# Patient Record
Sex: MALE | Race: WHITE | NOT HISPANIC OR LATINO | Employment: FULL TIME | URBAN - METROPOLITAN AREA
[De-identification: names, ages, dates, MRNs, and addresses within clinical notes are randomized per-mention and may not be internally consistent; named-entity substitution may affect disease eponyms.]

---

## 2017-08-03 ENCOUNTER — APPOINTMENT (OUTPATIENT)
Dept: LAB | Facility: MEDICAL CENTER | Age: 67
End: 2017-08-03

## 2017-08-03 ENCOUNTER — TRANSCRIBE ORDERS (OUTPATIENT)
Dept: URGENT CARE | Facility: MEDICAL CENTER | Age: 67
End: 2017-08-03

## 2017-08-03 ENCOUNTER — APPOINTMENT (OUTPATIENT)
Dept: URGENT CARE | Facility: MEDICAL CENTER | Age: 67
End: 2017-08-03

## 2017-08-03 DIAGNOSIS — Z02.1 PHYSICAL EXAM, PRE-EMPLOYMENT: ICD-10-CM

## 2017-08-03 DIAGNOSIS — Z02.1 PHYSICAL EXAM, PRE-EMPLOYMENT: Primary | ICD-10-CM

## 2017-08-03 LAB — RUBV IGG SERPL IA-ACNC: 56.5 IU/ML

## 2017-08-03 PROCEDURE — 86480 TB TEST CELL IMMUN MEASURE: CPT

## 2017-08-03 PROCEDURE — 86762 RUBELLA ANTIBODY: CPT

## 2017-08-03 PROCEDURE — 86735 MUMPS ANTIBODY: CPT

## 2017-08-03 PROCEDURE — 86787 VARICELLA-ZOSTER ANTIBODY: CPT

## 2017-08-03 PROCEDURE — 36415 COLL VENOUS BLD VENIPUNCTURE: CPT

## 2017-08-03 PROCEDURE — 86765 RUBEOLA ANTIBODY: CPT

## 2017-08-06 LAB
ANNOTATION COMMENT IMP: NORMAL
GAMMA INTERFERON BACKGROUND BLD IA-ACNC: 0.03 IU/ML
M TB IFN-G BLD-IMP: NEGATIVE
M TB IFN-G CD4+ BCKGRND COR BLD-ACNC: 0 IU/ML
M TB IFN-G CD4+ T-CELLS BLD-ACNC: 0.03 IU/ML
MITOGEN IGNF BLD-ACNC: 5.43 IU/ML
QUANTIFERON-TB GOLD IN TUBE: NORMAL
SERVICE CMNT-IMP: NORMAL

## 2017-08-08 LAB
MEV IGG SER QL: NORMAL
VZV IGG SER IA-ACNC: NORMAL

## 2017-08-10 LAB — MUV IGG SER QL: NORMAL

## 2020-12-29 ENCOUNTER — IMMUNIZATIONS (OUTPATIENT)
Dept: FAMILY MEDICINE CLINIC | Facility: HOSPITAL | Age: 70
End: 2020-12-29

## 2020-12-29 DIAGNOSIS — Z23 ENCOUNTER FOR IMMUNIZATION: ICD-10-CM

## 2020-12-29 PROCEDURE — 0011A SARS-COV-2 / COVID-19 MRNA VACCINE (MODERNA) 100 MCG: CPT

## 2020-12-29 PROCEDURE — 91301 SARS-COV-2 / COVID-19 MRNA VACCINE (MODERNA) 100 MCG: CPT

## 2021-01-06 DIAGNOSIS — Z11.59 SPECIAL SCREENING EXAMINATION FOR UNSPECIFIED VIRAL DISEASE: ICD-10-CM

## 2021-01-06 DIAGNOSIS — Z11.59 SPECIAL SCREENING EXAMINATION FOR UNSPECIFIED VIRAL DISEASE: Primary | ICD-10-CM

## 2021-01-06 LAB — SARS-COV-2 RNA RESP QL NAA+PROBE: POSITIVE

## 2021-01-06 PROCEDURE — 87635 SARS-COV-2 COVID-19 AMP PRB: CPT

## 2021-01-07 ENCOUNTER — TELEMEDICINE (OUTPATIENT)
Dept: FAMILY MEDICINE CLINIC | Facility: CLINIC | Age: 71
End: 2021-01-07
Payer: COMMERCIAL

## 2021-01-07 ENCOUNTER — HOSPITAL ENCOUNTER (OUTPATIENT)
Dept: INFUSION CENTER | Facility: HOSPITAL | Age: 71
Discharge: HOME | End: 2021-01-07
Payer: COMMERCIAL

## 2021-01-07 VITALS
OXYGEN SATURATION: 97 % | SYSTOLIC BLOOD PRESSURE: 113 MMHG | DIASTOLIC BLOOD PRESSURE: 56 MMHG | RESPIRATION RATE: 16 BRPM | TEMPERATURE: 96.5 F | HEART RATE: 55 BPM

## 2021-01-07 DIAGNOSIS — I10 ESSENTIAL HYPERTENSION: ICD-10-CM

## 2021-01-07 DIAGNOSIS — U07.1 COVID-19: Primary | ICD-10-CM

## 2021-01-07 PROCEDURE — 99214 OFFICE O/P EST MOD 30 MIN: CPT | Performed by: FAMILY MEDICINE

## 2021-01-07 PROCEDURE — M0239 BAMLANIVIMAB-XXXX INFUSION: HCPCS | Performed by: FAMILY MEDICINE

## 2021-01-07 RX ORDER — ACETAMINOPHEN 325 MG/1
650 TABLET ORAL ONCE AS NEEDED
Status: CANCELLED | OUTPATIENT
Start: 2021-01-07

## 2021-01-07 RX ORDER — SODIUM CHLORIDE 9 MG/ML
20 INJECTION, SOLUTION INTRAVENOUS ONCE
Status: CANCELLED | OUTPATIENT
Start: 2021-01-07

## 2021-01-07 RX ORDER — ALBUTEROL SULFATE 90 UG/1
3 AEROSOL, METERED RESPIRATORY (INHALATION) ONCE AS NEEDED
Status: DISCONTINUED | OUTPATIENT
Start: 2021-01-07 | End: 2021-01-10 | Stop reason: HOSPADM

## 2021-01-07 RX ORDER — ALBUTEROL SULFATE 90 UG/1
3 AEROSOL, METERED RESPIRATORY (INHALATION) ONCE AS NEEDED
Status: CANCELLED | OUTPATIENT
Start: 2021-01-07

## 2021-01-07 RX ORDER — ACETAMINOPHEN 325 MG/1
650 TABLET ORAL ONCE AS NEEDED
Status: DISCONTINUED | OUTPATIENT
Start: 2021-01-07 | End: 2021-01-10 | Stop reason: HOSPADM

## 2021-01-07 RX ORDER — SODIUM CHLORIDE 9 MG/ML
20 INJECTION, SOLUTION INTRAVENOUS ONCE
Status: COMPLETED | OUTPATIENT
Start: 2021-01-07 | End: 2021-01-07

## 2021-01-07 RX ORDER — IRBESARTAN 150 MG/1
150 TABLET ORAL
COMMUNITY
End: 2021-11-22

## 2021-01-07 RX ADMIN — SODIUM CHLORIDE 700 MG: 9 INJECTION, SOLUTION INTRAVENOUS at 13:10

## 2021-01-07 RX ADMIN — SODIUM CHLORIDE 20 ML/HR: 0.9 INJECTION, SOLUTION INTRAVENOUS at 12:44

## 2021-01-07 NOTE — PROGRESS NOTES
COVID-19 Virtual Visit     Assessment/Plan:    Problem List Items Addressed This Visit        Cardiovascular and Mediastinum    Hypertension     He notes his blood pressure has been typically well controlled  He did not have any vitals done today  Other    COVID-19 - Primary     The patient will be set up for monoclonal antibody infusion with Bamlanivimab as of today  Disposition:     I recommended self-quarantine for 10 days and to watch for symptoms until 14 days after exposure  If patient were to develop symptoms, they should self isolate and call our office for further guidance  Discussed COVID-19 antibody testing with the patient  If you choose to have a COVID-19 antibody test, you should understand some important limitations of this test   Antibody tests detect the body's immune response to infection rather than detecting the virus itself  It can take weeks for antibodies to show up in the blood  For this reason, antibody tests are not used to detect or manage infection  They may be better for tracking infections in the community  Current antibody tests have not undergone the usual strict FDA approval process  The FDA decided to make it easier to have more tests available  The result is that these new tests may not be reliable  Since COVID-19 antibody testing is so new, we do not know how accurate it is  You can have a positive test and have not actually been infected  You also can have a negative test even though you might have been infected  There are other coronaviruses that are known to cause the common cold  Many people may have antibodies to these other viruses that could make the COVID-19 antibody test positive  More importantly, even if you test positive, this does not necessarily mean you are immune to the virus  Even so, your [facility] provider understands that you may still want a COVID-19 antibody test and can order this test for you    It is important that you review the results with your provider and decide together how to use them  Patient is a candidate for Bamlanivimab  They were counseled in regards to risks, benefits, and side effects of this infusion  Possible side effects of bamlanivimab are: Allergic reactions   Allergic reactions can happen during and after infusion with bamlanivimab which include:    Fever, chills, nausea, headache, shortness of breath, low blood pressure, wheezing, swelling of your lips, face, or throat, rash including hives, itching, muscle aches, and dizziness  The side effects of getting any medicine by vein may include brief pain, bleeding, bruising of the skin, soreness, swelling, and possible infection at the infusion site  These are not all the possible side effects of bamlanivimab  Not a lot of people have been given bamlanivimab  Serious and unexpected side effects may happen  Heike Keita is still being studied so it is possible that all of the risks are not known at this time  Please note that this drug was approved under the Emergency Use Authorization of the FDA and has not gone through the full, formal FDA approval process    It is possible that bamlanivimab could interfere with your body's own ability to fight off a future infection of SARS-CoV-2  Similarly, bamlanivimab may reduce your bodys immune response to a vaccine for SARS-CoV-2  Specific studies have not been conducted to address these possible risks  Currently there is no data or safety or efficacy of COVID-19 vaccination in persons who received monoclonal antibodies as part of COVID-19 treatment  Treatment should be deferred for at least 90 days to avoid interference of the treatment with vaccine-induced immune responses (this is based on estimated half-life of therapies and evidence suggesting reinfection is uncommon within 90 days of initial infection)      The patient consents to proceed with bamlanivimab infusion  *[email or web address]  [email or web address]/[email or web address]-[email or web address]-factsheet-hcp  pdf    I have spent 15 minutes directly with the patient  Greater than 50% of this time was spent in counseling/coordination of care regarding: diagnostic results, prognosis, risks and benefits of treatment options and impressions  The patient is an excellent candidate for monoclonal antibody infusion  He agrees to contact me immediately with any worsening symptoms  We will do a follow-up visit tomorrow  Encounter provider Earl Lopez MD    Provider located at [address] [place] [address] [id number] [phone]    Recent Visits  No visits were found meeting these conditions  Showing recent visits within past 7 days and meeting all other requirements     Today's Visits  Date Type Provider Dept   01/07/21 Telemedicine Earl Lopez MD [facility]   Showing today's visits and meeting all other requirements     Future Appointments  No visits were found meeting these conditions  Showing future appointments within next 150 days and meeting all other requirements      This virtual check-in was done via [employer] and patient was informed that this is not a secure, HIPAA-compliant platform  He agrees to proceed  Patient agrees to participate in a virtual check in via telephone or video visit instead of presenting to the office to address urgent/immediate medical needs  Patient is aware this is a billable service  After connecting through [facility], the patient was identified by name and date of birth  Rosario Tyler was informed that this was a telemedicine visit and that the exam was being conducted confidentially over secure lines  My office door was closed  No one else was in the room  Rosario Tyler acknowledged consent and understanding of privacy and security of the telemedicine visit   I informed the patient that I have reviewed his record in Epic and presented the opportunity for him to ask any questions regarding the visit today  The patient agreed to participate  Subjective:   Jorge Alberto Cooper is a 79 y o  male who is concerned about COVID-19  Patient's symptoms include fatigue, anosmia, cough, chest tightness (with deep breathing), abdominal pain, diarrhea and myalgias  Patient denies fever, chills, malaise, congestion, rhinorrhea, sore throat, loss of taste, shortness of breath, nausea, vomiting and headaches  Date of symptom onset: 1/6/2020    Exposure:   Contact with a person who is under investigation (PUI) for or who is positive for COVID-19 within the last 14 days?: Yes    Hospitalized recently for fever and/or lower respiratory symptoms?: No      Currently a healthcare worker that is involved in direct patient care?: No      Works in a special setting where the risk of COVID-19 transmission may be high? (this may include long-term care, correctional and retirement facilities; homeless shelters; assisted-living facilities and group homes ): No      Resident in a special setting where the risk of COVID-19 transmission may be high? 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  He does wear proper PPE  He is not aware of any specific high risk exposure but presented with symptoms within the past 1-2 days  His symptoms include cough, mild chest tightness with deep breathing as well as diarrhea and abdominal pain  He has history of hypertension which he notes has been pretty well controlled  He is treated for depressive illness which is stable with Lexapro      Lab Results   Component Value Date    SARSCOV2 Positive (A) 01/06/2021     Past Medical History:   Diagnosis Date    Disease of thyroid gland     GERD (gastroesophageal reflux disease)     Hypertension     Tinnitus      Past Surgical History:   Procedure Laterality Date    ADENOIDECTOMY      COLONOSCOPY N/A 7/16/2016    Procedure: COLONOSCOPY;  Surgeon: Curtis Noble MD;  [facility];  Service:     HERNIA REPAIR      TONSILLECTOMY      UVULECTOMY       Current Outpatient Medications   Medication Sig Dispense Refill    aspirin 81 MG tablet Take 81 mg by mouth daily   escitalopram (LEXAPRO) 10 mg tablet       levothyroxine 125 mcg tablet Take 125 mcg by mouth daily   tamsulosin (FLOMAX) 0 4 mg Take 0 4 mg by mouth 2 (two) times a day Indications: Enlarged Prostate   valsartan (DIOVAN) 160 mg tablet Take 160 mg by mouth daily  No current facility-administered medications for this visit  No Known Allergies    Review of Systems   Constitutional: Positive for fatigue  Negative for chills and fever  HENT: Negative for congestion, rhinorrhea and sore throat  Respiratory: Positive for cough and chest tightness (with deep breathing)  Negative for shortness of breath  Cardiovascular: Negative for chest pain, palpitations and leg swelling  Gastrointestinal: Positive for abdominal pain and diarrhea  Negative for nausea and vomiting  Musculoskeletal: Positive for myalgias  Neurological: Negative for headaches  Objective: There were no vitals filed for this visit  Physical Exam  VIRTUAL VISIT DISCLAIMER    Christianne Reynoso acknowledges that he has consented to an online visit or consultation  He understands that the online visit is based solely on information provided by him, and that, in the absence of a face-to-face physical evaluation by the physician, the diagnosis he receives is both limited and provisional in terms of accuracy and completeness  This is not intended to replace a full medical face-to-face evaluation by the physician  Christianne Reynoso understands and accepts these terms

## 2021-01-07 NOTE — ASSESSMENT & PLAN NOTE
He notes his blood pressure has been typically well controlled  He did not have any vitals done today

## 2021-01-07 NOTE — PROGRESS NOTES
Observed 1 hour post infusion, no adverse effects noted  Patient left ambulatory in stable condition

## 2021-01-08 ENCOUNTER — TELEMEDICINE (OUTPATIENT)
Dept: FAMILY MEDICINE CLINIC | Facility: CLINIC | Age: 71
End: 2021-01-08
Payer: COMMERCIAL

## 2021-01-08 VITALS — OXYGEN SATURATION: 97 %

## 2021-01-08 DIAGNOSIS — U07.1 COVID-19: Primary | ICD-10-CM

## 2021-01-08 PROCEDURE — 99213 OFFICE O/P EST LOW 20 MIN: CPT | Performed by: FAMILY MEDICINE

## 2021-01-08 NOTE — LETTER
January 8, 2021     Patient: Jared Aguirre   YOB: 1950   Date of Visit: 1/8/2021       To Whom it May Concern:    Jared Aguirre is under my professional care  He was seen virtually in my office on 1/8/2021  He may return to work on 1/17/21  If you have any questions or concerns, please don't hesitate to call           Sincerely,          Alecia Radford MD        CC: No Recipients

## 2021-01-08 NOTE — PROGRESS NOTES
COVID-19 Virtual Visit     Assessment/Plan:    Problem List Items Addressed This Visit        Other    COVID-19 - Primary     The patient underwent successful monoclonal antibody treatment and is feeling better  He looks quite well on virtual exam today  He feels all of his symptoms have improved  He will contact me with any worsening symptoms  I did recommend follow-up with either myself or his primary care provider for his chronic health issues  Disposition:     I have spent 15 minutes directly with the patient  Greater than 50% of this time was spent in counseling/coordination of care regarding: prognosis and impressions  The patient underwent Bamlanivimab treatment yesterday  Encounter provider Rush Posadas MD    Provider located at [address] [place] [address] [address] [place] [phone]    Recent Visits  Date Type Provider Dept   01/07/21 Telemedicine Rush Posadas MD [facility]   Showing recent visits within past 7 days and meeting all other requirements     Today's Visits  Date Type Provider Dept   01/08/21 Telemedicine Rush Posadas MD [facility]   Showing today's visits and meeting all other requirements     Future Appointments  No visits were found meeting these conditions  Showing future appointments within next 150 days and meeting all other requirements      This virtual check-in was done via [employer] and patient was informed that this is not a secure, HIPAA-compliant platform  He agrees to proceed  Patient agrees to participate in a virtual check in via telephone or video visit instead of presenting to the office to address urgent/immediate medical needs  Patient is aware this is a billable service  After connecting through [facility], the patient was identified by name and date of birth   Alex Ngoc was informed that this was a telemedicine visit and that the exam was being conducted confidentially over secure lines  Jennifer Gusman acknowledged consent and understanding of privacy and security of the telemedicine visit  I informed the patient that I have reviewed his record in Epic and presented the opportunity for him to ask any questions regarding the visit today  The patient agreed to participate  Subjective:   Jennifer Gusman is a 79 y o  male who has been screened for COVID-19  Symptom change since last report: improving  Patient's symptoms include cough, myalgias (Improved) and headache  Patient denies fever, chills, congestion, rhinorrhea, sore throat, anosmia, loss of taste, shortness of breath, chest tightness, abdominal pain, nausea, vomiting and diarrhea  Enrico Vega has been staying home and has isolated themselves in his home  He is taking care to not share personal items and is cleaning all surfaces that are touched often, like counters, tabletops, and doorknobs using household cleaning sprays or wipes  He is wearing a mask when he leaves his room  Date of symptom onset: 1/6/2020  Date of positive COVID-19 PCR: 1/6/2021    Monoclonal Antibody Follow-up Symptom Questionnaire  I feel overall: much better  My breathing is: much better  My fever is: same  My fatigue is: much better    The patient underwent monoclonal antibody treatment yesterday  He is feeling well  He feels he is much improved  He had no issues with the vaccine  He denies any fever or chills  He is having a mild cough but denies shortness of breath  Myalgias have improved significantly  Fatigue also has improved      Lab Results   Component Value Date    SARSCOV2 Positive (A) 01/06/2021     Past Medical History:   Diagnosis Date    Disease of thyroid gland     GERD (gastroesophageal reflux disease)     Hypertension     Tinnitus      Past Surgical History:   Procedure Laterality Date    ADENOIDECTOMY      COLONOSCOPY N/A 7/16/2016    Procedure: COLONOSCOPY;  Surgeon: Nereida Cogan, MD;  [facility];  Service:     HERNIA REPAIR      TONSILLECTOMY      UVULECTOMY       Current Outpatient Medications   Medication Sig Dispense Refill    aspirin 81 MG tablet Take 81 mg by mouth daily   escitalopram (LEXAPRO) 10 mg tablet       irbesartan (AVAPRO) 150 mg tablet Take 150 mg by mouth daily at bedtime      levothyroxine 125 mcg tablet Take 125 mcg by mouth daily   tamsulosin (FLOMAX) 0 4 mg Take 0 4 mg by mouth 2 (two) times a day Indications: Enlarged Prostate   valsartan (DIOVAN) 160 mg tablet Take 160 mg by mouth daily  No current facility-administered medications for this visit  Facility-Administered Medications Ordered in Other Visits   Medication Dose Route Frequency Provider Last Rate Last Admin    acetaminophen (TYLENOL) tablet 650 mg  650 mg Oral Once PRN Diane Whalen MD        albuterol (PROVENTIL HFA,VENTOLIN HFA) inhaler 3 puff  3 puff Inhalation Once PRN Diane Whalen MD         No Known Allergies    Review of Systems   Constitutional: Negative for chills and fever  HENT: Negative for congestion, rhinorrhea and sore throat  Respiratory: Positive for cough  Negative for chest tightness and shortness of breath  Gastrointestinal: Negative for abdominal pain, diarrhea, nausea and vomiting  Musculoskeletal: Positive for myalgias (Improved)  Neurological: Positive for headaches  Objective:    Vitals:    01/08/21 1459   SpO2: 97%       Physical Exam  VIRTUAL VISIT DISCLAIMER    Alex Grossman acknowledges that he has consented to an online visit or consultation  He understands that the online visit is based solely on information provided by him, and that, in the absence of a face-to-face physical evaluation by the physician, the diagnosis he receives is both limited and provisional in terms of accuracy and completeness  This is not intended to replace a full medical face-to-face evaluation by the physician  Doreen Griffith understands and accepts these terms

## 2021-01-12 DIAGNOSIS — N40.0 BENIGN PROSTATIC HYPERPLASIA, UNSPECIFIED WHETHER LOWER URINARY TRACT SYMPTOMS PRESENT: ICD-10-CM

## 2021-01-12 DIAGNOSIS — E03.9 HYPOTHYROIDISM, UNSPECIFIED TYPE: Primary | ICD-10-CM

## 2021-01-12 DIAGNOSIS — E55.9 VITAMIN D DEFICIENCY: ICD-10-CM

## 2021-01-12 DIAGNOSIS — Z13.220 SCREENING FOR HYPERLIPIDEMIA: ICD-10-CM

## 2021-01-12 DIAGNOSIS — I10 ESSENTIAL HYPERTENSION: ICD-10-CM

## 2021-01-12 DIAGNOSIS — Z12.11 SCREENING FOR COLON CANCER: ICD-10-CM

## 2021-03-08 ENCOUNTER — LAB (OUTPATIENT)
Dept: LAB | Age: 71
End: 2021-03-08
Payer: COMMERCIAL

## 2021-03-08 ENCOUNTER — OFFICE VISIT (OUTPATIENT)
Dept: INTERNAL MEDICINE CLINIC | Facility: CLINIC | Age: 71
End: 2021-03-08
Payer: COMMERCIAL

## 2021-03-08 VITALS
HEIGHT: 69 IN | WEIGHT: 207.8 LBS | BODY MASS INDEX: 30.78 KG/M2 | SYSTOLIC BLOOD PRESSURE: 122 MMHG | OXYGEN SATURATION: 96 % | HEART RATE: 75 BPM | DIASTOLIC BLOOD PRESSURE: 64 MMHG

## 2021-03-08 DIAGNOSIS — Z82.49 FAMILY HISTORY OF EARLY CAD: ICD-10-CM

## 2021-03-08 DIAGNOSIS — E03.9 HYPOTHYROIDISM, UNSPECIFIED TYPE: Primary | ICD-10-CM

## 2021-03-08 DIAGNOSIS — Z13.6 ENCOUNTER FOR SCREENING FOR STENOSIS OF CAROTID ARTERY: ICD-10-CM

## 2021-03-08 DIAGNOSIS — Z13.220 SCREENING FOR HYPERLIPIDEMIA: Primary | ICD-10-CM

## 2021-03-08 DIAGNOSIS — N40.0 BENIGN PROSTATIC HYPERPLASIA, UNSPECIFIED WHETHER LOWER URINARY TRACT SYMPTOMS PRESENT: ICD-10-CM

## 2021-03-08 DIAGNOSIS — I10 ESSENTIAL HYPERTENSION: ICD-10-CM

## 2021-03-08 PROCEDURE — 84154 ASSAY OF PSA FREE: CPT | Performed by: INTERNAL MEDICINE

## 2021-03-08 PROCEDURE — 1101F PT FALLS ASSESS-DOCD LE1/YR: CPT | Performed by: INTERNAL MEDICINE

## 2021-03-08 PROCEDURE — 3074F SYST BP LT 130 MM HG: CPT | Performed by: INTERNAL MEDICINE

## 2021-03-08 PROCEDURE — 36415 COLL VENOUS BLD VENIPUNCTURE: CPT | Performed by: INTERNAL MEDICINE

## 2021-03-08 PROCEDURE — 82306 VITAMIN D 25 HYDROXY: CPT | Performed by: INTERNAL MEDICINE

## 2021-03-08 PROCEDURE — 85025 COMPLETE CBC W/AUTO DIFF WBC: CPT | Performed by: INTERNAL MEDICINE

## 2021-03-08 PROCEDURE — 3008F BODY MASS INDEX DOCD: CPT | Performed by: INTERNAL MEDICINE

## 2021-03-08 PROCEDURE — 84443 ASSAY THYROID STIM HORMONE: CPT | Performed by: INTERNAL MEDICINE

## 2021-03-08 PROCEDURE — 80061 LIPID PANEL: CPT

## 2021-03-08 PROCEDURE — 80053 COMPREHEN METABOLIC PANEL: CPT | Performed by: INTERNAL MEDICINE

## 2021-03-08 PROCEDURE — 3288F FALL RISK ASSESSMENT DOCD: CPT | Performed by: INTERNAL MEDICINE

## 2021-03-08 PROCEDURE — 93000 ELECTROCARDIOGRAM COMPLETE: CPT | Performed by: INTERNAL MEDICINE

## 2021-03-08 PROCEDURE — 99203 OFFICE O/P NEW LOW 30 MIN: CPT | Performed by: INTERNAL MEDICINE

## 2021-03-08 PROCEDURE — 3078F DIAST BP <80 MM HG: CPT | Performed by: INTERNAL MEDICINE

## 2021-03-08 PROCEDURE — 3725F SCREEN DEPRESSION PERFORMED: CPT | Performed by: INTERNAL MEDICINE

## 2021-03-08 PROCEDURE — 84153 ASSAY OF PSA TOTAL: CPT | Performed by: INTERNAL MEDICINE

## 2021-03-08 PROCEDURE — 83036 HEMOGLOBIN GLYCOSYLATED A1C: CPT | Performed by: INTERNAL MEDICINE

## 2021-03-08 PROCEDURE — 1160F RVW MEDS BY RX/DR IN RCRD: CPT | Performed by: INTERNAL MEDICINE

## 2021-03-08 RX ORDER — METHOCARBAMOL 750 MG/1
1500 TABLET, FILM COATED ORAL 2 TIMES DAILY
COMMUNITY
Start: 2020-12-01 | End: 2021-11-22

## 2021-03-08 RX ORDER — FINASTERIDE 5 MG/1
5 TABLET, FILM COATED ORAL DAILY
Qty: 90 TABLET | Refills: 1 | Status: SHIPPED | OUTPATIENT
Start: 2021-03-08 | End: 2021-11-22

## 2021-03-08 NOTE — PROGRESS NOTES
Assessment/Plan:    #HTN  -BP stable  -continue irbesartan    #Hypothyroid  -history autoimmune thyroiditis requiring radioactive iodine  -now on 125mcg levothyroxine daily    #BPH  -urinating 3-4 times nightly  -will add on proscar to flomax    #Ventral Hernia  -noted over abdomen and underwent repair  -denies pain  -also has inguinal hernia repairs b/l    #HLD  -awaiting lipids  -EKG NSR rate 70  -stress test and carotid US pending  -uses elliptical 45 minutes daily    #Depression  -stable on lexapro    #COVID  -diagnosed in January and underwent antibody infusion with resolution    #Health Maintenance  -routine labs and followup 6 months  -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  -covid vaccine scheduled  -colonoscopy due 2026    BMI Counseling: Body mass index is 30 69 kg/m²  Discussed the patient's BMI with him  The BMI is above normal  Nutrition recommendations include reducing portion sizes, reducing fast food intake, moderation in carbohydrate intake and reducing intake of saturated fat and trans fat  Exercise recommendations include vigorous aerobic physical activity for 75 minutes/week and exercising 3-5 times per week  No problem-specific Assessment & Plan notes found for this encounter  Diagnoses and all orders for this visit:    Hypothyroidism, unspecified type    Essential hypertension  -     VAS carotid complete study; Future    Benign prostatic hyperplasia, unspecified whether lower urinary tract symptoms present  -     finasteride (PROSCAR) 5 mg tablet; Take 1 tablet (5 mg total) by mouth daily    Encounter for screening for stenosis of carotid artery  -     VAS carotid complete study; Future    Family history of early CAD  -     POCT ECG  -     Stress test only, exercise; Future    Other orders  -     methocarbamol (ROBAXIN) 750 mg tablet;  Take 1,500 mg by mouth 2 (two) times a day  -     Cancel: PNEUMOCOCCAL POLYSACCHARIDE VACCINE 23-VALENT =>1YO SQ IM            Current Outpatient Medications:   aspirin 81 MG tablet, Take 81 mg by mouth daily  , Disp: , Rfl:     escitalopram (LEXAPRO) 10 mg tablet, , Disp: , Rfl:     finasteride (PROSCAR) 5 mg tablet, Take 1 tablet (5 mg total) by mouth daily, Disp: 90 tablet, Rfl: 1    irbesartan (AVAPRO) 150 mg tablet, Take 150 mg by mouth daily at bedtime, Disp: , Rfl:     levothyroxine 125 mcg tablet, Take 125 mcg by mouth daily  , Disp: , Rfl:     methocarbamol (ROBAXIN) 750 mg tablet, Take 1,500 mg by mouth 2 (two) times a day, Disp: , Rfl:     tamsulosin (FLOMAX) 0 4 mg, Take 0 4 mg by mouth 2 (two) times a day Indications: Enlarged Prostate , Disp: , Rfl:     Subjective:      Patient ID: Sandie Lawson is a 79 y o  male  HPI    Patient presents for a new patient visit  Reports that he had COVID back in January and received antibiotic infusion  He currently is asymptomatic  He states that he received the 1st dose of the COVID vaccine prior to this  He is due for the 2nd vaccine after April  Patient has hypothyroidism high  He had autoimmune thyroiditis and was given radioactive iodine which ablated his thyroid  He is currently taking 125 mcg levothyroxine daily without any issues  We are waiting results of his TS H  Patient has BPH and is currently taking Flomax 0 4 mg twice a day  He states that he is urinating approximately 4 times a night and as result we will add on Proscar for more relief  He has hypertension and remains on 896 mg orbit certain  States that his blood pressure stable  He  Is also taking Lexapro and currently doing well  Patient does report a family history of coronary artery disease in his father    We will obtain EKG and obtain an ultrasound of his carotid as well as exercise stress test     The following portions of the patient's history were reviewed and updated as appropriate: allergies, current medications, past family history, past medical history, past social history, past surgical history and problem list     Review of Systems   Constitutional: Negative for activity change, appetite change, fatigue and fever  HENT: Negative for congestion, ear pain, hearing loss, sore throat and tinnitus  Eyes: Negative for photophobia, pain and visual disturbance  Respiratory: Negative for cough, shortness of breath and wheezing  Cardiovascular: Negative for chest pain, palpitations and leg swelling  Gastrointestinal: Negative for abdominal distention, abdominal pain, constipation, diarrhea, nausea and vomiting  Genitourinary: Positive for frequency  Negative for difficulty urinating, hematuria and urgency  Nocturia 3-4 times   Musculoskeletal: Negative for arthralgias, back pain, gait problem, joint swelling, myalgias, neck pain and neck stiffness  Skin: Negative for color change, pallor, rash and wound  Neurological: Negative for dizziness, tremors, weakness, numbness and headaches  Hematological: Does not bruise/bleed easily  Objective:      /64   Pulse 75   Ht 5' 9" (1 753 m)   Wt 94 3 kg (207 lb 12 8 oz)   SpO2 96%   BMI 30 69 kg/m²          Physical Exam  Vitals signs reviewed  Constitutional:       Appearance: Normal appearance  He is well-developed  HENT:      Head: Normocephalic and atraumatic  Right Ear: Tympanic membrane, ear canal and external ear normal  There is no impacted cerumen  Left Ear: Tympanic membrane, ear canal and external ear normal  There is no impacted cerumen  Eyes:      Conjunctiva/sclera: Conjunctivae normal       Pupils: Pupils are equal, round, and reactive to light  Neck:      Musculoskeletal: Normal range of motion and neck supple  Thyroid: No thyromegaly  Vascular: No JVD  Cardiovascular:      Rate and Rhythm: Normal rate and regular rhythm  Pulses: Normal pulses  Heart sounds: Normal heart sounds  No murmur  No gallop  Pulmonary:      Effort: Pulmonary effort is normal  No respiratory distress  Breath sounds: Normal breath sounds  No stridor  No wheezing, rhonchi or rales  Abdominal:      General: Bowel sounds are normal  There is no distension  Palpations: Abdomen is soft  There is no mass  Tenderness: There is no abdominal tenderness  There is no right CVA tenderness, left CVA tenderness, guarding or rebound  Musculoskeletal: Normal range of motion  General: Tenderness (shoulders and medial left ankle) present  No deformity  Right lower leg: No edema  Left lower leg: No edema  Lymphadenopathy:      Cervical: No cervical adenopathy  Skin:     General: Skin is warm and dry  Findings: No erythema or rash  Neurological:      Mental Status: He is alert and oriented to person, place, and time  Deep Tendon Reflexes: Reflexes are normal and symmetric  This note was completed in part utilizing [employer] direct voice recognition software  Grammatical errors, random word insertion, spelling mistakes, and incomplete sentences may be an occasional consequence of the system secondary to software limitations, ambient noise and hardware issues  At the time of dictation, efforts were made to edit, clarify and /or correct errors  Please read the chart carefully and recognize, using context, where substitutions have occurred  If you have any questions or concerns about the context, text or information contained within the body of this dictation, please contact myself, the provider, for further clarification

## 2021-03-09 LAB
25(OH)D3 SERPL-MCNC: 36.1 NG/ML (ref 30–100)
ALBUMIN SERPL BCP-MCNC: 4.5 G/DL (ref 3.5–5)
ALP SERPL-CCNC: 81 U/L (ref 46–116)
ALT SERPL W P-5'-P-CCNC: 25 U/L (ref 12–78)
ANION GAP SERPL CALCULATED.3IONS-SCNC: 5 MMOL/L (ref 4–13)
AST SERPL W P-5'-P-CCNC: 18 U/L (ref 5–45)
BASOPHILS # BLD AUTO: 0.05 THOUSANDS/ΜL (ref 0–0.1)
BASOPHILS NFR BLD AUTO: 1 % (ref 0–1)
BILIRUB SERPL-MCNC: 1.05 MG/DL (ref 0.2–1)
BUN SERPL-MCNC: 21 MG/DL (ref 5–25)
CALCIUM SERPL-MCNC: 9.1 MG/DL (ref 8.3–10.1)
CHLORIDE SERPL-SCNC: 107 MMOL/L (ref 100–108)
CHOLEST SERPL-MCNC: 255 MG/DL (ref 50–200)
CO2 SERPL-SCNC: 27 MMOL/L (ref 21–32)
CREAT SERPL-MCNC: 1 MG/DL (ref 0.6–1.3)
EOSINOPHIL # BLD AUTO: 0.34 THOUSAND/ΜL (ref 0–0.61)
EOSINOPHIL NFR BLD AUTO: 5 % (ref 0–6)
ERYTHROCYTE [DISTWIDTH] IN BLOOD BY AUTOMATED COUNT: 14.2 % (ref 11.6–15.1)
EST. AVERAGE GLUCOSE BLD GHB EST-MCNC: 103 MG/DL
GFR SERPL CREATININE-BSD FRML MDRD: 76 ML/MIN/1.73SQ M
GLUCOSE P FAST SERPL-MCNC: 91 MG/DL (ref 65–99)
HBA1C MFR BLD: 5.2 %
HCT VFR BLD AUTO: 41.5 % (ref 36.5–49.3)
HDLC SERPL-MCNC: 45 MG/DL
HGB BLD-MCNC: 13.6 G/DL (ref 12–17)
IMM GRANULOCYTES # BLD AUTO: 0.03 THOUSAND/UL (ref 0–0.2)
IMM GRANULOCYTES NFR BLD AUTO: 0 % (ref 0–2)
LDLC SERPL CALC-MCNC: 184 MG/DL (ref 0–100)
LYMPHOCYTES # BLD AUTO: 1.71 THOUSANDS/ΜL (ref 0.6–4.47)
LYMPHOCYTES NFR BLD AUTO: 24 % (ref 14–44)
MCH RBC QN AUTO: 31.3 PG (ref 26.8–34.3)
MCHC RBC AUTO-ENTMCNC: 32.8 G/DL (ref 31.4–37.4)
MCV RBC AUTO: 96 FL (ref 82–98)
MONOCYTES # BLD AUTO: 0.55 THOUSAND/ΜL (ref 0.17–1.22)
MONOCYTES NFR BLD AUTO: 8 % (ref 4–12)
NEUTROPHILS # BLD AUTO: 4.53 THOUSANDS/ΜL (ref 1.85–7.62)
NEUTS SEG NFR BLD AUTO: 62 % (ref 43–75)
NRBC BLD AUTO-RTO: 0 /100 WBCS
PLATELET # BLD AUTO: 247 THOUSANDS/UL (ref 149–390)
PMV BLD AUTO: 10.3 FL (ref 8.9–12.7)
POTASSIUM SERPL-SCNC: 4.2 MMOL/L (ref 3.5–5.3)
PROT SERPL-MCNC: 7.4 G/DL (ref 6.4–8.2)
RBC # BLD AUTO: 4.34 MILLION/UL (ref 3.88–5.62)
SODIUM SERPL-SCNC: 139 MMOL/L (ref 136–145)
TRIGL SERPL-MCNC: 130 MG/DL
TSH SERPL DL<=0.05 MIU/L-ACNC: 1.49 UIU/ML (ref 0.36–3.74)
WBC # BLD AUTO: 7.21 THOUSAND/UL (ref 4.31–10.16)

## 2021-03-10 LAB
PSA FREE MFR SERPL: 17.2 %
PSA FREE SERPL-MCNC: 1.24 NG/ML
PSA SERPL-MCNC: 7.2 NG/ML (ref 0–4)

## 2021-03-11 ENCOUNTER — TELEPHONE (OUTPATIENT)
Dept: INTERNAL MEDICINE CLINIC | Facility: CLINIC | Age: 71
End: 2021-03-11

## 2021-03-11 DIAGNOSIS — R97.20 ELEVATED PSA: ICD-10-CM

## 2021-03-11 DIAGNOSIS — E03.9 HYPOTHYROIDISM, UNSPECIFIED TYPE: ICD-10-CM

## 2021-03-11 DIAGNOSIS — I10 ESSENTIAL HYPERTENSION: ICD-10-CM

## 2021-03-11 DIAGNOSIS — E78.00 PURE HYPERCHOLESTEROLEMIA: ICD-10-CM

## 2021-03-11 DIAGNOSIS — N40.0 BENIGN PROSTATIC HYPERPLASIA, UNSPECIFIED WHETHER LOWER URINARY TRACT SYMPTOMS PRESENT: Primary | ICD-10-CM

## 2021-03-11 RX ORDER — ROSUVASTATIN CALCIUM 20 MG/1
20 TABLET, COATED ORAL DAILY
COMMUNITY
End: 2022-02-09

## 2021-03-12 ENCOUNTER — IMMUNIZATIONS (OUTPATIENT)
Dept: FAMILY MEDICINE CLINIC | Facility: HOSPITAL | Age: 71
End: 2021-03-12

## 2021-03-12 DIAGNOSIS — Z23 ENCOUNTER FOR IMMUNIZATION: Primary | ICD-10-CM

## 2021-03-12 PROCEDURE — 91301 SARS-COV-2 / COVID-19 MRNA VACCINE (MODERNA) 100 MCG: CPT

## 2021-03-12 PROCEDURE — 0012A SARS-COV-2 / COVID-19 MRNA VACCINE (MODERNA) 100 MCG: CPT

## 2021-05-04 ENCOUNTER — TELEPHONE (OUTPATIENT)
Dept: UROLOGY | Facility: AMBULATORY SURGERY CENTER | Age: 71
End: 2021-05-04

## 2021-05-04 NOTE — TELEPHONE ENCOUNTER
Available appointments open with [facility] tomorrow  Called patient and left message seeing if he wanted to move appointment up to tomorrow as spots are available as of now   Patient to call back

## 2021-05-26 ENCOUNTER — TELEPHONE (OUTPATIENT)
Dept: UROLOGY | Facility: AMBULATORY SURGERY CENTER | Age: 71
End: 2021-05-26

## 2021-05-26 ENCOUNTER — CONSULT (OUTPATIENT)
Dept: UROLOGY | Facility: AMBULATORY SURGERY CENTER | Age: 71
End: 2021-05-26
Payer: COMMERCIAL

## 2021-05-26 VITALS
HEART RATE: 66 BPM | HEIGHT: 69 IN | SYSTOLIC BLOOD PRESSURE: 130 MMHG | DIASTOLIC BLOOD PRESSURE: 76 MMHG | WEIGHT: 210 LBS | BODY MASS INDEX: 31.1 KG/M2

## 2021-05-26 DIAGNOSIS — R97.20 ELEVATED PSA: Primary | ICD-10-CM

## 2021-05-26 PROCEDURE — 1160F RVW MEDS BY RX/DR IN RCRD: CPT | Performed by: UROLOGY

## 2021-05-26 PROCEDURE — 99244 OFF/OP CNSLTJ NEW/EST MOD 40: CPT | Performed by: UROLOGY

## 2021-05-26 PROCEDURE — 1036F TOBACCO NON-USER: CPT | Performed by: UROLOGY

## 2021-05-26 NOTE — PROGRESS NOTES
5/26/2021    [facility]  1950  [id number]        Assessment    Elevated PSA , BPH      Discussion   I recommend repeating a PSA  Assuming that the PSA remains elevated I would proceed with transrectal ultrasound-guided biopsy of the prostate  Risk and benefits of the biopsy discussed and reviewed and the patient is amenable with this plan  He will return 1 hour prior to his biopsy for intramuscular ceftriaxone  I will continue with the tamsulosin and hold on the finasteride until his prostate biopsy is completed  History of Present Illness  79 y o  male with a history of   An elevated PSA of 7 2 from March 2021  % free PSA is 17 2  He states that his brother had prostate cancer and unfortunately passed from other comorbidities  He states his brother was diagnosed approximately 79years of age  He states that he has significant nocturia and a known history of BPH  He is currently taking tamsulosin  He was prescribed finasteride but states he is holding the finasteride until his office evaluation today  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 2   How often have you had to urinate again less than two hours after you finished urinating? 5   How often have you found you stopped and started again several times when you urinate? 3   How often have you found it difficult to postpone urination? 5   How often have you had a weak urinary stream?  3   How often have you had to push or strain to begin urination? 2   How many times did you most typically get up to urinate from the time you went to bed at night until the time you got up in the morning?   4   Quality of Life: If you were to spend the rest of your life with your urinary condition just the way it is now, how would you feel about that?  4   AUA SYMPTOM SCORE  24          Review of Systems  Review of Systems   Constitutional: Negative  HENT: Negative  Eyes: Negative  Respiratory: Negative  Cardiovascular: Negative  Gastrointestinal: Negative  Endocrine: Negative  Genitourinary:        Per HPI   Musculoskeletal: Negative  Skin: Negative  Allergic/Immunologic: Negative  Neurological: Negative  Hematological: Negative  Psychiatric/Behavioral: Negative  All other systems reviewed and are negative  Past Medical History  Past Medical History:   Diagnosis Date    Disease of thyroid gland     GERD (gastroesophageal reflux disease)     Hypertension     Tinnitus        Past Social History  Past Surgical History:   Procedure Laterality Date    ADENOIDECTOMY      COLONOSCOPY N/A 7/16/2016    Procedure: COLONOSCOPY;  Surgeon: Caprice Pedro MD;  [facility];  Service:     HERNIA REPAIR      TONSILLECTOMY      UVULECTOMY         Past Family History  History reviewed  No pertinent family history      Past Social history  Social History     Socioeconomic History    Marital status: /Civil Union     Spouse name: Not on file    Number of children: Not on file    Years of education: Not on file    Highest education level: Not on file   Occupational History    Not on file   Social Needs    Financial resource strain: Not on file    Food insecurity     Worry: Not on file     Inability: Not on file   [employer] needs     Medical: Not on file     Non-medical: Not on file   Tobacco Use    Smoking status: Never Smoker    Smokeless tobacco: Never Used   Substance and Sexual Activity    Alcohol use: Yes     Comment: Rarely    Drug use: Never    Sexual activity: Not on file   Lifestyle    Physical activity     Days per week: Not on file     Minutes per session: Not on file    Stress: Not on file   Relationships    Social connections     Talks on phone: Not on file     Gets together: Not on file     Attends Denominational service: Not on file     Active member of club or organization: Not on file     Attends meetings of clubs or organizations: Not on file     Relationship status: Not on file    Intimate partner violence     Fear of current or ex partner: Not on file     Emotionally abused: Not on file     Physically abused: Not on file     Forced sexual activity: Not on file   Other Topics Concern    Not on file   Social History Narrative    Not on file       Current Medications  Current Outpatient Medications   Medication Sig Dispense Refill    aspirin 81 MG tablet Take 81 mg by mouth daily   escitalopram (LEXAPRO) 10 mg tablet       irbesartan (AVAPRO) 150 mg tablet Take 150 mg by mouth daily at bedtime      levothyroxine 125 mcg tablet Take 125 mcg by mouth daily   methocarbamol (ROBAXIN) 750 mg tablet Take 1,500 mg by mouth 2 (two) times a day      rosuvastatin (CRESTOR) 20 MG tablet Take 20 mg by mouth daily      tamsulosin (FLOMAX) 0 4 mg Take 0 4 mg by mouth 2 (two) times a day Indications: Enlarged Prostate   finasteride (PROSCAR) 5 mg tablet Take 1 tablet (5 mg total) by mouth daily (Patient not taking: Reported on 5/26/2021) 90 tablet 1     No current facility-administered medications for this visit  Allergies  No Known Allergies    Past Medical History, Social History, Family History, medications and allergies were reviewed  Vitals  Vitals:    05/26/21 1056   BP: 130/76   BP Location: Left arm   Patient Position: Sitting   Cuff Size: Adult   Pulse: 66   Weight: 95 3 kg (210 lb)   Height: 5' 9" (1 753 m)       Physical Exam  Physical Exam   on examination he is in no acute distress    Gait normal   Affect normal      Results  Lab Results   Component Value Date    PSA 7 2 (H) 03/08/2021     Lab Results   Component Value Date    CALCIUM 9 1 03/08/2021    K 4 2 03/08/2021    CO2 27 03/08/2021     03/08/2021    BUN 21 03/08/2021    CREATININE 1 00 03/08/2021     Lab Results   Component Value Date    WBC 7 21 03/08/2021    HGB 13 6 03/08/2021    HCT 41 5 03/08/2021    MCV 96 03/08/2021     03/08/2021         Office Urine Dip  No results found for this or any previous visit (from the past 1 hour(s))  ]      Total visit time was 40 minutes of which over 50% was spent on counseling

## 2021-05-26 NOTE — TELEPHONE ENCOUNTER
Patient seen by Dr Shakira Yung today  He needs TRUX BX ASAP/June with repeat PSA as per Dr   Please assist in scheduling  Thank you!

## 2021-05-27 NOTE — TELEPHONE ENCOUNTER
Patient tentatively scheduled for TRUS Biopsy with Dr Jonathan Thomas in [place] on 6/08/2021 - antibiotic injection at 2 pm and biopsy at 3 pm  Please call Patient to inform and confirm appointment  Please also ensure Patient is aware/has prep  Thank you

## 2021-05-28 NOTE — TELEPHONE ENCOUNTER
Advised pt that if we were cancel that appointment, we would not have anything available until Sept and that we would have to call him back to reschedule  Pt stated he would try to accommodate this appointment  I requested a call back next Tuesday if he cannot make this appointment so we can use the spot

## 2021-05-28 NOTE — TELEPHONE ENCOUNTER
Patient called stating he is not able to make the appointment 06/08 for biopsy  He stated he has available 06/09, 06/16,06/22 and 06/25       Patient can be reached at [phone] (H)

## 2021-06-08 ENCOUNTER — PROCEDURE VISIT (OUTPATIENT)
Dept: UROLOGY | Facility: AMBULATORY SURGERY CENTER | Age: 71
End: 2021-06-08
Payer: COMMERCIAL

## 2021-06-08 VITALS
HEART RATE: 67 BPM | HEIGHT: 69 IN | BODY MASS INDEX: 30.81 KG/M2 | SYSTOLIC BLOOD PRESSURE: 126 MMHG | DIASTOLIC BLOOD PRESSURE: 76 MMHG | WEIGHT: 208 LBS

## 2021-06-08 DIAGNOSIS — R97.20 ELEVATED PSA: Primary | ICD-10-CM

## 2021-06-08 PROCEDURE — 88342 IMHCHEM/IMCYTCHM 1ST ANTB: CPT | Performed by: PATHOLOGY

## 2021-06-08 PROCEDURE — G0416 PROSTATE BIOPSY, ANY MTHD: HCPCS | Performed by: PATHOLOGY

## 2021-06-08 PROCEDURE — 88344 IMHCHEM/IMCYTCHM EA MLT ANTB: CPT | Performed by: PATHOLOGY

## 2021-06-08 PROCEDURE — 76942 ECHO GUIDE FOR BIOPSY: CPT | Performed by: UROLOGY

## 2021-06-08 PROCEDURE — 3008F BODY MASS INDEX DOCD: CPT | Performed by: UROLOGY

## 2021-06-08 PROCEDURE — 96372 THER/PROPH/DIAG INJ SC/IM: CPT

## 2021-06-08 PROCEDURE — 55700 PR BIOPSY OF PROSTATE,NEEDLE/PUNCH: CPT | Performed by: UROLOGY

## 2021-06-08 RX ORDER — CEFTRIAXONE 1 G/1
1000 INJECTION, POWDER, FOR SOLUTION INTRAMUSCULAR; INTRAVENOUS EVERY 24 HOURS
Status: DISCONTINUED | OUTPATIENT
Start: 2021-06-08 | End: 2022-02-04 | Stop reason: ALTCHOICE

## 2021-06-08 RX ADMIN — CEFTRIAXONE 1000 MG: 1 INJECTION, POWDER, FOR SOLUTION INTRAMUSCULAR; INTRAVENOUS at 14:16

## 2021-06-08 NOTE — PROGRESS NOTES
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  We discussed performing a prostate biopsy  I have recommended repeating a  PSA, however, this was not performed prior to today's office visit  Prostate Biopsy note: The patient returns to the office today to undergo a transrectal ultrasound-guided biopsy of the prostate secondary to an elevated PSA  Risk and benefits of the procedure were discussed  Informed consent was obtained  The patient's prebiopsy preparation was deemed to be adequate  Antibiotics had been taken as prescribed  If appropriate blood thinners had been placed on hold  The patient completed an enema as prescribed  The patient was placed in the lateral decubitus position  Digital rectal examination was performed revealing a 80+ gram prostate  Viscous lidocaine jelly was instilled into the rectum  Transrectal ultrasonography was then performed  The prostate measured 152 grams  5 cc of 2% lidocaine were then injected bilaterally between the junction of the base of the prostate and the seminal vesicles  Ultrasound guidance was utilized to place the needle into proper position for the administration of the local anesthetic  A standard 12 core biopsy was then performed  Three cores were taken from each peripheral zone and 3 cores from each central zone bilaterally  Overall the patient tolerated the procedure and there were no complications  My overall impression status post transrectal ultrasound and biopsy the prostate secondary to an elevated PSA  I have recommended rest and completing antibiotics  Possible postbiopsy sequelae were discussed including hematuria and hematospermia  I've asked that he return in the next one to 2 weeks to review the results of the biopsy

## 2021-06-11 NOTE — TELEPHONE ENCOUNTER
Spoke to Patient and scheduled for 6/16/2021 at 9 am with Dr Scott Coleman in [place] due to Patient's work schedule  Patient repeats back appointment information and address  Knows to call office with questions or concerns in the interim

## 2021-06-14 ENCOUNTER — TELEPHONE (OUTPATIENT)
Dept: UROLOGY | Facility: AMBULATORY SURGERY CENTER | Age: 71
End: 2021-06-14

## 2021-06-14 DIAGNOSIS — N40.1 BENIGN PROSTATIC HYPERPLASIA WITH LOWER URINARY TRACT SYMPTOMS, SYMPTOM DETAILS UNSPECIFIED: Primary | ICD-10-CM

## 2021-06-14 RX ORDER — FINASTERIDE 5 MG/1
5 TABLET, FILM COATED ORAL DAILY
Qty: 90 TABLET | Refills: 3 | Status: SHIPPED | OUTPATIENT
Start: 2021-06-14 | End: 2022-02-11

## 2021-06-14 NOTE — TELEPHONE ENCOUNTER
Connected with patient via telephone to inform him that his prostate biopsy is negative  He had previously reviewed the results on MyChart  His prostate measured 152 g and we discussed that his BPH is likely driving his PSA  Based on his lower urinary tract symptoms and size of his prostate I recommend starting finasteride 5 mg daily  He is amenable with this plan  Follow-up will be in 6 months with a PSA and his office visit later this week can be canceled

## 2021-07-12 NOTE — TELEPHONE ENCOUNTER
LM for pt TENTATIVELY scheduled pt with Dr Jonathan Thomas in [facility] on 12/9/21 at [address] [place] [address], waiting for pt to cb to confirm

## 2021-07-12 NOTE — TELEPHONE ENCOUNTER
Please call Patient and offer appointment for follow up on 12/9 with Dr Kassandra Chaidez in [facility] with PSA prior to visit  Thank you

## 2021-07-16 NOTE — TELEPHONE ENCOUNTER
Left message for patient making him aware that I would be mailing him appointment card and script for PSA to be done one week prior to this appointment  Instructed him to call us if this appointment doesn't work for him

## 2021-08-11 ENCOUNTER — APPOINTMENT (OUTPATIENT)
Dept: LAB | Facility: HOSPITAL | Age: 71
End: 2021-08-11
Payer: COMMERCIAL

## 2021-08-11 ENCOUNTER — OFFICE VISIT (OUTPATIENT)
Dept: RHEUMATOLOGY | Facility: CLINIC | Age: 71
End: 2021-08-11
Payer: COMMERCIAL

## 2021-08-11 VITALS
HEIGHT: 69 IN | WEIGHT: 206.2 LBS | SYSTOLIC BLOOD PRESSURE: 110 MMHG | HEART RATE: 75 BPM | DIASTOLIC BLOOD PRESSURE: 65 MMHG | BODY MASS INDEX: 30.54 KG/M2

## 2021-08-11 DIAGNOSIS — M19.011 BILATERAL SHOULDER REGION ARTHRITIS: Primary | ICD-10-CM

## 2021-08-11 DIAGNOSIS — I10 ESSENTIAL HYPERTENSION: ICD-10-CM

## 2021-08-11 DIAGNOSIS — E03.9 HYPOTHYROIDISM, UNSPECIFIED TYPE: ICD-10-CM

## 2021-08-11 DIAGNOSIS — E78.00 PURE HYPERCHOLESTEROLEMIA: ICD-10-CM

## 2021-08-11 DIAGNOSIS — M62.81 MUSCLE WEAKNESS: ICD-10-CM

## 2021-08-11 DIAGNOSIS — N40.0 BENIGN PROSTATIC HYPERPLASIA, UNSPECIFIED WHETHER LOWER URINARY TRACT SYMPTOMS PRESENT: ICD-10-CM

## 2021-08-11 DIAGNOSIS — M19.90 INFLAMMATORY ARTHRITIS: ICD-10-CM

## 2021-08-11 DIAGNOSIS — M19.90 INFLAMMATORY ARTHRITIS: Primary | ICD-10-CM

## 2021-08-11 DIAGNOSIS — M19.012 BILATERAL SHOULDER REGION ARTHRITIS: Primary | ICD-10-CM

## 2021-08-11 DIAGNOSIS — R97.20 ELEVATED PSA: ICD-10-CM

## 2021-08-11 LAB
ALBUMIN SERPL BCP-MCNC: 4.1 G/DL (ref 3.5–5)
ALP SERPL-CCNC: 70 U/L (ref 46–116)
ALT SERPL W P-5'-P-CCNC: 25 U/L (ref 12–78)
ANION GAP SERPL CALCULATED.3IONS-SCNC: 5 MMOL/L (ref 4–13)
AST SERPL W P-5'-P-CCNC: 17 U/L (ref 5–45)
BASOPHILS # BLD AUTO: 0.05 THOUSANDS/ΜL (ref 0–0.1)
BASOPHILS NFR BLD AUTO: 1 % (ref 0–1)
BILIRUB SERPL-MCNC: 0.86 MG/DL (ref 0.2–1)
BUN SERPL-MCNC: 19 MG/DL (ref 5–25)
CALCIUM SERPL-MCNC: 8.7 MG/DL (ref 8.3–10.1)
CHLORIDE SERPL-SCNC: 109 MMOL/L (ref 100–108)
CHOLEST SERPL-MCNC: 148 MG/DL (ref 50–200)
CK SERPL-CCNC: 78 U/L (ref 39–308)
CO2 SERPL-SCNC: 25 MMOL/L (ref 21–32)
CREAT SERPL-MCNC: 1 MG/DL (ref 0.6–1.3)
CRP SERPL QL: <3 MG/L
EOSINOPHIL # BLD AUTO: 0.31 THOUSAND/ΜL (ref 0–0.61)
EOSINOPHIL NFR BLD AUTO: 4 % (ref 0–6)
ERYTHROCYTE [DISTWIDTH] IN BLOOD BY AUTOMATED COUNT: 13.2 % (ref 11.6–15.1)
ERYTHROCYTE [SEDIMENTATION RATE] IN BLOOD: 10 MM/HOUR (ref 0–19)
GFR SERPL CREATININE-BSD FRML MDRD: 75 ML/MIN/1.73SQ M
GLUCOSE SERPL-MCNC: 91 MG/DL (ref 65–140)
HCT VFR BLD AUTO: 40.5 % (ref 36.5–49.3)
HDLC SERPL-MCNC: 52 MG/DL
HGB BLD-MCNC: 13.3 G/DL (ref 12–17)
IMM GRANULOCYTES # BLD AUTO: 0.03 THOUSAND/UL (ref 0–0.2)
IMM GRANULOCYTES NFR BLD AUTO: 0 % (ref 0–2)
LDH SERPL-CCNC: 178 U/L (ref 81–234)
LDLC SERPL CALC-MCNC: 72 MG/DL (ref 0–100)
LDLC SERPL DIRECT ASSAY-MCNC: 78 MG/DL (ref 0–100)
LYMPHOCYTES # BLD AUTO: 1.54 THOUSANDS/ΜL (ref 0.6–4.47)
LYMPHOCYTES NFR BLD AUTO: 22 % (ref 14–44)
MCH RBC QN AUTO: 31.7 PG (ref 26.8–34.3)
MCHC RBC AUTO-ENTMCNC: 32.8 G/DL (ref 31.4–37.4)
MCV RBC AUTO: 97 FL (ref 82–98)
MONOCYTES # BLD AUTO: 0.62 THOUSAND/ΜL (ref 0.17–1.22)
MONOCYTES NFR BLD AUTO: 9 % (ref 4–12)
NEUTROPHILS # BLD AUTO: 4.47 THOUSANDS/ΜL (ref 1.85–7.62)
NEUTS SEG NFR BLD AUTO: 64 % (ref 43–75)
NONHDLC SERPL-MCNC: 96 MG/DL
NRBC BLD AUTO-RTO: 0 /100 WBCS
PLATELET # BLD AUTO: 266 THOUSANDS/UL (ref 149–390)
PMV BLD AUTO: 9.9 FL (ref 8.9–12.7)
POTASSIUM SERPL-SCNC: 4.8 MMOL/L (ref 3.5–5.3)
PROT SERPL-MCNC: 7 G/DL (ref 6.4–8.2)
RBC # BLD AUTO: 4.19 MILLION/UL (ref 3.88–5.62)
SODIUM SERPL-SCNC: 139 MMOL/L (ref 136–145)
TRIGL SERPL-MCNC: 121 MG/DL
TSH SERPL DL<=0.05 MIU/L-ACNC: 1 UIU/ML (ref 0.36–3.74)
WBC # BLD AUTO: 7.02 THOUSAND/UL (ref 4.31–10.16)

## 2021-08-11 PROCEDURE — 99204 OFFICE O/P NEW MOD 45 MIN: CPT | Performed by: INTERNAL MEDICINE

## 2021-08-11 PROCEDURE — 85652 RBC SED RATE AUTOMATED: CPT

## 2021-08-11 PROCEDURE — 83615 LACTATE (LD) (LDH) ENZYME: CPT

## 2021-08-11 PROCEDURE — 1036F TOBACCO NON-USER: CPT | Performed by: INTERNAL MEDICINE

## 2021-08-11 PROCEDURE — 1160F RVW MEDS BY RX/DR IN RCRD: CPT | Performed by: INTERNAL MEDICINE

## 2021-08-11 PROCEDURE — 83721 ASSAY OF BLOOD LIPOPROTEIN: CPT

## 2021-08-11 PROCEDURE — 86140 C-REACTIVE PROTEIN: CPT

## 2021-08-11 PROCEDURE — 3008F BODY MASS INDEX DOCD: CPT | Performed by: INTERNAL MEDICINE

## 2021-08-11 PROCEDURE — 85025 COMPLETE CBC W/AUTO DIFF WBC: CPT

## 2021-08-11 PROCEDURE — 80053 COMPREHEN METABOLIC PANEL: CPT

## 2021-08-11 PROCEDURE — 80061 LIPID PANEL: CPT

## 2021-08-11 PROCEDURE — 82085 ASSAY OF ALDOLASE: CPT

## 2021-08-11 PROCEDURE — 82550 ASSAY OF CK (CPK): CPT

## 2021-08-11 PROCEDURE — 84443 ASSAY THYROID STIM HORMONE: CPT

## 2021-08-11 PROCEDURE — 36415 COLL VENOUS BLD VENIPUNCTURE: CPT

## 2021-08-11 RX ORDER — METHYLPREDNISOLONE 8 MG/1
TABLET ORAL
Qty: 33 TABLET | Refills: 2 | Status: SHIPPED | OUTPATIENT
Start: 2021-08-11 | End: 2021-08-31

## 2021-08-11 NOTE — PATIENT INSTRUCTIONS
Please obtain blood work prior to your next visit in 3 months  You may use over-the-counter topical Voltaren gel 4 times daily as needed on affected painful areas  You may use steroids as needed for painful flares  Please start taking over-the-counter coenzyme Q 200 mg daily  Please continue following a healthy lifestyle and dietary modifications  Please follow-up with your primary care doctor routinely  Should you have any questions or concerns please call the office or schedule an appointment as needed

## 2021-08-12 LAB — ALDOLASE SERPL-CCNC: 3.4 U/L (ref 3.3–10.3)

## 2021-08-16 ENCOUNTER — TELEPHONE (OUTPATIENT)
Dept: INTERNAL MEDICINE CLINIC | Facility: CLINIC | Age: 71
End: 2021-08-16

## 2021-08-16 NOTE — TELEPHONE ENCOUNTER
----- Message from Darby Nuno DO sent at 8/13/2021  2:32 PM EDT -----  Please let patient know his thyroid was normal along with his cholesterol  He was scheduled to come in September but it was cancelled, please have him come in again to check up on him

## 2021-11-16 ENCOUNTER — TELEPHONE (OUTPATIENT)
Dept: INTERNAL MEDICINE CLINIC | Facility: CLINIC | Age: 71
End: 2021-11-16

## 2021-11-22 ENCOUNTER — OFFICE VISIT (OUTPATIENT)
Dept: INTERNAL MEDICINE CLINIC | Facility: CLINIC | Age: 71
End: 2021-11-22
Payer: COMMERCIAL

## 2021-11-22 VITALS
WEIGHT: 206 LBS | OXYGEN SATURATION: 97 % | RESPIRATION RATE: 16 BRPM | HEART RATE: 82 BPM | BODY MASS INDEX: 30.51 KG/M2 | DIASTOLIC BLOOD PRESSURE: 78 MMHG | SYSTOLIC BLOOD PRESSURE: 126 MMHG | HEIGHT: 69 IN

## 2021-11-22 DIAGNOSIS — I10 ESSENTIAL HYPERTENSION: ICD-10-CM

## 2021-11-22 DIAGNOSIS — E03.9 HYPOTHYROIDISM, UNSPECIFIED TYPE: ICD-10-CM

## 2021-11-22 DIAGNOSIS — K43.9 VENTRAL HERNIA WITHOUT OBSTRUCTION OR GANGRENE: Primary | ICD-10-CM

## 2021-11-22 DIAGNOSIS — I65.23 CAROTID STENOSIS, ASYMPTOMATIC, BILATERAL: ICD-10-CM

## 2021-11-22 DIAGNOSIS — E78.00 PURE HYPERCHOLESTEROLEMIA: ICD-10-CM

## 2021-11-22 DIAGNOSIS — R97.20 ELEVATED PSA: ICD-10-CM

## 2021-11-22 PROCEDURE — 99397 PER PM REEVAL EST PAT 65+ YR: CPT | Performed by: INTERNAL MEDICINE

## 2021-11-22 PROCEDURE — 3008F BODY MASS INDEX DOCD: CPT | Performed by: INTERNAL MEDICINE

## 2021-11-22 PROCEDURE — 93000 ELECTROCARDIOGRAM COMPLETE: CPT | Performed by: INTERNAL MEDICINE

## 2021-11-22 RX ORDER — HYDROCODONE BITARTRATE AND ACETAMINOPHEN 5; 325 MG/1; MG/1
1 TABLET ORAL EVERY 4 HOURS PRN
COMMUNITY
Start: 2021-10-04 | End: 2021-11-22

## 2021-11-22 RX ORDER — AZITHROMYCIN 250 MG/1
TABLET, FILM COATED ORAL
COMMUNITY
Start: 2021-11-20 | End: 2022-02-04 | Stop reason: ALTCHOICE

## 2021-11-22 RX ORDER — IRBESARTAN 300 MG/1
150 TABLET ORAL EVERY MORNING
COMMUNITY
Start: 2021-10-24 | End: 2022-03-06 | Stop reason: HOSPADM

## 2021-11-22 RX ORDER — AMOXICILLIN 875 MG/1
TABLET, COATED ORAL
COMMUNITY
Start: 2021-09-29 | End: 2021-11-22

## 2021-11-22 RX ORDER — CHLORHEXIDINE GLUCONATE 0.12 MG/ML
RINSE ORAL
COMMUNITY
Start: 2021-10-18 | End: 2022-02-11

## 2021-11-22 RX ORDER — AMOXICILLIN AND CLAVULANATE POTASSIUM 875; 125 MG/1; MG/1
TABLET, FILM COATED ORAL
COMMUNITY
Start: 2021-11-02 | End: 2021-11-22

## 2021-12-01 ENCOUNTER — CONSULT (OUTPATIENT)
Dept: SURGERY | Facility: CLINIC | Age: 71
End: 2021-12-01
Payer: COMMERCIAL

## 2021-12-01 VITALS — HEIGHT: 69 IN | WEIGHT: 190 LBS | BODY MASS INDEX: 28.14 KG/M2

## 2021-12-01 DIAGNOSIS — M62.08 RECTUS DIASTASIS: ICD-10-CM

## 2021-12-01 PROCEDURE — 99242 OFF/OP CONSLTJ NEW/EST SF 20: CPT | Performed by: SURGERY

## 2021-12-01 PROCEDURE — 3008F BODY MASS INDEX DOCD: CPT | Performed by: SURGERY

## 2021-12-15 ENCOUNTER — IMMUNIZATIONS (OUTPATIENT)
Dept: FAMILY MEDICINE CLINIC | Facility: HOSPITAL | Age: 71
End: 2021-12-15

## 2021-12-15 DIAGNOSIS — Z23 ENCOUNTER FOR IMMUNIZATION: Primary | ICD-10-CM

## 2021-12-15 PROCEDURE — 0064A COVID-19 MODERNA VACC 0.25 ML BOOSTER: CPT

## 2021-12-15 PROCEDURE — 91306 COVID-19 MODERNA VACC 0.25 ML BOOSTER: CPT

## 2022-01-01 DIAGNOSIS — C16.9 GASTRIC ADENOCARCINOMA (HCC): Primary | ICD-10-CM

## 2022-01-01 DIAGNOSIS — I10 PRIMARY HYPERTENSION: Primary | ICD-10-CM

## 2022-01-01 DIAGNOSIS — E03.9 HYPOTHYROIDISM, UNSPECIFIED TYPE: Primary | ICD-10-CM

## 2022-01-01 DIAGNOSIS — E87.6 HYPOPOTASSEMIA: ICD-10-CM

## 2022-01-01 DIAGNOSIS — E87.6 HYPOKALEMIA: ICD-10-CM

## 2022-01-01 DIAGNOSIS — E83.42 HYPOMAGNESEMIA: Primary | ICD-10-CM

## 2022-01-01 DIAGNOSIS — C16.9 MALIGNANT NEOPLASM OF STOMACH, UNSPECIFIED LOCATION (HCC): Primary | ICD-10-CM

## 2022-01-01 DIAGNOSIS — R30.0 DYSURIA: Primary | ICD-10-CM

## 2022-01-01 LAB
BACTERIA UR QL AUTO: ABNORMAL /HPF
BILIRUB UR QL STRIP: NEGATIVE
CLARITY UR: CLEAR
COLOR UR: YELLOW
CREAT UR-MCNC: 60.5 MG/DL
GLUCOSE UR STRIP-MCNC: NEGATIVE MG/DL
HGB UR QL STRIP.AUTO: NEGATIVE
KETONES UR STRIP-MCNC: NEGATIVE MG/DL
LEUKOCYTE ESTERASE UR QL STRIP: ABNORMAL
NITRITE UR QL STRIP: NEGATIVE
NON-SQ EPI CELLS URNS QL MICRO: ABNORMAL /HPF
PH UR STRIP.AUTO: 7 [PH]
POTASSIUM UR-SCNC: 49 MMOL/L
PROT UR STRIP-MCNC: ABNORMAL MG/DL
RBC #/AREA URNS AUTO: ABNORMAL /HPF
SCAN RESULT: NORMAL
SODIUM 24H UR-SCNC: 44 MOL/L
SP GR UR STRIP.AUTO: 1.01 (ref 1–1.03)
UROBILINOGEN UR QL STRIP.AUTO: 0.2 E.U./DL
WBC #/AREA URNS AUTO: ABNORMAL /HPF

## 2022-01-01 PROCEDURE — 84133 ASSAY OF URINE POTASSIUM: CPT

## 2022-01-01 PROCEDURE — 83735 ASSAY OF MAGNESIUM: CPT

## 2022-01-01 PROCEDURE — 82570 ASSAY OF URINE CREATININE: CPT

## 2022-01-01 PROCEDURE — 84300 ASSAY OF URINE SODIUM: CPT

## 2022-01-01 PROCEDURE — 81001 URINALYSIS AUTO W/SCOPE: CPT

## 2022-01-01 RX ORDER — POTASSIUM CHLORIDE 750 MG/1
10 TABLET, EXTENDED RELEASE ORAL 3 TIMES DAILY
Qty: 90 TABLET | Refills: 5 | Status: SHIPPED | OUTPATIENT
Start: 2022-01-01 | End: 2022-01-01 | Stop reason: SDUPTHER

## 2022-01-01 RX ORDER — LEVOTHYROXINE SODIUM 0.15 MG/1
150 TABLET ORAL
Qty: 90 TABLET | Refills: 3 | Status: SHIPPED | OUTPATIENT
Start: 2022-01-01 | End: 2023-01-01

## 2022-01-18 ENCOUNTER — OFFICE VISIT (OUTPATIENT)
Dept: INTERNAL MEDICINE CLINIC | Facility: CLINIC | Age: 72
End: 2022-01-18
Payer: COMMERCIAL

## 2022-01-18 VITALS
HEIGHT: 69 IN | RESPIRATION RATE: 16 BRPM | DIASTOLIC BLOOD PRESSURE: 68 MMHG | BODY MASS INDEX: 27.4 KG/M2 | TEMPERATURE: 98 F | SYSTOLIC BLOOD PRESSURE: 120 MMHG | WEIGHT: 185 LBS | HEART RATE: 70 BPM | OXYGEN SATURATION: 99 %

## 2022-01-18 DIAGNOSIS — K80.50 BILIARY COLIC: Primary | ICD-10-CM

## 2022-01-18 PROCEDURE — 3008F BODY MASS INDEX DOCD: CPT | Performed by: INTERNAL MEDICINE

## 2022-01-18 PROCEDURE — 1036F TOBACCO NON-USER: CPT | Performed by: INTERNAL MEDICINE

## 2022-01-18 PROCEDURE — 1160F RVW MEDS BY RX/DR IN RCRD: CPT | Performed by: INTERNAL MEDICINE

## 2022-01-18 PROCEDURE — 99214 OFFICE O/P EST MOD 30 MIN: CPT | Performed by: INTERNAL MEDICINE

## 2022-01-18 NOTE — PROGRESS NOTES
Assessment/Plan:    #Biliary Colic  -present 2 weeks with RUQ pain  -lasted 6 hours  -had vomiting and nausea  -triggered by fatty foods  -denies fever, diarrhea  -now with slight tenderness in RUQ  -will obtain RUQ US, hepatic function panel, lipase,   -refer to surgery    #Dental Graft  -has left upper mandible bone graft with plans for implants  -developed draining maxillary sinus tract infection and treated with multiple rounds of clindamycin and augmentin and now resolved    Addendum 2/4/22 patient complete EGD with hiatal hernia and esophageal mass  Will await bx results    No problem-specific Assessment & Plan notes found for this encounter  Diagnoses and all orders for this visit:    Biliary colic  -     Basic metabolic panel  -     Lipase  -     Hepatic function panel  -     US right upper quadrant; Future  -     Ambulatory Referral to General Surgery; Future            Current Outpatient Medications:     aspirin 81 MG tablet, Take 81 mg by mouth daily  , Disp: , Rfl:     azithromycin (ZITHROMAX) 250 mg tablet, take 2 tablets by mouth TODAY then take 1 tablet DAILY FOR 4 DAYS, Disp: , Rfl:     chlorhexidine (PERIDEX) 0 12 % solution, FILL SYRINGE WITH 10CC AND IRRIGATE UPPER LEFT AREA IN MOUTH, Disp: , Rfl:     co-enzyme Q-10 30 mg, Take 90 mg by mouth 2 (two) times a day, Disp: , Rfl:     escitalopram (LEXAPRO) 10 mg tablet, , Disp: , Rfl:     finasteride (PROSCAR) 5 mg tablet, Take 1 tablet (5 mg total) by mouth daily, Disp: 90 tablet, Rfl: 3    irbesartan (AVAPRO) 300 mg tablet, , Disp: , Rfl:     levothyroxine 125 mcg tablet, Take 125 mcg by mouth daily  , Disp: , Rfl:     rosuvastatin (CRESTOR) 20 MG tablet, Take 20 mg by mouth daily, Disp: , Rfl:     tamsulosin (FLOMAX) 0 4 mg, Take 0 4 mg by mouth 2 (two) times a day Indications: Enlarged Prostate , Disp: , Rfl:     Current Facility-Administered Medications:     cefTRIAXone (ROCEPHIN) injection 1,000 mg, 1,000 mg, Intramuscular, Q24H, Roverto Barros MD, 1,000 mg at 06/08/21 1416    Subjective:      Patient ID: Liss Breaux is a 70 y o  male  HPI      Patient presents for an acute visit  Reports that 3 months ago he had a bone graft surgery for preparation for implants and his left upper gum  States that he then developed left maxillary sinusitis and saw ENT and was treated with antibiotics and steroids  He then developed a sinus tract which was draining pus  He then started taking Augmentin as well as clindamycin and another course of Augmentin following that  States that last week and the week before that he started to have a decreased appetite as well as biliary colic  He had right upper quadrant pain that lasted for about 6 hours  He has lost approximately 20 lb  He has had a right upper quadrant ultrasound in the past with a history of stones  He denies any jaundice or fever  We will check the right upper quadrant ultrasound at this time as well as hepatic function panel and lipase  Patient reports that his pain does get worse whenever he eats fatty foods  We will also give him a referral to see surgery  If his pain worsens he is aware that he needs    The following portions of the patient's history were reviewed and updated as appropriate: allergies, current medications, past family history, past medical history, past social history, past surgical history and problem list     Review of Systems   Constitutional: Negative for activity change, appetite change, fatigue and fever  HENT: Negative for congestion and sore throat  Respiratory: Negative for cough, shortness of breath and wheezing  Cardiovascular: Negative for chest pain  Gastrointestinal: Positive for abdominal pain (right upper side)  Negative for constipation, diarrhea, nausea and vomiting  Musculoskeletal: Negative for arthralgias, back pain, neck pain and neck stiffness  Neurological: Negative for dizziness and headaches  Objective:      /68   Pulse 70   Temp 98 °F (36 7 °C)   Resp 16   Ht 5' 9" (1 753 m)   Wt 83 9 kg (185 lb)   SpO2 99%   BMI 27 32 kg/m²          Physical Exam  Vitals reviewed  Constitutional:       Appearance: He is well-developed  HENT:      Head: Normocephalic and atraumatic  Right Ear: External ear normal       Left Ear: External ear normal       Nose: Nose normal    Eyes:      Conjunctiva/sclera: Conjunctivae normal       Pupils: Pupils are equal, round, and reactive to light  Neck:      Thyroid: No thyromegaly  Vascular: No JVD  Cardiovascular:      Rate and Rhythm: Normal rate and regular rhythm  Pulses: Normal pulses  Heart sounds: Normal heart sounds  No murmur heard  Pulmonary:      Effort: Pulmonary effort is normal  No respiratory distress  Breath sounds: Normal breath sounds  No stridor  No wheezing, rhonchi or rales  Abdominal:      General: Abdomen is flat  Bowel sounds are normal  There is no distension  Palpations: Abdomen is soft  There is no mass  Tenderness: There is abdominal tenderness (RUQ)  There is no right CVA tenderness, left CVA tenderness, guarding or rebound  Comments: diasthesis recti   Musculoskeletal:         General: No tenderness or deformity  Normal range of motion  Cervical back: Normal range of motion and neck supple  Right lower leg: No edema  Left lower leg: No edema  Lymphadenopathy:      Cervical: No cervical adenopathy  Skin:     General: Skin is warm and dry  Findings: No erythema or rash  Neurological:      Mental Status: He is alert and oriented to person, place, and time  Deep Tendon Reflexes: Reflexes are normal and symmetric  This note was completed in part utilizing [employer] direct voice recognition software     Grammatical errors, random word insertion, spelling mistakes, and incomplete sentences may be an occasional consequence of the system secondary to software limitations, ambient noise and hardware issues  At the time of dictation, efforts were made to edit, clarify and /or correct errors  Please read the chart carefully and recognize, using context, where substitutions have occurred  If you have any questions or concerns about the context, text or information contained within the body of this dictation, please contact myself, the provider, for further clarification

## 2022-01-19 ENCOUNTER — APPOINTMENT (OUTPATIENT)
Dept: LAB | Age: 72
End: 2022-01-19
Payer: COMMERCIAL

## 2022-01-19 ENCOUNTER — HOSPITAL ENCOUNTER (OUTPATIENT)
Dept: RADIOLOGY | Age: 72
Discharge: HOME | End: 2022-01-19
Payer: COMMERCIAL

## 2022-01-19 DIAGNOSIS — K80.50 BILIARY COLIC: ICD-10-CM

## 2022-01-19 LAB
ALBUMIN SERPL BCP-MCNC: 3.6 G/DL (ref 3.5–5)
ALP SERPL-CCNC: 128 U/L (ref 46–116)
ALT SERPL W P-5'-P-CCNC: 25 U/L (ref 12–78)
ANION GAP SERPL CALCULATED.3IONS-SCNC: 6 MMOL/L (ref 4–13)
AST SERPL W P-5'-P-CCNC: 23 U/L (ref 5–45)
BILIRUB DIRECT SERPL-MCNC: 0.19 MG/DL (ref 0–0.2)
BILIRUB SERPL-MCNC: 1.38 MG/DL (ref 0.2–1)
BUN SERPL-MCNC: 17 MG/DL (ref 5–25)
CALCIUM SERPL-MCNC: 9.3 MG/DL (ref 8.3–10.1)
CHLORIDE SERPL-SCNC: 106 MMOL/L (ref 100–108)
CO2 SERPL-SCNC: 25 MMOL/L (ref 21–32)
CREAT SERPL-MCNC: 0.84 MG/DL (ref 0.6–1.3)
GFR SERPL CREATININE-BSD FRML MDRD: 88 ML/MIN/1.73SQ M
GLUCOSE P FAST SERPL-MCNC: 91 MG/DL (ref 65–99)
LIPASE SERPL-CCNC: 64 U/L (ref 73–393)
POTASSIUM SERPL-SCNC: 3.9 MMOL/L (ref 3.5–5.3)
PROT SERPL-MCNC: 7.4 G/DL (ref 6.4–8.2)
SODIUM SERPL-SCNC: 137 MMOL/L (ref 136–145)

## 2022-01-19 PROCEDURE — 80076 HEPATIC FUNCTION PANEL: CPT | Performed by: INTERNAL MEDICINE

## 2022-01-19 PROCEDURE — 80048 BASIC METABOLIC PNL TOTAL CA: CPT | Performed by: INTERNAL MEDICINE

## 2022-01-19 PROCEDURE — 76705 ECHO EXAM OF ABDOMEN: CPT

## 2022-01-19 PROCEDURE — 83690 ASSAY OF LIPASE: CPT | Performed by: INTERNAL MEDICINE

## 2022-01-19 PROCEDURE — 36415 COLL VENOUS BLD VENIPUNCTURE: CPT | Performed by: INTERNAL MEDICINE

## 2022-01-20 ENCOUNTER — TELEPHONE (OUTPATIENT)
Dept: INTERNAL MEDICINE CLINIC | Facility: CLINIC | Age: 72
End: 2022-01-20

## 2022-01-20 NOTE — TELEPHONE ENCOUNTER
----- Message from Sherry Farfan DO sent at 1/19/2022 10:39 AM EST -----  Patient completed US revealing right sided 8mm renal stones as well  Please have him see urology Dr Sarkis Wolfe for an evaluation since this could also be causing his right upper quadrant pain

## 2022-01-24 ENCOUNTER — ANESTHESIA EVENT (EMERGENCY)
Dept: PERIOP | Facility: HOSPITAL | Age: 72
End: 2022-01-24
Payer: COMMERCIAL

## 2022-01-24 ENCOUNTER — APPOINTMENT (EMERGENCY)
Dept: ULTRASOUND IMAGING | Facility: HOSPITAL | Age: 72
End: 2022-01-24
Payer: COMMERCIAL

## 2022-01-24 ENCOUNTER — ANESTHESIA (EMERGENCY)
Dept: PERIOP | Facility: HOSPITAL | Age: 72
End: 2022-01-24
Payer: COMMERCIAL

## 2022-01-24 ENCOUNTER — HOSPITAL ENCOUNTER (EMERGENCY)
Facility: HOSPITAL | Age: 72
Discharge: HOME | End: 2022-01-24
Attending: EMERGENCY MEDICINE | Admitting: EMERGENCY MEDICINE
Payer: COMMERCIAL

## 2022-01-24 ENCOUNTER — APPOINTMENT (EMERGENCY)
Dept: RADIOLOGY | Facility: HOSPITAL | Age: 72
End: 2022-01-24
Payer: COMMERCIAL

## 2022-01-24 VITALS
BODY MASS INDEX: 24.56 KG/M2 | WEIGHT: 165.79 LBS | DIASTOLIC BLOOD PRESSURE: 60 MMHG | SYSTOLIC BLOOD PRESSURE: 136 MMHG | TEMPERATURE: 97.9 F | RESPIRATION RATE: 20 BRPM | HEIGHT: 69 IN | HEART RATE: 72 BPM | OXYGEN SATURATION: 97 %

## 2022-01-24 DIAGNOSIS — K80.00 ACUTE CALCULOUS CHOLECYSTITIS: Primary | ICD-10-CM

## 2022-01-24 DIAGNOSIS — K80.50 BILIARY COLIC: ICD-10-CM

## 2022-01-24 LAB
ALBUMIN SERPL BCP-MCNC: 3.7 G/DL (ref 3.5–5)
ALP SERPL-CCNC: 143 U/L (ref 46–116)
ALT SERPL W P-5'-P-CCNC: 26 U/L (ref 12–78)
ANION GAP SERPL CALCULATED.3IONS-SCNC: 10 MMOL/L (ref 4–13)
AST SERPL W P-5'-P-CCNC: 29 U/L (ref 5–45)
BASOPHILS # BLD AUTO: 0.02 THOUSANDS/ΜL (ref 0–0.1)
BASOPHILS NFR BLD AUTO: 0 % (ref 0–1)
BILIRUB DIRECT SERPL-MCNC: 0.19 MG/DL (ref 0–0.2)
BILIRUB SERPL-MCNC: 0.83 MG/DL (ref 0.2–1)
BUN SERPL-MCNC: 15 MG/DL (ref 5–25)
CALCIUM SERPL-MCNC: 9.5 MG/DL (ref 8.3–10.1)
CHLORIDE SERPL-SCNC: 102 MMOL/L (ref 100–108)
CO2 SERPL-SCNC: 26 MMOL/L (ref 21–32)
CREAT SERPL-MCNC: 1.22 MG/DL (ref 0.6–1.3)
EOSINOPHIL # BLD AUTO: 0.14 THOUSAND/ΜL (ref 0–0.61)
EOSINOPHIL NFR BLD AUTO: 2 % (ref 0–6)
ERYTHROCYTE [DISTWIDTH] IN BLOOD BY AUTOMATED COUNT: 14.4 % (ref 11.6–15.1)
GFR SERPL CREATININE-BSD FRML MDRD: 59 ML/MIN/1.73SQ M
GLUCOSE SERPL-MCNC: 104 MG/DL (ref 65–140)
HCT VFR BLD AUTO: 37.3 % (ref 36.5–49.3)
HGB BLD-MCNC: 12.4 G/DL (ref 12–17)
IMM GRANULOCYTES # BLD AUTO: 0.02 THOUSAND/UL (ref 0–0.2)
IMM GRANULOCYTES NFR BLD AUTO: 0 % (ref 0–2)
LYMPHOCYTES # BLD AUTO: 1.13 THOUSANDS/ΜL (ref 0.6–4.47)
LYMPHOCYTES NFR BLD AUTO: 15 % (ref 14–44)
MCH RBC QN AUTO: 30 PG (ref 26.8–34.3)
MCHC RBC AUTO-ENTMCNC: 33.2 G/DL (ref 31.4–37.4)
MCV RBC AUTO: 90 FL (ref 82–98)
MONOCYTES # BLD AUTO: 0.61 THOUSAND/ΜL (ref 0.17–1.22)
MONOCYTES NFR BLD AUTO: 8 % (ref 4–12)
NEUTROPHILS # BLD AUTO: 5.55 THOUSANDS/ΜL (ref 1.85–7.62)
NEUTS SEG NFR BLD AUTO: 75 % (ref 43–75)
NRBC BLD AUTO-RTO: 0 /100 WBCS
PLATELET # BLD AUTO: 374 THOUSANDS/UL (ref 149–390)
PMV BLD AUTO: 9.8 FL (ref 8.9–12.7)
POTASSIUM SERPL-SCNC: 4.2 MMOL/L (ref 3.5–5.3)
PROT SERPL-MCNC: 7.6 G/DL (ref 6.4–8.2)
RBC # BLD AUTO: 4.14 MILLION/UL (ref 3.88–5.62)
SODIUM SERPL-SCNC: 138 MMOL/L (ref 136–145)
WBC # BLD AUTO: 7.47 THOUSAND/UL (ref 4.31–10.16)

## 2022-01-24 PROCEDURE — 74300 X-RAY BILE DUCTS/PANCREAS: CPT | Performed by: SURGERY

## 2022-01-24 PROCEDURE — 99285 EMERGENCY DEPT VISIT HI MDM: CPT | Performed by: EMERGENCY MEDICINE

## 2022-01-24 PROCEDURE — 36415 COLL VENOUS BLD VENIPUNCTURE: CPT | Performed by: EMERGENCY MEDICINE

## 2022-01-24 PROCEDURE — 76705 ECHO EXAM OF ABDOMEN: CPT

## 2022-01-24 PROCEDURE — 99285 EMERGENCY DEPT VISIT HI MDM: CPT

## 2022-01-24 PROCEDURE — 47563 LAPARO CHOLECYSTECTOMY/GRAPH: CPT | Performed by: SURGERY

## 2022-01-24 PROCEDURE — 93005 ELECTROCARDIOGRAM TRACING: CPT

## 2022-01-24 PROCEDURE — 85025 COMPLETE CBC W/AUTO DIFF WBC: CPT | Performed by: EMERGENCY MEDICINE

## 2022-01-24 PROCEDURE — 88304 TISSUE EXAM BY PATHOLOGIST: CPT | Performed by: PATHOLOGY

## 2022-01-24 PROCEDURE — 80048 BASIC METABOLIC PNL TOTAL CA: CPT | Performed by: EMERGENCY MEDICINE

## 2022-01-24 PROCEDURE — 99202 OFFICE O/P NEW SF 15 MIN: CPT | Performed by: SURGERY

## 2022-01-24 PROCEDURE — 80076 HEPATIC FUNCTION PANEL: CPT | Performed by: EMERGENCY MEDICINE

## 2022-01-24 RX ORDER — ONDANSETRON 2 MG/ML
INJECTION INTRAMUSCULAR; INTRAVENOUS AS NEEDED
Status: DISCONTINUED | OUTPATIENT
Start: 2022-01-24 | End: 2022-01-24

## 2022-01-24 RX ORDER — PROPOFOL 10 MG/ML
INJECTION, EMULSION INTRAVENOUS AS NEEDED
Status: DISCONTINUED | OUTPATIENT
Start: 2022-01-24 | End: 2022-01-24

## 2022-01-24 RX ORDER — NEOSTIGMINE METHYLSULFATE 1 MG/ML
INJECTION INTRAVENOUS AS NEEDED
Status: DISCONTINUED | OUTPATIENT
Start: 2022-01-24 | End: 2022-01-24

## 2022-01-24 RX ORDER — ONDANSETRON 2 MG/ML
4 INJECTION INTRAMUSCULAR; INTRAVENOUS ONCE AS NEEDED
Status: DISCONTINUED | OUTPATIENT
Start: 2022-01-24 | End: 2022-01-24 | Stop reason: HOSPADM

## 2022-01-24 RX ORDER — METOCLOPRAMIDE HYDROCHLORIDE 5 MG/ML
5 INJECTION INTRAMUSCULAR; INTRAVENOUS ONCE AS NEEDED
Status: DISCONTINUED | OUTPATIENT
Start: 2022-01-24 | End: 2022-01-24 | Stop reason: HOSPADM

## 2022-01-24 RX ORDER — SODIUM CHLORIDE, SODIUM LACTATE, POTASSIUM CHLORIDE, CALCIUM CHLORIDE 600; 310; 30; 20 MG/100ML; MG/100ML; MG/100ML; MG/100ML
INJECTION, SOLUTION INTRAVENOUS CONTINUOUS PRN
Status: DISCONTINUED | OUTPATIENT
Start: 2022-01-24 | End: 2022-01-24

## 2022-01-24 RX ORDER — HYDROMORPHONE HCL/PF 1 MG/ML
0.5 SYRINGE (ML) INJECTION
Status: DISCONTINUED | OUTPATIENT
Start: 2022-01-24 | End: 2022-01-24 | Stop reason: HOSPADM

## 2022-01-24 RX ORDER — SODIUM CHLORIDE, SODIUM LACTATE, POTASSIUM CHLORIDE, CALCIUM CHLORIDE 600; 310; 30; 20 MG/100ML; MG/100ML; MG/100ML; MG/100ML
125 INJECTION, SOLUTION INTRAVENOUS CONTINUOUS
Status: DISCONTINUED | OUTPATIENT
Start: 2022-01-24 | End: 2022-01-24 | Stop reason: HOSPADM

## 2022-01-24 RX ORDER — KETOROLAC TROMETHAMINE 30 MG/ML
INJECTION, SOLUTION INTRAMUSCULAR; INTRAVENOUS AS NEEDED
Status: DISCONTINUED | OUTPATIENT
Start: 2022-01-24 | End: 2022-01-24

## 2022-01-24 RX ORDER — NOREPINEPHRINE BITARTRATE 1 MG/ML
INJECTION, SOLUTION INTRAVENOUS AS NEEDED
Status: DISCONTINUED | OUTPATIENT
Start: 2022-01-24 | End: 2022-01-24

## 2022-01-24 RX ORDER — FENTANYL CITRATE 50 UG/ML
INJECTION, SOLUTION INTRAMUSCULAR; INTRAVENOUS AS NEEDED
Status: DISCONTINUED | OUTPATIENT
Start: 2022-01-24 | End: 2022-01-24

## 2022-01-24 RX ORDER — MAGNESIUM HYDROXIDE 1200 MG/15ML
LIQUID ORAL AS NEEDED
Status: DISCONTINUED | OUTPATIENT
Start: 2022-01-24 | End: 2022-01-24 | Stop reason: HOSPADM

## 2022-01-24 RX ORDER — GLYCOPYRROLATE 0.2 MG/ML
INJECTION INTRAMUSCULAR; INTRAVENOUS AS NEEDED
Status: DISCONTINUED | OUTPATIENT
Start: 2022-01-24 | End: 2022-01-24

## 2022-01-24 RX ORDER — CEFAZOLIN SODIUM 2 G/50ML
2000 SOLUTION INTRAVENOUS ONCE
Status: COMPLETED | OUTPATIENT
Start: 2022-01-24 | End: 2022-01-24

## 2022-01-24 RX ORDER — FENTANYL CITRATE/PF 50 MCG/ML
50 SYRINGE (ML) INJECTION
Status: DISCONTINUED | OUTPATIENT
Start: 2022-01-24 | End: 2022-01-24 | Stop reason: HOSPADM

## 2022-01-24 RX ORDER — BUPIVACAINE HYDROCHLORIDE AND EPINEPHRINE 2.5; 5 MG/ML; UG/ML
INJECTION, SOLUTION EPIDURAL; INFILTRATION; INTRACAUDAL; PERINEURAL AS NEEDED
Status: DISCONTINUED | OUTPATIENT
Start: 2022-01-24 | End: 2022-01-24 | Stop reason: HOSPADM

## 2022-01-24 RX ORDER — ROCURONIUM BROMIDE 10 MG/ML
INJECTION, SOLUTION INTRAVENOUS AS NEEDED
Status: DISCONTINUED | OUTPATIENT
Start: 2022-01-24 | End: 2022-01-24

## 2022-01-24 RX ORDER — ONDANSETRON 2 MG/ML
4 INJECTION INTRAMUSCULAR; INTRAVENOUS EVERY 4 HOURS PRN
Status: DISCONTINUED | OUTPATIENT
Start: 2022-01-24 | End: 2022-01-24 | Stop reason: HOSPADM

## 2022-01-24 RX ORDER — MORPHINE SULFATE 10 MG/ML
4 INJECTION, SOLUTION INTRAMUSCULAR; INTRAVENOUS
Status: DISCONTINUED | OUTPATIENT
Start: 2022-01-24 | End: 2022-01-24 | Stop reason: HOSPADM

## 2022-01-24 RX ORDER — SUCCINYLCHOLINE/SOD CL,ISO/PF 100 MG/5ML
SYRINGE (ML) INTRAVENOUS AS NEEDED
Status: DISCONTINUED | OUTPATIENT
Start: 2022-01-24 | End: 2022-01-24

## 2022-01-24 RX ORDER — LIDOCAINE HYDROCHLORIDE 10 MG/ML
INJECTION, SOLUTION EPIDURAL; INFILTRATION; INTRACAUDAL; PERINEURAL AS NEEDED
Status: DISCONTINUED | OUTPATIENT
Start: 2022-01-24 | End: 2022-01-24

## 2022-01-24 RX ORDER — DEXAMETHASONE SODIUM PHOSPHATE 10 MG/ML
INJECTION, SOLUTION INTRAMUSCULAR; INTRAVENOUS AS NEEDED
Status: DISCONTINUED | OUTPATIENT
Start: 2022-01-24 | End: 2022-01-24

## 2022-01-24 RX ADMIN — FENTANYL CITRATE 50 MCG: 50 INJECTION, SOLUTION INTRAMUSCULAR; INTRAVENOUS at 17:56

## 2022-01-24 RX ADMIN — KETOROLAC TROMETHAMINE 30 MG: 30 INJECTION, SOLUTION INTRAMUSCULAR at 18:40

## 2022-01-24 RX ADMIN — FENTANYL CITRATE 50 MCG: 50 INJECTION INTRAMUSCULAR; INTRAVENOUS at 19:21

## 2022-01-24 RX ADMIN — NEOSTIGMINE METHYLSULFATE 3 MG: 1 INJECTION INTRAVENOUS at 18:46

## 2022-01-24 RX ADMIN — DEXAMETHASONE SODIUM PHOSPHATE 8 MG: 10 INJECTION, SOLUTION INTRAMUSCULAR; INTRAVENOUS at 18:09

## 2022-01-24 RX ADMIN — CEFAZOLIN SODIUM 2000 MG: 2 SOLUTION INTRAVENOUS at 17:52

## 2022-01-24 RX ADMIN — NOREPINEPHRINE BITARTRATE 8 MCG: 1 INJECTION INTRAVENOUS at 18:15

## 2022-01-24 RX ADMIN — HYDROMORPHONE HYDROCHLORIDE 0.5 MG: 1 INJECTION, SOLUTION INTRAMUSCULAR; INTRAVENOUS; SUBCUTANEOUS at 19:40

## 2022-01-24 RX ADMIN — FENTANYL CITRATE 50 MCG: 50 INJECTION, SOLUTION INTRAMUSCULAR; INTRAVENOUS at 18:19

## 2022-01-24 RX ADMIN — NOREPINEPHRINE BITARTRATE 8 MCG: 1 INJECTION INTRAVENOUS at 18:33

## 2022-01-24 RX ADMIN — HYDROMORPHONE HYDROCHLORIDE 0.5 MG: 1 INJECTION, SOLUTION INTRAMUSCULAR; INTRAVENOUS; SUBCUTANEOUS at 19:30

## 2022-01-24 RX ADMIN — ROCURONIUM BROMIDE 30 MG: 10 SOLUTION INTRAVENOUS at 18:03

## 2022-01-24 RX ADMIN — ONDANSETRON 4 MG: 2 INJECTION INTRAMUSCULAR; INTRAVENOUS at 18:09

## 2022-01-24 RX ADMIN — ROCURONIUM BROMIDE 10 MG: 10 SOLUTION INTRAVENOUS at 18:34

## 2022-01-24 RX ADMIN — NOREPINEPHRINE BITARTRATE 4 MCG: 1 INJECTION INTRAVENOUS at 18:09

## 2022-01-24 RX ADMIN — Medication 100 MG: at 17:56

## 2022-01-24 RX ADMIN — LIDOCAINE HYDROCHLORIDE 50 MG: 10 INJECTION, SOLUTION EPIDURAL; INFILTRATION; INTRACAUDAL at 17:56

## 2022-01-24 RX ADMIN — GLYCOPYRROLATE 0.4 MG: 0.2 INJECTION, SOLUTION INTRAMUSCULAR; INTRAVENOUS at 18:46

## 2022-01-24 RX ADMIN — FENTANYL CITRATE 50 MCG: 50 INJECTION INTRAMUSCULAR; INTRAVENOUS at 19:26

## 2022-01-24 RX ADMIN — PROPOFOL 180 MG: 10 INJECTION, EMULSION INTRAVENOUS at 17:56

## 2022-01-24 RX ADMIN — SODIUM CHLORIDE, SODIUM LACTATE, POTASSIUM CHLORIDE, AND CALCIUM CHLORIDE: .6; .31; .03; .02 INJECTION, SOLUTION INTRAVENOUS at 17:42

## 2022-01-24 RX ADMIN — NOREPINEPHRINE BITARTRATE 4 MCG: 1 INJECTION INTRAVENOUS at 18:27

## 2022-01-24 NOTE — ED PROCEDURE NOTE
PROCEDURE  ECG 12 Lead Documentation Only    Date/Time: 1/24/2022 4:01 PM  Performed by: Aviva Shabazz MD  Authorized by: Aviva Shabazz MD     Indications / Diagnosis:  Ruq pain   ECG reviewed by me, the ED Provider: yes    Patient location:  ED and bedside  Previous ECG:     Previous ECG:  Compared to current    Comparison ECG info:  7/15/16- apcs adn vpc have resolved-  more diffuse  t wave flattening      Similarity:  Changes noted    Comparison to cardiac monitor: Yes    Interpretation:     Interpretation: non-specific    Rate:     ECG rate:  74    ECG rate assessment: normal    Rhythm:     Rhythm: sinus rhythm    Ectopy:     Ectopy: none    QRS:     QRS axis:  Normal    QRS intervals:  Normal  Conduction:     Conduction: normal    ST segments:     ST segments:  Normal  T waves:     T waves: flattening      Flattening:  V1, I, aVL, III and V2  Other findings:     Other findings: U wave    Comments:      No ecg signs of ischemia/ injury / r heart strain / manoj/pericarditis     - low voltage          Aviva Shabazz MD  01/24/22 3899

## 2022-01-24 NOTE — ANESTHESIA PREPROCEDURE EVALUATION
Procedure:  CHOLECYSTECTOMY LAPAROSCOPIC W/ INTRAOP CHOLANGIOGRAM (N/A Abdomen)    Relevant Problems   CARDIO   (+) Hypertension      ENDO   (+) Hypothyroid      /RENAL   (+) BPH (benign prostatic hyperplasia)      MUSCULOSKELETAL   (+) Inflammatory arthritis   (+) Rectus diastasis      NEURO/PSYCH   (+) Depression      Patient endorses nausea and vomiting in the past 24 hours  Physical Exam    Airway    Mallampati score: II  TM Distance: >3 FB  Neck ROM: full     Dental   Comment: Nothing loose or removable in patient's mouth ,     Cardiovascular      Pulmonary      Other Findings        Anesthesia Plan  ASA Score- 2     Anesthesia Type- general with ASA Monitors  Additional Monitors:   Airway Plan: ETT  Plan Factors-Exercise tolerance (METS): >4 METS  Chart reviewed  Existing labs reviewed  Patient summary reviewed  Patient is not a current smoker  Obstructive sleep apnea risk education given perioperatively  Induction- intravenous and rapid sequence induction  Postoperative Plan- Plan for postoperative opioid use  Planned trial extubation    Informed Consent- Anesthetic plan and risks discussed with patient  I personally reviewed this patient with the CRNA  Discussed and agreed on the Anesthesia Plan with the JUANJOSE Ponce

## 2022-01-24 NOTE — H&P
History and Physical -General Surgical Care   Minda López 70 y o  male MRN: [id number]  Unit/Bed#: ED 19 Encounter: [id number]       Principal Problem:  Calculous cholecystitis    HPI: Minda López is a 70y o  year old male who presents with biliary colic symptoms intermittently over the last 2 months  However these have been progressing  Tends to feel well in the morning  However by the evening and just before he goes to sleep experiences a lot of right costal margin pain with radiation into the right scapula  He describes bloating and nausea  Denies any vomiting  It is affecting when he eats  Does state a 30 lb weight loss in the last 2 months  No bowel issues  Last colonoscopy was 2016  Recent ultrasound as well as 1 repeated today revealed multiple gallstones with top normal thickness  Did have a history of a total bilirubin of 1 38  Is normal at 0 8 today but CBD was noted to be 9 mm  Review of Systems    Historical Information   Past Medical History:   Diagnosis Date    Disease of thyroid gland     GERD (gastroesophageal reflux disease)     Hypertension     Tinnitus      Past Surgical History:   Procedure Laterality Date    ADENOIDECTOMY      COLONOSCOPY N/A 7/16/2016    Procedure: COLONOSCOPY;  Surgeon: Viviana Regalado MD;  [facility];  Service:     HERNIA REPAIR      TONSILLECTOMY      UVULECTOMY       Social History   Social History     Substance and Sexual Activity   Alcohol Use Yes    Comment: Rarely     Social History     Substance and Sexual Activity   Drug Use Never     Social History     Tobacco Use   Smoking Status Never Smoker   Smokeless Tobacco Never Used     History reviewed  No pertinent family history      Meds/Allergies     (Not in a hospital admission)    Current Facility-Administered Medications   Medication Dose Route Frequency    cefTRIAXone (ROCEPHIN) injection 1,000 mg  1,000 mg Intramuscular Q24H       No Known Allergies        Blood pressure 105/61, pulse 70, temperature (!) 97 4 °F (36 3 °C), temperature source Oral, resp  rate 20, height 5' 9" (1 753 m), weight 75 2 kg (165 lb 12 6 oz), SpO2 95 %  No intake or output data in the 24 hours ending 01/24/22 1632    PHYSICAL EXAM  General appearance: alert and oriented, in no acute distress  Lungs: clear to auscultation bilaterally  Heart: regular rate and rhythm, S1, S2 normal, no murmur, click, rub or gallop  Abdomen:  Flat soft  Mild right upper quadrant discomfort to palpation    No rebound or guarding  Rectal: deferred  Skin: Skin color, texture, turgor normal  No rashes or lesions    Lab Results:   Admission on 01/24/2022   Component Date Value    WBC 01/24/2022 7 47     RBC 01/24/2022 4 14     Hemoglobin 01/24/2022 12 4     Hematocrit 01/24/2022 37 3     MCV 01/24/2022 90     MCH 01/24/2022 30 0     MCHC 01/24/2022 33 2     RDW 01/24/2022 14 4     MPV 01/24/2022 9 8     Platelets 23/92/4460 374     nRBC 01/24/2022 0     Neutrophils Relative 01/24/2022 75     Immat GRANS % 01/24/2022 0     Lymphocytes Relative 01/24/2022 15     Monocytes Relative 01/24/2022 8     Eosinophils Relative 01/24/2022 2     Basophils Relative 01/24/2022 0     Neutrophils Absolute 01/24/2022 5 55     Immature Grans Absolute 01/24/2022 0 02     Lymphocytes Absolute 01/24/2022 1 13     Monocytes Absolute 01/24/2022 0 61     Eosinophils Absolute 01/24/2022 0 14     Basophils Absolute 01/24/2022 0 02     Sodium 01/24/2022 138     Potassium 01/24/2022 4 2     Chloride 01/24/2022 102     CO2 01/24/2022 26     ANION GAP 01/24/2022 10     BUN 01/24/2022 15     Creatinine 01/24/2022 1 22     Glucose 01/24/2022 104     Calcium 01/24/2022 9 5     eGFR 01/24/2022 59     Total Bilirubin 01/24/2022 0 83     Bilirubin, Direct 01/24/2022 0 19     Alkaline Phosphatase 01/24/2022 143*    AST 01/24/2022 29     ALT 01/24/2022 26     Total Protein 01/24/2022 7 6     Albumin 01/24/2022 3 7      Imaging Studies: I have personally reviewed pertinent films in PACS    ASSESSMENT:  Calculous cholecystitis  Possibly dilated common duct    PLAN:  Risks and benefits for laparoscopic cholecystectomy with cholangiogram were discussed with him including potential for bile duct injury or open surgery and agrees to proceed  Counseling / Coordination of Care  Total time spent today  20 minutes  Greater than 50% of total time was spent with the patient and / or family counseling and / or coordination of care

## 2022-01-24 NOTE — ANESTHESIA POSTPROCEDURE EVALUATION
Post-Op Assessment Note    CV Status:  Stable  Pain Score: 0    Pain management: adequate     Mental Status:  Sleepy   Hydration Status:  Euvolemic   PONV Controlled:  Controlled   Airway Patency:  Patent      Post Op Vitals Reviewed: Yes      Staff: CRNA   Comments: vss sv nonobstructed uneventful        No complications documented      BP   136/62   Temp 97 0   Pulse 66   Resp 24   SpO2 99

## 2022-01-24 NOTE — ED NOTES
Pt aware of OR status   Pt undressed in room and belongings bagged      Elly Choudhury RN  01/24/22 6501

## 2022-01-25 LAB
ATRIAL RATE: 88 BPM
P AXIS: 50 DEGREES
PR INTERVAL: 146 MS
QRS AXIS: -2 DEGREES
QRSD INTERVAL: 86 MS
QT INTERVAL: 364 MS
QTC INTERVAL: 404 MS
T WAVE AXIS: 20 DEGREES
VENTRICULAR RATE: 74 BPM

## 2022-01-25 PROCEDURE — 93010 ELECTROCARDIOGRAM REPORT: CPT | Performed by: INTERNAL MEDICINE

## 2022-01-27 NOTE — ED PROVIDER NOTES
History  Chief Complaint   Patient presents with    Abdominal Pain     Pt reports worsening RLQ pain over the last few months, pain more constant, radiates to R flank/shoulder intermittently +NV Denies diarrhea or fevers Hx gallstones      70 yr  Male with  approx 3 m onths of progressive ruq pain worse after meals with radiating to r shoulder blade-  And lying down with weight loss- had recx labs ruq u/SUBJECTIVE: to see local surgeon for consult-- pain worsened so pt caedm to er - no cp/sob- no gu comps - no gerd/ progressice dysphagia/ melena- pt nto eating do to symptoms       History provided by:  Patient   used: No    Abdominal Pain  Pain location:  RUQ  Pain quality: sharp    Pain radiates to:  R shoulder  Associated symptoms: no chills, no constipation, no diarrhea, no fatigue, no fever, no nausea and no vomiting        Prior to Admission Medications   Prescriptions Last Dose Informant Patient Reported? Taking?   aspirin 81 MG tablet  Self Yes No   Sig: Take 81 mg by mouth daily  azithromycin (ZITHROMAX) 250 mg tablet   Yes No   Sig: take 2 tablets by mouth TODAY then take 1 tablet DAILY FOR 4 DAYS   chlorhexidine (PERIDEX) 0 12 % solution   Yes No   Sig: FILL SYRINGE WITH 10CC AND IRRIGATE UPPER LEFT AREA IN MOUTH   co-enzyme Q-10 30 mg   Yes No   Sig: Take 90 mg by mouth 2 (two) times a day   escitalopram (LEXAPRO) 10 mg tablet  Self Yes No   finasteride (PROSCAR) 5 mg tablet   No No   Sig: Take 1 tablet (5 mg total) by mouth daily   irbesartan (AVAPRO) 300 mg tablet   Yes No   levothyroxine 125 mcg tablet  Self Yes No   Sig: Take 125 mcg by mouth daily     oxyCODONE (Roxicodone) 5 immediate release tablet   No No   Sig: Take 1 tablet (5 mg total) by mouth every 6 (six) hours as needed for severe pain Max Daily Amount: 20 mg   rosuvastatin (CRESTOR) 20 MG tablet  Self Yes No   Sig: Take 20 mg by mouth daily   tamsulosin (FLOMAX) 0 4 mg  Self Yes No   Sig: Take 0 4 mg by mouth 2 (two) times a day Indications: Enlarged Prostate  Facility-Administered Medications Last Administration Doses Remaining   cefTRIAXone (ROCEPHIN) injection 1,000 mg 6/8/2021  2:16 PM           Past Medical History:   Diagnosis Date    Disease of thyroid gland     GERD (gastroesophageal reflux disease)     Hypertension     Tinnitus        Past Surgical History:   Procedure Laterality Date    ADENOIDECTOMY      CHOLECYSTECTOMY LAPAROSCOPIC N/A 1/24/2022    Procedure: CHOLECYSTECTOMY LAPAROSCOPIC W/ INTRAOP CHOLANGIOGRAM;  Surgeon: Andry Lee DO;  [facility];  Service: General    COLONOSCOPY N/A 7/16/2016    Procedure: COLONOSCOPY;  Surgeon: Sharifa Terrell MD;  [facility];  Service:    [facility]      UVULECTOMY         History reviewed  No pertinent family history  I have reviewed and agree with the history as documented  E-Cigarette/Vaping    E-Cigarette Use Never User      E-Cigarette/Vaping Substances     Social History     Tobacco Use    Smoking status: Never Smoker    Smokeless tobacco: Never Used   Vaping Use    Vaping Use: Never used   Substance Use Topics    Alcohol use: Yes     Comment: Rarely    Drug use: Never       Review of Systems   Constitutional: Positive for appetite change and unexpected weight change  Negative for activity change, chills, diaphoresis, fatigue and fever  HENT: Negative  Eyes: Negative  Respiratory: Negative  Cardiovascular: Negative  Gastrointestinal: Positive for abdominal pain  Negative for abdominal distention, anal bleeding, blood in stool, constipation, diarrhea, nausea, rectal pain and vomiting  Endocrine: Negative  Genitourinary: Negative  Musculoskeletal: Negative  Skin: Negative  Allergic/Immunologic: Negative  Neurological: Negative  Hematological: Negative  Psychiatric/Behavioral: Negative  Physical Exam  Physical Exam  Vitals and nursing note reviewed  Constitutional:       General: He is not in acute distress  Appearance: He is well-developed  He is not ill-appearing, toxic-appearing or diaphoretic  Comments: avss- pulse ox 97 % on ra- interpretation is normal- no intervention -    HENT:      Head: Normocephalic and atraumatic  Mouth/Throat:      Mouth: Mucous membranes are moist    Eyes:      General: No scleral icterus  Extraocular Movements: Extraocular movements intact  Pupils: Pupils are equal, round, and reactive to light  Comments: Mm pink   Cardiovascular:      Rate and Rhythm: Normal rate and regular rhythm  Heart sounds: Normal heart sounds  No murmur heard  No friction rub  No gallop  Comments: Equal  bialateral radial/dp pulses- no ble edema/calf tenderness/asym/ erythema  Pulmonary:      Effort: Pulmonary effort is normal  No respiratory distress  Breath sounds: Normal breath sounds  No stridor  No wheezing, rhonchi or rales  Chest:      Chest wall: No tenderness  Abdominal:      General: Bowel sounds are normal  There is distension  There are no signs of injury  Palpations: Abdomen is soft  There is no shifting dullness, fluid wave, hepatomegaly, splenomegaly, mass or pulsatile mass  Tenderness: There is abdominal tenderness in the right upper quadrant  There is no right CVA tenderness, left CVA tenderness, guarding or rebound  Negative signs include Horne's sign, Rovsing's sign, McBurney's sign, psoas sign and obturator sign  Hernia: No hernia is present  There is no hernia in the umbilical area, ventral area, left inguinal area, right femoral area, left femoral area or right inguinal area  Comments: Moderate ruq tenderness- no peritoneal signs- no cva tenderness- no ascites- no pulsatile abd mass/bruit/ tendenress- rest of abd is soft    Skin:     General: Skin is warm  Capillary Refill: Capillary refill takes less than 2 seconds        Coloration: Skin is not cyanotic, jaundiced, mottled or pale  Findings: No erythema or rash  Neurological:      General: No focal deficit present  Mental Status: He is alert and oriented to person, place, and time  Cranial Nerves: No cranial nerve deficit  Motor: No weakness  Comments: normal non focal neur o exam    Psychiatric:         Mood and Affect: Mood normal  Mood is not anxious or depressed           Behavior: Behavior normal          Vital Signs  ED Triage Vitals [01/24/22 1204]   Temperature Pulse Respirations Blood Pressure SpO2   (!) 97 4 °F (36 3 °C) 92 20 108/56 94 %      Temp Source Heart Rate Source Patient Position - Orthostatic VS BP Location FiO2 (%)   Oral Monitor -- -- --      Pain Score       2           Vitals:    01/24/22 1954 01/24/22 2015 01/24/22 2030 01/24/22 2045   BP: 132/62 140/66 140/66 136/60   Pulse: 84 69 74 72         Visual Acuity      ED Medications  Medications   ceFAZolin (ANCEF) IVPB (premix in dextrose) 2,000 mg 50 mL (2,000 mg Intravenous Given 1/24/22 1752)       Diagnostic Studies  Results Reviewed     Procedure Component Value Units Date/Time    Basic metabolic panel [[id number]] Collected: 01/24/22 1252    Lab Status: Final result Specimen: Blood from Arm, Left Updated: 01/24/22 1424     Sodium 138 mmol/L      Potassium 4 2 mmol/L      Chloride 102 mmol/L      CO2 26 mmol/L      ANION GAP 10 mmol/L      BUN 15 mg/dL      Creatinine 1 22 mg/dL      Glucose 104 mg/dL      Calcium 9 5 mg/dL      eGFR 59 ml/min/1 73sq m     Narrative:      [place] guidelines for Chronic Kidney Disease (CKD):     Stage 1 with normal or high GFR (GFR > 90 mL/min/1 73 square meters)    Stage 2 Mild CKD (GFR = 60-89 mL/min/1 73 square meters)    Stage 3A Moderate CKD (GFR = 45-59 mL/min/1 73 square meters)    Stage 3B Moderate CKD (GFR = 30-44 mL/min/1 73 square meters)    Stage 4 Severe CKD (GFR = 15-29 mL/min/1 73 square meters)    Stage 5 End Stage CKD (GFR <15 mL/min/1 73 square meters)  Note: GFR calculation is accurate only with a steady state creatinine    Hepatic function panel [[id number]]  (Abnormal) Collected: 01/24/22 1252    Lab Status: Final result Specimen: Blood from Arm, Left Updated: 01/24/22 1424     Total Bilirubin 0 83 mg/dL      Bilirubin, Direct 0 19 mg/dL      Alkaline Phosphatase 143 U/L      AST 29 U/L      ALT 26 U/L      Total Protein 7 6 g/dL      Albumin 3 7 g/dL     CBC and differential [[id number]] Collected: 01/24/22 1252    Lab Status: Final result Specimen: Blood from Arm, Left Updated: 01/24/22 1317     WBC 7 47 Thousand/uL      RBC 4 14 Million/uL      Hemoglobin 12 4 g/dL      Hematocrit 37 3 %      MCV 90 fL      MCH 30 0 pg      MCHC 33 2 g/dL      RDW 14 4 %      MPV 9 8 fL      Platelets 777 Thousands/uL      nRBC 0 /100 WBCs      Neutrophils Relative 75 %      Immat GRANS % 0 %      Lymphocytes Relative 15 %      Monocytes Relative 8 %      Eosinophils Relative 2 %      Basophils Relative 0 %      Neutrophils Absolute 5 55 Thousands/µL      Immature Grans Absolute 0 02 Thousand/uL      Lymphocytes Absolute 1 13 Thousands/µL      Monocytes Absolute 0 61 Thousand/µL      Eosinophils Absolute 0 14 Thousand/µL      Basophils Absolute 0 02 Thousands/µL                  US right upper quadrant   Final Result by Justin Ortiz MD (01/24 1424)      Cholelithiasis with top normal gallbladder wall caliber, but no additional features of acute cholecystitis  Mildly dilated common bile duct measuring 9 mm, not significantly changed since 1/19/2022  No choledocholithiasis seen           Workstation performed: [id number]         FL cholangiogram transhepatic    (Results Pending)              Procedures  Procedures         ED Course  ED Course as of 01/27/22 [place] Jan 24, 2022   1343 - er md note- pt offered pain medication refuses at thsis time    80 Er md note-  dr Karen allred texted = will bhe down to se pt- pt aware of this MDM    Disposition  Final diagnoses:   Biliary colic     Time reflects when diagnosis was documented in both MDM as applicable and the Disposition within this note     Time User Action Codes Description Comment    1/24/2022  4:30 PM Kulwant Diaz Add [P16 39] Biliary colic     7/50/3187  1:25 PM RaoningRoland Add [K80 00] Acute calculous cholecystitis     1/24/2022  6:39 PM Alex Zhang Modify [K80 00] Acute calculous cholecystitis       ED Disposition     ED Disposition Condition Date/Time Comment    Send to OR  Mon Jan 24, 2022  4:30 PM       Follow-up Information     Follow up With Specialties Details Why Contact Info    Paulina Beaulieu DO General Surgery Schedule an appointment as soon as possible for a visit in 2 week(s)  [address] [place] [address] 3  [phone]            Discharge Medication List as of 1/24/2022  7:10 PM      CONTINUE these medications which have NOT CHANGED    Details   aspirin 81 MG tablet Take 81 mg by mouth daily  , Historical Med      azithromycin (ZITHROMAX) 250 mg tablet take 2 tablets by mouth TODAY then take 1 tablet DAILY FOR 4 DAYS, Historical Med      chlorhexidine (PERIDEX) 0 12 % solution FILL SYRINGE WITH 10CC AND IRRIGATE UPPER LEFT AREA IN MOUTH, Historical Med      co-enzyme Q-10 30 mg Take 90 mg by mouth 2 (two) times a day, Starting Mon 10/4/2021, Historical Med      escitalopram (LEXAPRO) 10 mg tablet Starting Tue 2/4/2020, Historical Med      finasteride (PROSCAR) 5 mg tablet Take 1 tablet (5 mg total) by mouth daily, Starting Mon 6/14/2021, Normal      irbesartan (AVAPRO) 300 mg tablet Starting Sun 10/24/2021, Historical Med      levothyroxine 125 mcg tablet Take 125 mcg by mouth daily  , Historical Med      oxyCODONE (Roxicodone) 5 immediate release tablet Take 1 tablet (5 mg total) by mouth every 6 (six) hours as needed for severe pain Max Daily Amount: 20 mg, Starting Wed 1/19/2022, Normal rosuvastatin (CRESTOR) 20 MG tablet Take 20 mg by mouth daily, Historical Med      tamsulosin (FLOMAX) 0 4 mg Take 0 4 mg by mouth 2 (two) times a day Indications: Enlarged Prostate , Historical Med             No discharge procedures on file      PDMP Review       Value Time User    PDMP Reviewed  Yes 1/19/2022 10:37 AM Warner Lantigua DO          ED Provider  Electronically Signed by           Erica Quick MD  01/27/22 3096

## 2022-02-02 ENCOUNTER — TELEPHONE (OUTPATIENT)
Dept: GASTROENTEROLOGY | Facility: AMBULARY SURGERY CENTER | Age: 72
End: 2022-02-02

## 2022-02-02 ENCOUNTER — TELEPHONE (OUTPATIENT)
Dept: GASTROENTEROLOGY | Facility: CLINIC | Age: 72
End: 2022-02-02

## 2022-02-02 ENCOUNTER — PREP FOR PROCEDURE (OUTPATIENT)
Dept: GASTROENTEROLOGY | Facility: CLINIC | Age: 72
End: 2022-02-02

## 2022-02-02 DIAGNOSIS — R63.4 WEIGHT LOSS, UNINTENTIONAL: Primary | ICD-10-CM

## 2022-02-02 DIAGNOSIS — R10.13 EPIGASTRIC PAIN: ICD-10-CM

## 2022-02-02 NOTE — TELEPHONE ENCOUNTER
Patients GI provider:  Dr Alyssa Hanna    Number to return call: (  [phone]    Reason for call: Dr Gurjit Lou  calling to schedule an EGD with Dr Alyssa Hanna   He states he spoke with him on Monday and he wanted to  perform EGD as soon as possible---please assist    Scheduled procedure/appointment date if applicable: N/A

## 2022-02-02 NOTE — TELEPHONE ENCOUNTER
[employer] denied authorization for EGD as not being medically necessary  Please call for a peer to peer at [phone]  Reference# [id number]  Patients ID: [id number]    Procedure scheduled for 2/4  Thank you!

## 2022-02-03 ENCOUNTER — TELEPHONE (OUTPATIENT)
Dept: GASTROENTEROLOGY | Facility: AMBULARY SURGERY CENTER | Age: 72
End: 2022-02-03

## 2022-02-04 ENCOUNTER — HOSPITAL ENCOUNTER (OUTPATIENT)
Dept: CT IMAGING | Facility: HOSPITAL | Age: 72
Discharge: HOME | End: 2022-02-04
Attending: INTERNAL MEDICINE
Payer: COMMERCIAL

## 2022-02-04 ENCOUNTER — TELEPHONE (OUTPATIENT)
Dept: INTERNAL MEDICINE CLINIC | Facility: CLINIC | Age: 72
End: 2022-02-04

## 2022-02-04 ENCOUNTER — HOSPITAL ENCOUNTER (OUTPATIENT)
Dept: GASTROENTEROLOGY | Facility: AMBULARY SURGERY CENTER | Age: 72
Setting detail: OUTPATIENT SURGERY
Discharge: HOME | End: 2022-02-04
Attending: INTERNAL MEDICINE
Payer: COMMERCIAL

## 2022-02-04 ENCOUNTER — ANESTHESIA EVENT (OUTPATIENT)
Dept: GASTROENTEROLOGY | Facility: AMBULARY SURGERY CENTER | Age: 72
End: 2022-02-04

## 2022-02-04 ENCOUNTER — ANESTHESIA (OUTPATIENT)
Dept: GASTROENTEROLOGY | Facility: AMBULARY SURGERY CENTER | Age: 72
End: 2022-02-04

## 2022-02-04 VITALS
WEIGHT: 171 LBS | TEMPERATURE: 97.2 F | DIASTOLIC BLOOD PRESSURE: 74 MMHG | HEIGHT: 69 IN | RESPIRATION RATE: 18 BRPM | HEART RATE: 79 BPM | BODY MASS INDEX: 25.33 KG/M2 | SYSTOLIC BLOOD PRESSURE: 103 MMHG | OXYGEN SATURATION: 98 %

## 2022-02-04 DIAGNOSIS — K31.89 GASTRIC MASS: Primary | ICD-10-CM

## 2022-02-04 DIAGNOSIS — K31.89 GASTRIC MASS: ICD-10-CM

## 2022-02-04 DIAGNOSIS — R10.13 EPIGASTRIC PAIN: ICD-10-CM

## 2022-02-04 DIAGNOSIS — N20.0 NEPHROLITHIASIS: ICD-10-CM

## 2022-02-04 DIAGNOSIS — R63.4 WEIGHT LOSS, UNINTENTIONAL: ICD-10-CM

## 2022-02-04 DIAGNOSIS — K22.89 ESOPHAGEAL MASS: Primary | ICD-10-CM

## 2022-02-04 PROCEDURE — 88305 TISSUE EXAM BY PATHOLOGIST: CPT | Performed by: PATHOLOGY

## 2022-02-04 PROCEDURE — G1004 CDSM NDSC: HCPCS

## 2022-02-04 PROCEDURE — 74177 CT ABD & PELVIS W/CONTRAST: CPT

## 2022-02-04 PROCEDURE — 43239 EGD BIOPSY SINGLE/MULTIPLE: CPT | Performed by: INTERNAL MEDICINE

## 2022-02-04 PROCEDURE — 88342 IMHCHEM/IMCYTCHM 1ST ANTB: CPT | Performed by: PATHOLOGY

## 2022-02-04 PROCEDURE — 88360 TUMOR IMMUNOHISTOCHEM/MANUAL: CPT | Performed by: PATHOLOGY

## 2022-02-04 PROCEDURE — 88313 SPECIAL STAINS GROUP 2: CPT | Performed by: PATHOLOGY

## 2022-02-04 PROCEDURE — 88341 IMHCHEM/IMCYTCHM EA ADD ANTB: CPT | Performed by: PATHOLOGY

## 2022-02-04 PROCEDURE — 71260 CT THORAX DX C+: CPT

## 2022-02-04 RX ORDER — PROPOFOL 10 MG/ML
INJECTION, EMULSION INTRAVENOUS AS NEEDED
Status: DISCONTINUED | OUTPATIENT
Start: 2022-02-04 | End: 2022-02-04

## 2022-02-04 RX ORDER — SIMETHICONE 20 MG/.3ML
EMULSION ORAL CODE/TRAUMA/SEDATION MEDICATION
Status: COMPLETED | OUTPATIENT
Start: 2022-02-04 | End: 2022-02-04

## 2022-02-04 RX ORDER — PANTOPRAZOLE SODIUM 40 MG/1
40 TABLET, DELAYED RELEASE ORAL DAILY
Qty: 30 TABLET | Refills: 11 | Status: SHIPPED | OUTPATIENT
Start: 2022-02-04 | End: 2022-02-15 | Stop reason: SDUPTHER

## 2022-02-04 RX ORDER — SODIUM CHLORIDE, SODIUM LACTATE, POTASSIUM CHLORIDE, CALCIUM CHLORIDE 600; 310; 30; 20 MG/100ML; MG/100ML; MG/100ML; MG/100ML
INJECTION, SOLUTION INTRAVENOUS CONTINUOUS PRN
Status: DISCONTINUED | OUTPATIENT
Start: 2022-02-04 | End: 2022-02-04

## 2022-02-04 RX ORDER — OXYCODONE HYDROCHLORIDE 5 MG/1
5 TABLET ORAL EVERY 6 HOURS PRN
Qty: 30 TABLET | Refills: 0 | Status: SHIPPED | OUTPATIENT
Start: 2022-02-04 | End: 2022-02-08 | Stop reason: SDUPTHER

## 2022-02-04 RX ORDER — ONDANSETRON 2 MG/ML
4 INJECTION INTRAMUSCULAR; INTRAVENOUS EVERY 6 HOURS PRN
Status: DISCONTINUED | OUTPATIENT
Start: 2022-02-04 | End: 2022-02-08 | Stop reason: HOSPADM

## 2022-02-04 RX ADMIN — PROPOFOL 30 MG: 10 INJECTION, EMULSION INTRAVENOUS at 09:38

## 2022-02-04 RX ADMIN — PROPOFOL 30 MG: 10 INJECTION, EMULSION INTRAVENOUS at 09:35

## 2022-02-04 RX ADMIN — PROPOFOL 30 MG: 10 INJECTION, EMULSION INTRAVENOUS at 09:40

## 2022-02-04 RX ADMIN — IOHEXOL 100 ML: 350 INJECTION, SOLUTION INTRAVENOUS at 14:14

## 2022-02-04 RX ADMIN — ONDANSETRON 4 MG: 2 INJECTION INTRAMUSCULAR; INTRAVENOUS at 10:42

## 2022-02-04 RX ADMIN — PROPOFOL 30 MG: 10 INJECTION, EMULSION INTRAVENOUS at 09:34

## 2022-02-04 RX ADMIN — Medication 40 MG: at 09:37

## 2022-02-04 RX ADMIN — PROPOFOL 100 MG: 10 INJECTION, EMULSION INTRAVENOUS at 09:30

## 2022-02-04 RX ADMIN — SODIUM CHLORIDE, SODIUM LACTATE, POTASSIUM CHLORIDE, AND CALCIUM CHLORIDE: .6; .31; .03; .02 INJECTION, SOLUTION INTRAVENOUS at 09:19

## 2022-02-04 RX ADMIN — PROPOFOL 30 MG: 10 INJECTION, EMULSION INTRAVENOUS at 09:32

## 2022-02-04 RX ADMIN — PROPOFOL 30 MG: 10 INJECTION, EMULSION INTRAVENOUS at 09:36

## 2022-02-04 NOTE — TELEPHONE ENCOUNTER
I spoke with the patient and informed him that we or oncology would complete the FMLA forms for him and to not worry about it for now

## 2022-02-04 NOTE — TELEPHONE ENCOUNTER
PT CALLED, SAID THAT HE'D LIKE TO DISCUSS HIS CT RESULTS WITH YOU   SAID THAT HE DID SPEAK WITH DR Rosendo Bello ABOUT THE RESULTS BUT WANTS TO DISCUSS THEM FURTHER WITH YOU    HAS QUESTIONS ABOUT WORK, IF HE'LL BE ABLE TO WORK, OR IF YOU'LL HAVE TO FILL OUT FMLA FORMS FOR HIM

## 2022-02-04 NOTE — PROGRESS NOTES
Patient noted with esophageal mass  He will follow-up with Surgical Oncology and Oncology  EGD was completed with biopsies pending

## 2022-02-04 NOTE — ANESTHESIA PREPROCEDURE EVALUATION
Procedure:  EGD    Relevant Problems   CARDIO   (+) Hypertension      ENDO   (+) Hypothyroid      /RENAL   (+) BPH (benign prostatic hyperplasia)      MUSCULOSKELETAL   (+) Inflammatory arthritis   (+) Rectus diastasis      NEURO/PSYCH   (+) Depression      Physical Exam    Airway    Mallampati score: II  TM Distance: >3 FB  Neck ROM: full     Dental       Cardiovascular      Pulmonary      Other Findings        Anesthesia Plan  ASA Score- 2     Anesthesia Type- IV sedation with anesthesia with ASA Monitors  Additional Monitors:   Airway Plan:           Plan Factors-Exercise tolerance (METS): >4 METS  Chart reviewed  Existing labs reviewed  Patient summary reviewed  Patient is not a current smoker  Induction- intravenous  Postoperative Plan-     Informed Consent- Anesthetic plan and risks discussed with patient  I personally reviewed this patient with the CRNA  Discussed and agreed on the Anesthesia Plan with the CRNA  Ilir Reynolds

## 2022-02-04 NOTE — H&P
History and Physical - [facility]  Sidra Jeong 70 y o  male MRN: [id number]        HPI:  77-year-old male history of GERD, hypertension recent cholecystectomy reports having discomfort and weight loss  Regular bowel movements  He had colonoscopy in 2016  Historical Information   Past Medical History:   Diagnosis Date    Disease of thyroid gland     GERD (gastroesophageal reflux disease)     History of GI diverticular bleed     Hypertension     Tinnitus      Past Surgical History:   Procedure Laterality Date    ADENOIDECTOMY      CHOLECYSTECTOMY LAPAROSCOPIC N/A 1/24/2022    Procedure: CHOLECYSTECTOMY LAPAROSCOPIC W/ INTRAOP CHOLANGIOGRAM;  Surgeon: Roverto Portillo DO;  [facility];  Service: General    COLONOSCOPY N/A 7/16/2016    Procedure: COLONOSCOPY;  Surgeon: Dillan Donato MD;  [facility];  Service:     HERNIA REPAIR      PROSTATE BIOPSY      TONSILLECTOMY      UVULECTOMY       Social History   Social History     Substance and Sexual Activity   Alcohol Use Yes    Comment: Rarely     Social History     Substance and Sexual Activity   Drug Use Never     Social History     Tobacco Use   Smoking Status Never Smoker   Smokeless Tobacco Never Used     History reviewed  No pertinent family history  Meds/Allergies     (Not in a hospital admission)      No Known Allergies    Objective     Blood pressure 123/72, pulse 76, temperature (!) 97 4 °F (36 3 °C), temperature source Temporal, resp  rate 18, height 5' 9" (1 753 m), weight 77 6 kg (171 lb), SpO2 98 %      PHYSICAL EXAM:    Gen: NAD  CV: S1 & S2 normal, RRR  CHEST: Clear to auscultate  ABD: soft, NT/ND, good bowel sounds  EXT: no edema    ASSESSMENT:     Weight loss    PLAN:    EGD

## 2022-02-04 NOTE — ANESTHESIA POSTPROCEDURE EVALUATION
Post-Op Assessment Note    CV Status:  Stable  Pain Score: 0    Pain management: adequate     Mental Status:  Alert and awake   Hydration Status:  Euvolemic   PONV Controlled:  Controlled   Airway Patency:  Patent      Post Op Vitals Reviewed: Yes      Staff: Anesthesiologist, CRNA         No complications documented      BP   105/57   Temp     Pulse  68   Resp   18   SpO2   98

## 2022-02-07 ENCOUNTER — DOCUMENTATION (OUTPATIENT)
Dept: HEMATOLOGY ONCOLOGY | Facility: CLINIC | Age: 72
End: 2022-02-07

## 2022-02-07 ENCOUNTER — PATIENT OUTREACH (OUTPATIENT)
Dept: HEMATOLOGY ONCOLOGY | Facility: CLINIC | Age: 72
End: 2022-02-07

## 2022-02-07 ENCOUNTER — TELEPHONE (OUTPATIENT)
Dept: HEMATOLOGY ONCOLOGY | Facility: CLINIC | Age: 72
End: 2022-02-07

## 2022-02-07 DIAGNOSIS — C15.9 MALIGNANT NEOPLASM OF ESOPHAGUS, UNSPECIFIED LOCATION (HCC): Primary | ICD-10-CM

## 2022-02-07 PROBLEM — K31.89 GASTRIC MASS: Status: ACTIVE | Noted: 2022-02-07

## 2022-02-07 NOTE — TELEPHONE ENCOUNTER
Soft Intake Form   Patient Details   Aarti Avalos     1950     [id number]     Reason For Appointment   Who is Calling? Patient    If not patient, Name? Who is the Referring Doctor? Dr Silas Cordon   What is the diagnosis? Malignant neoplasm of esophagus, unspecified location  ( Preliminary diagnosis)   Has this diagnosis been confirmed by a biopsy/surgery? If yes, what is the date it was done? YES   Biopsy done at [place] [address]? If not, where was it done? YES   Was imaging done, and was it done at [facility]? If not, where was it done? yes   Have you been seen by another Oncologist?  If so, who and where (name of facility, city and state) no   For 2nd Opinions Only: Are you currently undergoing treatment, or are you scheduled to start treatment? If yes, name of facility, city and state N/a    For "History Of" only: Have you completed treatment? N/a    Have you had Genetic Testing done in the past?  If so, advise to bring test results to their visit No    Record Gathering Information   Did you advise to have records faxed to [phone]? N/a    Did you advise to have disks sent to the proper address with imaging? ("History of" Patients)  5 years of imaging for breast patients-Mammos, US etc N/a    Scheduling Information   What is the best call back number?    (If the [facility] is calling, please use their number) [phone]   Miscellaneous Information

## 2022-02-07 NOTE — TELEPHONE ENCOUNTER
Intake received  Insurance education outreach to follow    02/17/22  Pt has an active bc plan that runs on a contract year  Effective 04/01/20  Plan has in & out of network benefits  Deduct $500 met   Out of pocket $2950 met $1135 09    02/24/22   Called pt to go over his benefits got his voicemail left a message for pt to call me back    03/30/22  Per RTE pt has met the $2950 out of pocket   Since the deduct & out of pocket are met all services should be covered 100%  Insurance education outreach not needed at this time

## 2022-02-07 NOTE — PROGRESS NOTES
Ordered PET per Dr Attila Spain scheduled for [email or web address] called Lavon Haq with appointment information and reviewed all other appointments with him    He thanked me for the call and verbalized understanding

## 2022-02-07 NOTE — PROGRESS NOTES
Patients appointments have been rescheduled as follows:  Surg [email or web address]  PET 2/9@11:30  Med [email or web address]    Patient aware and agreeable  I also placed a referral for Palliative Care and spoke with the office regarding the need for a sooner rather than later appointment with Dr Angelique Saldana no longer sees patients in [facility] only

## 2022-02-07 NOTE — PROGRESS NOTES
Phone outreach to pt, introduced myself  Called to provide appt details for the pt  Pt is scheduled w/ Dr Yamile Sanford on 2/8/22 @ 21  AM and scheduled w/ Dr Karlos Johnson on 2/16/22 @ 940 AM  Both appts will be at the [address] [place] [address]  He was agreeable to the appt details  I told him to save my phone number and to reach out to me if he needed anything or had any questions, he understood and was agreeable  PET scan to be scheduled by Madelene Paget to complete workup, pending bx results

## 2022-02-08 ENCOUNTER — LAB (OUTPATIENT)
Dept: LAB | Facility: CLINIC | Age: 72
End: 2022-02-08
Payer: COMMERCIAL

## 2022-02-08 ENCOUNTER — CONSULT (OUTPATIENT)
Dept: SURGICAL ONCOLOGY | Facility: CLINIC | Age: 72
End: 2022-02-08
Payer: COMMERCIAL

## 2022-02-08 ENCOUNTER — APPOINTMENT (OUTPATIENT)
Dept: LAB | Facility: CLINIC | Age: 72
End: 2022-02-08
Payer: COMMERCIAL

## 2022-02-08 VITALS
DIASTOLIC BLOOD PRESSURE: 80 MMHG | TEMPERATURE: 97.4 F | SYSTOLIC BLOOD PRESSURE: 120 MMHG | OXYGEN SATURATION: 100 % | BODY MASS INDEX: 25.7 KG/M2 | HEIGHT: 69 IN | HEART RATE: 79 BPM | RESPIRATION RATE: 17 BRPM | WEIGHT: 173.5 LBS

## 2022-02-08 DIAGNOSIS — N20.0 NEPHROLITHIASIS: ICD-10-CM

## 2022-02-08 DIAGNOSIS — K22.89 ESOPHAGEAL MASS: ICD-10-CM

## 2022-02-08 DIAGNOSIS — C16.9 GASTRIC ADENOCARCINOMA (HCC): ICD-10-CM

## 2022-02-08 DIAGNOSIS — K31.89 GASTRIC MASS: Primary | ICD-10-CM

## 2022-02-08 DIAGNOSIS — K31.89 GASTRIC MASS: ICD-10-CM

## 2022-02-08 LAB
ALBUMIN SERPL BCP-MCNC: 3.8 G/DL (ref 3.5–5)
ALP SERPL-CCNC: 138 U/L (ref 46–116)
ALT SERPL W P-5'-P-CCNC: 22 U/L (ref 12–78)
ANION GAP SERPL CALCULATED.3IONS-SCNC: 9 MMOL/L (ref 4–13)
AST SERPL W P-5'-P-CCNC: 25 U/L (ref 5–45)
ATRIAL RATE: 73 BPM
BASOPHILS # BLD AUTO: 0.05 THOUSANDS/ΜL (ref 0–0.1)
BASOPHILS NFR BLD AUTO: 1 % (ref 0–1)
BILIRUB SERPL-MCNC: 0.94 MG/DL (ref 0.2–1)
BUN SERPL-MCNC: 17 MG/DL (ref 5–25)
CALCIUM SERPL-MCNC: 9.3 MG/DL (ref 8.3–10.1)
CHLORIDE SERPL-SCNC: 100 MMOL/L (ref 100–108)
CO2 SERPL-SCNC: 27 MMOL/L (ref 21–32)
CREAT SERPL-MCNC: 0.98 MG/DL (ref 0.6–1.3)
EOSINOPHIL # BLD AUTO: 0.19 THOUSAND/ΜL (ref 0–0.61)
EOSINOPHIL NFR BLD AUTO: 2 % (ref 0–6)
ERYTHROCYTE [DISTWIDTH] IN BLOOD BY AUTOMATED COUNT: 14.9 % (ref 11.6–15.1)
GFR SERPL CREATININE-BSD FRML MDRD: 77 ML/MIN/1.73SQ M
GLUCOSE SERPL-MCNC: 99 MG/DL (ref 65–140)
HCT VFR BLD AUTO: 39.5 % (ref 36.5–49.3)
HGB BLD-MCNC: 12.8 G/DL (ref 12–17)
IMM GRANULOCYTES # BLD AUTO: 0.05 THOUSAND/UL (ref 0–0.2)
IMM GRANULOCYTES NFR BLD AUTO: 1 % (ref 0–2)
LYMPHOCYTES # BLD AUTO: 1.2 THOUSANDS/ΜL (ref 0.6–4.47)
LYMPHOCYTES NFR BLD AUTO: 11 % (ref 14–44)
MCH RBC QN AUTO: 30.2 PG (ref 26.8–34.3)
MCHC RBC AUTO-ENTMCNC: 32.4 G/DL (ref 31.4–37.4)
MCV RBC AUTO: 93 FL (ref 82–98)
MONOCYTES # BLD AUTO: 0.74 THOUSAND/ΜL (ref 0.17–1.22)
MONOCYTES NFR BLD AUTO: 7 % (ref 4–12)
NEUTROPHILS # BLD AUTO: 8.34 THOUSANDS/ΜL (ref 1.85–7.62)
NEUTS SEG NFR BLD AUTO: 78 % (ref 43–75)
NRBC BLD AUTO-RTO: 0 /100 WBCS
P AXIS: 15 DEGREES
PLATELET # BLD AUTO: 382 THOUSANDS/UL (ref 149–390)
PMV BLD AUTO: 9.4 FL (ref 8.9–12.7)
POTASSIUM SERPL-SCNC: 4.3 MMOL/L (ref 3.5–5.3)
PR INTERVAL: 148 MS
PROT SERPL-MCNC: 7.5 G/DL (ref 6.4–8.2)
QRS AXIS: 11 DEGREES
QRSD INTERVAL: 84 MS
QT INTERVAL: 416 MS
QTC INTERVAL: 458 MS
RBC # BLD AUTO: 4.24 MILLION/UL (ref 3.88–5.62)
SODIUM SERPL-SCNC: 136 MMOL/L (ref 136–145)
T WAVE AXIS: 39 DEGREES
VENTRICULAR RATE: 73 BPM
WBC # BLD AUTO: 10.57 THOUSAND/UL (ref 4.31–10.16)

## 2022-02-08 PROCEDURE — 85025 COMPLETE CBC W/AUTO DIFF WBC: CPT

## 2022-02-08 PROCEDURE — 80053 COMPREHEN METABOLIC PANEL: CPT

## 2022-02-08 PROCEDURE — 93005 ELECTROCARDIOGRAM TRACING: CPT

## 2022-02-08 PROCEDURE — 36415 COLL VENOUS BLD VENIPUNCTURE: CPT

## 2022-02-08 PROCEDURE — 99245 OFF/OP CONSLTJ NEW/EST HI 55: CPT | Performed by: SURGERY

## 2022-02-08 PROCEDURE — 93010 ELECTROCARDIOGRAM REPORT: CPT | Performed by: INTERNAL MEDICINE

## 2022-02-08 RX ORDER — OXYCODONE HYDROCHLORIDE 5 MG/1
5 TABLET ORAL EVERY 6 HOURS PRN
Qty: 30 TABLET | Refills: 0 | Status: SHIPPED | OUTPATIENT
Start: 2022-02-08 | End: 2022-02-15 | Stop reason: SDUPTHER

## 2022-02-08 NOTE — PROGRESS NOTES
Surgical Oncology Consult       [address] [facility]  [address]  [facility] [address]    Servando Page  1950  [id number]  [address] [facility]  [address] [address]    Diagnoses and all orders for this visit:    Gastric mass  -     Comprehensive metabolic panel; Future  -     CBC and differential; Future  -     EKG 12 lead; Future    Esophageal mass  -     Ambulatory Referral to Surgical Oncology    Gastric adenocarcinoma [facility])  -     Case request operating room: INSERTION VENOUS PORT (PORT-A-CATH); Standing  -     Case request operating room: INSERTION VENOUS PORT (PORT-A-CATH)    Nephrolithiasis  -     oxyCODONE (Roxicodone) 5 immediate release tablet; Take 1 tablet (5 mg total) by mouth every 6 (six) hours as needed for severe pain Max Daily Amount: 20 mg        Chief Complaint   Patient presents with    Consult       No follow-ups on file  Oncology History    No history exists  History of Present Illness:  80-year-old male who recently started to have some right upper quadrant and epigastric discomfort  He ultimately underwent laparoscopic cholecystectomy with no significant improvement  He lost approximately 33 lb in the last month  He then saw Gastroenterology  EGD from February 4, 2022 revealed a 2 cm hiatal hernia with a friable mass at the GE junction/cardia of involving more than half of the circumference  This extended into the cardia fundus for 5 cm  Pathology revealed adenocarcinoma with a small amount squamous differentiation  CT from February 4, 2022 revealed a mass at the GE junction was suspicious for malignancy  There was significant sinan enlargement in the mediastinum, retroperitoneum and omentum  There were also suspicious subcentimeter pulmonary nodules  I personally reviewed the films    He comes in now to discuss further therapy  His appetite is poor  He has lost approximately 33 lb  No dysphagia to solids or liquids  Review of Systems  Complete ROS Surg Onc:   Constitutional: The patient has a recent weight loss  Eyes: No complaints of visual problems, no scleral icterus  ENT: no complaints of ear pain, no hoarseness, no difficulty swallowing,  no tinnitus and no new masses in head, oral cavity, or neck  Cardiovascular: No complaints of chest pain, no palpitations, no ankle edema  Respiratory: No complaints of shortness of breath, no cough  Gastrointestinal: No complaints of jaundice, no bloody stools, no pale stools  Genitourinary: No complaints of dysuria, no hematuria, no nocturia, no frequent urination, no urethral discharge  Musculoskeletal: No complaints of weakness, paralysis, joint stiffness or arthralgias  Integumentary: No complaints of rash, no new lesions  Neurological: No complaints of convulsions, no seizures, no dizziness  Hematologic/Lymphatic: No complaints of easy bruising  Endocrine:  No hot or cold intolerance  No polydipsia, polyphagia, or polyuria  Allergy/immunology:  No environmental allergies  No food allergies  Not immunocompromised  Skin:  No pallor or rash  No wound            Patient Active Problem List   Diagnosis    Hypertension    Hypothyroid    Depression    BPH (benign prostatic hyperplasia)    COVID-19    Inflammatory arthritis    Rectus diastasis    Acute calculous cholecystitis    Gastric mass    Gastric adenocarcinoma ([address] )     Past Medical History:   Diagnosis Date    Disease of thyroid gland     GERD (gastroesophageal reflux disease)     History of GI diverticular bleed     Hypertension     Tinnitus      Past Surgical History:   Procedure Laterality Date    ADENOIDECTOMY      CHOLECYSTECTOMY LAPAROSCOPIC N/A 1/24/2022    Procedure: CHOLECYSTECTOMY LAPAROSCOPIC W/ INTRAOP CHOLANGIOGRAM;  Surgeon: Sylvie Marshall DO;  [facility]; Service: General    COLONOSCOPY N/A 7/16/2016    Procedure: COLONOSCOPY;  Surgeon: Robert Mccray MD;  [facility];  Service:    [address] BIOPSY      TONSILLECTOMY      UVULECTOMY       History reviewed  No pertinent family history  Social History     Socioeconomic History    Marital status: /Civil Union     Spouse name: Not on file    Number of children: Not on file    Years of education: Not on file    Highest education level: Not on file   Occupational History    Not on file   Tobacco Use    Smoking status: Never Smoker    Smokeless tobacco: Never Used   Vaping Use    Vaping Use: Never used   Substance and Sexual Activity    Alcohol use: Yes     Comment: Rarely    Drug use: Never    Sexual activity: Not on file   Other Topics Concern    Not on file   Social History Narrative    Not on file     Social Determinants of Health     Financial Resource Strain: Not on file   Food Insecurity: Not on file   Transportation Needs: Not on file   Physical Activity: Not on file   Stress: Not on file   Social Connections: Not on file   Intimate Partner Violence: Not on file   Housing Stability: Not on file       Current Outpatient Medications:     irbesartan (AVAPRO) 300 mg tablet, , Disp: , Rfl:     levothyroxine 125 mcg tablet, Take 125 mcg by mouth daily  , Disp: , Rfl:     oxyCODONE (Roxicodone) 5 immediate release tablet, Take 1 tablet (5 mg total) by mouth every 6 (six) hours as needed for severe pain Max Daily Amount: 20 mg, Disp: 30 tablet, Rfl: 0    pantoprazole (PROTONIX) 40 mg tablet, Take 1 tablet (40 mg total) by mouth daily, Disp: 30 tablet, Rfl: 11    tamsulosin (FLOMAX) 0 4 mg, Take 0 4 mg by mouth 2 (two) times a day Indications: Enlarged Prostate , Disp: , Rfl:     chlorhexidine (PERIDEX) 0 12 % solution, FILL SYRINGE WITH 10CC AND IRRIGATE UPPER LEFT AREA IN MOUTH, Disp: , Rfl:     escitalopram (LEXAPRO) 10 mg tablet, , Disp: , Rfl:     finasteride (PROSCAR) 5 mg tablet, Take 1 tablet (5 mg total) by mouth daily, Disp: 90 tablet, Rfl: 3    rosuvastatin (CRESTOR) 20 MG tablet, Take 20 mg by mouth daily, Disp: , Rfl:   No current facility-administered medications for this visit  No Known Allergies  Vitals:    02/08/22 1032   BP: 120/80   Pulse: 79   Resp: 17   Temp: (!) 97 4 °F (36 3 °C)   SpO2: 100%       Physical Exam   Constitutional: General appearance: The Patient is well-developed and well-nourished who appears the stated age in no acute distress  Patient is pleasant and talkative  HEENT:  Normocephalic  Sclerae are anicteric  Mucous membranes are moist  Neck is supple without adenopathy  No JVD  Chest: The lungs are clear to auscultation  Cardiac: Heart is regular rate  Abdomen: Abdomen is soft, non-tender, non-distended and without masses  Extremities: There is no clubbing or cyanosis  There is no edema  Symmetric  Neuro: Grossly nonfocal  Gait is normal      Lymphatic: No evidence of cervical adenopathy bilaterally  No evidence of axillary adenopathy bilaterally  No evidence of inguinal adenopathy bilaterally  Skin: Warm, anicteric  Psych:  Patient is pleasant and talkative  Breasts:      Pathology:  Final Diagnosis   A  Stomach, body, biopsy:  - Gastric oxyntic mucosa with no significant pathologic alteration   - Negative for intestinal metaplasia, dysplasia or carcinoma    - No Helicobacter pylori is identified on H&E stained slides      B,Gastric mass, fundus, biopsy  - Adenocarcinoma with small portion of squamous component, arising in high grade dysplasia in a background of mixed squamous and cardiac mucosa       Comment: The majority of the tumor is an adenocarcinoma with a minor component of squamous differentiation which is confirmed by p40, 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        Immunohistochemistry (IHC) testing for Mismatch Repair (MMR) proteins and HER2 have been requested on block B1, and the results will be issued in an addendum      Intradepartmental consultation is in agreement  Dr Mara Gallardo is notified of the diagnosis in [facility] via [address] on 2/8/2022  at 10:30 am        Electronically signed by Cristina Domingo MD on 2/8/2022 at 10:31 AM         Labs:      Imaging  EGD    Result Date: 2/4/2022  Narrative: [facility] [address] [place] [address] [phone] DATE OF SERVICE: 2/04/22 PHYSICIAN(S): Mara Gallardo MD Proceduralist INDICATION: Epigastric pain, Weight loss, unintentional POST-OP DIAGNOSIS: See the impression below  PREPROCEDURE: Informed consent was obtained for the procedure, including sedation  Risks of perforation, hemorrhage, adverse drug reaction and aspiration were discussed  The patient was placed in the left lateral decubitus position  Patient was explained about the risks and benefits of the procedure  Risks including but not limited to bleeding, infection, and perforation were explained in detail  Also explained about less than 100% sensitivity with the exam and other alternatives  DETAILS OF PROCEDURE: Patient was taken to the procedure room where a time out was performed to confirm correct patient and correct procedure  The patient underwent monitored anesthesia care, which was administered by an anesthesia professional  The patient's blood pressure, heart rate, level of consciousness, respirations and oxygen were monitored throughout the procedure  The scope was advanced to the second part of the duodenum  Retroflexion was performed in the fundus  The patient experienced no blood loss  The procedure was not difficult  The patient tolerated the procedure well  There were no apparent complications  ANESTHESIA INFORMATION: ASA: II Anesthesia Type: Anesthesia type not filed in the log   MEDICATIONS: simethicone (MYLICON) oral suspension 40 mg (Totals for administrations occurring from 0927 to 0946 on 02/04/22) FINDINGS: Two cm hiatal hernia was seen with friable mass involving more than half the circumference and extending linearly down into the cardia and fundus for about 5 cm status post multiple biopsies Stomach-mild erythema in the antrum  Biopsies done to check for H pylori  Duodenum-patchy erythema in the bulb  SPECIMENS: ID Type Source Tests Collected by Time Destination 1 : gastric body bx r/o h  pylori Tissue Stomach TISSUE EXAM Colleen Ambrocio MD 2/4/2022  9:43 AM  2 : gastric mass/hiatal hernia/fundus bx Tissue Stomach TISSUE EXAM Colleen Ambrocio MD 2/4/2022  9:44 AM      Impression: Two cm hiatal hernia was seen with friable mass involving more than half the circumference and extending linearly down into the cardia and fundus for about 5 cm status post multiple biopsies Stomach-mild erythema in the antrum  Biopsies done to check for H pylori  Duodenum-patchy erythema in the bulb  RECOMMENDATION: Await pathology results Check CT scan of the chest, abdomen and pelvis   Colleen Ambrocio MD     CT chest abdomen pelvis w contrast    Addendum Date: 2/4/2022 Addendum:   There is a voice recognition software related error in the addendum to this report  A phrase which reads " The patient underwent LEFT UPPER CHOLECYSTECTOMY on January 24, 2022    " should read, " The patient underwent LAPAROSCOPIC CHOLECYSTECTOMY on January 24, 2022 "  Addendum Date: 2/4/2022 Addendum:   Please note the following important additional information  The patient underwent left upper cholecystectomy on January 24, 2022 which explains the presence of a few residual bubbles of pneumoperitoneum in the epigastric region on the current examination  Therefore, there are no findings to suggest perforated viscus  REVISED IMPRESSION: Mass at the gastroesophageal junction which is highly suspicious for malignancy   Areas of sinan enlargement that are highly suspicious for sinan metastatic disease in mediastinum, retroperitoneum, and omentum as described above  Subcentimeter pulmonary nodules which are highly suspicious for pulmonary parenchymal metastatic disease  Small bubbles of intraperitoneal gas in the epigastric region secondary to recent laparoscopic cholecystectomy  I discussed this addendum with Giovanni Colon via HIPAA compliant secure electronic messaging on 2/4/2022 at 3:25 PM      Result Date: 2/4/2022  Narrative: CT CHEST, ABDOMEN AND PELVIS WITH IV CONTRAST INDICATION:   K31 89: Other diseases of stomach and duodenum  COMPARISON:  Report of upper endoscopy performed earlier the same day  TECHNIQUE: CT examination of the chest, abdomen and pelvis was performed  Axial, sagittal, and coronal 2D reformatted images were created from the source data and submitted for interpretation  Radiation dose length product (DLP) for this visit:  721 mGy-cm   This examination, like all CT scans performed in the [facility], was performed utilizing techniques to minimize radiation dose exposure, including the use of iterative reconstruction and automated exposure control  IV Contrast:  100 mL of iohexol (OMNIPAQUE) Enteric Contrast: Enteric contrast was administered  FINDINGS: CHEST LUNGS:  There are numerous scattered noncalcified pulmonary nodules of varying sizes between 2 and 9 mm  Representative nodules measured on series 4 are as follows: Lateral left upper lobe, 0 3 x 0 2 cm, image 29  Medial right upper lobe, 0 5 x 0 3 cm, image 48  Superior segment right lower lobe, 0 8 x 0 6 cm, image 50  Posterior left lower lobe, 0 9 x 0 7 cm, image 79  No tracheal or endobronchial lesion  No consolidative or groundglass airspace opacity to suggest acute infection  PLEURA:  Unremarkable  HEART/GREAT VESSELS: Extensive coronary artery calcification  Mild calcification of aortic valve  No pericardial effusion  No thoracic aortic aneurysm   MEDIASTINUM AND SHERWIN:  Circumferential masslike thickening of the gastroesophageal junction corresponding to the findings suspicious for tumor in that location on recent upper endoscopy  Mild air and fluid-filled distention of the midesophagus  Enlarged aortopulmonary window lymph nodes measure 0 9 x 1 4 cm and 1 4 x 1 1 cm on image 19 of series 2  A right posterior mediastinal mass to the right of the aorta at the T11-T12 level measures 2 5 x 1 4 cm on image 49 of series 2 and appears to represent a centrally necrotic metastatic node  CHEST WALL AND LOWER NECK:   Unremarkable  ABDOMEN LIVER/BILIARY TREE:  Unremarkable  GALLBLADDER:  No calcified gallstones  No pericholecystic inflammatory change  SPLEEN:  Unremarkable  PANCREAS:  Unremarkable  ADRENAL GLANDS:  Nonspecific thickening of bilateral adrenal glands  No definite adrenal mass, though evaluation of the adrenals is limited by multiple surrounding probably metastatic nodules  KIDNEYS/URETERS:  Multiple nonobstructing intrarenal calculi on the order of 2 to 5 mm  Small parapelvic renal cysts  No solid renal mass  STOMACH AND BOWEL:  Mass at the gastroesophageal junction best seen on image 46 of series 2 suspicious for malignancy  Extensive descending and sigmoid colon diverticulosis without evidence for acute diverticulitis  No bowel obstruction  APPENDIX:  No findings to suggest appendicitis  ABDOMINOPELVIC CAVITY:  Upper retroperitoneal lymphadenopathy is noted with many of the nodes demonstrating indistinct margins and therefore difficult to measure  Conglomerate sinan mass in the aortocaval region measures 3 8 x 1 9 cm on image 64 series 2  A representative left para-aortic node measures 2 6 x 1 3 cm on image 59 of series 2  A representative mass in the gastrohepatic ligament measures 2 1 x 1 7  There are omental nodules that are highly suspicious for metastasis in the anterior right upper quadrant with a representative lesion measuring 1 1 x 1 0 cm on image 60 of series 2   There are multiple tiny bubbles of free intraperitoneal gas in the epigastric region beneath the hemidiaphragm in the approximate midline best seen on images 40 through 45 of series 2 most consistent with microperforation  No other findings of pneumoperitoneum and no abdominal or pelvic abscess is noted  There is trace free pelvic fluid but otherwise no ascites  VESSELS:  Unremarkable for patient's age  PELVIS REPRODUCTIVE ORGANS:  Marked prostatomegaly with impression of prostate into the urinary bladder base URINARY BLADDER:  Flattening of the bladder due to impression a markedly enlarged prostate  Urinary bladder is otherwise unremarkable  ABDOMINAL WALL/INGUINAL REGIONS:  Unremarkable  OSSEOUS STRUCTURES:  No acute fracture or destructive osseous lesion  Advanced bilateral glenohumeral degenerative changes  Spinal degenerative changes including grade 1 anterolisthesis of L4 relative to L5  Impression: Tiny bubbles of gas free within the upper anterior epigastric region of the peritoneum suggesting microperforation of an abdominal viscus, probably due to infiltrating mass at the gastroesophageal junction  None of the enteric contrast administered for this examination leaked into the peritoneum  No abdominal or pelvic abscess is noted  Mass at the gastroesophageal junction which is highly suspicious for malignancy  Areas of sinan enlargement that are highly suspicious for sinan metastatic disease in mediastinum, retroperitoneum, and omentum as described above  Subcentimeter pulmonary nodules which are highly suspicious for pulmonary parenchymal metastatic disease  I personally discussed this study with Olivia Simon on 2/4/2022 at 2:55 PM  Workstation performed: Kavya Fox right upper quadrant    Result Date: 1/24/2022  Narrative: RIGHT UPPER QUADRANT ULTRASOUND INDICATION:     ruq pain- hx of biliary colic  COMPARISON:  Right upper quadrant ultrasound 1/19/2022   TECHNIQUE:   Real-time ultrasound of the right upper quadrant was performed with a curvilinear transducer with both volumetric sweeps and still imaging techniques  FINDINGS: PANCREAS:  Portions of the pancreas are obscured by bowel gas  Visualized portions of the pancreas are unremarkable  AORTA AND IVC:  Obscured by bowel gas  LIVER: Size:  Within normal range  The liver measures 15 8 cm in the midclavicular line  Contour:  Surface contour is smooth  Parenchyma:  Echogenicity and echotexture are within normal limits  No evidence of suspicious mass  Limited imaging of the main portal vein shows it to be patent and hepatopetal   BILIARY: The gallbladder is normal in caliber  Top normal wall caliber  No pericholecystic fluid  Shadowing gallstone(s) identified  No sonographic Horne's sign  No intrahepatic biliary dilatation  CBD measures 9 mm, similar to prior  No choledocholithiasis  KIDNEY: Right kidney measures 11 0 x 5 9 x 7 9 cm  Nonobstructing calculi measuring up to 8 mm  There is a 1 cm lower pole cyst  ASCITES:   None  Impression: Cholelithiasis with top normal gallbladder wall caliber, but no additional features of acute cholecystitis  Mildly dilated common bile duct measuring 9 mm, not significantly changed since 1/19/2022  No choledocholithiasis seen  Workstation performed: [id number]     US right upper quadrant    Result Date: 1/19/2022  Narrative: RIGHT UPPER QUADRANT ULTRASOUND INDICATION:     K80 50: Calculus of bile duct without cholangitis or cholecystitis without obstruction  COMPARISON:  None TECHNIQUE:   Real-time ultrasound of the right upper quadrant was performed with a curvilinear transducer with both volumetric sweeps and still imaging techniques  FINDINGS: PANCREAS:  Portions of the pancreas are obscured by bowel gas  Visualized portions of the pancreas are unremarkable  AORTA AND IVC:  Visualized portions are normal for patient age  LIVER: Size:  Within normal range  The liver measures 13 6 cm in the midclavicular line  Contour:  Surface contour is smooth  Parenchyma: There is moderate diffuse increased echogenicity with smooth echotexture and acoustic beam attenuation  Most consistent with moderate hepatic steatosis  No evidence of suspicious mass  Limited imaging of the main portal vein shows it to be patent and hepatopetal   BILIARY: The gallbladder is normal in caliber  No wall thickening or pericholecystic fluid  Shadowing gallstone(s) identified  No sonographic Horne's sign  No intrahepatic biliary dilatation  CBD measures 8 mm  No choledocholithiasis  KIDNEY: Right kidney measures 10 0 cm  No hydronephrosis  Few nonobstructing intrarenal calculi measuring up to 8 mm  1 0 x 0 9 x 1 4 cm simple cyst in the lower pole  ASCITES:   None  Impression: Cholelithiasis  No sonographic evidence for acute cholecystitis  Hepatic steatosis  Right-sided nephrolithiasis  No hydronephrosis  Workstation performed: [id number]     I reviewed the above laboratory and imaging data  Discussion/Summary:  70year old male with what appears to be metastatic esophageal cancer  In there appeared to be omental and retroperitoneal lymph nodes as well as pulmonary nodules  These are certainly suspicious for metastasis  We will await the results of the PET-CT tomorrow  We discussed that based on these findings and his lack of dysphagia, he should not require radiation  I would recommend that he undergo chemotherapy  I have discussed this with his medical oncologist, Dr Ana Gunn  He will need a port for chemotherapy  The risks were explained including bleeding, infection, need for further surgery, wound complications, port malfunction, DVT, and pneumothorax  Informed consent was obtained  We will schedule this at our earliest mutual convenience  Since he is taking oxycodone, I recommended that he go on a bowel regimen  He has already set up to see palliative care    We discussed in general that surgery would usually not be offered in this situation, but pending his response to chemotherapy, surgery could be considered in certain limited situations  I will see him at the time of port placement  He and his family are agreeable to this  All their questions were answered

## 2022-02-08 NOTE — H&P (VIEW-ONLY)
Surgical Oncology Consult       [address]  [facility]  [address] [facility] [id number]    Lexis Villatoro  1950  [phone]  [address]  [facility]  [address] [facility] [address] [facility] [address]    Diagnoses and all orders for this visit:    Gastric mass  -     Comprehensive metabolic panel; Future  -     CBC and differential; Future  -     EKG 12 lead; Future    Esophageal mass  -     Ambulatory Referral to Surgical Oncology    Gastric adenocarcinoma [facility])  -     Case request operating room: INSERTION VENOUS PORT (PORT-A-CATH); Standing  -     Case request operating room: INSERTION VENOUS PORT (PORT-A-CATH)    Nephrolithiasis  -     oxyCODONE (Roxicodone) 5 immediate release tablet; Take 1 tablet (5 mg total) by mouth every 6 (six) hours as needed for severe pain Max Daily Amount: 20 mg        Chief Complaint   Patient presents with    Consult       No follow-ups on file  Oncology History    No history exists  History of Present Illness:  79-year-old male who recently started to have some right upper quadrant and epigastric discomfort  He ultimately underwent laparoscopic cholecystectomy with no significant improvement  He lost approximately 33 lb in the last month  He then saw Gastroenterology  EGD from February 4, 2022 revealed a 2 cm hiatal hernia with a friable mass at the GE junction/cardia of involving more than half of the circumference  This extended into the cardia fundus for 5 cm  Pathology revealed adenocarcinoma with a small amount squamous differentiation  CT from February 4, 2022 revealed a mass at the GE junction was suspicious for malignancy  There was significant sinan enlargement in the mediastinum, retroperitoneum and omentum  There were also suspicious subcentimeter pulmonary nodules  I personally reviewed the films    He comes in now to discuss further therapy  His appetite is poor  He has lost approximately 33 lb  No dysphagia to solids or liquids  Review of Systems  Complete ROS Surg Onc:   Constitutional: The patient has a recent weight loss  Eyes: No complaints of visual problems, no scleral icterus  ENT: no complaints of ear pain, no hoarseness, no difficulty swallowing,  no tinnitus and no new masses in head, oral cavity, or neck  Cardiovascular: No complaints of chest pain, no palpitations, no ankle edema  Respiratory: No complaints of shortness of breath, no cough  Gastrointestinal: No complaints of jaundice, no bloody stools, no pale stools  Genitourinary: No complaints of dysuria, no hematuria, no nocturia, no frequent urination, no urethral discharge  Musculoskeletal: No complaints of weakness, paralysis, joint stiffness or arthralgias  Integumentary: No complaints of rash, no new lesions  Neurological: No complaints of convulsions, no seizures, no dizziness  Hematologic/Lymphatic: No complaints of easy bruising  Endocrine:  No hot or cold intolerance  No polydipsia, polyphagia, or polyuria  Allergy/immunology:  No environmental allergies  No food allergies  Not immunocompromised  Skin:  No pallor or rash  No wound            Patient Active Problem List   Diagnosis    Hypertension    Hypothyroid    Depression    BPH (benign prostatic hyperplasia)    COVID-19    Inflammatory arthritis    Rectus diastasis    Acute calculous cholecystitis    Gastric mass    Gastric adenocarcinoma ([address] )     Past Medical History:   Diagnosis Date    Disease of thyroid gland     GERD (gastroesophageal reflux disease)     History of GI diverticular bleed     Hypertension     Tinnitus      Past Surgical History:   Procedure Laterality Date    ADENOIDECTOMY      CHOLECYSTECTOMY LAPAROSCOPIC N/A 1/24/2022    Procedure: CHOLECYSTECTOMY LAPAROSCOPIC W/ INTRAOP CHOLANGIOGRAM;  Surgeon: Jannette Gomez DO;  [facility]; Service: General    COLONOSCOPY N/A 7/16/2016    Procedure: COLONOSCOPY;  Surgeon: Cherelle Morel MD;  [facility];  Service:    [address] BIOPSY      TONSILLECTOMY      UVULECTOMY       History reviewed  No pertinent family history  Social History     Socioeconomic History    Marital status: /Civil Union     Spouse name: Not on file    Number of children: Not on file    Years of education: Not on file    Highest education level: Not on file   Occupational History    Not on file   Tobacco Use    Smoking status: Never Smoker    Smokeless tobacco: Never Used   Vaping Use    Vaping Use: Never used   Substance and Sexual Activity    Alcohol use: Yes     Comment: Rarely    Drug use: Never    Sexual activity: Not on file   Other Topics Concern    Not on file   Social History Narrative    Not on file     Social Determinants of Health     Financial Resource Strain: Not on file   Food Insecurity: Not on file   Transportation Needs: Not on file   Physical Activity: Not on file   Stress: Not on file   Social Connections: Not on file   Intimate Partner Violence: Not on file   Housing Stability: Not on file       Current Outpatient Medications:     irbesartan (AVAPRO) 300 mg tablet, , Disp: , Rfl:     levothyroxine 125 mcg tablet, Take 125 mcg by mouth daily  , Disp: , Rfl:     oxyCODONE (Roxicodone) 5 immediate release tablet, Take 1 tablet (5 mg total) by mouth every 6 (six) hours as needed for severe pain Max Daily Amount: 20 mg, Disp: 30 tablet, Rfl: 0    pantoprazole (PROTONIX) 40 mg tablet, Take 1 tablet (40 mg total) by mouth daily, Disp: 30 tablet, Rfl: 11    tamsulosin (FLOMAX) 0 4 mg, Take 0 4 mg by mouth 2 (two) times a day Indications: Enlarged Prostate , Disp: , Rfl:     chlorhexidine (PERIDEX) 0 12 % solution, FILL SYRINGE WITH 10CC AND IRRIGATE UPPER LEFT AREA IN MOUTH, Disp: , Rfl:     escitalopram (LEXAPRO) 10 mg tablet, , Disp: , Rfl:     finasteride (PROSCAR) 5 mg tablet, Take 1 tablet (5 mg total) by mouth daily, Disp: 90 tablet, Rfl: 3    rosuvastatin (CRESTOR) 20 MG tablet, Take 20 mg by mouth daily, Disp: , Rfl:   No current facility-administered medications for this visit  No Known Allergies  Vitals:    02/08/22 1032   BP: 120/80   Pulse: 79   Resp: 17   Temp: (!) 97 4 °F (36 3 °C)   SpO2: 100%       Physical Exam   Constitutional: General appearance: The Patient is well-developed and well-nourished who appears the stated age in no acute distress  Patient is pleasant and talkative  HEENT:  Normocephalic  Sclerae are anicteric  Mucous membranes are moist  Neck is supple without adenopathy  No JVD  Chest: The lungs are clear to auscultation  Cardiac: Heart is regular rate  Abdomen: Abdomen is soft, non-tender, non-distended and without masses  Extremities: There is no clubbing or cyanosis  There is no edema  Symmetric  Neuro: Grossly nonfocal  Gait is normal      Lymphatic: No evidence of cervical adenopathy bilaterally  No evidence of axillary adenopathy bilaterally  No evidence of inguinal adenopathy bilaterally  Skin: Warm, anicteric  Psych:  Patient is pleasant and talkative  Breasts:      Pathology:  Final Diagnosis   A  Stomach, body, biopsy:  - Gastric oxyntic mucosa with no significant pathologic alteration   - Negative for intestinal metaplasia, dysplasia or carcinoma    - No Helicobacter pylori is identified on H&E stained slides      B,Gastric mass, fundus, biopsy  - Adenocarcinoma with small portion of squamous component, arising in high grade dysplasia in a background of mixed squamous and cardiac mucosa       Comment: The majority of the tumor is an adenocarcinoma with a minor component of squamous differentiation which is confirmed by p40, 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        Immunohistochemistry (IHC) testing for Mismatch Repair (MMR) proteins and HER2 have been requested on block B1, and the results will be issued in an addendum      Intradepartmental consultation is in agreement  Dr Justa Cuenca is notified of the diagnosis in [facility] via [address] on 2/8/2022  at 10:30 am        Electronically signed by Kaia Odonnell MD on 2/8/2022 at 10:31 AM         Labs:      Imaging  EGD    Result Date: 2/4/2022  Narrative: Ruth Pozo [address] [place] [address] Road [address] [place] [id number] DATE OF SERVICE: 2/04/22 PHYSICIAN(S): Justa Cuenca MD Proceduralist INDICATION: Epigastric pain, Weight loss, unintentional POST-OP DIAGNOSIS: See the impression below  PREPROCEDURE: Informed consent was obtained for the procedure, including sedation  Risks of perforation, hemorrhage, adverse drug reaction and aspiration were discussed  The patient was placed in the left lateral decubitus position  Patient was explained about the risks and benefits of the procedure  Risks including but not limited to bleeding, infection, and perforation were explained in detail  Also explained about less than 100% sensitivity with the exam and other alternatives  DETAILS OF PROCEDURE: Patient was taken to the procedure room where a time out was performed to confirm correct patient and correct procedure  The patient underwent monitored anesthesia care, which was administered by an anesthesia professional  The patient's blood pressure, heart rate, level of consciousness, respirations and oxygen were monitored throughout the procedure  The scope was advanced to the second part of the duodenum  Retroflexion was performed in the fundus  The patient experienced no blood loss  The procedure was not difficult  The patient tolerated the procedure well  There were no apparent complications  ANESTHESIA INFORMATION: ASA: II Anesthesia Type: Anesthesia type not filed in the log   MEDICATIONS: simethicone (MYLICON) oral suspension 40 mg (Totals for administrations occurring from 0927 to 0946 on 02/04/22) FINDINGS: Two cm hiatal hernia was seen with friable mass involving more than half the circumference and extending linearly down into the cardia and fundus for about 5 cm status post multiple biopsies Stomach-mild erythema in the antrum  Biopsies done to check for H pylori  Duodenum-patchy erythema in the bulb  SPECIMENS: ID Type Source Tests Collected by Time Destination 1 : gastric body bx r/o h  pylori Tissue Stomach TISSUE EXAM Jing Maldonado MD 2/4/2022  9:43 AM  2 : gastric mass/hiatal hernia/fundus bx Tissue Stomach TISSUE EXAM Jing Maldonado MD 2/4/2022  9:44 AM      Impression: Two cm hiatal hernia was seen with friable mass involving more than half the circumference and extending linearly down into the cardia and fundus for about 5 cm status post multiple biopsies Stomach-mild erythema in the antrum  Biopsies done to check for H pylori  Duodenum-patchy erythema in the bulb  RECOMMENDATION: Await pathology results Check CT scan of the chest, abdomen and pelvis   Jing Maldonado MD     CT chest abdomen pelvis w contrast    Addendum Date: 2/4/2022 Addendum:   There is a voice recognition software related error in the addendum to this report  A phrase which reads " The patient underwent LEFT UPPER CHOLECYSTECTOMY on January 24, 2022    " should read, " The patient underwent LAPAROSCOPIC CHOLECYSTECTOMY on January 24, 2022 "  Addendum Date: 2/4/2022 Addendum:   Please note the following important additional information  The patient underwent left upper cholecystectomy on January 24, 2022 which explains the presence of a few residual bubbles of pneumoperitoneum in the epigastric region on the current examination  Therefore, there are no findings to suggest perforated viscus  REVISED IMPRESSION: Mass at the gastroesophageal junction which is highly suspicious for malignancy   Areas of sinan enlargement that are highly suspicious for sinan metastatic disease in mediastinum, retroperitoneum, and omentum as described above  Subcentimeter pulmonary nodules which are highly suspicious for pulmonary parenchymal metastatic disease  Small bubbles of intraperitoneal gas in the epigastric region secondary to recent laparoscopic cholecystectomy  I discussed this addendum with Tianna Anderson via HIPAA compliant secure electronic messaging on 2/4/2022 at 3:25 PM      Result Date: 2/4/2022  Narrative: CT CHEST, ABDOMEN AND PELVIS WITH IV CONTRAST INDICATION:   K31 89: Other diseases of stomach and duodenum  COMPARISON:  Report of upper endoscopy performed earlier the same day  TECHNIQUE: CT examination of the chest, abdomen and pelvis was performed  Axial, sagittal, and coronal 2D reformatted images were created from the source data and submitted for interpretation  Radiation dose length product (DLP) for this visit:  721 mGy-cm   This examination, like all CT scans performed in the [facility], was performed utilizing techniques to minimize radiation dose exposure, including the use of iterative reconstruction and automated exposure control  IV Contrast:  100 mL of iohexol (OMNIPAQUE) Enteric Contrast: Enteric contrast was administered  FINDINGS: CHEST LUNGS:  There are numerous scattered noncalcified pulmonary nodules of varying sizes between 2 and 9 mm  Representative nodules measured on series 4 are as follows: Lateral left upper lobe, 0 3 x 0 2 cm, image 29  Medial right upper lobe, 0 5 x 0 3 cm, image 48  Superior segment right lower lobe, 0 8 x 0 6 cm, image 50  Posterior left lower lobe, 0 9 x 0 7 cm, image 79  No tracheal or endobronchial lesion  No consolidative or groundglass airspace opacity to suggest acute infection  PLEURA:  Unremarkable  HEART/GREAT VESSELS: Extensive coronary artery calcification  Mild calcification of aortic valve  No pericardial effusion  No thoracic aortic aneurysm   MEDIASTINUM AND SHERWIN:  Circumferential masslike thickening of the gastroesophageal junction corresponding to the findings suspicious for tumor in that location on recent upper endoscopy  Mild air and fluid-filled distention of the midesophagus  Enlarged aortopulmonary window lymph nodes measure 0 9 x 1 4 cm and 1 4 x 1 1 cm on image 19 of series 2  A right posterior mediastinal mass to the right of the aorta at the T11-T12 level measures 2 5 x 1 4 cm on image 49 of series 2 and appears to represent a centrally necrotic metastatic node  CHEST WALL AND LOWER NECK:   Unremarkable  ABDOMEN LIVER/BILIARY TREE:  Unremarkable  GALLBLADDER:  No calcified gallstones  No pericholecystic inflammatory change  SPLEEN:  Unremarkable  PANCREAS:  Unremarkable  ADRENAL GLANDS:  Nonspecific thickening of bilateral adrenal glands  No definite adrenal mass, though evaluation of the adrenals is limited by multiple surrounding probably metastatic nodules  KIDNEYS/URETERS:  Multiple nonobstructing intrarenal calculi on the order of 2 to 5 mm  Small parapelvic renal cysts  No solid renal mass  STOMACH AND BOWEL:  Mass at the gastroesophageal junction best seen on image 46 of series 2 suspicious for malignancy  Extensive descending and sigmoid colon diverticulosis without evidence for acute diverticulitis  No bowel obstruction  APPENDIX:  No findings to suggest appendicitis  ABDOMINOPELVIC CAVITY:  Upper retroperitoneal lymphadenopathy is noted with many of the nodes demonstrating indistinct margins and therefore difficult to measure  Conglomerate sinan mass in the aortocaval region measures 3 8 x 1 9 cm on image 64 series 2  A representative left para-aortic node measures 2 6 x 1 3 cm on image 59 of series 2  A representative mass in the gastrohepatic ligament measures 2 1 x 1 7  There are omental nodules that are highly suspicious for metastasis in the anterior right upper quadrant with a representative lesion measuring 1 1 x 1 0 cm on image 60 of series 2   There are multiple tiny bubbles of free intraperitoneal gas in the epigastric region beneath the hemidiaphragm in the approximate midline best seen on images 40 through 45 of series 2 most consistent with microperforation  No other findings of pneumoperitoneum and no abdominal or pelvic abscess is noted  There is trace free pelvic fluid but otherwise no ascites  VESSELS:  Unremarkable for patient's age  PELVIS REPRODUCTIVE ORGANS:  Marked prostatomegaly with impression of prostate into the urinary bladder base URINARY BLADDER:  Flattening of the bladder due to impression a markedly enlarged prostate  Urinary bladder is otherwise unremarkable  ABDOMINAL WALL/INGUINAL REGIONS:  Unremarkable  OSSEOUS STRUCTURES:  No acute fracture or destructive osseous lesion  Advanced bilateral glenohumeral degenerative changes  Spinal degenerative changes including grade 1 anterolisthesis of L4 relative to L5  Impression: Tiny bubbles of gas free within the upper anterior epigastric region of the peritoneum suggesting microperforation of an abdominal viscus, probably due to infiltrating mass at the gastroesophageal junction  None of the enteric contrast administered for this examination leaked into the peritoneum  No abdominal or pelvic abscess is noted  Mass at the gastroesophageal junction which is highly suspicious for malignancy  Areas of sinan enlargement that are highly suspicious for sinan metastatic disease in mediastinum, retroperitoneum, and omentum as described above  Subcentimeter pulmonary nodules which are highly suspicious for pulmonary parenchymal metastatic disease  I personally discussed this study with Gustavo Tariq on 2/4/2022 at 2:55 PM  Workstation performed: Meli Capone right upper quadrant    Result Date: 1/24/2022  Narrative: RIGHT UPPER QUADRANT ULTRASOUND INDICATION:     ruq pain- hx of biliary colic  COMPARISON:  Right upper quadrant ultrasound 1/19/2022   TECHNIQUE:   Real-time ultrasound of the right upper quadrant was performed with a curvilinear transducer with both volumetric sweeps and still imaging techniques  FINDINGS: PANCREAS:  Portions of the pancreas are obscured by bowel gas  Visualized portions of the pancreas are unremarkable  AORTA AND IVC:  Obscured by bowel gas  LIVER: Size:  Within normal range  The liver measures 15 8 cm in the midclavicular line  Contour:  Surface contour is smooth  Parenchyma:  Echogenicity and echotexture are within normal limits  No evidence of suspicious mass  Limited imaging of the main portal vein shows it to be patent and hepatopetal   BILIARY: The gallbladder is normal in caliber  Top normal wall caliber  No pericholecystic fluid  Shadowing gallstone(s) identified  No sonographic Horne's sign  No intrahepatic biliary dilatation  CBD measures 9 mm, similar to prior  No choledocholithiasis  KIDNEY: Right kidney measures 11 0 x 5 9 x 7 9 cm  Nonobstructing calculi measuring up to 8 mm  There is a 1 cm lower pole cyst  ASCITES:   None  Impression: Cholelithiasis with top normal gallbladder wall caliber, but no additional features of acute cholecystitis  Mildly dilated common bile duct measuring 9 mm, not significantly changed since 1/19/2022  No choledocholithiasis seen  Workstation performed: [id number]     US right upper quadrant    Result Date: 1/19/2022  Narrative: RIGHT UPPER QUADRANT ULTRASOUND INDICATION:     K80 50: Calculus of bile duct without cholangitis or cholecystitis without obstruction  COMPARISON:  None TECHNIQUE:   Real-time ultrasound of the right upper quadrant was performed with a curvilinear transducer with both volumetric sweeps and still imaging techniques  FINDINGS: PANCREAS:  Portions of the pancreas are obscured by bowel gas  Visualized portions of the pancreas are unremarkable  AORTA AND IVC:  Visualized portions are normal for patient age  LIVER: Size:  Within normal range  The liver measures 13 6 cm in the midclavicular line  Contour:  Surface contour is smooth  Parenchyma: There is moderate diffuse increased echogenicity with smooth echotexture and acoustic beam attenuation  Most consistent with moderate hepatic steatosis  No evidence of suspicious mass  Limited imaging of the main portal vein shows it to be patent and hepatopetal   BILIARY: The gallbladder is normal in caliber  No wall thickening or pericholecystic fluid  Shadowing gallstone(s) identified  No sonographic Horne's sign  No intrahepatic biliary dilatation  CBD measures 8 mm  No choledocholithiasis  KIDNEY: Right kidney measures 10 0 cm  No hydronephrosis  Few nonobstructing intrarenal calculi measuring up to 8 mm  1 0 x 0 9 x 1 4 cm simple cyst in the lower pole  ASCITES:   None  Impression: Cholelithiasis  No sonographic evidence for acute cholecystitis  Hepatic steatosis  Right-sided nephrolithiasis  No hydronephrosis  Workstation performed: [id number]     I reviewed the above laboratory and imaging data  Discussion/Summary:  70year old male with what appears to be metastatic esophageal cancer  In there appeared to be omental and retroperitoneal lymph nodes as well as pulmonary nodules  These are certainly suspicious for metastasis  We will await the results of the PET-CT tomorrow  We discussed that based on these findings and his lack of dysphagia, he should not require radiation  I would recommend that he undergo chemotherapy  I have discussed this with his medical oncologist, Dr Davidson Pock  He will need a port for chemotherapy  The risks were explained including bleeding, infection, need for further surgery, wound complications, port malfunction, DVT, and pneumothorax  Informed consent was obtained  We will schedule this at our earliest mutual convenience  Since he is taking oxycodone, I recommended that he go on a bowel regimen  He has already set up to see palliative care    We discussed in general that surgery would usually not be offered in this situation, but pending his response to chemotherapy, surgery could be considered in certain limited situations  I will see him at the time of port placement  He and his family are agreeable to this  All their questions were answered

## 2022-02-08 NOTE — LETTER
February 8, 2022     Egegikkira Gautam, DO  [address]    Patient: Ilan Meraz   YOB: 1950   Date of Visit: 2/8/2022       Dear Dr Brandy Cardenas:    Thank you for referring Maeve Melgoza to me for evaluation  Below are my notes for this consultation  If you have questions, please do not hesitate to call me  I look forward to following your patient along with you  Sincerely,        Yovani Whatley MD        CC: THOMAS Dawson MD Charon Serge, MD Lane Goad, MD  2/8/2022 11:06 AM  Sign when Signing Visit               Surgical Oncology Consult       [address] [address]    Ilan Meraz  1950  [id number]  [address] [place] [address],[address]  [facility] [place]  [address] [facility] [address]    Diagnoses and all orders for this visit:    Gastric mass  -     Comprehensive metabolic panel; Future  -     CBC and differential; Future  -     EKG 12 lead; Future    Esophageal mass  -     Ambulatory Referral to Surgical Oncology    Gastric adenocarcinoma [facility])  -     Case request operating room: INSERTION VENOUS PORT (PORT-A-CATH); Standing  -     Case request operating room: INSERTION VENOUS PORT (PORT-A-CATH)    Nephrolithiasis  -     oxyCODONE (Roxicodone) 5 immediate release tablet; Take 1 tablet (5 mg total) by mouth every 6 (six) hours as needed for severe pain Max Daily Amount: 20 mg        Chief Complaint   Patient presents with    Consult       No follow-ups on file  Oncology History    No history exists  History of Present Illness:  70-year-old male who recently started to have some right upper quadrant and epigastric discomfort  He ultimately underwent laparoscopic cholecystectomy with no significant improvement  He lost approximately 33 lb in the last month  He then saw Gastroenterology  EGD from February 4, 2022 revealed a 2 cm hiatal hernia with a friable mass at the GE junction/cardia of involving more than half of the circumference  This extended into the cardia fundus for 5 cm  Pathology revealed adenocarcinoma with a small amount squamous differentiation  CT from February 4, 2022 revealed a mass at the GE junction was suspicious for malignancy  There was significant sinan enlargement in the mediastinum, retroperitoneum and omentum  There were also suspicious subcentimeter pulmonary nodules  I personally reviewed the films  He comes in now to discuss further therapy  His appetite is poor  He has lost approximately 33 lb  No dysphagia to solids or liquids  Review of Systems  Complete ROS Surg Onc:   Constitutional: The patient has a recent weight loss  Eyes: No complaints of visual problems, no scleral icterus  ENT: no complaints of ear pain, no hoarseness, no difficulty swallowing,  no tinnitus and no new masses in head, oral cavity, or neck  Cardiovascular: No complaints of chest pain, no palpitations, no ankle edema  Respiratory: No complaints of shortness of breath, no cough  Gastrointestinal: No complaints of jaundice, no bloody stools, no pale stools  Genitourinary: No complaints of dysuria, no hematuria, no nocturia, no frequent urination, no urethral discharge  Musculoskeletal: No complaints of weakness, paralysis, joint stiffness or arthralgias  Integumentary: No complaints of rash, no new lesions  Neurological: No complaints of convulsions, no seizures, no dizziness  Hematologic/Lymphatic: No complaints of easy bruising  Endocrine:  No hot or cold intolerance  No polydipsia, polyphagia, or polyuria  Allergy/immunology:  No environmental allergies  No food allergies  Not immunocompromised  Skin:  No pallor or rash  No wound            Patient Active Problem List   Diagnosis    Hypertension    Hypothyroid    Depression    BPH (benign prostatic hyperplasia)    COVID-19    Inflammatory arthritis    Rectus diastasis    Acute calculous cholecystitis    Gastric mass    Gastric adenocarcinoma (HCC)     Past Medical History:   Diagnosis Date    Disease of thyroid gland     GERD (gastroesophageal reflux disease)     History of GI diverticular bleed     Hypertension     Tinnitus      Past Surgical History:   Procedure Laterality Date    ADENOIDECTOMY      CHOLECYSTECTOMY LAPAROSCOPIC N/A 1/24/2022    Procedure: CHOLECYSTECTOMY LAPAROSCOPIC W/ INTRAOP CHOLANGIOGRAM;  Surgeon: Sabiha Guillermo DO;  [facility];  Service: General    COLONOSCOPY N/A 7/16/2016    Procedure: COLONOSCOPY;  Surgeon: Evette Izquierdo MD;  [facility];  Service:     HERNIA REPAIR      PROSTATE BIOPSY      TONSILLECTOMY      UVULECTOMY       History reviewed  No pertinent family history  Social History     Socioeconomic History    Marital status: /Civil Union     Spouse name: Not on file    Number of children: Not on file    Years of education: Not on file    Highest education level: Not on file   Occupational History    Not on file   Tobacco Use    Smoking status: Never Smoker    Smokeless tobacco: Never Used   Vaping Use    Vaping Use: Never used   Substance and Sexual Activity    Alcohol use: Yes     Comment: Rarely    Drug use: Never    Sexual activity: Not on file   Other Topics Concern    Not on file   Social History Narrative    Not on file     Social Determinants of Health     Financial Resource Strain: Not on file   Food Insecurity: Not on file   Transportation Needs: Not on file   Physical Activity: Not on file   Stress: Not on file   Social Connections: Not on file   Intimate Partner Violence: Not on file   Housing Stability: Not on file       Current Outpatient Medications:     irbesartan (AVAPRO) 300 mg tablet, , Disp: , Rfl:     levothyroxine 125 mcg tablet, Take 125 mcg by mouth daily  , Disp: , Rfl:     oxyCODONE (Roxicodone) 5 immediate release tablet, Take 1 tablet (5 mg total) by mouth every 6 (six) hours as needed for severe pain Max Daily Amount: 20 mg, Disp: 30 tablet, Rfl: 0    pantoprazole (PROTONIX) 40 mg tablet, Take 1 tablet (40 mg total) by mouth daily, Disp: 30 tablet, Rfl: 11    tamsulosin (FLOMAX) 0 4 mg, Take 0 4 mg by mouth 2 (two) times a day Indications: Enlarged Prostate , Disp: , Rfl:     chlorhexidine (PERIDEX) 0 12 % solution, FILL SYRINGE WITH 10CC AND IRRIGATE UPPER LEFT AREA IN MOUTH, Disp: , Rfl:     escitalopram (LEXAPRO) 10 mg tablet, , Disp: , Rfl:     finasteride (PROSCAR) 5 mg tablet, Take 1 tablet (5 mg total) by mouth daily, Disp: 90 tablet, Rfl: 3    rosuvastatin (CRESTOR) 20 MG tablet, Take 20 mg by mouth daily, Disp: , Rfl:   No current facility-administered medications for this visit  No Known Allergies  Vitals:    02/08/22 1032   BP: 120/80   Pulse: 79   Resp: 17   Temp: (!) 97 4 °F (36 3 °C)   SpO2: 100%       Physical Exam   Constitutional: General appearance: The Patient is well-developed and well-nourished who appears the stated age in no acute distress  Patient is pleasant and talkative  HEENT:  Normocephalic  Sclerae are anicteric  Mucous membranes are moist  Neck is supple without adenopathy  No JVD  Chest: The lungs are clear to auscultation  Cardiac: Heart is regular rate  Abdomen: Abdomen is soft, non-tender, non-distended and without masses  Extremities: There is no clubbing or cyanosis  There is no edema  Symmetric  Neuro: Grossly nonfocal  Gait is normal      Lymphatic: No evidence of cervical adenopathy bilaterally  No evidence of axillary adenopathy bilaterally  No evidence of inguinal adenopathy bilaterally  Skin: Warm, anicteric  Psych:  Patient is pleasant and talkative  Breasts:      Pathology:  Final Diagnosis   A    Stomach, body, biopsy:  - Gastric oxyntic mucosa with no significant pathologic alteration   - Negative for intestinal metaplasia, dysplasia or carcinoma  - No Helicobacter pylori is identified on H&E stained slides      B,Gastric mass, fundus, biopsy  - Adenocarcinoma with small portion of squamous component, arising in high grade dysplasia in a background of mixed squamous and cardiac mucosa       Comment: The majority of the tumor is an adenocarcinoma with a minor component of squamous differentiation which is confirmed by p40, 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        Immunohistochemistry (IHC) testing for Mismatch Repair (MMR) proteins and HER2 have been requested on block B1, and the results will be issued in an addendum      Intradepartmental consultation is in agreement  Dr Nils Yung is notified of the diagnosis in [facility] via [address] on 2/8/2022  at 10:30 am        Electronically signed by Meghan Nolasco MD on 2/8/2022 at 10:31 AM         Labs:      Imaging  EGD    Result Date: 2/4/2022  Narrative: Ruth Pozo [address] [place] [address] [phone] DATE OF SERVICE: 2/04/22 PHYSICIAN(S): Nils Yung MD Proceduralist INDICATION: Epigastric pain, Weight loss, unintentional POST-OP DIAGNOSIS: See the impression below  PREPROCEDURE: Informed consent was obtained for the procedure, including sedation  Risks of perforation, hemorrhage, adverse drug reaction and aspiration were discussed  The patient was placed in the left lateral decubitus position  Patient was explained about the risks and benefits of the procedure  Risks including but not limited to bleeding, infection, and perforation were explained in detail  Also explained about less than 100% sensitivity with the exam and other alternatives  DETAILS OF PROCEDURE: Patient was taken to the procedure room where a time out was performed to confirm correct patient and correct procedure   The patient underwent monitored anesthesia care, which was administered by an anesthesia professional  The patient's blood pressure, heart rate, level of consciousness, respirations and oxygen were monitored throughout the procedure  The scope was advanced to the second part of the duodenum  Retroflexion was performed in the fundus  The patient experienced no blood loss  The procedure was not difficult  The patient tolerated the procedure well  There were no apparent complications  ANESTHESIA INFORMATION: ASA: II Anesthesia Type: Anesthesia type not filed in the log  MEDICATIONS: simethicone (MYLICON) oral suspension 40 mg (Totals for administrations occurring from 0927 to 0946 on 02/04/22) FINDINGS: Two cm hiatal hernia was seen with friable mass involving more than half the circumference and extending linearly down into the cardia and fundus for about 5 cm status post multiple biopsies Stomach-mild erythema in the antrum  Biopsies done to check for H pylori  Duodenum-patchy erythema in the bulb  SPECIMENS: ID Type Source Tests Collected by Time Destination 1 : gastric body bx r/o h  pylori Tissue Stomach TISSUE EXAM Nils Yung MD 2/4/2022  9:43 AM  2 : gastric mass/hiatal hernia/fundus bx Tissue Stomach TISSUE EXAM Nils Yung MD 2/4/2022  9:44 AM      Impression: Two cm hiatal hernia was seen with friable mass involving more than half the circumference and extending linearly down into the cardia and fundus for about 5 cm status post multiple biopsies Stomach-mild erythema in the antrum  Biopsies done to check for H pylori  Duodenum-patchy erythema in the bulb  RECOMMENDATION: Await pathology results Check CT scan of the chest, abdomen and pelvis   Nils Yung MD     CT chest abdomen pelvis w contrast    Addendum Date: 2/4/2022 Addendum:   There is a voice recognition software related error in the addendum to this report  A phrase which reads " The patient underwent LEFT UPPER CHOLECYSTECTOMY on January 24, 2022    " should read, " The patient underwent LAPAROSCOPIC CHOLECYSTECTOMY on January 24, 2022 "  Addendum Date: 2/4/2022 Addendum:   Please note the following important additional information  The patient underwent left upper cholecystectomy on January 24, 2022 which explains the presence of a few residual bubbles of pneumoperitoneum in the epigastric region on the current examination  Therefore, there are no findings to suggest perforated viscus  REVISED IMPRESSION: Mass at the gastroesophageal junction which is highly suspicious for malignancy  Areas of sinan enlargement that are highly suspicious for sinan metastatic disease in mediastinum, retroperitoneum, and omentum as described above  Subcentimeter pulmonary nodules which are highly suspicious for pulmonary parenchymal metastatic disease  Small bubbles of intraperitoneal gas in the epigastric region secondary to recent laparoscopic cholecystectomy  I discussed this addendum with Mario Vazquezing via HIPAA compliant secure electronic messaging on 2/4/2022 at 3:25 PM      Result Date: 2/4/2022  Narrative: CT CHEST, ABDOMEN AND PELVIS WITH IV CONTRAST INDICATION:   K31 89: Other diseases of stomach and duodenum  COMPARISON:  Report of upper endoscopy performed earlier the same day  TECHNIQUE: CT examination of the chest, abdomen and pelvis was performed  Axial, sagittal, and coronal 2D reformatted images were created from the source data and submitted for interpretation  Radiation dose length product (DLP) for this visit:  721 mGy-cm   This examination, like all CT scans performed in the [facility], was performed utilizing techniques to minimize radiation dose exposure, including the use of iterative reconstruction and automated exposure control  IV Contrast:  100 mL of iohexol (OMNIPAQUE) Enteric Contrast: Enteric contrast was administered  FINDINGS: CHEST LUNGS:  There are numerous scattered noncalcified pulmonary nodules of varying sizes between 2 and 9 mm    Representative nodules measured on series 4 are as follows: Lateral left upper lobe, 0 3 x 0 2 cm, image 29  Medial right upper lobe, 0 5 x 0 3 cm, image 48  Superior segment right lower lobe, 0 8 x 0 6 cm, image 50  Posterior left lower lobe, 0 9 x 0 7 cm, image 79  No tracheal or endobronchial lesion  No consolidative or groundglass airspace opacity to suggest acute infection  PLEURA:  Unremarkable  HEART/GREAT VESSELS: Extensive coronary artery calcification  Mild calcification of aortic valve  No pericardial effusion  No thoracic aortic aneurysm  MEDIASTINUM AND SHERWIN:  Circumferential masslike thickening of the gastroesophageal junction corresponding to the findings suspicious for tumor in that location on recent upper endoscopy  Mild air and fluid-filled distention of the midesophagus  Enlarged aortopulmonary window lymph nodes measure 0 9 x 1 4 cm and 1 4 x 1 1 cm on image 19 of series 2  A right posterior mediastinal mass to the right of the aorta at the T11-T12 level measures 2 5 x 1 4 cm on image 49 of series 2 and appears to represent a centrally necrotic metastatic node  CHEST WALL AND LOWER NECK:   Unremarkable  ABDOMEN LIVER/BILIARY TREE:  Unremarkable  GALLBLADDER:  No calcified gallstones  No pericholecystic inflammatory change  SPLEEN:  Unremarkable  PANCREAS:  Unremarkable  ADRENAL GLANDS:  Nonspecific thickening of bilateral adrenal glands  No definite adrenal mass, though evaluation of the adrenals is limited by multiple surrounding probably metastatic nodules  KIDNEYS/URETERS:  Multiple nonobstructing intrarenal calculi on the order of 2 to 5 mm  Small parapelvic renal cysts  No solid renal mass  STOMACH AND BOWEL:  Mass at the gastroesophageal junction best seen on image 46 of series 2 suspicious for malignancy  Extensive descending and sigmoid colon diverticulosis without evidence for acute diverticulitis  No bowel obstruction  APPENDIX:  No findings to suggest appendicitis   ABDOMINOPELVIC CAVITY:  Upper retroperitoneal lymphadenopathy is noted with many of the nodes demonstrating indistinct margins and therefore difficult to measure  Conglomerate sinan mass in the aortocaval region measures 3 8 x 1 9 cm on image 64 series 2  A representative left para-aortic node measures 2 6 x 1 3 cm on image 59 of series 2  A representative mass in the gastrohepatic ligament measures 2 1 x 1 7  There are omental nodules that are highly suspicious for metastasis in the anterior right upper quadrant with a representative lesion measuring 1 1 x 1 0 cm on image 60 of series 2  There are multiple tiny bubbles of free intraperitoneal gas in the epigastric region beneath the hemidiaphragm in the approximate midline best seen on images 40 through 45 of series 2 most consistent with microperforation  No other findings of pneumoperitoneum and no abdominal or pelvic abscess is noted  There is trace free pelvic fluid but otherwise no ascites  VESSELS:  Unremarkable for patient's age  PELVIS REPRODUCTIVE ORGANS:  Marked prostatomegaly with impression of prostate into the urinary bladder base URINARY BLADDER:  Flattening of the bladder due to impression a markedly enlarged prostate  Urinary bladder is otherwise unremarkable  ABDOMINAL WALL/INGUINAL REGIONS:  Unremarkable  OSSEOUS STRUCTURES:  No acute fracture or destructive osseous lesion  Advanced bilateral glenohumeral degenerative changes  Spinal degenerative changes including grade 1 anterolisthesis of L4 relative to L5  Impression: Tiny bubbles of gas free within the upper anterior epigastric region of the peritoneum suggesting microperforation of an abdominal viscus, probably due to infiltrating mass at the gastroesophageal junction  None of the enteric contrast administered for this examination leaked into the peritoneum  No abdominal or pelvic abscess is noted  Mass at the gastroesophageal junction which is highly suspicious for malignancy   Areas of sinan enlargement that are highly suspicious for sinan metastatic disease in mediastinum, retroperitoneum, and omentum as described above  Subcentimeter pulmonary nodules which are highly suspicious for pulmonary parenchymal metastatic disease  I personally discussed this study with Yemi Hamilton on 2/4/2022 at 2:55 PM  Workstation performed: Francisco Edwards right upper quadrant    Result Date: 1/24/2022  Narrative: RIGHT UPPER QUADRANT ULTRASOUND INDICATION:     ruq pain- hx of biliary colic  COMPARISON:  Right upper quadrant ultrasound 1/19/2022  TECHNIQUE:   Real-time ultrasound of the right upper quadrant was performed with a curvilinear transducer with both volumetric sweeps and still imaging techniques  FINDINGS: PANCREAS:  Portions of the pancreas are obscured by bowel gas  Visualized portions of the pancreas are unremarkable  AORTA AND IVC:  Obscured by bowel gas  LIVER: Size:  Within normal range  The liver measures 15 8 cm in the midclavicular line  Contour:  Surface contour is smooth  Parenchyma:  Echogenicity and echotexture are within normal limits  No evidence of suspicious mass  Limited imaging of the main portal vein shows it to be patent and hepatopetal   BILIARY: The gallbladder is normal in caliber  Top normal wall caliber  No pericholecystic fluid  Shadowing gallstone(s) identified  No sonographic Horne's sign  No intrahepatic biliary dilatation  CBD measures 9 mm, similar to prior  No choledocholithiasis  KIDNEY: Right kidney measures 11 0 x 5 9 x 7 9 cm  Nonobstructing calculi measuring up to 8 mm  There is a 1 cm lower pole cyst  ASCITES:   None  Impression: Cholelithiasis with top normal gallbladder wall caliber, but no additional features of acute cholecystitis  Mildly dilated common bile duct measuring 9 mm, not significantly changed since 1/19/2022  No choledocholithiasis seen   Workstation performed: [id number]      right upper quadrant    Result Date: 1/19/2022  Narrative: RIGHT UPPER QUADRANT ULTRASOUND INDICATION:     K80 50: Calculus of bile duct without cholangitis or cholecystitis without obstruction  COMPARISON:  None TECHNIQUE:   Real-time ultrasound of the right upper quadrant was performed with a curvilinear transducer with both volumetric sweeps and still imaging techniques  FINDINGS: PANCREAS:  Portions of the pancreas are obscured by bowel gas  Visualized portions of the pancreas are unremarkable  AORTA AND IVC:  Visualized portions are normal for patient age  LIVER: Size:  Within normal range  The liver measures 13 6 cm in the midclavicular line  Contour:  Surface contour is smooth  Parenchyma: There is moderate diffuse increased echogenicity with smooth echotexture and acoustic beam attenuation  Most consistent with moderate hepatic steatosis  No evidence of suspicious mass  Limited imaging of the main portal vein shows it to be patent and hepatopetal   BILIARY: The gallbladder is normal in caliber  No wall thickening or pericholecystic fluid  Shadowing gallstone(s) identified  No sonographic Horne's sign  No intrahepatic biliary dilatation  CBD measures 8 mm  No choledocholithiasis  KIDNEY: Right kidney measures 10 0 cm  No hydronephrosis  Few nonobstructing intrarenal calculi measuring up to 8 mm  1 0 x 0 9 x 1 4 cm simple cyst in the lower pole  ASCITES:   None  Impression: Cholelithiasis  No sonographic evidence for acute cholecystitis  Hepatic steatosis  Right-sided nephrolithiasis  No hydronephrosis  Workstation performed: [id number]     I reviewed the above laboratory and imaging data  Discussion/Summary:  70year old male with what appears to be metastatic esophageal cancer  In there appeared to be omental and retroperitoneal lymph nodes as well as pulmonary nodules  These are certainly suspicious for metastasis  We will await the results of the PET-CT tomorrow  We discussed that based on these findings and his lack of dysphagia, he should not require radiation    I would recommend that he undergo chemotherapy  I have discussed this with his medical oncologist, Dr Dirk Urbina  He will need a port for chemotherapy  The risks were explained including bleeding, infection, need for further surgery, wound complications, port malfunction, DVT, and pneumothorax  Informed consent was obtained  We will schedule this at our earliest mutual convenience  Since he is taking oxycodone, I recommended that he go on a bowel regimen  He has already set up to see palliative care  We discussed in general that surgery would usually not be offered in this situation, but pending his response to chemotherapy, surgery could be considered in certain limited situations  I will see him at the time of port placement  He and his family are agreeable to this  All their questions were answered

## 2022-02-09 ENCOUNTER — HOSPITAL ENCOUNTER (OUTPATIENT)
Dept: RADIOLOGY | Age: 72
Discharge: HOME | End: 2022-02-09
Payer: COMMERCIAL

## 2022-02-09 ENCOUNTER — TELEPHONE (OUTPATIENT)
Dept: SURGICAL ONCOLOGY | Facility: CLINIC | Age: 72
End: 2022-02-09

## 2022-02-09 DIAGNOSIS — C15.9 MALIGNANT NEOPLASM OF ESOPHAGUS, UNSPECIFIED LOCATION (HCC): ICD-10-CM

## 2022-02-09 LAB — GLUCOSE SERPL-MCNC: 96 MG/DL (ref 65–140)

## 2022-02-09 PROCEDURE — 78815 PET IMAGE W/CT SKULL-THIGH: CPT

## 2022-02-09 PROCEDURE — A9552 F18 FDG: HCPCS

## 2022-02-09 PROCEDURE — 82948 REAGENT STRIP/BLOOD GLUCOSE: CPT

## 2022-02-09 PROCEDURE — G1004 CDSM NDSC: HCPCS

## 2022-02-09 NOTE — TELEPHONE ENCOUNTER
Discussed PET-CT findings with patient  Plan on follow-up with Medical Oncology and port placement  All his questions were answered

## 2022-02-09 NOTE — PRE-PROCEDURE INSTRUCTIONS
Pre-Surgery Instructions:   Medication Instructions    irbesartan (AVAPRO) 300 mg tablet Instructed patient per Anesthesia Guidelines   levothyroxine 125 mcg tablet Instructed patient per Anesthesia Guidelines   oxyCODONE (Roxicodone) 5 immediate release tablet Instructed patient per Anesthesia Guidelines   pantoprazole (PROTONIX) 40 mg tablet Instructed patient per Anesthesia Guidelines   tamsulosin (FLOMAX) 0 4 mg Instructed patient per Anesthesia Guidelines  Pre op and bathing instructions reviewed  Pt has hibiclens  Pt  Verbalized understanding of current visitor restrictions  Pt  Verbalized an understanding of all instructions reviewed and offers no concerns at this time  Instructed to avoid all ASA/NSAIDs and OTC Vit/Supp from now until after surgery per anesthesia guidelines   Tylenol ok prn  Instructed to take per normal schedule except DOS  DOS instructed to take Levothyroxine,Protonix,Flomax with a few sips of H2O and Oxycodone PRN

## 2022-02-10 ENCOUNTER — ANESTHESIA EVENT (OUTPATIENT)
Dept: PERIOP | Facility: HOSPITAL | Age: 72
End: 2022-02-10
Payer: COMMERCIAL

## 2022-02-10 ENCOUNTER — TELEPHONE (OUTPATIENT)
Dept: HEMATOLOGY ONCOLOGY | Facility: CLINIC | Age: 72
End: 2022-02-10

## 2022-02-10 ENCOUNTER — DOCUMENTATION (OUTPATIENT)
Dept: HEMATOLOGY ONCOLOGY | Facility: CLINIC | Age: 72
End: 2022-02-10

## 2022-02-10 ENCOUNTER — TELEPHONE (OUTPATIENT)
Dept: HEMATOLOGY ONCOLOGY | Facility: HOSPITAL | Age: 72
End: 2022-02-10

## 2022-02-10 DIAGNOSIS — C16.9 GASTRIC ADENOCARCINOMA (HCC): Primary | ICD-10-CM

## 2022-02-10 NOTE — PROGRESS NOTES
Scheduled a consult visit for Autumn Delacruz with Dr [email or web address] ok'd by Gene Madrigal  Spoke with Braydon Michael patients daughter and reviewed the appointment and location  She thanked me for the appointment and for all that has been done for her dad  We also reviewed the port placement appointment for same day    Braydon Michael verbalized understanding

## 2022-02-10 NOTE — TELEPHONE ENCOUNTER
Called patient and spoke to patient  Let patient know we have to reschedule his appt today due to provider not being in  Let patient know someone will be calling him today to reschedule his appt  Patient confirmed this

## 2022-02-10 NOTE — TELEPHONE ENCOUNTER
02/10/22    Spoke with pt informing we have to R/S his appt d/t Dr Alyssa Sanchez will not be in  Pt would like to be seen within 1 wk and I'm not sure if Dr Alyssa Sanchez will be back in next week  Confirmed with Sheryl Bates and pt, I will R/S him to see Dr Elio Burleson on 2/17 9:40AM     Pt is happy to the date and time

## 2022-02-10 NOTE — PROGRESS NOTES
Junito Farfan called to inquire about speaking with an Oncology social worker to assist with completing and filing paper work for social security disability  I gave him [address] name and assured him I would put in a referral for her  I also reached out to [facility] via Teams to inform her of same    Junito Farfan thanked me for the information and was appreciative of my assistance

## 2022-02-11 ENCOUNTER — DOCUMENTATION (OUTPATIENT)
Dept: HEMATOLOGY ONCOLOGY | Facility: CLINIC | Age: 72
End: 2022-02-11

## 2022-02-11 ENCOUNTER — PATIENT OUTREACH (OUTPATIENT)
Dept: CASE MANAGEMENT | Facility: HOSPITAL | Age: 72
End: 2022-02-11

## 2022-02-11 ENCOUNTER — APPOINTMENT (OUTPATIENT)
Dept: RADIOLOGY | Facility: HOSPITAL | Age: 72
End: 2022-02-11
Payer: COMMERCIAL

## 2022-02-11 ENCOUNTER — CONSULT (OUTPATIENT)
Dept: HEMATOLOGY ONCOLOGY | Facility: CLINIC | Age: 72
End: 2022-02-11
Payer: COMMERCIAL

## 2022-02-11 ENCOUNTER — HOSPITAL ENCOUNTER (OUTPATIENT)
Facility: HOSPITAL | Age: 72
Setting detail: OUTPATIENT SURGERY
Discharge: HOME | End: 2022-02-11
Attending: SURGERY | Admitting: SURGERY
Payer: COMMERCIAL

## 2022-02-11 ENCOUNTER — ANESTHESIA (OUTPATIENT)
Dept: PERIOP | Facility: HOSPITAL | Age: 72
End: 2022-02-11
Payer: COMMERCIAL

## 2022-02-11 VITALS
TEMPERATURE: 97.4 F | SYSTOLIC BLOOD PRESSURE: 113 MMHG | RESPIRATION RATE: 20 BRPM | DIASTOLIC BLOOD PRESSURE: 60 MMHG | HEART RATE: 84 BPM | WEIGHT: 173 LBS | HEIGHT: 69 IN | OXYGEN SATURATION: 98 % | BODY MASS INDEX: 25.62 KG/M2

## 2022-02-11 VITALS
BODY MASS INDEX: 25.62 KG/M2 | DIASTOLIC BLOOD PRESSURE: 78 MMHG | SYSTOLIC BLOOD PRESSURE: 106 MMHG | HEART RATE: 90 BPM | HEIGHT: 69 IN | OXYGEN SATURATION: 98 % | WEIGHT: 173 LBS | RESPIRATION RATE: 16 BRPM

## 2022-02-11 DIAGNOSIS — Z95.828 PORT-A-CATH IN PLACE: ICD-10-CM

## 2022-02-11 DIAGNOSIS — C16.0 MALIGNANT NEOPLASM OF CARDIA OF STOMACH (HCC): ICD-10-CM

## 2022-02-11 DIAGNOSIS — C16.9 GASTRIC ADENOCARCINOMA (HCC): Primary | ICD-10-CM

## 2022-02-11 DIAGNOSIS — K22.89 ESOPHAGEAL MASS: ICD-10-CM

## 2022-02-11 PROBLEM — Z45.2 ENCOUNTER FOR CENTRAL LINE CARE: Status: ACTIVE | Noted: 2022-02-11

## 2022-02-11 PROCEDURE — 36571 INSERT PICVAD CATH: CPT | Performed by: PHYSICIAN ASSISTANT

## 2022-02-11 PROCEDURE — 3008F BODY MASS INDEX DOCD: CPT | Performed by: FAMILY MEDICINE

## 2022-02-11 PROCEDURE — 77001 FLUOROGUIDE FOR VEIN DEVICE: CPT | Performed by: SURGERY

## 2022-02-11 PROCEDURE — 99245 OFF/OP CONSLTJ NEW/EST HI 55: CPT | Performed by: INTERNAL MEDICINE

## 2022-02-11 PROCEDURE — 36571 INSERT PICVAD CATH: CPT | Performed by: SURGERY

## 2022-02-11 PROCEDURE — 77001 FLUOROGUIDE FOR VEIN DEVICE: CPT

## 2022-02-11 PROCEDURE — C1788 PORT, INDWELLING, IMP: HCPCS | Performed by: SURGERY

## 2022-02-11 DEVICE — PORT HIGH PROFILE 8FR KIT PRO-FUSE: Type: IMPLANTABLE DEVICE | Status: FUNCTIONAL

## 2022-02-11 RX ORDER — MIRTAZAPINE 30 MG/1
30 TABLET, FILM COATED ORAL
Qty: 90 TABLET | Refills: 0 | Status: SHIPPED | OUTPATIENT
Start: 2022-02-11 | End: 2022-02-15 | Stop reason: SDUPTHER

## 2022-02-11 RX ORDER — FENTANYL CITRATE 50 UG/ML
INJECTION, SOLUTION INTRAMUSCULAR; INTRAVENOUS AS NEEDED
Status: DISCONTINUED | OUTPATIENT
Start: 2022-02-11 | End: 2022-02-11

## 2022-02-11 RX ORDER — FENTANYL CITRATE/PF 50 MCG/ML
25 SYRINGE (ML) INJECTION
Status: DISCONTINUED | OUTPATIENT
Start: 2022-02-11 | End: 2022-02-11 | Stop reason: HOSPADM

## 2022-02-11 RX ORDER — DEXAMETHASONE SODIUM PHOSPHATE 10 MG/ML
INJECTION, SOLUTION INTRAMUSCULAR; INTRAVENOUS AS NEEDED
Status: DISCONTINUED | OUTPATIENT
Start: 2022-02-11 | End: 2022-02-11

## 2022-02-11 RX ORDER — SODIUM CHLORIDE 9 MG/ML
20 INJECTION, SOLUTION INTRAVENOUS ONCE AS NEEDED
Status: CANCELLED | OUTPATIENT
Start: 2022-02-21

## 2022-02-11 RX ORDER — SODIUM CHLORIDE, SODIUM LACTATE, POTASSIUM CHLORIDE, CALCIUM CHLORIDE 600; 310; 30; 20 MG/100ML; MG/100ML; MG/100ML; MG/100ML
125 INJECTION, SOLUTION INTRAVENOUS CONTINUOUS
Status: DISCONTINUED | OUTPATIENT
Start: 2022-02-11 | End: 2022-02-11 | Stop reason: HOSPADM

## 2022-02-11 RX ORDER — LIDOCAINE HYDROCHLORIDE 10 MG/ML
0.5 INJECTION, SOLUTION EPIDURAL; INFILTRATION; INTRACAUDAL; PERINEURAL ONCE AS NEEDED
Status: DISCONTINUED | OUTPATIENT
Start: 2022-02-11 | End: 2022-02-11 | Stop reason: HOSPADM

## 2022-02-11 RX ORDER — MORPHINE SULFATE 10 MG/ML
2 INJECTION, SOLUTION INTRAMUSCULAR; INTRAVENOUS
Status: DISCONTINUED | OUTPATIENT
Start: 2022-02-11 | End: 2022-02-11 | Stop reason: HOSPADM

## 2022-02-11 RX ORDER — ONDANSETRON 2 MG/ML
4 INJECTION INTRAMUSCULAR; INTRAVENOUS EVERY 4 HOURS PRN
Status: DISCONTINUED | OUTPATIENT
Start: 2022-02-11 | End: 2022-02-11 | Stop reason: HOSPADM

## 2022-02-11 RX ORDER — DEXTROSE MONOHYDRATE 50 MG/ML
20 INJECTION, SOLUTION INTRAVENOUS ONCE
Status: CANCELLED | OUTPATIENT
Start: 2022-03-07

## 2022-02-11 RX ORDER — LIDOCAINE AND PRILOCAINE 25; 25 MG/G; MG/G
CREAM TOPICAL
Qty: 30 G | Refills: 1 | Status: SHIPPED | OUTPATIENT
Start: 2022-02-11 | End: 2022-04-29 | Stop reason: HOSPADM

## 2022-02-11 RX ORDER — PROPOFOL 10 MG/ML
INJECTION, EMULSION INTRAVENOUS AS NEEDED
Status: DISCONTINUED | OUTPATIENT
Start: 2022-02-11 | End: 2022-02-11

## 2022-02-11 RX ORDER — ONDANSETRON 2 MG/ML
INJECTION INTRAMUSCULAR; INTRAVENOUS AS NEEDED
Status: DISCONTINUED | OUTPATIENT
Start: 2022-02-11 | End: 2022-02-11

## 2022-02-11 RX ORDER — BUPIVACAINE HYDROCHLORIDE 2.5 MG/ML
INJECTION, SOLUTION EPIDURAL; INFILTRATION; INTRACAUDAL AS NEEDED
Status: DISCONTINUED | OUTPATIENT
Start: 2022-02-11 | End: 2022-02-11 | Stop reason: HOSPADM

## 2022-02-11 RX ORDER — ONDANSETRON 4 MG/1
4 TABLET, ORALLY DISINTEGRATING ORAL EVERY 6 HOURS PRN
Qty: 20 TABLET | Refills: 0 | Status: ON HOLD | OUTPATIENT
Start: 2022-02-11 | End: 2022-03-06 | Stop reason: SDUPTHER

## 2022-02-11 RX ORDER — SODIUM CHLORIDE, SODIUM LACTATE, POTASSIUM CHLORIDE, CALCIUM CHLORIDE 600; 310; 30; 20 MG/100ML; MG/100ML; MG/100ML; MG/100ML
INJECTION, SOLUTION INTRAVENOUS CONTINUOUS PRN
Status: DISCONTINUED | OUTPATIENT
Start: 2022-02-11 | End: 2022-02-11

## 2022-02-11 RX ORDER — OXYCODONE HYDROCHLORIDE 5 MG/1
5 TABLET ORAL EVERY 4 HOURS PRN
Status: DISCONTINUED | OUTPATIENT
Start: 2022-02-11 | End: 2022-02-11 | Stop reason: HOSPADM

## 2022-02-11 RX ORDER — CEFAZOLIN SODIUM 1 G/50ML
1000 SOLUTION INTRAVENOUS ONCE
Status: COMPLETED | OUTPATIENT
Start: 2022-02-11 | End: 2022-02-11

## 2022-02-11 RX ORDER — DEXTROSE MONOHYDRATE 50 MG/ML
20 INJECTION, SOLUTION INTRAVENOUS ONCE
Status: CANCELLED | OUTPATIENT
Start: 2022-02-21

## 2022-02-11 RX ORDER — MIDAZOLAM HYDROCHLORIDE 2 MG/2ML
INJECTION, SOLUTION INTRAMUSCULAR; INTRAVENOUS AS NEEDED
Status: DISCONTINUED | OUTPATIENT
Start: 2022-02-11 | End: 2022-02-11

## 2022-02-11 RX ORDER — SODIUM CHLORIDE 9 MG/ML
20 INJECTION, SOLUTION INTRAVENOUS ONCE AS NEEDED
Status: CANCELLED | OUTPATIENT
Start: 2022-03-07

## 2022-02-11 RX ORDER — LIDOCAINE HYDROCHLORIDE 10 MG/ML
INJECTION, SOLUTION EPIDURAL; INFILTRATION; INTRACAUDAL; PERINEURAL AS NEEDED
Status: DISCONTINUED | OUTPATIENT
Start: 2022-02-11 | End: 2022-02-11

## 2022-02-11 RX ORDER — EPHEDRINE SULFATE 50 MG/ML
INJECTION INTRAVENOUS AS NEEDED
Status: DISCONTINUED | OUTPATIENT
Start: 2022-02-11 | End: 2022-02-11

## 2022-02-11 RX ORDER — ACETAMINOPHEN 325 MG/1
650 TABLET ORAL EVERY 4 HOURS PRN
Status: DISCONTINUED | OUTPATIENT
Start: 2022-02-11 | End: 2022-02-11 | Stop reason: HOSPADM

## 2022-02-11 RX ADMIN — DEXAMETHASONE SODIUM PHOSPHATE 10 MG: 10 INJECTION, SOLUTION INTRAMUSCULAR; INTRAVENOUS at 10:54

## 2022-02-11 RX ADMIN — FENTANYL CITRATE 25 MCG: 50 INJECTION, SOLUTION INTRAMUSCULAR; INTRAVENOUS at 10:55

## 2022-02-11 RX ADMIN — EPHEDRINE SULFATE 15 MG: 50 INJECTION, SOLUTION INTRAVENOUS at 11:07

## 2022-02-11 RX ADMIN — CEFAZOLIN SODIUM 1000 MG: 1 SOLUTION INTRAVENOUS at 10:46

## 2022-02-11 RX ADMIN — LIDOCAINE HYDROCHLORIDE 50 MG: 10 INJECTION, SOLUTION EPIDURAL; INFILTRATION; INTRACAUDAL at 10:48

## 2022-02-11 RX ADMIN — PHENYLEPHRINE HYDROCHLORIDE 60 MCG/MIN: 10 INJECTION INTRAVENOUS at 11:00

## 2022-02-11 RX ADMIN — PROPOFOL 150 MG: 10 INJECTION, EMULSION INTRAVENOUS at 10:49

## 2022-02-11 RX ADMIN — ONDANSETRON 4 MG: 2 INJECTION INTRAMUSCULAR; INTRAVENOUS at 10:54

## 2022-02-11 RX ADMIN — SODIUM CHLORIDE, SODIUM LACTATE, POTASSIUM CHLORIDE, AND CALCIUM CHLORIDE: .6; .31; .03; .02 INJECTION, SOLUTION INTRAVENOUS at 10:30

## 2022-02-11 RX ADMIN — EPHEDRINE SULFATE 20 MG: 50 INJECTION, SOLUTION INTRAVENOUS at 11:01

## 2022-02-11 RX ADMIN — PROPOFOL 50 MG: 10 INJECTION, EMULSION INTRAVENOUS at 10:50

## 2022-02-11 RX ADMIN — MIDAZOLAM HYDROCHLORIDE 1 MG: 1 INJECTION, SOLUTION INTRAMUSCULAR; INTRAVENOUS at 10:44

## 2022-02-11 NOTE — ANESTHESIA PREPROCEDURE EVALUATION
Procedure:  INSERTION VENOUS PORT (PORT-A-CATH) (N/A Chest)    Relevant Problems   ANESTHESIA (within normal limits)      CARDIO   (+) Hypertension      ENDO   (+) Hypothyroid      GI/HEPATIC   (+) Gastric adenocarcinoma (HCC)      /RENAL   (+) BPH (benign prostatic hyperplasia)      MUSCULOSKELETAL   (+) Inflammatory arthritis   (+) Rectus diastasis      NEURO/PSYCH   (+) Depression      PULMONARY (within normal limits)      Other   (+) Gastric mass        Physical Exam    Airway    Mallampati score: II  TM Distance: >3 FB  Neck ROM: full     Dental       Cardiovascular      Pulmonary      Other Findings        Anesthesia Plan  ASA Score- 3     Anesthesia Type- general with ASA Monitors  Additional Monitors:   Airway Plan: LMA  Plan Factors-Exercise tolerance (METS): >4 METS  Chart reviewed  EKG reviewed  Existing labs reviewed  Patient summary reviewed  Patient is not a current smoker  Induction-     Postoperative Plan-     Informed Consent- Anesthetic plan and risks discussed with patient  I personally reviewed this patient with the CRNA  Discussed and agreed on the Anesthesia Plan with the CRNA  Shaun Colin

## 2022-02-11 NOTE — DISCHARGE INSTRUCTIONS
POST-OPERATIVE WOUND CARE INSTRUCTIONS    Your wound is closed with:   Sterile strips-white pieces of tape that hold your incision together       Wound care: You may remove your dressing after 24 hours   You may shower using soap and water to clean your wound  Gently pat it dry  You may redress your wound for comfort as needed  Activity:   Did not perform any heavy lifting or strenuous physical activity for 7 days  Your activity restrictions will be reevaluated at your postoperative visit  Medications: You may resume all your preoperative medications and diet  Pain medication as directed on the prescription given in the office  Other:   May use ice on the wound for 24-48 hours as needed for comfort  Call the office at [phone] if you have any of the followin  Redness, swelling, heat, unusual drainage or heavy bleeding from the wound     2  Fever greater than 101°F    3  Pain not relieved by the prescribed pain medication

## 2022-02-11 NOTE — OP NOTE
PERATIVE REPORT  PATIENT NAME: Servando Esparza    :  1950  MRN: [id number]  Pt Location: AN OR ROOM 03    SURGERY DATE: 2022    Surgeon(s) and Role:     Shay Harris MD - Primary     * Kecia Bass PA-C - Assisting    Preop Diagnosis:  Gastric adenocarcinoma ([facility] [address] ) [C16 9]    Post-Op Diagnosis Codes:     * Gastric adenocarcinoma ([address] ) [C16 9]    Procedure(s) (LRB):  INSERTION VENOUS PORT (PORT-A-CATH) (N/A)    Specimen(s):  * No specimens in log *    Estimated Blood Loss:   Minimal    Drains:  * No LDAs found *    Anesthesia Type:   General    Operative Indications:  Gastric adenocarcinoma ([facility] [address] ) [C129]  43-year-old male who needs a port for chemotherapy  Risks and benefits were explained  Informed consent was obtained  Patient brought to the operating room  Operative Findings:  Catheter tip at the cavoatrial junction  No ectopy  Complications:   None    Procedure and Technique:  After identifying the patient, general anesthesia was induced  Patient was prepped and draped in the usual sterile fashion  A time-out was performed  An incision was made in the left deltopectoral groove  This was taken down to the cephalic vein  The cephalic vein was encircled and tied off distally with 2-0 silk suture  A 2-0 silk was placed proximally around the vein to prevent any backbleeding  The catheter was introduced through the vein  Fluoroscopy showed this curling up into the neck  The catheter was pulled back  A guidewire was placed through the catheter and confirmed to be in the superior vena cava using fluoroscopy  The guidewire was removed  A subcutaneous port pocket was created using sharp dissection  Excess fat was removed using sharp dissection  There was excellent hemostasis  Three 2-0 Prolene sutures were placed in the port pocket and attached to the port  Using fluoroscopy, the catheter was manipulated into the cavoatrial junction    Catheter was cut and attached to the port and the port was secured using the previously placed Prolene sutures  The port withdrew blood easily and flushed very easily  The proximal tie was placed around the catheter and vein to prevent any backbleeding  Fluoroscopy confirmed the catheter tip to be at the cavoatrial junction  There was no ectopy  Wound was now copiously irrigated there was excellent hemostasis  The incision was approximated with interrupted 3-0 Vicryl suture subcutaneously and a running 4 Monocryl suture in a subcuticular fashion  Steri-Strips were applied  Port was once again accessed and withdrew blood easily and flushed very easily and was ultimately flushed with the final Hep-Lock solution  Sterile dressings were applied  The patient was extubated  Patient tolerated the procedure well and was taken to the recovery room in stable condition  I was present and participated in all aspects of this procedure  A chest x-ray was pending at the time of this dictation         I was present for the entire procedure    Patient Disposition:  PACU       SIGNATURE: Yovani Whatley MD  DATE: February 11, 2022  TIME: 11:33 AM

## 2022-02-11 NOTE — PROGRESS NOTES
Received telephone call from Derek Mar, patient's daughter, regarding the upcoming infusion appointment for 2/15/2022  Derek Mar indicated Nelson Khan is scheduled for a virtual consult with Nate Yost to investigate whether there are any available clinical trials he would be eligible for  If he starts treatment he becomes ineligible  He currently has a 3PM appointment with them on 2/17  Derek Mar will reach out to [facility] and ask for an earlier AM time and then I will move his treatment appropriately  She did not want to wait until the following week to start treatment

## 2022-02-11 NOTE — INTERVAL H&P NOTE
H&P reviewed  After examining the patient I find no changes in the patients condition since the H&P had been written      Vitals:    02/11/22 1021   BP: 101/57   Pulse: 74   Resp: 18   Temp: (!) 96 9 °F (36 1 °C)   SpO2: 96%

## 2022-02-11 NOTE — PROGRESS NOTES
Teaching completed with pt, wife and daughter    [address]     Task Priority Due Responsible Completed Completed By Step Outcome    Discussed role of Hopeline    2/11/2022 Paula Gar RN          Orders placed for pre-treatment labs    2/11/2022 Paula Gar RN          Perform IV assessment, review PORT procedure    2/11/2022 Paula Gar RN          Review schedule / calendar    2/11/2022 Paula Gar RN          Reviewed common side-effects    2/11/2022 Paula Gar RN          Reviewed if transportation assistance is needed    2/11/2022 Paula Gar RN          Reviewed medical oncology nurse contact information    2/11/2022 Paula Gar RN          Reviewed regimen specific education sheets    2/11/2022 Paula Gar RN          Reviewed use of supportive medication    2/11/2022 Paula aGr RN          Reviewed what to expect the first day of treatment    2/11/2022 Paula Gar RN          Reviewed when blood work needs to be completed (initial and subsequent draws)    2/11/2022 Paula Gar RN          Reviewed when to call (temp greater than 100 4, uncontrolled vomiting, diarrhea, etc )    2/11/2022 Paula Gar RN          Scripts for support medication sent to pharmacy    2/11/2022 Paula Gar RN

## 2022-02-11 NOTE — ANESTHESIA POSTPROCEDURE EVALUATION
Post-Op Assessment Note    CV Status:  Stable  Pain Score: 0    Pain management: adequate     Mental Status:  Alert and awake   Hydration Status:  Euvolemic   PONV Controlled:  Controlled   Airway Patency:  Patent      Post Op Vitals Reviewed: Yes      Staff: CRNA         No complications documented      BP   100/53   Temp     Pulse  81   Resp   15   SpO2 98

## 2022-02-11 NOTE — PROGRESS NOTES
Received a telephone call from Isabela Vasquez daughter, regarding the need to speak with the  Salomon Sharma  I gave Chirag Font contact information and also had Radha Tovar yesterday  Given the fact the need if for assistance in completing paperwork/filing for social security disability they would like to speak with Sonia sooner rather than later

## 2022-02-11 NOTE — PROGRESS NOTES
LSW received referral via referral process from [facility]  Requesting assistance with SSD forms  There is a DT on file where pt self scored 2/10 and noted concerns with sadness, worry , loss of interest in normal activities and physical stressors  LSW noted pt is scheduled for surgery today, LSW will attempt contact with pt in a few days

## 2022-02-11 NOTE — PROGRESS NOTES
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    History of Presenting Illness:   77-year-old  male with benign prostatic hypertrophy, hypertension, degenerative arthritis, hypothyroidism, GERD had been complaining of epigastric pain with on a 30 lb weight loss over the past 2 months, loss of appetite, he had cholecystectomy without improvement of symptoms in January 2022 subsequently EGD on 02/02/2022 showed 2 cm hiatal hernia with friable mass involving more than half of the circumference with extension down into the cardia and the fundus for about 5 cm mild erythema in the antrum    Biopsy of the gastric fundus mass showed adenocarcinoma with small portion squamous component arising in high-grade dysplasia in a background mixed squamous and cardiac mucosa the majority of the tumor is adenocarcinoma with minor component of squamous differentiation  Mismatch repair protein is intact    HER2 is 0 by IHC    CT scan the chest abdomen pelvis showed infiltrating mass at the gastroesophageal junction sinan enlargement suspicious for metastatic disease in the mediastinum, retroperitoneum, omentum, subcentimeter bilateral pulmonary nodules    PET scan on 02/09/2022 showed FDG uptake in the gastroesophageal junction and proximal stomach, sinan disease in the chest, abdomen, periaortic, small bilateral pulmonary nodules, uptake in the right upper quadrant omental nodules, uptake along the lateral service of the right lobe of the liver for possible peritoneal disease along the liver surface  Hemoglobin 12 8, MCV 93, WBC 10 5, platelets [id number], normal CMP    The patient is here with his wife and his daughter Mikal Lakhani    He does not smoke he drinks alcohol occasionally, he had cold symptoms denies any headache blurred vision diplopia dysphagia odynophagia abdominal distension dysuria hematuria melena hematochezia skin rash subjective lymphadenopathy  Review of Systems - As stated in the HPI otherwise the fourteen point review of systems was negative  Past Medical History:   Diagnosis Date    Cancer [facility])     gastric mass    COVID 01/2021    Disease of thyroid gland     GERD (gastroesophageal reflux disease)     History of GI diverticular bleed     Hyperlipidemia     Hypertension     Tinnitus        Social History     Socioeconomic History    Marital status: /Civil Union     Spouse name: None    Number of children: None    Years of education: None    Highest education level: None   Occupational History    None   Tobacco Use    Smoking status: Never Smoker    Smokeless tobacco: Never Used   Vaping Use    Vaping Use: Never used   Substance and Sexual Activity    Alcohol use: Yes     Comment: Rarely    Drug use: Never    Sexual activity: None   Other Topics Concern    None   Social History Narrative    None     Social Determinants of Health     Financial Resource Strain: Not on file   Food Insecurity: Not on file   Transportation Needs: Not on file   Physical Activity: Not on file   Stress: Not on file   Social Connections: Not on file   Intimate Partner Violence: Not on file   Housing Stability: Not on file       History reviewed  No pertinent family history      No Known Allergies      Current Outpatient Medications:     irbesartan (AVAPRO) 300 mg tablet, Take 150 mg by mouth every morning  , Disp: , Rfl:     levothyroxine 125 mcg tablet, Take 125 mcg by mouth every morning  , Disp: , Rfl:     oxyCODONE (Roxicodone) 5 immediate release tablet, Take 1 tablet (5 mg total) by mouth every 6 (six) hours as needed for severe pain Max Daily Amount: 20 mg, Disp: 30 tablet, Rfl: 0    pantoprazole (PROTONIX) 40 mg tablet, Take 1 tablet (40 mg total) by mouth daily (Patient taking differently: Take 40 mg by mouth every morning  ), Disp: 30 tablet, Rfl: 11    tamsulosin (FLOMAX) 0 4 mg, Take 0 4 mg by mouth 2 (two) times a day Indications: Enlarged Prostate , Disp: , Rfl:     mirtazapine (REMERON) 30 mg tablet, Take 1 tablet (30 mg total) by mouth daily at bedtime, Disp: 90 tablet, Rfl: 0    ondansetron (Zofran ODT) 4 mg disintegrating tablet, Take 1 tablet (4 mg total) by mouth every 6 (six) hours as needed for nausea or vomiting, Disp: 20 tablet, Rfl: 0      /78 (BP Location: Left arm, Cuff Size: Standard)   Pulse 90   Resp 16   Ht 5' 9" (1 753 m)   Wt 78 5 kg (173 lb)   SpO2 98%   BMI 25 55 kg/m²       General Appearance:    Alert, oriented        Eyes:    PERRL   Ears:    Normal external ear canals, both ears   Nose:   Nares normal, septum midline   Throat:   Mucosa moist  Pharynx without injection  Neck:   Supple       Lungs:     Clear to auscultation bilaterally   Chest Wall:    No tenderness or deformity    Heart:    Regular rate and rhythm       Abdomen:     Soft, non-tender, bowel sounds +, no organomegaly           Extremities:   Extremities no cyanosis or edema       Skin:   no rash or icterus  Lymph nodes:   Cervical, supraclavicular, and axillary nodes normal   Neurologic:   CNII-XII intact, normal strength, sensation and reflexes     Throughout               Recent Results (from the past 48 hour(s))   Fingerstick Glucose (POCT)    Collection Time: 02/09/22 11:45 AM   Result Value Ref Range    POC Glucose 96 65 - 140 mg/dl         EGD    Result Date: 2/4/2022  Narrative: Ruth Pozo y [address] [phone] DATE OF SERVICE: 2/04/22 PHYSICIAN(S): Devyn Vazquez MD Proceduralist INDICATION: Epigastric pain, Weight loss, unintentional POST-OP DIAGNOSIS: See the impression below  PREPROCEDURE: Informed consent was obtained for the procedure, including sedation  Risks of perforation, hemorrhage, adverse drug reaction and aspiration were discussed   The patient was placed in the left lateral decubitus position  Patient was explained about the risks and benefits of the procedure  Risks including but not limited to bleeding, infection, and perforation were explained in detail  Also explained about less than 100% sensitivity with the exam and other alternatives  DETAILS OF PROCEDURE: Patient was taken to the procedure room where a time out was performed to confirm correct patient and correct procedure  The patient underwent monitored anesthesia care, which was administered by an anesthesia professional  The patient's blood pressure, heart rate, level of consciousness, respirations and oxygen were monitored throughout the procedure  The scope was advanced to the second part of the duodenum  Retroflexion was performed in the fundus  The patient experienced no blood loss  The procedure was not difficult  The patient tolerated the procedure well  There were no apparent complications  ANESTHESIA INFORMATION: ASA: II Anesthesia Type: Anesthesia type not filed in the log  MEDICATIONS: simethicone (MYLICON) oral suspension 40 mg (Totals for administrations occurring from 0927 to 0946 on 02/04/22) FINDINGS: Two cm hiatal hernia was seen with friable mass involving more than half the circumference and extending linearly down into the cardia and fundus for about 5 cm status post multiple biopsies Stomach-mild erythema in the antrum  Biopsies done to check for H pylori  Duodenum-patchy erythema in the bulb   SPECIMENS: ID Type Source Tests Collected by Time Destination 1 : gastric body bx r/o h  pylori Tissue Stomach TISSUE EXAM Lashawn Abdalla MD 2/4/2022  9:43 AM  2 : gastric mass/hiatal hernia/fundus bx Tissue Stomach TISSUE EXAM Lashawn Abdalla MD 2/4/2022  9:44 AM      Impression: Two cm hiatal hernia was seen with friable mass involving more than half the circumference and extending linearly down into the cardia and fundus for about 5 cm status post multiple biopsies Stomach-mild erythema in the antrum  Biopsies done to check for H pylori  Duodenum-patchy erythema in the bulb  RECOMMENDATION: Await pathology results Check CT scan of the chest, abdomen and pelvis   Jessica Ryder MD     CT chest abdomen pelvis w contrast    Addendum Date: 2/4/2022 Addendum:   There is a voice recognition software related error in the addendum to this report  A phrase which reads " The patient underwent LEFT UPPER CHOLECYSTECTOMY on January 24, 2022    " should read, " The patient underwent LAPAROSCOPIC CHOLECYSTECTOMY on January 24, 2022 "  Addendum Date: 2/4/2022 Addendum:   Please note the following important additional information  The patient underwent left upper cholecystectomy on January 24, 2022 which explains the presence of a few residual bubbles of pneumoperitoneum in the epigastric region on the current examination  Therefore, there are no findings to suggest perforated viscus  REVISED IMPRESSION: Mass at the gastroesophageal junction which is highly suspicious for malignancy  Areas of sinan enlargement that are highly suspicious for sinan metastatic disease in mediastinum, retroperitoneum, and omentum as described above  Subcentimeter pulmonary nodules which are highly suspicious for pulmonary parenchymal metastatic disease  Small bubbles of intraperitoneal gas in the epigastric region secondary to recent laparoscopic cholecystectomy  I discussed this addendum with Mario Cabrera via HIPAA compliant secure electronic messaging on 2/4/2022 at 3:25 PM      Result Date: 2/4/2022  Narrative: CT CHEST, ABDOMEN AND PELVIS WITH IV CONTRAST INDICATION:   K31 89: Other diseases of stomach and duodenum  COMPARISON:  Report of upper endoscopy performed earlier the same day  TECHNIQUE: CT examination of the chest, abdomen and pelvis was performed  Axial, sagittal, and coronal 2D reformatted images were created from the source data and submitted for interpretation   Radiation dose length product (DLP) for this visit: 721 mGy-cm   This examination, like all CT scans performed in the [facility], was performed utilizing techniques to minimize radiation dose exposure, including the use of iterative reconstruction and automated exposure control  IV Contrast:  100 mL of iohexol (OMNIPAQUE) Enteric Contrast: Enteric contrast was administered  FINDINGS: CHEST LUNGS:  There are numerous scattered noncalcified pulmonary nodules of varying sizes between 2 and 9 mm  Representative nodules measured on series 4 are as follows: Lateral left upper lobe, 0 3 x 0 2 cm, image 29  Medial right upper lobe, 0 5 x 0 3 cm, image 48  Superior segment right lower lobe, 0 8 x 0 6 cm, image 50  Posterior left lower lobe, 0 9 x 0 7 cm, image 79  No tracheal or endobronchial lesion  No consolidative or groundglass airspace opacity to suggest acute infection  PLEURA:  Unremarkable  HEART/GREAT VESSELS: Extensive coronary artery calcification  Mild calcification of aortic valve  No pericardial effusion  No thoracic aortic aneurysm  MEDIASTINUM AND SHERWIN:  Circumferential masslike thickening of the gastroesophageal junction corresponding to the findings suspicious for tumor in that location on recent upper endoscopy  Mild air and fluid-filled distention of the midesophagus  Enlarged aortopulmonary window lymph nodes measure 0 9 x 1 4 cm and 1 4 x 1 1 cm on image 19 of series 2  A right posterior mediastinal mass to the right of the aorta at the T11-T12 level measures 2 5 x 1 4 cm on image 49 of series 2 and appears to represent a centrally necrotic metastatic node  CHEST WALL AND LOWER NECK:   Unremarkable  ABDOMEN LIVER/BILIARY TREE:  Unremarkable  GALLBLADDER:  No calcified gallstones  No pericholecystic inflammatory change  SPLEEN:  Unremarkable  PANCREAS:  Unremarkable  ADRENAL GLANDS:  Nonspecific thickening of bilateral adrenal glands    No definite adrenal mass, though evaluation of the adrenals is limited by multiple surrounding probably metastatic nodules  KIDNEYS/URETERS:  Multiple nonobstructing intrarenal calculi on the order of 2 to 5 mm  Small parapelvic renal cysts  No solid renal mass  STOMACH AND BOWEL:  Mass at the gastroesophageal junction best seen on image 46 of series 2 suspicious for malignancy  Extensive descending and sigmoid colon diverticulosis without evidence for acute diverticulitis  No bowel obstruction  APPENDIX:  No findings to suggest appendicitis  ABDOMINOPELVIC CAVITY:  Upper retroperitoneal lymphadenopathy is noted with many of the nodes demonstrating indistinct margins and therefore difficult to measure  Conglomerate sinan mass in the aortocaval region measures 3 8 x 1 9 cm on image 64 series 2  A representative left para-aortic node measures 2 6 x 1 3 cm on image 59 of series 2  A representative mass in the gastrohepatic ligament measures 2 1 x 1 7  There are omental nodules that are highly suspicious for metastasis in the anterior right upper quadrant with a representative lesion measuring 1 1 x 1 0 cm on image 60 of series 2  There are multiple tiny bubbles of free intraperitoneal gas in the epigastric region beneath the hemidiaphragm in the approximate midline best seen on images 40 through 45 of series 2 most consistent with microperforation  No other findings of pneumoperitoneum and no abdominal or pelvic abscess is noted  There is trace free pelvic fluid but otherwise no ascites  VESSELS:  Unremarkable for patient's age  PELVIS REPRODUCTIVE ORGANS:  Marked prostatomegaly with impression of prostate into the urinary bladder base URINARY BLADDER:  Flattening of the bladder due to impression a markedly enlarged prostate  Urinary bladder is otherwise unremarkable  ABDOMINAL WALL/INGUINAL REGIONS:  Unremarkable  OSSEOUS STRUCTURES:  No acute fracture or destructive osseous lesion  Advanced bilateral glenohumeral degenerative changes    Spinal degenerative changes including grade 1 anterolisthesis of L4 relative to L5  Impression: Tiny bubbles of gas free within the upper anterior epigastric region of the peritoneum suggesting microperforation of an abdominal viscus, probably due to infiltrating mass at the gastroesophageal junction  None of the enteric contrast administered for this examination leaked into the peritoneum  No abdominal or pelvic abscess is noted  Mass at the gastroesophageal junction which is highly suspicious for malignancy  Areas of sinan enlargement that are highly suspicious for sinan metastatic disease in mediastinum, retroperitoneum, and omentum as described above  Subcentimeter pulmonary nodules which are highly suspicious for pulmonary parenchymal metastatic disease  I personally discussed this study with Nisha Henson on 2/4/2022 at 2:55 PM  Workstation performed: [id number]     NM PET CT skull base to mid thigh    Result Date: 2/9/2022  Narrative: PET/CT SCAN INDICATION: Staging of esophageal cancer  C15 9: Malignant neoplasm of esophagus, unspecified MODIFIER: PI COMPARISON: CT chest abdomen and pelvis 2/4/2022 CELL TYPE:  Favor adenosquamous carcinoma gastric mass biopsy 2/4/2022 TECHNIQUE:   8 1 mCi F-18-FDG administered IV  Multiplanar attenuation corrected and non attenuation corrected PET images were acquired 60 minutes post injection  Contiguous, low dose, axial CT sections were obtained from the skull base through the femurs   Intravenous contrast material was not utilized  This examination, like all CT scans performed in the [facility], was performed utilizing techniques to minimize radiation dose exposure, including the use of iterative reconstruction and automated exposure control  Fasting serum glucose: 96 mg/dl FINDINGS: VISUALIZED BRAIN:   No acute abnormalities are seen   HEAD/NECK:   Patchy radiotracer uptake at the left ethmoid air cells and left maxillary sinus where there is partial opacification of CT likely inflammatory  There is more focal FDG uptake in the left ethmoid air cells anteriorly extending to the nasal cavity, SUV maximum of 5 6 with slightly increased density on CT, underlying obstructive polyp or lesion is not excluded  Left frontal sinus is also opacified but without significant FDG uptake  No FDG avid lymph nodes  CT images: No additional significant findings  CHEST:   Multiple small subcentimeter pulmonary nodules bilaterally with variable FDG uptake  A left lower lobe nodule posteriorly demonstrates a SUV max of 2 6  This measures up to 7 mm in size image 106 series 3  FDG activity may be underestimated in the smaller nodules due to their small size  Multiple FDG avid lymph nodes in the mediastinum and bilateral perihilar regions  Left para-aortic lymph node demonstrates a SUV max 7 1  This measures 1 5 x 1 3 cm image 81 series 3  Subcarinal lymph node demonstrates SUV max of 4 6  This measures 1 1 cm short axis image 94 series 3  Left perihilar focus demonstrate SUV max of 5 5  Right anterior hilar focus demonstrates a SUV maximum of 2 8  CT images: Extensive coronary artery calcifications  ABDOMEN:   Lobular FDG uptake at the GE junction, SUV max of 9 2  Associated soft tissue fullness on CT  The segmental FDG uptake measures approximately 3 3 cm in length  There does appear to be a separate focus of FDG uptake at the gastric fundus, SUV max of 6 2  No obvious findings on the limited CT  By PET images this is on the order of 2 cm in size  Multiple FDG avid lymph nodes in the gastrohepatic region, right retrocrural region, portacaval region and retroperitoneum  Right retrocrural lymph node demonstrates a SUV maximum of 4 5  This measures 2 4 x 1 9 cm image 123 series 3  Gastrohepatic node demonstrates a SUV maximum of 4 3  Lymph node here measures 1 5 x 1 4 cm image 128 series 3  Aortocaval lymph node demonstrates SUV max of 4 7  This measures 2 0 x 1 6 cm image 148 series 3   Left para-aortic lymph node demonstrates a SUV max of 5 3  This measures 2 2 x 1 2 cm image 142 series 3  Variable FDG uptake in the omental nodules of the right upper quadrant suspicious for metastasis  A nodule here demonstrates SUV max of 2 1  This measures 1 2 cm in size image 147 series 3  Somewhat curvilinear FDG uptake along the lateral surface of the right lobe of the liver, SUV maximum of 3 7  See image 121 series 1200 on the PET CT fusion images  Peritoneal disease along the liver surface is not excluded  CT images: Status post cholecystectomy  Scattered intrarenal calculi bilaterally measuring up to 5 mm at the left mid pole  Colonic diverticulosis  PELVIS: No FDG avid soft tissue lesions are seen  Physiologic right distal ureteral activity  Mild patchy radiotracer uptake superior to the left inguinal region may be related to prior surgery  Somewhat focal radiotracer uptake at the posterior central prostate likely related to urinary retention  CT images: Marked prostatomegaly  OSSEOUS STRUCTURES: No FDG avid lesions are seen  Patchy radiotracer uptake at the bilateral shoulders corresponding to degenerative changes on CT  CT images: Grade 1 anterolisthesis of L4 on L5  Impression: 1  FDG uptake at the gastroesophageal junction and proximal stomach compatible with known malignancy  2   Extensive FDG avid sinan disease in the chest and abdomen suspicious for metastasis  3   Variable FDG uptake in small pulmonary nodules suspicious for metastasis  4   Variable FDG uptake in right upper quadrant omental nodules suspicious for metastasis  5   Somewhat curvilinear FDG uptake along the lateral surface of the right lobe of the liver  Peritoneal disease along the liver surface is not excluded  6   Patchy radiotracer uptake at the left ethmoid air cells and left maxillary sinus where there is partial opacification of CT likely inflammatory    However, there is more focal FDG uptake in the left ethmoid air cells anteriorly extending to the nasal cavity, for which an underlying obstructive polyp or lesion is not excluded  Correlate with ENT evaluation  The study was marked in EPIC for significant notification  Workstation performed: [id number]     US right upper quadrant    Result Date: 1/24/2022  Narrative: RIGHT UPPER QUADRANT ULTRASOUND INDICATION:     ruq pain- hx of biliary colic  COMPARISON:  Right upper quadrant ultrasound 1/19/2022  TECHNIQUE:   Real-time ultrasound of the right upper quadrant was performed with a curvilinear transducer with both volumetric sweeps and still imaging techniques  FINDINGS: PANCREAS:  Portions of the pancreas are obscured by bowel gas  Visualized portions of the pancreas are unremarkable  AORTA AND IVC:  Obscured by bowel gas  LIVER: Size:  Within normal range  The liver measures 15 8 cm in the midclavicular line  Contour:  Surface contour is smooth  Parenchyma:  Echogenicity and echotexture are within normal limits  No evidence of suspicious mass  Limited imaging of the main portal vein shows it to be patent and hepatopetal   BILIARY: The gallbladder is normal in caliber  Top normal wall caliber  No pericholecystic fluid  Shadowing gallstone(s) identified  No sonographic Horne's sign  No intrahepatic biliary dilatation  CBD measures 9 mm, similar to prior  No choledocholithiasis  KIDNEY: Right kidney measures 11 0 x 5 9 x 7 9 cm  Nonobstructing calculi measuring up to 8 mm  There is a 1 cm lower pole cyst  ASCITES:   None  Impression: Cholelithiasis with top normal gallbladder wall caliber, but no additional features of acute cholecystitis  Mildly dilated common bile duct measuring 9 mm, not significantly changed since 1/19/2022  No choledocholithiasis seen   Workstation performed: [id number]     US right upper quadrant    Result Date: 1/19/2022  Narrative: RIGHT UPPER QUADRANT ULTRASOUND INDICATION:     K80 50: Calculus of bile duct without cholangitis or cholecystitis without obstruction  COMPARISON:  None TECHNIQUE:   Real-time ultrasound of the right upper quadrant was performed with a curvilinear transducer with both volumetric sweeps and still imaging techniques  FINDINGS: PANCREAS:  Portions of the pancreas are obscured by bowel gas  Visualized portions of the pancreas are unremarkable  AORTA AND IVC:  Visualized portions are normal for patient age  LIVER: Size:  Within normal range  The liver measures 13 6 cm in the midclavicular line  Contour:  Surface contour is smooth  Parenchyma: There is moderate diffuse increased echogenicity with smooth echotexture and acoustic beam attenuation  Most consistent with moderate hepatic steatosis  No evidence of suspicious mass  Limited imaging of the main portal vein shows it to be patent and hepatopetal   BILIARY: The gallbladder is normal in caliber  No wall thickening or pericholecystic fluid  Shadowing gallstone(s) identified  No sonographic Horne's sign  No intrahepatic biliary dilatation  CBD measures 8 mm  No choledocholithiasis  KIDNEY: Right kidney measures 10 0 cm  No hydronephrosis  Few nonobstructing intrarenal calculi measuring up to 8 mm  1 0 x 0 9 x 1 4 cm simple cyst in the lower pole  ASCITES:   None  Impression: Cholelithiasis  No sonographic evidence for acute cholecystitis  Hepatic steatosis  Right-sided nephrolithiasis  No hydronephrosis   Workstation performed: [id number]     ECOG :0      Assessment and plan:   59-year-old  male with history of benign prostatic hypertrophy, hypertension, hypothyroidism, GERD was diagnosed with adenocarcinoma of the GE junction with extension into the fundus of the stomach when he presented with weight loss, epigastric pain    Biopsy confirmed adenocarcinoma, IHC is 0 for HER2 staining, MMR is intact    PET scan showed multiple sinan metastases in the mediastinum, omental, periaortic, and possible involvement of the service of the right hepatic lobe    1  Will send for molecular tests at [facility]  2  [facility]  3   NCCN guidelines suggest 5 FU based chemotherapy + nivolumab     We talked about FOLFIRI versus FOLFOX    I believes initiating the patient on modified FOLFOX 6+ nivolumab 240 mg flat dose every 2 weeks is good idea    Side effects such as cold hypersensitivity, neuropathy, nausea, vomiting, diarrhea, mucositis, elevated liver enzymes, I anaphylactic reaction, allergic reaction, pneumonitis, hepatitis, nephritis, colitis etc  Told he agree to proceed, he signed the consent

## 2022-02-11 NOTE — PROGRESS NOTES
Spoke with daughter Harjit Victor and plan is now to start chemo 2/21  This will allow pt to be evaluated at [facility] for clinical trial options next week  [facility] notified  [employer]et message sent to Palliative Care to see if appt can be rescheduled, as this conflicts with chemo

## 2022-02-14 ENCOUNTER — HOSPITAL ENCOUNTER (OUTPATIENT)
Dept: INFUSION CENTER | Facility: CLINIC | Age: 72
End: 2022-02-14

## 2022-02-14 ENCOUNTER — PATIENT OUTREACH (OUTPATIENT)
Dept: CASE MANAGEMENT | Facility: HOSPITAL | Age: 72
End: 2022-02-14

## 2022-02-14 ENCOUNTER — DOCUMENTATION (OUTPATIENT)
Dept: HEMATOLOGY ONCOLOGY | Facility: CLINIC | Age: 72
End: 2022-02-14

## 2022-02-14 NOTE — PROGRESS NOTES
LSW received referral to call pt to assess needs and to offer support  Pt stated he only wanted assistance with SSD and how it would work while he is on his employers [employer] [facility] [employer] [facility]  LSW will request assistance with information and will advise pt of the outcome  LSW has been advised to have pt call his LTD and follow their recommendations  Pt requested LSW contact his daughter with information  LSW has been advised to request pt call his LTD carrier to find out the steps to take for disability  LSW contacted Derek Mar and advised her of this information  LSW advised Mary and also provided contact information and encouraged contact as needed

## 2022-02-14 NOTE — PROGRESS NOTES
Radha Reynolds daughter, called to inquire about the Palliative Care consult appointment  Per Princess Verdugo is a little concerned about getting chemo and is leaning more towards a Clinical trial if available  His 1st treatment is currently scheduled for 2/21 which is the same day of his Palliative Care appointment  She wondered is there was anything available this week  I explained the appointment given was the first available so the likelihood is slim  I did view the schedule and there was nothing available with Dr Yaritza Shabazz until 2/24

## 2022-02-15 ENCOUNTER — HOSPITAL ENCOUNTER (OUTPATIENT)
Dept: INFUSION CENTER | Facility: HOSPITAL | Age: 72
Discharge: HOME | End: 2022-02-15

## 2022-02-15 ENCOUNTER — CONSULT (OUTPATIENT)
Dept: PALLIATIVE MEDICINE | Facility: CLINIC | Age: 72
End: 2022-02-15
Payer: COMMERCIAL

## 2022-02-15 VITALS
OXYGEN SATURATION: 96 % | SYSTOLIC BLOOD PRESSURE: 110 MMHG | TEMPERATURE: 97.9 F | HEART RATE: 75 BPM | BODY MASS INDEX: 25.07 KG/M2 | DIASTOLIC BLOOD PRESSURE: 60 MMHG | WEIGHT: 169.75 LBS

## 2022-02-15 DIAGNOSIS — G89.3 CANCER-RELATED PAIN: Primary | ICD-10-CM

## 2022-02-15 DIAGNOSIS — C16.9 GASTRIC ADENOCARCINOMA (HCC): ICD-10-CM

## 2022-02-15 DIAGNOSIS — Z51.5 PALLIATIVE CARE PATIENT: ICD-10-CM

## 2022-02-15 DIAGNOSIS — C15.9 MALIGNANT NEOPLASM OF ESOPHAGUS, UNSPECIFIED LOCATION (HCC): ICD-10-CM

## 2022-02-15 DIAGNOSIS — R10.13 EPIGASTRIC PAIN: ICD-10-CM

## 2022-02-15 DIAGNOSIS — N20.0 NEPHROLITHIASIS: ICD-10-CM

## 2022-02-15 PROCEDURE — 1036F TOBACCO NON-USER: CPT | Performed by: FAMILY MEDICINE

## 2022-02-15 PROCEDURE — 99204 OFFICE O/P NEW MOD 45 MIN: CPT | Performed by: FAMILY MEDICINE

## 2022-02-15 RX ORDER — DRONABINOL 5 MG/1
5 CAPSULE ORAL
Qty: 60 CAPSULE | Refills: 0 | Status: SHIPPED | OUTPATIENT
Start: 2022-02-15 | End: 2022-03-06 | Stop reason: HOSPADM

## 2022-02-15 RX ORDER — MIRTAZAPINE 30 MG/1
45 TABLET, FILM COATED ORAL
Qty: 90 TABLET | Refills: 0
Start: 2022-02-15 | End: 2022-03-06 | Stop reason: HOSPADM

## 2022-02-15 RX ORDER — PREDNISONE 1 MG/1
5 TABLET ORAL 2 TIMES DAILY PRN
Qty: 30 TABLET | Refills: 0 | Status: SHIPPED | OUTPATIENT
Start: 2022-02-15 | End: 2022-04-29 | Stop reason: HOSPADM

## 2022-02-15 RX ORDER — PANTOPRAZOLE SODIUM 40 MG/1
40 TABLET, DELAYED RELEASE ORAL 2 TIMES DAILY
Qty: 60 TABLET | Refills: 0 | Status: SHIPPED | OUTPATIENT
Start: 2022-02-15 | End: 2022-04-09 | Stop reason: SDUPTHER

## 2022-02-15 RX ORDER — OXYCODONE HYDROCHLORIDE 10 MG/1
10 TABLET ORAL EVERY 4 HOURS PRN
Qty: 180 TABLET | Refills: 0 | Status: SHIPPED | OUTPATIENT
Start: 2022-02-15 | End: 2022-03-06 | Stop reason: HOSPADM

## 2022-02-15 NOTE — PROGRESS NOTES
Outpatient Consultation - Palliative and Supportive Care   Elías Vivas 70 y o  male [id number]    Assessment & Plan  Problem List Items Addressed This Visit     Gastric adenocarcinoma (HCC)    Relevant Medications    dronabinol (MARINOL) 5 MG capsule    mirtazapine (REMERON) 30 mg tablet    bisacodyl (DULCOLAX) 5 mg EC tablet    pantoprazole (PROTONIX) 40 mg tablet    predniSONE 5 mg tablet      Other Visit Diagnoses     Cancer-related pain    -  Primary    Relevant Medications    dronabinol (MARINOL) 5 MG capsule    bisacodyl (DULCOLAX) 5 mg EC tablet    predniSONE 5 mg tablet    Malignant neoplasm of esophagus, unspecified location (HCC)        Relevant Medications    pantoprazole (PROTONIX) 40 mg tablet    Epigastric pain        Relevant Medications    pantoprazole (PROTONIX) 40 mg tablet    Nephrolithiasis        Relevant Medications    oxyCODONE (ROXICODONE) 10 MG TABS    Palliative care patient        Relevant Medications    dronabinol (MARINOL) 5 MG capsule    bisacodyl (DULCOLAX) 5 mg EC tablet    predniSONE 5 mg tablet        - Counseling on health screening and disease prevention, COVID-19 specific (CPT V65 49)    Medications adjusted this encounter:  Requested Prescriptions     Signed Prescriptions Disp Refills    dronabinol (MARINOL) 5 MG capsule 60 capsule 0     Sig: Take 1 capsule (5 mg total) by mouth 2 (two) times a day before meals Breakfast and lunch to stimulate appetite   mirtazapine (REMERON) 30 mg tablet 90 tablet 0     Sig: Take 1 5 tablets (45 mg total) by mouth daily at bedtime If no improvement in somnolence at high dose after one week, reduce to 1 tab at bedtime for one week, then stop      bisacodyl (DULCOLAX) 5 mg EC tablet 30 tablet 0     Sig: Take 1 tablet (5 mg total) by mouth daily as needed for constipation Related to pain medicines    pantoprazole (PROTONIX) 40 mg tablet 60 tablet 0     Sig: Take 1 tablet (40 mg total) by mouth 2 (two) times a day Before breakfast and before bed to prevent acid accumulation   oxyCODONE (ROXICODONE) 10 MG TABS 180 tablet 0     Sig: Take 1 tablet (10 mg total) by mouth every 4 (four) hours as needed (cancer pain) Max Daily Amount: 60 mg    predniSONE 5 mg tablet 30 tablet 0     Sig: Take 1 tablet (5 mg total) by mouth 2 (two) times a day as needed (low energy)     No orders of the defined types were placed in this encounter  Medications Discontinued During This Encounter   Medication Reason    pantoprazole (PROTONIX) 40 mg tablet Reorder    oxyCODONE (Roxicodone) 5 immediate release tablet Reorder    mirtazapine (REMERON) 30 mg tablet Reorder   - Trial of multiple meds to attempt to stimulate appetite    PPS: 50      Lexis Villatoro was seen today for symptoms and planning cares related to above illnesses  Above orders were sent electronically, or provided in hardcopy in clinic  I have reviewed the patient's controlled substance dispensing history in the Prescription Drug Monitoring Program in compliance with the [facility] regulations before prescribing any controlled substances  We appreciate the referral, and wish for him to continue to follow with us  If there are questions or concerns, please contact us through our clinic/answering service 24 hours a day, seven days a week  Joel Duffy MD  [facility] Palliative [facility] Supportive [facility]  [phone]        Visit Information    Accompanied By: Family member    Source of History: Self, Family member    History Limitations: None    Contacts: wife Parviz Cespedes - [phone]    History of Present Illness      Lexis Villatoro is a 70 y o  male who presents as a referral from Dr Isaias Chun of Surg/Onc for primary palliative diagnosis of gastric adenoca  Consultation is requested for: symptom management, emotional support in the setting of serious illness  Former physician, recently diagnosed with cancer of gastric cardia  Constant gnawing pain in epgastric/umbilical area  Not often radiating to back  Worsening with oral intake  Noted improvement with protonix  At length today reviewed his symptoms and medications for both gastroprotection and aggressive symptom management  Pt is hopeful to be considered for [facility] [place] [facility]  Pertinent Palliative Care Domains    Physical Symptoms:ambulates with difficulty    Psychological Symptoms:mild anxiety, good insight    Social Aspects: support system relies heavily on daughter; wife is overwhelmed    Spiritual Aspects:     Historical Data  Past Medical History:   Diagnosis Date    Cancer ([address] )     gastric mass    COVID 01/2021    Disease of thyroid gland     GERD (gastroesophageal reflux disease)     History of GI diverticular bleed     Hyperlipidemia     Hypertension     Tinnitus      Past Surgical History:   Procedure Laterality Date    ADENOIDECTOMY      CHOLECYSTECTOMY LAPAROSCOPIC N/A 1/24/2022    Procedure: CHOLECYSTECTOMY LAPAROSCOPIC W/ INTRAOP CHOLANGIOGRAM;  Surgeon: Ruslan Farr DO;  [facility];  Service: General    COLONOSCOPY N/A 7/16/2016    Procedure: COLONOSCOPY;  Surgeon: Renetta Townsend MD;  [facility];  Service:     FL GUIDED CENTRAL VENOUS ACCESS DEVICE INSERTION  2/11/2022    HERNIA REPAIR      PROSTATE BIOPSY      TONSILLECTOMY      TUNNELED VENOUS PORT PLACEMENT N/A 2/11/2022    Procedure: INSERTION VENOUS PORT (PORT-A-CATH);   Surgeon: Sueellen Habermann, MD;  [facility];  Service: Surgical Oncology    UVULECTOMY       Social History     Socioeconomic History    Marital status: /Civil Union     Spouse name: Not on file    Number of children: Not on file    Years of education: Not on file    Highest education level: Not on file   Occupational History    Not on file   Tobacco Use    Smoking status: Never Smoker    Smokeless tobacco: Never Used   Vaping Use    Vaping Use: Never used   Substance and Sexual Activity    Alcohol use: Yes     Comment: Rarely    Drug use: Never    Sexual activity: Not on file   Other Topics Concern    Not on file   Social History Narrative    Not on file     Social Determinants of Health     Financial Resource Strain: Not on file   Food Insecurity: Not on file   Transportation Needs: Not on file   Physical Activity: Not on file   Stress: Not on file   Social Connections: Not on file   Intimate Partner Violence: Not on file   Housing Stability: Not on file     No family history on file  No Known Allergies  Current Outpatient Medications   Medication Sig Dispense Refill    bisacodyl (DULCOLAX) 5 mg EC tablet Take 1 tablet (5 mg total) by mouth daily as needed for constipation Related to pain medicines 30 tablet 0    dronabinol (MARINOL) 5 MG capsule Take 1 capsule (5 mg total) by mouth 2 (two) times a day before meals Breakfast and lunch to stimulate appetite  60 capsule 0    irbesartan (AVAPRO) 300 mg tablet Take 150 mg by mouth every morning        levothyroxine 125 mcg tablet Take 125 mcg by mouth every morning        lidocaine-prilocaine (EMLA) cream Apply to port site 30 minutes prior to port accessing and cover with a dressing 30 g 1    mirtazapine (REMERON) 30 mg tablet Take 1 5 tablets (45 mg total) by mouth daily at bedtime If no improvement in somnolence at high dose after one week, reduce to 1 tab at bedtime for one week, then stop  90 tablet 0    ondansetron (Zofran ODT) 4 mg disintegrating tablet Take 1 tablet (4 mg total) by mouth every 6 (six) hours as needed for nausea or vomiting 20 tablet 0    oxyCODONE (ROXICODONE) 10 MG TABS Take 1 tablet (10 mg total) by mouth every 4 (four) hours as needed (cancer pain) Max Daily Amount: 60 mg 180 tablet 0    pantoprazole (PROTONIX) 40 mg tablet Take 1 tablet (40 mg total) by mouth 2 (two) times a day Before breakfast and before bed to prevent acid accumulation   60 tablet 0    predniSONE 5 mg tablet Take 1 tablet (5 mg total) by mouth 2 (two) times a day as needed (low energy) 30 tablet 0    tamsulosin (FLOMAX) 0 4 mg Take 0 4 mg by mouth 2 (two) times a day Indications: Enlarged Prostate  No current facility-administered medications for this visit  Review of Systems   Constitutional: Positive for decreased appetite, malaise/fatigue and weight loss  Negative for weight gain  HENT: Negative for hoarse voice, nosebleeds and sore throat  Eyes: Negative for vision loss in left eye and vision loss in right eye  Cardiovascular: Negative for chest pain and dyspnea on exertion  Respiratory: Negative for cough and shortness of breath  Endocrine: Negative for polydipsia, polyphagia and polyuria  Skin: Negative for flushing and itching  Musculoskeletal: Negative for falls  Gastrointestinal: Positive for bloating, abdominal pain, diarrhea, nausea and vomiting  Negative for anorexia, dysphagia and jaundice  Genitourinary: Negative for frequency  Neurological: Negative for dizziness  Psychiatric/Behavioral: Negative for depression and memory loss  The patient is not nervous/anxious  Vital Signs    /60 (BP Location: Left arm, Cuff Size: Standard)   Pulse 75   Temp 97 9 °F (36 6 °C) (Temporal)   Wt 77 kg (169 lb 12 1 oz)   SpO2 96%   BMI 25 07 kg/m²     Physical Exam and Objective Data  Physical Exam  Constitutional:       General: He is not in acute distress  Appearance: He is ill-appearing  He is not toxic-appearing or diaphoretic  Comments: Frail   HENT:      Head: Normocephalic and atraumatic  Right Ear: External ear normal       Left Ear: External ear normal    Eyes:      General:         Right eye: No discharge  Left eye: No discharge  Conjunctiva/sclera: Conjunctivae normal       Pupils: Pupils are equal, round, and reactive to light  Neck:      Trachea: No tracheal deviation  Cardiovascular:      Rate and Rhythm: Normal rate and regular rhythm  Pulmonary:      Effort: Pulmonary effort is normal  No respiratory distress  Breath sounds: No stridor  Abdominal:      General: There is no distension  Palpations: Abdomen is soft  Comments: scaphoid   Skin:     General: Skin is warm and dry  Coloration: Skin is pale  Findings: No erythema or rash  Neurological:      General: No focal deficit present  Mental Status: He is alert and oriented to person, place, and time  Mental status is at baseline  Cranial Nerves: No cranial nerve deficit  Psychiatric:         Mood and Affect: Mood normal          Behavior: Behavior normal          Thought Content: Thought content normal          Judgment: Judgment normal            Radiology and Laboratory:  I personally reviewed and interpreted the following results: none new; reviewed historical data    45+ minutes was spent face to face with Francois Rhodes and his family with greater than 50% of the time spent in counseling or coordination of care including discussions of etiology of diagnosis, pathogenesis of diagnosis, risks and benefits of treatment, instructions for disease self management, risk factors and risk reduction of disease and patient and family counseling/involvement in care  Additional time was also spent in discussing coronavirus vaccine indications, availability, and logistical challenges  All of the patient's questions were answered during this discussion

## 2022-02-17 ENCOUNTER — TELEPHONE (OUTPATIENT)
Dept: INFUSION CENTER | Facility: CLINIC | Age: 72
End: 2022-02-17

## 2022-02-17 NOTE — TELEPHONE ENCOUNTER
Received a VM for patients daughter stating she needs to cancel appointments lab appointment for today  I called daughter Shadi Mejia back and told her that the labs would be for Chemo he is starting at [facility] on Monday  Shadi Mejia said her father is getting a second option today and they are not sure he wants to start chemo  Shadi Mejia stated if he decides he would like to start chemo on Monday he will go to an outpatient lab on Saturday  I told Shadi Mejia to please let [facility] know about his decision on chemo treatment for Monday  Shadi Meija in agreement with this

## 2022-02-18 ENCOUNTER — TELEPHONE (OUTPATIENT)
Dept: HEMATOLOGY ONCOLOGY | Facility: CLINIC | Age: 72
End: 2022-02-18

## 2022-02-18 ENCOUNTER — APPOINTMENT (OUTPATIENT)
Dept: LAB | Age: 72
End: 2022-02-18
Payer: COMMERCIAL

## 2022-02-18 DIAGNOSIS — C16.9 GASTRIC ADENOCARCINOMA (HCC): ICD-10-CM

## 2022-02-18 LAB
ALBUMIN SERPL BCP-MCNC: 3.6 G/DL (ref 3.5–5)
ALP SERPL-CCNC: 139 U/L (ref 46–116)
ALT SERPL W P-5'-P-CCNC: 22 U/L (ref 12–78)
ANION GAP SERPL CALCULATED.3IONS-SCNC: 6 MMOL/L (ref 4–13)
AST SERPL W P-5'-P-CCNC: 19 U/L (ref 5–45)
BASOPHILS # BLD AUTO: 0.03 THOUSANDS/ΜL (ref 0–0.1)
BASOPHILS NFR BLD AUTO: 0 % (ref 0–1)
BILIRUB SERPL-MCNC: 0.86 MG/DL (ref 0.2–1)
BUN SERPL-MCNC: 25 MG/DL (ref 5–25)
CALCIUM SERPL-MCNC: 9.4 MG/DL (ref 8.3–10.1)
CHLORIDE SERPL-SCNC: 102 MMOL/L (ref 100–108)
CO2 SERPL-SCNC: 28 MMOL/L (ref 21–32)
CREAT SERPL-MCNC: 0.86 MG/DL (ref 0.6–1.3)
EOSINOPHIL # BLD AUTO: 0.32 THOUSAND/ΜL (ref 0–0.61)
EOSINOPHIL NFR BLD AUTO: 4 % (ref 0–6)
ERYTHROCYTE [DISTWIDTH] IN BLOOD BY AUTOMATED COUNT: 15.1 % (ref 11.6–15.1)
GFR SERPL CREATININE-BSD FRML MDRD: 87 ML/MIN/1.73SQ M
GLUCOSE P FAST SERPL-MCNC: 97 MG/DL (ref 65–99)
HCT VFR BLD AUTO: 36.8 % (ref 36.5–49.3)
HGB BLD-MCNC: 11.9 G/DL (ref 12–17)
IMM GRANULOCYTES # BLD AUTO: 0.02 THOUSAND/UL (ref 0–0.2)
IMM GRANULOCYTES NFR BLD AUTO: 0 % (ref 0–2)
LYMPHOCYTES # BLD AUTO: 1.46 THOUSANDS/ΜL (ref 0.6–4.47)
LYMPHOCYTES NFR BLD AUTO: 19 % (ref 14–44)
MCH RBC QN AUTO: 29.9 PG (ref 26.8–34.3)
MCHC RBC AUTO-ENTMCNC: 32.3 G/DL (ref 31.4–37.4)
MCV RBC AUTO: 93 FL (ref 82–98)
MONOCYTES # BLD AUTO: 0.78 THOUSAND/ΜL (ref 0.17–1.22)
MONOCYTES NFR BLD AUTO: 10 % (ref 4–12)
NEUTROPHILS # BLD AUTO: 5.17 THOUSANDS/ΜL (ref 1.85–7.62)
NEUTS SEG NFR BLD AUTO: 67 % (ref 43–75)
NRBC BLD AUTO-RTO: 0 /100 WBCS
PLATELET # BLD AUTO: 309 THOUSANDS/UL (ref 149–390)
PMV BLD AUTO: 9.2 FL (ref 8.9–12.7)
POTASSIUM SERPL-SCNC: 4.1 MMOL/L (ref 3.5–5.3)
PROT SERPL-MCNC: 7.2 G/DL (ref 6.4–8.2)
RBC # BLD AUTO: 3.98 MILLION/UL (ref 3.88–5.62)
SODIUM SERPL-SCNC: 136 MMOL/L (ref 136–145)
WBC # BLD AUTO: 7.78 THOUSAND/UL (ref 4.31–10.16)

## 2022-02-18 PROCEDURE — 85025 COMPLETE CBC W/AUTO DIFF WBC: CPT

## 2022-02-18 PROCEDURE — 80053 COMPREHEN METABOLIC PANEL: CPT

## 2022-02-18 PROCEDURE — 36415 COLL VENOUS BLD VENIPUNCTURE: CPT

## 2022-02-18 NOTE — TELEPHONE ENCOUNTER
Suzi Soni is calling in requesting to speak to Dr Madden regarding patient, I have reached out to his MA [facility] who informed me to transfer call over to [address] at [phone]   Call transferred successfully

## 2022-02-18 NOTE — TELEPHONE ENCOUNTER
Per Dr Lazaro Spears, second opinion agrees with intended treatment plan of Folfox + Nivolumab to be started on 2/21/22  Dr Lazaro Spears would also like Guardant 360 test to be obtained prior to treatment  Call placed to daughter Jean-Paul Otoole, reviewed the above plan  She stated her father will come to [facility] office today prior to 4:30 and  the testing kit  He will take the kit to [facility] infusion on Monday, they will draw the labs at that time  [facility] was called, I spoke with Joyce RN  I notified her that [address] labs need to drawn on Monday 2/21/22 prior to starting his chemotherapy  She verbalized understanding

## 2022-02-21 ENCOUNTER — HOSPITAL ENCOUNTER (OUTPATIENT)
Dept: INFUSION CENTER | Facility: HOSPITAL | Age: 72
Discharge: HOME | End: 2022-02-21
Payer: COMMERCIAL

## 2022-02-21 ENCOUNTER — APPOINTMENT (OUTPATIENT)
Dept: LAB | Facility: HOSPITAL | Age: 72
End: 2022-02-21
Payer: COMMERCIAL

## 2022-02-21 VITALS
SYSTOLIC BLOOD PRESSURE: 135 MMHG | BODY MASS INDEX: 26.23 KG/M2 | HEART RATE: 85 BPM | DIASTOLIC BLOOD PRESSURE: 74 MMHG | WEIGHT: 167.11 LBS | TEMPERATURE: 98.3 F | OXYGEN SATURATION: 94 % | HEIGHT: 67 IN | RESPIRATION RATE: 18 BRPM

## 2022-02-21 DIAGNOSIS — C16.9 GASTRIC ADENOCARCINOMA (HCC): Primary | ICD-10-CM

## 2022-02-21 DIAGNOSIS — C16.0 MALIGNANT NEOPLASM OF CARDIA OF STOMACH (HCC): ICD-10-CM

## 2022-02-21 PROCEDURE — 96417 CHEMO IV INFUS EACH ADDL SEQ: CPT

## 2022-02-21 PROCEDURE — 96367 TX/PROPH/DG ADDL SEQ IV INF: CPT

## 2022-02-21 PROCEDURE — G0498 CHEMO EXTEND IV INFUS W/PUMP: HCPCS

## 2022-02-21 PROCEDURE — 96415 CHEMO IV INFUSION ADDL HR: CPT

## 2022-02-21 PROCEDURE — 96413 CHEMO IV INFUSION 1 HR: CPT

## 2022-02-21 PROCEDURE — 36415 COLL VENOUS BLD VENIPUNCTURE: CPT

## 2022-02-21 RX ORDER — DEXTROSE MONOHYDRATE 50 MG/ML
20 INJECTION, SOLUTION INTRAVENOUS ONCE
Status: COMPLETED | OUTPATIENT
Start: 2022-02-21 | End: 2022-02-21

## 2022-02-21 RX ORDER — SODIUM CHLORIDE 9 MG/ML
20 INJECTION, SOLUTION INTRAVENOUS ONCE AS NEEDED
Status: DISCONTINUED | OUTPATIENT
Start: 2022-02-21 | End: 2022-02-24 | Stop reason: HOSPADM

## 2022-02-21 RX ADMIN — SODIUM CHLORIDE 20 ML/HR: 0.9 INJECTION, SOLUTION INTRAVENOUS at 09:20

## 2022-02-21 RX ADMIN — OXALIPLATIN 164.9 MG: 5 INJECTION, SOLUTION INTRAVENOUS at 11:12

## 2022-02-21 RX ADMIN — DEXAMETHASONE SODIUM PHOSPHATE: 10 INJECTION, SOLUTION INTRAMUSCULAR; INTRAVENOUS at 09:21

## 2022-02-21 RX ADMIN — DEXTROSE 20 ML/HR: 50 INJECTION, SOLUTION INTRAVENOUS at 11:07

## 2022-02-21 RX ADMIN — SODIUM CHLORIDE 240 MG: 9 INJECTION, SOLUTION INTRAVENOUS at 10:06

## 2022-02-22 ENCOUNTER — TELEPHONE (OUTPATIENT)
Dept: NUTRITION | Facility: CLINIC | Age: 72
End: 2022-02-22

## 2022-02-22 NOTE — TELEPHONE ENCOUNTER
Received notification by Infusion RN, Nomi Desouza, RN, on 2/21/22 that pt has triggered for oncology nutrition care (reason for referral: MST: 5)  Jeanine Suero and introduced self and explained the reason for today's call  Spoke with Hayes Yung today who reports he got 80% of his appetite back today (day after 1st chemo infusion), prior to this he says he couldn't eat anything d/t reflux, poor taste and smell, and sharp pain when eating too much  He reports that he had been losing ~0 5# per day and lost 45# total     Discussed oncology nutrition services available (options for in-person and phone consultation) and the benefits of meeting for a consultation  Initial RD consultation set up for 3/1 at 1:30pm       E-mailed to pt ([email or web address]') my contact info  Provided this RDs contact information asking that Hayes Yung reach out prn  All questions/concerns addressed at this time

## 2022-02-22 NOTE — TELEPHONE ENCOUNTER
----- Message from Jazzy Arias RN sent at 2/21/2022 11:54 AM EST -----  Regarding: Referral  MST Score = 5

## 2022-02-23 ENCOUNTER — HOSPITAL ENCOUNTER (OUTPATIENT)
Dept: INFUSION CENTER | Facility: HOSPITAL | Age: 72
Discharge: HOME | End: 2022-02-23

## 2022-02-23 ENCOUNTER — TELEPHONE (OUTPATIENT)
Dept: HEMATOLOGY ONCOLOGY | Facility: HOSPITAL | Age: 72
End: 2022-02-23

## 2022-02-23 ENCOUNTER — TELEPHONE (OUTPATIENT)
Dept: INFUSION CENTER | Facility: CLINIC | Age: 72
End: 2022-02-23

## 2022-02-23 VITALS
RESPIRATION RATE: 18 BRPM | SYSTOLIC BLOOD PRESSURE: 134 MMHG | OXYGEN SATURATION: 97 % | TEMPERATURE: 98.7 F | DIASTOLIC BLOOD PRESSURE: 77 MMHG | HEART RATE: 81 BPM

## 2022-02-23 DIAGNOSIS — C16.9 GASTRIC ADENOCARCINOMA (HCC): Primary | ICD-10-CM

## 2022-02-23 NOTE — PROGRESS NOTES
Dr Danitza King spoke with wife - and as of now pt is ok for d/c - if he gets worse p's t wife knows to go to ER

## 2022-02-23 NOTE — TELEPHONE ENCOUNTER
02/23/22    Pt will be transferred to see Dr Chuck Pallas before his 3/21 treatment  LMOM to pt's Daughter Regan Ordonez informing I will schedule pt to see Dr Chuck Pallas on 3/17 11:20AM     [employer] number provided

## 2022-02-23 NOTE — PROGRESS NOTES
Spoke with wife - she had concerns that "  wasn;t acting right" - almost fell getting out the car - and when getting into the car tried to step on the car seat  Also didn't recognize his DIL - and has had periods of being angry and nasty    His has not had much of an appetite she has been encouraging fluids - reported to [employer] - she will speak with wife

## 2022-02-23 NOTE — TELEPHONE ENCOUNTER
I reached out to patient left  letting patient know that now 3/21 his appointment time had to get moved from 8:30 to 8:00   I let a return number and stated if this is an issue to give us a call back

## 2022-02-24 ENCOUNTER — TELEPHONE (OUTPATIENT)
Dept: INTERNAL MEDICINE CLINIC | Facility: CLINIC | Age: 72
End: 2022-02-24

## 2022-02-24 NOTE — TELEPHONE ENCOUNTER
----- Message from Jessie Branham DO sent at 2/23/2022  2:09 PM EST -----  Regarding: RE: [employer] plac card  Please place the placard form on my desk and I will sign it for him  ----- Message -----  From: Carlos Gorman  Sent: 2/23/2022  10:25 AM EST  To: Jessie Branham DO  Subject: Handi-Cap plac card                              Pt is requesting Handi Cap sticker he says the reason is for stomach cancer  Please advise if applicable     Phone-  [phone]

## 2022-02-25 ENCOUNTER — TELEPHONE (OUTPATIENT)
Dept: PALLIATIVE MEDICINE | Facility: CLINIC | Age: 72
End: 2022-02-25

## 2022-02-25 DIAGNOSIS — F98.8 ATTENTION DEFICIT DISORDER (ADD) WITHOUT HYPERACTIVITY: Primary | ICD-10-CM

## 2022-02-25 DIAGNOSIS — R64 MALIGNANT CACHEXIA (HCC): ICD-10-CM

## 2022-02-25 RX ORDER — METHYLPHENIDATE HYDROCHLORIDE 5 MG/1
5 TABLET ORAL 2 TIMES DAILY PRN
Qty: 30 TABLET | Refills: 0 | Status: SHIPPED | OUTPATIENT
Start: 2022-02-25 | End: 2022-03-16 | Stop reason: ALTCHOICE

## 2022-02-25 NOTE — TELEPHONE ENCOUNTER
Daughter Anjali Jaimes called  Her dad has been experiencing pain across his entire  abdominal area  He has been taking his Oxycodone 10 mg every four hours and it has his pain at a "5"   He describes it as stabbing pain  She also states that the days her dad does not have treatment or plans to go out he sleeps most of the day  Shes not sure if its results of the new medication or depression related   She also states the Marinol

## 2022-02-25 NOTE — TELEPHONE ENCOUNTER
2/25/2022 12:52 PM -  Pt still not eating  Pt still having significant pain  Sleeping the large portion of every day; will often not leave the home if he doesn't have treatments or appointments  Mirtazapine not helpful to improve appetite, and as noted he is sleeping the bulk of the day  Dronabinol does not seem helpful at all  He does very well a day after a treatment, then is terrible for three days, then begins to recover to some semblance of baseline  Reviewed with Liza Alvarenga that -- given his Pl-based drugs and immunotherapy -- our hands are tied re: steroids, and with his sleepiness, we should stop mirtazapine and dronabinol  Pain control may include up to 2tabs oxyIR at this moment      Will call out methylphenidate for energy boost

## 2022-02-26 LAB
Lab: NORMAL
Lab: NORMAL
MISCELLANEOUS LAB TEST RESULT: NORMAL
STONE ANALYSIS-IMP: NORMAL

## 2022-02-28 ENCOUNTER — HOSPITAL ENCOUNTER (INPATIENT)
Facility: HOSPITAL | Age: 72
LOS: 6 days | Discharge: HOME HEALTH | DRG: 375 | End: 2022-03-06
Attending: EMERGENCY MEDICINE
Payer: COMMERCIAL

## 2022-02-28 ENCOUNTER — TELEPHONE (OUTPATIENT)
Dept: NUTRITION | Facility: CLINIC | Age: 72
End: 2022-02-28

## 2022-02-28 DIAGNOSIS — C16.9 STOMACH CANCER (HCC): Primary | ICD-10-CM

## 2022-02-28 DIAGNOSIS — E44.0 MODERATE PROTEIN-CALORIE MALNUTRITION (HCC): ICD-10-CM

## 2022-02-28 DIAGNOSIS — E86.0 DEHYDRATION: ICD-10-CM

## 2022-02-28 DIAGNOSIS — G89.3 CANCER RELATED PAIN: ICD-10-CM

## 2022-02-28 DIAGNOSIS — C16.0 MALIGNANT NEOPLASM OF CARDIA OF STOMACH (HCC): ICD-10-CM

## 2022-02-28 DIAGNOSIS — C16.9 GASTRIC ADENOCARCINOMA (HCC): ICD-10-CM

## 2022-02-28 DIAGNOSIS — R63.8 DECREASED ORAL INTAKE: ICD-10-CM

## 2022-02-28 DIAGNOSIS — R52 INTRACTABLE PAIN: ICD-10-CM

## 2022-02-28 DIAGNOSIS — R62.7 FTT (FAILURE TO THRIVE) IN ADULT: ICD-10-CM

## 2022-02-28 DIAGNOSIS — Z51.5 PALLIATIVE CARE PATIENT: ICD-10-CM

## 2022-02-28 LAB
ALBUMIN SERPL BCP-MCNC: 3.3 G/DL (ref 3.5–5)
ALP SERPL-CCNC: 171 U/L (ref 46–116)
ALT SERPL W P-5'-P-CCNC: 27 U/L (ref 12–78)
ANION GAP SERPL CALCULATED.3IONS-SCNC: 13 MMOL/L (ref 4–13)
APTT PPP: 28 SECONDS (ref 23–37)
AST SERPL W P-5'-P-CCNC: 27 U/L (ref 5–45)
ATRIAL RATE: 70 BPM
BASOPHILS # BLD AUTO: 0.04 THOUSANDS/ΜL (ref 0–0.1)
BASOPHILS NFR BLD AUTO: 1 % (ref 0–1)
BILIRUB SERPL-MCNC: 0.9 MG/DL (ref 0.2–1)
BUN SERPL-MCNC: 19 MG/DL (ref 5–25)
CALCIUM ALBUM COR SERPL-MCNC: 9.6 MG/DL (ref 8.3–10.1)
CALCIUM SERPL-MCNC: 9 MG/DL (ref 8.3–10.1)
CHLORIDE SERPL-SCNC: 99 MMOL/L (ref 100–108)
CO2 SERPL-SCNC: 25 MMOL/L (ref 21–32)
CREAT SERPL-MCNC: 1.08 MG/DL (ref 0.6–1.3)
EOSINOPHIL # BLD AUTO: 0.13 THOUSAND/ΜL (ref 0–0.61)
EOSINOPHIL NFR BLD AUTO: 2 % (ref 0–6)
ERYTHROCYTE [DISTWIDTH] IN BLOOD BY AUTOMATED COUNT: 14.5 % (ref 11.6–15.1)
GFR SERPL CREATININE-BSD FRML MDRD: 68 ML/MIN/1.73SQ M
GLUCOSE SERPL-MCNC: 114 MG/DL (ref 65–140)
HCT VFR BLD AUTO: 35.5 % (ref 36.5–49.3)
HGB BLD-MCNC: 12.5 G/DL (ref 12–17)
IMM GRANULOCYTES # BLD AUTO: 0.06 THOUSAND/UL (ref 0–0.2)
IMM GRANULOCYTES NFR BLD AUTO: 1 % (ref 0–2)
INR PPP: 1.09 (ref 0.84–1.19)
LACTATE SERPL-SCNC: 1.9 MMOL/L (ref 0.5–2)
LIPASE SERPL-CCNC: 74 U/L (ref 73–393)
LYMPHOCYTES # BLD AUTO: 0.79 THOUSANDS/ΜL (ref 0.6–4.47)
LYMPHOCYTES NFR BLD AUTO: 12 % (ref 14–44)
MCH RBC QN AUTO: 30.9 PG (ref 26.8–34.3)
MCHC RBC AUTO-ENTMCNC: 35.2 G/DL (ref 31.4–37.4)
MCV RBC AUTO: 88 FL (ref 82–98)
MONOCYTES # BLD AUTO: 0.69 THOUSAND/ΜL (ref 0.17–1.22)
MONOCYTES NFR BLD AUTO: 11 % (ref 4–12)
NEUTROPHILS # BLD AUTO: 4.73 THOUSANDS/ΜL (ref 1.85–7.62)
NEUTS SEG NFR BLD AUTO: 73 % (ref 43–75)
NRBC BLD AUTO-RTO: 0 /100 WBCS
P AXIS: 8 DEGREES
PLATELET # BLD AUTO: 325 THOUSANDS/UL (ref 149–390)
PMV BLD AUTO: 9.3 FL (ref 8.9–12.7)
POTASSIUM SERPL-SCNC: 3.6 MMOL/L (ref 3.5–5.3)
PR INTERVAL: 140 MS
PROT SERPL-MCNC: 7.1 G/DL (ref 6.4–8.2)
PROTHROMBIN TIME: 14.1 SECONDS (ref 11.6–14.5)
QRS AXIS: 0 DEGREES
QRSD INTERVAL: 78 MS
QT INTERVAL: 408 MS
QTC INTERVAL: 437 MS
RBC # BLD AUTO: 4.05 MILLION/UL (ref 3.88–5.62)
SODIUM SERPL-SCNC: 137 MMOL/L (ref 136–145)
T WAVE AXIS: 37 DEGREES
TSH SERPL DL<=0.05 MIU/L-ACNC: 7.5 UIU/ML (ref 0.36–3.74)
VENTRICULAR RATE: 69 BPM
WBC # BLD AUTO: 6.44 THOUSAND/UL (ref 4.31–10.16)

## 2022-02-28 PROCEDURE — 99285 EMERGENCY DEPT VISIT HI MDM: CPT | Performed by: PHYSICIAN ASSISTANT

## 2022-02-28 PROCEDURE — 80053 COMPREHEN METABOLIC PANEL: CPT | Performed by: PHYSICIAN ASSISTANT

## 2022-02-28 PROCEDURE — 85025 COMPLETE CBC W/AUTO DIFF WBC: CPT | Performed by: PHYSICIAN ASSISTANT

## 2022-02-28 PROCEDURE — 96375 TX/PRO/DX INJ NEW DRUG ADDON: CPT

## 2022-02-28 PROCEDURE — 85730 THROMBOPLASTIN TIME PARTIAL: CPT | Performed by: PHYSICIAN ASSISTANT

## 2022-02-28 PROCEDURE — 84439 ASSAY OF FREE THYROXINE: CPT | Performed by: INTERNAL MEDICINE

## 2022-02-28 PROCEDURE — 85610 PROTHROMBIN TIME: CPT | Performed by: PHYSICIAN ASSISTANT

## 2022-02-28 PROCEDURE — 83690 ASSAY OF LIPASE: CPT | Performed by: PHYSICIAN ASSISTANT

## 2022-02-28 PROCEDURE — 84443 ASSAY THYROID STIM HORMONE: CPT | Performed by: PHYSICIAN ASSISTANT

## 2022-02-28 PROCEDURE — 99285 EMERGENCY DEPT VISIT HI MDM: CPT

## 2022-02-28 PROCEDURE — 36415 COLL VENOUS BLD VENIPUNCTURE: CPT | Performed by: PHYSICIAN ASSISTANT

## 2022-02-28 PROCEDURE — 83605 ASSAY OF LACTIC ACID: CPT | Performed by: PHYSICIAN ASSISTANT

## 2022-02-28 PROCEDURE — 93010 ELECTROCARDIOGRAM REPORT: CPT | Performed by: INTERNAL MEDICINE

## 2022-02-28 PROCEDURE — 96361 HYDRATE IV INFUSION ADD-ON: CPT

## 2022-02-28 PROCEDURE — 96374 THER/PROPH/DIAG INJ IV PUSH: CPT

## 2022-02-28 PROCEDURE — 93005 ELECTROCARDIOGRAM TRACING: CPT

## 2022-02-28 PROCEDURE — 99223 1ST HOSP IP/OBS HIGH 75: CPT | Performed by: INTERNAL MEDICINE

## 2022-02-28 RX ORDER — ONDANSETRON 2 MG/ML
4 INJECTION INTRAMUSCULAR; INTRAVENOUS ONCE
Status: COMPLETED | OUTPATIENT
Start: 2022-02-28 | End: 2022-02-28

## 2022-02-28 RX ORDER — OXYCODONE HCL 10 MG/1
10 TABLET, FILM COATED, EXTENDED RELEASE ORAL EVERY 12 HOURS SCHEDULED
Status: DISCONTINUED | OUTPATIENT
Start: 2022-02-28 | End: 2022-03-01

## 2022-02-28 RX ORDER — OXYCODONE HYDROCHLORIDE 10 MG/1
10 TABLET ORAL EVERY 4 HOURS PRN
Status: DISCONTINUED | OUTPATIENT
Start: 2022-02-28 | End: 2022-03-01

## 2022-02-28 RX ORDER — HYDROMORPHONE HCL/PF 1 MG/ML
0.5 SYRINGE (ML) INJECTION EVERY 4 HOURS PRN
Status: DISCONTINUED | OUTPATIENT
Start: 2022-02-28 | End: 2022-03-01

## 2022-02-28 RX ORDER — ONDANSETRON 2 MG/ML
4 INJECTION INTRAMUSCULAR; INTRAVENOUS EVERY 6 HOURS PRN
Status: DISCONTINUED | OUTPATIENT
Start: 2022-02-28 | End: 2022-02-28

## 2022-02-28 RX ORDER — MORPHINE SULFATE 10 MG/ML
6 INJECTION, SOLUTION INTRAMUSCULAR; INTRAVENOUS ONCE
Status: COMPLETED | OUTPATIENT
Start: 2022-02-28 | End: 2022-02-28

## 2022-02-28 RX ORDER — POLYETHYLENE GLYCOL 3350 17 G/17G
17 POWDER, FOR SOLUTION ORAL DAILY
Status: DISCONTINUED | OUTPATIENT
Start: 2022-03-01 | End: 2022-03-06 | Stop reason: HOSPADM

## 2022-02-28 RX ORDER — SODIUM CHLORIDE 9 MG/ML
125 INJECTION, SOLUTION INTRAVENOUS CONTINUOUS
Status: DISCONTINUED | OUTPATIENT
Start: 2022-02-28 | End: 2022-03-01

## 2022-02-28 RX ORDER — OXYCODONE HCL 10 MG/1
10 TABLET, FILM COATED, EXTENDED RELEASE ORAL EVERY 12 HOURS SCHEDULED
Status: DISCONTINUED | OUTPATIENT
Start: 2022-02-28 | End: 2022-02-28

## 2022-02-28 RX ORDER — ONDANSETRON 2 MG/ML
4 INJECTION INTRAMUSCULAR; INTRAVENOUS EVERY 6 HOURS PRN
Status: DISCONTINUED | OUTPATIENT
Start: 2022-02-28 | End: 2022-03-06 | Stop reason: HOSPADM

## 2022-02-28 RX ORDER — SODIUM CHLORIDE, SODIUM GLUCONATE, SODIUM ACETATE, POTASSIUM CHLORIDE, MAGNESIUM CHLORIDE, SODIUM PHOSPHATE, DIBASIC, AND POTASSIUM PHOSPHATE .53; .5; .37; .037; .03; .012; .00082 G/100ML; G/100ML; G/100ML; G/100ML; G/100ML; G/100ML; G/100ML
100 INJECTION, SOLUTION INTRAVENOUS CONTINUOUS
Status: DISCONTINUED | OUTPATIENT
Start: 2022-02-28 | End: 2022-03-01

## 2022-02-28 RX ORDER — OXYCODONE HYDROCHLORIDE 5 MG/1
5 TABLET ORAL EVERY 4 HOURS PRN
Status: DISCONTINUED | OUTPATIENT
Start: 2022-02-28 | End: 2022-03-01

## 2022-02-28 RX ORDER — DOCUSATE SODIUM 100 MG/1
100 CAPSULE, LIQUID FILLED ORAL 2 TIMES DAILY
Status: DISCONTINUED | OUTPATIENT
Start: 2022-02-28 | End: 2022-03-06 | Stop reason: HOSPADM

## 2022-02-28 RX ADMIN — SODIUM CHLORIDE 1000 ML: 0.9 INJECTION, SOLUTION INTRAVENOUS at 13:37

## 2022-02-28 RX ADMIN — DOCUSATE SODIUM 100 MG: 100 CAPSULE ORAL at 16:51

## 2022-02-28 RX ADMIN — HYDROMORPHONE HYDROCHLORIDE 0.5 MG: 1 INJECTION, SOLUTION INTRAMUSCULAR; INTRAVENOUS; SUBCUTANEOUS at 21:13

## 2022-02-28 RX ADMIN — SODIUM CHLORIDE, SODIUM GLUCONATE, SODIUM ACETATE, POTASSIUM CHLORIDE, MAGNESIUM CHLORIDE, SODIUM PHOSPHATE, DIBASIC, AND POTASSIUM PHOSPHATE 100 ML/HR: .53; .5; .37; .037; .03; .012; .00082 INJECTION, SOLUTION INTRAVENOUS at 19:16

## 2022-02-28 RX ADMIN — ONDANSETRON 4 MG: 2 INJECTION INTRAMUSCULAR; INTRAVENOUS at 13:35

## 2022-02-28 RX ADMIN — ONDANSETRON 4 MG: 2 INJECTION INTRAMUSCULAR; INTRAVENOUS at 18:42

## 2022-02-28 RX ADMIN — MORPHINE SULFATE 6 MG: 10 INJECTION INTRAVENOUS at 13:37

## 2022-02-28 RX ADMIN — OXYCODONE HYDROCHLORIDE 10 MG: 10 TABLET ORAL at 16:51

## 2022-02-28 RX ADMIN — OXYCODONE HYDROCHLORIDE 10 MG: 10 TABLET, FILM COATED, EXTENDED RELEASE ORAL at 18:42

## 2022-02-28 NOTE — H&P
[facility]  [facility]- Danise Phoenix 1950, 70 y o  male MRN: [id number]  Unit/Bed#: ED 14 Encounter: [id number]  Primary Care Provider: Annamae Lanes, DO   Date and time admitted to hospital: 2/28/2022 12:12 PM    * FTT (failure to thrive) in adult  Assessment & Plan  Significantly decreased oral intake, poor appetite and unintentional 50 lb Weight loss in last 1 month in the setting of recently diagnosed stomach cancer  Discussed with alternative means of feeding with the patient  He is agreeable at peg tube if possible  Will consult GI for evaluation for PEG tube placement  For now will continue IV hydration, full liquid diet  Can advance as tolerated  Cancer related pain  Assessment & Plan  Patient complaining of pain in his chest and upper abdomen  Constant  Not relieved with Percocet that he has been prescribed by palliative care  Was asked to increase the dose of Percocet but patient has been sleeping most of the day so could not take extra Percocet  Patient awake alert on my evaluation  Asking for long-acting OxyContin  So for now will start the patient on OxyContin 10 mg b i d   In addition can get oxycodone p r n  Every 4 hours for pain relief  Will consult palliative care for input since patient known to this service  Stomach cancer [facility])  Assessment & Plan  Recently diagnosed  Patient follows with Dr Sandie Bullock  Had one seesion of chemo        VTE Prophylaxis: Enoxaparin (Lovenox)  / sequential compression device   Code Status: DNR DNI  POLST: POLST form is not discussed and not completed at this time  Discussion with family: daughter at bedside    Anticipated Length of Stay:  Patient will be admitted on an Inpatient basis with an anticipated length of stay of  > 2 midnights  Justification for Hospital Stay: above    Total Time for Visit, including Counseling / Coordination of Care: 45 minutes    Greater than 50% of this total time spent on direct patient counseling and coordination of care  Chief Complaint:   Abdominal/chest pain, decreased oral intake, extreme fatigue, unintentional weight loss x 1 month    History of Present Illness:    Jose Cassidy is a 70 y o  male who presents with Abdominal/chest pain, decreased oral intake, extreme fatigue, unintentional weight loss x 1 month  Patient was recently diagnosed with stomach cancer beginning of this month  Had follows round of chemo  But patient is has been more and more fatigued, very poor appetite, decreased oral intake, sleeping most of the day, unintentional weight loss of approximately 50 lb in last 1 month  Has been following with Oncology, palliative Care  Has constant pain in his abdomen and chest that gets worse intermittently and shoots down his lower abdomen  He also has nausea and had an episode of coffee-ground emesis last week  Review of Systems:    Review of Systems   Constitutional: Positive for appetite change and fatigue  Negative for chills and fever  HENT: Negative for congestion and sore throat  Respiratory: Negative for cough and shortness of breath  Cardiovascular: Negative for chest pain, palpitations and leg swelling  Gastrointestinal: Positive for abdominal pain, nausea and vomiting  Negative for abdominal distention  Genitourinary: Negative for difficulty urinating, dysuria, flank pain, frequency and urgency  Musculoskeletal: Negative for arthralgias and myalgias  Skin: Negative for color change and rash  Neurological: Negative for dizziness and light-headedness  Psychiatric/Behavioral: Negative for agitation, behavioral problems and confusion         Past Medical and Surgical History:     Past Medical History:   Diagnosis Date    Cancer [facility])     gastric mass    COVID 01/2021    Disease of thyroid gland     GERD (gastroesophageal reflux disease)     History of GI diverticular bleed     Hyperlipidemia     Hypertension     Tinnitus        Past Surgical History:   Procedure Laterality Date    ADENOIDECTOMY      CHOLECYSTECTOMY LAPAROSCOPIC N/A 1/24/2022    Procedure: CHOLECYSTECTOMY LAPAROSCOPIC W/ INTRAOP CHOLANGIOGRAM;  Surgeon: Jennyfer Edwards DO;  [facility];  Service: General    COLONOSCOPY N/A 7/16/2016    Procedure: COLONOSCOPY;  Surgeon: Viviana Regalado MD;  [facility];  Service:     FL CHOLANGIOGRAM TRANSHEPATIC  1/24/2022    FL GUIDED CENTRAL VENOUS ACCESS DEVICE INSERTION  2/11/2022    HERNIA REPAIR      PROSTATE BIOPSY      TONSILLECTOMY      TUNNELED VENOUS PORT PLACEMENT N/A 2/11/2022    Procedure: INSERTION VENOUS PORT (PORT-A-CATH); Surgeon: Jennifer Velasquez MD;  [facility];  Service: Surgical Oncology    UVULECTOMY         Meds/Allergies:    Prior to Admission medications    Medication Sig Start Date End Date Taking? Authorizing Provider   bisacodyl (DULCOLAX) 5 mg EC tablet Take 1 tablet (5 mg total) by mouth daily as needed for constipation Related to pain medicines 2/15/22   Cricket Randle MD   dronabinol (MARINOL) 5 MG capsule Take 1 capsule (5 mg total) by mouth 2 (two) times a day before meals Breakfast and lunch to stimulate appetite   2/15/22   Cricket Randle MD   irbesartan (AVAPRO) 300 mg tablet Take 150 mg by mouth every morning   10/24/21   Historical Provider, MD   levothyroxine 125 mcg tablet Take 125 mcg by mouth every morning      Historical Provider, MD   lidocaine-prilocaine (EMLA) cream Apply to port site 30 minutes prior to port accessing and cover with a dressing 2/11/22   Balbir MD Alexa   methylphenidate (RITALIN) 5 mg tablet Take 1 tablet (5 mg total) by mouth 2 (two) times a day as needed (low energy) May use after breakfast, after lunch Max Daily Amount: 10 mg 2/25/22   Cricket Randle MD   mirtazapine (REMERON) 30 mg tablet Take 1 5 tablets (45 mg total) by mouth daily at bedtime If no improvement in somnolence at high dose after one week, reduce to 1 tab at bedtime for one week, then stop  2/15/22   Mansi Moore MD   ondansetron (Zofran ODT) 4 mg disintegrating tablet Take 1 tablet (4 mg total) by mouth every 6 (six) hours as needed for nausea or vomiting 2/11/22   Felipe Thrasher MD   oxyCODONE (ROXICODONE) 10 MG TABS Take 1 tablet (10 mg total) by mouth every 4 (four) hours as needed (cancer pain) Max Daily Amount: 60 mg 2/15/22   Mansi Moore MD   pantoprazole (PROTONIX) 40 mg tablet Take 1 tablet (40 mg total) by mouth 2 (two) times a day Before breakfast and before bed to prevent acid accumulation  2/15/22   Mansi Moore MD   predniSONE 5 mg tablet Take 1 tablet (5 mg total) by mouth 2 (two) times a day as needed (low energy) 2/15/22   Mansi Moore MD   tamsulosin [facility]) 0 4 mg Take 0 4 mg by mouth 2 (two) times a day Indications: Enlarged Prostate  Historical Provider, MD         Allergies: No Known Allergies    Social History:     Marital Status: /Civil Union   Occupation:   Patient Pre-hospital Living Situation:   Patient Pre-hospital Level of Mobility:   Patient Pre-hospital Diet Restrictions:   Substance Use History:   Social History     Substance and Sexual Activity   Alcohol Use Yes    Comment: Rarely     Social History     Tobacco Use   Smoking Status Never Smoker   Smokeless Tobacco Never Used     Social History     Substance and Sexual Activity   Drug Use Never       Family History:    History reviewed  No pertinent family history  Physical Exam:     Vitals:   Blood Pressure: 133/85 (02/28/22 1427)  Pulse: 62 (02/28/22 1427)  Temperature: 97 7 °F (36 5 °C) (02/28/22 1203)  Temp Source: Oral (02/28/22 1203)  Respirations: 18 (02/28/22 1427)  SpO2: 93 % (02/28/22 1427)    Physical Exam    Constitutional: Pt appears comfortable  Not in any acute distress  Cardiovascular: Normal rate, regular rhythm, normal heart sounds  No murmur heard  Pulmonary/Chest: Effort normal, air entry b/l equal  No respiratory distress   Pt has no wheezes or crackles  Abdominal: Soft  Non-distended, Non-tender  Bowel sounds are normal    Musculoskeletal: Normal range of motion  Neurological: awake, alert  Moving all extremities spontaneously  Psychiatric: normal mood and affect  Additional Data:     Lab Results: I have personally reviewed pertinent reports  Results from last 7 days   Lab Units 02/28/22  1333   WBC Thousand/uL 6 44   HEMOGLOBIN g/dL 12 5   HEMATOCRIT % 35 5*   PLATELETS Thousands/uL 325   NEUTROS PCT % 73   LYMPHS PCT % 12*   MONOS PCT % 11   EOS PCT % 2     Results from last 7 days   Lab Units 02/28/22  1333   SODIUM mmol/L 137   POTASSIUM mmol/L 3 6   CHLORIDE mmol/L 99*   CO2 mmol/L 25   BUN mg/dL 19   CREATININE mg/dL 1 08   ANION GAP mmol/L 13   CALCIUM mg/dL 9 0   ALBUMIN g/dL 3 3*   TOTAL BILIRUBIN mg/dL 0 90   ALK PHOS U/L 171*   ALT U/L 27   AST U/L 27   GLUCOSE RANDOM mg/dL 114     Results from last 7 days   Lab Units 02/28/22  1333   INR  1 09             Results from last 7 days   Lab Units 02/28/22  1333   LACTIC ACID mmol/L 1 9       Imaging: I have personally reviewed pertinent reports  No orders to display       EKG, Pathology, and Other Studies Reviewed on Admission:   · EKG:     Allscripts / Epic Records Reviewed: Yes     ** Please Note: This note has been constructed using a voice recognition system   **

## 2022-02-28 NOTE — ED PROVIDER NOTES
0000History  Chief Complaint   Patient presents with    Abdominal Pain     Pt reports abdominal pain  Pt has hx of stomach and esphogeal cancer  His prescribes pain medications are no longer working  Pt also reports generalized weakness and fatigue  Pt has not been eating or drinking  Patient presents to the emergency room with complaints of generalized weakness and pain  He was recently diagnosed on February 4, 2022 with a gastric mass with metastasis  You is class stage IV  He is 1st chemo session, 1 week ago  He felt sick for the 3rd day but then now he feels back to normal other than he is constantly tired  His daughter states that he sleeps most of the day  He is only awake for about 4 hours  He states that the pain wakes him up  They did speak to pain management who have them on Percocet  The pain management physician told him to increase the Percocet to 2 every 4 hours as needed  His daughter states that that is not helping him  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 why could have a patch for long-acting pain relief  He is very weak  Has lost 60 lb since the diagnosis  Daughters said that he is losing a lb a day because he is not eating secondary to the fact that he has pain with swallowing  He denies any specific abdominal pain  He denies any fever chills  He has a past history that is positive for gastric cancer, COVID, hypothyroidism, GERD, history of diverticular bleed, hypertension, hyperlipidemia, tinnitus  History provided by:  Patient  Fatigue  Severity:  Moderate  Onset quality:  Gradual  Duration:  2 weeks  Timing:  Constant  Progression:  Worsening  Chronicity:  New  Context: dehydration    Context: not alcohol use, not allergies, not change in medication, not decreased sleep, not drug use, not increased activity, not pinched nerve, not recent infection, not stress and not urinary tract infection    Relieved by:  Nothing  Worsened by:   Activity  Ineffective treatments:  Drinking fluids  Associated symptoms: anorexia, difficulty walking, lethargy and nausea    Associated symptoms: no abdominal pain, no chest pain, no cough, no diarrhea, no dizziness, no drooling, no dysphagia, no dysuria, no numbness in extremities, no falls, no fever, no foul-smelling urine, no frequency, no headaches, no hematochezia, no loss of consciousness, no melena, no myalgias, no near-syncope, no sensory-motor deficit, no shortness of breath, no stroke symptoms, no syncope, no urgency, no vision change and no vomiting    Risk factors: no anemia, no congestive heart failure, no coronary artery disease, no diabetes, no family hx of stroke, no heart disease, no neurologic disease, no new medications and no recent stressors        Prior to Admission Medications   Prescriptions Last Dose Informant Patient Reported? Taking?   bisacodyl (DULCOLAX) 5 mg EC tablet   No No   Sig: Take 1 tablet (5 mg total) by mouth daily as needed for constipation Related to pain medicines   dronabinol (MARINOL) 5 MG capsule   No No   Sig: Take 1 capsule (5 mg total) by mouth 2 (two) times a day before meals Breakfast and lunch to stimulate appetite  irbesartan (AVAPRO) 300 mg tablet   Yes No   Sig: Take 150 mg by mouth every morning     levothyroxine 125 mcg tablet  Self Yes No   Sig: Take 125 mcg by mouth every morning     lidocaine-prilocaine (EMLA) cream   No No   Sig: Apply to port site 30 minutes prior to port accessing and cover with a dressing   methylphenidate (RITALIN) 5 mg tablet   No No   Sig: Take 1 tablet (5 mg total) by mouth 2 (two) times a day as needed (low energy) May use after breakfast, after lunch Max Daily Amount: 10 mg   mirtazapine (REMERON) 30 mg tablet   No No   Sig: Take 1 5 tablets (45 mg total) by mouth daily at bedtime If no improvement in somnolence at high dose after one week, reduce to 1 tab at bedtime for one week, then stop     ondansetron (Zofran ODT) 4 mg disintegrating tablet   No No   Sig: Take 1 tablet (4 mg total) by mouth every 6 (six) hours as needed for nausea or vomiting   oxyCODONE (ROXICODONE) 10 MG TABS   No No   Sig: Take 1 tablet (10 mg total) by mouth every 4 (four) hours as needed (cancer pain) Max Daily Amount: 60 mg   pantoprazole (PROTONIX) 40 mg tablet   No No   Sig: Take 1 tablet (40 mg total) by mouth 2 (two) times a day Before breakfast and before bed to prevent acid accumulation  predniSONE 5 mg tablet   No No   Sig: Take 1 tablet (5 mg total) by mouth 2 (two) times a day as needed (low energy)   tamsulosin (FLOMAX) 0 4 mg  Self Yes No   Sig: Take 0 4 mg by mouth 2 (two) times a day Indications: Enlarged Prostate  Facility-Administered Medications: None       Past Medical History:   Diagnosis Date    Cancer [facility])     gastric mass    COVID 01/2021    Disease of thyroid gland     GERD (gastroesophageal reflux disease)     History of GI diverticular bleed     Hyperlipidemia     Hypertension     Tinnitus        Past Surgical History:   Procedure Laterality Date    ADENOIDECTOMY      CHOLECYSTECTOMY LAPAROSCOPIC N/A 1/24/2022    Procedure: CHOLECYSTECTOMY LAPAROSCOPIC W/ INTRAOP CHOLANGIOGRAM;  Surgeon: Rebecca Martini DO;  [facility];  Service: General    COLONOSCOPY N/A 7/16/2016    Procedure: COLONOSCOPY;  Surgeon: Annabella Owens MD;  [facility];  Service:     FL CHOLANGIOGRAM TRANSHEPATIC  1/24/2022    FL GUIDED CENTRAL VENOUS ACCESS DEVICE INSERTION  2/11/2022    HERNIA REPAIR      PROSTATE BIOPSY      TONSILLECTOMY      TUNNELED VENOUS PORT PLACEMENT N/A 2/11/2022    Procedure: INSERTION VENOUS PORT (PORT-A-CATH); Surgeon: Yanique Erwin MD;  [facility];  Service: Surgical Oncology    UVULECTOMY         History reviewed  No pertinent family history  I have reviewed and agree with the history as documented      E-Cigarette/Vaping    E-Cigarette Use Never User      E-Cigarette/Vaping Substances     Social History Tobacco Use    Smoking status: Never Smoker    Smokeless tobacco: Never Used   Vaping Use    Vaping Use: Never used   Substance Use Topics    Alcohol use: Yes     Comment: Rarely    Drug use: Never       Review of Systems   Constitutional: Positive for activity change, appetite change, fatigue and unexpected weight change  Negative for chills and fever  HENT: Negative for congestion, drooling, mouth sores, postnasal drip, rhinorrhea and sore throat  Eyes: Negative for pain, discharge, redness and itching  Respiratory: Negative for cough, chest tightness and shortness of breath  Cardiovascular: Negative for chest pain, syncope and near-syncope  Gastrointestinal: Positive for anorexia and nausea  Negative for abdominal pain, diarrhea, dysphagia, hematochezia, melena and vomiting  Genitourinary: Negative for dysuria, frequency, hematuria and urgency  Musculoskeletal: Negative for falls and myalgias  Skin: Negative for color change, pallor and rash  Neurological: Negative for dizziness, loss of consciousness and headaches  Psychiatric/Behavioral: Negative for confusion  All other systems reviewed and are negative  Physical Exam  Physical Exam  Vitals and nursing note reviewed  Constitutional:       General: He is not in acute distress  Appearance: He is well-developed and normal weight  He is not ill-appearing, toxic-appearing or diaphoretic  HENT:      Head: Normocephalic and atraumatic  Cardiovascular:      Rate and Rhythm: Regular rhythm  Tachycardia present  Heart sounds: No murmur heard  No friction rub  No gallop  Pulmonary:      Effort: Pulmonary effort is normal       Breath sounds: Normal breath sounds  Abdominal:      General: Abdomen is flat  There is no distension  Palpations: Abdomen is soft  Tenderness: There is no guarding or rebound  Skin:     General: Skin is warm  Capillary Refill: Capillary refill takes less than 2 seconds  Neurological:      Mental Status: He is alert and oriented to person, place, and time     Psychiatric:         Mood and Affect: Mood normal          Behavior: Behavior normal          Vital Signs  ED Triage Vitals [02/28/22 1203]   Temperature Pulse Respirations Blood Pressure SpO2   97 7 °F (36 5 °C) 89 22 134/82 97 %      Temp Source Heart Rate Source Patient Position - Orthostatic VS BP Location FiO2 (%)   Oral Monitor Sitting Left arm --      Pain Score       8           Vitals:    02/28/22 1203 02/28/22 1427 02/28/22 1715   BP: 134/82 133/85 146/67   Pulse: 89 62 64   Patient Position - Orthostatic VS: Sitting Lying          Visual Acuity      ED Medications  Medications   sodium chloride 0 9 % infusion (125 mL/hr Intravenous Not Given 2/28/22 1654)   multi-electrolyte (PLASMALYTE-A/ISOLYTE-S PH 7 4) IV solution (100 mL/hr Intravenous New Bag 2/28/22 1916)   oxyCODONE (ROXICODONE) IR tablet 5 mg (has no administration in time range)   oxyCODONE (ROXICODONE) immediate release tablet 10 mg (10 mg Oral Given 2/28/22 1651)   HYDROmorphone (DILAUDID) injection 0 5 mg (has no administration in time range)   polyethylene glycol (MIRALAX) packet 17 g (has no administration in time range)   docusate sodium (COLACE) capsule 100 mg ( Oral Canceled Entry 2/28/22 1800)   ondansetron (ZOFRAN) injection 4 mg (4 mg Intravenous Given 2/28/22 1842)   oxyCODONE (OxyCONTIN) 12 hr tablet 10 mg (10 mg Oral Given 2/28/22 1842)   sodium chloride 0 9 % bolus 1,000 mL (0 mL Intravenous Stopped 2/28/22 1715)   morphine (PF) 10 mg/mL injection 6 mg (6 mg Intravenous Given 2/28/22 1337)   ondansetron (ZOFRAN) injection 4 mg (4 mg Intravenous Given 2/28/22 1335)       Diagnostic Studies  Results Reviewed     Procedure Component Value Units Date/Time    TSH [[id number]]  (Abnormal) Collected: 02/28/22 1333    Lab Status: Final result Specimen: Blood Updated: 02/28/22 1446     TSH 3RD GENERATON 7 497 uIU/mL     Narrative:      Patients undergoing fluorescein dye angiography may retain small amounts of fluorescein in the body for 48-72 hours post procedure  Samples containing fluorescein can produce falsely depressed TSH values  If the patient had this procedure,a specimen should be resubmitted post fluorescein clearance  Lactic acid [[id number]]  (Normal) Collected: 02/28/22 1333    Lab Status: Final result Specimen: Blood Updated: 02/28/22 1401     LACTIC ACID 1 9 mmol/L     Narrative:      Result may be elevated if tourniquet was used during collection      Comprehensive metabolic panel [[id number]]  (Abnormal) Collected: 02/28/22 1333    Lab Status: Final result Specimen: Blood Updated: 02/28/22 1359     Sodium 137 mmol/L      Potassium 3 6 mmol/L      Chloride 99 mmol/L      CO2 25 mmol/L      ANION GAP 13 mmol/L      BUN 19 mg/dL      Creatinine 1 08 mg/dL      Glucose 114 mg/dL      Calcium 9 0 mg/dL      Corrected Calcium 9 6 mg/dL      AST 27 U/L      ALT 27 U/L      Alkaline Phosphatase 171 U/L      Total Protein 7 1 g/dL      Albumin 3 3 g/dL      Total Bilirubin 0 90 mg/dL      eGFR 68 ml/min/1 73sq m     Narrative:      [place] guidelines for Chronic Kidney Disease (CKD):     Stage 1 with normal or high GFR (GFR > 90 mL/min/1 73 square meters)    Stage 2 Mild CKD (GFR = 60-89 mL/min/1 73 square meters)    Stage 3A Moderate CKD (GFR = 45-59 mL/min/1 73 square meters)    Stage 3B Moderate CKD (GFR = 30-44 mL/min/1 73 square meters)    Stage 4 Severe CKD (GFR = 15-29 mL/min/1 73 square meters)    Stage 5 End Stage CKD (GFR <15 mL/min/1 73 square meters)  Note: GFR calculation is accurate only with a steady state creatinine    Lipase [[id number]]  (Normal) Collected: 02/28/22 1333    Lab Status: Final result Specimen: Blood Updated: 02/28/22 1359     Lipase 74 u/L     Protime-INR [[id number]]  (Normal) Collected: 02/28/22 1333    Lab Status: Final result Specimen: Blood Updated: 02/28/22 1357     Protime 14 1 seconds      INR 1 09    APTT [[id number]]  (Normal) Collected: 02/28/22 1333    Lab Status: Final result Specimen: Blood Updated: 02/28/22 1357     PTT 28 seconds     CBC and differential [[id number]]  (Abnormal) Collected: 02/28/22 1333    Lab Status: Final result Specimen: Blood Updated: 02/28/22 1336     WBC 6 44 Thousand/uL      RBC 4 05 Million/uL      Hemoglobin 12 5 g/dL      Hematocrit 35 5 %      MCV 88 fL      MCH 30 9 pg      MCHC 35 2 g/dL      RDW 14 5 %      MPV 9 3 fL      Platelets 027 Thousands/uL      nRBC 0 /100 WBCs      Neutrophils Relative 73 %      Immat GRANS % 1 %      Lymphocytes Relative 12 %      Monocytes Relative 11 %      Eosinophils Relative 2 %      Basophils Relative 1 %      Neutrophils Absolute 4 73 Thousands/µL      Immature Grans Absolute 0 06 Thousand/uL      Lymphocytes Absolute 0 79 Thousands/µL      Monocytes Absolute 0 69 Thousand/µL      Eosinophils Absolute 0 13 Thousand/µL      Basophils Absolute 0 04 Thousands/µL                  No orders to display              Procedures  ECG 12 Lead Documentation Only    Date/Time: 2/28/2022 1:41 PM  Performed by: Elliott Franz PA-C  Authorized by: Elliott Franz PA-C     Indications / Diagnosis:  69  ECG reviewed by me, the ED Provider: yes    Patient location:  ED  Previous ECG:     Previous ECG:  Compared to current    Comparison ECG info:  2/8/22    Similarity:  No change    Comparison to cardiac monitor: Yes    Interpretation:     Interpretation: non-specific    Rate:     ECG rate:  69    ECG rate assessment: normal    Rhythm:     Rhythm: sinus rhythm    Ectopy:     Ectopy: none    QRS:     QRS axis:  Normal    QRS intervals:  Normal  Conduction:     Conduction: normal    ST segments:     ST segments:  Normal  T waves:     T waves: normal    Comments:      Low-voltage QRS complexes, flattened T-waves    No signs of acute ischemia    Independently interpreted by me             ED Course MDM  Number of Diagnoses or Management Options  Dehydration: new and requires workup  Intractable pain: new and requires workup  Stomach cancer [facility]): new and requires workup  Diagnosis management comments: Medical decision making/    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 recent diagnosis of stomach and esophageal cancer  He recently underwent his 1st dose of chemotherapy approximately a week ago  Complains of generalized weakness and decreased appetite  He is not eating anything at home he  His family states that he is having difficulty ambulating today  He is able to tolerate some liquids but is not drinking often  Complains of pain with swallowing  He does see pain management and has reached out to pain management to see if there are any other options other than Percocet every 4 hours as needed for pain  He was instructed to increase his Percocet 2 tablets every 4 hours  He states that this is not helping him  He was seen and evaluated  His mucous membranes were dry  His lungs were clear to auscultation  His heart was a tachycardic rate  His abdominal exam was soft nondistended nontender without guarding or rebound  Laboratory studies were taken and were reviewed  Hospital course included intravenous hydration  He was medicated for pain and nausea  He has symptoms improved with the IV hydration and pain medication  His differential diagnosis included but was not limited to dehydration, acute kidney injury, worsening of his tumor causing an obstruction, thrush secondary to immunocompromised    Impression-dysphagia//stomach cancer/esophageal cancer dehydration/intractable pain    Plan the patient was admitted to the [facility] service for further evaluation and management  He may require some type of tPA and verses a feeding tube  He will see pallor take care while he is here to help with pain management         Amount and/or Complexity of Data Reviewed  Clinical lab tests: ordered and reviewed  Tests in the radiology section of CPT®: ordered and reviewed  Tests in the medicine section of CPT®: ordered and reviewed    Risk of Complications, Morbidity, and/or Mortality  Presenting problems: high  Diagnostic procedures: high  Management options: high    Patient Progress  Patient progress: stable      Disposition  Final diagnoses:   Stomach cancer ([facility] )   Dehydration   Intractable pain     Time reflects when diagnosis was documented in both MDM as applicable and the Disposition within this note     Time User Action Codes Description Comment    2/28/2022  3:22 PM Dong Amass Add [C16 9] Stomach cancer ([facility] )     2/28/2022  3:22 PM Kimberley Victor [E86 0] Dehydration     2/28/2022  3:22 PM Dong Amass Add [R52] Intractable pain     2/28/2022  4:25 PM Willeen Lennert Add [G89 3] Cancer related pain     2/28/2022  4:25 PM Willeen Lennert Add [R63 8] Decreased oral intake       ED Disposition     ED Disposition Condition Date/Time Comment    Admit Stable Mon Feb 28, 2022  3:22 PM Case was discussed with Dr Emily Harper and the patient's admission status was agreed to be Admission Status: inpatient status to the service of Dr Emily Harper   Follow-up Information    None         Current Discharge Medication List      CONTINUE these medications which have NOT CHANGED    Details   bisacodyl (DULCOLAX) 5 mg EC tablet Take 1 tablet (5 mg total) by mouth daily as needed for constipation Related to pain medicines  Qty: 30 tablet, Refills: 0    Associated Diagnoses: Gastric adenocarcinoma ([facility] ); Cancer-related pain; Palliative care patient      dronabinol (MARINOL) 5 MG capsule Take 1 capsule (5 mg total) by mouth 2 (two) times a day before meals Breakfast and lunch to stimulate appetite  Qty: 60 capsule, Refills: 0    Associated Diagnoses: Gastric adenocarcinoma ([facility] );  Cancer-related pain; Palliative care patient      irbesartan (AVAPRO) 300 mg tablet Take 150 mg by mouth every morning        levothyroxine 125 mcg tablet Take 125 mcg by mouth every morning        lidocaine-prilocaine (EMLA) cream Apply to port site 30 minutes prior to port accessing and cover with a dressing  Qty: 30 g, Refills: 1    Associated Diagnoses: Gastric [facility] ([facility] ); Port-A-Cath in place      methylphenidate (RITALIN) 5 mg tablet Take 1 tablet (5 mg total) by mouth 2 (two) times a day as needed (low energy) May use after breakfast, after lunch Max Daily Amount: 10 mg  Qty: 30 tablet, Refills: 0    Comments: For cancer cachexia and adult attention deficit symptoms  Associated Diagnoses: Attention deficit disorder (ADD) without hyperactivity; Malignant cachexia (HCC)      mirtazapine (REMERON) 30 mg tablet Take 1 5 tablets (45 mg total) by mouth daily at bedtime If no improvement in somnolence at high dose after one week, reduce to 1 tab at bedtime for one week, then stop  Qty: 90 tablet, Refills: 0    Associated Diagnoses: Gastric adenocarcinoma (HCC)      ondansetron (Zofran ODT) 4 mg disintegrating tablet Take 1 tablet (4 mg total) by mouth every 6 (six) hours as needed for nausea or vomiting  Qty: 20 tablet, Refills: 0    Associated Diagnoses: 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[facility] ); Gastric adenocarcinoma (HCC)      oxyCODONE (ROXICODONE) 10 MG TABS Take 1 tablet (10 mg total) by mouth every 4 (four) hours as needed (cancer pain) Max Daily Amount: 60 mg  Qty: 180 tablet, Refills: 0    Comments: Cancer pain  Associated Diagnoses: Nephrolithiasis      pantoprazole (PROTONIX) 40 mg tablet Take 1 tablet (40 mg total) by mouth 2 (two) times a day Before breakfast and before bed to prevent acid accumulation    Qty: 60 tablet, Refills: 0    Associated Diagnoses: Epigastric pain      predniSONE 5 mg tablet Take 1 tablet (5 mg total) by mouth 2 (two) times a day as needed (low energy)  Qty: 30 tablet, Refills: 0    Associated Diagnoses: Gastric adenocarcinoma ([facility] ); Cancer-related pain; Palliative care patient      tamsulosin (FLOMAX) 0 4 mg Take 0 4 mg by mouth 2 (two) times a day Indications: Enlarged Prostate  No discharge procedures on file      PDMP Review       Value Time User    PDMP Reviewed  Yes 2/25/2022  1:03 PM Mansi Moore MD          ED Provider  Electronically Signed by           Valerie Rosas PA-C  02/28/22 2018

## 2022-02-28 NOTE — ASSESSMENT & PLAN NOTE
Significantly decreased oral intake, poor appetite and unintentional 50 lb Weight loss in last 1 month in the setting of recently diagnosed stomach cancer  Discussed with alternative means of feeding with the patient  He is agreeable at peg tube if possible  Will consult GI for evaluation for PEG tube placement  For now will continue IV hydration, full liquid diet  Can advance as tolerated

## 2022-02-28 NOTE — ASSESSMENT & PLAN NOTE
Patient complaining of pain in his chest and upper abdomen  Constant  Not relieved with Percocet that he has been prescribed by palliative care  Was asked to increase the dose of Percocet but patient has been sleeping most of the day so could not take extra Percocet  Patient awake alert on my evaluation  Asking for long-acting OxyContin  So for now will start the patient on OxyContin 10 mg b i d   In addition can get oxycodone p r n  Every 4 hours for pain relief  Will consult palliative care for input since patient known to this service

## 2022-02-28 NOTE — TELEPHONE ENCOUNTER
Johnson Marino to complete the COVID-19 screening for tomorrows appointment  No answer  Left voice message requesting a call back to complete screening and confirm appointment

## 2022-03-01 ENCOUNTER — TELEPHONE (OUTPATIENT)
Dept: NUTRITION | Facility: CLINIC | Age: 72
End: 2022-03-01

## 2022-03-01 ENCOUNTER — TELEPHONE (OUTPATIENT)
Dept: OTHER | Facility: OTHER | Age: 72
End: 2022-03-01

## 2022-03-01 PROBLEM — E44.0 MODERATE PROTEIN-CALORIE MALNUTRITION (HCC): Status: ACTIVE | Noted: 2022-03-01

## 2022-03-01 LAB — T4 FREE SERPL-MCNC: 0.99 NG/DL (ref 0.76–1.46)

## 2022-03-01 PROCEDURE — 99222 1ST HOSP IP/OBS MODERATE 55: CPT | Performed by: NURSE PRACTITIONER

## 2022-03-01 PROCEDURE — 99222 1ST HOSP IP/OBS MODERATE 55: CPT | Performed by: INTERNAL MEDICINE

## 2022-03-01 PROCEDURE — 99232 SBSQ HOSP IP/OBS MODERATE 35: CPT | Performed by: INTERNAL MEDICINE

## 2022-03-01 PROCEDURE — C9113 INJ PANTOPRAZOLE SODIUM, VIA: HCPCS | Performed by: NURSE PRACTITIONER

## 2022-03-01 RX ORDER — LEVOTHYROXINE SODIUM 0.12 MG/1
125 TABLET ORAL
Status: DISCONTINUED | OUTPATIENT
Start: 2022-03-01 | End: 2022-03-01

## 2022-03-01 RX ORDER — TAMSULOSIN HYDROCHLORIDE 0.4 MG/1
0.4 CAPSULE ORAL 2 TIMES DAILY
Status: DISCONTINUED | OUTPATIENT
Start: 2022-03-01 | End: 2022-03-06 | Stop reason: HOSPADM

## 2022-03-01 RX ORDER — METHYLPHENIDATE HYDROCHLORIDE 10 MG/1
5 TABLET ORAL 2 TIMES DAILY PRN
Status: DISCONTINUED | OUTPATIENT
Start: 2022-03-01 | End: 2022-03-06 | Stop reason: HOSPADM

## 2022-03-01 RX ORDER — OXYCODONE HCL 5 MG/5 ML
20 SOLUTION, ORAL ORAL EVERY 4 HOURS PRN
Status: DISCONTINUED | OUTPATIENT
Start: 2022-03-01 | End: 2022-03-06 | Stop reason: HOSPADM

## 2022-03-01 RX ORDER — OXYCODONE HCL 5 MG/5 ML
10 SOLUTION, ORAL ORAL EVERY 4 HOURS PRN
Status: DISCONTINUED | OUTPATIENT
Start: 2022-03-01 | End: 2022-03-06 | Stop reason: HOSPADM

## 2022-03-01 RX ORDER — OXYCODONE HCL 10 MG/1
10 TABLET, FILM COATED, EXTENDED RELEASE ORAL EVERY 8 HOURS SCHEDULED
Status: DISCONTINUED | OUTPATIENT
Start: 2022-03-01 | End: 2022-03-06 | Stop reason: HOSPADM

## 2022-03-01 RX ORDER — LOSARTAN POTASSIUM 50 MG/1
50 TABLET ORAL DAILY
Status: DISCONTINUED | OUTPATIENT
Start: 2022-03-02 | End: 2022-03-04

## 2022-03-01 RX ORDER — HYDROMORPHONE HCL/PF 1 MG/ML
0.5 SYRINGE (ML) INJECTION
Status: DISCONTINUED | OUTPATIENT
Start: 2022-03-01 | End: 2022-03-06 | Stop reason: HOSPADM

## 2022-03-01 RX ORDER — LEVOTHYROXINE SODIUM 0.12 MG/1
125 TABLET ORAL
Status: DISCONTINUED | OUTPATIENT
Start: 2022-03-01 | End: 2022-03-06 | Stop reason: HOSPADM

## 2022-03-01 RX ORDER — PANTOPRAZOLE SODIUM 40 MG/1
40 INJECTION, POWDER, FOR SOLUTION INTRAVENOUS EVERY 12 HOURS SCHEDULED
Status: DISCONTINUED | OUTPATIENT
Start: 2022-03-01 | End: 2022-03-06 | Stop reason: HOSPADM

## 2022-03-01 RX ADMIN — PANTOPRAZOLE SODIUM 40 MG: 40 INJECTION, POWDER, FOR SOLUTION INTRAVENOUS at 21:00

## 2022-03-01 RX ADMIN — OXYCODONE HYDROCHLORIDE 10 MG: 10 TABLET ORAL at 02:41

## 2022-03-01 RX ADMIN — HYDROMORPHONE HYDROCHLORIDE 0.5 MG: 1 INJECTION, SOLUTION INTRAMUSCULAR; INTRAVENOUS; SUBCUTANEOUS at 15:34

## 2022-03-01 RX ADMIN — TAMSULOSIN HYDROCHLORIDE 0.4 MG: 0.4 CAPSULE ORAL at 10:37

## 2022-03-01 RX ADMIN — LEVOTHYROXINE SODIUM 125 MCG: 125 TABLET ORAL at 12:10

## 2022-03-01 RX ADMIN — OXYCODONE HYDROCHLORIDE 10 MG: 10 TABLET, FILM COATED, EXTENDED RELEASE ORAL at 16:45

## 2022-03-01 RX ADMIN — ONDANSETRON 4 MG: 2 INJECTION INTRAMUSCULAR; INTRAVENOUS at 10:37

## 2022-03-01 RX ADMIN — DOCUSATE SODIUM 100 MG: 100 CAPSULE ORAL at 16:45

## 2022-03-01 RX ADMIN — OXYCODONE HYDROCHLORIDE 10 MG: 10 TABLET, FILM COATED, EXTENDED RELEASE ORAL at 08:44

## 2022-03-01 RX ADMIN — HYDROMORPHONE HYDROCHLORIDE 0.5 MG: 1 INJECTION, SOLUTION INTRAMUSCULAR; INTRAVENOUS; SUBCUTANEOUS at 04:25

## 2022-03-01 RX ADMIN — PANTOPRAZOLE SODIUM 40 MG: 40 INJECTION, POWDER, FOR SOLUTION INTRAVENOUS at 10:38

## 2022-03-01 RX ADMIN — ONDANSETRON 4 MG: 2 INJECTION INTRAMUSCULAR; INTRAVENOUS at 16:45

## 2022-03-01 RX ADMIN — OXYCODONE HYDROCHLORIDE 10 MG: 5 SOLUTION ORAL at 12:11

## 2022-03-01 RX ADMIN — ONDANSETRON 4 MG: 2 INJECTION INTRAMUSCULAR; INTRAVENOUS at 04:41

## 2022-03-01 RX ADMIN — HYDROMORPHONE HYDROCHLORIDE 0.5 MG: 1 INJECTION, SOLUTION INTRAMUSCULAR; INTRAVENOUS; SUBCUTANEOUS at 09:53

## 2022-03-01 RX ADMIN — OXYCODONE HYDROCHLORIDE 10 MG: 10 TABLET, FILM COATED, EXTENDED RELEASE ORAL at 21:00

## 2022-03-01 RX ADMIN — TAMSULOSIN HYDROCHLORIDE 0.4 MG: 0.4 CAPSULE ORAL at 21:00

## 2022-03-01 RX ADMIN — DOCUSATE SODIUM 100 MG: 100 CAPSULE ORAL at 08:44

## 2022-03-01 NOTE — MALNUTRITION/BMI
This medical record reflects one or more clinical indicators suggestive of malnutrition and/or morbid obesity  Malnutrition Findings:   Adult Malnutrition type: Acute illness (related to catabolic illness, inadequate oral intake as evidenced by  weight decrease of 4 7 % x 1 month, 11 9% weight decrease x 1 5 months, < 75% of estimated nutritional needs for > 7 days  Currently treated with nutrition education, supplementation )  Adult Degree of Malnutrition: Malnutrition of moderate degree  Malnutrition Characteristics: Weight loss,Inadequate energy    BMI Findings: Body mass index is 25 41 kg/m²  See Nutrition note dated 3/1/22 for additional details  Completed nutrition assessment is viewable in the nutrition documentation

## 2022-03-01 NOTE — TELEPHONE ENCOUNTER
PT's daughter Harjit Victor called in requesting a call back from [facility]

## 2022-03-01 NOTE — ASSESSMENT & PLAN NOTE
Malnutrition Findings:   Adult Malnutrition type: Acute illness (related to catabolic illness, inadequate oral intake as evidenced by  weight decrease of 4 7 % x 1 month, 11 9% weight decrease x 1 5 months, < 75% of estimated nutritional needs for > 7 days  Currently treated with nutrition education, supplementation )  Adult Degree of Malnutrition: Malnutrition of moderate degree    BMI Findings: Body mass index is 25 41 kg/m²

## 2022-03-01 NOTE — CONSULTS
Consultation - Palliative and Supportive Care   Servando Page 70 y o  male [phone]    Patient Active Problem List   Diagnosis    Hypertension    Hypothyroid    Depression    BPH (benign prostatic hyperplasia)    COVID-19    Inflammatory arthritis    Rectus diastasis    Acute calculous cholecystitis    Gastric mass    Gastric adenocarcinoma ([facility] [address] )    Encounter for central line care    FTT (failure to thrive) in adult    Stomach cancer ([facility] [address] )    Cancer related pain     Active issues specifically addressed today include:   Gastric adenocarcinoma  Cancer related pain  Palliative care patient  Failure to thrive      Plan:  1  Symptom management   Cancer related pain   · Increase Oxycontin 10 mg Q8H scheduled  · Continue Oxycodone 10mg PO Q4H PRN for moderate pain (home regimen)  · Continue Oxycodone 20mg PO Q4H PRN for severe pain (home regimen)  · Continue Hydromorphone 0 5 mg Q 4 hours prn breakthrough pain       2  Goals   · Disease focused care  · Patient wishes to have a PEG placed and proceed with all cancer directed care  · Encouraged follow up with Palliative Medicine on an outpatient basis after discharge for continued symptom management  Our office will contact patient to schedule a hospital follow up  3   Support system  The patient is  to his wife, Sheridan Michel  His daughter [address] is very involved in his care  Code Status: full code - Level 1   Decisional apparatus:  Patient is competent on my exam today  If competence is lost, patient's substitute decision maker would default to spouse Linnea  by [place] 169  Advance Directive / Living Will / POLST:  none     I have reviewed the patient's controlled substance dispensing history in the Prescription Drug Monitoring Program in compliance with the [facility] regulations before prescribing any controlled substances  We appreciate the invitation to be involved in this patient's care    Please do not hesitate to reach our on call provider through our clinic answering service at  should you have acute symptom control concerns  IDENTIFICATION:  Inpatient consult to Palliative Care  Consult performed by: JADEN Casiano  Consult ordered by: Marietta Mcclelland MD        Physician Requesting Consult: Boris John MD  Reason for Consult / Principal Problem: symptom management   Hx and PE limited by: N/A    HISTORY OF PRESENT ILLNESS:       Starla Galeano is a 70 y o  male with a past medical history of GERD, htn, and recent diagnosis of gastric cancer who presents with generalized weakness and pain  As the end of January, the patient presented to [facility] with complaints of right upper quadrant pain and a 30 pound weight loss over the previous 2 months  On February 4,  the patient had EGD with biopsy of distal esophageal mass which revealed adenocarcinoma  A PET scan was completed on February 9, which revealed extensive metastatic disease in the abdomen, right upper quadrant omentum, liver and peritoneum  He established medical oncology care with Dr Rg Dukes and sought clinical trials and second opinion [facility]  The patient was initiated on Folfox + Nivolumab on February 21  He has been experiencing significant symptoms of pain, fatigue, poor appetite  He has lost approximately 60 pounds over the past 4-5 months  He sleeps approximately 20 hours/day  Patient is seen with daughter Catracho Yates present  Their biggest concern today is pain management  They report he came in in significant pain and are struggling to understand why he can not only use only IV Dilaudid  Reviewed patient's home regimen which includes OxyContin b i d  and oxycodone 10-20 mg q 4  Reviewed this regimen with patient and his daughter and both are agreeable to continuing with this with the addition of an afternoon dose of OxyContin in hopes of minimizing PRN doses        Appetite is poor and he has lost significant weight  They have agreed to a GI consult to consider artifical nutrition  Have stopped alanis and mirtazapine  Review of Systems   All other systems reviewed and are negative  Past Medical History:   Diagnosis Date    Cancer [facility])     gastric mass    COVID 01/2021    Disease of thyroid gland     GERD (gastroesophageal reflux disease)     History of GI diverticular bleed     Hyperlipidemia     Hypertension     Tinnitus      Past Surgical History:   Procedure Laterality Date    ADENOIDECTOMY      CHOLECYSTECTOMY LAPAROSCOPIC N/A 1/24/2022    Procedure: CHOLECYSTECTOMY LAPAROSCOPIC W/ INTRAOP CHOLANGIOGRAM;  Surgeon: Patrick Dial DO;  [facility];  Service: General    COLONOSCOPY N/A 7/16/2016    Procedure: COLONOSCOPY;  Surgeon: Jason Meek MD;  [facility];  Service:     FL CHOLANGIOGRAM TRANSHEPATIC  1/24/2022    FL GUIDED CENTRAL VENOUS ACCESS DEVICE INSERTION  2/11/2022    HERNIA REPAIR      PROSTATE BIOPSY      TONSILLECTOMY      TUNNELED VENOUS PORT PLACEMENT N/A 2/11/2022    Procedure: INSERTION VENOUS PORT (PORT-A-CATH);   Surgeon: Jayson Cronin MD;  [facility];  Service: Surgical Oncology    UVULECTOMY       Social History     Socioeconomic History    Marital status: /Civil Union     Spouse name: Not on file    Number of children: Not on file    Years of education: Not on file    Highest education level: Not on file   Occupational History    Not on file   Tobacco Use    Smoking status: Never Smoker    Smokeless tobacco: Never Used   Vaping Use    Vaping Use: Never used   Substance and Sexual Activity    Alcohol use: Yes     Comment: Rarely    Drug use: Never    Sexual activity: Not on file   Other Topics Concern    Not on file   Social History Narrative    Not on file     Social Determinants of Health     Financial Resource Strain: Not on file   Food Insecurity: Not on file   Transportation Needs: Not on file   Physical Activity: Not on file   Stress: Not on file   Social Connections: Not on file   Intimate Partner Violence: Not on file   Housing Stability: Not on file     History reviewed  No pertinent family history  MEDICATIONS / ALLERGIES:    current meds:   Current Facility-Administered Medications   Medication Dose Route Frequency    docusate sodium (COLACE) capsule 100 mg  100 mg Oral BID    HYDROmorphone (DILAUDID) injection 0 5 mg  0 5 mg Intravenous Q3H PRN    ondansetron (ZOFRAN) injection 4 mg  4 mg Intravenous Q6H PRN    oxyCODONE (OxyCONTIN) 12 hr tablet 10 mg  10 mg Oral Q8H YOHANA    oxyCODONE (ROXICODONE) oral solution 10 mg  10 mg Oral Q4H PRN    oxyCODONE (ROXICODONE) oral solution 20 mg  20 mg Oral Q4H PRN    pantoprazole (PROTONIX) injection 40 mg  40 mg Intravenous Q12H [address]    polyethylene glycol (MIRALAX) packet 17 g  17 g Oral Daily    tamsulosin (FLOMAX) capsule 0 4 mg  0 4 mg Oral BID       No Known Allergies    OBJECTIVE:  /74 (BP Location: Left arm)   Pulse 79   Temp 97 7 °F (36 5 °C) (Oral)   Resp 18   Wt 74 3 kg (163 lb 12 8 oz)   SpO2 97%   BMI 25 41 kg/m²   Physical Exam  Physical Exam  Vitals and nursing note reviewed  Constitutional:       Appearance: He is ill-appearing  HENT:      Head: Normocephalic and atraumatic  Nose: Nose normal       Mouth/Throat:      Mouth: Mucous membranes are moist       Pharynx: Oropharynx is clear  Eyes:      Extraocular Movements: Extraocular movements intact  Conjunctiva/sclera: Conjunctivae normal       Pupils: Pupils are equal, round, and reactive to light  Cardiovascular:      Rate and Rhythm: Normal rate and regular rhythm  Pulses: Normal pulses  Pulmonary:      Effort: Pulmonary effort is normal    Abdominal:      General: Abdomen is flat  Palpations: Abdomen is soft  Musculoskeletal:         General: Normal range of motion  Skin:     General: Skin is warm and dry     Neurological:      Mental Status: He is alert and oriented to person, place, and time  Psychiatric:         Mood and Affect: Mood normal          Behavior: Behavior normal          Thought Content: Thought content normal          Judgment: Judgment normal          Lab Results:   CBC:   Lab Results   Component Value Date    WBC 6 44 02/28/2022    HGB 12 5 02/28/2022    HCT 35 5 (L) 02/28/2022    MCV 88 02/28/2022     02/28/2022    MCH 30 9 02/28/2022    MCHC 35 2 02/28/2022    RDW 14 5 02/28/2022    MPV 9 3 02/28/2022    NRBC 0 02/28/2022   , CMP:   Lab Results   Component Value Date    SODIUM 137 02/28/2022    K 3 6 02/28/2022    CL 99 (L) 02/28/2022    CO2 25 02/28/2022    BUN 19 02/28/2022    CREATININE 1 08 02/28/2022    CALCIUM 9 0 02/28/2022    AST 27 02/28/2022    ALT 27 02/28/2022    ALKPHOS 171 (H) 02/28/2022    EGFR 68 02/28/2022   , PT/PTT:  Lab Results   Component Value Date    PTT 28 02/28/2022   Imaging Studies: Reviewed   EKG, Pathology, and Other Studies: Reviewed    Counseling / Coordination of Care    Total floor / unit time spent today 70 minutes  Greater than 50% of total time was spent with the patient and / or family counseling and / or coordination of care  A description of the counseling / coordination of care: Introduced role of Palliative Medicine  Reviewed expected disease trajectory, prognosis,and symptoms  Spent time supportive listening regarding frustrations of diagnosis and treatment options available at this time

## 2022-03-01 NOTE — NUTRITION
03/01/22 1317   Assessment   Timepoint Initial  (weight loss, decreased PO intake per RN screening)   Labs & Meds   Labs/Meds Review Lab values reviewed; Meds reviewed  (levothyroxine, miralax, flomax ; 2/28 alk phos 171)   Feeding Route   PO Independent   Adequacy of Intake   Nutrition Modality PO  (regular)   Estimated Fluid Intake   (IVF, now d/c)   Estimated calorie intake compared to estimated need No PO documentation, predict inadequate PO intake   Nutrition Prognosis   Nutrition Concerns RN skin assessment reviewed; Lack of appetite   Comorbid Concerns   (FTT, stomach cancer, cancer related pain)   Nutrition Precautions & Barriers to Adequate Nutrition Other (Comment)  (catabolic illness)   Nutrition Considerations Other (Comment)  (provided education to family regarding types of tubes feeds if appropriate )   PES Statement   Problem Clinical   Weight (3) Unintended weight loss NC-3 2   Related to Catabolic illness; Inadequate diet   As evidenced by: Per patient/family interview; Intake < estimated needs   Patient Nutrition Goals   Goal Nutrition via appropriate route   Goal Status initiated   Timeframe to complete goal by next f/u   Recommendations/Interventions   Summary Patient s/p 1 session chemo for stomach cancer  Per H&P documentation, patient reports 45-50# weight decrease x 1 month  Per  Review of EMR documentation;  compared to current standing weight, patient has lost 8 pounds since EGD 2/4/22  Total weight loss x 1 5 months is  22 pounds  ( 11 9%)   Weight decrease x ~12 months is 21 6% Will monitor for plan of care  If PEG is deemed appropriate, please reconsult for recs  Malnutrition/BMI Present Yes   Adult Malnutrition type Acute illness  (related to catabolic illness, inadequate oral intake as evidenced by  weight decrease of 4 7 % x 1 month, 11 9% weight decrease x 1 5 months, < 75% of estimated nutritional needs for > 7 days   Currently treated with nutrition education, supplementation ) Adult Degree of Malnutrition Malnutrition of moderate degree   Malnutrition Characteristics Weight loss; Inadequate energy   Interventions Diet: continued as ordered;Education initiated/completed; Supplement initiate  (ensure compact bid)   Nutrition Recommendations Continue diet as ordered   Nutrition Complexity Risk   Nutrition complexity level Moderate risk   Nutrition review: 03/03/22  (plan of care)   Follow up date 03/08/22

## 2022-03-01 NOTE — UTILIZATION REVIEW
Initial Clinical Review    Admission: Date/Time/Statement:   Admission Orders (From admission, onward)     Ordered        02/28/22 1524  INPATIENT ADMISSION  Once                      Orders Placed This Encounter   Procedures    INPATIENT ADMISSION     Standing Status:   Standing     Number of Occurrences:   1     Order Specific Question:   Level of Care     Answer:   Med Surg [16]     Order Specific Question:   Estimated length of stay     Answer:   More than 2 Midnights     Order Specific Question:   Certification     Answer:   I certify that inpatient services are medically necessary for this patient for a duration of greater than two midnights  See H&P and MD Progress Notes for additional information about the patient's course of treatment  ED Arrival Information     Expected Arrival Acuity    - 2/28/2022 11:50 Urgent         Means of arrival Escorted by Service Admission type    Wheelchair Family Member Hospitalist Urgent         Arrival complaint    general weakness,pain,        Chief Complaint   Patient presents with    Abdominal Pain     Pt reports abdominal pain  Pt has hx of stomach and esphogeal cancer  His prescribes pain medications are no longer working  Pt also reports generalized weakness and fatigue  Pt has not been eating or drinking  Initial Presentation: 70 y o  male recently dx with stomach CA, receiving chemo who presents to ED from home with abdominal/chest pain, decreased oral intake, extreme fatigue, unintentional weight loss 50 lbs x 1 month  Pt with nausea and coffee ground emesis last week  On exam, abdomen soft, non distended  Constant chest and upper abdomen pain, not relieved with home percocet  Pt given IVF, IV/Po analgesics, iv antiemetic in ED  Pt admitted as inpatient with failure to thrive , cancer related pain  PLan - GI consult for PEG tube, IVF  FL diet, advance as tolerated  Pain control-scheduled oxycontin and prn po analgesic  Palliative care consult      Date: 3/1   Day 2:   Nutrition- 11 9 % wt decrease x 1 5 months  Plan  forsupplements -Ensure compact BID  GI consult-Poor oral intake, hard time controlling pain with po analgesics  Pt focused on disease directed therapy and anticipates to continue chemotherapy  Is able to tolerate swallowing liquids and soft foods without regurgitation or vomiting, but experiences significant early satiety  Abdomen soft  Plan -discuss with attending with regards to feasibility of PEG tube placement, depending on anatomic location and extent of his malignancy an IR guided approach may potentially be preferable   Continue diet for today, add Ensure /Boost   Continue PPI, symptom mgmt, pain control  Palliative care- Increase Oxycontin scheduled to 10 mg q8h, continue po and IV prn narcotic analgesics  Disease focused care  Pt and dgt struggling to understand why he can't use only IV analgesics           ED Triage Vitals [02/28/22 1203]   Temperature Pulse Respirations Blood Pressure SpO2   97 7 °F (36 5 °C) 89 22 134/82 97 %      Temp Source Heart Rate Source Patient Position - Orthostatic VS BP Location FiO2 (%)   Oral Monitor Sitting Left arm --      Pain Score       8          Wt Readings from Last 1 Encounters:   02/21/22 75 8 kg (167 lb 1 7 oz)     Additional Vital Signs:   Date/Time Temp Pulse Resp BP MAP (mmHg) SpO2   03/01/22 0749 97 8 °F (36 6 °C) 79 18 137/65 93 93 %   02/28/22 2110 98 3 °F (36 8 °C) 67 18 142/68 98 93 %   02/28/22 1715 -- 64 18 146/67 -- 93 %   02/28/22 1427 -- 62 18 133/85 -- 93 %       Pertinent Labs/Diagnostic Test Results:   2/28 ECG-Normal sinus rhythm  Low voltage QRS      Results from last 7 days   Lab Units 02/28/22  1333   WBC Thousand/uL 6 44   HEMOGLOBIN g/dL 12 5   HEMATOCRIT % 35 5*   PLATELETS Thousands/uL 325   NEUTROS ABS Thousands/µL 4 73         Results from last 7 days   Lab Units 02/28/22  1333   SODIUM mmol/L 137   POTASSIUM mmol/L 3 6   CHLORIDE mmol/L 99*   CO2 mmol/L 25   ANION GAP mmol/L 13   BUN mg/dL 19   CREATININE mg/dL 1 08   EGFR ml/min/1 73sq m 68   CALCIUM mg/dL 9 0     Results from last 7 days   Lab Units 02/28/22  1333   AST U/L 27   ALT U/L 27   ALK PHOS U/L 171*   TOTAL PROTEIN g/dL 7 1   ALBUMIN g/dL 3 3*   TOTAL BILIRUBIN mg/dL 0 90         Results from last 7 days   Lab Units 02/28/22  1333   GLUCOSE RANDOM mg/dL 114               Results from last 7 days   Lab Units 02/28/22  1333   PROTIME seconds 14 1   INR  1 09   PTT seconds 28     Results from last 7 days   Lab Units 02/28/22  1333   TSH 3RD GENERATON uIU/mL 7 497*         Results from last 7 days   Lab Units 02/28/22  1333   LACTIC ACID mmol/L 1 9                         Results from last 7 days   Lab Units 02/28/22  1333   LIPASE u/L 74                 ED Treatment:   Medication Administration from 02/28/2022 1150 to 02/28/2022 1833       Date/Time Order Dose Route Action     02/28/2022 1337 sodium chloride 0 9 % bolus 1,000 mL 1,000 mL Intravenous New Bag     02/28/2022 1337 morphine (PF) 10 mg/mL injection 6 mg 6 mg Intravenous Given     02/28/2022 1335 ondansetron (ZOFRAN) injection 4 mg 4 mg Intravenous Given     02/28/2022 1651 oxyCODONE (ROXICODONE) immediate release tablet 10 mg 10 mg Oral Given     02/28/2022 1651 docusate sodium (COLACE) capsule 100 mg 100 mg Oral Given        Past Medical History:   Diagnosis Date    Cancer ([facility] )     gastric mass    COVID 01/2021    Disease of thyroid gland     GERD (gastroesophageal reflux disease)     History of GI diverticular bleed     Hyperlipidemia     Hypertension     Tinnitus      Present on Admission:  **None**      Admitting Diagnosis: Dehydration [E86 0]  Stomach cancer ([facility] [address] ) [C16 9]  Pain [R52]  Cancer related pain [G89 3]  Decreased oral intake [R63 8]  Intractable pain [R52]  Age/Sex: 70 y o  male  Admission Orders:  Scheduled Medications:  docusate sodium, 100 mg, Oral, BID  oxyCODONE, 10 mg, Oral, Q8H YOHANA  pantoprazole, 40 mg, Intravenous, Q12H [facility]  polyethylene glycol, 17 g, Oral, Daily  tamsulosin, 0 4 mg, Oral, BID  oxyCODONE (OxyCONTIN) 12 hr tablet 10 mg  Dose: 10 mg  Freq: Every 12 hours scheduled Route: PO  Start: 02/28/22 1745 End: 03/01/22 1007    Continuous IV Infusions:multi-electrolyte (PLASMALYTE-A/ISOLYTE-S PH 7 4) IV solution  Rate: 100 mL/hr Dose: 100 mL/hr  Freq: Continuous Route: IV  Last Dose: Stopped (03/01/22 0944)  Start: 02/28/22 1600 End: 03/01/22 0944     PRN Meds:  HYDROmorphone, 0 5 mg, Intravenous, Q3H PRN  x1 3/1  ondansetron, 4 mg, Intravenous, Q6H PRN x1 2/28, x2 3/1  oxyCODONE, 10 mg, Oral, Q4H PRN x2 3/1  oxyCODONE, 20 mg, Oral, Q4H PRN  HYDROmorphone (DILAUDID) injection 0 5 mg  Dose: 0 5 mg  Freq: Every 4 hours PRN Route: IV  PRN Reason: breakthrough pain  Start: 02/28/22 1620 End: 03/01/22 1014 x1 2/28, x2 3/1      Up w/ assist  CL diet    IP CONSULT TO PALLIATIVE CARE  IP CONSULT TO GASTROENTEROLOGY    Network Utilization Review Department  ATTENTION: Please call with any questions or concerns to [phone] and carefully listen to the prompts so that you are directed to the right person  All voicemails are confidential   Rosanne Nicolas all requests for admission clinical reviews, approved or denied determinations and any other requests to dedicated fax number below belonging to the campus where the patient is receiving treatment   List of dedicated fax numbers for the Facilities:  [address] [place] [address] DENIALS (Administrative/Medical Necessity) [phone]   [address] (Maternity/NICU/Pediatrics) [phone]   [address]  [address] [facility] [place] [address] [address] [address] [place] [address] Keily Berrios [address] [phone]

## 2022-03-01 NOTE — ASSESSMENT & PLAN NOTE
· Maintained on levothyroxine 125mcg daily  · TSH noted to be mildly elevated at 7 497, will check Free T4  If  Free T4 levels wnl, will continue current dosing with recommendations for repeat TSH in 6-8 weeks  If Free T4 levels low, will likely increase levothyroxine dosing to 150mcg daily

## 2022-03-01 NOTE — ASSESSMENT & PLAN NOTE
·  Maintained on Irbesartan 300mg    Not on formulary, equivalent would be losartan 100mg  · Will restart at half dosing 50mg and titrate as needed

## 2022-03-01 NOTE — CONSULTS
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, recently diagnosed with metastatic gastric cancer at the beginning of February, had EGD on 02/04/2022 (see report below), started chemotherapy 1 week ago  Patient presented with worsening fatigue, poor appetite, weight loss of 30-40 lb since his diagnosis, with poor oral intake  Has been having more difficulty controlling abdominal pain with oral pain medications as well  As he is currently focused on disease directed therapy and anticipates to continue chemotherapy, our service were consulted with regards to the possibility of placing a feeding tube to assist with nutritional intake during the course of his treatment  Patient has never had a feeding tube  He says he has had umbilical hernia repair in 2 inguinal hernia repairs, laparoscopic cholecystectomy, denies other abdominal surgeries  2 cm hiatal hernia was noted at the time of his EGD, in addition to the gastric/esophageal mass  He reports he is able to tolerate swallowing liquids and soft foods without regurgitation or vomiting, but experiences significant early satiety  REVIEW OF SYSTEMS:    CONSTITUTIONAL: Denies any fever, chills, or rigors  Good appetite, and no recent weight loss  HEENT: No earache or tinnitus  Denies hearing loss or visual disturbances  CARDIOVASCULAR: No chest pain or palpitations  RESPIRATORY: Denies any cough, hemoptysis, shortness of breath or dyspnea on exertion  GASTROINTESTINAL: As noted in the History of Present Illness  GENITOURINARY: No problems with urination  Denies any hematuria or dysuria  NEUROLOGIC: No dizziness or vertigo, denies headaches     MUSCULOSKELETAL: Denies any muscle or joint pain  SKIN: Denies skin rashes or itching  ENDOCRINE: Denies excessive thirst  Denies intolerance to heat or cold  PSYCHOSOCIAL: Denies depression or anxiety  Denies any recent memory loss  Historical Information   Past Medical History:   Diagnosis Date    Cancer [facility])     gastric mass    COVID 01/2021    Disease of thyroid gland     GERD (gastroesophageal reflux disease)     History of GI diverticular bleed     Hyperlipidemia     Hypertension     Tinnitus      Past Surgical History:   Procedure Laterality Date    ADENOIDECTOMY      CHOLECYSTECTOMY LAPAROSCOPIC N/A 1/24/2022    Procedure: CHOLECYSTECTOMY LAPAROSCOPIC W/ INTRAOP CHOLANGIOGRAM;  Surgeon: Franklin Tay DO;  [facility];  Service: General    COLONOSCOPY N/A 7/16/2016    Procedure: COLONOSCOPY;  Surgeon: Chloe Inman MD;  [facility];  Service:     FL CHOLANGIOGRAM TRANSHEPATIC  1/24/2022    FL GUIDED CENTRAL VENOUS ACCESS DEVICE INSERTION  2/11/2022    HERNIA REPAIR      PROSTATE BIOPSY      TONSILLECTOMY      TUNNELED VENOUS PORT PLACEMENT N/A 2/11/2022    Procedure: INSERTION VENOUS PORT (PORT-A-CATH); Surgeon: Mary Berry MD;  [facility];  Service: Surgical Oncology    UVULECTOMY       Social History   Social History     Substance and Sexual Activity   Alcohol Use Yes    Comment: Rarely     Social History     Substance and Sexual Activity   Drug Use Never     Social History     Tobacco Use   Smoking Status Never Smoker   Smokeless Tobacco Never Used     History reviewed  No pertinent family history      Meds/Allergies     Medications Prior to Admission   Medication    bisacodyl (DULCOLAX) 5 mg EC tablet    dronabinol (MARINOL) 5 MG capsule    irbesartan (AVAPRO) 300 mg tablet    levothyroxine 125 mcg tablet    lidocaine-prilocaine (EMLA) cream    methylphenidate (RITALIN) 5 mg tablet    mirtazapine (REMERON) 30 mg tablet    ondansetron (Zofran ODT) 4 mg disintegrating tablet    oxyCODONE (ROXICODONE) 10 MG TABS    pantoprazole (PROTONIX) 40 mg tablet    predniSONE 5 mg tablet    tamsulosin (FLOMAX) 0 4 mg     Current Facility-Administered Medications   Medication Dose Route Frequency    bisacodyl (DULCOLAX) EC tablet 5 mg  5 mg Oral Daily PRN    docusate sodium (COLACE) capsule 100 mg  100 mg Oral BID    HYDROmorphone (DILAUDID) injection 0 5 mg  0 5 mg Intravenous Q3H PRN    levothyroxine tablet 125 mcg  125 mcg Oral Early Morning    methylphenidate (RITALIN) tablet 5 mg  5 mg Oral BID PRN    ondansetron (ZOFRAN) injection 4 mg  4 mg Intravenous Q6H PRN    oxyCODONE (OxyCONTIN) 12 hr tablet 10 mg  10 mg Oral Q8H YOHANA    oxyCODONE (ROXICODONE) oral solution 10 mg  10 mg Oral Q4H PRN    oxyCODONE (ROXICODONE) oral solution 20 mg  20 mg Oral Q4H PRN    pantoprazole (PROTONIX) injection 40 mg  40 mg Intravenous Q12H YOHANA    polyethylene glycol (MIRALAX) packet 17 g  17 g Oral Daily    tamsulosin (FLOMAX) capsule 0 4 mg  0 4 mg Oral BID       No Known Allergies        Objective     Blood pressure 137/65, pulse 79, temperature 97 8 °F (36 6 °C), temperature source Oral, resp  rate 18, weight 74 3 kg (163 lb 12 8 oz), SpO2 93 %  No intake or output data in the 24 hours ending 03/01/22 1551      PHYSICAL EXAM     General Appearance:   Alert, cooperative, no distress, appears stated age    HEENT:   Normocephalic, atraumatic, anicteric      Neck:  Supple, symmetrical, trachea midline, no adenopathy;    thyroid: no enlargement/tenderness/nodules; no carotid  bruit or JVD    Lungs:   Clear to auscultation bilaterally; no rales, rhonchi or wheezing; respirations unlabored    Heart[de-identified]   S1 and S2 normal; regular rate and rhythm; no murmur, rub, or gallop     Abdomen:   Soft, non-tender, non-distended; normal bowel sounds; no masses, no organomegaly    Genitalia:   Deferred    Rectal:   Deferred    Extremities:  No cyanosis, clubbing or edema    Pulses:  2+ and symmetric all extremities    Skin:  Skin color, texture, turgor normal, no rashes or lesions    Lymph nodes:  No palpable cervical, axillary or inguinal lymphadenopathy        Lab Results:   Admission on 02/28/2022   Component Date Value    WBC 02/28/2022 6 44     RBC 02/28/2022 4 05     Hemoglobin 02/28/2022 12 5     Hematocrit 02/28/2022 35 5*    MCV 02/28/2022 88     MCH 02/28/2022 30 9     MCHC 02/28/2022 35 2     RDW 02/28/2022 14 5     MPV 02/28/2022 9 3     Platelets 96/49/0553 325     nRBC 02/28/2022 0     Neutrophils Relative 02/28/2022 73     Immat GRANS % 02/28/2022 1     Lymphocytes Relative 02/28/2022 12*    Monocytes Relative 02/28/2022 11     Eosinophils Relative 02/28/2022 2     Basophils Relative 02/28/2022 1     Neutrophils Absolute 02/28/2022 4 73     Immature Grans Absolute 02/28/2022 0 06     Lymphocytes Absolute 02/28/2022 0 79     Monocytes Absolute 02/28/2022 0 69     Eosinophils Absolute 02/28/2022 0 13     Basophils Absolute 02/28/2022 0 04     Protime 02/28/2022 14 1     INR 02/28/2022 1 09     PTT 02/28/2022 28     Sodium 02/28/2022 137     Potassium 02/28/2022 3 6     Chloride 02/28/2022 99*    CO2 02/28/2022 25     ANION GAP 02/28/2022 13     BUN 02/28/2022 19     Creatinine 02/28/2022 1 08     Glucose 02/28/2022 114     Calcium 02/28/2022 9 0     Corrected Calcium 02/28/2022 9 6     AST 02/28/2022 27     ALT 02/28/2022 27     Alkaline Phosphatase 02/28/2022 171*    Total Protein 02/28/2022 7 1     Albumin 02/28/2022 3 3*    Total Bilirubin 02/28/2022 0 90     eGFR 02/28/2022 68     Lipase 02/28/2022 74     LACTIC ACID 02/28/2022 1 9     TSH 3RD GENERATON 02/28/2022 7 497*    Ventricular Rate 02/28/2022 69     Atrial Rate 02/28/2022 70     LA Interval 02/28/2022 140     QRSD Interval 02/28/2022 78     QT Interval 02/28/2022 408     QTC Interval 02/28/2022 437     P Axis 02/28/2022 8     QRS Axis 02/28/2022 0     T Wave Axis 02/28/2022 37     Free T4 02/28/2022 0 99        Imaging Studies: I have personally reviewed pertinent reports  FINDINGS:  · Two cm hiatal hernia was seen with friable mass involving more than half the circumference and extending linearly down into the cardia and fundus for about 5 cm status post multiple biopsies  · Stomach-mild erythema in the antrum  Biopsies done to check for H pylori  · Duodenum-patchy erythema in the bulb         SPECIMENS:  ID Type Source Tests Collected by Time Destination   1 : gastric body bx r/o h  pylori Tissue Stomach TISSUE EXAM Yuli Collins MD 2/4/2022  9:43 AM     2 : gastric mass/hiatal hernia/fundus bx Tissue Stomach TISSUE EXAM Yuli Collins MD 2/4/2022  9:44 AM              IMPRESSION:  · Two cm hiatal hernia was seen with friable mass involving more than half the circumference and extending linearly down into the cardia and fundus for about 5 cm status post multiple biopsies  · Stomach-mild erythema in the antrum  Biopsies done to check for H pylori  · Duodenum-patchy erythema in the bulb      RECOMMENDATION:  Await pathology results     Check CT scan of the chest, abdomen and pelvis        ASSESSMENT/PLAN:     1  Substantial weight loss with stage IV gastric cancer, recently started chemotherapy; poor oral intake/poor nutritional intake at this time    -will discuss with attending physician with regards to feasibility of PEG tube placement, depending on anatomic location and extent of his malignancy an IR guided approach may potentially be preferable     - may continue with diet for today, can give Ensure/boost shakes with meals for nutritional support    - continue Protonix    - symptomatic management, pain control      The patient was seen and examined by Dr Elham Kerr, all pantoja medical decisions were made with Dr Elham Kerr  Thank you for allowing us to participate in the care of this pleasant patient  We will follow up with you closely

## 2022-03-01 NOTE — TELEPHONE ENCOUNTER
Call received from dtr, Denver Kearns, to cancel Juan's appt today as he is in the hospital   Elizabeth Cloud know that I will call Nicolas Argueta once he is discharged to reschedule

## 2022-03-01 NOTE — PROGRESS NOTES
[facility]  Progress Note - Sidra Jeong 1950, 70 y o  male MRN: [id number]  Unit/Bed#: S -01 Encounter: [id number]  Primary Care Provider: Kathleen Westfall DO   Date and time admitted to hospital: 2/28/2022 12:12 PM    * FTT (failure to thrive) in adult  Assessment & Plan  · Significantly decreased oral intake, poor appetite and significant unintentional weight loss in last 1 month in the setting of recently diagnosed stomach cancer with recent initiation of treatment  · GI consulted for feasibility of PEG tube placement for alternative means of enteral nutrition  This may need to be done by IR - will coordinate if necessary  ·  Nutrition consulted  · Continue diet as ordered for now  · Will hold off on further IV fluids  Monitor hydration status and will give prn fluids  · Monitor lytes and replete as needed  Check Mag and phos  Moderate protein-calorie malnutrition (HCC)  Assessment & Plan  Malnutrition Findings:   Adult Malnutrition type: Acute illness (related to catabolic illness, inadequate oral intake as evidenced by  weight decrease of 4 7 % x 1 month, 11 9% weight decrease x 1 5 months, < 75% of estimated nutritional needs for > 7 days  Currently treated with nutrition education, supplementation )  Adult Degree of Malnutrition: Malnutrition of moderate degree    BMI Findings: Body mass index is 25 41 kg/m²  Cancer related pain  Assessment & Plan  Patient complaining of pain in his chest and upper abdomen  Constant  Not relieved with Percocet that he has been prescribed by palliative care  Appreciate palliative care consultation  Medication regimen adjusted with increase Oxycontin to TID dosing  Trial of Aqua K pad for additional comfort  Will continue to monitor and adjust appropriately  Bowel regimen     Stomach cancer [facility])  Assessment & Plan  Recently diagnosed    Patient follows with Dr Bruce Guzman  Had one seesion of chemo prior to admission  Plan for resumption on discharge from this hospitalization    BPH (benign prostatic hyperplasia)  Assessment & Plan  · Continue flomax    Hypothyroid  Assessment & Plan  · Maintained on levothyroxine 125mcg daily  · TSH noted to be mildly elevated at 7 497, will check Free T4  If  Free T4 levels wnl, will continue current dosing with recommendations for repeat TSH in 6-8 weeks  If Free T4 levels low, will likely increase levothyroxine dosing to 150mcg daily  Hypertension  Assessment & Plan  ·  Maintained on Irbesartan 300mg  Not on formulary, equivalent would be losartan 100mg  · Will restart at half dosing 50mg and titrate as needed      VTE Pharmacologic Prophylaxis: VTE Score: 5 Moderate Risk (Score 3-4) - Pharmacological DVT Prophylaxis Ordered: enoxaparin (Lovenox)  Patient Centered Rounds: I performed bedside rounds with nursing staff today  Discussions with Specialists or Other Care Team Provider: GI    Education and Discussions with Family / Patient: Updated  (wife and daughter) at bedside  Time Spent for Care: 30 minutes  More than 50% of total time spent on counseling and coordination of care as described above  Current Length of Stay: 1 day(s)  Current Patient Status: Inpatient   Certification Statement: The patient will continue to require additional inpatient hospital stay due to Evaluation for alternative means of enteral nutrition  Discharge Plan: Anticipate discharge in 48-72 hrs to home  Code Status: Prior    Subjective:   Patient seen and examined at bedside  No acute events overnight  Patient reports some improvement in his abdominal pain with current medical therapies  He would like to try to have a grilled cheese this afternoon  No nausea or vomiting  No diarrhea  Afebrile        Objective:     Vitals:   Temp (24hrs), Av 9 °F (36 6 °C), Min:97 7 °F (36 5 °C), Max:98 3 °F (36 8 °C)    Temp:  [97 7 °F (36 5 °C)-98 3 °F (36 8 °C)] 97 7 °F (36 5 °C)  HR:  [67-79] 79  Resp:  [18] 18  BP: (137-142)/(65-74) 142/74  SpO2:  [93 %-97 %] 97 %  Body mass index is 25 41 kg/m²  Input and Output Summary (last 24 hours):   No intake or output data in the 24 hours ending 03/01/22 4712    Physical Exam:   Physical Exam  Vitals reviewed  Constitutional:       General: He is not in acute distress  Appearance: He is not toxic-appearing or diaphoretic  Cardiovascular:      Rate and Rhythm: Normal rate and regular rhythm  Pulmonary:      Effort: Pulmonary effort is normal  No respiratory distress  Breath sounds: No wheezing, rhonchi or rales  Abdominal:      General: There is no distension  Tenderness: There is no abdominal tenderness  There is no guarding  Hernia: A hernia is present  Musculoskeletal:         General: No swelling  Skin:     General: Skin is warm and dry  Neurological:      Mental Status: He is alert and oriented to person, place, and time  Mental status is at baseline            Additional Data:     Labs:  Results from last 7 days   Lab Units 02/28/22  1333   WBC Thousand/uL 6 44   HEMOGLOBIN g/dL 12 5   HEMATOCRIT % 35 5*   PLATELETS Thousands/uL 325   NEUTROS PCT % 73   LYMPHS PCT % 12*   MONOS PCT % 11   EOS PCT % 2     Results from last 7 days   Lab Units 02/28/22  1333   SODIUM mmol/L 137   POTASSIUM mmol/L 3 6   CHLORIDE mmol/L 99*   CO2 mmol/L 25   BUN mg/dL 19   CREATININE mg/dL 1 08   ANION GAP mmol/L 13   CALCIUM mg/dL 9 0   ALBUMIN g/dL 3 3*   TOTAL BILIRUBIN mg/dL 0 90   ALK PHOS U/L 171*   ALT U/L 27   AST U/L 27   GLUCOSE RANDOM mg/dL 114     Results from last 7 days   Lab Units 02/28/22  1333   INR  1 09             Results from last 7 days   Lab Units 02/28/22  1333   LACTIC ACID mmol/L 1 9       Lines/Drains:  Invasive Devices  Report    Central Venous Catheter Line            Port A Cath 02/11/22 Left Subclavian 18 days          Peripheral Intravenous Line            Peripheral IV 02/28/22 Right Antecubital 1 day                Central Line:  Goal for removal: N/A - Chronic PICC               Recent Cultures (last 7 days):         Last 24 Hours Medication List:   Current Facility-Administered Medications   Medication Dose Route Frequency Provider Last Rate    bisacodyl  5 mg Oral Daily PRN Darrick Wright MD      docusate sodium  100 mg Oral BID Raoul Wood MD      [START ON 3/2/2022] enoxaparin  40 mg Subcutaneous Q24H [address] Darrick Wright MD      HYDROmorphone  0 5 mg Intravenous Q3H PRN Ree Guard, CRNP      levothyroxine  125 mcg Oral Early Morning MD Sherry Knapp Ramp ON 3/2/2022] losartan  50 mg Oral Daily Darrick Wright MD      methylphenidate  5 mg Oral BID PRN Darrick Wright MD      ondansetron  4 mg Intravenous Q6H PRN Raoul Wood MD      oxyCODONE  10 mg Oral [facility] Ree Guard, CRNP      oxyCODONE  10 mg Oral Q4H PRN Ree Guard, CRNP      oxyCODONE  20 mg Oral Q4H PRN Ree Guard, CRNP      pantoprazole  40 mg Intravenous Q12H [address] Ree Guard, CRNP      polyethylene glycol  17 g Oral Daily Raoul Wood MD      tamsulosin  0 4 mg Oral BID Ree Guard, CRNP          Today, Patient Was Seen By: Darrick Wright MD    **Please Note: This note may have been constructed using a voice recognition system  **

## 2022-03-01 NOTE — PROGRESS NOTES
I went in to answer the call bell  The patient was complaining of pain  He wanted the dilaudid  I explained to the patient that we start with the po pain medication first   Then we give the dilaudid for breakthrough pain  His family is like no it should be just dilaudid that he can get not oxycodone anymore  I explained again how we do our system for pain management  They are requesting to see the doctor  I let dr Anuradha Yoon know  Waiting for a response  Will continue to monitor

## 2022-03-01 NOTE — PLAN OF CARE
Problem: GASTROINTESTINAL - ADULT  Goal: Minimal or absence of nausea and/or vomiting  Description: INTERVENTIONS:  - Administer IV fluids if ordered to ensure adequate hydration  - Maintain NPO status until nausea and vomiting are resolved  - Nasogastric tube if ordered  - Administer ordered antiemetic medications as needed  - Provide nonpharmacologic comfort measures as appropriate  - Advance diet as tolerated, if ordered  - Consider nutrition services referral to assist patient with adequate nutrition and appropriate food choices  Outcome: Progressing  Goal: Maintains or returns to baseline bowel function  Description: INTERVENTIONS:  - Assess bowel function  - Encourage oral fluids to ensure adequate hydration  - Administer IV fluids if ordered to ensure adequate hydration  - Administer ordered medications as needed  - Encourage mobilization and activity  - Consider nutritional services referral to assist patient with adequate nutrition and appropriate food choices  Outcome: Progressing  Goal: Maintains adequate nutritional intake  Description: INTERVENTIONS:  - Monitor percentage of each meal consumed  - Identify factors contributing to decreased intake, treat as appropriate  - Assist with meals as needed  - Monitor I&O, weight, and lab values if indicated  - Obtain nutrition services referral as needed  Outcome: Progressing  Goal: Oral mucous membranes remain intact  Description: INTERVENTIONS  - Assess oral mucosa and hygiene practices  - Implement preventative oral hygiene regimen  - Implement oral medicated treatments as ordered  - Initiate Nutrition services referral as needed  Outcome: Progressing  Problem: MUSCULOSKELETAL - ADULT  Goal: Maintain or return mobility to safest level of function  Description: INTERVENTIONS:  - Assess patient's ability to carry out ADLs; assess patient's baseline for ADL function and identify physical deficits which impact ability to perform ADLs (bathing, care of mouth/teeth, toileting, grooming, dressing, etc )  - Assess/evaluate cause of self-care deficits   - Assess range of motion  - Assess patient's mobility  - Assess patient's need for assistive devices and provide as appropriate  - Encourage maximum independence but intervene and supervise when necessary  - Involve family in performance of ADLs  - Assess for home care needs following discharge   - Consider OT consult to assist with ADL evaluation and planning for discharge  - Provide patient education as appropriate  Outcome: Progressing  Goal: Maintain proper alignment of affected body part  Description: INTERVENTIONS:  - Support, maintain and protect limb and body alignment  - Provide patient/ family with appropriate education  Outcome: Progressing  Problem: MOBILITY - ADULT  Goal: Maintain or return to baseline ADL function  Description: INTERVENTIONS:  -  Assess patient's ability to carry out ADLs; assess patient's baseline for ADL function and identify physical deficits which impact ability to perform ADLs (bathing, care of mouth/teeth, toileting, grooming, dressing, etc )  - Assess/evaluate cause of self-care deficits   - Assess range of motion  - Assess patient's mobility; develop plan if impaired  - Assess patient's need for assistive devices and provide as appropriate  - Encourage maximum independence but intervene and supervise when necessary  - Involve family in performance of ADLs  - Assess for home care needs following discharge   - Consider OT consult to assist with ADL evaluation and planning for discharge  - Provide patient education as appropriate  Outcome: Progressing

## 2022-03-01 NOTE — ASSESSMENT & PLAN NOTE
Recently diagnosed    Patient follows with Dr Brenda Asher  Had one seesion of chemo prior to admission  Plan for resumption on discharge from this hospitalization

## 2022-03-01 NOTE — ASSESSMENT & PLAN NOTE
· Significantly decreased oral intake, poor appetite and significant unintentional weight loss in last 1 month in the setting of recently diagnosed stomach cancer with recent initiation of treatment  · GI consulted for feasibility of PEG tube placement for alternative means of enteral nutrition  This may need to be done by IR - will coordinate if necessary  ·  Nutrition consulted  · Continue diet as ordered for now  · Will hold off on further IV fluids  Monitor hydration status and will give prn fluids  · Monitor lytes and replete as needed  Check Mag and phos

## 2022-03-02 ENCOUNTER — TELEPHONE (OUTPATIENT)
Dept: HEMATOLOGY ONCOLOGY | Facility: CLINIC | Age: 72
End: 2022-03-02

## 2022-03-02 ENCOUNTER — TELEPHONE (OUTPATIENT)
Dept: INFUSION CENTER | Facility: CLINIC | Age: 72
End: 2022-03-02

## 2022-03-02 LAB
ANION GAP SERPL CALCULATED.3IONS-SCNC: 9 MMOL/L (ref 4–13)
BUN SERPL-MCNC: 13 MG/DL (ref 5–25)
CALCIUM SERPL-MCNC: 8.3 MG/DL (ref 8.3–10.1)
CHLORIDE SERPL-SCNC: 105 MMOL/L (ref 100–108)
CO2 SERPL-SCNC: 25 MMOL/L (ref 21–32)
CREAT SERPL-MCNC: 0.74 MG/DL (ref 0.6–1.3)
ERYTHROCYTE [DISTWIDTH] IN BLOOD BY AUTOMATED COUNT: 15.2 % (ref 11.6–15.1)
GFR SERPL CREATININE-BSD FRML MDRD: 92 ML/MIN/1.73SQ M
GLUCOSE SERPL-MCNC: 82 MG/DL (ref 65–140)
HCT VFR BLD AUTO: 29.9 % (ref 36.5–49.3)
HGB BLD-MCNC: 10.4 G/DL (ref 12–17)
MAGNESIUM SERPL-MCNC: 1.9 MG/DL (ref 1.6–2.6)
MCH RBC QN AUTO: 31 PG (ref 26.8–34.3)
MCHC RBC AUTO-ENTMCNC: 34.8 G/DL (ref 31.4–37.4)
MCV RBC AUTO: 89 FL (ref 82–98)
PHOSPHATE SERPL-MCNC: 3 MG/DL (ref 2.3–4.1)
PLATELET # BLD AUTO: 294 THOUSANDS/UL (ref 149–390)
PMV BLD AUTO: 9.4 FL (ref 8.9–12.7)
POTASSIUM SERPL-SCNC: 3.3 MMOL/L (ref 3.5–5.3)
RBC # BLD AUTO: 3.36 MILLION/UL (ref 3.88–5.62)
SODIUM SERPL-SCNC: 139 MMOL/L (ref 136–145)
WBC # BLD AUTO: 4.77 THOUSAND/UL (ref 4.31–10.16)

## 2022-03-02 PROCEDURE — 84100 ASSAY OF PHOSPHORUS: CPT | Performed by: INTERNAL MEDICINE

## 2022-03-02 PROCEDURE — 85027 COMPLETE CBC AUTOMATED: CPT | Performed by: INTERNAL MEDICINE

## 2022-03-02 PROCEDURE — C9113 INJ PANTOPRAZOLE SODIUM, VIA: HCPCS | Performed by: NURSE PRACTITIONER

## 2022-03-02 PROCEDURE — 83735 ASSAY OF MAGNESIUM: CPT | Performed by: INTERNAL MEDICINE

## 2022-03-02 PROCEDURE — 80048 BASIC METABOLIC PNL TOTAL CA: CPT | Performed by: INTERNAL MEDICINE

## 2022-03-02 PROCEDURE — 99232 SBSQ HOSP IP/OBS MODERATE 35: CPT | Performed by: INTERNAL MEDICINE

## 2022-03-02 PROCEDURE — 99233 SBSQ HOSP IP/OBS HIGH 50: CPT | Performed by: NURSE PRACTITIONER

## 2022-03-02 RX ORDER — POTASSIUM CHLORIDE 20 MEQ/1
40 TABLET, EXTENDED RELEASE ORAL ONCE
Status: COMPLETED | OUTPATIENT
Start: 2022-03-02 | End: 2022-03-02

## 2022-03-02 RX ORDER — SODIUM CHLORIDE, SODIUM GLUCONATE, SODIUM ACETATE, POTASSIUM CHLORIDE, MAGNESIUM CHLORIDE, SODIUM PHOSPHATE, DIBASIC, AND POTASSIUM PHOSPHATE .53; .5; .37; .037; .03; .012; .00082 G/100ML; G/100ML; G/100ML; G/100ML; G/100ML; G/100ML; G/100ML
100 INJECTION, SOLUTION INTRAVENOUS CONTINUOUS
Status: DISCONTINUED | OUTPATIENT
Start: 2022-03-03 | End: 2022-03-03

## 2022-03-02 RX ORDER — BISACODYL 10 MG
10 SUPPOSITORY, RECTAL RECTAL DAILY PRN
Status: DISCONTINUED | OUTPATIENT
Start: 2022-03-02 | End: 2022-03-06 | Stop reason: HOSPADM

## 2022-03-02 RX ORDER — XYLITOL/YERBA SANTA
5 AEROSOL, SPRAY WITH PUMP (ML) MUCOUS MEMBRANE 4 TIMES DAILY PRN
Status: DISCONTINUED | OUTPATIENT
Start: 2022-03-02 | End: 2022-03-06 | Stop reason: HOSPADM

## 2022-03-02 RX ADMIN — ONDANSETRON 4 MG: 2 INJECTION INTRAMUSCULAR; INTRAVENOUS at 21:48

## 2022-03-02 RX ADMIN — Medication 5 SPRAY: at 11:06

## 2022-03-02 RX ADMIN — PANTOPRAZOLE SODIUM 40 MG: 40 INJECTION, POWDER, FOR SOLUTION INTRAVENOUS at 08:40

## 2022-03-02 RX ADMIN — OXYCODONE HYDROCHLORIDE 20 MG: 5 SOLUTION ORAL at 18:24

## 2022-03-02 RX ADMIN — DOCUSATE SODIUM 100 MG: 100 CAPSULE ORAL at 08:40

## 2022-03-02 RX ADMIN — OXYCODONE HYDROCHLORIDE 10 MG: 10 TABLET, FILM COATED, EXTENDED RELEASE ORAL at 05:54

## 2022-03-02 RX ADMIN — BISACODYL 5 MG: 5 TABLET, COATED ORAL at 08:40

## 2022-03-02 RX ADMIN — TAMSULOSIN HYDROCHLORIDE 0.4 MG: 0.4 CAPSULE ORAL at 21:48

## 2022-03-02 RX ADMIN — TAMSULOSIN HYDROCHLORIDE 0.4 MG: 0.4 CAPSULE ORAL at 08:40

## 2022-03-02 RX ADMIN — OXYCODONE HYDROCHLORIDE 10 MG: 5 SOLUTION ORAL at 03:12

## 2022-03-02 RX ADMIN — ENOXAPARIN SODIUM 40 MG: 40 INJECTION SUBCUTANEOUS at 08:40

## 2022-03-02 RX ADMIN — POTASSIUM CHLORIDE 40 MEQ: 1500 TABLET, EXTENDED RELEASE ORAL at 08:40

## 2022-03-02 RX ADMIN — BISACODYL 5 MG: 5 TABLET, COATED ORAL at 18:31

## 2022-03-02 RX ADMIN — HYDROMORPHONE HYDROCHLORIDE 0.5 MG: 1 INJECTION, SOLUTION INTRAMUSCULAR; INTRAVENOUS; SUBCUTANEOUS at 10:12

## 2022-03-02 RX ADMIN — OXYCODONE HYDROCHLORIDE 20 MG: 5 SOLUTION ORAL at 08:39

## 2022-03-02 RX ADMIN — BISACODYL 10 MG: 10 SUPPOSITORY RECTAL at 20:01

## 2022-03-02 RX ADMIN — LEVOTHYROXINE SODIUM 125 MCG: 125 TABLET ORAL at 05:54

## 2022-03-02 RX ADMIN — OXYCODONE HYDROCHLORIDE 10 MG: 10 TABLET, FILM COATED, EXTENDED RELEASE ORAL at 21:49

## 2022-03-02 RX ADMIN — PANTOPRAZOLE SODIUM 40 MG: 40 INJECTION, POWDER, FOR SOLUTION INTRAVENOUS at 21:48

## 2022-03-02 RX ADMIN — LOSARTAN POTASSIUM 50 MG: 50 TABLET, FILM COATED ORAL at 08:40

## 2022-03-02 RX ADMIN — ONDANSETRON 4 MG: 2 INJECTION INTRAMUSCULAR; INTRAVENOUS at 15:49

## 2022-03-02 RX ADMIN — OXYCODONE HYDROCHLORIDE 20 MG: 5 SOLUTION ORAL at 13:18

## 2022-03-02 RX ADMIN — DOCUSATE SODIUM 100 MG: 100 CAPSULE ORAL at 18:24

## 2022-03-02 RX ADMIN — ONDANSETRON 4 MG: 2 INJECTION INTRAMUSCULAR; INTRAVENOUS at 08:40

## 2022-03-02 RX ADMIN — OXYCODONE HYDROCHLORIDE 10 MG: 10 TABLET, FILM COATED, EXTENDED RELEASE ORAL at 15:48

## 2022-03-02 NOTE — PROGRESS NOTES
Progress Note - Lexis Villatoro 70 y o  male MRN: [id number]    Unit/Bed#: S -01 Encounter: [id number]        Subjective:   Patient reports he was able to tolerate some food and liquid today without vomiting  Denies any rectal bleeding or melena  Objective:     Vitals: Blood pressure 130/70, pulse 76, temperature 97 7 °F (36 5 °C), temperature source Oral, resp  rate 18, weight 74 3 kg (163 lb 12 8 oz), SpO2 96 %  ,Body mass index is 25 41 kg/m²  No intake or output data in the 24 hours ending 03/02/22 1216    Physical Exam:   General appearance: alert, appears stated age and cooperative  Lungs: clear to auscultation bilaterally, no labored breathing/accessory muscle use  Heart: regular rate and rhythm, S1, S2 normal, no murmur, click, rub or gallop  Abdomen: soft, non-tender; bowel sounds normal; no masses,  no organomegaly  Extremities: no edema    Invasive Devices  Report    Central Venous Catheter Line            Port A Cath 02/11/22 Left Subclavian 19 days          Peripheral Intravenous Line            Peripheral IV 02/28/22 Right Antecubital 1 day                Lab, Imaging and other studies: I have personally reviewed pertinent reports      Admission on 02/28/2022   Component Date Value    WBC 02/28/2022 6 44     RBC 02/28/2022 4 05     Hemoglobin 02/28/2022 12 5     Hematocrit 02/28/2022 35 5*    MCV 02/28/2022 88     MCH 02/28/2022 30 9     MCHC 02/28/2022 35 2     RDW 02/28/2022 14 5     MPV 02/28/2022 9 3     Platelets 73/41/5915 325     nRBC 02/28/2022 0     Neutrophils Relative 02/28/2022 73     Immat GRANS % 02/28/2022 1     Lymphocytes Relative 02/28/2022 12*    Monocytes Relative 02/28/2022 11     Eosinophils Relative 02/28/2022 2     Basophils Relative 02/28/2022 1     Neutrophils Absolute 02/28/2022 4 73     Immature Grans Absolute 02/28/2022 0 06     Lymphocytes Absolute 02/28/2022 0 79     Monocytes Absolute 02/28/2022 0 69     Eosinophils Absolute 02/28/2022 0 13     Basophils Absolute 02/28/2022 0 04     Protime 02/28/2022 14 1     INR 02/28/2022 1 09     PTT 02/28/2022 28     Sodium 02/28/2022 137     Potassium 02/28/2022 3 6     Chloride 02/28/2022 99*    CO2 02/28/2022 25     ANION GAP 02/28/2022 13     BUN 02/28/2022 19     Creatinine 02/28/2022 1 08     Glucose 02/28/2022 114     Calcium 02/28/2022 9 0     Corrected Calcium 02/28/2022 9 6     AST 02/28/2022 27     ALT 02/28/2022 27     Alkaline Phosphatase 02/28/2022 171*    Total Protein 02/28/2022 7 1     Albumin 02/28/2022 3 3*    Total Bilirubin 02/28/2022 0 90     eGFR 02/28/2022 68     Lipase 02/28/2022 74     LACTIC ACID 02/28/2022 1 9     TSH 3RD GENERATON 02/28/2022 7 497*    Ventricular Rate 02/28/2022 69     Atrial Rate 02/28/2022 70     MN Interval 02/28/2022 140     QRSD Interval 02/28/2022 78     QT Interval 02/28/2022 408     QTC Interval 02/28/2022 437     P Axis 02/28/2022 8     QRS Axis 02/28/2022 0     T Wave Axis 02/28/2022 37     Free T4 02/28/2022 0 99     Magnesium 03/02/2022 1 9     Phosphorus 03/02/2022 3 0     WBC 03/02/2022 4 77     RBC 03/02/2022 3 36*    Hemoglobin 03/02/2022 10 4*    Hematocrit 03/02/2022 29 9*    MCV 03/02/2022 89     MCH 03/02/2022 31 0     MCHC 03/02/2022 34 8     RDW 03/02/2022 15 2*    Platelets 32/60/5848 294     MPV 03/02/2022 9 4     Sodium 03/02/2022 139     Potassium 03/02/2022 3 3*    Chloride 03/02/2022 105     CO2 03/02/2022 25     ANION GAP 03/02/2022 9     BUN 03/02/2022 13     Creatinine 03/02/2022 0 74     Glucose 03/02/2022 82     Calcium 03/02/2022 8 3     eGFR 03/02/2022 92      Results for Raghav Yuan (MRN [id number]) as of 3/2/2022 12:16   Ref   Range 2/28/2022 13:33 3/2/2022 04:31   Sodium Latest Ref Range: 136 - 145 mmol/L 137 139   Potassium Latest Ref Range: 3 5 - 5 3 mmol/L 3 6 3 3 (L)   Chloride Latest Ref Range: 100 - 108 mmol/L 99 (L) 105   CO2 Latest Ref Range: 21 - 32 mmol/L 25 25   Anion Gap Latest Ref Range: 4 - 13 mmol/L 13 9   BUN Latest Ref Range: 5 - 25 mg/dL 19 13   Creatinine Latest Ref Range: 0 60 - 1 30 mg/dL 1 08 0 74   Glucose, Random Latest Ref Range: 65 - 140 mg/dL 114 82   Calcium Latest Ref Range: 8 3 - 10 1 mg/dL 9 0 8 3   CORRECTED CALCIUM Latest Ref Range: 8 3 - 10 1 mg/dL 9 6    AST Latest Ref Range: 5 - 45 U/L 27    ALT Latest Ref Range: 12 - 78 U/L 27    Alkaline Phosphatase Latest Ref Range: 46 - 116 U/L 171 (H)    Total Protein Latest Ref Range: 6 4 - 8 2 g/dL 7 1    Albumin Latest Ref Range: 3 5 - 5 0 g/dL 3 3 (L)    TOTAL BILIRUBIN Latest Ref Range: 0 20 - 1 00 mg/dL 0 90    eGFR Latest Units: ml/min/1 73sq m 68 92   Phosphorus Latest Ref Range: 2 3 - 4 1 mg/dL  3 0   Magnesium Latest Ref Range: 1 6 - 2 6 mg/dL  1 9   Lipase Latest Ref Range: 73 - 393 u/L 74    LACTIC ACID Latest Ref Range: 0 5 - 2 0 mmol/L 1 9    WBC Latest Ref Range: 4 31 - 10 16 Thousand/uL 6 44 4 77   Red Blood Cell Count Latest Ref Range: 3 88 - 5 62 Million/uL 4 05 3 36 (L)   Hemoglobin Latest Ref Range: 12 0 - 17 0 g/dL 12 5 10 4 (L)   HCT Latest Ref Range: 36 5 - 49 3 % 35 5 (L) 29 9 (L)   MCV Latest Ref Range: 82 - 98 fL 88 89   MCH Latest Ref Range: 26 8 - 34 3 pg 30 9 31 0   MCHC Latest Ref Range: 31 4 - 37 4 g/dL 35 2 34 8   RDW Latest Ref Range: 11 6 - 15 1 % 14 5 15 2 (H)   Platelet Count Latest Ref Range: 149 - 390 Thousands/uL 325 294   MPV Latest Ref Range: 8 9 - 12 7 fL 9 3 9 4   nRBC Latest Units: /100 WBCs 0    Neutrophils % Latest Ref Range: 43 - 75 % 73    Immat GRANS % Latest Ref Range: 0 - 2 % 1    Lymphocytes Relative Latest Ref Range: 14 - 44 % 12 (L)    Monocytes Relative Latest Ref Range: 4 - 12 % 11    Eosinophils Latest Ref Range: 0 - 6 % 2    Basophils Relative Latest Ref Range: 0 - 1 % 1    Immature Grans Absolute Latest Ref Range: 0 00 - 0 20 Thousand/uL 0 06    Absolute Neutrophils Latest Ref Range: 1 85 - 7 62 Thousands/µL 4 73    Lymphocytes Absolute Latest Ref Range: 0 60 - 4 47 Thousands/µL 0 79    Absolute Monocytes Latest Ref Range: 0 17 - 1 22 Thousand/µL 0 69    Absolute Eosinophils Latest Ref Range: 0 00 - 0 61 Thousand/µL 0 13    Basophils Absolute Latest Ref Range: 0 00 - 0 10 Thousands/µL 0 04    PROTIME Latest Ref Range: 11 6 - 14 5 seconds 14 1    POCT INR Latest Ref Range: 0 84 - 1 19  1 09    PTT Latest Ref Range: 23 - 37 seconds 28    TSH 3RD GENERATON Latest Ref Range: 0 358 - 3 740 uIU/mL 7 497 (H)    Free T4 Latest Ref Range: 0 76 - 1 46 ng/dL 0 99        Assessment/Plan:    1   Recently diagnosed metastatic gastric cancer associated with poor oral intake and early satiety, for which patient also recently began chemotherapy; patient anticipated to have continue difficulty tolerating adequate nutritional intake for the near future with continued chemotherapy anticipated    -will plan for PEG tube placement tomorrow, discussed with Dr Pasha Garay yesterday; reviewed patient's case with Dr Cande Reeder as well    -diet as tolerated today, NPO after midnight    -PEG tube procedure was explained in detail to the patient at this time including associated risks and benefits; 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

## 2022-03-02 NOTE — TELEPHONE ENCOUNTER
Nader Lisa, patient now in hospital, does not need anything from us    Advised her to call back if she needs us

## 2022-03-02 NOTE — SOCIAL WORK
Ankur Villanueva LUCY met with patient/family to introduce the role of Ankur AYALA in their care/treatment  Patient/family were given the opportunity to have their questions/concerns addressed  Ankur AYALA encouraged patient/family to reach out to SW as needed for support and/or resources as needed  Palliative LSW saw patient at the bedside today  LSW appreciates the opportunity to provide patient/family with inpatient emotional support and guidance while patient continues to receive medical attention from the medical team      Topics discussed: Met with pt at bedside  Pt in good spirits and states he is very happy with the care he has received while being in the hospital  He was pleased he was able to receive ashes today for [facility] Wednesday  He feels his pain is also greatly improved today and is satisfied with his current medication regimen  He denies any other immediate needs  Pt feels he is coping well emotionally at this time  He is well-supported by family and friends  Pt has been  to his wife Jaden March for over 27 years  Pt has 3 biological children, 2 step-children, and grandchildren  Pt's wife and grandchildren will be coming in to visit with him this afternoon  Pt worked as an internal medicine physician prior  Pt referenced Shannon Coffey and the stages of grief during conversation since his diagnosis  Emotional support provided  Pt states he plans to continue with his chemo txs and "fight "     Pt discussed being agreeable to continued visits from [facility] for emotional support      Areas that need follow-up: Emotional Support  Resources given: None  Others present: None      LSW will continue to follow as requested by the medical team, patient, or family

## 2022-03-02 NOTE — PROGRESS NOTES
[facility]  Progress Note - Damion Horton 1950, 70 y o  male MRN: [id number]  Unit/Bed#: S -01 Encounter: [id number]  Primary Care Provider: Shawn Gregg DO   Date and time admitted to hospital: 2/28/2022 12:12 PM    * FTT (failure to thrive) in adult  Assessment & Plan  · Significantly decreased oral intake, poor appetite and significant unintentional weight loss in last 1 month in the setting of recently diagnosed stomach cancer with recent initiation of treatment  · Plan for PEG tube placement with GI endoscopically 3/3/22  NPO after midnight  Gentle IV hydration to started midnight while NPO  · Nutrition consulted - appreciate recommendations  · Continue diet as ordered for now  · Monitor lytes and replete as needed  Potassium repleted    Moderate protein-calorie malnutrition (HCC)  Assessment & Plan  Malnutrition Findings:   Adult Malnutrition type: Acute illness (related to catabolic illness, inadequate oral intake as evidenced by  weight decrease of 4 7 % x 1 month, 11 9% weight decrease x 1 5 months, < 75% of estimated nutritional needs for > 7 days  Currently treated with nutrition education, supplementation )  Adult Degree of Malnutrition: Malnutrition of moderate degree    BMI Findings: Body mass index is 25 41 kg/m²  Cancer related pain  Assessment & Plan  · Appreciate palliative care consultation  Pain is much improved  · Medication regimen adjusted with increase in Oxycontin to TID dosing  Trial of Aqua K pad for additional comfort  · Will continue to monitor and adjust appropriately  · Bowel regimen     Stomach cancer [facility])  Assessment & Plan  · Recently diagnosed  Patient follows with Dr Power Chappell    Had one seesion of chemo prior to admission  · Plan for resumption of treatment on discharge from this hospitalization    BPH (benign prostatic hyperplasia)  Assessment & Plan  · Continue flomax    Hypothyroid  Assessment & Plan  · Maintained on levothyroxine 125mcg daily  · TSH noted to be mildly elevated at 7 497  Free T4 levels wnl, will continue current dosing with recommendations for repeat TSH in 6-8 weeks  Hypertension  Assessment & Plan  ·  Maintained on Irbesartan 300mg  Not on formulary, equivalent would be losartan 100mg  · Restarted at half dosing losartan 50 mg, will adjust as needed        VTE Pharmacologic Prophylaxis: VTE Score: 5 High Risk (Score >/= 5) - Pharmacological DVT Prophylaxis Ordered: enoxaparin (Lovenox)  Sequential Compression Devices Ordered  Patient Centered Rounds: I performed bedside rounds with nursing staff today  Discussions with Specialists or Other Care Team Provider:  Gastroenterology  Education and Discussions with Family / Patient: Updated  (daughter) at bedside  Time Spent for Care: 30 minutes  More than 50% of total time spent on counseling and coordination of care as described above  Current Length of Stay: 2 day(s)  Current Patient Status: Inpatient   Certification Statement: PEG tube placement planned for 3/3/22  Discharge Plan: Anticipate discharge in 48-72 hrs to home  Code Status: Prior    Subjective:   Patient seen and examined at bedside this morning  No acute events overnight  Patient did have some breakfast and his oral meds though did not receive antiemetics prior and now has some nausea  Denies any vomiting  Otherwise states that his abdominal pain is much better controlled on current regimen  Afebrile  Objective:     Vitals:   Temp (24hrs), Av 9 °F (36 6 °C), Min:97 7 °F (36 5 °C), Max:98 1 °F (36 7 °C)    Temp:  [97 7 °F (36 5 °C)-98 1 °F (36 7 °C)] 97 8 °F (36 6 °C)  HR:  [75-84] 84  Resp:  [18] 18  BP: (130-148)/(65-70) 148/65  SpO2:  [96 %-97 %] 96 %  Body mass index is 25 41 kg/m²  Input and Output Summary (last 24 hours):   No intake or output data in the 24 hours ending 22 1607    Physical Exam:   Physical Exam  Vitals reviewed  Constitutional:       General: He is not in acute distress  Appearance: He is not ill-appearing or toxic-appearing  Cardiovascular:      Rate and Rhythm: Normal rate and regular rhythm  Heart sounds: No murmur heard  Pulmonary:      Effort: No respiratory distress  Breath sounds: No stridor  No wheezing or rhonchi  Abdominal:      General: There is no distension  Palpations: Abdomen is soft  There is no mass  Tenderness: There is no abdominal tenderness  There is no guarding  Musculoskeletal:         General: No swelling  Skin:     General: Skin is warm and dry  Neurological:      Mental Status: Mental status is at baseline  Additional Data:     Labs:  Results from last 7 days   Lab Units 03/02/22  0431 02/28/22  1333 02/28/22  1333   WBC Thousand/uL 4 77   < > 6 44   HEMOGLOBIN g/dL 10 4*   < > 12 5   HEMATOCRIT % 29 9*   < > 35 5*   PLATELETS Thousands/uL 294   < > 325   NEUTROS PCT %  --   --  73   LYMPHS PCT %  --   --  12*   MONOS PCT %  --   --  11   EOS PCT %  --   --  2    < > = values in this interval not displayed  Results from last 7 days   Lab Units 03/02/22  0431 02/28/22  1333 02/28/22  1333   SODIUM mmol/L 139   < > 137   POTASSIUM mmol/L 3 3*   < > 3 6   CHLORIDE mmol/L 105   < > 99*   CO2 mmol/L 25   < > 25   BUN mg/dL 13   < > 19   CREATININE mg/dL 0 74   < > 1 08   ANION GAP mmol/L 9   < > 13   CALCIUM mg/dL 8 3   < > 9 0   ALBUMIN g/dL  --   --  3 3*   TOTAL BILIRUBIN mg/dL  --   --  0 90   ALK PHOS U/L  --   --  171*   ALT U/L  --   --  27   AST U/L  --   --  27   GLUCOSE RANDOM mg/dL 82   < > 114    < > = values in this interval not displayed       Results from last 7 days   Lab Units 02/28/22  1333   INR  1 09             Results from last 7 days   Lab Units 02/28/22  1333   LACTIC ACID mmol/L 1 9       Lines/Drains:  Invasive Devices  Report    Central Venous Catheter Line            Port A Cath 02/11/22 Left Subclavian 19 days Peripheral Intravenous Line            Peripheral IV 02/28/22 Right Antecubital 2 days                Central Line:  Goal for removal: N/A - Chronic PICC             Imaging: No pertinent imaging reviewed  Recent Cultures (last 7 days):         Last 24 Hours Medication List:   Current Facility-Administered Medications   Medication Dose Route Frequency Provider Last Rate    bisacodyl  5 mg Oral Daily PRN Yadi Giraldo MD      docusate sodium  100 mg Oral BID Cher Lesches, MD      enoxaparin  40 mg Subcutaneous Q24H [facility] Yadi Giraldo MD      HYDROmorphone  0 5 mg Intravenous Q3H PRN Ethelda Boehringer, CRNP      levothyroxine  125 mcg Oral Early Morning Yadi Giraldo MD      losartan  50 mg Oral Daily Yadi Giraldo MD      methylphenidate  5 mg Oral BID PRN Yadi Giraldo MD      [START ON 3/3/2022] multi-electrolyte  100 mL/hr Intravenous Continuous Yadi Giraldo MD      ondansetron  4 mg Intravenous Q6H PRN Cher Lesches, MD      oxyCODONE  10 mg Oral [facility] Ethelda Boehringer, CRNP      oxyCODONE  10 mg Oral Q4H PRN Ethelda Boehringer, CRNP      oxyCODONE  20 mg Oral Q4H PRN Ethelda Boehringer, CRNP      pantoprazole  40 mg Intravenous Q12H [address] [place], CRNP      polyethylene glycol  17 g Oral Daily Cher Lesches, MD      saliva substitute  5 spray Mouth/Throat 4x Daily PRN Ethelda Boehringer, CRNP      tamsulosin  0 4 mg Oral BID Ethelda Boehringer, JADEN          Today, Patient Was Seen By: Yadi Giraldo MD    **Please Note: This note may have been constructed using a voice recognition system  **

## 2022-03-02 NOTE — SPEECH THERAPY NOTE
Speech Language/Pathology    Speech/Language Pathology Progress Note    Patient Name: Servando Esparza  [id number] Date: 3/2/2022     Consult rec'd for speech eval and provide exercises for tongue and to maintain swallowing skills for pending PEG placement  Attempted to see pt, multiple family members and visitors present, pt requested and agreeable to hold until tomorrow        April Jessica, [address] [place] CCC-SLP  Speech Pathologist  Available via  tiger text

## 2022-03-02 NOTE — TELEPHONE ENCOUNTER
Rock Garrison called the infusion center due to the patient's hospitalization  Rock Slade was concerned because the patient is scheduled for chemo Monday 3/7 and will not be out of the hospital in time for his blood work appointment scheduled for 3/3  I informed Rock Slade that his blood work appointment has been cancelled already, but that I will reach out to his referring provider's office about making sure his blood work is done prior to his scheduled tx on 3/7

## 2022-03-02 NOTE — PLAN OF CARE
Problem: GASTROINTESTINAL - ADULT  Goal: Minimal or absence of nausea and/or vomiting  Description: INTERVENTIONS:  - Administer IV fluids if ordered to ensure adequate hydration  - Maintain NPO status until nausea and vomiting are resolved  - Nasogastric tube if ordered  - Administer ordered antiemetic medications as needed  - Provide nonpharmacologic comfort measures as appropriate  - Advance diet as tolerated, if ordered  - Consider nutrition services referral to assist patient with adequate nutrition and appropriate food choices  Outcome: Progressing  Goal: Maintains or returns to baseline bowel function  Description: INTERVENTIONS:  - Assess bowel function  - Encourage oral fluids to ensure adequate hydration  - Administer IV fluids if ordered to ensure adequate hydration  - Administer ordered medications as needed  - Encourage mobilization and activity  - Consider nutritional services referral to assist patient with adequate nutrition and appropriate food choices  Outcome: Progressing  Goal: Maintains adequate nutritional intake  Description: INTERVENTIONS:  - Monitor percentage of each meal consumed  - Identify factors contributing to decreased intake, treat as appropriate  - Assist with meals as needed  - Monitor I&O, weight, and lab values if indicated  - Obtain nutrition services referral as needed  Outcome: Progressing  Goal: Oral mucous membranes remain intact  Description: INTERVENTIONS  - Assess oral mucosa and hygiene practices  - Implement preventative oral hygiene regimen  - Implement oral medicated treatments as ordered  - Initiate Nutrition services referral as needed  Outcome: Progressing     Problem: MOBILITY - ADULT  Goal: Maintain or return to baseline ADL function  Description: INTERVENTIONS:  -  Assess patient's ability to carry out ADLs; assess patient's baseline for ADL function and identify physical deficits which impact ability to perform ADLs (bathing, care of mouth/teeth, toileting, grooming, dressing, etc )  - Assess/evaluate cause of self-care deficits   - Assess range of motion  - Assess patient's mobility; develop plan if impaired  - Assess patient's need for assistive devices and provide as appropriate  - Encourage maximum independence but intervene and supervise when necessary  - Involve family in performance of ADLs  - Assess for home care needs following discharge   - Consider OT consult to assist with ADL evaluation and planning for discharge  - Provide patient education as appropriate  Outcome: Progressing     Problem: Nutrition/Hydration-ADULT  Goal: Nutrient/Hydration intake appropriate for improving, restoring or maintaining nutritional needs  Description: Monitor and assess patient's nutrition/hydration status for malnutrition  Collaborate with interdisciplinary team and initiate plan and interventions as ordered  Monitor patient's weight and dietary intake as ordered or per policy  Utilize nutrition screening tool and intervene as necessary  Determine patient's food preferences and provide high-protein, high-caloric foods as appropriate       INTERVENTIONS:  - Monitor oral intake, urinary output, labs, and treatment plans  - Assess nutrition and hydration status and recommend course of action  - Evaluate amount of meals eaten  - Assist patient with eating if necessary   - Allow adequate time for meals  - Recommend/ encourage appropriate diets, oral nutritional supplements, and vitamin/mineral supplements  - Order, calculate, and assess calorie counts as needed  - Recommend, monitor, and adjust tube feedings and TPN/PPN based on assessed needs  - Assess need for intravenous fluids  - Provide specific nutrition/hydration education as appropriate  - Include patient/family/caregiver in decisions related to nutrition  Outcome: Progressing

## 2022-03-02 NOTE — ASSESSMENT & PLAN NOTE
·  Maintained on Irbesartan 300mg    Not on formulary, equivalent would be losartan 100mg  · Restarted at half dosing losartan 50 mg, will adjust as needed

## 2022-03-02 NOTE — ASSESSMENT & PLAN NOTE
· Maintained on levothyroxine 125mcg daily  · TSH noted to be mildly elevated at 7 497  Free T4 levels wnl, will continue current dosing with recommendations for repeat TSH in 6-8 weeks

## 2022-03-02 NOTE — PLAN OF CARE
Problem: GASTROINTESTINAL - ADULT  Goal: Minimal or absence of nausea and/or vomiting  Description: INTERVENTIONS:  - Administer IV fluids if ordered to ensure adequate hydration  - Maintain NPO status until nausea and vomiting are resolved  - Nasogastric tube if ordered  - Administer ordered antiemetic medications as needed  - Provide nonpharmacologic comfort measures as appropriate  - Advance diet as tolerated, if ordered  - Consider nutrition services referral to assist patient with adequate nutrition and appropriate food choices  Outcome: Progressing     Problem: GASTROINTESTINAL - ADULT  Goal: Maintains or returns to baseline bowel function  Description: INTERVENTIONS:  - Assess bowel function  - Encourage oral fluids to ensure adequate hydration  - Administer IV fluids if ordered to ensure adequate hydration  - Administer ordered medications as needed  - Encourage mobilization and activity  - Consider nutritional services referral to assist patient with adequate nutrition and appropriate food choices  Outcome: Progressing     Problem: GASTROINTESTINAL - ADULT  Goal: Maintains adequate nutritional intake  Description: INTERVENTIONS:  - Monitor percentage of each meal consumed  - Identify factors contributing to decreased intake, treat as appropriate  - Assist with meals as needed  - Monitor I&O, weight, and lab values if indicated  - Obtain nutrition services referral as needed  Outcome: Progressing    Problem: GASTROINTESTINAL - ADULT  Goal: Oral mucous membranes remain intact  Description: INTERVENTIONS  - Assess oral mucosa and hygiene practices  - Implement preventative oral hygiene regimen  - Implement oral medicated treatments as ordered  - Initiate Nutrition services referral as needed  Outcome: Progressing     Problem: Nutrition/Hydration-ADULT  Goal: Nutrient/Hydration intake appropriate for improving, restoring or maintaining nutritional needs  Description: Monitor and assess patient's nutrition/hydration status for malnutrition  Collaborate with interdisciplinary team and initiate plan and interventions as ordered  Monitor patient's weight and dietary intake as ordered or per policy  Utilize nutrition screening tool and intervene as necessary  Determine patient's food preferences and provide high-protein, high-caloric foods as appropriate       INTERVENTIONS:  - Monitor oral intake, urinary output, labs, and treatment plans  - Assess nutrition and hydration status and recommend course of action  - Evaluate amount of meals eaten  - Assist patient with eating if necessary   - Allow adequate time for meals  - Recommend/ encourage appropriate diets, oral nutritional supplements, and vitamin/mineral supplements  - Order, calculate, and assess calorie counts as needed  - Recommend, monitor, and adjust tube feedings and TPN/PPN based on assessed needs  - Assess need for intravenous fluids  - Provide specific nutrition/hydration education as appropriate  - Include patient/family/caregiver in decisions related to nutrition  Outcome: Progressing

## 2022-03-02 NOTE — ASSESSMENT & PLAN NOTE
· Recently diagnosed  Patient follows with Dr Man Vargas    Had one seesion of chemo prior to admission  · Plan for resumption of treatment on discharge from this hospitalization

## 2022-03-02 NOTE — TELEPHONE ENCOUNTER
Jan Au from [facility] calling in request a copy of  patients Guarding report fax to [phone] attention to Jan Au     Best call back number [phone]

## 2022-03-02 NOTE — TELEPHONE ENCOUNTER
Can you please send this  I do not have access to Dr Darin Franks portal for results and I do not see them in Epic   Thanks

## 2022-03-02 NOTE — PROGRESS NOTES
Progress Note - Palliative & Supportive Care  Sidra Jeong  70 y o   male  S /S -01   MRN: [id number]  Encounter: [id number]     Assessment  Gastric adenocarcinoma  Cancer related pain  Palliative care patient  Failure to thrive  Xerostomia  GERD  Anxiety  Poor PO intake  Weight loss  Goals of care counseling        Plan:  1  Symptom management   Cancer related pain   Pain well controlled today  No medication changes  OMEs for 3/1/22 were 110mg  · Increase Oxycontin 10 mg Q8H scheduled  · Continue Oxycodone 10mg PO Q4H PRN for moderate pain (home regimen)  · Continue Oxycodone 20mg PO Q4H PRN for severe pain (home regimen)  · Continue Hydromorphone 0 5 mg Q 4 hours prn breakthrough pain      Xerostomia  · Mouth Kote 5 sprays QID PRN  ·   Poor PO intake/Weight loss  · Has discontinued all appetite stimulants  · PEG being placed tomorrow    2  Goals   · Disease focused care  · Patient wishes to have a PEG placed and proceed with all cancer directed care  · Encouraged follow up with Palliative Medicine on an outpatient basis after discharge for continued symptom management  Our office will contact patient to schedule a hospital follow up  · Agreeable to seeing Palliative Medicine   24 Hour History  Chart reviewed before visit  Pain has improved significantly since yesterday  He has only used 1 IV dose of Dilaudid since my visit yesterday morning  He is very pleased with pain regimen and this is what he will likely be discharged with if there are no further changes  PRN Use of Pain Medications: 4 PRN doses    He is having bowel movements  Denies nausea vomiting today  Is trying to eat small amounts  Patient reports his tongue has shrank  Feels that due to his significant rapid weight loss from cancer he is struggling to talk because the size and shape of his tongue has changed  He is also experiencing severe dry mouth which also is causing him difficulty to talk  Discussed goals of care  Patient states his goal is to fight this cancer and liver as long as possible  He does wish to proceed with PEG placement tomorrow because he understands that nutrition is vitally important in finding cancer  We discussed the fact that continuing to try to eat after PEG is placed is going to be very important  His daughter is present and supports this his decisions  Review of Systems   All other systems reviewed and are negative        Medications    Current Facility-Administered Medications:     bisacodyl (DULCOLAX) EC tablet 5 mg, 5 mg, Oral, Daily PRN, Hardeep Kelly MD, 5 mg at 03/02/22 0840    docusate sodium (COLACE) capsule 100 mg, 100 mg, Oral, BID, Partha Valencia MD, 100 mg at 03/02/22 0840    enoxaparin (LOVENOX) subcutaneous injection 40 mg, 40 mg, Subcutaneous, Q24H [address], Hardeep Kelly MD, 40 mg at 03/02/22 0840    HYDROmorphone (DILAUDID) injection 0 5 mg, 0 5 mg, Intravenous, Q3H PRN, [employer], CRNP, 0 5 mg at 03/01/22 1534    levothyroxine tablet 125 mcg, 125 mcg, Oral, Early Morning, Hardeep Kelly MD, 125 mcg at 03/02/22 0554    losartan (COZAAR) tablet 50 mg, 50 mg, Oral, Daily, Hardeep Kelly MD, 50 mg at 03/02/22 0840    methylphenidate (RITALIN) tablet 5 mg, 5 mg, Oral, BID PRN, MD Dianna Parks  [START ON 3/3/2022] multi-electrolyte (PLASMALYTE-A/ISOLYTE-S PH 7 4) IV solution, 100 mL/hr, Intravenous, Continuous, Hardeep Kelly MD    [facility]ansetron [facility]) injection 4 mg, 4 mg, Intravenous, Q6H PRN, Partha Valencia MD, 4 mg at 03/02/22 0840    oxyCODONE (OxyCONTIN) 12 hr tablet 10 mg, 10 mg, Oral, Q8H [address], Jennifer P Bloch, CRNP, 10 mg at 03/02/22 0554    oxyCODONE (ROXICODONE) oral solution 10 mg, 10 mg, Oral, Q4H PRN, [employer], CRNP, 10 mg at 03/02/22 7704    oxyCODONE (ROXICODONE) oral solution 20 mg, 20 mg, Oral, Q4H PRN, [employer], CRNP, 20 mg at 03/02/22 0839    pantoprazole (PROTONIX) injection 40 mg, 40 mg, Intravenous, Q12H [address], Jenny Henderson, CRNP, 40 mg at 03/02/22 0840    polyethylene glycol (MIRALAX) packet 17 g, 17 g, Oral, Daily, Brittany Quesada MD    tamsulosin (FLOMAX) capsule 0 4 mg, 0 4 mg, Oral, BID, CARIE GonzalesNP, 0 4 mg at 03/02/22 0840    Objective  /70 (BP Location: Left arm)   Pulse 76   Temp 97 7 °F (36 5 °C) (Oral)   Resp 18   Wt 74 3 kg (163 lb 12 8 oz)   SpO2 96%   BMI 25 41 kg/m²   Physical Exam:   Constitutional: Appears ill appearing  Comfortable and in no acute distress  Head: Normocephalic and atraumatic  Eyes: EOM are normal  No ocular discharge  No scleral icterus  Neck: no visible adenopathy or masses  Cardiac: Normal rate and rhythm, normal pulses  Respiratory: Effort normal  No stridor  No respiratory distress  No cough  Gastrointestinal: No abdominal distension  Musculoskeletal: No edema  Neurological: Alert, oriented and appropriately conversant  Skin: Dry, no diaphoresis  Psychiatric: Displays a normal mood and affect  Behavior, judgement and thought content appear normal      Lab Results:   I have personally reviewed pertinent labs  , CBC:   Lab Results   Component Value Date    WBC 4 77 03/02/2022    HGB 10 4 (L) 03/02/2022    HCT 29 9 (L) 03/02/2022    MCV 89 03/02/2022     03/02/2022    MCH 31 0 03/02/2022    MCHC 34 8 03/02/2022    RDW 15 2 (H) 03/02/2022    MPV 9 4 03/02/2022   , CMP:   Lab Results   Component Value Date    SODIUM 139 03/02/2022    K 3 3 (L) 03/02/2022     03/02/2022    CO2 25 03/02/2022    BUN 13 03/02/2022    CREATININE 0 74 03/02/2022    CALCIUM 8 3 03/02/2022    EGFR 92 03/02/2022   Imaging Studies: No new studies today  EKG, Pathology, and Other Studies: No new studies today  Counseling / Coordination of Care  Total floor / unit time spent today 40 minutes  Greater than 50% of total time was spent with the patient and / or family counseling and / or coordinating of care   A description of the counseling / coordination of care: Chart reviewed, provided medical updates, determined goals of care, discussed palliative care and symptom management, determined competency and POA/HCA, determined social/family support, provided psychosocial support  Reviewed with THOMAS GUPTA and LINDA Bates, CRNP, [facility]  Palliative & Supportive Care    Portions of this document may have been created using dictation software and as such some "sound alike" terms may have been generated by the system  Do not hesitate to contact me with any questions or clarifications

## 2022-03-02 NOTE — ASSESSMENT & PLAN NOTE
· Appreciate palliative care consultation  Pain is much improved  · Medication regimen adjusted with increase in Oxycontin to TID dosing  Trial of Aqua K pad for additional comfort    · Will continue to monitor and adjust appropriately  · Bowel regimen

## 2022-03-02 NOTE — ASSESSMENT & PLAN NOTE
· Significantly decreased oral intake, poor appetite and significant unintentional weight loss in last 1 month in the setting of recently diagnosed stomach cancer with recent initiation of treatment  · Plan for PEG tube placement with GI endoscopically 3/3/22  NPO after midnight  Gentle IV hydration to started midnight while NPO  · Nutrition consulted - appreciate recommendations  · Continue diet as ordered for now  · Monitor lytes and replete as needed    Potassium repleted

## 2022-03-03 ENCOUNTER — APPOINTMENT (INPATIENT)
Dept: GASTROENTEROLOGY | Facility: HOSPITAL | Age: 72
DRG: 375 | End: 2022-03-03
Payer: COMMERCIAL

## 2022-03-03 ENCOUNTER — ANESTHESIA (INPATIENT)
Dept: GASTROENTEROLOGY | Facility: HOSPITAL | Age: 72
DRG: 375 | End: 2022-03-03
Payer: COMMERCIAL

## 2022-03-03 ENCOUNTER — ANESTHESIA EVENT (INPATIENT)
Dept: GASTROENTEROLOGY | Facility: HOSPITAL | Age: 72
DRG: 375 | End: 2022-03-03
Payer: COMMERCIAL

## 2022-03-03 ENCOUNTER — HOSPITAL ENCOUNTER (OUTPATIENT)
Dept: INFUSION CENTER | Facility: CLINIC | Age: 72
Discharge: HOME | End: 2022-03-03

## 2022-03-03 ENCOUNTER — TELEPHONE (OUTPATIENT)
Dept: PALLIATIVE MEDICINE | Facility: CLINIC | Age: 72
End: 2022-03-03

## 2022-03-03 LAB
ANION GAP SERPL CALCULATED.3IONS-SCNC: 10 MMOL/L (ref 4–13)
BUN SERPL-MCNC: 13 MG/DL (ref 5–25)
CALCIUM SERPL-MCNC: 8.3 MG/DL (ref 8.3–10.1)
CHLORIDE SERPL-SCNC: 105 MMOL/L (ref 100–108)
CO2 SERPL-SCNC: 25 MMOL/L (ref 21–32)
CREAT SERPL-MCNC: 0.76 MG/DL (ref 0.6–1.3)
ERYTHROCYTE [DISTWIDTH] IN BLOOD BY AUTOMATED COUNT: 15.7 % (ref 11.6–15.1)
FERRITIN SERPL-MCNC: 429 NG/ML (ref 8–388)
GFR SERPL CREATININE-BSD FRML MDRD: 91 ML/MIN/1.73SQ M
GLUCOSE SERPL-MCNC: 105 MG/DL (ref 65–140)
HCT VFR BLD AUTO: 29 % (ref 36.5–49.3)
HGB BLD-MCNC: 9.6 G/DL (ref 12–17)
INR PPP: 1.11 (ref 0.84–1.19)
IRON SATN MFR SERPL: 11 % (ref 20–50)
IRON SERPL-MCNC: 28 UG/DL (ref 65–175)
MCH RBC QN AUTO: 30.8 PG (ref 26.8–34.3)
MCHC RBC AUTO-ENTMCNC: 33.1 G/DL (ref 31.4–37.4)
MCV RBC AUTO: 93 FL (ref 82–98)
PLATELET # BLD AUTO: 289 THOUSANDS/UL (ref 149–390)
PMV BLD AUTO: 9.1 FL (ref 8.9–12.7)
POTASSIUM SERPL-SCNC: 3.4 MMOL/L (ref 3.5–5.3)
PROTHROMBIN TIME: 14.3 SECONDS (ref 11.6–14.5)
RBC # BLD AUTO: 3.12 MILLION/UL (ref 3.88–5.62)
SODIUM SERPL-SCNC: 140 MMOL/L (ref 136–145)
TIBC SERPL-MCNC: 254 UG/DL (ref 250–450)
WBC # BLD AUTO: 4.04 THOUSAND/UL (ref 4.31–10.16)

## 2022-03-03 PROCEDURE — 83540 ASSAY OF IRON: CPT | Performed by: INTERNAL MEDICINE

## 2022-03-03 PROCEDURE — 80048 BASIC METABOLIC PNL TOTAL CA: CPT | Performed by: INTERNAL MEDICINE

## 2022-03-03 PROCEDURE — 83550 IRON BINDING TEST: CPT | Performed by: INTERNAL MEDICINE

## 2022-03-03 PROCEDURE — 99232 SBSQ HOSP IP/OBS MODERATE 35: CPT | Performed by: INTERNAL MEDICINE

## 2022-03-03 PROCEDURE — 43246 EGD PLACE GASTROSTOMY TUBE: CPT | Performed by: INTERNAL MEDICINE

## 2022-03-03 PROCEDURE — C9113 INJ PANTOPRAZOLE SODIUM, VIA: HCPCS | Performed by: NURSE PRACTITIONER

## 2022-03-03 PROCEDURE — 85027 COMPLETE CBC AUTOMATED: CPT | Performed by: INTERNAL MEDICINE

## 2022-03-03 PROCEDURE — 82728 ASSAY OF FERRITIN: CPT | Performed by: INTERNAL MEDICINE

## 2022-03-03 PROCEDURE — C9113 INJ PANTOPRAZOLE SODIUM, VIA: HCPCS | Performed by: INTERNAL MEDICINE

## 2022-03-03 PROCEDURE — 0DH64UZ INSERTION OF FEEDING DEVICE INTO STOMACH, PERCUTANEOUS ENDOSCOPIC APPROACH: ICD-10-PCS | Performed by: INTERNAL MEDICINE

## 2022-03-03 PROCEDURE — 92522 EVALUATE SPEECH PRODUCTION: CPT

## 2022-03-03 PROCEDURE — 85610 PROTHROMBIN TIME: CPT | Performed by: PHYSICIAN ASSISTANT

## 2022-03-03 PROCEDURE — 99232 SBSQ HOSP IP/OBS MODERATE 35: CPT | Performed by: NURSE PRACTITIONER

## 2022-03-03 RX ORDER — PROPOFOL 10 MG/ML
INJECTION, EMULSION INTRAVENOUS AS NEEDED
Status: DISCONTINUED | OUTPATIENT
Start: 2022-03-03 | End: 2022-03-03

## 2022-03-03 RX ORDER — OXYCODONE HCL 10 MG/1
10 TABLET, FILM COATED, EXTENDED RELEASE ORAL EVERY 8 HOURS SCHEDULED
Qty: 90 TABLET | Refills: 0 | Status: SHIPPED | OUTPATIENT
Start: 2022-03-03 | End: 2022-04-01 | Stop reason: SDUPTHER

## 2022-03-03 RX ORDER — CEFAZOLIN SODIUM 1 G/3ML
INJECTION, POWDER, FOR SOLUTION INTRAMUSCULAR; INTRAVENOUS AS NEEDED
Status: DISCONTINUED | OUTPATIENT
Start: 2022-03-03 | End: 2022-03-03

## 2022-03-03 RX ORDER — SODIUM CHLORIDE 9 MG/ML
INJECTION, SOLUTION INTRAVENOUS CONTINUOUS PRN
Status: DISCONTINUED | OUTPATIENT
Start: 2022-03-03 | End: 2022-03-03

## 2022-03-03 RX ORDER — POTASSIUM CHLORIDE 20 MEQ/1
40 TABLET, EXTENDED RELEASE ORAL DAILY
Status: DISCONTINUED | OUTPATIENT
Start: 2022-03-03 | End: 2022-03-06 | Stop reason: HOSPADM

## 2022-03-03 RX ORDER — OXYCODONE HCL 5 MG/5 ML
10 SOLUTION, ORAL ORAL EVERY 4 HOURS PRN
Qty: 473 ML | Refills: 0 | Status: SHIPPED | OUTPATIENT
Start: 2022-03-03 | End: 2022-03-13

## 2022-03-03 RX ADMIN — TAMSULOSIN HYDROCHLORIDE 0.4 MG: 0.4 CAPSULE ORAL at 09:02

## 2022-03-03 RX ADMIN — ONDANSETRON 4 MG: 2 INJECTION INTRAMUSCULAR; INTRAVENOUS at 09:02

## 2022-03-03 RX ADMIN — PROPOFOL 30 MG: 10 INJECTION, EMULSION INTRAVENOUS at 17:19

## 2022-03-03 RX ADMIN — OXYCODONE HYDROCHLORIDE 10 MG: 10 TABLET, FILM COATED, EXTENDED RELEASE ORAL at 21:52

## 2022-03-03 RX ADMIN — POTASSIUM CHLORIDE 40 MEQ: 1500 TABLET, EXTENDED RELEASE ORAL at 09:03

## 2022-03-03 RX ADMIN — PROPOFOL 30 MG: 10 INJECTION, EMULSION INTRAVENOUS at 17:31

## 2022-03-03 RX ADMIN — LEVOTHYROXINE SODIUM 125 MCG: 125 TABLET ORAL at 05:14

## 2022-03-03 RX ADMIN — PROPOFOL 30 MG: 10 INJECTION, EMULSION INTRAVENOUS at 17:27

## 2022-03-03 RX ADMIN — PANTOPRAZOLE SODIUM 40 MG: 40 INJECTION, POWDER, FOR SOLUTION INTRAVENOUS at 09:02

## 2022-03-03 RX ADMIN — OXYCODONE HYDROCHLORIDE 10 MG: 5 SOLUTION ORAL at 09:15

## 2022-03-03 RX ADMIN — PROPOFOL 30 MG: 10 INJECTION, EMULSION INTRAVENOUS at 17:23

## 2022-03-03 RX ADMIN — PROPOFOL 100 MG: 10 INJECTION, EMULSION INTRAVENOUS at 17:15

## 2022-03-03 RX ADMIN — SODIUM CHLORIDE, SODIUM GLUCONATE, SODIUM ACETATE, POTASSIUM CHLORIDE, MAGNESIUM CHLORIDE, SODIUM PHOSPHATE, DIBASIC, AND POTASSIUM PHOSPHATE 100 ML/HR: .53; .5; .37; .037; .03; .012; .00082 INJECTION, SOLUTION INTRAVENOUS at 00:00

## 2022-03-03 RX ADMIN — OXYCODONE HYDROCHLORIDE 10 MG: 5 SOLUTION ORAL at 14:40

## 2022-03-03 RX ADMIN — ONDANSETRON 4 MG: 2 INJECTION INTRAMUSCULAR; INTRAVENOUS at 14:40

## 2022-03-03 RX ADMIN — SODIUM CHLORIDE, SODIUM GLUCONATE, SODIUM ACETATE, POTASSIUM CHLORIDE, MAGNESIUM CHLORIDE, SODIUM PHOSPHATE, DIBASIC, AND POTASSIUM PHOSPHATE 100 ML/HR: .53; .5; .37; .037; .03; .012; .00082 INJECTION, SOLUTION INTRAVENOUS at 09:15

## 2022-03-03 RX ADMIN — ONDANSETRON 4 MG: 2 INJECTION INTRAMUSCULAR; INTRAVENOUS at 18:56

## 2022-03-03 RX ADMIN — CEFAZOLIN SODIUM 1000 MG: 1 INJECTION, POWDER, FOR SOLUTION INTRAMUSCULAR; INTRAVENOUS at 17:12

## 2022-03-03 RX ADMIN — HYDROMORPHONE HYDROCHLORIDE 0.5 MG: 1 INJECTION, SOLUTION INTRAMUSCULAR; INTRAVENOUS; SUBCUTANEOUS at 12:25

## 2022-03-03 RX ADMIN — PANTOPRAZOLE SODIUM 40 MG: 40 INJECTION, POWDER, FOR SOLUTION INTRAVENOUS at 21:52

## 2022-03-03 RX ADMIN — OXYCODONE HYDROCHLORIDE 20 MG: 5 SOLUTION ORAL at 18:55

## 2022-03-03 RX ADMIN — SODIUM CHLORIDE: 0.9 INJECTION, SOLUTION INTRAVENOUS at 17:11

## 2022-03-03 RX ADMIN — PROPOFOL 30 MG: 10 INJECTION, EMULSION INTRAVENOUS at 17:34

## 2022-03-03 RX ADMIN — TAMSULOSIN HYDROCHLORIDE 0.4 MG: 0.4 CAPSULE ORAL at 21:52

## 2022-03-03 RX ADMIN — OXYCODONE HYDROCHLORIDE 10 MG: 10 TABLET, FILM COATED, EXTENDED RELEASE ORAL at 05:15

## 2022-03-03 NOTE — ASSESSMENT & PLAN NOTE
· Significantly decreased oral intake, poor appetite and significant unintentional weight loss in last 1 month in the setting of recently diagnosed stomach cancer with recent initiation of treatment  · Plan for PEG tube placement with GI endoscopically 3/3/22  NPO   Gentle IV hydration while NPO  · Nutrition consulted - appreciate recommendations  Will start trickle tube feeds lead tomorrow morning 3/4/22 at 10 cc an hour  If patient is tolerating, will increase trickle tube feeds to 20 cc an hour in the evening and likely transition further to a bolus regimen on Saturday  · Monitor lytes and replete as needed    Potassium repleted

## 2022-03-03 NOTE — ASSESSMENT & PLAN NOTE
· Appreciate palliative care consultation  Pain is much improved on current regimen  · Medication regimen adjusted with increase in Oxycontin to TID dosing     · Will continue to monitor and adjust appropriately  · Bowel regimen - had a bowel movement on 3/2/22

## 2022-03-03 NOTE — ANESTHESIA POSTPROCEDURE EVALUATION
Post-Op Assessment Note    CV Status:  Stable  Pain Score: 0    Pain management: adequate     Mental Status:  Sleepy and awake   Hydration Status:  Stable   PONV Controlled:  None   Airway Patency:  Patent      Post Op Vitals Reviewed: Yes      Staff: CRNA         No complications documented      BP  103/55    Temp      Pulse 73   Resp 15   SpO2 95

## 2022-03-03 NOTE — ASSESSMENT & PLAN NOTE
· Recently diagnosed  Patient follows with Dr Ni Room  Had one seesion of chemo prior to admission  · Plan for resumption of treatment on discharge from this hospitalization  · Palliative care further discuss code status on 3/3/22  Patient has elected for level 3 DNR/DNI

## 2022-03-03 NOTE — TELEPHONE ENCOUNTER
Call [phone] to inquire on   Oxycodone ER ( oxycontin) possible need for Prior Auth  Per pharmacist, med is covered but unable to get Generic   Oxycontin will cost $180 out of pocket for 90 tablets after insurance  Will also need to order the Oxycontin and will be available Monday  Per pharmacist Neha Crystal would need pa  Not in stock  [facility] may be cheaper  And may be covered  This nurse did not call insurance  Call to [facility]  Also do not have in stock until Monday  [facility]  Oxycodone 5 mg/5ml  Solution covered and has been processed  Provider will notify pt/family as to above       Thank you

## 2022-03-03 NOTE — TELEPHONE ENCOUNTER
Would not get same discount  This nurse will call insurance to inquire why out of pocket costso high       [facility]   ID [phone]  Ph 7

## 2022-03-03 NOTE — PLAN OF CARE
Problem: MOBILITY - ADULT  Goal: Maintain or return to baseline ADL function  Description: INTERVENTIONS:  -  Assess patient's ability to carry out ADLs; assess patient's baseline for ADL function and identify physical deficits which impact ability to perform ADLs (bathing, care of mouth/teeth, toileting, grooming, dressing, etc )  - Assess/evaluate cause of self-care deficits   - Assess range of motion  - Assess patient's mobility; develop plan if impaired  - Assess patient's need for assistive devices and provide as appropriate  - Encourage maximum independence but intervene and supervise when necessary  - Involve family in performance of ADLs  - Assess for home care needs following discharge   - Consider OT consult to assist with ADL evaluation and planning for discharge  - Provide patient education as appropriate  Outcome: Progressing     Problem: GASTROINTESTINAL - ADULT  Goal: Minimal or absence of nausea and/or vomiting  Description: INTERVENTIONS:  - Administer IV fluids if ordered to ensure adequate hydration  - Maintain NPO status until nausea and vomiting are resolved  - Nasogastric tube if ordered  - Administer ordered antiemetic medications as needed  - Provide nonpharmacologic comfort measures as appropriate  - Advance diet as tolerated, if ordered  - Consider nutrition services referral to assist patient with adequate nutrition and appropriate food choices  Outcome: Progressing     Problem: GASTROINTESTINAL - ADULT  Goal: Maintains or returns to baseline bowel function  Description: INTERVENTIONS:  - Assess bowel function  - Encourage oral fluids to ensure adequate hydration  - Administer IV fluids if ordered to ensure adequate hydration  - Administer ordered medications as needed  - Encourage mobilization and activity  - Consider nutritional services referral to assist patient with adequate nutrition and appropriate food choices  Outcome: Progressing     Problem: GASTROINTESTINAL - ADULT  Goal: Maintains adequate nutritional intake  Description: INTERVENTIONS:  - Monitor percentage of each meal consumed  - Identify factors contributing to decreased intake, treat as appropriate  - Assist with meals as needed  - Monitor I&O, weight, and lab values if indicated  - Obtain nutrition services referral as needed  Outcome: Progressing     Problem: GASTROINTESTINAL - ADULT  Goal: Oral mucous membranes remain intact  Description: INTERVENTIONS  - Assess oral mucosa and hygiene practices  - Implement preventative oral hygiene regimen  - Implement oral medicated treatments as ordered  - Initiate Nutrition services referral as needed  Outcome: Progressing     Problem: Nutrition/Hydration-ADULT  Goal: Nutrient/Hydration intake appropriate for improving, restoring or maintaining nutritional needs  Description: Monitor and assess patient's nutrition/hydration status for malnutrition  Collaborate with interdisciplinary team and initiate plan and interventions as ordered  Monitor patient's weight and dietary intake as ordered or per policy  Utilize nutrition screening tool and intervene as necessary  Determine patient's food preferences and provide high-protein, high-caloric foods as appropriate       INTERVENTIONS:  - Monitor oral intake, urinary output, labs, and treatment plans  - Assess nutrition and hydration status and recommend course of action  - Evaluate amount of meals eaten  - Assist patient with eating if necessary   - Allow adequate time for meals  - Recommend/ encourage appropriate diets, oral nutritional supplements, and vitamin/mineral supplements  - Order, calculate, and assess calorie counts as needed  - Recommend, monitor, and adjust tube feedings and TPN/PPN based on assessed needs  - Assess need for intravenous fluids  - Provide specific nutrition/hydration education as appropriate  - Include patient/family/caregiver in decisions related to nutrition  Outcome: Progressing

## 2022-03-03 NOTE — NUTRITION
03/03/22 1126   Assessment   Timepoint Nutrition Review   Labs & Meds   Labs/Meds Review Lab values reviewed; Meds reviewed   Feeding Route   PO NPO   Adequacy of Intake   Nutrition Modality NPO   Estimated Fluid Intake   (IVF)   Recommendations/Interventions   Summary Patient NPO for scheduled PEG placement  Suggest trickle feed Jevity 1 5 x initial 24 hours when appropriate  Patient wishes bolus feeds at home once continuous feeding is established  RD to place further TF recommendations once feeding access route placed  Nutrition Recommendations Initiate EN; Other (Specify); Tube feeding recs provided  (as medically appropriate, see summary)   Nutrition Complexity Risk   Nutrition complexity level Moderate risk   Nutrition review: 03/04/22  (TF recs)   Follow up date 03/08/22

## 2022-03-03 NOTE — PROGRESS NOTES
Progress Note - Palliative & Supportive Care  Gay Farah  70 y o   male  S /S -01   MRN: [id number]  Encounter: [id number]     Assessment  Gastric adenocarcinoma  Cancer related pain  Palliative care patient  Failure to thrive  Xerostomia  GERD  Anxiety  Poor PO intake  Weight loss  Goals of care counseling        Plan:  1  Symptom management   Cancer related pain   Pain well controlled on this regimen  No medication changes  OMEs for 3/2/22 were 145mg  · Continue Oxycontin 10 mg Q8H scheduled - RX sent to pharmacy  · Continue Oxycodone 10mg PO Q4H PRN for moderate pain (home regimen) - RX sent to pharmacy  · Continue Oxycodone 20mg PO Q4H PRN for severe pain (home regimen)  · Continue Hydromorphone 0 5 mg Q 4 hours prn breakthrough pain      Xerostomia  · Mouth Kote 5 sprays QID PRN  ·    Poor PO intake/Weight loss  · Has discontinued all appetite stimulants  · PEG being placed this afternoon     2  Goals   Code status changed to level 3 today  · Disease focused care    · Patient wishes to have a PEG placed and proceed with all cancer directed care    · Encouraged follow up with Palliative Medicine on an outpatient basis after discharge for continued symptom management   Our office will contact patient to schedule a hospital follow up  · Agreeable to seeing Palliative Medicine   24 Hour History  Chart reviewed before visit  Patient is awake and alert in bed with daughter Denver Kearns present  He is in good spirits and feels prepared for procedure this afternoon  He reports his pain is well controlled on current regimen and we discuss discharging home with these medications  He is tolerating meds very well no confusion no lethargy and is having bowel movements  He understands he will need follow-up by palliative Medicine for further refills  Slept well last night  Is currently NPO for peg placement  Denies nausea/vomiting    Discussed code status  Currently a level 1  Explained CPR and intubation in depth and discussed likelihood of survival   At this time patient wishes to forego any heroic measures including CPR and intubation  Although although Isa Corey finds this decision unsettling she reports she will honor her father's wishes  Review of Systems   All other systems reviewed and are negative        Medications    Current Facility-Administered Medications:     bisacodyl (DULCOLAX) rectal suppository 10 mg, 10 mg, Rectal, Daily PRN, Saundra Lizama MD, 10 mg at 03/02/22 2001    docusate sodium (COLACE) capsule 100 mg, 100 mg, Oral, BID, Shell Gaines MD, 100 mg at 03/02/22 1824    [START ON 3/4/2022] enoxaparin (LOVENOX) subcutaneous injection 40 mg, 40 mg, Subcutaneous, Q24H [address], Saundra Lizama MD    HYDROmorphone (DILAUDID) injection 0 5 mg, 0 5 mg, Intravenous, Q3H PRN, JADEN Agosto, 0 5 mg at 03/03/22 1225    levothyroxine tablet 125 mcg, 125 mcg, Oral, Early Morning, Saundra Lizama MD, 125 mcg at 03/03/22 0514    losartan (COZAAR) tablet 50 mg, 50 mg, Oral, Daily, Saundra Lizama MD, 50 mg at 03/02/22 0840    methylphenidate (RITALIN) tablet 5 mg, 5 mg, Oral, BID PRN, Saundra Lizama MD    multi-electrolyte (PLASMALYTE-A/ISOLYTE-S PH 7 4) IV solution, 100 mL/hr, Intravenous, Continuous, Saundra Lizama MD, Last Rate: 100 mL/hr at 03/03/22 0915, 100 mL/hr at 03/03/22 0915    ondansetron (ZOFRAN) injection 4 mg, 4 mg, Intravenous, Q6H PRN, Shell Gaines MD, 4 mg at 03/03/22 0902    oxyCODONE (OxyCONTIN) 12 hr tablet 10 mg, 10 mg, Oral, Q8H [address], Jennifer P Bloch, CRNP, 10 mg at 03/03/22 0515    oxyCODONE (ROXICODONE) oral solution 10 mg, 10 mg, Oral, Q4H PRN, JADEN Agosto, 10 mg at 03/03/22 0915    oxyCODONE (ROXICODONE) oral solution 20 mg, 20 mg, Oral, Q4H PRN, JADEN Agosto, 20 mg at 03/02/22 1824    pantoprazole (PROTONIX) injection 40 mg, 40 mg, Intravenous, Q12H [address], JADEN Agosto, 40 mg at 03/03/22 0902   polyethylene glycol (MIRALAX) packet 17 g, 17 g, Oral, Daily, Wayne Azar MD    potassium chloride (K-DUR,KLOR-CON) CR tablet 40 mEq, 40 mEq, Oral, Daily, Noy Fuentes MD, 40 mEq at 03/03/22 0903    saliva substitute (MOUTH KOTE) mucosal solution 5 spray, 5 spray, Mouth/Throat, 4x Daily PRN, Ann-Marie Ling, CRNP, 5 spray at 03/02/22 1106    tamsulosin (FLOMAX) capsule 0 4 mg, 0 4 mg, Oral, BID, Ann-Marie Ling, CRNP, 0 4 mg at 03/03/22 0902    Objective  /61 (BP Location: Left arm)   Pulse 70   Temp (!) 97 4 °F (36 3 °C) (Oral)   Resp 18   Wt 74 3 kg (163 lb 12 8 oz)   SpO2 94%   BMI 25 41 kg/m²   Physical Exam:   Constitutional: Appears ill appearing  Comfortable and in no acute distress  Head: Normocephalic and atraumatic  Eyes: EOM are normal  No ocular discharge  No scleral icterus  Neck: no visible adenopathy or masses  Cardiac: Normal rate and rhythm, normal pulses  Respiratory: Effort normal  No stridor  No respiratory distress  No cough  Gastrointestinal: No abdominal distension  Musculoskeletal: No edema  Neurological: Alert, oriented and appropriately conversant  Skin: Dry, no diaphoresis  Psychiatric: Displays a normal mood and affect  Behavior, judgement and thought content appear normal      Lab Results:   I have personally reviewed pertinent labs  , CBC:   Lab Results   Component Value Date    WBC 4 04 (L) 03/03/2022    HGB 9 6 (L) 03/03/2022    HCT 29 0 (L) 03/03/2022    MCV 93 03/03/2022     03/03/2022    MCH 30 8 03/03/2022    MCHC 33 1 03/03/2022    RDW 15 7 (H) 03/03/2022    MPV 9 1 03/03/2022   , CMP:   Lab Results   Component Value Date    SODIUM 140 03/03/2022    K 3 4 (L) 03/03/2022     03/03/2022    CO2 25 03/03/2022    BUN 13 03/03/2022    CREATININE 0 76 03/03/2022    CALCIUM 8 3 03/03/2022    EGFR 91 03/03/2022   Imaging Studies: I have personally reviewed pertinent reports    EKG, Pathology, and Other Studies: I have personally reviewed pertinent reports  Counseling / Coordination of Care  Total floor / unit time spent today 30 minutes  Greater than 50% of total time was spent with the patient and / or family counseling and / or coordinating of care  A description of the counseling / coordination of care: Chart reviewed, provided medical updates, determined goals of care, discussed palliative care and symptom management, discussed code status, determined competency and POA/HCA, determined social/family support, provided psychosocial support  Reviewed with THOMAS GUPTA and ARELY Peter MSN, CRNP, [facility]  Palliative & Supportive Care    Portions of this document may have been created using dictation software and as such some "sound alike" terms may have been generated by the system  Do not hesitate to contact me with any questions or clarifications

## 2022-03-03 NOTE — ANESTHESIA PREPROCEDURE EVALUATION
Procedure:  EGD    Relevant Problems   ANESTHESIA (within normal limits)      CARDIO   (+) Hypertension      ENDO   (+) Hypothyroid      GI/HEPATIC   (+) Gastric adenocarcinoma (HCC)   (+) Stomach cancer (HCC)      /RENAL   (+) BPH (benign prostatic hyperplasia)      MUSCULOSKELETAL   (+) Inflammatory arthritis   (+) Rectus diastasis      NEURO/PSYCH   (+) Depression        Physical Exam    Airway    Mallampati score: II  TM Distance: >3 FB  Neck ROM: full     Dental       Cardiovascular  Rate: normal,     Pulmonary  Pulmonary exam normal     Other Findings  Per pt denies anything remaining that is loose or removeable      Anesthesia Plan  ASA Score- 3     Anesthesia Type- IV sedation with anesthesia with ASA Monitors  Additional Monitors:   Airway Plan:     Comment: Per patient, appropriately NPO, denies active CP/SOB/wheezing/symptoms related to heartburn/nausea/vomiting  Plan Factors-Exercise tolerance (METS): >4 METS  Chart reviewed  Patient summary reviewed  Induction- intravenous  Postoperative Plan-     Informed Consent- Anesthetic plan and risks discussed with patient  I personally reviewed this patient with the CRNA  Discussed and agreed on the Anesthesia Plan with the CRNA  Shaun Colin

## 2022-03-03 NOTE — SPEECH THERAPY NOTE
SLP Motor Speech Evaluation      Patient Name: Rosendo Sears    ETMZY'M Date: 3/3/2022     Problem List  Principal Problem:    FTT (failure to thrive) in adult  Active Problems:    Hypertension    Hypothyroid    BPH (benign prostatic hyperplasia)    [facility] ([facility] )    Cancer related pain    Moderate protein-calorie malnutrition ([facility] )      Past Medical History  Past Medical History:   Diagnosis Date    Cancer ([facility] )     gastric mass    COVID 01/2021    Disease of thyroid gland     GERD (gastroesophageal reflux disease)     History of GI diverticular bleed     Hyperlipidemia     Hypertension     Tinnitus        Past Surgical History  Past Surgical History:   Procedure Laterality Date    ADENOIDECTOMY      CHOLECYSTECTOMY LAPAROSCOPIC N/A 1/24/2022    Procedure: CHOLECYSTECTOMY LAPAROSCOPIC W/ INTRAOP CHOLANGIOGRAM;  Surgeon: Latanya Palomares DO;  [facility];  Service: General    COLONOSCOPY N/A 7/16/2016    Procedure: COLONOSCOPY;  Surgeon: Dannie Saul MD;  [facility];  Service:     FL CHOLANGIOGRAM TRANSHEPATIC  1/24/2022    FL GUIDED CENTRAL VENOUS ACCESS DEVICE INSERTION  2/11/2022    HERNIA REPAIR      PROSTATE BIOPSY      TONSILLECTOMY      TUNNELED VENOUS PORT PLACEMENT N/A 2/11/2022    Procedure: INSERTION VENOUS PORT (PORT-A-CATH); Surgeon: Jameson Oliver MD;  [facility];  Service: Surgical Oncology    UVULECTOMY           Impressions:  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 and is awaiting PEG tube placement  He is concerned re: loss of lingual muscle mass/bulk and desired exercises to maintain/increase/maximize function  Today, he presented with respiratory, phonatory and articulatory skills that are within normal limits  Exercise sheets were provided (not demonstrated)  Instruction to maintain each position and effort for a min of 5 secs for min of 5 repetitions daily was completed  Evaluation only;  no therapy indicated         HISTORY AND PHYSICAL:     Daniel Whatley is a 70 y o  male with a past medical history of GERD, htn, and recent diagnosis of gastric cancer who presents with generalized weakness and pain  As the end of January, the patient presented to [place] [address] with c/o right upper quadrant pain and a 30 pound weight loss over the previous 2 months  On February 4,  the patient had EGD with biopsy of distal esophageal mass which revealed adenocarcinoma  A PET scan was completed on February 9, which revealed extensive metastatic disease in the abdomen, right upper quadrant omentum, liver and peritoneum  He has been experiencing significant symptoms of pain, fatigue, poor appetite  He has lost approximately 60 pounds over the past 4-5 months  PEG tube insertion is planned for later today  He also c/o feeling that his tongue feels "very dry and weak "  SLP Evaluation ordered at this time to assess and maximize lingual function for speech and swallowing   Motor Speech Evaluation and lingual exercise (strength and ROM) training completed bedside       Speech and Swallowing Mechanism Exam   Facial: symmetrical  Labial: WFL  Lingual: WFL  Mandible: adequate ROM  Dentition: adequate (missing a few natural teeth)  Respiratory Support: on RA      Respiration and Phonation  Maximum Phonation Time  (Normal 15-20 seconds for healthy older adult with standard deviation of 5 5-6):  WFL (12 secs):   Able to sustain phonation counting from 1 to 42 at good volume  Sustains phonation across words, phrases, sentences and in normal conversational speech  Vocal Quality:  WNL/no dysphonia     Articulation:  Sentence Level: precise articulation  Conversational Speech:   precise articulation    Diadochokinesis: DNT    Resonation: Normal nasality (and despite uvulectomy)    INTELLIGIBILITY in CONVERSATIONAL SPEECH:  100%

## 2022-03-03 NOTE — PROGRESS NOTES
[facility]  Progress Note - Fracnois Rhodes 1950, 70 y o  male MRN: [id number]  Unit/Bed#: S -01 Encounter: [id number]  Primary Care Provider: Spenser Barnett DO   Date and time admitted to hospital: 2/28/2022 12:12 PM    * FTT (failure to thrive) in adult  Assessment & Plan  · Significantly decreased oral intake, poor appetite and significant unintentional weight loss in last 1 month in the setting of recently diagnosed stomach cancer with recent initiation of treatment  · Plan for PEG tube placement with GI endoscopically 3/3/22  NPO   Gentle IV hydration while NPO  · Nutrition consulted - appreciate recommendations  Will start trickle tube feeds lead tomorrow morning 3/4/22 at 10 cc an hour  If patient is tolerating, will increase trickle tube feeds to 20 cc an hour in the evening and likely transition further to a bolus regimen on Saturday  · Monitor lytes and replete as needed  Potassium repleted    Moderate protein-calorie malnutrition (HCC)  Assessment & Plan  Malnutrition Findings:   Adult Malnutrition type: Acute illness (related to catabolic illness, inadequate oral intake as evidenced by  weight decrease of 4 7 % x 1 month, 11 9% weight decrease x 1 5 months, < 75% of estimated nutritional needs for > 7 days  Currently treated with nutrition education, supplementation )  Adult Degree of Malnutrition: Malnutrition of moderate degree    BMI Findings: Body mass index is 25 41 kg/m²  Cancer related pain  Assessment & Plan  · Appreciate palliative care consultation  Pain is much improved on current regimen  · Medication regimen adjusted with increase in Oxycontin to TID dosing  · Will continue to monitor and adjust appropriately  · Bowel regimen - had a bowel movement on 3/2/22     Stomach cancer [facility])  Assessment & Plan  · Recently diagnosed  Patient follows with Dr Aviva Al    Had one seesion of chemo prior to admission  · Plan for resumption of treatment on discharge from this hospitalization  · Palliative care further discuss code status on 3/3/22  Patient has elected for level 3 DNR/DNI  BPH (benign prostatic hyperplasia)  Assessment & Plan  · Continue flomax    Hypothyroid  Assessment & Plan  · Maintained on levothyroxine 125mcg daily  · TSH noted to be mildly elevated at 7 497  Free T4 levels wnl, will continue current dosing with recommendations for repeat TSH in 6-8 weeks  Hypertension  Assessment & Plan  · Maintained on Irbesartan 300mg  Not on formulary, equivalent would be losartan 100mg  · Restarted at half dosing losartan 50 mg, will adjust as needed      VTE Pharmacologic Prophylaxis: VTE Score: 5 High Risk (Score >/= 5) - Pharmacological DVT Prophylaxis Ordered: enoxaparin (Lovenox)  Sequential Compression Devices Ordered  Patient Centered Rounds: I performed bedside rounds with nursing staff today  Discussions with Specialists or Other Care Team Provider: GI and nutrition services    Education and Discussions with Family / Patient: Updated  (daughter) at bedside  Time Spent for Care: 30 minutes  More than 50% of total time spent on counseling and coordination of care as described above  Current Length of Stay: 3 day(s)  Current Patient Status: Inpatient   Certification Statement: The patient will continue to require additional inpatient hospital stay due to PEG tube placement  Discharge Plan: Anticipate discharge in 48 hrs to home  Code Status: Level 3 - DNAR and DNI    Subjective:   Patient was seen and examined at bedside this morning  No acute events overnight  Patient feels well this morning  Only some mild nausea  He is currently NPO for PEG tube placement later this evening  Afebrile  Had a bowel movement yesterday      Objective:     Vitals:   Temp (24hrs), Av 7 °F (36 5 °C), Min:97 4 °F (36 3 °C), Max:97 9 °F (36 6 °C)    Temp:  [97 4 °F (36 3 °C)-97 9 °F (36 6 °C)] 97 9 °F (36 6 °C)  HR:  [70-88] 79  Resp:  [17-18] 18  BP: (109-119)/(55-63) 117/63  SpO2:  [94 %-97 %] 97 %  Body mass index is 25 41 kg/m²  Input and Output Summary (last 24 hours): Intake/Output Summary (Last 24 hours) at 3/3/2022 1619  Last data filed at 3/3/2022 0915  Gross per 24 hour   Intake 925 ml   Output --   Net 925 ml       Physical Exam:   Physical Exam  Vitals reviewed  Constitutional:       General: He is not in acute distress  Appearance: He is not ill-appearing or toxic-appearing  Cardiovascular:      Rate and Rhythm: Normal rate and regular rhythm  Heart sounds: No murmur heard  Pulmonary:      Effort: No respiratory distress  Breath sounds: No wheezing, rhonchi or rales  Abdominal:      General: There is no distension  Palpations: There is no mass  Tenderness: There is no abdominal tenderness  Musculoskeletal:         General: No swelling  Skin:     General: Skin is warm and dry  Neurological:      Mental Status: He is oriented to person, place, and time  Mental status is at baseline  Additional Data:     Labs:  Results from last 7 days   Lab Units 03/03/22  0615 03/02/22  0431 02/28/22  1333   WBC Thousand/uL 4 04*   < > 6 44   HEMOGLOBIN g/dL 9 6*   < > 12 5   HEMATOCRIT % 29 0*   < > 35 5*   PLATELETS Thousands/uL 289   < > 325   NEUTROS PCT %  --   --  73   LYMPHS PCT %  --   --  12*   MONOS PCT %  --   --  11   EOS PCT %  --   --  2    < > = values in this interval not displayed       Results from last 7 days   Lab Units 03/03/22 0615 03/02/22 0431 02/28/22  1333   SODIUM mmol/L 140   < > 137   POTASSIUM mmol/L 3 4*   < > 3 6   CHLORIDE mmol/L 105   < > 99*   CO2 mmol/L 25   < > 25   BUN mg/dL 13   < > 19   CREATININE mg/dL 0 76   < > 1 08   ANION GAP mmol/L 10   < > 13   CALCIUM mg/dL 8 3   < > 9 0   ALBUMIN g/dL  --   --  3 3*   TOTAL BILIRUBIN mg/dL  --   --  0 90   ALK PHOS U/L  --   --  171*   ALT U/L  --   --  27   AST U/L  --   --  27 GLUCOSE RANDOM mg/dL 105   < > 114    < > = values in this interval not displayed  Results from last 7 days   Lab Units 03/03/22  0615   INR  1 11             Results from last 7 days   Lab Units 02/28/22  1333   LACTIC ACID mmol/L 1 9       Lines/Drains:  Invasive Devices  Report    Central Venous Catheter Line            Port A Cath 02/11/22 Left Subclavian 20 days          Peripheral Intravenous Line            Peripheral IV 03/03/22 Left;Ventral (anterior) Forearm <1 day                Central Line:  Goal for removal: N/A - Chronic PICC             Imaging: No pertinent imaging reviewed  Recent Cultures (last 7 days):         Last 24 Hours Medication List:   Current Facility-Administered Medications   Medication Dose Route Frequency Provider Last Rate    bisacodyl  10 mg Rectal Daily PRN Noy Fuentes MD      docusate sodium  100 mg Oral BID Wayne Azar MD      [START ON 3/4/2022] enoxaparin  40 mg Subcutaneous Q24H [address] Noy Fuentes MD      HYDROmorphone  0 5 mg Intravenous Q3H PRN JADEN Bowman      levothyroxine  125 mcg Oral Early Morning Noy Fuentes MD      losartan  50 mg Oral Daily Noy Fuentes MD      methylphenidate  5 mg Oral BID PRN Noy Fuentes MD      ondansetron  4 mg Intravenous Q6H PRN Wayne Azar MD      oxyCODONE  10 mg Oral [facility] JADEN Bowman      oxyCODONE  10 mg Oral Q4H PRN JADEN Bowman      oxyCODONE  20 mg Oral Q4H PRN JADEN Bowman      pantoprazole  40 mg Intravenous Q12H [address] [place], [place]      polyethylene glycol  17 g Oral Daily Wayne Azar MD      potassium chloride  40 mEq Oral Daily Noy Fuentes MD      saliva substitute  5 spray Mouth/Throat 4x Daily PRN JADEN Bowman      tamsulosin  0 4 mg Oral BID JADEN Bowman          Today, Patient Was Seen By: Noy Fuentes MD    **Please Note: This note may have been constructed using a voice recognition system  **

## 2022-03-04 ENCOUNTER — TELEPHONE (OUTPATIENT)
Dept: GYNECOLOGIC ONCOLOGY | Facility: CLINIC | Age: 72
End: 2022-03-04

## 2022-03-04 DIAGNOSIS — C16.9 GASTRIC ADENOCARCINOMA (HCC): Primary | ICD-10-CM

## 2022-03-04 LAB
ANION GAP SERPL CALCULATED.3IONS-SCNC: 10 MMOL/L (ref 4–13)
BUN SERPL-MCNC: 13 MG/DL (ref 5–25)
CALCIUM SERPL-MCNC: 8.4 MG/DL (ref 8.3–10.1)
CHLORIDE SERPL-SCNC: 105 MMOL/L (ref 100–108)
CO2 SERPL-SCNC: 25 MMOL/L (ref 21–32)
CREAT SERPL-MCNC: 0.84 MG/DL (ref 0.6–1.3)
ERYTHROCYTE [DISTWIDTH] IN BLOOD BY AUTOMATED COUNT: 15.9 % (ref 11.6–15.1)
GFR SERPL CREATININE-BSD FRML MDRD: 88 ML/MIN/1.73SQ M
GLUCOSE SERPL-MCNC: 85 MG/DL (ref 65–140)
HCT VFR BLD AUTO: 30.9 % (ref 36.5–49.3)
HGB BLD-MCNC: 10.2 G/DL (ref 12–17)
MCH RBC QN AUTO: 30.9 PG (ref 26.8–34.3)
MCHC RBC AUTO-ENTMCNC: 33 G/DL (ref 31.4–37.4)
MCV RBC AUTO: 94 FL (ref 82–98)
PLATELET # BLD AUTO: 316 THOUSANDS/UL (ref 149–390)
PMV BLD AUTO: 9 FL (ref 8.9–12.7)
POTASSIUM SERPL-SCNC: 4 MMOL/L (ref 3.5–5.3)
RBC # BLD AUTO: 3.3 MILLION/UL (ref 3.88–5.62)
SODIUM SERPL-SCNC: 140 MMOL/L (ref 136–145)
WBC # BLD AUTO: 6.42 THOUSAND/UL (ref 4.31–10.16)

## 2022-03-04 PROCEDURE — 85027 COMPLETE CBC AUTOMATED: CPT | Performed by: INTERNAL MEDICINE

## 2022-03-04 PROCEDURE — C9113 INJ PANTOPRAZOLE SODIUM, VIA: HCPCS | Performed by: INTERNAL MEDICINE

## 2022-03-04 PROCEDURE — 99232 SBSQ HOSP IP/OBS MODERATE 35: CPT | Performed by: NURSE PRACTITIONER

## 2022-03-04 PROCEDURE — 99232 SBSQ HOSP IP/OBS MODERATE 35: CPT | Performed by: INTERNAL MEDICINE

## 2022-03-04 PROCEDURE — 80048 BASIC METABOLIC PNL TOTAL CA: CPT | Performed by: INTERNAL MEDICINE

## 2022-03-04 RX ADMIN — POTASSIUM CHLORIDE 40 MEQ: 1500 TABLET, EXTENDED RELEASE ORAL at 08:21

## 2022-03-04 RX ADMIN — DOCUSATE SODIUM 100 MG: 100 CAPSULE ORAL at 08:21

## 2022-03-04 RX ADMIN — ENOXAPARIN SODIUM 40 MG: 40 INJECTION SUBCUTANEOUS at 08:20

## 2022-03-04 RX ADMIN — OXYCODONE HYDROCHLORIDE 20 MG: 5 SOLUTION ORAL at 18:00

## 2022-03-04 RX ADMIN — OXYCODONE HYDROCHLORIDE 10 MG: 10 TABLET, FILM COATED, EXTENDED RELEASE ORAL at 06:03

## 2022-03-04 RX ADMIN — OXYCODONE HYDROCHLORIDE 10 MG: 10 TABLET, FILM COATED, EXTENDED RELEASE ORAL at 15:17

## 2022-03-04 RX ADMIN — PANTOPRAZOLE SODIUM 40 MG: 40 INJECTION, POWDER, FOR SOLUTION INTRAVENOUS at 21:02

## 2022-03-04 RX ADMIN — ONDANSETRON 4 MG: 2 INJECTION INTRAMUSCULAR; INTRAVENOUS at 08:21

## 2022-03-04 RX ADMIN — TAMSULOSIN HYDROCHLORIDE 0.4 MG: 0.4 CAPSULE ORAL at 08:21

## 2022-03-04 RX ADMIN — ONDANSETRON 4 MG: 2 INJECTION INTRAMUSCULAR; INTRAVENOUS at 15:17

## 2022-03-04 RX ADMIN — LEVOTHYROXINE SODIUM 125 MCG: 125 TABLET ORAL at 06:03

## 2022-03-04 RX ADMIN — DOCUSATE SODIUM 100 MG: 100 CAPSULE ORAL at 18:00

## 2022-03-04 RX ADMIN — POLYETHYLENE GLYCOL 3350 17 G: 17 POWDER, FOR SOLUTION ORAL at 08:21

## 2022-03-04 RX ADMIN — OXYCODONE HYDROCHLORIDE 10 MG: 10 TABLET, FILM COATED, EXTENDED RELEASE ORAL at 21:02

## 2022-03-04 RX ADMIN — OXYCODONE HYDROCHLORIDE 20 MG: 5 SOLUTION ORAL at 11:42

## 2022-03-04 RX ADMIN — PANTOPRAZOLE SODIUM 40 MG: 40 INJECTION, POWDER, FOR SOLUTION INTRAVENOUS at 08:21

## 2022-03-04 RX ADMIN — BISACODYL 10 MG: 10 SUPPOSITORY RECTAL at 15:29

## 2022-03-04 RX ADMIN — TAMSULOSIN HYDROCHLORIDE 0.4 MG: 0.4 CAPSULE ORAL at 21:01

## 2022-03-04 NOTE — CASE MANAGEMENT
Case Management Discharge Planning Note    Patient name Anne Marie Alvarado  Location S /S -41 MRN [id number]  : 1950 Date 3/4/2022       Current Admission Date: 2022  Current Admission Diagnosis:FTT (failure to thrive) in adult   Patient Active Problem List    Diagnosis Date Noted    Moderate protein-calorie malnutrition ([facility] ) 2022    FTT (failure to thrive) in adult 2022    Stomach cancer ([facility] ) 2022    Cancer related pain 2022    Encounter for central line care 2022    Gastric adenocarcinoma ([facility] ) 2022    Gastric mass 2022    Acute calculous cholecystitis 2022    Rectus diastasis 2021    Inflammatory arthritis 2021    COVID-19 2021    Hypertension 07/15/2016    Hypothyroid 07/15/2016    Depression 07/15/2016    BPH (benign prostatic hyperplasia) 07/15/2016      LOS (days): 4  Geometric Mean LOS (GMLOS) (days): 3 60  Days to GMLOS:-0 3     OBJECTIVE:  Risk of Unplanned Readmission Score: 19         Current admission status: Inpatient   Preferred Pharmacy:   [address]  Phone: [phone] Fax: [phone]    Primary Care Provider: Aline Allen DO    Primary Insurance: BLUE CROSS  Secondary Insurance: MEDICARE    DISCHARGE DETAILS:     CM met with pt and daughter to introduce self and review role  CM reviewed with them the need for VNA services the day he returns home and this has been arranged with [facility]  They will be coming to the home at 1600  (day of DC) to assist and teach bolus feedings to both pt and daughter  Daughter and pt would like for the TF to come from [facility]   CM explained the referral will be made once the dietician is able to see pt today and make the recommendations for the bolus feedings  Daughter will be transporting pt home at [place]    CM will continue to follow to assist with needs and planning for DC

## 2022-03-04 NOTE — ASSESSMENT & PLAN NOTE
· Significantly decreased oral intake, poor appetite and significant unintentional weight loss in last 1 month in the setting of recently diagnosed stomach cancer with recent initiation of treatment  · S/p PEG placement with GI endoscopically 3/3/22  · Nutrition consulted - appreciate recommendations  Will start trickle tube feeds morning of 3/4/22 at 10 cc an hour  If patient is tolerating, will increase trickle tube feeds to 20 cc an hour in the evening and likely transition further to a bolus regimen on Saturday  · Monitor lytes and replete as needed

## 2022-03-04 NOTE — PROGRESS NOTES
Progress Note - Palliative & Supportive Care  Rachel Kohler  70 y o   male  S /S -01   MRN: [id number]  Encounter: [id number]     Assessment  Gastric adenocarcinoma  Cancer related pain  Palliative care patient  Failure to thrive  Xerostomia  GERD  Anxiety  Poor PO intake  Weight loss  Goals of care counseling        Plan:  1  Symptom management   Cancer related pain   Pain well controlled on this regimen  PRN Use of Pain Medications: 4 PRN doses  No medication changes  OMEs for 3/3/22 were 100mg, lidocaine infusion used for procedure not included  Explained to patient that Oxycontin cost at pharmacy will $180 00 per month and offered to transition to a different long acting opioid however patient stated he would prefer to remain on current regimen and the $180 will not be a hardship for him at this time  Suggested they attempt to use the [employer] card to see if it would bring the cost down which they will do  Alternative long acting medications discussed included fentanyl patch (assuming he does not continue to rapidly lose weight) and MS Contin  He will keep these medications in mind for the future  · Continue Oxycontin 10 mg Q8H scheduled - RX sent to pharmacy  · Continue Oxycodone 10mg PO Q4H PRN for moderate pain (home regimen) - RX sent to pharmacy  · Continue Oxycodone 20mg PO Q4H PRN for severe pain (home regimen)  · Continue Hydromorphone 0 5 mg Q 4 hours prn breakthrough pain      Xerostomia  · Mouth Kote 5 sprays QID PRN  ·    Poor PO intake/Weight loss  · S/P PEG placement 3/3/22 - tolerated procedure well     2  Goals   Code status changed to level 3 today  · Disease focused care    · Encouraged follow up with Palliative Medicine on an outpatient basis after discharge for continued symptom management  [place] office will contact patient to schedule a hospital follow up  · Agreeable to seeing Palliative Medicine       24 Hour History  Chart reviewed before visit  Patient seen with no family present  He is comfortable and reports that he tolerated procedure well yesterday  He has a lunch tray bedside and does plan to try to eat  Currently denies pain  Has not used IV dilaudid since yesterday morning  Discussed the fact that although his insurance covers the OxyContin the out of pocket cost is $180  Discussed the option of transitioning to fentanyl patch or MS Contin however patient feels he has waited a long time to feel comfortable  He is able to afford the OxyContin at least for a few months so he wishes to continue on this regimen  Patient has had significant weight loss over last few months  Explained if that continued a fentanyl patch may not be an option later but we can discuss that when he wishes to transition to something else  Spoke to daughter Domenic Davis and explained above conversation at patient's request   She would want to transition to something less expense however just wants him to be comfortable so will agree to this plan  Review of Systems   All other systems reviewed and are negative      Medications    Current Facility-Administered Medications:     bisacodyl (DULCOLAX) rectal suppository 10 mg, 10 mg, Rectal, Daily PRN, Ashia Li MD, 10 mg at 03/02/22 2001    docusate sodium (COLACE) capsule 100 mg, 100 mg, Oral, BID, Ashia Li MD, 100 mg at 03/04/22 3332    enoxaparin (LOVENOX) subcutaneous injection 40 mg, 40 mg, Subcutaneous, Q24H [address], Ashia Li MD, 40 mg at 03/04/22 0820    HYDROmorphone (DILAUDID) injection 0 5 mg, 0 5 mg, Intravenous, Q3H PRN, Ashia Li MD, 0 5 mg at 03/03/22 1225    levothyroxine tablet 125 mcg, 125 mcg, Oral, Early Morning, Ashia Li MD, 125 mcg at 03/04/22 0603    methylphenidate (RITALIN) tablet 5 mg, 5 mg, Oral, BID PRN, Ashia Li MD    ondansetron [facility]) injection 4 mg, 4 mg, Intravenous, Q6H PRN, Ashia Li MD, 4 mg at 03/04/22 0821    oxyCODONE (OxyCONTIN) 12 hr tablet 10 mg, 10 mg, Oral, Q8H [address], Hugo Rollins MD, 10 mg at 03/04/22 0603    oxyCODONE (ROXICODONE) oral solution 10 mg, 10 mg, Oral, Q4H PRN, Hugo Rollins MD, 10 mg at 03/03/22 1440    oxyCODONE (ROXICODONE) oral solution 20 mg, 20 mg, Oral, Q4H PRN, Hugo Rollins MD, 20 mg at 03/03/22 1855    pantoprazole (PROTONIX) injection 40 mg, 40 mg, Intravenous, Q12H [address], Hugo Rollins MD, 40 mg at 03/04/22 6319    polyethylene glycol (MIRALAX) packet 17 g, 17 g, Oral, Daily, Hugo Rollins MD, 17 g at 03/04/22 6700    potassium chloride (K-DUR,KLOR-CON) CR tablet 40 mEq, 40 mEq, Oral, Daily, Hugo Rollins MD, 40 mEq at 03/04/22 0821    saliva substitute (MOUTH KOTE) mucosal solution 5 spray, 5 spray, Mouth/Throat, 4x Daily PRN, Hugo Rollins MD, 5 spray at 03/02/22 1106    tamsulosin (FLOMAX) capsule 0 4 mg, 0 4 mg, Oral, BID, Hugo Rollins MD, 0 4 mg at 03/04/22 1709    Objective  /59 (BP Location: Right arm)   Pulse 80   Temp 97 5 °F (36 4 °C) (Oral)   Resp 18   Ht 5' 7" (1 702 m)   Wt 73 9 kg (163 lb)   SpO2 96%   BMI 25 53 kg/m²   Physical Exam:   Constitutional: Appears well-developed and well-nourished  Comfortable and in no acute distress  Head: Normocephalic and atraumatic  Eyes: EOM are normal  No ocular discharge  No scleral icterus  Neck: no visible adenopathy or masses  Cardiac: Normal rate and rhythm, normal pulses  Respiratory: Effort normal  No stridor  No respiratory distress  No cough  Gastrointestinal: No abdominal distension  Musculoskeletal: No edema  Neurological: Alert, oriented and appropriately conversant  Skin: Dry, no diaphoresis  Psychiatric: Displays a normal mood and affect  Behavior, judgement and thought content appear normal      Lab Results:   I have personally reviewed pertinent labs  , CBC:   Lab Results   Component Value Date    WBC 6 42 03/04/2022    HGB 10 2 (L) 03/04/2022    HCT 30 9 (L) 03/04/2022    MCV 94 03/04/2022     03/04/2022    MCH 30 9 03/04/2022    MCHC 33 0 03/04/2022    RDW 15 9 (H) 03/04/2022    MPV 9 0 03/04/2022   , CMP:   Lab Results   Component Value Date    SODIUM 140 03/04/2022    K 4 0 03/04/2022     03/04/2022    CO2 25 03/04/2022    BUN 13 03/04/2022    CREATININE 0 84 03/04/2022    CALCIUM 8 4 03/04/2022    EGFR 88 03/04/2022   Imaging Studies: I have personally reviewed pertinent reports  EKG, Pathology, and Other Studies: I have personally reviewed pertinent reports  Counseling / Coordination of Care  Total floor / unit time spent today 40 minutes  Greater than 50% of total time was spent with the patient and / or family counseling and / or coordinating of care  A description of the counseling / coordination of care: Chart reviewed, provided medical updates, determined goals of care, discussed palliative care and symptom management, discussed code status, discussed comfort care and hospice, determined competency and POA/HCA, determined social/family support, provided psychosocial support  Reviewed with THOMAS GUPTA and ARELY Lopez, MSN, CRNP, [facility]  Palliative & Supportive Care    Portions of this document may have been created using dictation software and as such some "sound alike" terms may have been generated by the system  Do not hesitate to contact me with any questions or clarifications

## 2022-03-04 NOTE — CASE MANAGEMENT
Case Management Assessment & Discharge Planning Note    Patient name Viv Ibarra  Location S /S -01 MRN [id number]  : 1950 Date 3/4/2022       Current Admission Date: 2022  Current Admission Diagnosis:FTT (failure to thrive) in adult   Patient Active Problem List    Diagnosis Date Noted    Moderate protein-calorie malnutrition ([facility] [address] ) 2022    FTT (failure to thrive) in adult 2022    Stomach cancer ([facility] [address] ) 2022    Cancer related pain 2022    Encounter for central line care 2022    Gastric adenocarcinoma ([facility] [address] ) 2022    Gastric mass 2022    Acute calculous cholecystitis 2022    Rectus diastasis 2021    Inflammatory arthritis 2021    COVID-19 2021    Hypertension 07/15/2016    Hypothyroid 07/15/2016    Depression 07/15/2016    BPH (benign prostatic hyperplasia) 07/15/2016      LOS (days): 4  Geometric Mean LOS (GMLOS) (days): 3 60  Days to GMLOS:-0 4     OBJECTIVE:    Risk of Unplanned Readmission Score: 19         Current admission status: Inpatient  Referral Reason: VNA,Other (tube feeding supplies)    Preferred Pharmacy:   [address]EN[address]  Phone: [phone] Fax: [phone]    Primary Care Provider: Meeta Jacinto DO    Primary Insurance: BLUE CROSS  Secondary Insurance: MEDICARE    ASSESSMENT:  [address] Representative - Spouse   Primary Phone: [phone] (Mobile)  Home Phone: [phone]               Advance Directives  Does patient have a [address] [place] [address]?: No  Was patient offered paperwork?: Yes  Does patient currently have a Health Care decision maker?: Yes, please see Health Care Proxy section  Does patient have Advance Directives?: No  Was patient offered paperwork?: Yes  Primary Contact: daughter         Readmission Root Cause  30 Day Readmission: No    Patient Information  Admitted from[de-identified] Home  Mental Status: Alert  During Assessment patient was accompanied by: Daughter  Assessment information provided by[de-identified] Patient  Primary Caregiver: Self  Support Systems: Daughter,Spouse/significant other  [place] of Residence: [address] [facility] [address] [facility] do you live in?: [address] [facility] entry access options   Select all that apply : Stairs  Number of steps to enter home : 2  Do the steps have railings?: No  Type of Current Residence: 2 story home  Upon entering residence, is there a bedroom on the main floor (no further steps)?: Yes  Upon entering residence, is there a bathroom on the main floor (no further steps)?: Yes  In the last 12 months, was there a time when you were not able to pay the mortgage or rent on time?: No  In the last 12 months, was there a time when you did not have a steady place to sleep or slept in a shelter (including now)?: No  Homeless/housing insecurity resource given?: N/A  Living Arrangements: Lives w/ Spouse/significant other  Is patient a ?: No    Activities of Daily Living Prior to Admission  Functional Status: Independent  Completes ADLs independently?: Yes  Ambulates independently?: Yes  Does patient use assisted devices?: No  Does patient currently own DME?: No  Does patient have a history of Outpatient Therapy (PT/OT)?: No  Does the patient have a history of Short-Term Rehab?: No  Does patient have a history of HHC?: No  Does patient currently have [facility]?: No         Patient Information Continued  Income Source: SSI/SSD  Does patient have prescription coverage?: Yes  Within the past 12 months, you worried that your food would run out before you got the money to buy more : Never true  Within the past 12 months, the food you bought just didnt last and you didnt have money to get more : Never true  Food insecurity resource given?: N/A  Does patient receive dialysis treatments?: No  Does patient have a history of substance abuse?: No  Does patient have a history of Mental Health Diagnosis?: No         Means of Transportation  Means of Transport to Appts[de-identified] Drives Self  In the past 12 months, has lack of transportation kept you from medical appointments or from getting medications?: No  In the past 12 months, has lack of transportation kept you from meetings, work, or from getting things needed for daily living?: No  Was application for public transport provided?: N/A        DISCHARGE DETAILS:    Discharge planning discussed with[de-identified] pt nad daughter  Freedom of Choice: Yes  Comments - Freedom of Choice: Freedom of choice has been offered to both pt and daughter for VNA services and TF supplies  Daughter would like to have [facility] provide TF supplies  She would like [facility] for VNA services  CM contacted family/caregiver?: Yes  Were Treatment Team discharge recommendations reviewed with patient/caregiver?: Yes  Did patient/caregiver verbalize understanding of patient care needs?: Yes  Were patient/caregiver advised of the risks associated with not following Treatment Team discharge recommendations?: Yes    Contacts  Patient Contacts: daughter  Relationship to Patient[de-identified] Family  Contact Method: In Person  Reason/Outcome: Continuity of [address] [place] [address]         Is the patient interested in [place] [address] at discharge?: Yes  Via Archie Morocho requested[de-identified] [address] Name[de-identified] [facility] Provider[de-identified] PCP  Home Health Services Needed[de-identified] Other (comment),Evaluate Functional Status and Safety,Gait/ADL Training,Post-Op Care and Assessment  Homebound Criteria Met[de-identified] Immunosuppressed  Supporting Clincal Findings[de-identified] Limited Endurance    DME Referral Provided  Referral made for DME?: No    Other Referral/Resources/Interventions Provided:  Interventions: HHC,DME  Referral Comments: Referrals have been made to [facility] for TF supplies and [facility] for nursing       Treatment Team Recommendation: Home with [facility]  Discharge Destination Plan[de-identified] Home with Precious at Discharge : Family      ETA of Transport (Date): 03/27/22        Transfer Mode: Wheelchair       CM reviewed the availability of treatment team to discuss questions or concerns patient and/or family may have regarding  understanding medications and recognizing signs and symptoms at discharge  CM also encouraged patient to follow up with all recommended appointments after discharge  CM reviewed the information that will be provided to pt/family on the discharge instructions  Patient advised of importance for patient and family to participate in managing patient's medical well being  CM made referral to [facility] for PEG tube bolus supplies  CM will need to follow up with Ruth Gutierrez tomorrow to be sure supplies have been delivered

## 2022-03-04 NOTE — PROGRESS NOTES
[facility]  Progress Note - Rosendo Sears 1950, 70 y o  male MRN: [id number]  Unit/Bed#: S -01 Encounter: [id number]  Primary Care Provider: Chad Delgado DO   Date and time admitted to hospital: 2/28/2022 12:12 PM    * FTT (failure to thrive) in adult  Assessment & Plan  · Significantly decreased oral intake, poor appetite and significant unintentional weight loss in last 1 month in the setting of recently diagnosed stomach cancer with recent initiation of treatment  · S/p PEG placement with GI endoscopically 3/3/22  · Nutrition consulted - appreciate recommendations  Will start trickle tube feeds morning of 3/4/22 at 10 cc an hour  If patient is tolerating, will increase trickle tube feeds to 20 cc an hour in the evening and likely transition further to a bolus regimen on Saturday  · Monitor lytes and replete as needed  Moderate protein-calorie malnutrition (HCC)  Assessment & Plan  Malnutrition Findings:   Adult Malnutrition type: Acute illness (related to catabolic illness, inadequate oral intake as evidenced by  weight decrease of 4 7 % x 1 month, 11 9% weight decrease x 1 5 months, < 75% of estimated nutritional needs for > 7 days  Currently treated with nutrition education, supplementation )  Adult Degree of Malnutrition: Malnutrition of moderate degree    BMI Findings: Body mass index is 25 53 kg/m²  Cancer related pain  Assessment & Plan  · Appreciate palliative care consultation  Pain is much improved on current regimen  · Medication regimen adjusted with increase in Oxycontin to TID dosing  · Will continue to monitor and adjust appropriately  · Bowel regimen - had a bowel movement on 3/2/22     Stomach cancer [facility])  Assessment & Plan  · Recently diagnosed  Patient follows with Dr Tristian Noel  Had one seesion of chemo prior to admission  · Plan for resumption of treatment on discharge from this hospitalization    Chemotherapy planned for 3/7/22    BPH (benign prostatic hyperplasia)  Assessment & Plan  · Continue flomax    Hypothyroid  Assessment & Plan  · Maintained on levothyroxine 125mcg daily  · TSH noted to be mildly elevated at 7 497  Free T4 levels wnl, will continue current dosing with recommendations for repeat TSH in 6-8 weeks  Hypertension  Assessment & Plan  · Maintained on Irbesartan 300mg  DC ARB  BP stable        VTE Pharmacologic Prophylaxis: VTE Score: 5 High Risk (Score >/= 5) - Pharmacological DVT Prophylaxis Ordered: enoxaparin (Lovenox)  Sequential Compression Devices Ordered  Patient Centered Rounds: I performed bedside rounds with nursing staff today  Discussions with Specialists or Other Care Team Provider: Dietary and CM    Education and Discussions with Family / Patient: Updated  (daughter) at bedside  Time Spent for Care: 30 minutes  More than 50% of total time spent on counseling and coordination of care as described above  Current Length of Stay: 4 day(s)  Current Patient Status: Inpatient   Certification Statement: The patient will continue to require additional inpatient hospital stay due to tube feeding  Discharge Plan: Anticipate discharge in 24-48 hrs to home with home services  Code Status: Level 3 - DNAR and DNI    Subjective:   No acute events overnight  Patient tolerated PEG placement well yesterday evening  Oral diet resumed this AM   Afebrile  Some nausea this AM though no vomiting  Minimal discomfort  Objective:     Vitals:   Temp (24hrs), Av 8 °F (36 6 °C), Min:97 5 °F (36 4 °C), Max:98 °F (36 7 °C)    Temp:  [97 5 °F (36 4 °C)-98 °F (36 7 °C)] 97 5 °F (36 4 °C)  HR:  [71-84] 80  Resp:  [16-20] 18  BP: (103-132)/(55-64) 125/59  SpO2:  [93 %-97 %] 96 %  Body mass index is 25 53 kg/m²  Input and Output Summary (last 24 hours):      Intake/Output Summary (Last 24 hours) at 3/4/2022 1404  Last data filed at 3/3/2022 1735  Gross per 24 hour   Intake 100 ml   Output --   Net 100 ml       Physical Exam:   Physical Exam  Vitals reviewed  Constitutional:       General: He is not in acute distress  Cardiovascular:      Rate and Rhythm: Normal rate and regular rhythm  Heart sounds: No murmur heard  Pulmonary:      Effort: No respiratory distress  Breath sounds: No wheezing, rhonchi or rales  Abdominal:      General: There is no distension  Tenderness: There is no abdominal tenderness  There is no guarding  Comments: PEG tube in place  No bleeding  Musculoskeletal:         General: No swelling  Skin:     General: Skin is warm and dry  Neurological:      Mental Status: Mental status is at baseline  Additional Data:     Labs:  Results from last 7 days   Lab Units 03/04/22  0632 03/02/22 0431 02/28/22  1333   WBC Thousand/uL 6 42   < > 6 44   HEMOGLOBIN g/dL 10 2*   < > 12 5   HEMATOCRIT % 30 9*   < > 35 5*   PLATELETS Thousands/uL 316   < > 325   NEUTROS PCT %  --   --  73   LYMPHS PCT %  --   --  12*   MONOS PCT %  --   --  11   EOS PCT %  --   --  2    < > = values in this interval not displayed  Results from last 7 days   Lab Units 03/04/22  0632 03/02/22 0431 02/28/22  1333   SODIUM mmol/L 140   < > 137   POTASSIUM mmol/L 4 0   < > 3 6   CHLORIDE mmol/L 105   < > 99*   CO2 mmol/L 25   < > 25   BUN mg/dL 13   < > 19   CREATININE mg/dL 0 84   < > 1 08   ANION GAP mmol/L 10   < > 13   CALCIUM mg/dL 8 4   < > 9 0   ALBUMIN g/dL  --   --  3 3*   TOTAL BILIRUBIN mg/dL  --   --  0 90   ALK PHOS U/L  --   --  171*   ALT U/L  --   --  27   AST U/L  --   --  27   GLUCOSE RANDOM mg/dL 85   < > 114    < > = values in this interval not displayed       Results from last 7 days   Lab Units 03/03/22  0615   INR  1 11             Results from last 7 days   Lab Units 02/28/22  1333   LACTIC ACID mmol/L 1 9       Lines/Drains:  Invasive Devices  Report    Central Venous Catheter Line            Port A Cath 02/11/22 Left Subclavian 21 days Peripheral Intravenous Line            Peripheral IV 03/03/22 Left;Ventral (anterior) Forearm 1 day                Central Line:  Goal for removal: N/A - Chronic PICC             Imaging: No pertinent imaging reviewed  Recent Cultures (last 7 days):         Last 24 Hours Medication List:   Current Facility-Administered Medications   Medication Dose Route Frequency Provider Last Rate    bisacodyl  10 mg Rectal Daily PRN Angie Melvin MD      docusate sodium  100 mg Oral BID Angie Melvin MD      enoxaparin  40 mg Subcutaneous Q24H [address] Angie Melvin MD      HYDROmorphone  0 5 mg Intravenous Q3H PRN Angie Melvin MD      levothyroxine  125 mcg Oral Early Morning Angie Melvin MD      methylphenidate  5 mg Oral BID PRN Angie Melvin MD      ondansetron  4 mg Intravenous Q6H PRN Angie Melvin MD      oxyCODONE  10 mg Oral [facility] Angie Melvin MD      oxyCODONE  10 mg Oral Q4H PRN Angie Melvin MD      oxyCODONE  20 mg Oral Q4H PRN Angie Melvin MD      pantoprazole  40 mg Intravenous Q12H [address] Angie Melvin MD      polyethylene glycol  17 g Oral Daily Angie Melvin MD      potassium chloride  40 mEq Oral Daily Angie Melvin MD      saliva substitute  5 spray Mouth/Throat 4x Daily PRN Angie Melvin MD      tamsulosin  0 4 mg Oral BID Angie Melvin MD          Today, Patient Was Seen By: Pascual Mims MD    **Please Note: This note may have been constructed using a voice recognition system  **

## 2022-03-04 NOTE — UTILIZATION REVIEW
Continued Stay Review    Date: 3/2/22-3/3/22                          Current Patient Class:  IP  Current Level of Care: MS    HPI:71 y o  male with hx gastric CA recently received chemo initially admitted on 2/28/22 with failure to thrive, cancer related pain   GI following, palliative care  Assessment/Plan: 3/2 Plan for endoscopic eval with PEG tube placement 3/3  If PEG placement is not feasible due to the proximal stomach mass we can consider NG placement during endoscopic evaluation for IR placement  Abdominal exam showed previous scar from laparoscopy but no contraindication to PEG placement  Pain improved from yesterday  Appetite  Stimulants d/c'ed  Pt has severe dry mouth causing him difficulty talking   Palliative care discussed w/ pt- continuing to try to eat after PEG is placed is going to be very important  his goal is to fight this cancer and liver as long as possible  Start gentle IVF overnight , npo after MN  Pt nauseated after breakfast today  Potassium low, repleted  3/3  ST- Pt concerned re: loss of lingual muscle mass/bulk and desired exercises to maintain/increase/maximize function  presented with respiratory, phonatory and articulatory skills that are within normal limits  Exercise sheets were provided   Asim Jimenez is well controlled on current regimen  He is tolerating meds very well no confusion no lethargy and is having bowel movements  Receiving po and IV prn narcotic analgesics as well mas scheduled narcotic analgesic  3/3/ @5486 EGD with PEG placement-  IMPRESSION:  [place] 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  PEG tube placed in the gastric body  Normal duodenum  Medicine-Nutrition consulted- plan to start trickle tube feeds 3/4 at 10 ml/hr, assess tolerance and increase trickle feed to 20 ml/hr in evening 3/4 , likely transition to bolus feeds 3/5 if tolerates   Pt nauseated , receiving prn Zofran IV          Vital Signs:   Date/Time Temp Pulse Resp BP MAP (mmHg) SpO2   03/03/22 2149 98 °F (36 7 °C) 84 18 132/63 88 93 %   03/03/22 2100 -- -- -- -- -- 93 %   03/03/22 1746 -- 73 20 104/59 -- 95 %   03/03/22 1730 -- 74 16 103/55 -- 94 %   03/03/22 1626 97 6 °F (36 4 °C) 71 18 120/64 -- 97 %   03/03/22 1455 97 9 °F (36 6 °C) 79 18 117/63 83 97 %   03/03/22 0756 97 4 °F (36 3 °C) Abnormal  70 18 119/61 82 94 %   03/02/22 2129 97 8 °F (36 6 °C) 88 17 109/55 76 97 %   03/02/22 1517 97 8 °F (36 6 °C) 84 18 148/65 93 96 %   03/02/22 0718 97 7 °F (36 5 °C) 76 18 130/70 94 96 %         Pertinent Labs/Diagnostic Results:       Results from last 7 days   WBC Thousand/uL 6 42 4 04* 4 77   < > 6 44   HEMOGLOBIN g/dL 10 2* 9 6* 10 4*  --  12 5   HEMATOCRIT % 30 9* 29 0* 29 9*  --  35 5*   PLATELETS Thousands/uL 316 289 294   < > 325   NEUTROS ABS Thousands/µL  --   --   --   --  4 73    < > = values in this interval not displayed           Results from last 7 days   SODIUM mmol/L 140 140 139 137   POTASSIUM mmol/L 4 0 3 4* 3 3* 3 6   CHLORIDE mmol/L 105 105 105 99*   CO2 mmol/L 25 25 25 25   ANION GAP mmol/L 10 10 9 13   BUN mg/dL 13 13 13 19   CREATININE mg/dL 0 84 0 76 0 74 1 08   EGFR ml/min/1 73sq m 88 91 92 68   CALCIUM mg/dL 8 4 8 3 8 3 9 0   MAGNESIUM mg/dL  --   --  1 9  --    PHOSPHORUS mg/dL  --   --  3 0  --      Results from last 7 days   Lab Units 02/28/22  1333   AST U/L 27   ALT U/L 27   ALK PHOS U/L 171*   TOTAL PROTEIN g/dL 7 1   ALBUMIN g/dL 3 3*   TOTAL BILIRUBIN mg/dL 0 90         Results from last 7 days   GLUCOSE RANDOM mg/dL 85 105 82 114               Results from last 7 days   Lab Units 03/03/22  0615 02/28/22  1333   PROTIME seconds 14 3 14 1   INR  1 11 1 09   PTT seconds  --  28     Results from last 7 days   Lab Units 02/28/22  1333   TSH 3RD GENERATON uIU/mL 7 497*         Results from last 7 days   Lab Units 02/28/22  1333   LACTIC ACID mmol/L 1 9                 Results from last 7 days   Lab Units 03/03/22  0615   FERRITIN ng/mL 429*         Results from last 7 days   Lab Units 02/28/22  1333   LIPASE u/L 74       Medications:   Scheduled Medications:  docusate sodium, 100 mg, Oral, BID  enoxaparin, 40 mg, Subcutaneous, Q24H YOHANA  levothyroxine, 125 mcg, Oral, Early Morning  oxyCODONE, 10 mg, Oral, Q8H YOHANA  pantoprazole, 40 mg, Intravenous, Q12H YOHANA  polyethylene glycol, 17 g, Oral, Daily  potassium chloride, 40 mEq, Oral, Daily  tamsulosin, 0 4 mg, Oral, BID      Continuous IV Infusions:     PRN Meds:  bisacodyl, 10 mg, Rectal, Daily PRN  HYDROmorphone, 0 5 mg, Intravenous, Q3H PRN x1 3/2, x1 3/3  methylphenidate, 5 mg, Oral, BID PRN  ondansetron, 4 mg, Intravenous, Q6H PRN x3 3/2, x3 3/3  oxyCODONE, 10 mg, Oral, Q4H PRN x1 3/2, x2 3/3  oxyCODONE, 20 mg, Oral, Q4H PRN x3 3/2, x1 3/3  saliva substitute, 5 spray, Mouth/Throat, 4x Daily PRN        Discharge Plan:D    Network Utilization Review Department  ATTENTION: Please call with any questions or concerns to [phone] and carefully listen to the prompts so that you are directed to the right person  All voicemails are confidential   Harlene Pair all requests for admission clinical reviews, approved or denied determinations and any other requests to dedicated fax number below belonging to the campus where the patient is receiving treatment   List of dedicated fax numbers for the Facilities:  [address] DENIALS (Administrative/Medical Necessity) [phone]   [address] [place] [address] (Maternity/NICU/Pediatrics) 261 [facility],[address]  [address] [facility] [place] [address] [id number] [place] [address] Keily Berrios [phone] [phone]

## 2022-03-04 NOTE — NUTRITION
03/04/22 1413   Assessment   Timepoint Reassess   Labs & Meds   Labs/Meds Review Lab values reviewed; Meds reviewed  (levothyroxine, cozaar, protonix, flomax; 3/4 BMP within normal limits)   Feeding Route   PO NPO   Tube Feeding PEG tube   Formula Jevity 1 5   Formula rate 10ml/hr intial 12 hours, increased to 20 ml/hr  for next 12 hours  to establish TF tolerance   Cycle  continuous   Adequacy of Intake   Nutrition Modality EN;PO   Current Meal Intake 0-25%   Estimated Calorie Intake 0-25%   Estimated Protein Intake  0-25%   Estimated Fluid Intake 50-75%   Estimated calorie intake compared to estimated need TF initiating to establish, patient able to tolerate PO liquids   Nutrition Prognosis   Nutrition Concerns RN skin assessment reviewed   Nutrition Considerations Other (Comment)   PES Statement   Problem Continue previous diagnosis   Weight (3) Unintended weight loss NC-3 2   Related to Catabolic illness; Inadequate intake   As evidenced by: Per patient/family interview; Intake < estimated needs   Patient Nutrition Goals   Goal Comprehend education; Other (Comment)  (initiate and tolerate bolus)   Goal Status met;revised;new goal made   Recommendations/Interventions   Summary Patient requests bolus feeds at home  Suggest Jevity 1 5-  initiate on 3/5/22 ( prior to discharge) 120 ml  each bolus  at 8a, 11a, 2p, 5p, 8p, 11p  increasing bolus amount daily by 60 ml each bolus until patient is able to tolerate 6 (237 ml) cartons per day  Flush 50 ml  before and after each bolus  Home TF goal  of 6 (237) ml cartons provides 2133 kcal, 91 g protein, 1081 free water with 600 ml free water  Patient will require ~500 ml fluid via PO intake or additional flushes  Additional PO intake as able     Interventions Diet: continued as ordered;EN change to bolus   Nutrition Recommendations Adjust EN/PN   Nutrition Complexity Risk   Nutrition complexity level Moderate risk   Nutrition review: 03/07/22   Follow up date 03/07/22

## 2022-03-04 NOTE — PROGRESS NOTES
Progress Note - Sidra Jeong 70 y o  male MRN: [id number]    Unit/Bed#: S -01 Encounter: [id number]        Subjective:   Patient reports feeling well, no pain at the PEG tube site currently, recently started tube feeds at 10 cc/hour currently and denies any nausea or vomiting  Objective:     Vitals: Blood pressure 125/59, pulse 80, temperature 97 5 °F (36 4 °C), temperature source Oral, resp  rate 18, height 5' 7" (1 702 m), weight 73 9 kg (163 lb), SpO2 96 %  ,Body mass index is 25 53 kg/m²  Intake/Output Summary (Last 24 hours) at 3/4/2022 1408  Last data filed at 3/3/2022 1735  Gross per 24 hour   Intake 100 ml   Output --   Net 100 ml       Physical Exam:   General appearance: alert, appears stated age and cooperative  Lungs: clear to auscultation bilaterally, no labored breathing/accessory muscle use  Heart: regular rate and rhythm, S1, S2 normal, no murmur, click, rub or gallop  Abdomen: soft, lumbar of PEG tube noted at 3 cm with no induration bleeding or leakage, some mild tenderness around the region without guarding, rest of the abdomen is non-tender; bowel sounds normal; no masses,  no organomegaly  Extremities: no edema    Invasive Devices  Report    Central Venous Catheter Line            Port A Cath 02/11/22 Left Subclavian 21 days          Peripheral Intravenous Line            Peripheral IV 03/03/22 Left;Ventral (anterior) Forearm 1 day                Lab, Imaging and other studies: I have personally reviewed pertinent reports      Admission on 02/28/2022   Component Date Value    WBC 02/28/2022 6 44     RBC 02/28/2022 4 05     Hemoglobin 02/28/2022 12 5     Hematocrit 02/28/2022 35 5*    MCV 02/28/2022 88     MCH 02/28/2022 30 9     MCHC 02/28/2022 35 2     RDW 02/28/2022 14 5     MPV 02/28/2022 9 3     Platelets 61/66/8638 325     nRBC 02/28/2022 0     Neutrophils Relative 02/28/2022 73     Immat GRANS % 02/28/2022 1     Lymphocytes Relative 02/28/2022 12*    Monocytes Relative 02/28/2022 11     Eosinophils Relative 02/28/2022 2     Basophils Relative 02/28/2022 1     Neutrophils Absolute 02/28/2022 4 73     Immature Grans Absolute 02/28/2022 0 06     Lymphocytes Absolute 02/28/2022 0 79     Monocytes Absolute 02/28/2022 0 69     Eosinophils Absolute 02/28/2022 0 13     Basophils Absolute 02/28/2022 0 04     Protime 02/28/2022 14 1     INR 02/28/2022 1 09     PTT 02/28/2022 28     Sodium 02/28/2022 137     Potassium 02/28/2022 3 6     Chloride 02/28/2022 99*    CO2 02/28/2022 25     ANION GAP 02/28/2022 13     BUN 02/28/2022 19     Creatinine 02/28/2022 1 08     Glucose 02/28/2022 114     Calcium 02/28/2022 9 0     Corrected Calcium 02/28/2022 9 6     AST 02/28/2022 27     ALT 02/28/2022 27     Alkaline Phosphatase 02/28/2022 171*    Total Protein 02/28/2022 7 1     Albumin 02/28/2022 3 3*    Total Bilirubin 02/28/2022 0 90     eGFR 02/28/2022 68     Lipase 02/28/2022 74     LACTIC ACID 02/28/2022 1 9     TSH 3RD GENERATON 02/28/2022 7 497*    Ventricular Rate 02/28/2022 69     Atrial Rate 02/28/2022 70     IA Interval 02/28/2022 140     QRSD Interval 02/28/2022 78     QT Interval 02/28/2022 408     QTC Interval 02/28/2022 437     P Axis 02/28/2022 8     QRS Axis 02/28/2022 0     T Wave Axis 02/28/2022 37     Free T4 02/28/2022 0 99     Magnesium 03/02/2022 1 9     Phosphorus 03/02/2022 3 0     WBC 03/02/2022 4 77     RBC 03/02/2022 3 36*    Hemoglobin 03/02/2022 10 4*    Hematocrit 03/02/2022 29 9*    MCV 03/02/2022 89     MCH 03/02/2022 31 0     MCHC 03/02/2022 34 8     RDW 03/02/2022 15 2*    Platelets 17/02/3169 294     MPV 03/02/2022 9 4     Sodium 03/02/2022 139     Potassium 03/02/2022 3 3*    Chloride 03/02/2022 105     CO2 03/02/2022 25     ANION GAP 03/02/2022 9     BUN 03/02/2022 13     Creatinine 03/02/2022 0 74     Glucose 03/02/2022 82     Calcium 03/02/2022 8 3     eGFR 03/02/2022 92     Sodium 03/03/2022 140     Potassium 03/03/2022 3 4*    Chloride 03/03/2022 105     CO2 03/03/2022 25     ANION GAP 03/03/2022 10     BUN 03/03/2022 13     Creatinine 03/03/2022 0 76     Glucose 03/03/2022 105     Calcium 03/03/2022 8 3     eGFR 03/03/2022 91     WBC 03/03/2022 4 04*    RBC 03/03/2022 3 12*    Hemoglobin 03/03/2022 9 6*    Hematocrit 03/03/2022 29 0*    MCV 03/03/2022 93     MCH 03/03/2022 30 8     MCHC 03/03/2022 33 1     RDW 03/03/2022 15 7*    Platelets 01/12/4257 289     MPV 03/03/2022 9 1     Protime 03/03/2022 14 3     INR 03/03/2022 1 11     Iron Saturation 03/03/2022 11*    TIBC 03/03/2022 254     Iron 03/03/2022 28*    Ferritin 03/03/2022 429*    WBC 03/04/2022 6 42     RBC 03/04/2022 3 30*    Hemoglobin 03/04/2022 10 2*    Hematocrit 03/04/2022 30 9*    MCV 03/04/2022 94     MCH 03/04/2022 30 9     MCHC 03/04/2022 33 0     RDW 03/04/2022 15 9*    Platelets 07/84/3027 316     MPV 03/04/2022 9 0     Sodium 03/04/2022 140     Potassium 03/04/2022 4 0     Chloride 03/04/2022 105     CO2 03/04/2022 25     ANION GAP 03/04/2022 10     BUN 03/04/2022 13     Creatinine 03/04/2022 0 84     Glucose 03/04/2022 85     Calcium 03/04/2022 8 4     eGFR 03/04/2022 88      Results for Olayinka Taylor (MRN [id number]) as of 3/4/2022 14:08   Ref   Range 3/3/2022 06:15 3/3/2022 17:38 3/4/2022 06:32   Sodium Latest Ref Range: 136 - 145 mmol/L 140  140   Potassium Latest Ref Range: 3 5 - 5 3 mmol/L 3 4 (L)  4 0   Chloride Latest Ref Range: 100 - 108 mmol/L 105  105   CO2 Latest Ref Range: 21 - 32 mmol/L 25  25   Anion Gap Latest Ref Range: 4 - 13 mmol/L 10  10   BUN Latest Ref Range: 5 - 25 mg/dL 13  13   Creatinine Latest Ref Range: 0 60 - 1 30 mg/dL 0 76  0 84   Glucose, Random Latest Ref Range: 65 - 140 mg/dL 105  85   Calcium Latest Ref Range: 8 3 - 10 1 mg/dL 8 3  8 4   eGFR Latest Units: ml/min/1 73sq m 91  88   Iron Latest Ref Range: 65 - 175 ug/dL 28 (L) Ferritin Latest Ref Range: 8 - 388 ng/mL 429 (H)     Iron Saturation Latest Ref Range: 20 - 50 % 11 (L)     TIBC Latest Ref Range: 250 - 450 ug/dL 254     WBC Latest Ref Range: 4 31 - 10 16 Thousand/uL 4 04 (L)  6 42   Red Blood Cell Count Latest Ref Range: 3 88 - 5 62 Million/uL 3 12 (L)  3 30 (L)   Hemoglobin Latest Ref Range: 12 0 - 17 0 g/dL 9 6 (L)  10 2 (L)   HCT Latest Ref Range: 36 5 - 49 3 % 29 0 (L)  30 9 (L)   MCV Latest Ref Range: 82 - 98 fL 93  94   MCH Latest Ref Range: 26 8 - 34 3 pg 30 8  30 9   MCHC Latest Ref Range: 31 4 - 37 4 g/dL 33 1  33 0   RDW Latest Ref Range: 11 6 - 15 1 % 15 7 (H)  15 9 (H)   Platelet Count Latest Ref Range: 149 - 390 Thousands/uL 289  316   MPV Latest Ref Range: 8 9 - 12 7 fL 9 1  9 0   PROTIME Latest Ref Range: 11 6 - 14 5 seconds 14 3     POCT INR Latest Ref Range: 0 84 - 1 19  1 11     EGD Unknown  Attch          Assessment/Plan:    1   Gastric cancer associated with poor oral intake and early satiety; patient underwent PEG tube placement yesterday, he is anticipated to continue chemotherapy for the time being    -continue tube feeds in gradual advancement to target rate determined by nutrition    -advised patient about general tube site care recommendations, skin protection, dressing changes regularly, flushing of the tube to prevent blockage, etcetera    -continue follow-up with Oncology for treatment of his gastric cancer    - advised patient that he may continue with an oral diet as he is able to tolerate    - continue Protonix

## 2022-03-04 NOTE — ASSESSMENT & PLAN NOTE
Malnutrition Findings:   Adult Malnutrition type: Acute illness (related to catabolic illness, inadequate oral intake as evidenced by  weight decrease of 4 7 % x 1 month, 11 9% weight decrease x 1 5 months, < 75% of estimated nutritional needs for > 7 days  Currently treated with nutrition education, supplementation )  Adult Degree of Malnutrition: Malnutrition of moderate degree    BMI Findings: Body mass index is 25 53 kg/m²

## 2022-03-04 NOTE — PLAN OF CARE
Problem: GASTROINTESTINAL - ADULT  Goal: Minimal or absence of nausea and/or vomiting  Description: INTERVENTIONS:  - Administer IV fluids if ordered to ensure adequate hydration  - Maintain NPO status until nausea and vomiting are resolved  - Nasogastric tube if ordered  - Administer ordered antiemetic medications as needed  - Provide nonpharmacologic comfort measures as appropriate  - Advance diet as tolerated, if ordered  - Consider nutrition services referral to assist patient with adequate nutrition and appropriate food choices  Outcome: Progressing  Goal: Maintains or returns to baseline bowel function  Description: INTERVENTIONS:  - Assess bowel function  - Encourage oral fluids to ensure adequate hydration  - Administer IV fluids if ordered to ensure adequate hydration  - Administer ordered medications as needed  - Encourage mobilization and activity  - Consider nutritional services referral to assist patient with adequate nutrition and appropriate food choices  Outcome: Progressing  Goal: Maintains adequate nutritional intake  Description: INTERVENTIONS:  - Monitor percentage of each meal consumed  - Identify factors contributing to decreased intake, treat as appropriate  - Assist with meals as needed  - Monitor I&O, weight, and lab values if indicated  - Obtain nutrition services referral as needed  Outcome: Progressing  Goal: Oral mucous membranes remain intact  Description: INTERVENTIONS  - Assess oral mucosa and hygiene practices  - Implement preventative oral hygiene regimen  - Implement oral medicated treatments as ordered  - Initiate Nutrition services referral as needed  Outcome: Progressing  Problem: MOBILITY - ADULT  Goal: Maintain or return to baseline ADL function  Description: INTERVENTIONS:  -  Assess patient's ability to carry out ADLs; assess patient's baseline for ADL function and identify physical deficits which impact ability to perform ADLs (bathing, care of mouth/teeth, toileting, grooming, dressing, etc )  - Assess/evaluate cause of self-care deficits   - Assess range of motion  - Assess patient's mobility; develop plan if impaired  - Assess patient's need for assistive devices and provide as appropriate  - Encourage maximum independence but intervene and supervise when necessary  - Involve family in performance of ADLs  - Assess for home care needs following discharge   - Consider OT consult to assist with ADL evaluation and planning for discharge  - Provide patient education as appropriate  Outcome: Progressing     Problem: MUSCULOSKELETAL - ADULT  Goal: Maintain proper alignment of affected body part  Description: INTERVENTIONS:  - Support, maintain and protect limb and body alignment  - Provide patient/ family with appropriate education  Outcome: Progressing

## 2022-03-04 NOTE — TELEPHONE ENCOUNTER
Patient daughter is looking for his send out blood work results, she needs the report emailed to her so she can upload it to his [facility] patient portal

## 2022-03-04 NOTE — ASSESSMENT & PLAN NOTE
· Recently diagnosed  Patient follows with Dr Dirk Urbina  Had one seesion of chemo prior to admission  · Plan for resumption of treatment on discharge from this hospitalization    Chemotherapy planned for 3/7/22

## 2022-03-04 NOTE — PLAN OF CARE
Problem: MOBILITY - ADULT  Goal: Maintain or return to baseline ADL function  Description: INTERVENTIONS:  -  Assess patient's ability to carry out ADLs; assess patient's baseline for ADL function and identify physical deficits which impact ability to perform ADLs (bathing, care of mouth/teeth, toileting, grooming, dressing, etc )  - Assess/evaluate cause of self-care deficits   - Assess range of motion  - Assess patient's mobility; develop plan if impaired  - Assess patient's need for assistive devices and provide as appropriate  - Encourage maximum independence but intervene and supervise when necessary  - Involve family in performance of ADLs  - Assess for home care needs following discharge   - Consider OT consult to assist with ADL evaluation and planning for discharge  - Provide patient education as appropriate  3/4/2022 0339 by Karin Dodd RN  Outcome: Progressing  3/3/2022 1516 by Karin Dodd RN  Outcome: Progressing     Problem: GASTROINTESTINAL - ADULT  Goal: Minimal or absence of nausea and/or vomiting  Description: INTERVENTIONS:  - Administer IV fluids if ordered to ensure adequate hydration  - Maintain NPO status until nausea and vomiting are resolved  - Nasogastric tube if ordered  - Administer ordered antiemetic medications as needed  - Provide nonpharmacologic comfort measures as appropriate  - Advance diet as tolerated, if ordered  - Consider nutrition services referral to assist patient with adequate nutrition and appropriate food choices  3/4/2022 0339 by Karin Dodd RN  Outcome: Progressing  3/3/2022 1516 by Karin Dodd RN  Outcome: Progressing     Problem: GASTROINTESTINAL - ADULT  Goal: Maintains or returns to baseline bowel function  Description: INTERVENTIONS:  - Assess bowel function  - Encourage oral fluids to ensure adequate hydration  - Administer IV fluids if ordered to ensure adequate hydration  - Administer ordered medications as needed  - Encourage mobilization and activity  - Consider nutritional services referral to assist patient with adequate nutrition and appropriate food choices  3/4/2022 0339 by Davis Vazquez RN  Outcome: Progressing  3/3/2022 1516 by Davis Vazquez RN  Outcome: Progressing     Problem: GASTROINTESTINAL - ADULT  Goal: Maintains adequate nutritional intake  Description: INTERVENTIONS:  - Monitor percentage of each meal consumed  - Identify factors contributing to decreased intake, treat as appropriate  - Assist with meals as needed  - Monitor I&O, weight, and lab values if indicated  - Obtain nutrition services referral as needed  3/4/2022 0339 by Davis Vazquez RN  Outcome: Progressing  3/3/2022 1516 by Davis Vazquez RN  Outcome: Progressing     Problem: GASTROINTESTINAL - ADULT  Goal: Oral mucous membranes remain intact  Description: INTERVENTIONS  - Assess oral mucosa and hygiene practices  - Implement preventative oral hygiene regimen  - Implement oral medicated treatments as ordered  - Initiate Nutrition services referral as needed  3/4/2022 0339 by Davis Vazquez RN  Outcome: Progressing  3/3/2022 1516 by Davis Vazquez RN  Outcome: Progressing     Problem: Nutrition/Hydration-ADULT  Goal: Nutrient/Hydration intake appropriate for improving, restoring or maintaining nutritional needs  Description: Monitor and assess patient's nutrition/hydration status for malnutrition  Collaborate with interdisciplinary team and initiate plan and interventions as ordered  Monitor patient's weight and dietary intake as ordered or per policy  Utilize nutrition screening tool and intervene as necessary  Determine patient's food preferences and provide high-protein, high-caloric foods as appropriate       INTERVENTIONS:  - Monitor oral intake, urinary output, labs, and treatment plans  - Assess nutrition and hydration status and recommend course of action  - Evaluate amount of meals eaten  - Assist patient with eating if necessary   - Allow adequate time for meals  - Recommend/ encourage appropriate diets, oral nutritional supplements, and vitamin/mineral supplements  - Order, calculate, and assess calorie counts as needed  - Recommend, monitor, and adjust tube feedings and TPN/PPN based on assessed needs  - Assess need for intravenous fluids  - Provide specific nutrition/hydration education as appropriate  - Include patient/family/caregiver in decisions related to nutrition  3/4/2022 0339 by Mian Damon, RN  Outcome: Progressing  3/3/2022 1516 by Mian Damon, RN  Outcome: Progressing

## 2022-03-05 LAB
ANION GAP SERPL CALCULATED.3IONS-SCNC: 4 MMOL/L (ref 4–13)
BUN SERPL-MCNC: 13 MG/DL (ref 5–25)
CALCIUM SERPL-MCNC: 8.4 MG/DL (ref 8.3–10.1)
CHLORIDE SERPL-SCNC: 106 MMOL/L (ref 100–108)
CO2 SERPL-SCNC: 28 MMOL/L (ref 21–32)
CREAT SERPL-MCNC: 0.69 MG/DL (ref 0.6–1.3)
ERYTHROCYTE [DISTWIDTH] IN BLOOD BY AUTOMATED COUNT: 15.7 % (ref 11.6–15.1)
GFR SERPL CREATININE-BSD FRML MDRD: 95 ML/MIN/1.73SQ M
GLUCOSE SERPL-MCNC: 90 MG/DL (ref 65–140)
HCT VFR BLD AUTO: 29.3 % (ref 36.5–49.3)
HGB BLD-MCNC: 9.4 G/DL (ref 12–17)
MCH RBC QN AUTO: 30.3 PG (ref 26.8–34.3)
MCHC RBC AUTO-ENTMCNC: 32.1 G/DL (ref 31.4–37.4)
MCV RBC AUTO: 95 FL (ref 82–98)
PLATELET # BLD AUTO: 283 THOUSANDS/UL (ref 149–390)
PMV BLD AUTO: 9.2 FL (ref 8.9–12.7)
POTASSIUM SERPL-SCNC: 3.9 MMOL/L (ref 3.5–5.3)
RBC # BLD AUTO: 3.1 MILLION/UL (ref 3.88–5.62)
SODIUM SERPL-SCNC: 138 MMOL/L (ref 136–145)
WBC # BLD AUTO: 4.26 THOUSAND/UL (ref 4.31–10.16)

## 2022-03-05 PROCEDURE — 80048 BASIC METABOLIC PNL TOTAL CA: CPT | Performed by: INTERNAL MEDICINE

## 2022-03-05 PROCEDURE — 85027 COMPLETE CBC AUTOMATED: CPT | Performed by: INTERNAL MEDICINE

## 2022-03-05 PROCEDURE — 99232 SBSQ HOSP IP/OBS MODERATE 35: CPT | Performed by: INTERNAL MEDICINE

## 2022-03-05 PROCEDURE — C9113 INJ PANTOPRAZOLE SODIUM, VIA: HCPCS | Performed by: INTERNAL MEDICINE

## 2022-03-05 RX ADMIN — OXYCODONE HYDROCHLORIDE 10 MG: 10 TABLET, FILM COATED, EXTENDED RELEASE ORAL at 20:20

## 2022-03-05 RX ADMIN — TAMSULOSIN HYDROCHLORIDE 0.4 MG: 0.4 CAPSULE ORAL at 08:41

## 2022-03-05 RX ADMIN — OXYCODONE HYDROCHLORIDE 10 MG: 10 TABLET, FILM COATED, EXTENDED RELEASE ORAL at 05:15

## 2022-03-05 RX ADMIN — BISACODYL 10 MG: 10 SUPPOSITORY RECTAL at 08:41

## 2022-03-05 RX ADMIN — OXYCODONE HYDROCHLORIDE 20 MG: 5 SOLUTION ORAL at 21:47

## 2022-03-05 RX ADMIN — TAMSULOSIN HYDROCHLORIDE 0.4 MG: 0.4 CAPSULE ORAL at 20:21

## 2022-03-05 RX ADMIN — LEVOTHYROXINE SODIUM 125 MCG: 125 TABLET ORAL at 05:15

## 2022-03-05 RX ADMIN — POLYETHYLENE GLYCOL 3350 17 G: 17 POWDER, FOR SOLUTION ORAL at 08:41

## 2022-03-05 RX ADMIN — OXYCODONE HYDROCHLORIDE 10 MG: 5 SOLUTION ORAL at 09:04

## 2022-03-05 RX ADMIN — DOCUSATE SODIUM 100 MG: 100 CAPSULE ORAL at 18:28

## 2022-03-05 RX ADMIN — DOCUSATE SODIUM 100 MG: 100 CAPSULE ORAL at 08:41

## 2022-03-05 RX ADMIN — OXYCODONE HYDROCHLORIDE 10 MG: 10 TABLET, FILM COATED, EXTENDED RELEASE ORAL at 13:10

## 2022-03-05 RX ADMIN — ENOXAPARIN SODIUM 40 MG: 40 INJECTION SUBCUTANEOUS at 08:41

## 2022-03-05 RX ADMIN — ONDANSETRON 4 MG: 2 INJECTION INTRAMUSCULAR; INTRAVENOUS at 09:04

## 2022-03-05 RX ADMIN — POTASSIUM CHLORIDE 40 MEQ: 1500 TABLET, EXTENDED RELEASE ORAL at 08:41

## 2022-03-05 RX ADMIN — ONDANSETRON 4 MG: 2 INJECTION INTRAMUSCULAR; INTRAVENOUS at 15:33

## 2022-03-05 RX ADMIN — PANTOPRAZOLE SODIUM 40 MG: 40 INJECTION, POWDER, FOR SOLUTION INTRAVENOUS at 20:21

## 2022-03-05 RX ADMIN — ONDANSETRON 4 MG: 2 INJECTION INTRAMUSCULAR; INTRAVENOUS at 21:47

## 2022-03-05 RX ADMIN — PANTOPRAZOLE SODIUM 40 MG: 40 INJECTION, POWDER, FOR SOLUTION INTRAVENOUS at 08:41

## 2022-03-05 RX ADMIN — OXYCODONE HYDROCHLORIDE 20 MG: 5 SOLUTION ORAL at 14:26

## 2022-03-05 NOTE — CASE MANAGEMENT
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    Gastric mass 2022    Acute calculous cholecystitis 2022    Rectus diastasis 2021    Inflammatory arthritis 2021    COVID-19 2021    Hypertension 07/15/2016    Hypothyroid 07/15/2016    Depression 07/15/2016    BPH (benign prostatic hyperplasia) 07/15/2016      LOS (days): 5  Geometric Mean LOS (GMLOS) (days): 3 60  Days to GMLOS:-1 4     OBJECTIVE:  Risk of Unplanned Readmission Score: 18         Current admission status: Inpatient   Preferred Pharmacy:   [address] [facility] [address]  Phone: [phone] Fax: [phone]    Primary Care Provider: Meryle Nestle, DO    Primary Insurance: BLUE CROSS  Secondary Insurance: MEDICARE    DISCHARGE DETAILS:    Discharge planning discussed with[de-identified] [facility]     Comments - [place] of Choice: CM was in contact with Verónica Gamez at [address] [place] [address] who confirmed the Pt's [facility] order was received, and still in the processing phase  CM notified Verónica Gamez of the Pt's pending d/c to home tomorrow and need for [place] delivery  Verónica Gamez took CM contact information and reported she would have a supervisor follow up on the delivery status

## 2022-03-05 NOTE — ASSESSMENT & PLAN NOTE
· Significantly decreased oral intake, poor appetite and significant unintentional weight loss in last 1 month in the setting of recently diagnosed stomach cancer with recent initiation of treatment  · S/p PEG placement with GI endoscopically 3/3/22  · Nutrition consulted - appreciate recommendations  Transition to bolus feeds per nutrition recommendations on 3/5/22  Tolerating alternative means of enteral nutrition without issue  · Monitor lytes and replete as needed

## 2022-03-05 NOTE — ASSESSMENT & PLAN NOTE
· Appreciate palliative care consultation  Pain is much improved on current regimen  · Medication regimen adjusted with increase in Oxycontin to TID dosing  Continue current dosing  · Will continue to monitor and adjust appropriately  · Bowel regimen   · OOB as tolerated  Encourage ambulation

## 2022-03-05 NOTE — PROGRESS NOTES
Progress Note - [facility]  Elías Vivas 70 y o  male MRN: [id number]    Unit/Bed#: S -01 Encounter: [id number]      Assessment/Plan:  1  Gastric cancer associated with poor oral intake and early satiety  Patient has history of gastric cancer and had recent poor oral intake  Patient underwent PEG tube placement on 03/03  Patient is continuing chemotherapy  Tolerating tube feedings  No nausea or vomiting  Denies abdominal pain  Peg tube site without redness or drainage   -Patient being changed to bolus feedings per attending  -Follow-up with oncology  -Continue pantoprazole  -Continue supportive care  -Will follow as needed per attending patient being discharged tomorrow  Subjective:   Lying in bed in acute distress  Tolerating oral and and tube feedings  Denies nausea, vomiting, acid reflux, heartburn, epigastric or abdominal pain  Peg tube site without redness or drainage  Objective:     Vitals: Blood pressure 111/64, pulse 74, temperature 98 1 °F (36 7 °C), temperature source Oral, resp  rate 18, height 5' 7" (1 702 m), weight 73 9 kg (163 lb), SpO2 97 %  ,Body mass index is 25 53 kg/m²  Intake/Output Summary (Last 24 hours) at 3/5/2022 1107  Last data filed at 3/5/2022 0837  Gross per 24 hour   Intake 522 ml   Output --   Net 522 ml       Physical Exam: Physical Exam  Vitals and nursing note reviewed  Constitutional:       General: He is not in acute distress  Cardiovascular:      Rate and Rhythm: Normal rate and regular rhythm  Pulses: Normal pulses  Heart sounds: Normal heart sounds  Pulmonary:      Effort: Pulmonary effort is normal  No respiratory distress  Breath sounds: Normal breath sounds  No stridor  No wheezing, rhonchi or rales  Abdominal:      General: Bowel sounds are normal  There is no distension  Palpations: Abdomen is soft  There is no mass  Tenderness: There is no abdominal tenderness  There is no guarding or rebound        Hernia: No hernia is present  Comments: PEG tube clamped site without redness or drainage  Musculoskeletal:         General: Normal range of motion  Right lower leg: No edema  Left lower leg: No edema  Skin:     General: Skin is dry  Capillary Refill: Capillary refill takes less than 2 seconds  Coloration: Skin is not jaundiced or pale  Neurological:      Mental Status: He is alert and oriented to person, place, and time     Psychiatric:         Mood and Affect: Mood normal          Invasive Devices  Report    Central Venous Catheter Line            Port A Cath 02/11/22 Left Subclavian 21 days          Peripheral Intravenous Line            Peripheral IV 03/03/22 Left;Ventral (anterior) Forearm 1 day          Drain            Gastrostomy/Enterostomy Percutaneous endoscopic gastrostomy (PEG) 2 days                Current Facility-Administered Medications:     bisacodyl (DULCOLAX) rectal suppository 10 mg, 10 mg, Rectal, Daily PRN, Alberot Rogers MD, 10 mg at 03/05/22 0841    docusate sodium (COLACE) capsule 100 mg, 100 mg, Oral, BID, Alberto Rogers MD, 100 mg at 03/05/22 0841    enoxaparin (LOVENOX) subcutaneous injection 40 mg, 40 mg, Subcutaneous, Q24H [facility], Alberto Rogers MD, 40 mg at 03/05/22 0841    HYDROmorphone (DILAUDID) injection 0 5 mg, 0 5 mg, Intravenous, Q3H PRN, Alberto Rogers MD, 0 5 mg at 03/03/22 1225    levothyroxine tablet 125 mcg, 125 mcg, Oral, Early Morning, Alberto Rogers MD, 125 mcg at 03/05/22 0515    methylphenidate (RITALIN) tablet 5 mg, 5 mg, Oral, BID PRN, Alberto Rogers MD    ondansetron [facility]) injection 4 mg, 4 mg, Intravenous, Q6H PRN, Alberto Rogers MD, 4 mg at 03/05/22 0904    oxyCODONE (OxyCONTIN) 12 hr tablet 10 mg, 10 mg, Oral, Q8H [facility], Alberto Rogers MD, 10 mg at 03/05/22 0515    oxyCODONE (ROXICODONE) oral solution 10 mg, 10 mg, Oral, Q4H PRN, Alberto Rogers MD, 10 mg at 03/05/22 0904    oxyCODONE (ROXICODONE) oral solution 20 mg, 20 mg, Oral, Q4H PRN, Quinton Ortiz Torsten Moore MD, 20 mg at 03/04/22 1800    pantoprazole (PROTONIX) injection 40 mg, 40 mg, Intravenous, Q12H [address], Kunal Zambrano MD, 40 mg at 03/05/22 0841    polyethylene glycol (MIRALAX) packet 17 g, 17 g, Oral, Daily, Kunal Zambrano MD, 17 g at 03/05/22 0841    potassium chloride (K-DUR,KLOR-CON) CR tablet 40 mEq, 40 mEq, Oral, Daily, Kunal Zambrano MD, 40 mEq at 03/05/22 0841    saliva substitute (MOUTH KOTE) mucosal solution 5 spray, 5 spray, Mouth/Throat, 4x Daily PRN, Kunal Zambrano MD, 5 spray at 03/02/22 1106    tamsulosin (FLOMAX) capsule 0 4 mg, 0 4 mg, Oral, BID, Kunal Zambrano MD, 0 4 mg at 03/05/22 1263    Lab Results:   Recent Results (from the past 24 hour(s))   Bolus/Syringe Enteral Feeding Kit    Collection Time: 03/04/22  2:47 PM   Result Value Ref Range    Supplier Name [address]     Supplier Phone Number [phone]     Order Status Pending Further Review     Delivery Note      Delivery Request Date 03/04/2022     Item Description Enteral Feeding Kit, Bolus / Syringe     Item Description Irrigation Piston Syringe, Enteral Package    Jevity Formula    Collection Time: 03/04/22  2:50 PM   Result Value Ref Range    Supplier Name [address]     Supplier Phone Number [phone]     Order Status Pending Further Review     Delivery Note      Delivery Request Date 03/04/2022     Item Description       Jevity, 1 5 Alexis High Protein w/ Fiber, w/ Safety Screw Connector, Each (1)   CBC and Platelet    Collection Time: 03/05/22  5:10 AM   Result Value Ref Range    WBC 4 26 (L) 4 31 - 10 16 Thousand/uL    RBC 3 10 (L) 3 88 - 5 62 Million/uL    Hemoglobin 9 4 (L) 12 0 - 17 0 g/dL    Hematocrit 29 3 (L) 36 5 - 49 3 %    MCV 95 82 - 98 fL    MCH 30 3 26 8 - 34 3 pg    MCHC 32 1 31 4 - 37 4 g/dL    RDW 15 7 (H) 11 6 - 15 1 %    Platelets 368 428 - 296 Thousands/uL    MPV 9 2 8 9 - 12 7 fL   Basic metabolic panel    Collection Time: 03/05/22  5:10 AM   Result Value Ref Range    Sodium 138 136 - 145 mmol/L    Potassium 3 9 3 5 - 5 3 mmol/L    Chloride 106 100 - 108 mmol/L    CO2 28 21 - 32 mmol/L    ANION GAP 4 4 - 13 mmol/L    BUN 13 5 - 25 mg/dL    Creatinine 0 69 0 60 - 1 30 mg/dL    Glucose 90 65 - 140 mg/dL    Calcium 8 4 8 3 - 10 1 mg/dL    eGFR 95 ml/min/1 73sq m             Imaging Studies: EGD Peg Tube Placement    Result Date: 3/3/2022  Narrative: Irma Vel[place]quez [id number] [phone] DATE OF SERVICE: 3/03/22 PHYSICIAN(S): Nyasia Aguayo MD Proceduralist INDICATION: Stomach cancer [facility]), FTT (failure to thrive) in adult, Moderate protein-calorie malnutrition ([facility] [address] ) POST-OP DIAGNOSIS: See the impression below  PREPROCEDURE: Informed consent was obtained for the procedure, including sedation  Risks of perforation, hemorrhage, adverse drug reaction and aspiration were discussed  The patient was placed in the left lateral decubitus position  Patient was explained about the risks and benefits of the procedure  Risks including but not limited to bleeding, infection, and perforation were explained in detail  Also explained about less than 100% sensitivity with the exam and other alternatives  DETAILS OF PROCEDURE: Patient was taken to the procedure room where a time out was performed to confirm correct patient and correct procedure  The patient underwent monitored anesthesia care, which was administered by an anesthesia professional  The patient's blood pressure, heart rate, level of consciousness, respirations and oxygen were monitored throughout the procedure  The scope was advanced to the second part of the duodenum  Retroflexion was performed in the fundus  The patient experienced no blood loss  The procedure was not difficult  The patient tolerated the procedure well  There were no apparent complications   ANESTHESIA INFORMATION: ASA: III Anesthesia Type: IV Sedation with Anesthesia MEDICATIONS: docusate sodium (COLACE) capsule 100 mg 0 mg*  *Some administrations are not included in total oxyCODONE (OxyCONTIN) 12 hr tablet 10 mg 0 mg*  *Some administrations are not included in total (Totals for administrations occurring from 1711 to 1738 on 03/03/22) IV Cefazolin 1g FINDINGS: Malignant-appearing stricture (traversable) in the GE junction The duodenum appeared normal  PEG-G tube successfully placed in the body of the stomach using a deformable internal bolster via the pull technique after the site was identified via transillumination, visualized indentation and needle passed through abdominal wall; distance from external bolster to external end of tube: 3 cm; tube rotated freely to confirm placement SPECIMENS: * No specimens in log *     Impression: 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 PEG tube placed in the gastric body Normal duodenum RECOMMENDATION: May use PEG for medications after 6 hours and feedings in the morning May advance diet as tolerated in the morning Check CBC in a m  Devan Mosher MD     EGD    Result Date: 2/4/2022  Narrative: [address] [phone] DATE OF SERVICE: 2/04/22 PHYSICIAN(S): Sheridan Madrigal MD Proceduralist INDICATION: Epigastric pain, Weight loss, unintentional POST-OP DIAGNOSIS: See the impression below  PREPROCEDURE: Informed consent was obtained for the procedure, including sedation  Risks of perforation, hemorrhage, adverse drug reaction and aspiration were discussed  The patient was placed in the left lateral decubitus position  Patient was explained about the risks and benefits of the procedure  Risks including but not limited to bleeding, infection, and perforation were explained in detail  Also explained about less than 100% sensitivity with the exam and other alternatives   DETAILS OF PROCEDURE: Patient was taken to the procedure room where a time out was performed to confirm correct patient and correct procedure  The patient underwent monitored anesthesia care, which was administered by an anesthesia professional  The patient's blood pressure, heart rate, level of consciousness, respirations and oxygen were monitored throughout the procedure  The scope was advanced to the second part of the duodenum  Retroflexion was performed in the fundus  The patient experienced no blood loss  The procedure was not difficult  The patient tolerated the procedure well  There were no apparent complications  ANESTHESIA INFORMATION: ASA: II Anesthesia Type: Anesthesia type not filed in the log  MEDICATIONS: simethicone (MYLICON) oral suspension 40 mg (Totals for administrations occurring from 0927 to 0946 on 02/04/22) FINDINGS: Two cm hiatal hernia was seen with friable mass involving more than half the circumference and extending linearly down into the cardia and fundus for about 5 cm status post multiple biopsies Stomach-mild erythema in the antrum  Biopsies done to check for H pylori  Duodenum-patchy erythema in the bulb  SPECIMENS: ID Type Source Tests Collected by Time Destination 1 : gastric body bx r/o h  pylori Tissue Stomach TISSUE EXAM Junior Betts MD 2/4/2022  9:43 AM  2 : gastric mass/hiatal hernia/fundus bx Tissue Stomach TISSUE EXAM Junior Betts MD 2/4/2022  9:44 AM      Impression: Two cm hiatal hernia was seen with friable mass involving more than half the circumference and extending linearly down into the cardia and fundus for about 5 cm status post multiple biopsies Stomach-mild erythema in the antrum  Biopsies done to check for H pylori  Duodenum-patchy erythema in the bulb  RECOMMENDATION: Await pathology results Check CT scan of the chest, abdomen and pelvis   Junior Betts MD     XR chest portable    Result Date: 2/11/2022  Narrative: CHEST INDICATION:  S/P port   COMPARISON:  CT chest, abdomen and pelvis 2/4/2022 EXAM PERFORMED/VIEWS:  XR CHEST PORTABLE Images: 2 (duplicate image re-windowed with line enhancement technique) FINDINGS:  Left chest wall Port-A-Cath with tip in the region of the SVC  No pneumothorax  Cardiomediastinal silhouette appears unremarkable  Small pulmonary nodules again suggested, better visualized on recent CT imaging  No pleural effusion  Advanced left glenohumeral joint osteoarthritis  Impression: No pneumothorax following Port-A-Cath insertion  Small pulmonary nodules again suggested, better visualized on recent CT imaging  Workstation performed: [id number]     CT chest abdomen pelvis w contrast    Addendum Date: 2/4/2022 Addendum:   There is a voice recognition software related error in the addendum to this report  A phrase which reads " The patient underwent LEFT UPPER CHOLECYSTECTOMY on January 24, 2022    " should read, " The patient underwent LAPAROSCOPIC CHOLECYSTECTOMY on January 24, 2022 "  Addendum Date: 2/4/2022 Addendum:   Please note the following important additional information  The patient underwent left upper cholecystectomy on January 24, 2022 which explains the presence of a few residual bubbles of pneumoperitoneum in the epigastric region on the current examination  Therefore, there are no findings to suggest perforated viscus  REVISED IMPRESSION: Mass at the gastroesophageal junction which is highly suspicious for malignancy  Areas of sinan enlargement that are highly suspicious for sinan metastatic disease in mediastinum, retroperitoneum, and omentum as described above  Subcentimeter pulmonary nodules which are highly suspicious for pulmonary parenchymal metastatic disease  Small bubbles of intraperitoneal gas in the epigastric region secondary to recent laparoscopic cholecystectomy  I discussed this addendum with Gustavo Tariq via HIPAA compliant secure electronic messaging on 2/4/2022 at 3:25 PM      Result Date: 2/4/2022  Narrative: CT CHEST, ABDOMEN AND PELVIS WITH IV CONTRAST INDICATION:   K31 89:  Other diseases of stomach and duodenum  COMPARISON:  Report of upper endoscopy performed earlier the same day  TECHNIQUE: CT examination of the chest, abdomen and pelvis was performed  Axial, sagittal, and coronal 2D reformatted images were created from the source data and submitted for interpretation  Radiation dose length product (DLP) for this visit:  721 mGy-cm   This examination, like all CT scans performed in the [facility], was performed utilizing techniques to minimize radiation dose exposure, including the use of iterative reconstruction and automated exposure control  IV Contrast:  100 mL of iohexol (OMNIPAQUE) Enteric Contrast: Enteric contrast was administered  FINDINGS: CHEST LUNGS:  There are numerous scattered noncalcified pulmonary nodules of varying sizes between 2 and 9 mm  Representative nodules measured on series 4 are as follows: Lateral left upper lobe, 0 3 x 0 2 cm, image 29  Medial right upper lobe, 0 5 x 0 3 cm, image 48  Superior segment right lower lobe, 0 8 x 0 6 cm, image 50  Posterior left lower lobe, 0 9 x 0 7 cm, image 79  No tracheal or endobronchial lesion  No consolidative or groundglass airspace opacity to suggest acute infection  PLEURA:  Unremarkable  HEART/GREAT VESSELS: Extensive coronary artery calcification  Mild calcification of aortic valve  No pericardial effusion  No thoracic aortic aneurysm  MEDIASTINUM AND SHERWIN:  Circumferential masslike thickening of the gastroesophageal junction corresponding to the findings suspicious for tumor in that location on recent upper endoscopy  Mild air and fluid-filled distention of the midesophagus  Enlarged aortopulmonary window lymph nodes measure 0 9 x 1 4 cm and 1 4 x 1 1 cm on image 19 of series 2  A right posterior mediastinal mass to the right of the aorta at the T11-T12 level measures 2 5 x 1 4 cm on image 49 of series 2 and appears to represent a centrally necrotic metastatic node  CHEST WALL AND LOWER NECK:   Unremarkable  ABDOMEN LIVER/BILIARY TREE:  Unremarkable  GALLBLADDER:  No calcified gallstones  No pericholecystic inflammatory change  SPLEEN:  Unremarkable  PANCREAS:  Unremarkable  ADRENAL GLANDS:  Nonspecific thickening of bilateral adrenal glands  No definite adrenal mass, though evaluation of the adrenals is limited by multiple surrounding probably metastatic nodules  KIDNEYS/URETERS:  Multiple nonobstructing intrarenal calculi on the order of 2 to 5 mm  Small parapelvic renal cysts  No solid renal mass  STOMACH AND BOWEL:  Mass at the gastroesophageal junction best seen on image 46 of series 2 suspicious for malignancy  Extensive descending and sigmoid colon diverticulosis without evidence for acute diverticulitis  No bowel obstruction  APPENDIX:  No findings to suggest appendicitis  ABDOMINOPELVIC CAVITY:  Upper retroperitoneal lymphadenopathy is noted with many of the nodes demonstrating indistinct margins and therefore difficult to measure  Conglomerate sinan mass in the aortocaval region measures 3 8 x 1 9 cm on image 64 series 2  A representative left para-aortic node measures 2 6 x 1 3 cm on image 59 of series 2  A representative mass in the gastrohepatic ligament measures 2 1 x 1 7  There are omental nodules that are highly suspicious for metastasis in the anterior right upper quadrant with a representative lesion measuring 1 1 x 1 0 cm on image 60 of series 2  There are multiple tiny bubbles of free intraperitoneal gas in the epigastric region beneath the hemidiaphragm in the approximate midline best seen on images 40 through 45 of series 2 most consistent with microperforation  No other findings of pneumoperitoneum and no abdominal or pelvic abscess is noted  There is trace free pelvic fluid but otherwise no ascites  VESSELS:  Unremarkable for patient's age   PELVIS REPRODUCTIVE ORGANS:  Marked prostatomegaly with impression of prostate into the urinary bladder base URINARY BLADDER:  Flattening of the bladder due to impression a markedly enlarged prostate  Urinary bladder is otherwise unremarkable  ABDOMINAL WALL/INGUINAL REGIONS:  Unremarkable  OSSEOUS STRUCTURES:  No acute fracture or destructive osseous lesion  Advanced bilateral glenohumeral degenerative changes  Spinal degenerative changes including grade 1 anterolisthesis of L4 relative to L5  Impression: Tiny bubbles of gas free within the upper anterior epigastric region of the peritoneum suggesting microperforation of an abdominal viscus, probably due to infiltrating mass at the gastroesophageal junction  None of the enteric contrast administered for this examination leaked into the peritoneum  No abdominal or pelvic abscess is noted  Mass at the gastroesophageal junction which is highly suspicious for malignancy  Areas of sinan enlargement that are highly suspicious for sinan metastatic disease in mediastinum, retroperitoneum, and omentum as described above  Subcentimeter pulmonary nodules which are highly suspicious for pulmonary parenchymal metastatic disease  I personally discussed this study with Tianna Anderson on 2/4/2022 at 2:55 PM  Workstation performed: [id number]     NM PET CT skull base to mid thigh    Result Date: 2/9/2022  Narrative: PET/CT SCAN INDICATION: Staging of esophageal cancer  C15 9: Malignant neoplasm of esophagus, unspecified MODIFIER: PI COMPARISON: CT chest abdomen and pelvis 2/4/2022 CELL TYPE:  Favor adenosquamous carcinoma gastric mass biopsy 2/4/2022 TECHNIQUE:   8 1 mCi F-18-FDG administered IV  Multiplanar attenuation corrected and non attenuation corrected PET images were acquired 60 minutes post injection  Contiguous, low dose, axial CT sections were obtained from the skull base through the femurs   Intravenous contrast material was not utilized    This examination, like all CT scans performed in the [facility], was performed utilizing techniques to minimize radiation dose exposure, including the use of iterative reconstruction and automated exposure control  Fasting serum glucose: 96 mg/dl FINDINGS: VISUALIZED BRAIN:   No acute abnormalities are seen  HEAD/NECK:   Patchy radiotracer uptake at the left ethmoid air cells and left maxillary sinus where there is partial opacification of CT likely inflammatory  There is more focal FDG uptake in the left ethmoid air cells anteriorly extending to the nasal cavity, SUV maximum of 5 6 with slightly increased density on CT, underlying obstructive polyp or lesion is not excluded  Left frontal sinus is also opacified but without significant FDG uptake  No FDG avid lymph nodes  CT images: No additional significant findings  CHEST:   Multiple small subcentimeter pulmonary nodules bilaterally with variable FDG uptake  A left lower lobe nodule posteriorly demonstrates a SUV max of 2 6  This measures up to 7 mm in size image 106 series 3  FDG activity may be underestimated in the smaller nodules due to their small size  Multiple FDG avid lymph nodes in the mediastinum and bilateral perihilar regions  Left para-aortic lymph node demonstrates a SUV max 7 1  This measures 1 5 x 1 3 cm image 81 series 3  Subcarinal lymph node demonstrates SUV max of 4 6  This measures 1 1 cm short axis image 94 series 3  Left perihilar focus demonstrate SUV max of 5 5  Right anterior hilar focus demonstrates a SUV maximum of 2 8  CT images: Extensive coronary artery calcifications  ABDOMEN:   Lobular FDG uptake at the GE junction, SUV max of 9 2  Associated soft tissue fullness on CT  The segmental FDG uptake measures approximately 3 3 cm in length  There does appear to be a separate focus of FDG uptake at the gastric fundus, SUV max of 6 2  No obvious findings on the limited CT  By PET images this is on the order of 2 cm in size  Multiple FDG avid lymph nodes in the gastrohepatic region, right retrocrural region, portacaval region and retroperitoneum   Right retrocrural lymph node demonstrates a SUV maximum of 4 5  This measures 2 4 x 1 9 cm image 123 series 3  Gastrohepatic node demonstrates a SUV maximum of 4 3  Lymph node here measures 1 5 x 1 4 cm image 128 series 3  Aortocaval lymph node demonstrates SUV max of 4 7  This measures 2 0 x 1 6 cm image 148 series 3  Left para-aortic lymph node demonstrates a SUV max of 5 3  This measures 2 2 x 1 2 cm image 142 series 3  Variable FDG uptake in the omental nodules of the right upper quadrant suspicious for metastasis  A nodule here demonstrates SUV max of 2 1  This measures 1 2 cm in size image 147 series 3  Somewhat curvilinear FDG uptake along the lateral surface of the right lobe of the liver, SUV maximum of 3 7  See image 121 series 1200 on the PET CT fusion images  Peritoneal disease along the liver surface is not excluded  CT images: Status post cholecystectomy  Scattered intrarenal calculi bilaterally measuring up to 5 mm at the left mid pole  Colonic diverticulosis  PELVIS: No FDG avid soft tissue lesions are seen  Physiologic right distal ureteral activity  Mild patchy radiotracer uptake superior to the left inguinal region may be related to prior surgery  Somewhat focal radiotracer uptake at the posterior central prostate likely related to urinary retention  CT images: Marked prostatomegaly  OSSEOUS STRUCTURES: No FDG avid lesions are seen  Patchy radiotracer uptake at the bilateral shoulders corresponding to degenerative changes on CT  CT images: Grade 1 anterolisthesis of L4 on L5  Impression: 1  FDG uptake at the gastroesophageal junction and proximal stomach compatible with known malignancy  2   Extensive FDG avid sinan disease in the chest and abdomen suspicious for metastasis  3   Variable FDG uptake in small pulmonary nodules suspicious for metastasis  4   Variable FDG uptake in right upper quadrant omental nodules suspicious for metastasis   5   Somewhat curvilinear FDG uptake along the lateral surface of the right lobe of the liver  Peritoneal disease along the liver surface is not excluded  6   Patchy radiotracer uptake at the left ethmoid air cells and left maxillary sinus where there is partial opacification of CT likely inflammatory  However, there is more focal FDG uptake in the left ethmoid air cells anteriorly extending to the nasal cavity, for which an underlying obstructive polyp or lesion is not excluded  Correlate with ENT evaluation  The study was marked in EPIC for significant notification  Workstation performed: [id number]     FL guided central venous access device insertion    Result Date: 2/15/2022  Narrative: C-arm - Chest INDICATION: Gastric adenocarcinoma ([facility] [facility] [address] ) [C16 9]  Infusion port placement  COMPARISON:  None IMAGES:  3 FLUOROSCOPY TIME:   33 sft TECHNIQUE: FINDINGS: Fluoroscopic guidance provided for implantable infusion port placement  Tip of port catheter terminates in the region of the cavoatrial junction  Impression: Fluoroscopic guidance provided for infusion port placement as above  Workstation performed: [id number]           JADEN Lopez      Please Note: "This note has been constructed using a voice recognition system  Therefore there may be syntax, spelling, and/or grammatical errors   Please call if you have any questions  "**

## 2022-03-05 NOTE — NURSING NOTE
Trickle tube feed stopped overnight at request of patient and OK'ed by physician  Will restart in AM with bolus order

## 2022-03-05 NOTE — PROGRESS NOTES
[facility]  Progress Note - Sanam Pi 1950, 70 y o  male MRN: [id number]  Unit/Bed#: S -01 Encounter: [id number]  Primary Care Provider: Hassie Cranker, DO   Date and time admitted to hospital: 2/28/2022 12:12 PM    * FTT (failure to thrive) in adult  Assessment & Plan  · Significantly decreased oral intake, poor appetite and significant unintentional weight loss in last 1 month in the setting of recently diagnosed stomach cancer with recent initiation of treatment  · S/p PEG placement with GI endoscopically 3/3/22  · Nutrition consulted - appreciate recommendations  Transition to bolus feeds per nutrition recommendations on 3/5/22  Tolerating alternative means of enteral nutrition without issue  · Monitor lytes and replete as needed  Moderate protein-calorie malnutrition (HCC)  Assessment & Plan  Malnutrition Findings:   Adult Malnutrition type: Acute illness (related to catabolic illness, inadequate oral intake as evidenced by  weight decrease of 4 7 % x 1 month, 11 9% weight decrease x 1 5 months, < 75% of estimated nutritional needs for > 7 days  Currently treated with nutrition education, supplementation )  Adult Degree of Malnutrition: Malnutrition of moderate degree    BMI Findings: Body mass index is 25 53 kg/m²  Cancer related pain  Assessment & Plan  · Appreciate palliative care consultation  Pain is much improved on current regimen  · Medication regimen adjusted with increase in Oxycontin to TID dosing  Continue current dosing  · Will continue to monitor and adjust appropriately  · Bowel regimen   · OOB as tolerated  Encourage ambulation  Stomach cancer [facility])  Assessment & Plan  · Recently diagnosed  Patient follows with Dr Toribio Bryan  Had one session of chemo prior to admission  · Plan for resumption of treatment on discharge from this hospitalization      · Chemotherapy planned for Monday 3/7/22    BPH (benign prostatic Pt/parent declined 48hr nurse callback upon discharge.  Pt/parent verbalized understanding re: discharge instructions, medication, and f/u information.  Urgent Care phone number provided to patient if questions arise.     hyperplasia)  Assessment & Plan  · Continue flomax    Hypothyroid  Assessment & Plan  · Maintained on levothyroxine 125mcg daily  · TSH noted to be mildly elevated at 7 497  Free T4 levels wnl, will continue current dosing with recommendations for repeat TSH in 6-8 weeks  Hypertension  Assessment & Plan  · Maintained on Irbesartan 300mg  · Blood pressure well controlled off of antihypertensives          VTE Pharmacologic Prophylaxis: VTE Score: 5 High Risk (Score >/= 5) - Pharmacological DVT Prophylaxis Ordered: enoxaparin (Lovenox)  Sequential Compression Devices Ordered  Patient Centered Rounds: I performed bedside rounds with nursing staff today  Discussions with Specialists or Other Care Team Provider:  Case management    Education and Discussions with Family / Patient: Updated  (daughter) at bedside  Time Spent for Care: 30 minutes  More than 50% of total time spent on counseling and coordination of care as described above  Current Length of Stay: 5 day(s)  Current Patient Status: Inpatient   Certification Statement: The patient will continue to require additional inpatient hospital stay due to Medical management  Discharge Plan: Anticipate discharge tomorrow to home  Code Status: Level 3 - DNAR and DNI    Subjective:   Patient seen examined this morning at bedside  No acute events overnight  Patient states he feels well  Very little nausea  Abdominal pain is controlled on current regimen  Patient tolerating trickle tube feeds which were stopped overnight as planned  Objective:     Vitals:   Temp (24hrs), Av 1 °F (36 7 °C), Min:98 °F (36 7 °C), Max:98 1 °F (36 7 °C)    Temp:  [98 °F (36 7 °C)-98 1 °F (36 7 °C)] 98 1 °F (36 7 °C)  HR:  [69-74] 70  Resp:  [17-18] 17  BP: (111-147)/(55-67) 112/55  SpO2:  [94 %-97 %] 94 %  Body mass index is 25 53 kg/m²  Input and Output Summary (last 24 hours):      Intake/Output Summary (Last 24 hours) at 3/5/2022 1646  Last data filed at 3/5/2022 1300  Gross per 24 hour   Intake 1042 ml   Output --   Net 1042 ml       Physical Exam:   Physical Exam  Vitals reviewed  Constitutional:       General: He is not in acute distress  Appearance: He is not ill-appearing or toxic-appearing  Cardiovascular:      Rate and Rhythm: Normal rate and regular rhythm  Heart sounds: No murmur heard  Pulmonary:      Effort: No respiratory distress  Breath sounds: No wheezing, rhonchi or rales  Abdominal:      General: There is no distension  Palpations: Abdomen is soft  There is no mass  Tenderness: There is no abdominal tenderness  There is no guarding  Comments: PEG tube in place without surrounding erythema or discharge   Musculoskeletal:         General: No swelling  Skin:     General: Skin is warm and dry  Neurological:      Mental Status: Mental status is at baseline  Additional Data:     Labs:  Results from last 7 days   Lab Units 03/05/22 0510 03/02/22  0431 02/28/22  1333   WBC Thousand/uL 4 26*   < > 6 44   HEMOGLOBIN g/dL 9 4*   < > 12 5   HEMATOCRIT % 29 3*   < > 35 5*   PLATELETS Thousands/uL 283   < > 325   NEUTROS PCT %  --   --  73   LYMPHS PCT %  --   --  12*   MONOS PCT %  --   --  11   EOS PCT %  --   --  2    < > = values in this interval not displayed  Results from last 7 days   Lab Units 03/05/22  0510 03/02/22  0431 02/28/22  1333   SODIUM mmol/L 138   < > 137   POTASSIUM mmol/L 3 9   < > 3 6   CHLORIDE mmol/L 106   < > 99*   CO2 mmol/L 28   < > 25   BUN mg/dL 13   < > 19   CREATININE mg/dL 0 69   < > 1 08   ANION GAP mmol/L 4   < > 13   CALCIUM mg/dL 8 4   < > 9 0   ALBUMIN g/dL  --   --  3 3*   TOTAL BILIRUBIN mg/dL  --   --  0 90   ALK PHOS U/L  --   --  171*   ALT U/L  --   --  27   AST U/L  --   --  27   GLUCOSE RANDOM mg/dL 90   < > 114    < > = values in this interval not displayed       Results from last 7 days   Lab Units 03/03/22  0615   INR  1 11             Results from last 7 days   Lab Units 02/28/22  1333   LACTIC ACID mmol/L 1 9       Lines/Drains:  Invasive Devices  Report    Central Venous Catheter Line            Port A Cath 02/11/22 Left Subclavian 22 days          Peripheral Intravenous Line            Peripheral IV 03/03/22 Left;Ventral (anterior) Forearm 2 days          Drain            Gastrostomy/Enterostomy Percutaneous endoscopic gastrostomy (PEG) 2 days                Central Line:  Goal for removal: N/A - Chronic PICC             Imaging: No pertinent imaging reviewed  Recent Cultures (last 7 days):         Last 24 Hours Medication List:   Current Facility-Administered Medications   Medication Dose Route Frequency Provider Last Rate    bisacodyl  10 mg Rectal Daily PRN Hayde Zhao MD      docusate sodium  100 mg Oral BID Hayde Zhao MD      enoxaparin  40 mg Subcutaneous Q24H [address] Hayde Zhao MD      HYDROmorphone  0 5 mg Intravenous Q3H PRN Hayde Zhao MD      levothyroxine  125 mcg Oral Early Morning Hayde Zhao MD      methylphenidate  5 mg Oral BID PRN Hayde Zhao MD      ondansetron  4 mg Intravenous Q6H PRN Hayde Zhao MD      oxyCODONE  10 mg Oral [facility] Hayde Zhao MD      oxyCODONE  10 mg Oral Q4H PRN Hayde Zhao MD      oxyCODONE  20 mg Oral Q4H PRN Hayde Zhao MD      pantoprazole  40 mg Intravenous Q12H [address] Hayde Zhao MD      polyethylene glycol  17 g Oral Daily Hayde Zhao MD      potassium chloride  40 mEq Oral Daily Hayde Zhao MD      saliva substitute  5 spray Mouth/Throat 4x Daily PRN Hayde Zhao MD      tamsulosin  0 4 mg Oral BID Hayde Zhao MD          Today, Patient Was Seen By: Micheal Peña MD    **Please Note: This note may have been constructed using a voice recognition system  **

## 2022-03-05 NOTE — CASE MANAGEMENT
Case Management Discharge Planning Note    Patient name Rosendo Sears  Location S /S -09 MRN [id number]  : 1950 Date 3/5/2022       Current Admission Date: 2022  Current Admission Diagnosis:FTT (failure to thrive) in adult   Patient Active Problem List    Diagnosis Date Noted    Moderate protein-calorie malnutrition ([facility] [address] ) 2022    FTT (failure to thrive) in adult 2022    Stomach cancer ([facility] [address] ) 2022    Cancer related pain 2022    Encounter for central line care 2022    Gastric adenocarcinoma ([facility] [address] ) 2022    Gastric mass 2022    Acute calculous cholecystitis 2022    Rectus diastasis 2021    Inflammatory arthritis 2021    COVID-19 2021    Hypertension 07/15/2016    Hypothyroid 07/15/2016    Depression 07/15/2016    BPH (benign prostatic hyperplasia) 07/15/2016      LOS (days): 5  Geometric Mean LOS (GMLOS) (days): 3 60  Days to GMLOS:-1 4     OBJECTIVE:  Risk of Unplanned Readmission Score: 18         Current admission status: Inpatient   Preferred Pharmacy:   [facility] [address]  Phone: [phone] Fax: [phone]    Primary Care Provider: Chad Delgado DO    Primary Insurance: BLUE CROSS  Secondary Insurance: MEDICARE    DISCHARGE DETAILS:    Discharge planning discussed with[de-identified] [facility] & Mary-daughter     Comments - Freedom of Choice: While awaiting a call back from [address], CM made a follow up call to [address] and spoke to a Mare who reported the dept for [facility] wasn't open on the weekend finalize the Pt's order for delivery tomorrow  CM made Dr Parrish Diaz and Pt's daughter aware of Mare's report           Contacts  Patient Contacts: Mary  Relationship to Patient[de-identified] Family  Contact Method: Phone  Phone Number: [phone]

## 2022-03-05 NOTE — ASSESSMENT & PLAN NOTE
· Recently diagnosed  Patient follows with Dr Lali Rowley  Had one session of chemo prior to admission  · Plan for resumption of treatment on discharge from this hospitalization      · Chemotherapy planned for Monday 3/7/22

## 2022-03-06 VITALS
HEART RATE: 72 BPM | SYSTOLIC BLOOD PRESSURE: 118 MMHG | TEMPERATURE: 98.4 F | HEIGHT: 67 IN | WEIGHT: 163 LBS | OXYGEN SATURATION: 96 % | BODY MASS INDEX: 25.58 KG/M2 | RESPIRATION RATE: 18 BRPM | DIASTOLIC BLOOD PRESSURE: 69 MMHG

## 2022-03-06 LAB
ANION GAP SERPL CALCULATED.3IONS-SCNC: 5 MMOL/L (ref 4–13)
BUN SERPL-MCNC: 13 MG/DL (ref 5–25)
CALCIUM SERPL-MCNC: 8.4 MG/DL (ref 8.3–10.1)
CHLORIDE SERPL-SCNC: 103 MMOL/L (ref 100–108)
CO2 SERPL-SCNC: 30 MMOL/L (ref 21–32)
CREAT SERPL-MCNC: 0.7 MG/DL (ref 0.6–1.3)
GFR SERPL CREATININE-BSD FRML MDRD: 94 ML/MIN/1.73SQ M
GLUCOSE SERPL-MCNC: 96 MG/DL (ref 65–140)
POTASSIUM SERPL-SCNC: 4.2 MMOL/L (ref 3.5–5.3)
SODIUM SERPL-SCNC: 138 MMOL/L (ref 136–145)

## 2022-03-06 PROCEDURE — C9113 INJ PANTOPRAZOLE SODIUM, VIA: HCPCS | Performed by: INTERNAL MEDICINE

## 2022-03-06 PROCEDURE — 80048 BASIC METABOLIC PNL TOTAL CA: CPT | Performed by: INTERNAL MEDICINE

## 2022-03-06 PROCEDURE — 99239 HOSP IP/OBS DSCHRG MGMT >30: CPT | Performed by: INTERNAL MEDICINE

## 2022-03-06 RX ORDER — OXYCODONE HCL 10 MG/1
10 TABLET, FILM COATED, EXTENDED RELEASE ORAL EVERY 8 HOURS SCHEDULED
Qty: 6 TABLET | Refills: 0 | Status: SHIPPED | OUTPATIENT
Start: 2022-03-06 | End: 2022-03-08

## 2022-03-06 RX ORDER — ONDANSETRON 4 MG/1
4 TABLET, ORALLY DISINTEGRATING ORAL EVERY 6 HOURS PRN
Qty: 60 TABLET | Refills: 0 | Status: SHIPPED | OUTPATIENT
Start: 2022-03-06 | End: 2022-03-17 | Stop reason: SDUPTHER

## 2022-03-06 RX ORDER — XYLITOL/YERBA SANTA
5 AEROSOL, SPRAY WITH PUMP (ML) MUCOUS MEMBRANE 4 TIMES DAILY PRN
Qty: 59 ML | Refills: 0 | Status: SHIPPED | OUTPATIENT
Start: 2022-03-06 | End: 2022-07-14

## 2022-03-06 RX ADMIN — PANTOPRAZOLE SODIUM 40 MG: 40 INJECTION, POWDER, FOR SOLUTION INTRAVENOUS at 08:27

## 2022-03-06 RX ADMIN — OXYCODONE HYDROCHLORIDE 10 MG: 10 TABLET, FILM COATED, EXTENDED RELEASE ORAL at 13:16

## 2022-03-06 RX ADMIN — POTASSIUM CHLORIDE 40 MEQ: 1500 TABLET, EXTENDED RELEASE ORAL at 08:26

## 2022-03-06 RX ADMIN — DOCUSATE SODIUM 100 MG: 100 CAPSULE ORAL at 08:26

## 2022-03-06 RX ADMIN — LEVOTHYROXINE SODIUM 125 MCG: 125 TABLET ORAL at 05:53

## 2022-03-06 RX ADMIN — OXYCODONE HYDROCHLORIDE 10 MG: 10 TABLET, FILM COATED, EXTENDED RELEASE ORAL at 05:52

## 2022-03-06 RX ADMIN — ONDANSETRON 4 MG: 2 INJECTION INTRAMUSCULAR; INTRAVENOUS at 08:29

## 2022-03-06 RX ADMIN — POLYETHYLENE GLYCOL 3350 17 G: 17 POWDER, FOR SOLUTION ORAL at 08:27

## 2022-03-06 RX ADMIN — OXYCODONE HYDROCHLORIDE 20 MG: 5 SOLUTION ORAL at 10:05

## 2022-03-06 RX ADMIN — ENOXAPARIN SODIUM 40 MG: 40 INJECTION SUBCUTANEOUS at 08:26

## 2022-03-06 RX ADMIN — TAMSULOSIN HYDROCHLORIDE 0.4 MG: 0.4 CAPSULE ORAL at 08:26

## 2022-03-06 NOTE — ASSESSMENT & PLAN NOTE
· Maintained on Irbesartan 300mg  · Blood pressure well controlled off of antihypertensives  Will discontinue on discharge and recommend continued monitoring by patient's PCP in the outpatient setting

## 2022-03-06 NOTE — DISCHARGE INSTR - AVS FIRST PAGE
Tube Feeding Recommendations:    Jevity 1 5- 200 ml  each bolus  at 830p, 1130p, 230p, 530p, 830p, (or other q3 hour frequency that fits your schedule better) increasing bolus amount daily by 60 ml each bolus until patient is able to tolerate 6 (237 ml) cartons per day  Flush 50 ml  before and after each bolus  Goal of 6 (237) ml cartons provides 2133 kcal, 91 g protein, 1081 free water with 600 ml free water  Patient will require ~500 ml fluid via PO intake or additional flushes  Additional PO intake as able

## 2022-03-06 NOTE — ASSESSMENT & PLAN NOTE
· Significantly decreased oral intake, poor appetite and significant unintentional weight loss in last 1 month in the setting of recently diagnosed stomach cancer with recent initiation of treatment  · S/p PEG placement with GI endoscopically 3/3/22  · Nutrition consulted - appreciate recommendations  Transitioned to bolus feeds per nutrition recommendations on 3/5/22  Tolerating alternative means of enteral nutrition without issue  Did have a little bit of fullness with bolus this morning, has been titrated up to 200 cc per bolus here in the hospital  · Instructions for local PEG care provided  · Tube feeding recommendations on discharge  · Jevity 1 5- 200 ml  each bolus at 830p, 1130p, 230p, 530p, 830p, (or other q3 hour frequency that fits your schedule better) increasing bolus amount daily by 60 ml each bolus until patient is able to tolerate 6 (237 ml) cartons per day  Flush 50 ml  before and after each bolus  Goal of 6 (237) ml cartons provides 2133 kcal, 91 g protein, 1081 free water with 600 ml free water  Patient will require ~500 ml fluid via PO intake or additional flushes  Additional PO intake as able  Patient also advised that he does not have to go up to 237 mL per bolus as he has still a pretty good oral diet at times  · VNA plan to go to house at 4:00 p m  3/6/22 for teaching  Nursing also provided teaching to both patient and patient's daughter at bedside prior to discharge  · [facility] to deliver patient's tube feeds tomorrow  I will follow-up with the  tomorrow to double check    Patient was sent home with [facility]ev[facility] to cover today and tomorrow's feedings

## 2022-03-06 NOTE — ASSESSMENT & PLAN NOTE
· Appreciate palliative care consultation  Pain is much improved on current regimen  · Medication regimen adjusted with increase in Oxycontin to TID dosing  Continue current dosing  · I [address] for 2 days of OxyContin 10 mg every 8 hours as they will not be able to go to patient is outpatient pharmacy until tomorrow    Palliative Care had previously written for OxyContin and oxycodone 5 mg/5 mL solution 10 mg q 4 hours p r n  which were sent to patient's outpatient pharmacy which they will  tomorrow  · PDMP reviewed  · Continued outpatient follow-up for symptom management with palliative care services

## 2022-03-06 NOTE — DISCHARGE SUMMARY
[facility]  Discharge- Dorean Minus 1950, 70 y o  male MRN: [id number]  Unit/Bed#: S -01 Encounter: [id number]  Primary Care Provider: Mikal Frederick DO   Date and time admitted to hospital: 2/28/2022 12:12 PM    * FTT (failure to thrive) in adult  Assessment & Plan  · Significantly decreased oral intake, poor appetite and significant unintentional weight loss in last 1 month in the setting of recently diagnosed stomach cancer with recent initiation of treatment  · S/p PEG placement with GI endoscopically 3/3/22  · Nutrition consulted - appreciate recommendations  Transitioned to bolus feeds per nutrition recommendations on 3/5/22  Tolerating alternative means of enteral nutrition without issue  Did have a little bit of fullness with bolus this morning, has been titrated up to 200 cc per bolus here in the hospital  · Instructions for local PEG care provided  · Tube feeding recommendations on discharge  · Jevity 1 5- 200 ml  each bolus at 830p, 1130p, 230p, 530p, 830p, (or other q3 hour frequency that fits your schedule better) increasing bolus amount daily by 60 ml each bolus until patient is able to tolerate 6 (237 ml) cartons per day  Flush 50 ml  before and after each bolus  Goal of 6 (237) ml cartons provides 2133 kcal, 91 g protein, 1081 free water with 600 ml free water  Patient will require ~500 ml fluid via PO intake or additional flushes  Additional PO intake as able  Patient also advised that he does not have to go up to 237 mL per bolus as he has still a pretty good oral diet at times  · VNA plan to go to house at 4:00 p m  3/6/22 for teaching  Nursing also provided teaching to both patient and patient's daughter at bedside prior to discharge  · [facility] to deliver patient's tube feeds tomorrow  I will follow-up with the  tomorrow to double check    Patient was sent home with [facility] to cover today and tomorrow's feedings    Moderate protein-calorie malnutrition ([facility] [address] )  Assessment & Plan  Malnutrition Findings:   Adult Malnutrition type: Acute illness (related to catabolic illness, inadequate oral intake as evidenced by  weight decrease of 4 7 % x 1 month, 11 9% weight decrease x 1 5 months, < 75% of estimated nutritional needs for > 7 days  Currently treated with nutrition education, supplementation )  Adult Degree of Malnutrition: Malnutrition of moderate degree    BMI Findings: Body mass index is 25 53 kg/m²  Cancer related pain  Assessment & Plan  · Appreciate palliative care consultation  Pain is much improved on current regimen  · Medication regimen adjusted with increase in Oxycontin to TID dosing  Continue current dosing  · I sent Rx down to [address] for 2 days of OxyContin 10 mg every 8 hours as they will not be able to go to patient is outpatient pharmacy until tomorrow  Palliative Care had previously written for OxyContin and oxycodone 5 mg/5 mL solution 10 mg q 4 hours p r n  which were sent to patient's outpatient pharmacy which they will  tomorrow  · PDMP reviewed  · Continued outpatient follow-up for symptom management with palliative care services    Stomach cancer [facility])  Assessment & Plan  · Recently diagnosed  Patient follows with Dr Annel Grant  Had one session of chemo prior to admission  · Plan for resumption of treatment on discharge from this hospitalization  · Chemotherapy planned for Monday 3/7/22    BPH (benign prostatic hyperplasia)  Assessment & Plan  · Continue flomax    Hypothyroid  Assessment & Plan  · Maintained on levothyroxine 125mcg daily  · TSH noted to be mildly elevated at 7 497  Free T4 levels wnl, will continue current dosing with recommendations for repeat TSH in 6-8 weeks  Hypertension  Assessment & Plan  · Maintained on Irbesartan 300mg  · Blood pressure well controlled off of antihypertensives    Will discontinue on discharge and recommend continued monitoring by patient's PCP in the outpatient setting  Medical Problems             Resolved Problems  Date Reviewed: 3/6/2022    None              Discharging Physician / Practitioner: Darrick Wright MD  PCP: Era Baker DO  Admission Date:   Admission Orders (From admission, onward)     Ordered        02/28/22 1524  [facility] [place]  Once                      Discharge Date: 03/06/22    Consultations During Hospital Stay:  · Gastroenterology  · Palliative care    Procedures Performed:   EGD Peg Tube Placement    Result Date: 3/3/2022  Impression: [place] 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 PEG tube placed in the gastric body Normal duodenum RECOMMENDATION: May use PEG for medications after 6 hours and feedings in the morning May advance diet as tolerated in the morning Check CBC in a m  Juan Guzman MD     Significant Findings / Test Results:   · N/A    Incidental Findings:   · N/A    Test Results Pending at Discharge (will require follow up): · See above     Outpatient Tests Requested:  · See above    Complications:  None    Reason for Admission:  Weight loss/decreased oral intake    Hospital Course:   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, hypertension, hypothyroidism maintained on levothyroxine, GERD, recently discovered gastric fundic mass with diagnosis of 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  Patient is receiving chemotherapy (FOLFOX 6+ nivolumab)and received 1 dose thus far  He originally presented to the hospital on 2/28/2022 due to abdominal pains, weakness, and poor p o  Intake with unintentional weight loss  This patient was admitted for failure to thrive for consideration for alternative means of enteral nutrition  Gastroenterology was consulted and case was discussed with IR to develop best approach    It was ultimately determined that gastroenterology could safely insert PEG endoscopically and this was done without complication on 0/9/88  Patient was also seen very early on in his hospital course by palliative care services for adjustments to his pain regimen  This resulted in significant improvement in patient's cancer related pain symptoms  Dietary provided tube feed recommendations and patient was initiated on trickle feeds for 24 hours and then transitioned to bolus feeds  Patient tolerated everything well without complication  He was deemed medically stable for discharge close interval follow-up with his local oncology team as well as following up at [facility] for clinical trials/2nd opinion  Please see above list of diagnoses and related plan for additional information  Condition at Discharge: stable    Discharge Day Visit / Exam:   Subjective:  Patient was seen and examined at bedside this morning  No acute events overnight  Patient states he feels well  His bolus feeds were increased  He said this morning after receiving bolus his stomach felt a little full though this has since subsided  No nausea or vomiting  He is afebrile and systemically well  Patient is hopeful for discharge today  Vitals: Blood Pressure: 118/69 (03/06/22 0700)  Pulse: 72 (03/06/22 0700)  Temperature: 98 4 °F (36 9 °C) (03/06/22 0700)  Temp Source: Oral (03/06/22 0700)  Respirations: 18 (03/06/22 0700)  Height: 5' 7" (170 2 cm) (03/03/22 1626)  Weight - Scale: 73 9 kg (163 lb) (03/03/22 1626)  SpO2: 96 % (03/06/22 0700)  Exam:   Physical Exam  Vitals reviewed  Constitutional:       General: He is not in acute distress  Appearance: Normal appearance  He is not ill-appearing or toxic-appearing  Cardiovascular:      Rate and Rhythm: Normal rate and regular rhythm  Heart sounds: No murmur heard  Pulmonary:      Effort: No respiratory distress  Breath sounds: No wheezing, rhonchi or rales  Abdominal:      General: There is no distension  Palpations: There is no mass  Tenderness: There is no abdominal tenderness  There is no guarding  Comments: PEG tube in place   Musculoskeletal:         General: No swelling  Skin:     General: Skin is warm and dry  Coloration: Skin is not jaundiced  Neurological:      Mental Status: He is alert  Mental status is at baseline  Discussion with Family: Updated  (daughter) at bedside  Discharge instructions/Information to patient and family:   See after visit summary for information provided to patient and family  Provisions for Follow-Up Care:  See after visit summary for information related to follow-up care and any pertinent home health orders  Disposition:   Home with VNA Services (Reminder: Complete face to face encounter)    Planned Readmission:  None     Discharge Statement:  I spent 40 minutes discharging the patient  This time was spent on the day of discharge  I had direct contact with the patient on the day of discharge  Greater than 50% of the total time was spent examining patient, answering all patient questions, arranging and discussing plan of care with patient as well as directly providing post-discharge instructions  Additional time then spent on discharge activities  Discharge Medications:  See after visit summary for reconciled discharge medications provided to patient and/or family        **Please Note: This note may have been constructed using a voice recognition system**

## 2022-03-06 NOTE — ASSESSMENT & PLAN NOTE
· Recently diagnosed  Patient follows with Dr Aviva Al  Had one session of chemo prior to admission  · Plan for resumption of treatment on discharge from this hospitalization      · Chemotherapy planned for Monday 3/7/22

## 2022-03-06 NOTE — DISCHARGE INSTRUCTIONS
How to Use and Care for Your PEG Tube   WHAT YOU NEED TO KNOW:   Healthcare providers will teach you how to put liquid food and certain medicines through the tube  You will also be taught how to care for the PEG tube and the skin where the tube enters your body  DISCHARGE INSTRUCTIONS:   Call your doctor or gastroenterologist if:   · You start coughing or vomiting during or after a feeding  · You have severe abdominal pain  · Blood or tube feeding fluid leaks from the PEG tube site  · Your PEG tube is shorter than it was when it was put in     · Your PEG tube comes out  · Your mouth feels dry, your heart feels like it is beating too fast, or you feel weak  · You have nausea, diarrhea, or abdominal bloating or discomfort  · You have stomach pain after each feeding or when you move around  · You have discomfort or pain around your PEG tube site  · The skin around your PEG tube is red, swollen, or draining pus  · You weigh less than your healthcare provider says you should  · Your PEG tube is longer than it was when it was put in     · You have questions or concerns about your condition or care  How to use a PEG tube for feedings: Your healthcare provider will tell you when and how often to use your PEG tube for feedings  A bolus feeding means nutrition is given over a short period of time  An intermittent feeding is scheduled for certain times throughout the day  Continuous feedings run all the time  The following are types of PEG tube systems:  · A feeding syringe  helps liquid food to flow steadily into the PEG tube  The syringe is connected to the end of the PEG tube  You will pour the liquid into the syringe and hold it up high  The syringe plunger may be used to gently push the last of the liquid through the PEG tube  · A gravity drip bag  allows liquid food to drip more slowly into the PEG tube  The tubing from the gravity drip bag is connected to the end of the PEG tube  You will pour the liquid into the bag  The bag hangs on a medical pole or similar device  You can adjust the flow rate on the tubing according to your healthcare provider's instructions  · An electric feeding pump  controls the flow of the liquid food into your PEG tube  Your healthcare provider will teach you how to set up and use the pump  How to care for your PEG tube:   · Always wash your hands before and after each use  This helps prevent infections  Use soap and water to wash your hands  · Always flush your PEG tube before and after each use  This helps prevent blockage from formula or medicine  Use at least 30 milliliters (mL) of water to flush the tube  Follow directions for flushing your PEG tube  · If your PEG tube becomes clogged, try to unclog it as soon as you can  Flush your PEG tube with a 60 mL syringe filled with warm water  Never use a wire to unclog the tube  A wire can poke a hole in the tube  Your healthcare provider may have you use a medicine or a plastic brush to help unclog your tube  · Check the PEG tube daily:      ? Check the length of the tube from the end to where it goes into your body  If it gets longer, it may be at risk for coming out  If it gets shorter, let your healthcare provider know right away  ? Check the bumper  The bumper is a piece that goes around the tube, next to your skin  It should be snug against your skin  Tell your healthcare provider if the bumper seems too tight or too loose  · Use an alcohol pad to clean the end of your PEG tube  Clean before you connect tubing or a syringe to your PEG tube and after you remove it  Do not let the end of the PEG tube touch anything  How do I care for the skin around my PEG tube? · Do not remove the stitches or medical tape  Stitches or medical tape hold your PEG tube in place when you first get it  Your healthcare provider will take them off once the skin around your tube heals   Leave clean bandages over the tube area for the first 24 hours after the tube is put in  You may not need to use bandages after 24 hours if the skin around the tube looks dry  Ask when you can shower or bathe  · Routine skin care:      ? Clean the skin around your tube 1 to 2 times each day  Ask your healthcare provider what you should use to clean your skin  Check for redness, swelling, or pus in the area where the tube goes into your body  Check for fluid draining from your stoma (the hole where the tube was put in)  ? Gently turn your tube daily after your stitches come out  This may decrease pressure on your skin under the bumper  It may also help prevent an infection  ? Keep the skin around your PEG tube dry  This will help prevent skin irritation and infection  · Use topical medicines as directed  You may need to put antibiotic cream on the skin around your tube after you are done cleaning it  Other tips to know about a PEG tube:   · Keep a record of liquids you have each day  You may also need to keep a record of how much you urinate and how many times you have a bowel movement each day  Bring this record to your follow-up visits  · Check your weight as directed  Keep a record of your weights and bring it to your follow-up visits  Your healthcare provider may need to change your feedings if your weight changes too quickly  · Take your medicines as directed  Learn which of your medicines can be crushed, mixed with water, and given through the PEG tube  Certain medicines should not be crushed or may clog the PEG tube  · Go to all follow-up appointments  You may need to have blood tests and other tests when you see your healthcare provider  Follow up with your doctor or gastroenterologist as directed:  Write down your questions so you remember to ask them during your visits    © Copyright [address] 2022 Information is for End User's use only and may not be sold, redistributed or otherwise used for commercial purposes  All illustrations and images included in CareNotes® are the copyrighted property of [employer]  or [employer]  The above information is an  only  It is not intended as medical advice for individual conditions or treatments  Talk to your doctor, nurse or pharmacist before following any medical regimen to see if it is safe and effective for you

## 2022-03-07 ENCOUNTER — HOSPITAL ENCOUNTER (OUTPATIENT)
Dept: INFUSION CENTER | Facility: CLINIC | Age: 72
Discharge: HOME | End: 2022-03-07
Payer: COMMERCIAL

## 2022-03-07 ENCOUNTER — TRANSITIONAL CARE MANAGEMENT (OUTPATIENT)
Dept: INTERNAL MEDICINE CLINIC | Facility: CLINIC | Age: 72
End: 2022-03-07

## 2022-03-07 ENCOUNTER — TELEPHONE (OUTPATIENT)
Dept: NUTRITION | Facility: CLINIC | Age: 72
End: 2022-03-07

## 2022-03-07 VITALS
HEIGHT: 67 IN | TEMPERATURE: 97.1 F | OXYGEN SATURATION: 97 % | RESPIRATION RATE: 18 BRPM | HEART RATE: 76 BPM | WEIGHT: 170.5 LBS | BODY MASS INDEX: 26.76 KG/M2 | SYSTOLIC BLOOD PRESSURE: 136 MMHG | DIASTOLIC BLOOD PRESSURE: 72 MMHG

## 2022-03-07 DIAGNOSIS — C16.9 GASTRIC ADENOCARCINOMA (HCC): Primary | ICD-10-CM

## 2022-03-07 LAB
ANISOCYTOSIS BLD QL SMEAR: PRESENT
BASOPHILS # BLD AUTO: 0.02 THOUSANDS/ΜL (ref 0–0.1)
BASOPHILS NFR BLD AUTO: 0 % (ref 0–1)
DME PARACHUTE DELIVERY DATE ACTUAL: NORMAL
DME PARACHUTE DELIVERY DATE EXPECTED: NORMAL
DME PARACHUTE DELIVERY DATE REQUESTED: NORMAL
DME PARACHUTE ITEM DESCRIPTION: NORMAL
DME PARACHUTE ORDER STATUS: NORMAL
DME PARACHUTE SUPPLIER NAME: NORMAL
DME PARACHUTE SUPPLIER PHONE: NORMAL
EOSINOPHIL # BLD AUTO: 0.19 THOUSAND/ΜL (ref 0–0.61)
EOSINOPHIL NFR BLD AUTO: 4 % (ref 0–6)
EOSINOPHIL NFR BLD MANUAL: 4 % (ref 0–6)
ERYTHROCYTE [DISTWIDTH] IN BLOOD BY AUTOMATED COUNT: 15.6 % (ref 11.6–15.1)
HCT VFR BLD AUTO: 30 % (ref 36.5–49.3)
HCT VFR BLD AUTO: 30 % (ref 36.5–49.3)
HGB BLD-MCNC: 9.6 G/DL (ref 12–17)
HGB BLD-MCNC: 9.6 G/DL (ref 12–17)
HYPERCHROMIA BLD QL SMEAR: PRESENT
IMM GRANULOCYTES # BLD AUTO: 0.02 THOUSAND/UL (ref 0–0.2)
IMM GRANULOCYTES NFR BLD AUTO: 0 % (ref 0–2)
LYMPHOCYTES # BLD AUTO: 1.05 THOUSANDS/ΜL (ref 0.6–4.47)
LYMPHOCYTES # BLD AUTO: 32 % (ref 14–44)
LYMPHOCYTES NFR BLD AUTO: 21 % (ref 14–44)
MCH RBC QN AUTO: 30.1 PG (ref 26.8–34.3)
MCH RBC QN AUTO: 30.1 PG (ref 26.8–34.3)
MCHC RBC AUTO-ENTMCNC: 32 G/DL (ref 31.4–37.4)
MCHC RBC AUTO-ENTMCNC: 32 G/DL (ref 31.4–37.4)
MCV RBC AUTO: 94 FL (ref 82–98)
MCV RBC AUTO: 94 FL (ref 82–98)
MONOCYTES # BLD AUTO: 0.59 THOUSAND/ΜL (ref 0.17–1.22)
MONOCYTES NFR BLD AUTO: 12 % (ref 4–12)
MONOCYTES NFR BLD: 11 % (ref 4–12)
NEUTROPHILS # BLD AUTO: 3.12 THOUSANDS/ΜL (ref 1.85–7.62)
NEUTS SEG NFR BLD AUTO: 53 % (ref 43–75)
NEUTS SEG NFR BLD AUTO: 63 % (ref 43–75)
PLATELET # BLD AUTO: 271 THOUSANDS/UL (ref 149–390)
PLATELET # BLD AUTO: 271 THOUSANDS/UL (ref 149–390)
PLATELET BLD QL SMEAR: ADEQUATE
PMV BLD AUTO: 9.5 FL (ref 8.9–12.7)
RBC # BLD AUTO: 3.19 MILLION/UL (ref 3.88–5.62)
RBC # BLD AUTO: 3.19 MILLION/UL (ref 3.88–5.62)
RBC MORPH BLD: PRESENT
WBC # BLD AUTO: 4.99 THOUSAND/UL (ref 4.31–10.16)
WBC # BLD AUTO: 4.99 THOUSAND/UL (ref 4.31–10.16)

## 2022-03-07 PROCEDURE — 85027 COMPLETE CBC AUTOMATED: CPT | Performed by: INTERNAL MEDICINE

## 2022-03-07 PROCEDURE — 96413 CHEMO IV INFUSION 1 HR: CPT

## 2022-03-07 PROCEDURE — 96417 CHEMO IV INFUS EACH ADDL SEQ: CPT

## 2022-03-07 PROCEDURE — 85025 COMPLETE CBC W/AUTO DIFF WBC: CPT | Performed by: INTERNAL MEDICINE

## 2022-03-07 PROCEDURE — 96367 TX/PROPH/DG ADDL SEQ IV INF: CPT

## 2022-03-07 PROCEDURE — 96415 CHEMO IV INFUSION ADDL HR: CPT

## 2022-03-07 PROCEDURE — 85007 BL SMEAR W/DIFF WBC COUNT: CPT | Performed by: INTERNAL MEDICINE

## 2022-03-07 RX ORDER — SODIUM CHLORIDE 9 MG/ML
20 INJECTION, SOLUTION INTRAVENOUS ONCE AS NEEDED
Status: DISCONTINUED | OUTPATIENT
Start: 2022-03-07 | End: 2022-03-10 | Stop reason: HOSPADM

## 2022-03-07 RX ORDER — DEXTROSE MONOHYDRATE 50 MG/ML
20 INJECTION, SOLUTION INTRAVENOUS ONCE
Status: COMPLETED | OUTPATIENT
Start: 2022-03-07 | End: 2022-03-07

## 2022-03-07 RX ADMIN — DEXAMETHASONE SODIUM PHOSPHATE: 10 INJECTION, SOLUTION INTRAMUSCULAR; INTRAVENOUS at 10:17

## 2022-03-07 RX ADMIN — SODIUM CHLORIDE 240 MG: 9 INJECTION, SOLUTION INTRAVENOUS at 10:50

## 2022-03-07 RX ADMIN — OXALIPLATIN 164.9 MG: 5 INJECTION, SOLUTION INTRAVENOUS at 11:37

## 2022-03-07 RX ADMIN — SODIUM CHLORIDE 20 ML/HR: 9 INJECTION, SOLUTION INTRAVENOUS at 10:17

## 2022-03-07 RX ADMIN — DEXTROSE 20 ML/HR: 5 SOLUTION INTRAVENOUS at 11:33

## 2022-03-07 NOTE — PROGRESS NOTES
Pt to clinic for opdivo and folfox  Spoke with Jony and Abdullahi Reese from lab regarding [address] result  They stated ANC is 3 12 but having a problem with computer and will be in soon

## 2022-03-07 NOTE — TELEPHONE ENCOUNTER
Met with Yasemin Ricketts in the infusion center today to attempt to reschedule missed RD cx  Yasemin Ricketts states he is doing well post-hospitalization  He now has a PEG tube which was placed 3/3/22  He says he was trained very well on PEG tube use and care prior to being discharged and does not have any questions or concerns at this time  He also says he does not want to do bolus feedings at the infusion center so he will try to eat po while here; encouraged him to bring meals/snacks for himself as he is still trying to eat po  He says he has all the necessary EN supplies at home and he has no urgent needs/questions at this time  Offered to reschedule cx, however, Yasemin Ricketts asked that I call his dtr, REY, to coordinate appt  Provided pt with extra snacks (yogurt, granola bar) and my business card  Call placed to REY to discuss the above  REY reports that they are still waiting for his TF supplies to arrive and that they were told they would arrive today  I let her know that if she does not get the shipment today, he can use Ensure in place of Jevity and to call me so that I can provide them with donated Jevity 1 5 and extra syringes  I asked how his TF was going and how he was tolerating  She said that he was tolerating his TF the first day (100 mL boluses) while still in the hospital, but then yesterday when he increased his bolus volume to 200 mL he had diarrhea (had been constipation and using suppository daily; but constipation had been improved so REY thinks diarrhea was triggered by the [address])  He had 4 bolus feedings yesterday  Length of feedings was 5-10 min each, using gravity syringe method  Pt infused very cold Jevity and "was freezing" afterwards  Reviewed proper feeding tube use and care    Recommended decreasing volume at feedings to 150 mL, infusing feedings much slower (~15 min), continuing with gravity syringe method, and only infusing room-temp to slightly warm fluids through tube (never cold or hot)  Discussed that diarrhea could have been d/t increasing fiber too quickly and/or improver technique  Encouraged po intake as able  Consult rescheduled for 3/10 at 1:30pm by phone with pt, wife, and dtr  All questions and concerns addressed at this time

## 2022-03-08 LAB
DME PARACHUTE DELIVERY DATE ACTUAL: NORMAL
DME PARACHUTE DELIVERY DATE EXPECTED: NORMAL
DME PARACHUTE DELIVERY DATE REQUESTED: NORMAL
DME PARACHUTE ITEM DESCRIPTION: NORMAL
DME PARACHUTE ITEM DESCRIPTION: NORMAL
DME PARACHUTE ORDER STATUS: NORMAL
DME PARACHUTE SUPPLIER NAME: NORMAL
DME PARACHUTE SUPPLIER PHONE: NORMAL

## 2022-03-09 ENCOUNTER — HOSPITAL ENCOUNTER (OUTPATIENT)
Dept: INFUSION CENTER | Facility: CLINIC | Age: 72
Discharge: HOME | End: 2022-03-09

## 2022-03-09 VITALS
HEART RATE: 86 BPM | BODY MASS INDEX: 26.47 KG/M2 | RESPIRATION RATE: 18 BRPM | WEIGHT: 169 LBS | SYSTOLIC BLOOD PRESSURE: 124 MMHG | DIASTOLIC BLOOD PRESSURE: 69 MMHG | TEMPERATURE: 98.2 F | OXYGEN SATURATION: 95 %

## 2022-03-09 DIAGNOSIS — C16.9 GASTRIC ADENOCARCINOMA (HCC): Primary | ICD-10-CM

## 2022-03-09 NOTE — PROGRESS NOTES
Pt here for CADD pump disconnect  Pt tolerated at home infusion  Res volume is 0ml  Port flushed and blood return noted and was de accessed  Pt aware of future appts  AVS printed and given to pt

## 2022-03-10 ENCOUNTER — DOCUMENTATION (OUTPATIENT)
Dept: HEMATOLOGY ONCOLOGY | Facility: CLINIC | Age: 72
End: 2022-03-10

## 2022-03-10 ENCOUNTER — HOSPITAL ENCOUNTER (EMERGENCY)
Facility: HOSPITAL | Age: 72
Discharge: HOME | End: 2022-03-10
Attending: EMERGENCY MEDICINE
Payer: COMMERCIAL

## 2022-03-10 ENCOUNTER — APPOINTMENT (EMERGENCY)
Dept: CT IMAGING | Facility: HOSPITAL | Age: 72
End: 2022-03-10
Payer: COMMERCIAL

## 2022-03-10 ENCOUNTER — TELEPHONE (OUTPATIENT)
Dept: NUTRITION | Facility: CLINIC | Age: 72
End: 2022-03-10

## 2022-03-10 ENCOUNTER — TELEPHONE (OUTPATIENT)
Dept: INTERNAL MEDICINE CLINIC | Facility: CLINIC | Age: 72
End: 2022-03-10

## 2022-03-10 ENCOUNTER — TELEPHONE (OUTPATIENT)
Dept: PALLIATIVE MEDICINE | Facility: CLINIC | Age: 72
End: 2022-03-10

## 2022-03-10 VITALS
TEMPERATURE: 97.8 F | DIASTOLIC BLOOD PRESSURE: 55 MMHG | SYSTOLIC BLOOD PRESSURE: 115 MMHG | HEART RATE: 82 BPM | RESPIRATION RATE: 16 BRPM | OXYGEN SATURATION: 97 %

## 2022-03-10 DIAGNOSIS — C16.9 GASTRIC CANCER (HCC): ICD-10-CM

## 2022-03-10 DIAGNOSIS — R33.8 ACUTE URINARY RETENTION: Primary | ICD-10-CM

## 2022-03-10 DIAGNOSIS — N39.0 URINARY TRACT INFECTION: ICD-10-CM

## 2022-03-10 DIAGNOSIS — R11.0 CHEMOTHERAPY-INDUCED NAUSEA: Primary | ICD-10-CM

## 2022-03-10 DIAGNOSIS — T45.1X5A CHEMOTHERAPY-INDUCED NAUSEA: Primary | ICD-10-CM

## 2022-03-10 LAB
ALBUMIN SERPL BCP-MCNC: 3.2 G/DL (ref 3.5–5)
ALP SERPL-CCNC: 138 U/L (ref 46–116)
ALT SERPL W P-5'-P-CCNC: 37 U/L (ref 12–78)
ANION GAP SERPL CALCULATED.3IONS-SCNC: 12 MMOL/L (ref 4–13)
AST SERPL W P-5'-P-CCNC: 38 U/L (ref 5–45)
BACTERIA UR QL AUTO: NORMAL /HPF
BASOPHILS # BLD AUTO: 0 THOUSANDS/ΜL (ref 0–0.1)
BASOPHILS NFR BLD AUTO: 0 % (ref 0–1)
BILIRUB SERPL-MCNC: 0.63 MG/DL (ref 0.2–1)
BILIRUB UR QL STRIP: NEGATIVE
BUN SERPL-MCNC: 20 MG/DL (ref 5–25)
CALCIUM ALBUM COR SERPL-MCNC: 9.5 MG/DL (ref 8.3–10.1)
CALCIUM SERPL-MCNC: 8.9 MG/DL (ref 8.3–10.1)
CHLORIDE SERPL-SCNC: 99 MMOL/L (ref 100–108)
CLARITY UR: CLEAR
CO2 SERPL-SCNC: 25 MMOL/L (ref 21–32)
COLOR UR: YELLOW
CREAT SERPL-MCNC: 0.95 MG/DL (ref 0.6–1.3)
EOSINOPHIL # BLD AUTO: 0.04 THOUSAND/ΜL (ref 0–0.61)
EOSINOPHIL NFR BLD AUTO: 1 % (ref 0–6)
ERYTHROCYTE [DISTWIDTH] IN BLOOD BY AUTOMATED COUNT: 15.3 % (ref 11.6–15.1)
GFR SERPL CREATININE-BSD FRML MDRD: 80 ML/MIN/1.73SQ M
GLUCOSE SERPL-MCNC: 101 MG/DL (ref 65–140)
GLUCOSE UR STRIP-MCNC: NEGATIVE MG/DL
HCT VFR BLD AUTO: 32.6 % (ref 36.5–49.3)
HGB BLD-MCNC: 11 G/DL (ref 12–17)
HGB UR QL STRIP.AUTO: NEGATIVE
IMM GRANULOCYTES # BLD AUTO: 0.01 THOUSAND/UL (ref 0–0.2)
IMM GRANULOCYTES NFR BLD AUTO: 0 % (ref 0–2)
KETONES UR STRIP-MCNC: NEGATIVE MG/DL
LEUKOCYTE ESTERASE UR QL STRIP: ABNORMAL
LYMPHOCYTES # BLD AUTO: 0.78 THOUSANDS/ΜL (ref 0.6–4.47)
LYMPHOCYTES NFR BLD AUTO: 18 % (ref 14–44)
MCH RBC QN AUTO: 30.7 PG (ref 26.8–34.3)
MCHC RBC AUTO-ENTMCNC: 33.7 G/DL (ref 31.4–37.4)
MCV RBC AUTO: 91 FL (ref 82–98)
MONOCYTES # BLD AUTO: 0.35 THOUSAND/ΜL (ref 0.17–1.22)
MONOCYTES NFR BLD AUTO: 8 % (ref 4–12)
NEUTROPHILS # BLD AUTO: 3.18 THOUSANDS/ΜL (ref 1.85–7.62)
NEUTS SEG NFR BLD AUTO: 73 % (ref 43–75)
NITRITE UR QL STRIP: POSITIVE
NON-SQ EPI CELLS URNS QL MICRO: NORMAL /HPF
NRBC BLD AUTO-RTO: 0 /100 WBCS
PH UR STRIP.AUTO: 7.5 [PH]
PLATELET # BLD AUTO: 303 THOUSANDS/UL (ref 149–390)
PMV BLD AUTO: 9.6 FL (ref 8.9–12.7)
POTASSIUM SERPL-SCNC: 4.1 MMOL/L (ref 3.5–5.3)
PROT SERPL-MCNC: 6.7 G/DL (ref 6.4–8.2)
PROT UR STRIP-MCNC: ABNORMAL MG/DL
RBC # BLD AUTO: 3.58 MILLION/UL (ref 3.88–5.62)
RBC #/AREA URNS AUTO: NORMAL /HPF
SODIUM SERPL-SCNC: 136 MMOL/L (ref 136–145)
SP GR UR STRIP.AUTO: 1.01 (ref 1–1.03)
UROBILINOGEN UR QL STRIP.AUTO: 1 E.U./DL
WBC # BLD AUTO: 4.36 THOUSAND/UL (ref 4.31–10.16)
WBC #/AREA URNS AUTO: NORMAL /HPF

## 2022-03-10 PROCEDURE — 74177 CT ABD & PELVIS W/CONTRAST: CPT

## 2022-03-10 PROCEDURE — 80053 COMPREHEN METABOLIC PANEL: CPT | Performed by: EMERGENCY MEDICINE

## 2022-03-10 PROCEDURE — 99284 EMERGENCY DEPT VISIT MOD MDM: CPT

## 2022-03-10 PROCEDURE — 36415 COLL VENOUS BLD VENIPUNCTURE: CPT | Performed by: EMERGENCY MEDICINE

## 2022-03-10 PROCEDURE — 81001 URINALYSIS AUTO W/SCOPE: CPT | Performed by: EMERGENCY MEDICINE

## 2022-03-10 PROCEDURE — 96365 THER/PROPH/DIAG IV INF INIT: CPT

## 2022-03-10 PROCEDURE — 96375 TX/PRO/DX INJ NEW DRUG ADDON: CPT

## 2022-03-10 PROCEDURE — 87086 URINE CULTURE/COLONY COUNT: CPT | Performed by: EMERGENCY MEDICINE

## 2022-03-10 PROCEDURE — 99285 EMERGENCY DEPT VISIT HI MDM: CPT | Performed by: EMERGENCY MEDICINE

## 2022-03-10 PROCEDURE — 85025 COMPLETE CBC W/AUTO DIFF WBC: CPT | Performed by: EMERGENCY MEDICINE

## 2022-03-10 RX ORDER — ONDANSETRON 2 MG/ML
4 INJECTION INTRAMUSCULAR; INTRAVENOUS ONCE
Status: COMPLETED | OUTPATIENT
Start: 2022-03-10 | End: 2022-03-10

## 2022-03-10 RX ORDER — LIDOCAINE HYDROCHLORIDE 20 MG/ML
1 JELLY TOPICAL ONCE
Status: COMPLETED | OUTPATIENT
Start: 2022-03-10 | End: 2022-03-10

## 2022-03-10 RX ORDER — PROCHLORPERAZINE MALEATE 10 MG
10 TABLET ORAL EVERY 6 HOURS PRN
Qty: 30 TABLET | Refills: 1 | Status: SHIPPED | OUTPATIENT
Start: 2022-03-10 | End: 2022-04-29 | Stop reason: HOSPADM

## 2022-03-10 RX ORDER — HYDROMORPHONE HCL/PF 1 MG/ML
0.5 SYRINGE (ML) INJECTION ONCE
Status: COMPLETED | OUTPATIENT
Start: 2022-03-10 | End: 2022-03-10

## 2022-03-10 RX ORDER — CEPHALEXIN 500 MG/1
500 CAPSULE ORAL EVERY 8 HOURS SCHEDULED
Qty: 21 CAPSULE | Refills: 0 | Status: SHIPPED | OUTPATIENT
Start: 2022-03-10 | End: 2022-03-17

## 2022-03-10 RX ADMIN — HYDROMORPHONE HYDROCHLORIDE 0.5 MG: 1 INJECTION, SOLUTION INTRAMUSCULAR; INTRAVENOUS; SUBCUTANEOUS at 13:36

## 2022-03-10 RX ADMIN — LIDOCAINE HYDROCHLORIDE 1 APPLICATION: 20 JELLY TOPICAL at 13:36

## 2022-03-10 RX ADMIN — SODIUM CHLORIDE 500 ML: 0.9 INJECTION, SOLUTION INTRAVENOUS at 15:08

## 2022-03-10 RX ADMIN — IOHEXOL 100 ML: 350 INJECTION, SOLUTION INTRAVENOUS at 14:55

## 2022-03-10 RX ADMIN — CEFTRIAXONE SODIUM 1000 MG: 10 INJECTION, POWDER, FOR SOLUTION INTRAVENOUS at 15:08

## 2022-03-10 RX ADMIN — ONDANSETRON 4 MG: 2 INJECTION INTRAMUSCULAR; INTRAVENOUS at 13:36

## 2022-03-10 NOTE — ED PROVIDER NOTES
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 Taking?   bisacodyl (DULCOLAX) 5 mg EC tablet   No No   Sig: Take 1 tablet (5 mg total) by mouth daily as needed for constipation Related to pain medicines   fluorouracil 4,655 mg in CADD infusion pump   No No   Sig: Infuse 4,655 mg (1,200 mg/m2/day x 1 94 m2) into a venous catheter over 46 hours for 2 days  Do not start before March 7, 2022     levothyroxine 125 mcg tablet  Self Yes No   Sig: Take 125 mcg by mouth every morning     lidocaine-prilocaine (EMLA) cream   No No   Sig: Apply to port site 30 minutes prior to port accessing and cover with a dressing   methylphenidate (RITALIN) 5 mg tablet   No No   Sig: Take 1 tablet (5 mg total) by mouth 2 (two) times a day as needed (low energy) May use after breakfast, after lunch Max Daily Amount: 10 mg   ondansetron (Zofran ODT) 4 mg disintegrating tablet   No No   Sig: Take 1 tablet (4 mg total) by mouth every 6 (six) hours as needed for nausea or vomiting   oxyCODONE (OxyCONTIN) 10 mg 12 hr tablet   No No   Sig: Take 1 tablet (10 mg total) by mouth every 8 (eight) hours Max Daily Amount: 30 mg   oxyCODONE (ROXICODONE) 5 mg/5 mL solution   No No   Sig: Take 10 mL (10 mg total) by mouth every 4 (four) hours as needed for moderate pain for up to 10 days Max Daily Amount: 60 mg   pantoprazole (PROTONIX) 40 mg tablet   No No   Sig: Take 1 tablet (40 mg total) by mouth 2 (two) times a day Before breakfast and before bed to prevent acid accumulation  predniSONE 5 mg tablet   No No   Sig: Take 1 tablet (5 mg total) by mouth 2 (two) times a day as needed (low energy)   prochlorperazine (COMPAZINE) 10 mg tablet   No No   Sig: Take 1 tablet (10 mg total) by mouth every 6 (six) hours as needed for nausea or vomiting   saliva substitute (MOUTH KOTE)   No No   Sig: Apply 5 sprays to the mouth or throat 4 (four) times a day as needed (dry mouth)   tamsulosin (FLOMAX) 0 4 mg  Self Yes No   Sig: Take 0 4 mg by mouth 2 (two) times a day Indications: Enlarged Prostate  Facility-Administered Medications: None       Past Medical History:   Diagnosis Date    Cancer [facility])     gastric mass    COVID 01/2021    Disease of thyroid gland     GERD (gastroesophageal reflux disease)     History of GI diverticular bleed     Hyperlipidemia     Hypertension     Tinnitus        Past Surgical History:   Procedure Laterality Date    ADENOIDECTOMY      CHOLECYSTECTOMY LAPAROSCOPIC N/A 1/24/2022    Procedure: CHOLECYSTECTOMY LAPAROSCOPIC W/ INTRAOP CHOLANGIOGRAM;  Surgeon: Jannette Gomez DO;  [facility];  Service: General    COLONOSCOPY N/A 7/16/2016    Procedure: COLONOSCOPY;  Surgeon: Marcus Kamara MD;  [facility];  Service:     FL CHOLANGIOGRAM TRANSHEPATIC  1/24/2022    FL GUIDED CENTRAL VENOUS ACCESS DEVICE INSERTION  2/11/2022    HERNIA REPAIR      PROSTATE BIOPSY      TONSILLECTOMY      TUNNELED VENOUS PORT PLACEMENT N/A 2/11/2022    Procedure: INSERTION VENOUS PORT (PORT-A-CATH); Surgeon: Martinez Mendoza MD;  [facility];  Service: Surgical Oncology    UVULECTOMY         History reviewed  No pertinent family history    I have reviewed and agree with the history as documented  E-Cigarette/Vaping    E-Cigarette Use Never User      E-Cigarette/Vaping Substances     Social History     Tobacco Use    Smoking status: Never Smoker    Smokeless tobacco: Never Used   Vaping Use    Vaping Use: Never used   Substance Use Topics    Alcohol use: Yes     Comment: Rarely    Drug use: Never       Review of Systems   Genitourinary: Positive for difficulty urinating  All other systems reviewed and are negative  Physical Exam  Physical Exam  Vitals and nursing note reviewed  Constitutional:       General: He is in acute distress  Appearance: Normal appearance  He is normal weight  Comments: Severe pain distress  HENT:      Head: Normocephalic and atraumatic  Right Ear: External ear normal       Left Ear: External ear normal       Nose: Nose normal  No congestion or rhinorrhea  Mouth/Throat:      Mouth: Mucous membranes are moist       Pharynx: Oropharynx is clear  Eyes:      General: No scleral icterus  Right eye: No discharge  Left eye: No discharge  Extraocular Movements: Extraocular movements intact  Conjunctiva/sclera: Conjunctivae normal       Pupils: Pupils are equal, round, and reactive to light  Cardiovascular:      Rate and Rhythm: Normal rate and regular rhythm  Pulses: Normal pulses  Heart sounds: Normal heart sounds  Pulmonary:      Effort: Pulmonary effort is normal  No respiratory distress  Breath sounds: Normal breath sounds  Abdominal:      General: Abdomen is flat  Bowel sounds are normal  There is distension  Palpations: Abdomen is soft  Tenderness: There is abdominal tenderness in the suprapubic area  Musculoskeletal:         General: No swelling  Normal range of motion  Cervical back: Normal range of motion and neck supple  Skin:     General: Skin is warm and dry  Capillary Refill: Capillary refill takes less than 2 seconds     Neurological:      General: No focal deficit present  Mental Status: He is alert and oriented to person, place, and time  Mental status is at baseline     Psychiatric:         Mood and Affect: Mood normal          Behavior: Behavior normal          Vital Signs  ED Triage Vitals [03/10/22 1301]   Temperature Pulse Respirations Blood Pressure SpO2   97 8 °F (36 6 °C) 94 16 146/75 99 %      Temp Source Heart Rate Source Patient Position - Orthostatic VS BP Location FiO2 (%)   Oral Monitor Sitting Left arm --      Pain Score       9           Vitals:    03/10/22 1301 03/10/22 1515   BP: 146/75 115/55   Pulse: 94 82   Patient Position - Orthostatic VS: Sitting          Visual Acuity      ED Medications  Medications   HYDROmorphone (DILAUDID) injection 0 5 mg (0 5 mg Intravenous Given 3/10/22 1336)   ondansetron (ZOFRAN) injection 4 mg (4 mg Intravenous Given 3/10/22 1336)   lidocaine (XYLOCAINE) 2 % topical gel 1 application (1 application Urethral Given 3/10/22 1336)   ceftriaxone (ROCEPHIN) 1 g/50 mL in dextrose IVPB (0 mg Intravenous Stopped 3/10/22 1633)   sodium chloride 0 9 % bolus 500 mL (0 mL Intravenous Stopped 3/10/22 1633)   iohexol (OMNIPAQUE) 350 MG/ML injection (SINGLE-DOSE) 100 mL (100 mL Intravenous Given 3/10/22 1455)       Diagnostic Studies  Results Reviewed     Procedure Component Value Units Date/Time    Urine culture [[id number]] Collected: 03/10/22 1346    Lab Status: Final result Specimen: Urine, Indwelling Wilder Catheter Updated: 03/11/22 1418     Urine Culture No Growth <1000 cfu/mL    Comprehensive metabolic panel [[id number]]  (Abnormal) Collected: 03/10/22 1347    Lab Status: Final result Specimen: Blood from Arm, Right Updated: 03/10/22 1429     Sodium 136 mmol/L      Potassium 4 1 mmol/L      Chloride 99 mmol/L      CO2 25 mmol/L      ANION GAP 12 mmol/L      BUN 20 mg/dL      Creatinine 0 95 mg/dL      Glucose 101 mg/dL      Calcium 8 9 mg/dL      Corrected Calcium 9 5 mg/dL      AST 38 U/L      ALT 37 U/L      Alkaline Phosphatase 138 U/L      Total Protein 6 7 g/dL      Albumin 3 2 g/dL      Total Bilirubin 0 63 mg/dL      eGFR 80 ml/min/1 73sq m     Narrative:      National Kidney Disease Foundation guidelines for Chronic Kidney Disease (CKD):     Stage 1 with normal or high GFR (GFR > 90 mL/min/1 73 square meters)    Stage 2 Mild CKD (GFR = 60-89 mL/min/1 73 square meters)    Stage 3A Moderate CKD (GFR = 45-59 mL/min/1 73 square meters)    Stage 3B Moderate CKD (GFR = 30-44 mL/min/1 73 square meters)    Stage 4 Severe CKD (GFR = 15-29 mL/min/1 73 square meters)    Stage 5 End Stage CKD (GFR <15 mL/min/1 73 square meters)  Note: GFR calculation is accurate only with a steady state creatinine    Urine Microscopic [[id number]]  (Normal) Collected: 03/10/22 1346    Lab Status: Final result Specimen: Urine, Indwelling Wilder Catheter Updated: 03/10/22 1423     RBC, UA 0-1 /hpf      WBC, UA 2-4 /hpf      Epithelial Cells None Seen /hpf      Bacteria, UA Occasional /hpf     UA w Reflex to Microscopic w Reflex to Culture [[id number]]  (Abnormal) Collected: 03/10/22 1346    Lab Status: Final result Specimen: Urine, Indwelling Wilder Catheter Updated: 03/10/22 1412     Color, UA Yellow     Clarity, UA Clear     Specific Gravity, UA 1 010     pH, UA 7 5     Leukocytes, UA Trace     Nitrite, UA Positive     Protein, UA Trace mg/dl      Glucose, UA Negative mg/dl      Ketones, UA Negative mg/dl      Urobilinogen, UA 1 0 E U /dl      Bilirubin, UA Negative     Blood, UA Negative    CBC and differential [[id number]]  (Abnormal) Collected: 03/10/22 1347    Lab Status: Final result Specimen: Blood from Arm, Right Updated: 03/10/22 1411     WBC 4 36 Thousand/uL      RBC 3 58 Million/uL      Hemoglobin 11 0 g/dL      Hematocrit 32 6 %      MCV 91 fL      MCH 30 7 pg      MCHC 33 7 g/dL      RDW 15 3 %      MPV 9 6 fL      Platelets 745 Thousands/uL      nRBC 0 /100 WBCs      Neutrophils Relative 73 %      Immat GRANS % 0 %      Lymphocytes Relative 18 %      Monocytes Relative 8 %      Eosinophils Relative 1 %      Basophils Relative 0 %      Neutrophils Absolute 3 18 Thousands/µL      Immature Grans Absolute 0 01 Thousand/uL      Lymphocytes Absolute 0 78 Thousands/µL      Monocytes Absolute 0 35 Thousand/µL      Eosinophils Absolute 0 04 Thousand/µL      Basophils Absolute 0 00 Thousands/µL                  CT abdomen pelvis with contrast   Final Result by Zoila Copeland MD (03/10 4111)         1  Overall stable size of the gastric cardia soft tissue mass with slight decrease in the thickening of the distal esophagus  2   Conglomerate retroperitoneal sinan mass slightly increased in size as described above  3   Nodular thickening along the anterior peritoneal reflection increased or new from the prior study  Omental soft tissue nodules have remained stable to slightly decreased in size  4   Bibasilar lung nodules not significantly changed in size  5   No acute findings  The study was marked in [facility] for immediate notification  Workstation performed: [id number]                    Procedures  Procedures         ED Course  ED Course as of 03/11/22 2153   Thu Mar 10, 2022   1412 Urinary catheter placed by RN due to acute urinary retention, in severe pain  Put out about 600 mL of urine  The patient is feeling much better this time  MDM  Number of Diagnoses or Management Options  Acute urinary retention: new and requires workup  Gastric cancer [facility]): new and requires workup  Urinary tract infection: new and requires workup  Diagnosis management comments: 69 yo M w/ metastatic gastric cancer  Acute urinary retention, unable to urinate x 24 hours  + distention  Dawkins cath placed w/ relief of discomfort, put out over 600 mL urine  CT abd pelvis shows slightly worsening metastatic cancer, otherwise no new abnormalities   D/c home w/ dawkins and leg bag, start on antibiotics to cover UTI        Amount and/or Complexity of Data Reviewed  Clinical lab tests: ordered and reviewed  Tests in the radiology section of CPT®: ordered and reviewed  Decide to obtain previous medical records or to obtain history from someone other than the patient: yes    Risk of Complications, Morbidity, and/or Mortality  Presenting problems: moderate  Diagnostic procedures: moderate  Management options: moderate    Patient Progress  Patient progress: stable      Disposition  Final diagnoses:   Acute urinary retention   Gastric cancer ([facility] [address] )   Urinary tract infection     Time reflects when diagnosis was documented in both MDM as applicable and the Disposition within this note     Time User Action Codes Description Comment    3/10/2022  4:30 PM Stan Bailey [R33 [id number]] Acute urinary retention     3/10/2022  4:30 PM Dichter, Colletta Able Add [C16 9] Gastric cancer ([facility] [address] )     3/10/2022  4:30 PM Krish Kay Add [N39 0] Urinary tract infection       ED Disposition     ED Disposition Condition Date/Time Comment    Discharge Stable Thu Mar 10, 2022  4:30 PM [address] discharge to home/self care  Follow-up Information     Follow up With Specialties Details Why [address], DO Internal Medicine In 3 days  [address] [facility] [place] [facility] In 3 days  [address]  [phone]  [address] [facility] [place], [address] [place], [place], [address] [facility]          Discharge Medication List as of 3/10/2022  4:32 PM      START taking these medications    Details   cephalexin (KEFLEX) 500 mg capsule Take 1 capsule (500 mg total) by mouth every 8 (eight) hours for 7 days, Starting Thu 3/10/2022, Until Thu 3/17/2022, Normal      prochlorperazine (COMPAZINE) 10 mg tablet Take 1 tablet (10 mg total) by mouth every 6 (six) hours as needed for nausea or vomiting, Starting u 3/10/2022, Normal         CONTINUE these medications which have NOT CHANGED    Details   bisacodyl (DULCOLAX) 5 mg EC tablet Take 1 tablet (5 mg total) by mouth daily as needed for constipation Related to pain medicines, Starting Tue 2/15/2022, Normal      levothyroxine 125 mcg tablet Take 125 mcg by mouth every morning  , Historical Med      lidocaine-prilocaine (EMLA) cream Apply to port site 30 minutes prior to port accessing and cover with a dressing, Normal      methylphenidate (RITALIN) 5 mg tablet Take 1 tablet (5 mg total) by mouth 2 (two) times a day as needed (low energy) May use after breakfast, after lunch Max Daily Amount: 10 mg, Starting Fri 2/25/2022, Normal      ondansetron (Zofran ODT) 4 mg disintegrating tablet Take 1 tablet (4 mg total) by mouth every 6 (six) hours as needed for nausea or vomiting, Starting Sun 3/6/2022, Normal      oxyCODONE (OxyCONTIN) 10 mg 12 hr tablet Take 1 tablet (10 mg total) by mouth every 8 (eight) hours Max Daily Amount: 30 mg, Starting u 3/3/2022, Normal      oxyCODONE (ROXICODONE) 5 mg/5 mL solution Take 10 mL (10 mg total) by mouth every 4 (four) hours as needed for moderate pain for up to 10 days Max Daily Amount: 60 mg, Starting Thu 3/3/2022, Until Sun 3/13/2022 at 2359, Normal      pantoprazole (PROTONIX) 40 mg tablet Take 1 tablet (40 mg total) by mouth 2 (two) times a day Before breakfast and before bed to prevent acid accumulation  , Starting Tue 2/15/2022, Normal      predniSONE 5 mg tablet Take 1 tablet (5 mg total) by mouth 2 (two) times a day as needed (low energy), Starting Tue 2/15/2022, Normal      saliva substitute (MOUTH KOTE) Apply 5 sprays to the mouth or throat 4 (four) times a day as needed (dry mouth), Starting Sun 3/6/2022, Normal      tamsulosin (FLOMAX) 0 4 mg Take 0 4 mg by mouth 2 (two) times a day Indications: Enlarged Prostate , Historical Med         STOP taking these medications fluorouracil 4,655 mg in CADD infusion pump Comments:   Reason for Stopping:               No discharge procedures on file      PDMP Review       Value Time User    PDMP Reviewed  Yes 3/6/2022  8:28 AM Dhara Nixon MD          ED Provider  Electronically Signed by           nAa Eddy DO  03/11/22 8309

## 2022-03-10 NOTE — PROGRESS NOTES
Spoke with Linus Cramer patients daughter today  She indicated patient had significant nausea and vomiting 3/9 when he returned home from his pump d/c  He lost control of his bladder at one point  She would like an antiemetic for at home use  He had a script from a recent hospital admission for zofran but only for 9 pills   Thank you

## 2022-03-10 NOTE — TELEPHONE ENCOUNTER
3/10/2022 11:55 AM -  Received urgent phone call that pt has requested at-home urgent straight cath for inability to urinate for past day  Pt has been advised to go to ED has has refused this rec  He had phoned a family friend who works for [employer], and between his usual home health team and his family friend, a triage RN will work to go to home and straight cath [email or web address] to encourage them to f/up on this potentially dangerous challenge urgently: ED visit is still rec'd to help coordinate effective and appropriate imaging, consultation, and management of presumed bladder outlet obstruction  Family continue to request non-urgent in-home eval at this time  Pt is stable, afebrile, with limited amount of pain, and mentating clearly  RN agency has orders to drain bladder and record output, communicate with me for further instructions and mangaement

## 2022-03-10 NOTE — TELEPHONE ENCOUNTER
Daughter called to cancel her dad's RD cx this afternoon d/t hospitalization  Discussed with dtr and let her know that I will be in touch to reschedule once he is discharged  Dtr confirmed that pt has all of his TF supplies now

## 2022-03-10 NOTE — TELEPHONE ENCOUNTER
PT CALLED SAID THAT HE'S HAVING ISSUES WITH URINATING SINCE YESTERDAY WHEN THEY REMOVED THE CHEMO PUMP SO HE PRESCRIBED HIMSELF PYRIDIUM    PT SAID HE STARTED THE MEDICATION TODAY BUT IT'S NOT WORKING YET AND WANTS TO KNOW FROM YOU WHAT HE SHOULD DO    PT HAS NOT NOTIFIED ONCOLOGY     PLEASE ADVISE

## 2022-03-10 NOTE — PROGRESS NOTES
Telephone call to Braydon Michael patients daughter regarding the upcoming palliative care appointment  Confirmed their desire to switch providers either Dr Kiley Sánchez or Dr Uvaldo Zhang  I also reminded her of my role and the fact that I am here to help with ANYTHING they would need assistance with  I assured her if I was unable to provide an answer I would work behind the scenes until I got the answer for them  She told me her father just reached out to his PCP as a natural reaction and she was not even aware of the situation until it had escalated to urgent  She told me he experienced almost immediate nausea and vomiting after his infusion on this past Monday  She said he loss urinary control during a vomiting episode  I told her I would reach out to his med onc Dr Amber Langley to see if he could prescribe antiemetics for at home use  He did have a few from a recent hospital stay but was only given 9 pills  She would remind him to call me first   She thanked me very much for my call and will wait to hear about the palliative care appointment

## 2022-03-10 NOTE — PROGRESS NOTES
Telephone call to Palliative Care office to schedule patient with Dr Edi Dc left regarding same; requested a return call ASAP

## 2022-03-10 NOTE — PROGRESS NOTES
Telephone call to Anjali Jaimes, patients daughter, informing he of the change in Palliative Care appointment and provider to Dr [email or web address]  I also let her know there was a zofran script sent to [address] [place] [address] for 60 tabs and a compazine pended order  She thanked me for the information and was appreciative of my call

## 2022-03-11 ENCOUNTER — TELEPHONE (OUTPATIENT)
Dept: UROLOGY | Facility: AMBULATORY SURGERY CENTER | Age: 72
End: 2022-03-11

## 2022-03-11 ENCOUNTER — TELEPHONE (OUTPATIENT)
Dept: NUTRITION | Facility: CLINIC | Age: 72
End: 2022-03-11

## 2022-03-11 ENCOUNTER — PATIENT OUTREACH (OUTPATIENT)
Dept: HEMATOLOGY ONCOLOGY | Facility: CLINIC | Age: 72
End: 2022-03-11

## 2022-03-11 LAB — BACTERIA UR CULT: NORMAL

## 2022-03-11 NOTE — TELEPHONE ENCOUNTER
Pt's daughter called and requested an appt set up  I see an encounter is sent to providers for recommendations  Pt is on hold on the line requesting dawkins be removed on Monday  I then reached out to Cornell puga for assistance       Pt's daughter requesting a call back before the end of the day     Eliana Gilman [phone]

## 2022-03-11 NOTE — PROGRESS NOTES
Phone outreach to the pt, I called and introduced myself, explained my role as the GI Oncology Nurse Navigator and that If he needs anything, is unsure of who to call, wants to just talk to someone, needs to get in touch with someone, that he can reach out to me  He took down my contact information (my full name and job title, my phone number, and my email address)  I also mentioned my partner, Ena Tapia, and her job title and asked if he had her contact info, he did  He mentioned how he has a visiting nurse that has been his main point of contact and they have been very helpful  He also told me that Isamar Brown has been the best oncology team and he has been very happy with us  He thanked me for my call

## 2022-03-11 NOTE — TELEPHONE ENCOUNTER
Pt is under care of Dr Norma Gamble      PT ws in ED yesterday for urinary retention and bladder infection  Pt had cath put in and discharged  ED would like follow appt with Dr Norma Gamble  Pt is having discomforter from cath possibly  Pt is wanting the cath removed if possible      Daughter Olivia Mora is calling for appt    Call Back [phone]

## 2022-03-11 NOTE — TELEPHONE ENCOUNTER
Spoke with Dr Ralph Michael  Patient has hx of 152g prostate and was admitted for urinary retention with a UTI  Antibiotic was prescribed for 7 days and patient has Flomax listed in chart from awhile ago  Confirmed with Dr Ralph Michael that it would be best to wait at least a week before attempting a void trial to give everything time to calm down  Called and spoke with Blossom Dallas  Explained the above and she was okay with scheduling void trial Thursday or Friday of next week  Patient and daughter preferred [facility], however nothing was available  Scheduled Friday in [place] per patient schedule  She is aware it is a two part appointment  She confirmed patient was still on Flomax and questioning if the dose should just be increased  Advised that with increasing the dose, you usually end up with more side effects and that maybe they can add another medication to help him empty but that was a discussion to have with a provider  She requested an office visit with a provider   Scheduled with Bobbi Jerry next week at [facility] per request

## 2022-03-11 NOTE — TELEPHONE ENCOUNTER
Called Mary per discussion yesterday  She stated she is in the middle of something right now but will call me back  Confirmed that she had my contact info

## 2022-03-14 ENCOUNTER — PATIENT OUTREACH (OUTPATIENT)
Dept: HEMATOLOGY ONCOLOGY | Facility: CLINIC | Age: 72
End: 2022-03-14

## 2022-03-14 NOTE — PROGRESS NOTES
Rcv'd phone call this morning from pt asking for appt details this week  We reviewed all the scheduled upcoming appts and times  Pt asked if the VN he sees would be able to bring a new dawkins bag for him since the one that he has now fills up and leaks  I did tell him that they should be able to bring him one but that he should call them to find out before they see him  He said he called and left a VM  He is waiting to hear back from them  Pt thanked me for the information

## 2022-03-15 ENCOUNTER — DOCUMENTATION (OUTPATIENT)
Dept: SOCIAL WORK | Facility: HOSPITAL | Age: 72
End: 2022-03-15

## 2022-03-15 ENCOUNTER — PATIENT OUTREACH (OUTPATIENT)
Dept: HEMATOLOGY ONCOLOGY | Facility: CLINIC | Age: 72
End: 2022-03-15

## 2022-03-15 ENCOUNTER — TELEPHONE (OUTPATIENT)
Dept: HEMATOLOGY ONCOLOGY | Facility: CLINIC | Age: 72
End: 2022-03-15

## 2022-03-15 ENCOUNTER — TELEPHONE (OUTPATIENT)
Dept: PALLIATIVE MEDICINE | Facility: CLINIC | Age: 72
End: 2022-03-15

## 2022-03-15 DIAGNOSIS — G89.3 CANCER ASSOCIATED PAIN: ICD-10-CM

## 2022-03-15 DIAGNOSIS — C16.9 GASTRIC ADENOCARCINOMA (HCC): Primary | ICD-10-CM

## 2022-03-15 RX ORDER — OXYCODONE HCL 5 MG/5 ML
SOLUTION, ORAL ORAL
Qty: 473 ML | Refills: 0 | Status: SHIPPED | OUTPATIENT
Start: 2022-03-15 | End: 2022-03-31 | Stop reason: SDUPTHER

## 2022-03-15 NOTE — TELEPHONE ENCOUNTER
John Link, from [facility] calling in,regarding  Request to have the most recent doctor visit notes and Caris report be faxed to [phone]  John Link is stating that she thinks that fax was being sent to office phone number and not the fax number      Please refax to [phone]

## 2022-03-15 NOTE — TELEPHONE ENCOUNTER
Pt contacted to Oncology Nurse, Demetra, to inquire about Oxycontin med refills  Per PDMP pt had a 30 day supply filled on 3/7/22  Pt has a follow up with Palliative on 3/22/22

## 2022-03-15 NOTE — TELEPHONE ENCOUNTER
Oxycodone solution refilled  12 day supply filled on 3/3  Chart and PDMP reviewed  No red flags identified  7 day supply sent to pharmacy until patient's scheduled appointment on 3/22  Patient was called and is aware

## 2022-03-15 NOTE — PROGRESS NOTES
Pt called me this morning to ask about a prescription refill  The pt states that he has Oxycodone liquid form  prescribed for breakthrough pain  He said he wanted to call ahead of time since the medication is getting [place] to mid[place]  I told him that the Oxycodone was prescribed by the NP w [facility], Scarlett Méndez  I provided Mr Sly Cárdenas w the phone number to [facility] and I also reminded him of his appt scheduled w Dr Karol Uribe on 3/22  I told him that I would route my note to them to address this  He was appreciative of my help

## 2022-03-15 NOTE — TELEPHONE ENCOUNTER
Milton Dong, from [facility] [place], is requesting the most recent doctor visit notes and Caris report be faxed over to [phone]  Milton Dong can be reached back at [phone] for any questions

## 2022-03-15 NOTE — PROGRESS NOTES
Pt is open with [facility] and requests weekly visits  Currently [facility] does not have any catheter management orders  Would you like [facility] to change catheter or flush if blocked or would you only like the catheter to be managed in office? Please advise  Please have [address] office fax [facility] office with orders if you would like us to assist in his indwelling dawkins management  Thank you      Kim Spencer RN    [phone]  Or brigida benavides

## 2022-03-16 ENCOUNTER — OFFICE VISIT (OUTPATIENT)
Dept: UROLOGY | Facility: AMBULATORY SURGERY CENTER | Age: 72
End: 2022-03-16
Payer: COMMERCIAL

## 2022-03-16 VITALS
BODY MASS INDEX: 26.68 KG/M2 | HEIGHT: 67 IN | DIASTOLIC BLOOD PRESSURE: 80 MMHG | HEART RATE: 77 BPM | SYSTOLIC BLOOD PRESSURE: 120 MMHG | WEIGHT: 170 LBS

## 2022-03-16 DIAGNOSIS — N40.1 BENIGN PROSTATIC HYPERPLASIA WITH LOWER URINARY TRACT SYMPTOMS, SYMPTOM DETAILS UNSPECIFIED: Primary | ICD-10-CM

## 2022-03-16 PROCEDURE — 99213 OFFICE O/P EST LOW 20 MIN: CPT | Performed by: NURSE PRACTITIONER

## 2022-03-16 PROCEDURE — 1111F DSCHRG MED/CURRENT MED MERGE: CPT | Performed by: NURSE PRACTITIONER

## 2022-03-16 RX ORDER — TAMSULOSIN HYDROCHLORIDE 0.4 MG/1
0.4 CAPSULE ORAL 2 TIMES DAILY
Qty: 180 CAPSULE | Refills: 3 | Status: SHIPPED | OUTPATIENT
Start: 2022-03-16 | End: 2022-04-29 | Stop reason: HOSPADM

## 2022-03-16 RX ORDER — FINASTERIDE 5 MG/1
5 TABLET, FILM COATED ORAL DAILY
Qty: 90 TABLET | Refills: 3 | Status: SHIPPED | OUTPATIENT
Start: 2022-03-16 | End: 2022-04-29 | Stop reason: HOSPADM

## 2022-03-16 NOTE — PATIENT INSTRUCTIONS
Continue Tamsulosin twice daily  Take   Pyridium as directed  Return to office as scheduled for dawkins removal and void trial

## 2022-03-16 NOTE — PROGRESS NOTES
3/16/2022    Assessment and Plan    70 y o  male managed by Dr César Yuan    1  Benign prostatic hyperplasia with lower urinary tract symptoms-urinary retention  · Status post Wilder catheter insertion-03/10/2022  · Patient is scheduled for Wilder catheter removal and void trial 03/18/2022  If urine output during void trial is greater than 300 on PVR patient will require insertion of Wilder catheter  · Continue tamsulosin and finasteride  · Patient will take Pyridium as indicated  · Will discuss with Dr César Yuan if patient fails void trial and requires Wilder catheter insertion what will be the plan moving forward  · Follow up in the office we based on results of void trial   If patient successfully passes void trial he should follow-up in the office in 2 weeks with bladder scan PVR and valuation  2  Elevated PSA  · Status post prostate biopsy 06/08/2021 with negative results for malignancy  He is noted to have a prostate size of 152 g  · Patient will be due for PSA and BHARAT in 3 months    3  Family history prostate cancer  · Brother    History of Present Illness  Elena Ibarra is a 70 y o  male here for follow up evaluation of urinary symptoms secondary to benign prostatic hyperplasia  Patient is status post insertion of Wilder catheter 03/10/2022 status post evaluation emergency department for urinary retention  He has been managed with Wilder catheter insertion since that time and has been it  tolerating well  He is status post prostate biopsy 06/08/2021 with a noted prostate size of about 152 g  At his follow-up appointment it was recommended that he start finasteride to assist in reduction of prostate size and hopefully improve urinary symptoms  Review of Systems   Constitutional: Negative for chills and fever  Respiratory: Negative for cough and shortness of breath  Cardiovascular: Negative for chest pain     Gastrointestinal: Negative for abdominal distention, abdominal pain, blood in stool, nausea and vomiting  Genitourinary: Positive for difficulty urinating  Negative for dysuria, enuresis, flank pain, frequency, hematuria and urgency  Skin: Negative for rash  Past Medical History  Past Medical History:   Diagnosis Date    Cancer [facility])     gastric mass    COVID 01/2021    Disease of thyroid gland     GERD (gastroesophageal reflux disease)     History of GI diverticular bleed     Hyperlipidemia     Hypertension     Tinnitus        Past Social History  Past Surgical History:   Procedure Laterality Date    ADENOIDECTOMY      CHOLECYSTECTOMY LAPAROSCOPIC N/A 1/24/2022    Procedure: CHOLECYSTECTOMY LAPAROSCOPIC W/ INTRAOP CHOLANGIOGRAM;  Surgeon: Ida Mcclelland DO;  [facility];  Service: General    COLONOSCOPY N/A 7/16/2016    Procedure: COLONOSCOPY;  Surgeon: Anshu Jeffrey MD;  [facility];  Service:     FL CHOLANGIOGRAM TRANSHEPATIC  1/24/2022    FL GUIDED CENTRAL VENOUS ACCESS DEVICE INSERTION  2/11/2022    HERNIA REPAIR      PROSTATE BIOPSY      TONSILLECTOMY      TUNNELED VENOUS PORT PLACEMENT N/A 2/11/2022    Procedure: INSERTION VENOUS PORT (PORT-A-CATH); Surgeon: Luna Benitez MD;  [facility];  Service: Surgical Oncology    UVULECTOMY       Social History     Tobacco Use   Smoking Status Never Smoker   Smokeless Tobacco Never Used       Past Family History  History reviewed  No pertinent family history      Past Social history  Social History     Socioeconomic History    Marital status: /Civil Union     Spouse name: Not on file    Number of children: Not on file    Years of education: Not on file    Highest education level: Not on file   Occupational History    Not on file   Tobacco Use    Smoking status: Never Smoker    Smokeless tobacco: Never Used   Vaping Use    Vaping Use: Never used   Substance and Sexual Activity    Alcohol use: Yes     Comment: Rarely    Drug use: Never    Sexual activity: Not on file   Other Topics Concern    Not on file   Social History Narrative    Not on file     Social Determinants of Health     Financial Resource Strain: Not on file   Food Insecurity: No Food Insecurity    Worried About Running Out of Food in the Last Year: Never true    Amrita of Food in the Last Year: Never true   Transportation Needs: No Transportation Needs    Lack of Transportation (Medical): No    Lack of Transportation (Non-Medical): No   Physical Activity: Not on file   Stress: Not on file   Social Connections: Not on file   Intimate Partner Violence: Not on file   Housing Stability: Unknown    Unable to Pay for Housing in the Last Year: No    Number of Places Lived in the Last Year: Not on file    Unstable Housing in the Last Year: No       Current Medications  Current Outpatient Medications   Medication Sig Dispense Refill    bisacodyl (DULCOLAX) 5 mg EC tablet Take 1 tablet (5 mg total) by mouth daily as needed for constipation Related to pain medicines 30 tablet 0    cephalexin (KEFLEX) 500 mg capsule Take 1 capsule (500 mg total) by mouth every 8 (eight) hours for 7 days 21 capsule 0    [START ON 3/21/2022] fluorouracil 4,655 mg in CADD infusion pump Infuse 4,655 mg (1,200 mg/m2/day x 1 94 m2) into a venous catheter over 46 hours for 2 days  Do not start before March 21, 2022  1 each 0    levothyroxine 125 mcg tablet Take 125 mcg by mouth every morning        ondansetron (Zofran ODT) 4 mg disintegrating tablet Take 1 tablet (4 mg total) by mouth every 6 (six) hours as needed for nausea or vomiting 60 tablet 0    oxyCODONE (ROXICODONE) 5 mg/5 mL solution Take 10-20mg PO Q4H PRN Moderate-severe pain 473 mL 0    pantoprazole (PROTONIX) 40 mg tablet Take 1 tablet (40 mg total) by mouth 2 (two) times a day Before breakfast and before bed to prevent acid accumulation   60 tablet 0    saliva substitute (MOUTH KOTE) Apply 5 sprays to the mouth or throat 4 (four) times a day as needed (dry mouth) 59 mL 0    tamsulosin (FLOMAX) 0 4 mg Take 1 capsule (0 4 mg total) by mouth 2 (two) times a day 180 capsule 3    finasteride (PROSCAR) 5 mg tablet Take 1 tablet (5 mg total) by mouth daily 90 tablet 3    levothyroxine 100 mcg tablet Take 100 mcg by mouth daily      lidocaine-prilocaine (EMLA) cream Apply to port site 30 minutes prior to port accessing and cover with a dressing (Patient not taking: Reported on 3/16/2022 ) 30 g 1    oxyCODONE (OxyCONTIN) 10 mg 12 hr tablet Take 1 tablet (10 mg total) by mouth every 8 (eight) hours Max Daily Amount: 30 mg 90 tablet 0    phenazopyridine (PYRIDIUM) 200 mg tablet Take 400 mg by mouth 3 (three) times a day      predniSONE 5 mg tablet Take 1 tablet (5 mg total) by mouth 2 (two) times a day as needed (low energy) 30 tablet 0    prochlorperazine (COMPAZINE) 10 mg tablet Take 1 tablet (10 mg total) by mouth every 6 (six) hours as needed for nausea or vomiting 30 tablet 1     No current facility-administered medications for this visit  Allergies  No Known Allergies      The following portions of the patient's history were reviewed and updated as appropriate: allergies, current medications, past medical history, past social history, past surgical history and problem list       Vitals  Vitals:    03/16/22 1449   BP: 120/80   Pulse: 77   Weight: 77 1 kg (170 lb)   Height: 5' 7" (1 702 m)     Physical Exam  Physical Exam  Vitals reviewed  Constitutional:       General: He is not in acute distress  Appearance: Normal appearance  He is normal weight  HENT:      Head: Normocephalic  Pulmonary:      Effort: No respiratory distress  Breath sounds: Normal breath sounds  Skin:     General: Skin is warm and dry  Neurological:      General: No focal deficit present  Mental Status: He is alert and oriented to person, place, and time     Psychiatric:         Mood and Affect: Mood normal          Behavior: Behavior normal        Results  No results found for this or any previous visit (from the past 1 hour(s)) ]  Lab Results   Component Value Date    PSA 7 2 (H) 03/08/2021     Lab Results   Component Value Date    CALCIUM 8 9 03/10/2022    K 4 1 03/10/2022    CO2 25 03/10/2022    CL 99 (L) 03/10/2022    BUN 20 03/10/2022    CREATININE 0 95 03/10/2022     Lab Results   Component Value Date    WBC 4 36 03/10/2022    HGB 11 0 (L) 03/10/2022    HCT 32 6 (L) 03/10/2022    MCV 91 03/10/2022     03/10/2022     Orders  No orders of the defined types were placed in this encounter        JADEN Wyatt

## 2022-03-17 ENCOUNTER — OFFICE VISIT (OUTPATIENT)
Dept: HEMATOLOGY ONCOLOGY | Facility: CLINIC | Age: 72
End: 2022-03-17
Payer: COMMERCIAL

## 2022-03-17 ENCOUNTER — HOSPITAL ENCOUNTER (OUTPATIENT)
Dept: INFUSION CENTER | Facility: CLINIC | Age: 72
Discharge: HOME | End: 2022-03-17
Payer: COMMERCIAL

## 2022-03-17 ENCOUNTER — TELEPHONE (OUTPATIENT)
Dept: HEMATOLOGY ONCOLOGY | Facility: CLINIC | Age: 72
End: 2022-03-17

## 2022-03-17 ENCOUNTER — TELEPHONE (OUTPATIENT)
Dept: UROLOGY | Facility: AMBULATORY SURGERY CENTER | Age: 72
End: 2022-03-17

## 2022-03-17 VITALS
HEART RATE: 85 BPM | HEIGHT: 67 IN | SYSTOLIC BLOOD PRESSURE: 126 MMHG | BODY MASS INDEX: 26.21 KG/M2 | DIASTOLIC BLOOD PRESSURE: 76 MMHG | WEIGHT: 167 LBS | RESPIRATION RATE: 14 BRPM | TEMPERATURE: 97.6 F | OXYGEN SATURATION: 99 %

## 2022-03-17 DIAGNOSIS — C16.0 MALIGNANT NEOPLASM OF CARDIA OF STOMACH (HCC): ICD-10-CM

## 2022-03-17 DIAGNOSIS — C16.9 GASTRIC ADENOCARCINOMA (HCC): Primary | ICD-10-CM

## 2022-03-17 DIAGNOSIS — C16.9 GASTRIC ADENOCARCINOMA (HCC): ICD-10-CM

## 2022-03-17 DIAGNOSIS — Z45.2 ENCOUNTER FOR CENTRAL LINE CARE: ICD-10-CM

## 2022-03-17 LAB
ALBUMIN SERPL BCP-MCNC: 3.1 G/DL (ref 3.5–5)
ALP SERPL-CCNC: 137 U/L (ref 46–116)
ALT SERPL W P-5'-P-CCNC: 38 U/L (ref 12–78)
ANION GAP SERPL CALCULATED.3IONS-SCNC: 8 MMOL/L (ref 4–13)
AST SERPL W P-5'-P-CCNC: 30 U/L (ref 5–45)
BASOPHILS # BLD AUTO: 0.02 THOUSANDS/ΜL (ref 0–0.1)
BASOPHILS NFR BLD AUTO: 0 % (ref 0–1)
BILIRUB SERPL-MCNC: 0.5 MG/DL (ref 0.2–1)
BUN SERPL-MCNC: 13 MG/DL (ref 5–25)
CALCIUM ALBUM COR SERPL-MCNC: 9.5 MG/DL (ref 8.3–10.1)
CALCIUM SERPL-MCNC: 8.8 MG/DL (ref 8.3–10.1)
CHLORIDE SERPL-SCNC: 102 MMOL/L (ref 100–108)
CO2 SERPL-SCNC: 28 MMOL/L (ref 21–32)
CREAT SERPL-MCNC: 0.8 MG/DL (ref 0.6–1.3)
EOSINOPHIL # BLD AUTO: 0.15 THOUSAND/ΜL (ref 0–0.61)
EOSINOPHIL NFR BLD AUTO: 3 % (ref 0–6)
ERYTHROCYTE [DISTWIDTH] IN BLOOD BY AUTOMATED COUNT: 16.1 % (ref 11.6–15.1)
GFR SERPL CREATININE-BSD FRML MDRD: 89 ML/MIN/1.73SQ M
GLUCOSE SERPL-MCNC: 108 MG/DL (ref 65–140)
HCT VFR BLD AUTO: 31.8 % (ref 36.5–49.3)
HGB BLD-MCNC: 10.5 G/DL (ref 12–17)
IMM GRANULOCYTES # BLD AUTO: 0.07 THOUSAND/UL (ref 0–0.2)
IMM GRANULOCYTES NFR BLD AUTO: 1 % (ref 0–2)
LYMPHOCYTES # BLD AUTO: 1.03 THOUSANDS/ΜL (ref 0.6–4.47)
LYMPHOCYTES NFR BLD AUTO: 21 % (ref 14–44)
MCH RBC QN AUTO: 30.9 PG (ref 26.8–34.3)
MCHC RBC AUTO-ENTMCNC: 33 G/DL (ref 31.4–37.4)
MCV RBC AUTO: 94 FL (ref 82–98)
MONOCYTES # BLD AUTO: 0.83 THOUSAND/ΜL (ref 0.17–1.22)
MONOCYTES NFR BLD AUTO: 17 % (ref 4–12)
NEUTROPHILS # BLD AUTO: 2.75 THOUSANDS/ΜL (ref 1.85–7.62)
NEUTS SEG NFR BLD AUTO: 58 % (ref 43–75)
NRBC BLD AUTO-RTO: 0 /100 WBCS
PLATELET # BLD AUTO: 333 THOUSANDS/UL (ref 149–390)
PMV BLD AUTO: 9 FL (ref 8.9–12.7)
POTASSIUM SERPL-SCNC: 4.3 MMOL/L (ref 3.5–5.3)
PROT SERPL-MCNC: 6.7 G/DL (ref 6.4–8.2)
RBC # BLD AUTO: 3.4 MILLION/UL (ref 3.88–5.62)
SODIUM SERPL-SCNC: 138 MMOL/L (ref 136–145)
WBC # BLD AUTO: 4.85 THOUSAND/UL (ref 4.31–10.16)

## 2022-03-17 PROCEDURE — 85025 COMPLETE CBC W/AUTO DIFF WBC: CPT | Performed by: INTERNAL MEDICINE

## 2022-03-17 PROCEDURE — 80053 COMPREHEN METABOLIC PANEL: CPT | Performed by: INTERNAL MEDICINE

## 2022-03-17 PROCEDURE — 99214 OFFICE O/P EST MOD 30 MIN: CPT | Performed by: INTERNAL MEDICINE

## 2022-03-17 RX ORDER — ONDANSETRON 4 MG/1
8 TABLET, ORALLY DISINTEGRATING ORAL EVERY 8 HOURS PRN
Qty: 90 TABLET | Refills: 3 | Status: SHIPPED | OUTPATIENT
Start: 2022-03-17 | End: 2022-03-31 | Stop reason: SDUPTHER

## 2022-03-17 RX ORDER — PALONOSETRON 0.05 MG/ML
0.25 INJECTION, SOLUTION INTRAVENOUS ONCE
Status: CANCELLED | OUTPATIENT
Start: 2022-03-21

## 2022-03-17 NOTE — PROGRESS NOTES
Hematology/Oncology Outpatient Follow- up Note  Jem Sheth 70 y o  male MRN: @ Encounter: [id number]        Date:  3/17/2022    Presenting Complaint/Diagnosis :   Metastatic GE junction malignancy, HER2 negative, MSI stable, tumor mutation burden low    HPI:      Per the note from [address]    70-year-old  male with benign prostatic hypertrophy, hypertension, degenerative arthritis, hypothyroidism, GERD had been complaining of epigastric pain with on a 30 lb weight loss over the past 2 months, loss of appetite, he had cholecystectomy without improvement of symptoms in January 2022 subsequently EGD on 02/02/2022 showed 2 cm hiatal hernia with friable mass involving more than half of the circumference with extension down into the cardia and the fundus for about 5 cm mild erythema in the antrum     Biopsy of the gastric fundus mass showed adenocarcinoma with small portion squamous component arising in high-grade dysplasia in a background mixed squamous and cardiac mucosa the majority of the tumor is adenocarcinoma with minor component of squamous differentiation  Mismatch repair protein is intact     HER2 is 0 by IHC     CT scan the chest abdomen pelvis showed infiltrating mass at the gastroesophageal junction sinan enlargement suspicious for metastatic disease in the mediastinum, retroperitoneum, omentum, subcentimeter bilateral pulmonary nodules     PET scan on 02/09/2022 showed FDG uptake in the gastroesophageal junction and proximal stomach, sinan disease in the chest, abdomen, periaortic, small bilateral pulmonary nodules, uptake in the right upper quadrant omental nodules, uptake along the lateral service of the right lobe of the liver for possible peritoneal disease along the liver surface  The patient was started on modified FOLFOX 6 with Opdivo every 2 weeks        Previous Hematologic/ Oncologic History:    Oncology History   Gastric adenocarcinoma ([facility] [address] )   2/8/2022 Initial Diagnosis Gastric adenocarcinoma ([facility] [address] )     2/21/2022 -  Chemotherapy    nivolumab (OPDIVO) IVPB, 240 mg (100 % of original dose 240 mg), Intravenous, Once, 2 of 12 cycles  Dose modification: 240 mg (original dose 240 mg, Cycle 1)  Administration: 240 mg (2/21/2022), 240 mg (3/7/2022)  oxaliplatin (ELOXATIN) chemo infusion, 85 mg/m2 = 164 9 mg, Intravenous, Once, 2 of 12 cycles  Administration: 164 9 mg (2/21/2022), 164 9 mg (3/7/2022)  fluorouracil (ADRUCIL) ambulatory infusion Soln, 1,200 mg/m2/day = 4,655 mg, Intravenous, Over 46 hours, 2 of 12 cycles         Current Hematologic/ Oncologic Treatment:      Modified FOLFOX 6 with nivolumab s/p 2 cycles so far  Cycle 3  Is coming up on the 21st of March  Interval History:      Patient returns for follow-up visit  He was seen by my colleague who has started him appropriately on modified FOLFOX 6 with the will to mab for his metastatic GE junction malignancy which is HER2 negative, MSI stable and tumor mutation burden low  The patient has had 2 cycles so far  He was in the emergency room for some urinary issues and had a CT scan which showed a question of some very mild progression in some areas and regression in others all measured in mm  This is compared to his PET-CT scan  He has not had adequate treatment at this point to say if this is treatment failure or not  Based on his Caris testing he does not have a lot of actionable mutations so if he does progress on this further chemotherapy would probably consist of a taxane and Cyramza  I explained I would probably give at least 6-8 cycles prior to considering any imaging at this point  The patient and his daughter agree with this  He is having some nausea with his chemotherapy so I will add on a mend and Aloxi  I also refilled his prescription for Zofran and I advised him he could take 8 mg every 6-8 hours  His pain is actually improved  He is seeing palliative care    The rest of his 14 point review of systems today was negative  He is swallowing better than he was previously  Does feel weak on occasion  Does have a urinary catheter in place for an enlarged prostate and if he does go for a TURP procedure I advised him we could hold chemo for a week so he can have this done for quality of life and decrease in future UTI risk  Test Results:    Imaging: EGD Peg Tube Placement    Result Date: 3/3/2022  Narrative: [facility] [address] [address] [phone] DATE OF SERVICE: 3/03/22 PHYSICIAN(S): Rosa Cook MD Proceduralist INDICATION: Stomach cancer [facility]), FTT (failure to thrive) in adult, Moderate protein-calorie malnutrition ([facility] [address] ) POST-OP DIAGNOSIS: See the impression below  PREPROCEDURE: Informed consent was obtained for the procedure, including sedation  Risks of perforation, hemorrhage, adverse drug reaction and aspiration were discussed  The patient was placed in the left lateral decubitus position  Patient was explained about the risks and benefits of the procedure  Risks including but not limited to bleeding, infection, and perforation were explained in detail  Also explained about less than 100% sensitivity with the exam and other alternatives  DETAILS OF PROCEDURE: Patient was taken to the procedure room where a time out was performed to confirm correct patient and correct procedure  The patient underwent monitored anesthesia care, which was administered by an anesthesia professional  The patient's blood pressure, heart rate, level of consciousness, respirations and oxygen were monitored throughout the procedure  The scope was advanced to the second part of the duodenum  Retroflexion was performed in the fundus  The patient experienced no blood loss  The procedure was not difficult  The patient tolerated the procedure well  There were no apparent complications   ANESTHESIA INFORMATION: ASA: III Anesthesia Type: IV Sedation with Anesthesia MEDICATIONS: docusate sodium (COLACE) capsule 100 mg 0 mg*  *Some administrations are not included in total oxyCODONE (OxyCONTIN) 12 hr tablet 10 mg 0 mg*  *Some administrations are not included in total (Totals for administrations occurring from 1711 to 1738 on 03/03/22) IV Cefazolin 1g FINDINGS: Malignant-appearing stricture (traversable) in the GE junction The duodenum appeared normal  PEG-G tube successfully placed in the body of the stomach using a deformable internal bolster via the pull technique after the site was identified via transillumination, visualized indentation and needle passed through abdominal wall; distance from external bolster to external end of tube: 3 cm; tube rotated freely to confirm placement SPECIMENS: * No specimens in log *     Impression: [place] 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 PEG tube placed in the gastric body Normal duodenum RECOMMENDATION: May use PEG for medications after 6 hours and feedings in the morning May advance diet as tolerated in the morning Check CBC in a m  Lio Bowles MD     CT abdomen pelvis with contrast    Result Date: 3/10/2022  Narrative: CT ABDOMEN AND PELVIS WITH IV CONTRAST INDICATION:   abd pain, urinary retention, gastric cancer  COMPARISON:  Most recent comparison is a CT scan dated February 4, 2022  TECHNIQUE:  CT examination of the abdomen and pelvis was performed  Axial, sagittal, and coronal 2D reformatted images were created from the source data and submitted for interpretation  Radiation dose length product (DLP) for this visit:  805 mGy-cm   This examination, like all CT scans performed in the [facility], was performed utilizing techniques to minimize radiation dose exposure, including the use of iterative reconstruction and automated exposure control  IV Contrast:  100 mL of iohexol (OMNIPAQUE) Enteric Contrast:  Enteric contrast was not administered   FINDINGS: ABDOMEN LOWER CHEST:  Left lower lobe pulmonary nodule measures 1 cm, previously 0 9 cm  A 6 mm right lower lobe nodule is unchanged  LIVER/BILIARY TREE:  Mild bilobar intrahepatic biliary ductal dilatation likely due to prior cholecystectomy  GALLBLADDER:  Gallbladder is surgically absent    SPLEEN:  Unremarkable  PANCREAS:  Unremarkable  ADRENAL GLANDS:  Stable bilateral adrenal nodules  KIDNEYS/URETERS:  Bilateral nonobstructing renal calculi measuring up to approximately 5 to 6 mm  No hydronephrosis  STOMACH AND BOWEL:  Multiple colonic diverticula  No evidence of acute diverticulitis  Thickening in the region of the gastric cardia, which likely is the site of tumor appears similar to the prior study, although the distal esophagus appears to be less thickened  APPENDIX:  No findings to suggest appendicitis  ABDOMINOPELVIC CAVITY:  There is thickening of the gastric cardia which likely represents known tumor probably not significantly changed  A right retrocrural abnormal lymph node is also stable  Tumor which partially encases the abdominal aorta, origin of the SMA, left renal vein likely represents confluent sinan metastases and appear slightly increased from the prior study  For example, tumor posterior to the left renal vein measures 1 4 cm in  the short axis, previously 0 9 cm  Several omental soft tissue nodules, mostly in the right upper quadrant are again shown, and are stable to slightly decreased in size  There is nodular thickening involving the anterior peritoneal reflection (series 2 images 68 through 70 which is increased or new from the prior study  VESSELS:  Marked atherosclerotic disease  No aneurysm  PELVIS REPRODUCTIVE ORGANS:  Marked prostatic enlargement  URINARY BLADDER:  Decompressed with a Wilder catheter in place  ABDOMINAL WALL/INGUINAL REGIONS:  Unremarkable  OSSEOUS STRUCTURES:  No acute fracture or destructive osseous lesion  Spinal degenerative changes are noted  Impression: 1    Overall stable size of the gastric cardia soft tissue mass with slight decrease in the thickening of the distal esophagus  2   Conglomerate retroperitoneal sinan mass slightly increased in size as described above  3   Nodular thickening along the anterior peritoneal reflection increased or new from the prior study  Omental soft tissue nodules have remained stable to slightly decreased in size  4   Bibasilar lung nodules not significantly changed in size  5   No acute findings  The study was marked in [place] [facility] for immediate notification  Workstation performed: [id number]       Labs:   Lab Results   Component Value Date    WBC 4 36 03/10/2022    HGB 11 0 (L) 03/10/2022    HCT 32 6 (L) 03/10/2022    MCV 91 03/10/2022     03/10/2022     Lab Results   Component Value Date    K 4 1 03/10/2022    CL 99 (L) 03/10/2022    CO2 25 03/10/2022    BUN 20 03/10/2022    CREATININE 0 95 03/10/2022    GLUF 97 02/18/2022    CALCIUM 8 9 03/10/2022    CORRECTEDCA 9 5 03/10/2022    AST 38 03/10/2022    ALT 37 03/10/2022    ALKPHOS 138 (H) 03/10/2022    EGFR 80 03/10/2022       Lab Results   Component Value Date    PSA 7 2 (H) 03/08/2021       Lab Results   Component Value Date    IRON 28 (L) 03/03/2022    TIBC 254 03/03/2022    FERRITIN 429 (H) 03/03/2022       ROS: As stated in the history of present illness otherwise his 14 point review of systems today was negative        Active Problems:   Patient Active Problem List   Diagnosis    Hypertension    Hypothyroid    Depression    BPH (benign prostatic hyperplasia)    COVID-19    Inflammatory arthritis    Rectus diastasis    Acute calculous cholecystitis    Gastric mass    Gastric adenocarcinoma ([address]    Encounter for central line care    FTT (failure to thrive) in adult    Stomach cancer ([address] )    Cancer related pain    Moderate protein-calorie malnutrition ([address] )       Past Medical History:   Past Medical History:   Diagnosis Date    Cancer ([address] )     gastric mass    COVID 01/2021    Disease of thyroid gland     GERD (gastroesophageal reflux disease)     History of GI diverticular bleed     Hyperlipidemia     Hypertension     Tinnitus        Surgical History:   Past Surgical History:   Procedure Laterality Date    ADENOIDECTOMY      CHOLECYSTECTOMY LAPAROSCOPIC N/A 1/24/2022    Procedure: CHOLECYSTECTOMY LAPAROSCOPIC W/ INTRAOP CHOLANGIOGRAM;  Surgeon: Deidre Wise DO;  [facility];  Service: General    COLONOSCOPY N/A 7/16/2016    Procedure: COLONOSCOPY;  Surgeon: Harvey Roblero MD;  [facility];  Service:     FL CHOLANGIOGRAM TRANSHEPATIC  1/24/2022    FL GUIDED CENTRAL VENOUS ACCESS DEVICE INSERTION  2/11/2022    HERNIA REPAIR      PROSTATE BIOPSY      TONSILLECTOMY      TUNNELED VENOUS PORT PLACEMENT N/A 2/11/2022    Procedure: INSERTION VENOUS PORT (PORT-A-CATH); Surgeon: Tamra Oscar MD;  [facility];  Service: Surgical Oncology    UVULECTOMY         Family History:  No family history on file  Cancer-related family history is not on file  Social History:   Social History     Socioeconomic History    Marital status: /Civil Union     Spouse name: Not on file    Number of children: Not on file    Years of education: Not on file    Highest education level: Not on file   Occupational History    Not on file   Tobacco Use    Smoking status: Never Smoker    Smokeless tobacco: Never Used   Vaping Use    Vaping Use: Never used   Substance and Sexual Activity    Alcohol use: Yes     Comment: Rarely    Drug use: Never    Sexual activity: Not on file   Other Topics Concern    Not on file   Social History Narrative    Not on file     Social Determinants of Health     Financial Resource Strain: Not on file   Food Insecurity: No Food Insecurity    Worried About Running Out of Food in the Last Year: Never true    Amrita of Food in the Last Year: Never true   Transportation Needs: No Transportation Needs    Lack of Transportation (Medical):  No    Lack of Transportation (Non-Medical): No   Physical Activity: Not on file   Stress: Not on file   Social Connections: Not on file   Intimate Partner Violence: Not on file   Housing Stability: Unknown    Unable to Pay for Housing in the Last Year: No    Number of Places Lived in the Last Year: Not on file    Unstable Housing in the Last Year: No       Current Medications:   Current Outpatient Medications   Medication Sig Dispense Refill    bisacodyl (DULCOLAX) 5 mg EC tablet Take 1 tablet (5 mg total) by mouth daily as needed for constipation Related to pain medicines 30 tablet 0    cephalexin (KEFLEX) 500 mg capsule Take 1 capsule (500 mg total) by mouth every 8 (eight) hours for 7 days 21 capsule 0    finasteride (PROSCAR) 5 mg tablet Take 1 tablet (5 mg total) by mouth daily 90 tablet 3    [START ON 3/21/2022] fluorouracil 4,655 mg in CADD infusion pump Infuse 4,655 mg (1,200 mg/m2/day x 1 94 m2) into a venous catheter over 46 hours for 2 days  Do not start before March 21, 2022  1 each 0    levothyroxine 100 mcg tablet Take 100 mcg by mouth daily      levothyroxine 125 mcg tablet Take 125 mcg by mouth every morning        ondansetron (Zofran ODT) 4 mg disintegrating tablet Take 1 tablet (4 mg total) by mouth every 6 (six) hours as needed for nausea or vomiting 60 tablet 0    oxyCODONE (OxyCONTIN) 10 mg 12 hr tablet Take 1 tablet (10 mg total) by mouth every 8 (eight) hours Max Daily Amount: 30 mg 90 tablet 0    oxyCODONE (ROXICODONE) 5 mg/5 mL solution Take 10-20mg PO Q4H PRN Moderate-severe pain 473 mL 0    pantoprazole (PROTONIX) 40 mg tablet Take 1 tablet (40 mg total) by mouth 2 (two) times a day Before breakfast and before bed to prevent acid accumulation   60 tablet 0    phenazopyridine (PYRIDIUM) 200 mg tablet Take 400 mg by mouth 3 (three) times a day      predniSONE 5 mg tablet Take 1 tablet (5 mg total) by mouth 2 (two) times a day as needed (low energy) 30 tablet 0    prochlorperazine (COMPAZINE) 10 mg tablet Take 1 tablet (10 mg total) by mouth every 6 (six) hours as needed for nausea or vomiting 30 tablet 1    saliva substitute (MOUTH KOTE) Apply 5 sprays to the mouth or throat 4 (four) times a day as needed (dry mouth) 59 mL 0    tamsulosin (FLOMAX) 0 4 mg Take 1 capsule (0 4 mg total) by mouth 2 (two) times a day 180 capsule 3    lidocaine-prilocaine (EMLA) cream Apply to port site 30 minutes prior to port accessing and cover with a dressing (Patient not taking: Reported on 3/16/2022 ) 30 g 1     No current facility-administered medications for this visit  Allergies: No Known Allergies    Physical Exam:    Body surface area is 1 87 meters squared  Wt Readings from Last 3 Encounters:   03/17/22 75 8 kg (167 lb)   03/16/22 77 1 kg (170 lb)   03/09/22 76 7 kg (169 lb)        Temp Readings from Last 3 Encounters:   03/17/22 97 6 °F (36 4 °C) (Temporal)   03/10/22 97 8 °F (36 6 °C) (Oral)   03/09/22 98 2 °F (36 8 °C) (Tympanic)        BP Readings from Last 3 Encounters:   03/17/22 126/76   03/16/22 120/80   03/10/22 115/55         Pulse Readings from Last 3 Encounters:   03/17/22 85   03/16/22 77   03/10/22 82         Physical Exam     Constitutional   General appearance: No acute distress, well appearing and well nourished  Eyes   Conjunctiva and lids: No swelling, erythema or discharge  Pupils and irises: Equal, round and reactive to light  Ears, Nose, Mouth, and Throat   External inspection of ears and nose: Normal     Nasal mucosa, septum, and turbinates: Normal without edema or erythema  Oropharynx: Normal with no erythema, edema, exudate or lesions  Pulmonary   Respiratory effort: No increased work of breathing or signs of respiratory distress  Auscultation of lungs: Clear to auscultation  Cardiovascular   Palpation of heart: Normal PMI, no thrills  Auscultation of heart: Normal rate and rhythm, normal S1 and S2, without murmurs      Examination of extremities for edema and/or varicosities: Normal     Carotid pulses: Normal     Abdomen   Abdomen: Non-tender, no masses  Liver and spleen: No hepatomegaly or splenomegaly  Lymphatic   Palpation of lymph nodes in neck: No lymphadenopathy  Musculoskeletal   Gait and station: Normal     Digits and nails: Normal without clubbing or cyanosis  Inspection/palpation of joints, bones, and muscles: Normal     Skin   Skin and subcutaneous tissue: Normal without rashes or lesions  Neurologic   Cranial nerves: Cranial nerves 2-12 intact  Sensation: No sensory loss  Psychiatric   Orientation to person, place, and time: Normal     Mood and affect: Normal         Assessment / Plan: 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 benign prostatic hypertrophy, hypertension, hypothyroidism and GERD was diagnosed with adenocarcinoma of the GE junction with extension into the fundus of the stomach when he presented with weight loss and epigastric pain  Biopsy confirmed adenocarcinoma which was HER2 negative  MMR was intact  Tumor mutation burden is low  MSI was stable  No other actionable mutations were detected  Garden testing did show he may be eligible for TAPUR clinical trial which may be open with [facility] which looks at mutations and treatment with FDA approved drugs for those mutations outside of standard of care  He did have CDK 6 amplification and EGFR amplification for which they may have clinical trial if needed  For now he is on modified FOLFOX 6 with Opdivo every 2 weeks which I think is a very reasonable regimen based on his disease  I would do 6-8 cycles and then repeat imaging  He states he has already swallowing better so hopefully is having a clinical response as his appetite is also improved  He has a urinary catheter in place for BPH which will be removed    This was placed because of urinary obstruction possibly related to narcotics plus his BPH  If he needs a urological procedure we will hold the treatment for a week so he can have this done if his blood counts allow  He will discuss this with his urologist   For now he will stay on treatment  I will see him back in 4-6 weeks  I have refilled his Zofran 0 DT but advised him to take 8 mg every 6-8 hours as needed for nausea  I have changed his premedications to include Aloxi and Emend and hopefully this will also help with his nausea on day 1  The patient and his daughter asked appropriate questions which were answered  They were satisfied with the visit  I will see him back in 4-6 weeks  Goals and Barriers:  Current Goal:  Prolong Survival from adenocarcinoma of the GE junction  Barriers: None  Patient's Capacity to Self Care:  Patient able to self care  Portions of the record may have been created with voice recognition software  Occasional wrong word or "sound a like" substitutions may have occurred due to the inherent limitations of voice recognition software  Read the chart carefully and recognize, using context, where substitutions have occurred

## 2022-03-17 NOTE — TELEPHONE ENCOUNTER
Nursing:  Please be sure to schedule follow-up appointment with Dr Luis Brooks after tomorrow's nurse visit for Wilder catheter removal and void trial   Discussion will occur regarding definitive prostate treatment for BPH      Thank you

## 2022-03-17 NOTE — TELEPHONE ENCOUNTER
Title: Pre medication change    Date patient scheduled: 3/21    Original medication ordered: Zofran + Decadron    New Medication ordered: Decadron, Emend and Aloxi            Office to route to [place] [address]    Infusion tech to receive message, confirm scheduled treatment duration matches ordered treatment duration or adjust accordingly, and re-link appointment request orders  Infusion tech to notify pharmacy

## 2022-03-17 NOTE — TELEPHONE ENCOUNTER
Pt seen in office yesterday 3/16 by Flor Bowen, PT lvm today 3/17 saying he had some follow up questions for Esteban Boudreaux  He did not state what his questions were  Pt is requesting a call back from Esteban Boudreaux

## 2022-03-17 NOTE — PROGRESS NOTES
Pt presents for lab work  Port flushed with blood return noted  Blood work drawn and sent to lab  Saline locked and de accessed  Pt declined AVS  Aware of future appts

## 2022-03-18 ENCOUNTER — PROCEDURE VISIT (OUTPATIENT)
Dept: UROLOGY | Facility: AMBULATORY SURGERY CENTER | Age: 72
End: 2022-03-18
Payer: COMMERCIAL

## 2022-03-18 VITALS — BODY MASS INDEX: 27.37 KG/M2 | HEIGHT: 67 IN | WEIGHT: 174.4 LBS

## 2022-03-18 DIAGNOSIS — N40.1 BENIGN PROSTATIC HYPERPLASIA WITH LOWER URINARY TRACT SYMPTOMS, SYMPTOM DETAILS UNSPECIFIED: Primary | ICD-10-CM

## 2022-03-18 LAB — POST-VOID RESIDUAL VOLUME, ML POC: 141 ML

## 2022-03-18 PROCEDURE — 51798 US URINE CAPACITY MEASURE: CPT

## 2022-03-18 PROCEDURE — 99211 OFF/OP EST MAY X REQ PHY/QHP: CPT

## 2022-03-18 PROCEDURE — 1111F DSCHRG MED/CURRENT MED MERGE: CPT

## 2022-03-18 PROCEDURE — 3008F BODY MASS INDEX DOCD: CPT | Performed by: INTERNAL MEDICINE

## 2022-03-18 NOTE — PROGRESS NOTES
3/18/2022    [address] [facility]  1950  [id number]    Diagnosis  Chief Complaint     Void trial          Patient presents for void trial managed by Dr Evans Deal  Follow up as scheduled for PVR in 2 weeks per Jose Alejandro's recommendation  Follow up as scheduled with Dr Brad Gill to discuss TURP procedure  Knows to call the office in the meantime with concerns  Procedure Wilder removal/voiding trial    Wilder catheter removed after deflation of an intact balloon  Patient tolerated well  Encouraged patient to hydrate well and return this afternoon for post void residual   he knows he may return early if uncomfortable and unable to urinate  Patient agrees to this plan  Patient returned this afternoon  Patient states able to void  Patient voided while in office  Bladder ultrasound performed and PVR measured 141ml  Patient scheduled for 2 week f/u per JADEN Wilkes  Patient requested Sheridan Madrigal if possible  Scheduled right after chemo since patient will be on location      Recent Results (from the past 4 hour(s))   POCT Measure PVR    Collection Time: 03/18/22  2:48 PM   Result Value Ref Range    POST-VOID RESIDUAL VOLUME, ML  mL           Vitals:    03/18/22 0853   Weight: 79 1 kg (174 lb 6 4 oz)   Height: 5' 7" (1 702 m)           Helena Bernal RN

## 2022-03-21 ENCOUNTER — HOSPITAL ENCOUNTER (OUTPATIENT)
Dept: INFUSION CENTER | Facility: CLINIC | Age: 72
Discharge: HOME | End: 2022-03-21
Payer: COMMERCIAL

## 2022-03-21 VITALS
TEMPERATURE: 96.6 F | HEART RATE: 76 BPM | SYSTOLIC BLOOD PRESSURE: 122 MMHG | WEIGHT: 166 LBS | BODY MASS INDEX: 26.06 KG/M2 | RESPIRATION RATE: 16 BRPM | OXYGEN SATURATION: 96 % | DIASTOLIC BLOOD PRESSURE: 70 MMHG | HEIGHT: 67 IN

## 2022-03-21 DIAGNOSIS — C16.9 GASTRIC ADENOCARCINOMA (HCC): Primary | ICD-10-CM

## 2022-03-21 PROCEDURE — 96367 TX/PROPH/DG ADDL SEQ IV INF: CPT

## 2022-03-21 PROCEDURE — 96415 CHEMO IV INFUSION ADDL HR: CPT

## 2022-03-21 PROCEDURE — G0498 CHEMO EXTEND IV INFUS W/PUMP: HCPCS

## 2022-03-21 PROCEDURE — 96375 TX/PRO/DX INJ NEW DRUG ADDON: CPT

## 2022-03-21 PROCEDURE — 96413 CHEMO IV INFUSION 1 HR: CPT

## 2022-03-21 PROCEDURE — 96417 CHEMO IV INFUS EACH ADDL SEQ: CPT

## 2022-03-21 RX ORDER — DEXTROSE MONOHYDRATE 50 MG/ML
20 INJECTION, SOLUTION INTRAVENOUS ONCE
Status: COMPLETED | OUTPATIENT
Start: 2022-03-21 | End: 2022-03-21

## 2022-03-21 RX ORDER — SODIUM CHLORIDE 9 MG/ML
20 INJECTION, SOLUTION INTRAVENOUS ONCE AS NEEDED
Status: DISCONTINUED | OUTPATIENT
Start: 2022-03-21 | End: 2022-03-24 | Stop reason: HOSPADM

## 2022-03-21 RX ORDER — PALONOSETRON 0.05 MG/ML
0.25 INJECTION, SOLUTION INTRAVENOUS ONCE
Status: COMPLETED | OUTPATIENT
Start: 2022-03-21 | End: 2022-03-21

## 2022-03-21 RX ADMIN — DEXAMETHASONE SODIUM PHOSPHATE 10 MG: 10 INJECTION, SOLUTION INTRAMUSCULAR; INTRAVENOUS at 08:30

## 2022-03-21 RX ADMIN — SODIUM CHLORIDE 240 MG: 9 INJECTION, SOLUTION INTRAVENOUS at 09:36

## 2022-03-21 RX ADMIN — FOSAPREPITANT 150 MG: 150 INJECTION, POWDER, LYOPHILIZED, FOR SOLUTION INTRAVENOUS at 08:52

## 2022-03-21 RX ADMIN — SODIUM CHLORIDE 20 ML/HR: 0.9 INJECTION, SOLUTION INTRAVENOUS at 08:20

## 2022-03-21 RX ADMIN — OXALIPLATIN 164.9 MG: 5 INJECTION, SOLUTION INTRAVENOUS at 10:23

## 2022-03-21 RX ADMIN — PALONOSETRON HYDROCHLORIDE 0.25 MG: 0.25 INJECTION INTRAVENOUS at 10:16

## 2022-03-21 RX ADMIN — DEXTROSE 20 ML/HR: 5 SOLUTION INTRAVENOUS at 10:22

## 2022-03-21 NOTE — PROGRESS NOTES
Patient tolerated treatment without complications  Patient connected to CADD pump, all connections secured  CADD infusing without issue, pump running and green light flashing  Patient aware of disconnect date and time   Patient given AVS and emend care notes print out from [email or web address] per request

## 2022-03-21 NOTE — PROGRESS NOTES
Patient here for folfox  Patient resting in recliner, has had some nausea and weight loss at home, provider aware of this per note, today emend premedication is added  Vitals stable  Labs within parameters for treatment  [place] within reach of patient  Will continue to monitor

## 2022-03-22 ENCOUNTER — OFFICE VISIT (OUTPATIENT)
Dept: PALLIATIVE MEDICINE | Facility: CLINIC | Age: 72
End: 2022-03-22
Payer: COMMERCIAL

## 2022-03-22 VITALS
WEIGHT: 169.53 LBS | TEMPERATURE: 97.8 F | OXYGEN SATURATION: 98 % | RESPIRATION RATE: 16 BRPM | SYSTOLIC BLOOD PRESSURE: 110 MMHG | BODY MASS INDEX: 26.55 KG/M2 | DIASTOLIC BLOOD PRESSURE: 80 MMHG | HEART RATE: 69 BPM

## 2022-03-22 DIAGNOSIS — G89.3 CANCER RELATED PAIN: ICD-10-CM

## 2022-03-22 DIAGNOSIS — C16.9 GASTRIC ADENOCARCINOMA (HCC): Primary | ICD-10-CM

## 2022-03-22 DIAGNOSIS — Z51.5 PALLIATIVE CARE PATIENT: ICD-10-CM

## 2022-03-22 DIAGNOSIS — N40.0 BPH (BENIGN PROSTATIC HYPERPLASIA): ICD-10-CM

## 2022-03-22 DIAGNOSIS — R62.7 FTT (FAILURE TO THRIVE) IN ADULT: ICD-10-CM

## 2022-03-22 DIAGNOSIS — E44.0 MODERATE PROTEIN-CALORIE MALNUTRITION (HCC): ICD-10-CM

## 2022-03-22 DIAGNOSIS — F32.A DEPRESSION: ICD-10-CM

## 2022-03-22 PROCEDURE — 1036F TOBACCO NON-USER: CPT | Performed by: INTERNAL MEDICINE

## 2022-03-22 PROCEDURE — 99214 OFFICE O/P EST MOD 30 MIN: CPT | Performed by: INTERNAL MEDICINE

## 2022-03-22 PROCEDURE — 1160F RVW MEDS BY RX/DR IN RCRD: CPT | Performed by: INTERNAL MEDICINE

## 2022-03-22 NOTE — PROGRESS NOTES
Palliative and Supportive Care   Santosh Pappas 70 y o  male [id number]    Assessment/Plan:  1  Gastric adenocarcinoma ([facility] [address] )    2  BPH (benign prostatic hyperplasia)    3  Depression    4  FTT (failure to thrive) in adult    5  Cancer related pain    6  Moderate protein-calorie malnutrition ([facility] [address] )    7  Palliative care patient      Requested Prescriptions      No prescriptions requested or ordered in this encounter     There are no discontinued medications  PLAN:  1  Symptom management-   -cancer related pain:  No changes current regimen effective  -poor appetite:  Patient stopped Marinol as he did not like going main feel  Does inquire about MMJ and provided him with information and will set up follow-up with MMJ provider  -neuropathy:  Likely due to chemotherapy, patient has learned medication strategies and does not need any medication for this at this time  2    Goals of care :  Patient aware of his diagnosis in overall prognosis  Patient's goal is to have a couple more good years stating my goal is to have 2 more years  Patient does emphasize that his goal is on quality of life with the hopes of prolonging life with disease directed therapies  3    Psychosocial support :  Time spent providing supportive listening  4    ACP:  Patient has not completed ACP at this time, but is interested in doing so  Provided patient with 5 wishes document with instructions on how to complete and plan to follow up at next visit to ensure this is documented in his chart  5  Return to care in 2 months, will see medical marijuana provider prior to that  Representatives have queried the patient's controlled substance dispensing history in the Prescription Drug Monitoring Program in compliance with regulations before I have prescribed any controlled substances  The prescription history is consistent with prescribed therapy and our practice policies  No follow-ups on file    Santosh Pappas and his brother  Subjective: In attendance at this visit:   Chief Complaint  Follow up visit for:  pain, neoplasm related, nausea, fatigue, assessment of goals of care, disease process education and discussion of prognosis, advance care planning  HPI     Doe Lopez is a 70 y o  male with palliative diagnosis of gastric adenocarcinoma follows with Dr Leopoldo Prose as an outpatient  Patient currently on combination of chemotherapy and immunotherapy  Most recently patient has suffered from complications from his BPH, requiring VNA nursing to straight cath x1 and is now following up with Urology  Oncologic history is outlined below  Patient seen in clinic today with his brother  Overall he is doing very well  He states his pain is controlled with his current regimen and does not want make any changes  He uses his p r n  opioids sparingly  Patient has increased his activity and is walking 15 minutes on the treadmill a day  He stopped taking the Marinol as he did not like the way it made him feel  However he is interested in medical marijuana and provided information on this  Patient is very knowledgeable about his diagnosis and prognosis  His goals are very clear, which is at to extend life but focus on quality  He has tolerated his chemotherapy at this time and knows there is additional options available if he progresses through the spine and treatment  He is hopeful to visit [address] [place] [address] after he finishes the cycle  No other complaints at this time      Oncology History   Gastric adenocarcinoma ([facility] [address] )   2/8/2022 Initial Diagnosis    Gastric adenocarcinoma ([facility] [address] )     2/21/2022 -  Chemotherapy    palonosetron (ALOXI), 0 25 mg, Intravenous, Once, 1 of 10 cycles  Administration: 0 25 mg (3/21/2022)  fosaprepitant (EMEND) IVPB, 150 mg, Intravenous, Once, 1 of 10 cycles  Administration: 150 mg (3/21/2022)  nivolumab (OPDIVO) IVPB, 240 mg (100 % of original dose 240 mg), Intravenous, Once, 3 of 12 cycles  Dose modification: 240 mg (original dose 240 mg, Cycle 1)  Administration: 240 mg (2/21/2022), 240 mg (3/7/2022), 240 mg (3/21/2022)  oxaliplatin (ELOXATIN) chemo infusion, 85 mg/m2 = 164 9 mg, Intravenous, Once, 3 of 12 cycles  Administration: 164 9 mg (2/21/2022), 164 9 mg (3/7/2022), 164 9 mg (3/21/2022)  fluorouracil (ADRUCIL) ambulatory infusion Soln, 1,200 mg/m2/day = 4,655 mg, Intravenous, Over 46 hours, 3 of 12 cycles         The following portions of the medical history were reviewed: past medical history, problem list, medication list, and social history      Current Outpatient Medications:     bisacodyl (DULCOLAX) 5 mg EC tablet, Take 1 tablet (5 mg total) by mouth daily as needed for constipation Related to pain medicines, Disp: 30 tablet, Rfl: 0    finasteride (PROSCAR) 5 mg tablet, Take 1 tablet (5 mg total) by mouth daily, Disp: 90 tablet, Rfl: 3    fluorouracil 4,655 mg in CADD infusion pump, Infuse 4,655 mg (1,200 mg/m2/day x 1 94 m2) into a venous catheter over 46 hours for 2 days  Do not start before March 21, 2022 , Disp: 1 each, Rfl: 0    levothyroxine 100 mcg tablet, Take 100 mcg by mouth daily, Disp: , Rfl:     levothyroxine 125 mcg tablet, Take 125 mcg by mouth every morning  , Disp: , Rfl:     lidocaine-prilocaine (EMLA) cream, Apply to port site 30 minutes prior to port accessing and cover with a dressing (Patient not taking: Reported on 3/16/2022 ), Disp: 30 g, Rfl: 1    ondansetron (Zofran ODT) 4 mg disintegrating tablet, Take 2 tablets (8 mg total) by mouth every 8 (eight) hours as needed for nausea or vomiting for up to 90 doses, Disp: 90 tablet, Rfl: 3    oxyCODONE (OxyCONTIN) 10 mg 12 hr tablet, Take 1 tablet (10 mg total) by mouth every 8 (eight) hours Max Daily Amount: 30 mg, Disp: 90 tablet, Rfl: 0    oxyCODONE (ROXICODONE) 5 mg/5 mL solution, Take 10-20mg PO Q4H PRN Moderate-severe pain, Disp: 473 mL, Rfl: 0    pantoprazole (PROTONIX) 40 mg tablet, Take 1 tablet (40 mg total) by mouth 2 (two) times a day Before breakfast and before bed to prevent acid accumulation  , Disp: 60 tablet, Rfl: 0    phenazopyridine (PYRIDIUM) 200 mg tablet, Take 400 mg by mouth 3 (three) times a day, Disp: , Rfl:     predniSONE 5 mg tablet, Take 1 tablet (5 mg total) by mouth 2 (two) times a day as needed (low energy), Disp: 30 tablet, Rfl: 0    prochlorperazine (COMPAZINE) 10 mg tablet, Take 1 tablet (10 mg total) by mouth every 6 (six) hours as needed for nausea or vomiting, Disp: 30 tablet, Rfl: 1    saliva substitute (MOUTH KOTE), Apply 5 sprays to the mouth or throat 4 (four) times a day as needed (dry mouth), Disp: 59 mL, Rfl: 0    tamsulosin (FLOMAX) 0 4 mg, Take 1 capsule (0 4 mg total) by mouth 2 (two) times a day, Disp: 180 capsule, Rfl: 3  No current facility-administered medications for this visit  Facility-Administered Medications Ordered in Other Visits:     sodium chloride 0 9 % infusion, 20 mL/hr, Intravenous, Once PRN, Inocencio Gilman MD, Stopped at 03/21/22 1022  Review of Systems   Constitutional: Positive for appetite change  Negative for activity change and fatigue  Respiratory: Negative for shortness of breath  Gastrointestinal: Positive for abdominal pain  Negative for constipation and diarrhea  Skin: Positive for pallor  Neurological: Negative for weakness  Psychiatric/Behavioral: The patient is not nervous/anxious  All other systems reviewed and are negative  All other systems negative    Objective:  Vital Signs  There were no vitals taken for this visit  Physical Exam    Constitutional: Appears chronically ill   In no acute physical or emotional distress  Head: Normocephalic and atraumatic  Eyes: EOM are normal  No ocular discharge  No scleral icterus  Neck: No visible adenopathy or masses  Respiratory: Effort normal  No stridor  No respiratory distress  Gastrointestinal: No abdominal distension  Abdomen is soft  Musculoskeletal: No edema  Neurological: Alert, oriented and appropriately conversant  Skin: No diaphoresis, dry and warm to touch, no rashes seen on exposed areas of skin  Psychiatric: Displays a normal mood and affect  Behavior, judgement and thought content appear normal      Portions of this document may have been created using dictation software and as such some "sound alike" terms may have been generated by the system  Do not hesitate to contact me with any questions or clarifications

## 2022-03-23 ENCOUNTER — HOSPITAL ENCOUNTER (OUTPATIENT)
Dept: INFUSION CENTER | Facility: CLINIC | Age: 72
Discharge: HOME | End: 2022-03-23

## 2022-03-23 DIAGNOSIS — C16.9 GASTRIC ADENOCARCINOMA (HCC): Primary | ICD-10-CM

## 2022-03-23 NOTE — PROGRESS NOTES
Pt here for 5 FU CADD pump take down  CADD pump discontinued at this time  Res volume 0  Port flushed and de accessed  Site clean dry and intact  Pt offers no complaints  AVS printed and given to patient

## 2022-03-25 ENCOUNTER — DOCUMENTATION (OUTPATIENT)
Dept: SOCIAL WORK | Facility: HOSPITAL | Age: 72
End: 2022-03-25

## 2022-03-25 NOTE — PROGRESS NOTES
Pt discharged from [facility] as of 3/23/2022  Pt passed void trial and no longer requires further treatment  Pt agreeable to discharge  Thank you for allowing us to participate in the care of your patient      Luis Herron RN  [phone]

## 2022-03-25 NOTE — TELEPHONE ENCOUNTER
Patient called stating he has an appointment to see Dr Ariel Robles on 04/26/22 to discuss Turp  He wants to know if appointment can be done sooner  Please call him at [phone]

## 2022-03-25 NOTE — TELEPHONE ENCOUNTER
Called and spoke with patient  Advised that nothing sooner is available   Offered to be put on cancellation list

## 2022-03-28 ENCOUNTER — TELEPHONE (OUTPATIENT)
Dept: UROLOGY | Facility: AMBULATORY SURGERY CENTER | Age: 72
End: 2022-03-28

## 2022-03-28 RX ORDER — PALONOSETRON 0.05 MG/ML
0.25 INJECTION, SOLUTION INTRAVENOUS ONCE
Status: CANCELLED | OUTPATIENT
Start: 2022-04-04

## 2022-03-28 RX ORDER — DEXTROSE MONOHYDRATE 50 MG/ML
20 INJECTION, SOLUTION INTRAVENOUS ONCE
Status: CANCELLED | OUTPATIENT
Start: 2022-04-04

## 2022-03-28 RX ORDER — SODIUM CHLORIDE 9 MG/ML
20 INJECTION, SOLUTION INTRAVENOUS ONCE AS NEEDED
Status: CANCELLED | OUTPATIENT
Start: 2022-04-04

## 2022-03-28 NOTE — TELEPHONE ENCOUNTER
I called Dr Mariela Dee today do discuss his urinary retention  He has a known 152 gm prostate after negative prostate biopsy  Unfortunately, he was recently diagnosed with Stage IV gastric Ca  I left Kim Thrasherded my cell number on his VM and asked him to call me to discuss his options  Will await a call back

## 2022-03-29 ENCOUNTER — TELEPHONE (OUTPATIENT)
Dept: SURGICAL ONCOLOGY | Facility: CLINIC | Age: 72
End: 2022-03-29

## 2022-03-29 ENCOUNTER — PATIENT OUTREACH (OUTPATIENT)
Dept: CASE MANAGEMENT | Facility: HOSPITAL | Age: 72
End: 2022-03-29

## 2022-03-29 NOTE — TELEPHONE ENCOUNTER
03/29/22 Called patient to ask for authorization and financial information to look for funding for ELOXATIN used in his treatment plan  He gave me the information and auth  To go forward  [facility] has funding open for Gastric cancer that I will apply for him  He thanked me for the call  Realized patient has commercial insurance and would not be eligible for funding with [facility]

## 2022-03-31 ENCOUNTER — DOCUMENTATION (OUTPATIENT)
Dept: HEMATOLOGY ONCOLOGY | Facility: CLINIC | Age: 72
End: 2022-03-31

## 2022-03-31 ENCOUNTER — HOSPITAL ENCOUNTER (OUTPATIENT)
Dept: INFUSION CENTER | Facility: CLINIC | Age: 72
Discharge: HOME | End: 2022-03-31
Payer: COMMERCIAL

## 2022-03-31 DIAGNOSIS — C16.9 GASTRIC ADENOCARCINOMA (HCC): ICD-10-CM

## 2022-03-31 DIAGNOSIS — Z45.2 ENCOUNTER FOR CENTRAL LINE CARE: ICD-10-CM

## 2022-03-31 DIAGNOSIS — C16.9 GASTRIC ADENOCARCINOMA (HCC): Primary | ICD-10-CM

## 2022-03-31 DIAGNOSIS — C16.0 MALIGNANT NEOPLASM OF CARDIA OF STOMACH (HCC): ICD-10-CM

## 2022-03-31 DIAGNOSIS — G89.3 CANCER ASSOCIATED PAIN: ICD-10-CM

## 2022-03-31 LAB
ALBUMIN SERPL BCP-MCNC: 3.2 G/DL (ref 3.5–5)
ALP SERPL-CCNC: 123 U/L (ref 46–116)
ALT SERPL W P-5'-P-CCNC: 30 U/L (ref 12–78)
ANION GAP SERPL CALCULATED.3IONS-SCNC: 7 MMOL/L (ref 4–13)
AST SERPL W P-5'-P-CCNC: 21 U/L (ref 5–45)
BASOPHILS # BLD AUTO: 0.02 THOUSANDS/ΜL (ref 0–0.1)
BASOPHILS NFR BLD AUTO: 0 % (ref 0–1)
BILIRUB SERPL-MCNC: 0.45 MG/DL (ref 0.2–1)
BUN SERPL-MCNC: 18 MG/DL (ref 5–25)
CALCIUM ALBUM COR SERPL-MCNC: 8.8 MG/DL (ref 8.3–10.1)
CALCIUM SERPL-MCNC: 8.2 MG/DL (ref 8.3–10.1)
CHLORIDE SERPL-SCNC: 102 MMOL/L (ref 100–108)
CO2 SERPL-SCNC: 27 MMOL/L (ref 21–32)
CREAT SERPL-MCNC: 0.74 MG/DL (ref 0.6–1.3)
EOSINOPHIL # BLD AUTO: 0.46 THOUSAND/ΜL (ref 0–0.61)
EOSINOPHIL NFR BLD AUTO: 10 % (ref 0–6)
ERYTHROCYTE [DISTWIDTH] IN BLOOD BY AUTOMATED COUNT: 16.1 % (ref 11.6–15.1)
GFR SERPL CREATININE-BSD FRML MDRD: 92 ML/MIN/1.73SQ M
GLUCOSE SERPL-MCNC: 118 MG/DL (ref 65–140)
HCT VFR BLD AUTO: 32 % (ref 36.5–49.3)
HGB BLD-MCNC: 10.8 G/DL (ref 12–17)
IMM GRANULOCYTES # BLD AUTO: 0.04 THOUSAND/UL (ref 0–0.2)
IMM GRANULOCYTES NFR BLD AUTO: 1 % (ref 0–2)
LYMPHOCYTES # BLD AUTO: 0.9 THOUSANDS/ΜL (ref 0.6–4.47)
LYMPHOCYTES NFR BLD AUTO: 19 % (ref 14–44)
MCH RBC QN AUTO: 30.7 PG (ref 26.8–34.3)
MCHC RBC AUTO-ENTMCNC: 33.8 G/DL (ref 31.4–37.4)
MCV RBC AUTO: 91 FL (ref 82–98)
MONOCYTES # BLD AUTO: 0.76 THOUSAND/ΜL (ref 0.17–1.22)
MONOCYTES NFR BLD AUTO: 16 % (ref 4–12)
NEUTROPHILS # BLD AUTO: 2.6 THOUSANDS/ΜL (ref 1.85–7.62)
NEUTS SEG NFR BLD AUTO: 54 % (ref 43–75)
NRBC BLD AUTO-RTO: 0 /100 WBCS
PLATELET # BLD AUTO: 220 THOUSANDS/UL (ref 149–390)
PMV BLD AUTO: 9.5 FL (ref 8.9–12.7)
POTASSIUM SERPL-SCNC: 3.9 MMOL/L (ref 3.5–5.3)
PROT SERPL-MCNC: 6.5 G/DL (ref 6.4–8.2)
RBC # BLD AUTO: 3.52 MILLION/UL (ref 3.88–5.62)
SODIUM SERPL-SCNC: 136 MMOL/L (ref 136–145)
WBC # BLD AUTO: 4.78 THOUSAND/UL (ref 4.31–10.16)

## 2022-03-31 PROCEDURE — 85025 COMPLETE CBC W/AUTO DIFF WBC: CPT | Performed by: INTERNAL MEDICINE

## 2022-03-31 PROCEDURE — 80053 COMPREHEN METABOLIC PANEL: CPT | Performed by: INTERNAL MEDICINE

## 2022-03-31 NOTE — TELEPHONE ENCOUNTER
Primary palliative medicine provider:   Keith     Medication requested:  Oxycodone 5mg /5ml     If for pain, how has the patient been taking their pain medicine?      Last appointment: 3/22/22    Next scheduled appointment: 6/16/22     PDMP review:      03/15/2022 03/15/2022 oxyCODONE HCL (Solution) 473 0 4 5 MG/5  [address]     Pt states will need by weekend

## 2022-03-31 NOTE — PROGRESS NOTES
Catracho Yates patients daughter called me to inform her father is getting low on both his oxycodone medications the 5mg solution as well as the 10 mg tabs  She was calling for refills to be sent to the pharmacy for both  I assured her I would send a  Message to palliative care regarding this

## 2022-03-31 NOTE — PROGRESS NOTES
Pt presents for lab work  Port flushed with blood return noted  Blood work drawn and sent to lab  Saline locked and de-accessed  Pt declined AVS  Aware of future appts

## 2022-04-01 ENCOUNTER — DOCUMENTATION (OUTPATIENT)
Dept: HEMATOLOGY ONCOLOGY | Facility: CLINIC | Age: 72
End: 2022-04-01

## 2022-04-01 DIAGNOSIS — G89.3 CANCER RELATED PAIN: ICD-10-CM

## 2022-04-01 DIAGNOSIS — R52 INTRACTABLE PAIN: ICD-10-CM

## 2022-04-01 DIAGNOSIS — Z51.5 PALLIATIVE CARE PATIENT: ICD-10-CM

## 2022-04-01 RX ORDER — OXYCODONE HCL 5 MG/5 ML
SOLUTION, ORAL ORAL
Qty: 473 ML | Refills: 0 | Status: SHIPPED | OUTPATIENT
Start: 2022-04-01 | End: 2022-04-14 | Stop reason: SDUPTHER

## 2022-04-01 RX ORDER — OXYCODONE HCL 10 MG/1
10 TABLET, FILM COATED, EXTENDED RELEASE ORAL EVERY 8 HOURS SCHEDULED
Qty: 90 TABLET | Refills: 0 | Status: SHIPPED | OUTPATIENT
Start: 2022-04-01 | End: 2022-04-02 | Stop reason: SDUPTHER

## 2022-04-01 RX ORDER — ONDANSETRON 4 MG/1
8 TABLET, ORALLY DISINTEGRATING ORAL EVERY 8 HOURS PRN
Qty: 90 TABLET | Refills: 3 | Status: SHIPPED | OUTPATIENT
Start: 2022-04-01 | End: 2022-06-08 | Stop reason: SDUPTHER

## 2022-04-01 NOTE — TELEPHONE ENCOUNTER
Manpreetbryan Dallas (daughter) requesting a refill of  the 10 mg oxycodone tablet for dad  Patient has enough medication till  Thursday  She wishes to get this all taken care of as she will be out of town all next week     Pharmacy- [facility]

## 2022-04-01 NOTE — PROGRESS NOTES
Telephone call from REY, patients daughter, regarding the medication refill request   She is aware the oxycodone solution is at the pharmacy but she was also requesting a refill for the 10 mg oxycodone as well since he has only enough for a week  She wishes to get this all taken care of as she will be out of town all next week  The [facility] does not have it in stock so she wondered if it could be sent to the [facility]    Forwarding to Palliative Care for assistance

## 2022-04-02 ENCOUNTER — TELEPHONE (OUTPATIENT)
Dept: OTHER | Facility: OTHER | Age: 72
End: 2022-04-02

## 2022-04-02 DIAGNOSIS — G89.3 CANCER RELATED PAIN: ICD-10-CM

## 2022-04-02 DIAGNOSIS — R52 INTRACTABLE PAIN: ICD-10-CM

## 2022-04-02 DIAGNOSIS — Z51.5 PALLIATIVE CARE PATIENT: ICD-10-CM

## 2022-04-02 RX ORDER — OXYCODONE HCL 10 MG/1
10 TABLET, FILM COATED, EXTENDED RELEASE ORAL EVERY 8 HOURS SCHEDULED
Qty: 90 TABLET | Refills: 0 | Status: SHIPPED | OUTPATIENT
Start: 2022-04-02 | End: 2022-05-01 | Stop reason: SDUPTHER

## 2022-04-02 NOTE — PROGRESS NOTES
4/2/22 1430    Refill sent to [facility] per request      Ty King,   Palliative and Supportive Care  [phone]

## 2022-04-02 NOTE — TELEPHONE ENCOUNTER
PT:  Huyen Melo  1950- Daughter, [facility] stated that her father's medication for oxyCODONE (OxyCONTIN) 10 mg 12 hr tablet was sent to the wrong phamracy  They are requesting it be resent to [facility]  They are requesting a call back at [phone]

## 2022-04-04 ENCOUNTER — HOSPITAL ENCOUNTER (OUTPATIENT)
Dept: INFUSION CENTER | Facility: CLINIC | Age: 72
Discharge: HOME | End: 2022-04-04
Payer: COMMERCIAL

## 2022-04-04 ENCOUNTER — PROCEDURE VISIT (OUTPATIENT)
Dept: UROLOGY | Facility: CLINIC | Age: 72
End: 2022-04-04
Payer: COMMERCIAL

## 2022-04-04 ENCOUNTER — TELEPHONE (OUTPATIENT)
Dept: UROLOGY | Facility: AMBULATORY SURGERY CENTER | Age: 72
End: 2022-04-04

## 2022-04-04 VITALS
HEIGHT: 67 IN | BODY MASS INDEX: 25.67 KG/M2 | RESPIRATION RATE: 17 BRPM | OXYGEN SATURATION: 98 % | TEMPERATURE: 98.4 F | WEIGHT: 163.58 LBS | DIASTOLIC BLOOD PRESSURE: 72 MMHG | HEART RATE: 80 BPM | SYSTOLIC BLOOD PRESSURE: 132 MMHG

## 2022-04-04 VITALS — WEIGHT: 164 LBS | BODY MASS INDEX: 25.74 KG/M2 | RESPIRATION RATE: 18 BRPM | HEIGHT: 67 IN

## 2022-04-04 DIAGNOSIS — N40.1 BENIGN PROSTATIC HYPERPLASIA WITH LOWER URINARY TRACT SYMPTOMS, SYMPTOM DETAILS UNSPECIFIED: Primary | ICD-10-CM

## 2022-04-04 DIAGNOSIS — C16.9 GASTRIC ADENOCARCINOMA (HCC): Primary | ICD-10-CM

## 2022-04-04 LAB — POST-VOID RESIDUAL VOLUME, ML POC: 45 ML

## 2022-04-04 PROCEDURE — 96413 CHEMO IV INFUSION 1 HR: CPT

## 2022-04-04 PROCEDURE — 96415 CHEMO IV INFUSION ADDL HR: CPT

## 2022-04-04 PROCEDURE — 96417 CHEMO IV INFUS EACH ADDL SEQ: CPT

## 2022-04-04 PROCEDURE — 96375 TX/PRO/DX INJ NEW DRUG ADDON: CPT

## 2022-04-04 PROCEDURE — 96367 TX/PROPH/DG ADDL SEQ IV INF: CPT

## 2022-04-04 PROCEDURE — G0498 CHEMO EXTEND IV INFUS W/PUMP: HCPCS

## 2022-04-04 PROCEDURE — 51798 US URINE CAPACITY MEASURE: CPT

## 2022-04-04 RX ORDER — DEXTROSE MONOHYDRATE 50 MG/ML
20 INJECTION, SOLUTION INTRAVENOUS ONCE
Status: COMPLETED | OUTPATIENT
Start: 2022-04-04 | End: 2022-04-04

## 2022-04-04 RX ORDER — SODIUM CHLORIDE 9 MG/ML
20 INJECTION, SOLUTION INTRAVENOUS ONCE AS NEEDED
Status: DISCONTINUED | OUTPATIENT
Start: 2022-04-04 | End: 2022-04-07 | Stop reason: HOSPADM

## 2022-04-04 RX ORDER — PALONOSETRON 0.05 MG/ML
0.25 INJECTION, SOLUTION INTRAVENOUS ONCE
Status: COMPLETED | OUTPATIENT
Start: 2022-04-04 | End: 2022-04-04

## 2022-04-04 RX ADMIN — PALONOSETRON 0.25 MG: 0.05 INJECTION, SOLUTION INTRAVENOUS at 09:30

## 2022-04-04 RX ADMIN — SODIUM CHLORIDE 240 MG: 9 INJECTION, SOLUTION INTRAVENOUS at 10:14

## 2022-04-04 RX ADMIN — OXALIPLATIN 164.9 MG: 5 INJECTION, SOLUTION INTRAVENOUS at 11:02

## 2022-04-04 RX ADMIN — DEXAMETHASONE SODIUM PHOSPHATE 10 MG: 10 INJECTION, SOLUTION INTRAMUSCULAR; INTRAVENOUS at 08:52

## 2022-04-04 RX ADMIN — SODIUM CHLORIDE 20 ML/HR: 0.9 INJECTION, SOLUTION INTRAVENOUS at 08:54

## 2022-04-04 RX ADMIN — DEXTROSE 20 ML/HR: 5 SOLUTION INTRAVENOUS at 11:00

## 2022-04-04 RX ADMIN — FOSAPREPITANT 150 MG: 150 INJECTION, POWDER, LYOPHILIZED, FOR SOLUTION INTRAVENOUS at 09:30

## 2022-04-04 NOTE — PROGRESS NOTES
Pt tolerated treatment well  Good blood return noted before, during and after chemo  CADD pump connected per protocol after 2 RN double checks  Pt knows to return 4/6/22 at 200  Aware of next appt  AVS given

## 2022-04-04 NOTE — LETTER
Laura Ness:    You have an appointment scheduled with us 4/12/2022 at 1:45 with Dr Gabby Barbour at our [facility] location  Our [employer] is located at [address], [address] [place] [address], [facility], [facility] [address] [facility]  You had an appointment previously scheduled 4/26 at 10:30 in [place] with Dr Gabby Barbour  Once you confirm that the 4/12 appointment works with your schedule, please give us a call so we can cancel the 4/26 appointment  Our number is [phone]              [facility]

## 2022-04-04 NOTE — TELEPHONE ENCOUNTER
Received TT from Dr Lesvia Butts about getting patient in for a TURP discussion  Patient has a history of BPH and stage IV gastric cancer currently undergoing chemo  Patient already has an appointment 4/26 for TURP discussion  Called and spoke with patient  Patient said he would like to move the TURP discussion up  Only one availability in our [facility] office  Scheduled 4/12  Patient is to confirm that this appointment works with his schedule and call to cancel 4/26 appointment

## 2022-04-05 ENCOUNTER — TELEPHONE (OUTPATIENT)
Dept: INTERNAL MEDICINE CLINIC | Facility: CLINIC | Age: 72
End: 2022-04-05

## 2022-04-05 ENCOUNTER — TELEPHONE (OUTPATIENT)
Dept: PALLIATIVE MEDICINE | Facility: CLINIC | Age: 72
End: 2022-04-05

## 2022-04-05 NOTE — TELEPHONE ENCOUNTER
Piero Blackwell, I am not familiar with the patient's chemotherapy regimen or the side effects however my office sent me this message that the patient has been experiencing hives after the chemo  Please let me know what I should order to help him   Thanks

## 2022-04-05 NOTE — TELEPHONE ENCOUNTER
Jackelinekristina Laura called and is on his 4th round of chemotherapy  Each time he has it he gets hives by the 2nd and 3rd day   This last round is a little worse and has hives on his face, arms legs and chest   Can you please prescribe something?

## 2022-04-05 NOTE — TELEPHONE ENCOUNTER
Patient left a message that he had infusion and it gave him intense hives, he is requesting for you to call in something for his hives  Please advise

## 2022-04-06 ENCOUNTER — TELEPHONE (OUTPATIENT)
Dept: PALLIATIVE MEDICINE | Facility: CLINIC | Age: 72
End: 2022-04-06

## 2022-04-06 ENCOUNTER — HOSPITAL ENCOUNTER (OUTPATIENT)
Dept: INFUSION CENTER | Facility: CLINIC | Age: 72
Discharge: HOME | End: 2022-04-06

## 2022-04-06 DIAGNOSIS — R62.7 FTT (FAILURE TO THRIVE) IN ADULT: Primary | ICD-10-CM

## 2022-04-06 DIAGNOSIS — C16.9 GASTRIC ADENOCARCINOMA (HCC): Primary | ICD-10-CM

## 2022-04-06 DIAGNOSIS — E44.0 MODERATE PROTEIN-CALORIE MALNUTRITION (HCC): ICD-10-CM

## 2022-04-06 DIAGNOSIS — Z51.5 PALLIATIVE CARE PATIENT: ICD-10-CM

## 2022-04-06 RX ORDER — DRONABINOL 5 MG/1
5 CAPSULE ORAL
Qty: 60 CAPSULE | Refills: 0 | Status: SHIPPED | OUTPATIENT
Start: 2022-04-06 | End: 2022-05-04 | Stop reason: SDUPTHER

## 2022-04-06 NOTE — TELEPHONE ENCOUNTER
Primary palliative medicine provider:   Dr Lauren Quach    Medication requested:  Dronabinol    If for pain, how has the patient been taking their pain medicine?      Last appointment:    Next scheduled appointment:  6/16/22    Patient has enough medication till Friday  Pharmacy- [facility] [address] [place]

## 2022-04-06 NOTE — PROGRESS NOTES
4/4/2022  Tao Muñoz is a 70 y o  male  [id number]    Diagnosis:  Chief Complaint     Benign Prostatic Hypertrophy; Urinary Retention          Patient presents for follow up post void residual s/p void trial on 3/18/22 managed by Dr Susanne Burt:  Patient will follow up with Dr Ivan Sosa as scheduled  Assessment:      Vitals:    04/04/22 1344   Resp: 18   Weight: 74 4 kg (164 lb)   Height: 5' 7" (1 702 m)       Patient reports he is feeling well, and able to void without issue  Patient voided 75 mL in the office  Post void residual measured via bladder scanner to be 45 mL  Patient knows to call the office in the meantime if he has any issues       Recent Results (from the past 96 hour(s))   POCT Measure PVR    Collection Time: 04/04/22  1:45 PM   Result Value Ref Range    POST-VOID RESIDUAL VOLUME, ML POC 45 mL         Sheng Alexandre RN BSN

## 2022-04-06 NOTE — PROGRESS NOTES
Pt  offers no complaints  CADD pump disconnected,  res  Vol  mL , good blood return noted  AVS declined  Next appt  confirmed

## 2022-04-09 DIAGNOSIS — R10.13 EPIGASTRIC PAIN: ICD-10-CM

## 2022-04-11 RX ORDER — PALONOSETRON 0.05 MG/ML
0.25 INJECTION, SOLUTION INTRAVENOUS ONCE
Status: CANCELLED | OUTPATIENT
Start: 2022-04-18

## 2022-04-11 RX ORDER — PANTOPRAZOLE SODIUM 40 MG/1
40 TABLET, DELAYED RELEASE ORAL 2 TIMES DAILY
Qty: 60 TABLET | Refills: 0 | Status: SHIPPED | OUTPATIENT
Start: 2022-04-11 | End: 2022-05-08 | Stop reason: SDUPTHER

## 2022-04-11 RX ORDER — DEXTROSE MONOHYDRATE 50 MG/ML
20 INJECTION, SOLUTION INTRAVENOUS ONCE
Status: CANCELLED | OUTPATIENT
Start: 2022-04-18

## 2022-04-11 RX ORDER — SODIUM CHLORIDE 9 MG/ML
20 INJECTION, SOLUTION INTRAVENOUS ONCE AS NEEDED
Status: CANCELLED | OUTPATIENT
Start: 2022-04-18

## 2022-04-12 ENCOUNTER — DOCUMENTATION (OUTPATIENT)
Dept: HEMATOLOGY ONCOLOGY | Facility: CLINIC | Age: 72
End: 2022-04-12

## 2022-04-12 ENCOUNTER — OFFICE VISIT (OUTPATIENT)
Dept: UROLOGY | Facility: CLINIC | Age: 72
End: 2022-04-12
Payer: COMMERCIAL

## 2022-04-12 VITALS
DIASTOLIC BLOOD PRESSURE: 68 MMHG | WEIGHT: 164 LBS | SYSTOLIC BLOOD PRESSURE: 130 MMHG | HEART RATE: 80 BPM | BODY MASS INDEX: 25.74 KG/M2 | HEIGHT: 67 IN

## 2022-04-12 DIAGNOSIS — C16.9 GASTRIC ADENOCARCINOMA (HCC): Primary | ICD-10-CM

## 2022-04-12 DIAGNOSIS — R33.8 BENIGN PROSTATIC HYPERPLASIA WITH URINARY RETENTION: Primary | ICD-10-CM

## 2022-04-12 DIAGNOSIS — N40.1 BENIGN PROSTATIC HYPERPLASIA WITH URINARY RETENTION: Primary | ICD-10-CM

## 2022-04-12 PROCEDURE — 1160F RVW MEDS BY RX/DR IN RCRD: CPT | Performed by: UROLOGY

## 2022-04-12 PROCEDURE — 1036F TOBACCO NON-USER: CPT | Performed by: UROLOGY

## 2022-04-12 PROCEDURE — 3008F BODY MASS INDEX DOCD: CPT | Performed by: UROLOGY

## 2022-04-12 PROCEDURE — 99214 OFFICE O/P EST MOD 30 MIN: CPT | Performed by: UROLOGY

## 2022-04-12 RX ORDER — SODIUM CHLORIDE 9 MG/ML
125 INJECTION, SOLUTION INTRAVENOUS CONTINUOUS
Status: CANCELLED | OUTPATIENT
Start: 2022-04-12

## 2022-04-12 NOTE — LETTER
April 12, 2022     Amber Ruiz MD  [address] [facility]  2nd [address] [facility]    Patient: Jaylen Gonzalez   YOB: 1950   Date of Visit: 4/12/2022       Dear Dr Maury Stinson:    Thank you for referring Doreen Griffith to me for evaluation  Below are my notes for this consultation  If you have questions, please do not hesitate to call me  I look forward to following your patient along with you  Sincerely,        Soraya Swift MD        CC: MD Kam Chisholm MD Rebecca Seta, MD  4/12/2022  2:58 PM  Sign when Signing Visit  4/12/2022    Jaylen Gonzalez  1950  [id number]        Assessment  Stage IV gastric cancer, BPH with 152 g gland, resolved urinary retention      Discussion  I had a lengthy discussion with Dr Kayleen Damon and his daughter Kassandra Francisco in the office today regarding his 152 g prostate and his recent urinary retention  First I provided him with 25 [place] red rubber catheters to use in an emergency situation  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  Next, we discussed options including medical management versus TURP versus robot assisted laparoscopic suprapubic prostatectomy  We discussed the potential efficacy, or lack thereof, of a TURP in a 152 g prostate  The patient is requesting a robot assisted laparoscopic suprapubic prostatectomy  Although he has stage IV gastric cancer he states that he wants bladder outlet surgery to improve his quality of life as he has profound nocturia and daytime urgency and frequency  Would need to obtain permission from Dr Nisa Poe to proceed with surgery in late April 2022 after a small hiatus from chemotherapy for at least 2 weeks  History of Present Illness  70 y o  male with a history of an elevated PSA as 7 2 in March of 2021   This prompted transrectal ultrasound-guided biopsy the prostate which was fortunately negative for prostate cancer, however, the patient was found to have a 152 g prostate  More recently he underwent a GI workup and was found to have stage IV gastric cancer  He is currently under the care of Dr José Miguel Fontenot and receiving chemotherapy every 2 weeks  He states he has significant nocturia and daytime urgency and frequency of urination  He is concerned about his overall quality of life  AUA Symptom Score      Review of Systems  Review of Systems   Constitutional: Negative  HENT: Negative  Eyes: Negative  Respiratory: Negative  Cardiovascular: Negative  Gastrointestinal: Negative  Endocrine: Negative  Genitourinary:        Per HPI   Musculoskeletal: Negative  Skin: Negative  Allergic/Immunologic: Negative  Neurological: Negative  Hematological: Negative  Psychiatric/Behavioral: Negative  Past Medical History  Past Medical History:   Diagnosis Date    Cancer [facility])     gastric mass    COVID 01/2021    Disease of thyroid gland     GERD (gastroesophageal reflux disease)     History of GI diverticular bleed     Hyperlipidemia     Hypertension     Tinnitus        Past Social History  Past Surgical History:   Procedure Laterality Date    ADENOIDECTOMY      CHOLECYSTECTOMY LAPAROSCOPIC N/A 1/24/2022    Procedure: CHOLECYSTECTOMY LAPAROSCOPIC W/ INTRAOP CHOLANGIOGRAM;  Surgeon: Mary Gamble DO;  [facility];  Service: General    COLONOSCOPY N/A 7/16/2016    Procedure: COLONOSCOPY;  Surgeon: Marcos Thomas MD;  [facility];  Service:     FL CHOLANGIOGRAM TRANSHEPATIC  1/24/2022    FL GUIDED CENTRAL VENOUS ACCESS DEVICE INSERTION  2/11/2022    HERNIA REPAIR      PROSTATE BIOPSY      TONSILLECTOMY      TUNNELED VENOUS PORT PLACEMENT N/A 2/11/2022    Procedure: INSERTION VENOUS PORT (PORT-A-CATH); Surgeon: Dhara Albert MD;  [facility];  Service: Surgical Oncology    UVULECTOMY         Past Family History  History reviewed  No pertinent family history  Past Social history  Social History     Socioeconomic History    Marital status: /Civil Union     Spouse name: Not on file    Number of children: Not on file    Years of education: Not on file    Highest education level: Not on file   Occupational History    Not on file   Tobacco Use    Smoking status: Never Smoker    Smokeless tobacco: Never Used   Vaping Use    Vaping Use: Never used   Substance and Sexual Activity    Alcohol use: Not Currently    Drug use: Never    Sexual activity: Not on file   Other Topics Concern    Not on file   Social History Narrative    Not on file     Social Determinants of Health     Financial Resource Strain: Not on file   Food Insecurity: No Food Insecurity    Worried About Running Out of Food in the Last Year: Never true    [address] in the Last Year: Never true   Transportation Needs: No Transportation Needs    Lack of Transportation (Medical): No    Lack of Transportation (Non-Medical):  No   Physical Activity: Not on file   Stress: Not on file   Social Connections: Not on file   Intimate Partner Violence: Not on file   Housing Stability: Unknown    Unable to Pay for Housing in the Last Year: No    Number of Places Lived in the Last Year: Not on file    Unstable Housing in the Last Year: No       Current Medications  Current Outpatient Medications   Medication Sig Dispense Refill    bisacodyl (DULCOLAX) 5 mg EC tablet Take 1 tablet (5 mg total) by mouth daily as needed for constipation Related to pain medicines 30 tablet 0    dronabinol (MARINOL) 5 MG capsule Take 1 capsule (5 mg total) by mouth 2 (two) times a day before meals 60 capsule 0    finasteride (PROSCAR) 5 mg tablet Take 1 tablet (5 mg total) by mouth daily 90 tablet 3    [START ON 4/18/2022] fluorouracil 4,465 mg in CADD infusion pump Infuse 4,465 mg (1,200 mg/m2/day x 1 86 m2) into a venous catheter over 46 hours for 2 days  Do not start before April 18, 2022  1 each 0    levothyroxine 125 mcg tablet Take 125 mcg by mouth every morning        ondansetron (Zofran ODT) 4 mg disintegrating tablet Take 2 tablets (8 mg total) by mouth every 8 (eight) hours as needed for nausea or vomiting for up to 90 doses 90 tablet 3    oxyCODONE (OxyCONTIN) 10 mg 12 hr tablet Take 1 tablet (10 mg total) by mouth every 8 (eight) hours Max Daily Amount: 30 mg 90 tablet 0    oxyCODONE (ROXICODONE) 5 mg/5 mL solution Take 10-20mg PO Q4H PRN Moderate-severe pain 473 mL 0    pantoprazole (PROTONIX) 40 mg tablet Take 1 tablet (40 mg total) by mouth 2 (two) times a day Before breakfast and before bed to prevent acid accumulation  60 tablet 0    prochlorperazine (COMPAZINE) 10 mg tablet Take 1 tablet (10 mg total) by mouth every 6 (six) hours as needed for nausea or vomiting 30 tablet 1    saliva substitute (MOUTH KOTE) Apply 5 sprays to the mouth or throat 4 (four) times a day as needed (dry mouth) 59 mL 0    tamsulosin (FLOMAX) 0 4 mg Take 1 capsule (0 4 mg total) by mouth 2 (two) times a day 180 capsule 3    diphenhydrAMINE (BENADRYL) 25 mg tablet Take 1 tablet (25 mg total) by mouth every 6 (six) hours as needed for itching (Patient not taking: Reported on 4/12/2022 ) 90 tablet 1    levothyroxine 100 mcg tablet Take 100 mcg by mouth daily (Patient not taking: Reported on 4/12/2022 )      lidocaine-prilocaine (EMLA) cream Apply to port site 30 minutes prior to port accessing and cover with a dressing (Patient not taking: Reported on 3/16/2022 ) 30 g 1    phenazopyridine (PYRIDIUM) 200 mg tablet Take 400 mg by mouth 3 (three) times a day (Patient not taking: Reported on 4/12/2022 )      predniSONE 5 mg tablet Take 1 tablet (5 mg total) by mouth 2 (two) times a day as needed (low energy) (Patient not taking: Reported on 4/12/2022 ) 30 tablet 0     No current facility-administered medications for this visit         Allergies  No Known Allergies    Past Medical History, Social History, Family History, medications and allergies were reviewed  Vitals  Vitals:    04/12/22 1342   BP: 130/68   BP Location: Left arm   Patient Position: Sitting   Cuff Size: Adult   Pulse: 80   Weight: 74 4 kg (164 lb)   Height: 5' 7" (1 702 m)       Physical Exam  Physical Exam  On examination he is in no acute distress  Gait normal   Affect normal      Results  Lab Results   Component Value Date    PSA 7 2 (H) 03/08/2021     Lab Results   Component Value Date    CALCIUM 8 2 (L) 03/31/2022    K 3 9 03/31/2022    CO2 27 03/31/2022     03/31/2022    BUN 18 03/31/2022    CREATININE 0 74 03/31/2022     Lab Results   Component Value Date    WBC 4 78 03/31/2022    HGB 10 8 (L) 03/31/2022    HCT 32 0 (L) 03/31/2022    MCV 91 03/31/2022     03/31/2022         Office Urine Dip  No results found for this or any previous visit (from the past 1 hour(s))  ]      Total visit time was 30 minutes of which over 50% was spent on counseling

## 2022-04-12 NOTE — H&P (VIEW-ONLY)
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  Next, we discussed options including medical management versus TURP versus robot assisted laparoscopic suprapubic prostatectomy  We discussed the potential efficacy, or lack thereof, of a TURP in a 152 g prostate  The patient is requesting a robot assisted laparoscopic suprapubic prostatectomy  Although he has stage IV gastric cancer he states that he wants bladder outlet surgery to improve his quality of life as he has profound nocturia and daytime urgency and frequency  Would need to obtain permission from Dr Patricia Holcomb to proceed with surgery in late April 2022 after a small hiatus from chemotherapy for at least 2 weeks  History of Present Illness  70 y o  male with a history of an elevated PSA as 7 2 in March of 2021  This prompted transrectal ultrasound-guided biopsy the prostate which was fortunately negative for prostate cancer, however, the patient was found to have a 152 g prostate  More recently he underwent a GI workup and was found to have stage IV gastric cancer  He is currently under the care of Dr Patricia Holcomb and receiving chemotherapy every 2 weeks  He states he has significant nocturia and daytime urgency and frequency of urination  He is concerned about his overall quality of life  AUA Symptom Score      Review of Systems  Review of Systems   Constitutional: Negative  HENT: Negative  Eyes: Negative  Respiratory: Negative  Cardiovascular: Negative  Gastrointestinal: Negative  Endocrine: Negative  Genitourinary:        Per HPI   Musculoskeletal: Negative  Skin: Negative  Allergic/Immunologic: Negative  Neurological: Negative  Hematological: Negative  Psychiatric/Behavioral: Negative  Past Medical History  Past Medical History:   Diagnosis Date    Cancer [facility])     gastric mass    COVID 01/2021    Disease of thyroid gland     GERD (gastroesophageal reflux disease)     History of GI diverticular bleed     Hyperlipidemia     Hypertension     Tinnitus        Past Social History  Past Surgical History:   Procedure Laterality Date    ADENOIDECTOMY      CHOLECYSTECTOMY LAPAROSCOPIC N/A 1/24/2022    Procedure: CHOLECYSTECTOMY LAPAROSCOPIC W/ INTRAOP CHOLANGIOGRAM;  Surgeon: Ankita Gonsalez DO;  [facility];  Service: General    COLONOSCOPY N/A 7/16/2016    Procedure: COLONOSCOPY;  Surgeon: Nyasia Bennett MD;  [facility];  Service:     FL CHOLANGIOGRAM TRANSHEPATIC  1/24/2022    FL GUIDED CENTRAL VENOUS ACCESS DEVICE INSERTION  2/11/2022    HERNIA REPAIR      PROSTATE BIOPSY      TONSILLECTOMY      TUNNELED VENOUS PORT PLACEMENT N/A 2/11/2022    Procedure: INSERTION VENOUS PORT (PORT-A-CATH); Surgeon: Inga Adamson MD;  [facility];  Service: Surgical Oncology    UVULECTOMY         Past Family History  History reviewed  No pertinent family history      Past Social history  Social History     Socioeconomic History    Marital status: /Civil Union     Spouse name: Not on file    Number of children: Not on file    Years of education: Not on file    Highest education level: Not on file   Occupational History    Not on file   Tobacco Use    Smoking status: Never Smoker    Smokeless tobacco: Never Used   Vaping Use    Vaping Use: Never used   Substance and Sexual Activity    Alcohol use: Not Currently    Drug use: Never    Sexual activity: Not on file   Other Topics Concern    Not on file   Social History Narrative  Not on file     Social Determinants of Health     Financial Resource Strain: Not on file   Food Insecurity: No Food Insecurity    Worried About Running Out of Food in the Last Year: Never true    Amrita of Food in the Last Year: Never true   Transportation Needs: No Transportation Needs    Lack of Transportation (Medical): No    Lack of Transportation (Non-Medical):  No   Physical Activity: Not on file   Stress: Not on file   Social Connections: Not on file   Intimate Partner Violence: Not on file   Housing Stability: Unknown    Unable to Pay for Housing in the Last Year: No    Number of Places Lived in the Last Year: Not on file    Unstable Housing in the Last Year: No       Current Medications  Current Outpatient Medications   Medication Sig Dispense Refill    bisacodyl (DULCOLAX) 5 mg EC tablet Take 1 tablet (5 mg total) by mouth daily as needed for constipation Related to pain medicines 30 tablet 0    dronabinol (MARINOL) 5 MG capsule Take 1 capsule (5 mg total) by mouth 2 (two) times a day before meals 60 capsule 0    finasteride (PROSCAR) 5 mg tablet Take 1 tablet (5 mg total) by mouth daily 90 tablet 3    [START ON 4/18/2022] fluorouracil 4,465 mg in CADD infusion pump Infuse 4,465 mg (1,200 mg/m2/day x 1 86 m2) into a venous catheter over 46 hours for 2 days  Do not start before April 18, 2022  1 each 0    levothyroxine 125 mcg tablet Take 125 mcg by mouth every morning        ondansetron (Zofran ODT) 4 mg disintegrating tablet Take 2 tablets (8 mg total) by mouth every 8 (eight) hours as needed for nausea or vomiting for up to 90 doses 90 tablet 3    oxyCODONE (OxyCONTIN) 10 mg 12 hr tablet Take 1 tablet (10 mg total) by mouth every 8 (eight) hours Max Daily Amount: 30 mg 90 tablet 0    oxyCODONE (ROXICODONE) 5 mg/5 mL solution Take 10-20mg PO Q4H PRN Moderate-severe pain 473 mL 0    pantoprazole (PROTONIX) 40 mg tablet Take 1 tablet (40 mg total) by mouth 2 (two) times a day Before breakfast and before bed to prevent acid accumulation  60 tablet 0    prochlorperazine (COMPAZINE) 10 mg tablet Take 1 tablet (10 mg total) by mouth every 6 (six) hours as needed for nausea or vomiting 30 tablet 1    saliva substitute (MOUTH KOTE) Apply 5 sprays to the mouth or throat 4 (four) times a day as needed (dry mouth) 59 mL 0    tamsulosin (FLOMAX) 0 4 mg Take 1 capsule (0 4 mg total) by mouth 2 (two) times a day 180 capsule 3    diphenhydrAMINE (BENADRYL) 25 mg tablet Take 1 tablet (25 mg total) by mouth every 6 (six) hours as needed for itching (Patient not taking: Reported on 4/12/2022 ) 90 tablet 1    levothyroxine 100 mcg tablet Take 100 mcg by mouth daily (Patient not taking: Reported on 4/12/2022 )      lidocaine-prilocaine (EMLA) cream Apply to port site 30 minutes prior to port accessing and cover with a dressing (Patient not taking: Reported on 3/16/2022 ) 30 g 1    phenazopyridine (PYRIDIUM) 200 mg tablet Take 400 mg by mouth 3 (three) times a day (Patient not taking: Reported on 4/12/2022 )      predniSONE 5 mg tablet Take 1 tablet (5 mg total) by mouth 2 (two) times a day as needed (low energy) (Patient not taking: Reported on 4/12/2022 ) 30 tablet 0     No current facility-administered medications for this visit  Allergies  No Known Allergies    Past Medical History, Social History, Family History, medications and allergies were reviewed  Vitals  Vitals:    04/12/22 1342   BP: 130/68   BP Location: Left arm   Patient Position: Sitting   Cuff Size: Adult   Pulse: 80   Weight: 74 4 kg (164 lb)   Height: 5' 7" (1 702 m)       Physical Exam  Physical Exam  On examination he is in no acute distress    Gait normal   Affect normal      Results  Lab Results   Component Value Date    PSA 7 2 (H) 03/08/2021     Lab Results   Component Value Date    CALCIUM 8 2 (L) 03/31/2022    K 3 9 03/31/2022    CO2 27 03/31/2022     03/31/2022    BUN 18 03/31/2022    CREATININE 0 74 03/31/2022 Lab Results   Component Value Date    WBC 4 78 03/31/2022    HGB 10 8 (L) 03/31/2022    HCT 32 0 (L) 03/31/2022    MCV 91 03/31/2022     03/31/2022         Office Urine Dip  No results found for this or any previous visit (from the past 1 hour(s))  ]      Total visit time was 30 minutes of which over 50% was spent on counseling

## 2022-04-12 NOTE — PROGRESS NOTES
Telephone call from patient today  He states he is experiencing opioid induced constipation  Last BM was 2 days ago and was very hard and required digital assistance per patient  He also states softeners and laxatives not effective  Patient is very uncomfortable and bloated

## 2022-04-12 NOTE — PROGRESS NOTES
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  Next, we discussed options including medical management versus TURP versus robot assisted laparoscopic suprapubic prostatectomy  We discussed the potential efficacy, or lack thereof, of a TURP in a 152 g prostate  The patient is requesting a robot assisted laparoscopic suprapubic prostatectomy  Although he has stage IV gastric cancer he states that he wants bladder outlet surgery to improve his quality of life as he has profound nocturia and daytime urgency and frequency  Would need to obtain permission from Dr Karl Peña to proceed with surgery in late April 2022 after a small hiatus from chemotherapy for at least 2 weeks  History of Present Illness  70 y o  male with a history of an elevated PSA as 7 2 in March of 2021  This prompted transrectal ultrasound-guided biopsy the prostate which was fortunately negative for prostate cancer, however, the patient was found to have a 152 g prostate  More recently he underwent a GI workup and was found to have stage IV gastric cancer  He is currently under the care of Dr Karl Peña and receiving chemotherapy every 2 weeks  He states he has significant nocturia and daytime urgency and frequency of urination  He is concerned about his overall quality of life  AUA Symptom Score      Review of Systems  Review of Systems   Constitutional: Negative  HENT: Negative  Eyes: Negative  Respiratory: Negative  Cardiovascular: Negative  Gastrointestinal: Negative  Endocrine: Negative  Genitourinary:        Per HPI   Musculoskeletal: Negative  Skin: Negative  Allergic/Immunologic: Negative  Neurological: Negative  Hematological: Negative  Psychiatric/Behavioral: Negative  Past Medical History  Past Medical History:   Diagnosis Date    Cancer [facility])     gastric mass    COVID 01/2021    Disease of thyroid gland     GERD (gastroesophageal reflux disease)     History of GI diverticular bleed     Hyperlipidemia     Hypertension     Tinnitus        Past Social History  Past Surgical History:   Procedure Laterality Date    ADENOIDECTOMY      CHOLECYSTECTOMY LAPAROSCOPIC N/A 1/24/2022    Procedure: CHOLECYSTECTOMY LAPAROSCOPIC W/ INTRAOP CHOLANGIOGRAM;  Surgeon: Mary Gamble DO;  [facility];  Service: General    COLONOSCOPY N/A 7/16/2016    Procedure: COLONOSCOPY;  Surgeon: Marcos Thomas MD;  [facility];  Service:     FL CHOLANGIOGRAM TRANSHEPATIC  1/24/2022    FL GUIDED CENTRAL VENOUS ACCESS DEVICE INSERTION  2/11/2022    HERNIA REPAIR      PROSTATE BIOPSY      TONSILLECTOMY      TUNNELED VENOUS PORT PLACEMENT N/A 2/11/2022    Procedure: INSERTION VENOUS PORT (PORT-A-CATH); Surgeon: Dhara Albert MD;  [facility];  Service: Surgical Oncology    UVULECTOMY         Past Family History  History reviewed  No pertinent family history      Past Social history  Social History     Socioeconomic History    Marital status: /Civil Union     Spouse name: Not on file    Number of children: Not on file    Years of education: Not on file    Highest education level: Not on file   Occupational History    Not on file   Tobacco Use    Smoking status: Never Smoker    Smokeless tobacco: Never Used   Vaping Use    Vaping Use: Never used   Substance and Sexual Activity    Alcohol use: Not Currently    Drug use: Never    Sexual activity: Not on file   Other Topics Concern    Not on file   Social History Narrative  Not on file     Social Determinants of Health     Financial Resource Strain: Not on file   Food Insecurity: No Food Insecurity    Worried About Running Out of Food in the Last Year: Never true    Amrita of Food in the Last Year: Never true   Transportation Needs: No Transportation Needs    Lack of Transportation (Medical): No    Lack of Transportation (Non-Medical):  No   Physical Activity: Not on file   Stress: Not on file   Social Connections: Not on file   Intimate Partner Violence: Not on file   Housing Stability: Unknown    Unable to Pay for Housing in the Last Year: No    Number of Places Lived in the Last Year: Not on file    Unstable Housing in the Last Year: No       Current Medications  Current Outpatient Medications   Medication Sig Dispense Refill    bisacodyl (DULCOLAX) 5 mg EC tablet Take 1 tablet (5 mg total) by mouth daily as needed for constipation Related to pain medicines 30 tablet 0    dronabinol (MARINOL) 5 MG capsule Take 1 capsule (5 mg total) by mouth 2 (two) times a day before meals 60 capsule 0    finasteride (PROSCAR) 5 mg tablet Take 1 tablet (5 mg total) by mouth daily 90 tablet 3    [START ON 4/18/2022] fluorouracil 4,465 mg in CADD infusion pump Infuse 4,465 mg (1,200 mg/m2/day x 1 86 m2) into a venous catheter over 46 hours for 2 days  Do not start before April 18, 2022  1 each 0    levothyroxine 125 mcg tablet Take 125 mcg by mouth every morning        ondansetron (Zofran ODT) 4 mg disintegrating tablet Take 2 tablets (8 mg total) by mouth every 8 (eight) hours as needed for nausea or vomiting for up to 90 doses 90 tablet 3    oxyCODONE (OxyCONTIN) 10 mg 12 hr tablet Take 1 tablet (10 mg total) by mouth every 8 (eight) hours Max Daily Amount: 30 mg 90 tablet 0    oxyCODONE (ROXICODONE) 5 mg/5 mL solution Take 10-20mg PO Q4H PRN Moderate-severe pain 473 mL 0    pantoprazole (PROTONIX) 40 mg tablet Take 1 tablet (40 mg total) by mouth 2 (two) times a day Before breakfast and before bed to prevent acid accumulation  60 tablet 0    prochlorperazine (COMPAZINE) 10 mg tablet Take 1 tablet (10 mg total) by mouth every 6 (six) hours as needed for nausea or vomiting 30 tablet 1    saliva substitute (MOUTH KOTE) Apply 5 sprays to the mouth or throat 4 (four) times a day as needed (dry mouth) 59 mL 0    tamsulosin (FLOMAX) 0 4 mg Take 1 capsule (0 4 mg total) by mouth 2 (two) times a day 180 capsule 3    diphenhydrAMINE (BENADRYL) 25 mg tablet Take 1 tablet (25 mg total) by mouth every 6 (six) hours as needed for itching (Patient not taking: Reported on 4/12/2022 ) 90 tablet 1    levothyroxine 100 mcg tablet Take 100 mcg by mouth daily (Patient not taking: Reported on 4/12/2022 )      lidocaine-prilocaine (EMLA) cream Apply to port site 30 minutes prior to port accessing and cover with a dressing (Patient not taking: Reported on 3/16/2022 ) 30 g 1    phenazopyridine (PYRIDIUM) 200 mg tablet Take 400 mg by mouth 3 (three) times a day (Patient not taking: Reported on 4/12/2022 )      predniSONE 5 mg tablet Take 1 tablet (5 mg total) by mouth 2 (two) times a day as needed (low energy) (Patient not taking: Reported on 4/12/2022 ) 30 tablet 0     No current facility-administered medications for this visit  Allergies  No Known Allergies    Past Medical History, Social History, Family History, medications and allergies were reviewed  Vitals  Vitals:    04/12/22 1342   BP: 130/68   BP Location: Left arm   Patient Position: Sitting   Cuff Size: Adult   Pulse: 80   Weight: 74 4 kg (164 lb)   Height: 5' 7" (1 702 m)       Physical Exam  Physical Exam  On examination he is in no acute distress    Gait normal   Affect normal      Results  Lab Results   Component Value Date    PSA 7 2 (H) 03/08/2021     Lab Results   Component Value Date    CALCIUM 8 2 (L) 03/31/2022    K 3 9 03/31/2022    CO2 27 03/31/2022     03/31/2022    BUN 18 03/31/2022    CREATININE 0 74 03/31/2022 Lab Results   Component Value Date    WBC 4 78 03/31/2022    HGB 10 8 (L) 03/31/2022    HCT 32 0 (L) 03/31/2022    MCV 91 03/31/2022     03/31/2022         Office Urine Dip  No results found for this or any previous visit (from the past 1 hour(s))  ]      Total visit time was 30 minutes of which over 50% was spent on counseling

## 2022-04-13 ENCOUNTER — TELEPHONE (OUTPATIENT)
Dept: UROLOGY | Facility: CLINIC | Age: 72
End: 2022-04-13

## 2022-04-13 ENCOUNTER — TELEPHONE (OUTPATIENT)
Dept: HEMATOLOGY ONCOLOGY | Facility: CLINIC | Age: 72
End: 2022-04-13

## 2022-04-13 ENCOUNTER — TELEPHONE (OUTPATIENT)
Dept: PALLIATIVE MEDICINE | Facility: CLINIC | Age: 72
End: 2022-04-13

## 2022-04-13 ENCOUNTER — PREP FOR PROCEDURE (OUTPATIENT)
Dept: UROLOGY | Facility: CLINIC | Age: 72
End: 2022-04-13

## 2022-04-13 DIAGNOSIS — K59.03 THERAPEUTIC OPIOID INDUCED CONSTIPATION: Primary | ICD-10-CM

## 2022-04-13 DIAGNOSIS — Z51.81 ADMISSION FOR LONG-TERM (CURRENT) USE OF ANTICOAGULANTS: ICD-10-CM

## 2022-04-13 DIAGNOSIS — R33.8 BENIGN PROSTATIC HYPERPLASIA WITH URINARY RETENTION: Primary | ICD-10-CM

## 2022-04-13 DIAGNOSIS — T40.2X5A THERAPEUTIC OPIOID INDUCED CONSTIPATION: Primary | ICD-10-CM

## 2022-04-13 DIAGNOSIS — Z79.01 ADMISSION FOR LONG-TERM (CURRENT) USE OF ANTICOAGULANTS: ICD-10-CM

## 2022-04-13 DIAGNOSIS — N40.1 BENIGN PROSTATIC HYPERPLASIA WITH URINARY RETENTION: Primary | ICD-10-CM

## 2022-04-13 RX ORDER — LACTULOSE 10 G/10G
10 SOLUTION ORAL 3 TIMES DAILY
Qty: 30 EACH | Refills: 0 | Status: SHIPPED | OUTPATIENT
Start: 2022-04-13 | End: 2022-04-29 | Stop reason: HOSPADM

## 2022-04-13 NOTE — TELEPHONE ENCOUNTER
Patient called left a message stating that he is having issues with his opioid constipation not responding to any over the counter medications  Please advise

## 2022-04-13 NOTE — PROGRESS NOTES
Call received that patient struggling with constipation and has tried multiple OTC regimens without relief  Will start lactulose TID until normalized and then return to daily dulcolax

## 2022-04-13 NOTE — TELEPHONE ENCOUNTER
Called and spoke to pt daughter Cassi Go to let pt know that his upcoming chemo treatment will be delayed until 5/16 due to his scheduled surgery procedure tentatively scheduled for 4/29  Daughter also mentioned about pt ongoing constipation  Pt has been following with palliative for this and they did prescribe lactulose for him  I also advised to try using fleet enemas and miralax as well  Message sent to Infusion at [facility] made aware pt to start treatment on 5/16

## 2022-04-13 NOTE — TELEPHONE ENCOUNTER
Spoke with patient and he is scheduled for 4/27 with FT at [facility]  He is aware the hospital will call the day prior with time of arrival, nothing to eat or drink after midnight,he will need a , and to hold blood thinning mediations 7 days prior  He will go for pre op labs tomorrow  Mailed surgical pkt per patient request      I was unable to schedule a 2 week follow up with Dr Cheryle Rummage as there were no open slots  Please call patient with follow up appt     Thank you

## 2022-04-14 ENCOUNTER — ANESTHESIA EVENT (OUTPATIENT)
Dept: PERIOP | Facility: HOSPITAL | Age: 72
DRG: 707 | End: 2022-04-14
Payer: COMMERCIAL

## 2022-04-14 ENCOUNTER — APPOINTMENT (OUTPATIENT)
Dept: LAB | Age: 72
End: 2022-04-14
Payer: COMMERCIAL

## 2022-04-14 ENCOUNTER — LAB REQUISITION (OUTPATIENT)
Dept: LAB | Facility: HOSPITAL | Age: 72
End: 2022-04-14
Payer: COMMERCIAL

## 2022-04-14 DIAGNOSIS — Z51.81 ADMISSION FOR LONG-TERM (CURRENT) USE OF ANTICOAGULANTS: ICD-10-CM

## 2022-04-14 DIAGNOSIS — C16.9 MALIGNANT NEOPLASM OF STOMACH, UNSPECIFIED (HCC): ICD-10-CM

## 2022-04-14 DIAGNOSIS — R33.8 BENIGN PROSTATIC HYPERPLASIA WITH URINARY RETENTION: ICD-10-CM

## 2022-04-14 DIAGNOSIS — G89.3 CANCER ASSOCIATED PAIN: ICD-10-CM

## 2022-04-14 DIAGNOSIS — Z79.01 ADMISSION FOR LONG-TERM (CURRENT) USE OF ANTICOAGULANTS: ICD-10-CM

## 2022-04-14 DIAGNOSIS — C16.9 GASTRIC ADENOCARCINOMA (HCC): ICD-10-CM

## 2022-04-14 DIAGNOSIS — N40.1 BENIGN PROSTATIC HYPERPLASIA WITH URINARY RETENTION: ICD-10-CM

## 2022-04-14 LAB
ABO GROUP BLD: NORMAL
ALBUMIN SERPL BCP-MCNC: 3.4 G/DL (ref 3.5–5)
ALP SERPL-CCNC: 157 U/L (ref 46–116)
ALT SERPL W P-5'-P-CCNC: 30 U/L (ref 12–78)
ANION GAP SERPL CALCULATED.3IONS-SCNC: 2 MMOL/L (ref 4–13)
APTT PPP: 30 SECONDS (ref 23–37)
AST SERPL W P-5'-P-CCNC: 21 U/L (ref 5–45)
BACTERIA UR QL AUTO: ABNORMAL /HPF
BASOPHILS # BLD AUTO: 0.01 THOUSANDS/ΜL (ref 0–0.1)
BASOPHILS NFR BLD AUTO: 0 % (ref 0–1)
BILIRUB SERPL-MCNC: 0.61 MG/DL (ref 0.2–1)
BILIRUB UR QL STRIP: NEGATIVE
BLD GP AB SCN SERPL QL: NEGATIVE
BUN SERPL-MCNC: 16 MG/DL (ref 5–25)
CALCIUM ALBUM COR SERPL-MCNC: 9.4 MG/DL (ref 8.3–10.1)
CALCIUM SERPL-MCNC: 8.9 MG/DL (ref 8.3–10.1)
CHLORIDE SERPL-SCNC: 108 MMOL/L (ref 100–108)
CLARITY UR: CLEAR
CO2 SERPL-SCNC: 28 MMOL/L (ref 21–32)
COLOR UR: YELLOW
CREAT SERPL-MCNC: 0.96 MG/DL (ref 0.6–1.3)
EOSINOPHIL # BLD AUTO: 0.22 THOUSAND/ΜL (ref 0–0.61)
EOSINOPHIL NFR BLD AUTO: 6 % (ref 0–6)
ERYTHROCYTE [DISTWIDTH] IN BLOOD BY AUTOMATED COUNT: 15.3 % (ref 11.6–15.1)
GFR SERPL CREATININE-BSD FRML MDRD: 79 ML/MIN/1.73SQ M
GLUCOSE SERPL-MCNC: 121 MG/DL (ref 65–140)
GLUCOSE UR STRIP-MCNC: NEGATIVE MG/DL
HCT VFR BLD AUTO: 34.5 % (ref 36.5–49.3)
HGB BLD-MCNC: 11 G/DL (ref 12–17)
HGB UR QL STRIP.AUTO: NEGATIVE
HYALINE CASTS #/AREA URNS LPF: ABNORMAL /LPF
IMM GRANULOCYTES # BLD AUTO: 0.01 THOUSAND/UL (ref 0–0.2)
IMM GRANULOCYTES NFR BLD AUTO: 0 % (ref 0–2)
INR PPP: 1.07 (ref 0.84–1.19)
KETONES UR STRIP-MCNC: NEGATIVE MG/DL
LEUKOCYTE ESTERASE UR QL STRIP: NEGATIVE
LYMPHOCYTES # BLD AUTO: 0.79 THOUSANDS/ΜL (ref 0.6–4.47)
LYMPHOCYTES NFR BLD AUTO: 21 % (ref 14–44)
MCH RBC QN AUTO: 29.9 PG (ref 26.8–34.3)
MCHC RBC AUTO-ENTMCNC: 31.9 G/DL (ref 31.4–37.4)
MCV RBC AUTO: 94 FL (ref 82–98)
MONOCYTES # BLD AUTO: 0.57 THOUSAND/ΜL (ref 0.17–1.22)
MONOCYTES NFR BLD AUTO: 15 % (ref 4–12)
MUCOUS THREADS UR QL AUTO: ABNORMAL
NEUTROPHILS # BLD AUTO: 2.16 THOUSANDS/ΜL (ref 1.85–7.62)
NEUTS SEG NFR BLD AUTO: 58 % (ref 43–75)
NITRITE UR QL STRIP: NEGATIVE
NON-SQ EPI CELLS URNS QL MICRO: ABNORMAL /HPF
NRBC BLD AUTO-RTO: 0 /100 WBCS
PH UR STRIP.AUTO: 6 [PH]
PLATELET # BLD AUTO: 232 THOUSANDS/UL (ref 149–390)
PMV BLD AUTO: 9.2 FL (ref 8.9–12.7)
POTASSIUM SERPL-SCNC: 4.2 MMOL/L (ref 3.5–5.3)
PROT SERPL-MCNC: 6.3 G/DL (ref 6.4–8.2)
PROT UR STRIP-MCNC: ABNORMAL MG/DL
PROTHROMBIN TIME: 13.5 SECONDS (ref 11.6–14.5)
RBC # BLD AUTO: 3.68 MILLION/UL (ref 3.88–5.62)
RBC #/AREA URNS AUTO: ABNORMAL /HPF
RH BLD: POSITIVE
SODIUM SERPL-SCNC: 138 MMOL/L (ref 136–145)
SP GR UR STRIP.AUTO: 1.02 (ref 1–1.03)
SPECIMEN EXPIRATION DATE: NORMAL
UROBILINOGEN UR STRIP-ACNC: <2 MG/DL
WBC # BLD AUTO: 3.76 THOUSAND/UL (ref 4.31–10.16)
WBC #/AREA URNS AUTO: ABNORMAL /HPF

## 2022-04-14 PROCEDURE — 87086 URINE CULTURE/COLONY COUNT: CPT

## 2022-04-14 PROCEDURE — 86900 BLOOD TYPING SEROLOGIC ABO: CPT | Performed by: UROLOGY

## 2022-04-14 PROCEDURE — 85610 PROTHROMBIN TIME: CPT

## 2022-04-14 PROCEDURE — 86901 BLOOD TYPING SEROLOGIC RH(D): CPT | Performed by: UROLOGY

## 2022-04-14 PROCEDURE — 36415 COLL VENOUS BLD VENIPUNCTURE: CPT

## 2022-04-14 PROCEDURE — 81001 URINALYSIS AUTO W/SCOPE: CPT

## 2022-04-14 PROCEDURE — 85730 THROMBOPLASTIN TIME PARTIAL: CPT

## 2022-04-14 PROCEDURE — 86850 RBC ANTIBODY SCREEN: CPT | Performed by: UROLOGY

## 2022-04-14 PROCEDURE — 85025 COMPLETE CBC W/AUTO DIFF WBC: CPT

## 2022-04-14 PROCEDURE — 80053 COMPREHEN METABOLIC PANEL: CPT

## 2022-04-14 RX ORDER — OXYCODONE HCL 5 MG/5 ML
SOLUTION, ORAL ORAL
Qty: 473 ML | Refills: 0 | Status: SHIPPED | OUTPATIENT
Start: 2022-04-14 | End: 2022-05-03 | Stop reason: SDUPTHER

## 2022-04-15 LAB
BACTERIA UR CULT: NORMAL
BACTERIA UR CULT: NORMAL

## 2022-04-21 ENCOUNTER — APPOINTMENT (OUTPATIENT)
Dept: PREADMISSION TESTING | Facility: HOSPITAL | Age: 72
DRG: 707 | End: 2022-04-21
Payer: COMMERCIAL

## 2022-04-21 NOTE — PRE-PROCEDURE INSTRUCTIONS
See Geriatric Assessment below        Cognitive Assessment: alert and oriented   CAM:    TUG <15 sec:   Falls (last 6 months): no falls   Hand  score:  -Attempt 1:  -Attempt 2:  -Attempt 3:   Tre Total Score: 18- weakness upper body/ cant drive-goes out daily for drive with friends/family- goes on treadmill for 15 min /day      PHQ- 9 Depression Scale:6-stage 4 cancer      Nutrition Assessment Score:appetite poor -lost 60 lbs in 2 months-has peg tube -takes  Jevity 1-2 cans day by mouth   METS: 7 34   Health goals:  -What are your overall health goals? (quit smoking, wt  loss, rest, decrease stress) comfort/pain free/ resolution nocturia and urinary freq/ urgency    -What brings you strength? (family, friends, Sabianism, health) family/ friends    -What activities are important to you? (exercise, reading, travel, work) spending quality time with family/ friends      Pre-Surgery Instructions:   Medication Instructions    bisacodyl (DULCOLAX) 5 mg EC tablet Hold day of surgery   diphenhydrAMINE (BENADRYL) 25 mg tablet Hold day of surgery   dronabinol (MARINOL) 5 MG capsule Hold day of surgery   finasteride (PROSCAR) 5 mg tablet Take day of surgery   levothyroxine 125 mcg tablet Take day of surgery   ondansetron (Zofran ODT) 4 mg disintegrating tablet Take day of surgery  if needed    oxyCODONE (OxyCONTIN) 10 mg 12 hr tablet Take day of surgery   oxyCODONE (ROXICODONE) 5 mg/5 mL solution Take day of surgery  if needed    pantoprazole (PROTONIX) 40 mg tablet Take day of surgery   saliva substitute (MOUTH KOTE) Hold day of surgery   tamsulosin (FLOMAX) 0 4 mg Take day of surgery  Pre-op instructions given via phone  Pt verbalizes understanding  Pedro Lopez

## 2022-04-25 ENCOUNTER — PATIENT OUTREACH (OUTPATIENT)
Dept: CASE MANAGEMENT | Facility: HOSPITAL | Age: 72
End: 2022-04-25

## 2022-04-25 ENCOUNTER — TELEPHONE (OUTPATIENT)
Dept: UROLOGY | Facility: CLINIC | Age: 72
End: 2022-04-25

## 2022-04-25 NOTE — PROGRESS NOTES
LSW attempted to contact pt, no contact made  LSW is closing this episode and will remain available for new referrals as needed

## 2022-04-26 ENCOUNTER — DOCUMENTATION (OUTPATIENT)
Dept: HEMATOLOGY ONCOLOGY | Facility: CLINIC | Age: 72
End: 2022-04-26

## 2022-04-26 NOTE — PROGRESS NOTES
Telephone call from patients daughter Zachary Jenkins to reschedule Edu's upcoming appointment with Dr Susanna Jenkins indicated Demar Dillon is scheduled for surgery tomorrow 4/27 and forgot to cancel his appointment on the same day  I rescheduled for [email or web address] with Dr Susanna Bernal at [facility]  Zachary Jenkins thanked me for the assistance

## 2022-04-26 NOTE — ANESTHESIA PREPROCEDURE EVALUATION
Procedure:  PROSTATECTOMY RADICAL LAPAROSCOPIC  W ROBOTICS (N/A Abdomen)    Relevant Problems   CARDIO   (+) Hypertension      ENDO   (+) Hypothyroid      GI/HEPATIC   (+) Gastric adenocarcinoma (HCC)   (+) Stomach cancer (HCC)      /RENAL   (+) BPH (benign prostatic hyperplasia)      MUSCULOSKELETAL   (+) Inflammatory arthritis   (+) Rectus diastasis      NEURO/PSYCH   (+) Depression        Physical Exam    Airway    Mallampati score: II  TM Distance: >3 FB  Neck ROM: full     Dental       Cardiovascular  Rate: normal,     Pulmonary  Breath sounds clear to auscultation,     Other Findings        Anesthesia Plan  ASA Score- 2     Anesthesia Type- general with ASA Monitors  Additional Monitors:   Airway Plan: ETT  Plan Factors-    Chart reviewed  EKG reviewed  Existing labs reviewed  Patient summary reviewed  Induction- intravenous  Postoperative Plan-     Informed Consent- Anesthetic plan and risks discussed with patient  I personally reviewed this patient with the CRNA  Discussed and agreed on the Anesthesia Plan with the CRNA  Migeul A Lainez

## 2022-04-27 ENCOUNTER — HOSPITAL ENCOUNTER (INPATIENT)
Facility: HOSPITAL | Age: 72
LOS: 2 days | Discharge: HOME | DRG: 707 | End: 2022-04-29
Attending: UROLOGY | Admitting: UROLOGY
Payer: COMMERCIAL

## 2022-04-27 ENCOUNTER — ANESTHESIA (OUTPATIENT)
Dept: PERIOP | Facility: HOSPITAL | Age: 72
DRG: 707 | End: 2022-04-27
Payer: COMMERCIAL

## 2022-04-27 DIAGNOSIS — F32.A DEPRESSION, UNSPECIFIED DEPRESSION TYPE: ICD-10-CM

## 2022-04-27 DIAGNOSIS — C16.9 GASTRIC ADENOCARCINOMA (HCC): Primary | ICD-10-CM

## 2022-04-27 DIAGNOSIS — R35.0 BENIGN PROSTATIC HYPERPLASIA WITH URINARY FREQUENCY: ICD-10-CM

## 2022-04-27 DIAGNOSIS — R62.7 FTT (FAILURE TO THRIVE) IN ADULT: ICD-10-CM

## 2022-04-27 DIAGNOSIS — R33.8 BENIGN PROSTATIC HYPERPLASIA WITH URINARY RETENTION: ICD-10-CM

## 2022-04-27 DIAGNOSIS — Z98.890 STATUS POST SURGERY: ICD-10-CM

## 2022-04-27 DIAGNOSIS — N40.1 BENIGN PROSTATIC HYPERPLASIA WITH URINARY FREQUENCY: ICD-10-CM

## 2022-04-27 DIAGNOSIS — G89.3 CANCER RELATED PAIN: ICD-10-CM

## 2022-04-27 DIAGNOSIS — E44.0 MODERATE PROTEIN-CALORIE MALNUTRITION (HCC): ICD-10-CM

## 2022-04-27 DIAGNOSIS — N40.1 BENIGN PROSTATIC HYPERPLASIA WITH URINARY RETENTION: ICD-10-CM

## 2022-04-27 DIAGNOSIS — Z51.5 PALLIATIVE CARE PATIENT: ICD-10-CM

## 2022-04-27 DIAGNOSIS — R52 INTRACTABLE PAIN: ICD-10-CM

## 2022-04-27 LAB
ANION GAP SERPL CALCULATED.3IONS-SCNC: 8 MMOL/L (ref 4–13)
BUN SERPL-MCNC: 9 MG/DL (ref 5–25)
CALCIUM SERPL-MCNC: 8.6 MG/DL (ref 8.3–10.1)
CHLORIDE SERPL-SCNC: 108 MMOL/L (ref 100–108)
CO2 SERPL-SCNC: 24 MMOL/L (ref 21–32)
CREAT SERPL-MCNC: 0.83 MG/DL (ref 0.6–1.3)
ERYTHROCYTE [DISTWIDTH] IN BLOOD BY AUTOMATED COUNT: 14.4 % (ref 11.6–15.1)
GFR SERPL CREATININE-BSD FRML MDRD: 88 ML/MIN/1.73SQ M
GLUCOSE SERPL-MCNC: 97 MG/DL (ref 65–140)
HCT VFR BLD AUTO: 32.4 % (ref 36.5–49.3)
HCT VFR BLD AUTO: 34.4 % (ref 36.5–49.3)
HGB BLD-MCNC: 10.5 G/DL (ref 12–17)
HGB BLD-MCNC: 11 G/DL (ref 12–17)
MCH RBC QN AUTO: 29.5 PG (ref 26.8–34.3)
MCHC RBC AUTO-ENTMCNC: 32.4 G/DL (ref 31.4–37.4)
MCV RBC AUTO: 91 FL (ref 82–98)
PLATELET # BLD AUTO: 238 THOUSANDS/UL (ref 149–390)
PMV BLD AUTO: 9.2 FL (ref 8.9–12.7)
POTASSIUM SERPL-SCNC: 3.5 MMOL/L (ref 3.5–5.3)
RBC # BLD AUTO: 3.56 MILLION/UL (ref 3.88–5.62)
SODIUM SERPL-SCNC: 140 MMOL/L (ref 136–145)
WBC # BLD AUTO: 8.02 THOUSAND/UL (ref 4.31–10.16)

## 2022-04-27 PROCEDURE — 30233N1 TRANSFUSION OF NONAUTOLOGOUS RED BLOOD CELLS INTO PERIPHERAL VEIN, PERCUTANEOUS APPROACH: ICD-10-PCS | Performed by: UROLOGY

## 2022-04-27 PROCEDURE — 85018 HEMOGLOBIN: CPT | Performed by: UROLOGY

## 2022-04-27 PROCEDURE — S2900 ROBOTIC SURGICAL SYSTEM: HCPCS | Performed by: UROLOGY

## 2022-04-27 PROCEDURE — 80048 BASIC METABOLIC PNL TOTAL CA: CPT | Performed by: UROLOGY

## 2022-04-27 PROCEDURE — 55866 LAPS SURG PRST8ECT RPBIC RAD: CPT | Performed by: UROLOGY

## 2022-04-27 PROCEDURE — 85014 HEMATOCRIT: CPT | Performed by: UROLOGY

## 2022-04-27 PROCEDURE — NC001 PR NO CHARGE

## 2022-04-27 PROCEDURE — 0TQB4ZZ REPAIR BLADDER, PERCUTANEOUS ENDOSCOPIC APPROACH: ICD-10-PCS | Performed by: UROLOGY

## 2022-04-27 PROCEDURE — 99223 1ST HOSP IP/OBS HIGH 75: CPT | Performed by: INTERNAL MEDICINE

## 2022-04-27 PROCEDURE — 88307 TISSUE EXAM BY PATHOLOGIST: CPT | Performed by: PATHOLOGY

## 2022-04-27 PROCEDURE — 8E0W4CZ ROBOTIC ASSISTED PROCEDURE OF TRUNK REGION, PERCUTANEOUS ENDOSCOPIC APPROACH: ICD-10-PCS | Performed by: UROLOGY

## 2022-04-27 PROCEDURE — 0VT04ZZ RESECTION OF PROSTATE, PERCUTANEOUS ENDOSCOPIC APPROACH: ICD-10-PCS | Performed by: UROLOGY

## 2022-04-27 PROCEDURE — 85027 COMPLETE CBC AUTOMATED: CPT | Performed by: UROLOGY

## 2022-04-27 PROCEDURE — 86920 COMPATIBILITY TEST SPIN: CPT

## 2022-04-27 PROCEDURE — 99222 1ST HOSP IP/OBS MODERATE 55: CPT | Performed by: NURSE PRACTITIONER

## 2022-04-27 RX ORDER — ONDANSETRON 2 MG/ML
INJECTION INTRAMUSCULAR; INTRAVENOUS AS NEEDED
Status: DISCONTINUED | OUTPATIENT
Start: 2022-04-27 | End: 2022-04-27

## 2022-04-27 RX ORDER — MAGNESIUM HYDROXIDE 1200 MG/15ML
3000 LIQUID ORAL CONTINUOUS
Status: DISCONTINUED | OUTPATIENT
Start: 2022-04-27 | End: 2022-04-28 | Stop reason: ALTCHOICE

## 2022-04-27 RX ORDER — SENNOSIDES 8.6 MG
1 TABLET ORAL
Status: DISCONTINUED | OUTPATIENT
Start: 2022-04-27 | End: 2022-04-29 | Stop reason: HOSPADM

## 2022-04-27 RX ORDER — FENTANYL CITRATE 50 UG/ML
INJECTION, SOLUTION INTRAMUSCULAR; INTRAVENOUS AS NEEDED
Status: DISCONTINUED | OUTPATIENT
Start: 2022-04-27 | End: 2022-04-27

## 2022-04-27 RX ORDER — ONDANSETRON 4 MG/1
8 TABLET, ORALLY DISINTEGRATING ORAL EVERY 8 HOURS PRN
Status: DISCONTINUED | OUTPATIENT
Start: 2022-04-27 | End: 2022-04-27

## 2022-04-27 RX ORDER — SODIUM CHLORIDE, SODIUM LACTATE, POTASSIUM CHLORIDE, CALCIUM CHLORIDE 600; 310; 30; 20 MG/100ML; MG/100ML; MG/100ML; MG/100ML
INJECTION, SOLUTION INTRAVENOUS CONTINUOUS PRN
Status: DISCONTINUED | OUTPATIENT
Start: 2022-04-27 | End: 2022-04-27

## 2022-04-27 RX ORDER — ATROPA BELLADONNA AND OPIUM 16.2; 3 MG/1; MG/1
1 SUPPOSITORY RECTAL EVERY 6 HOURS PRN
Status: DISCONTINUED | OUTPATIENT
Start: 2022-04-27 | End: 2022-04-29 | Stop reason: HOSPADM

## 2022-04-27 RX ORDER — SODIUM CHLORIDE 9 MG/ML
125 INJECTION, SOLUTION INTRAVENOUS CONTINUOUS
Status: DISCONTINUED | OUTPATIENT
Start: 2022-04-27 | End: 2022-04-28

## 2022-04-27 RX ORDER — ONDANSETRON 2 MG/ML
4 INJECTION INTRAMUSCULAR; INTRAVENOUS ONCE AS NEEDED
Status: COMPLETED | OUTPATIENT
Start: 2022-04-27 | End: 2022-04-27

## 2022-04-27 RX ORDER — PANTOPRAZOLE SODIUM 40 MG/1
40 TABLET, DELAYED RELEASE ORAL
Status: DISCONTINUED | OUTPATIENT
Start: 2022-04-28 | End: 2022-04-29 | Stop reason: HOSPADM

## 2022-04-27 RX ORDER — FENTANYL CITRATE/PF 50 MCG/ML
25 SYRINGE (ML) INJECTION
Status: COMPLETED | OUTPATIENT
Start: 2022-04-27 | End: 2022-04-27

## 2022-04-27 RX ORDER — SODIUM CHLORIDE 9 MG/ML
INJECTION, SOLUTION INTRAVENOUS CONTINUOUS PRN
Status: DISCONTINUED | OUTPATIENT
Start: 2022-04-27 | End: 2022-04-27

## 2022-04-27 RX ORDER — BACITRACIN, NEOMYCIN, POLYMYXIN B 400; 3.5; 5 [USP'U]/G; MG/G; [USP'U]/G
1 OINTMENT TOPICAL 2 TIMES DAILY
Status: DISCONTINUED | OUTPATIENT
Start: 2022-04-27 | End: 2022-04-29 | Stop reason: HOSPADM

## 2022-04-27 RX ORDER — LIDOCAINE HYDROCHLORIDE 10 MG/ML
INJECTION, SOLUTION EPIDURAL; INFILTRATION; INTRACAUDAL; PERINEURAL AS NEEDED
Status: DISCONTINUED | OUTPATIENT
Start: 2022-04-27 | End: 2022-04-27

## 2022-04-27 RX ORDER — OXYCODONE HYDROCHLORIDE 10 MG/1
10 TABLET ORAL EVERY 4 HOURS PRN
Status: DISCONTINUED | OUTPATIENT
Start: 2022-04-27 | End: 2022-04-27

## 2022-04-27 RX ORDER — MIDAZOLAM HYDROCHLORIDE 2 MG/2ML
INJECTION, SOLUTION INTRAMUSCULAR; INTRAVENOUS AS NEEDED
Status: DISCONTINUED | OUTPATIENT
Start: 2022-04-27 | End: 2022-04-27

## 2022-04-27 RX ORDER — PROCHLORPERAZINE MALEATE 10 MG
10 TABLET ORAL EVERY 6 HOURS PRN
Status: DISCONTINUED | OUTPATIENT
Start: 2022-04-27 | End: 2022-04-27

## 2022-04-27 RX ORDER — DEXTROSE, SODIUM CHLORIDE, SODIUM LACTATE, POTASSIUM CHLORIDE, AND CALCIUM CHLORIDE 5; .6; .31; .03; .02 G/100ML; G/100ML; G/100ML; G/100ML; G/100ML
125 INJECTION, SOLUTION INTRAVENOUS CONTINUOUS
Status: DISCONTINUED | OUTPATIENT
Start: 2022-04-27 | End: 2022-04-28 | Stop reason: ALTCHOICE

## 2022-04-27 RX ORDER — MAGNESIUM HYDROXIDE 1200 MG/15ML
LIQUID ORAL AS NEEDED
Status: DISCONTINUED | OUTPATIENT
Start: 2022-04-27 | End: 2022-04-27 | Stop reason: HOSPADM

## 2022-04-27 RX ORDER — OXYCODONE HYDROCHLORIDE 5 MG/1
5 TABLET ORAL EVERY 4 HOURS PRN
Status: DISCONTINUED | OUTPATIENT
Start: 2022-04-27 | End: 2022-04-27

## 2022-04-27 RX ORDER — BUPIVACAINE HYDROCHLORIDE 5 MG/ML
INJECTION, SOLUTION PERINEURAL AS NEEDED
Status: DISCONTINUED | OUTPATIENT
Start: 2022-04-27 | End: 2022-04-27 | Stop reason: HOSPADM

## 2022-04-27 RX ORDER — DRONABINOL 2.5 MG/1
5 CAPSULE ORAL
Status: DISCONTINUED | OUTPATIENT
Start: 2022-04-27 | End: 2022-04-27

## 2022-04-27 RX ORDER — DOCUSATE SODIUM 100 MG/1
100 CAPSULE, LIQUID FILLED ORAL 2 TIMES DAILY
Status: DISCONTINUED | OUTPATIENT
Start: 2022-04-27 | End: 2022-04-29 | Stop reason: HOSPADM

## 2022-04-27 RX ORDER — MORPHINE SULFATE 4 MG/ML
4 INJECTION, SOLUTION INTRAMUSCULAR; INTRAVENOUS EVERY 2 HOUR PRN
Status: DISPENSED | OUTPATIENT
Start: 2022-04-27 | End: 2022-04-29

## 2022-04-27 RX ORDER — HYDROMORPHONE HCL/PF 1 MG/ML
SYRINGE (ML) INJECTION AS NEEDED
Status: DISCONTINUED | OUTPATIENT
Start: 2022-04-27 | End: 2022-04-27

## 2022-04-27 RX ORDER — ACETAMINOPHEN 325 MG/1
650 TABLET ORAL EVERY 6 HOURS SCHEDULED
Status: DISCONTINUED | OUTPATIENT
Start: 2022-04-27 | End: 2022-04-29 | Stop reason: HOSPADM

## 2022-04-27 RX ORDER — HYDROMORPHONE HCL/PF 1 MG/ML
0.5 SYRINGE (ML) INJECTION
Status: COMPLETED | OUTPATIENT
Start: 2022-04-27 | End: 2022-04-27

## 2022-04-27 RX ORDER — SODIUM CHLORIDE, SODIUM LACTATE, POTASSIUM CHLORIDE, CALCIUM CHLORIDE 600; 310; 30; 20 MG/100ML; MG/100ML; MG/100ML; MG/100ML
125 INJECTION, SOLUTION INTRAVENOUS CONTINUOUS
Status: DISCONTINUED | OUTPATIENT
Start: 2022-04-27 | End: 2022-04-28 | Stop reason: ALTCHOICE

## 2022-04-27 RX ORDER — ONDANSETRON 2 MG/ML
4 INJECTION INTRAMUSCULAR; INTRAVENOUS EVERY 6 HOURS PRN
Status: DISCONTINUED | OUTPATIENT
Start: 2022-04-27 | End: 2022-04-29 | Stop reason: HOSPADM

## 2022-04-27 RX ORDER — EPHEDRINE SULFATE 50 MG/ML
INJECTION INTRAVENOUS AS NEEDED
Status: DISCONTINUED | OUTPATIENT
Start: 2022-04-27 | End: 2022-04-27

## 2022-04-27 RX ORDER — CEFAZOLIN SODIUM 1 G/50ML
1000 SOLUTION INTRAVENOUS ONCE
Status: COMPLETED | OUTPATIENT
Start: 2022-04-27 | End: 2022-04-27

## 2022-04-27 RX ORDER — XYLITOL/YERBA SANTA
5 AEROSOL, SPRAY WITH PUMP (ML) MUCOUS MEMBRANE 4 TIMES DAILY PRN
Status: DISCONTINUED | OUTPATIENT
Start: 2022-04-27 | End: 2022-04-29 | Stop reason: HOSPADM

## 2022-04-27 RX ORDER — PANTOPRAZOLE SODIUM 40 MG/1
40 TABLET, DELAYED RELEASE ORAL
Status: DISCONTINUED | OUTPATIENT
Start: 2022-04-28 | End: 2022-04-27

## 2022-04-27 RX ORDER — POLYETHYLENE GLYCOL 3350 17 G/17G
17 POWDER, FOR SOLUTION ORAL DAILY PRN
Status: DISCONTINUED | OUTPATIENT
Start: 2022-04-27 | End: 2022-04-29 | Stop reason: HOSPADM

## 2022-04-27 RX ORDER — KETAMINE HCL IN NACL, ISO-OSM 100MG/10ML
SYRINGE (ML) INJECTION AS NEEDED
Status: DISCONTINUED | OUTPATIENT
Start: 2022-04-27 | End: 2022-04-27

## 2022-04-27 RX ORDER — DIPHENHYDRAMINE HCL 25 MG
25 TABLET ORAL EVERY 6 HOURS PRN
Status: DISCONTINUED | OUTPATIENT
Start: 2022-04-27 | End: 2022-04-29 | Stop reason: HOSPADM

## 2022-04-27 RX ORDER — PROPOFOL 10 MG/ML
INJECTION, EMULSION INTRAVENOUS AS NEEDED
Status: DISCONTINUED | OUTPATIENT
Start: 2022-04-27 | End: 2022-04-27

## 2022-04-27 RX ORDER — SUCCINYLCHOLINE/SOD CL,ISO/PF 100 MG/5ML
SYRINGE (ML) INTRAVENOUS AS NEEDED
Status: DISCONTINUED | OUTPATIENT
Start: 2022-04-27 | End: 2022-04-27

## 2022-04-27 RX ORDER — LEVOTHYROXINE SODIUM 0.12 MG/1
125 TABLET ORAL EVERY MORNING
Status: DISCONTINUED | OUTPATIENT
Start: 2022-04-28 | End: 2022-04-29 | Stop reason: HOSPADM

## 2022-04-27 RX ORDER — PROCHLORPERAZINE MALEATE 10 MG
10 TABLET ORAL EVERY 6 HOURS PRN
Status: DISCONTINUED | OUTPATIENT
Start: 2022-04-27 | End: 2022-04-29 | Stop reason: HOSPADM

## 2022-04-27 RX ORDER — ROCURONIUM BROMIDE 10 MG/ML
INJECTION, SOLUTION INTRAVENOUS AS NEEDED
Status: DISCONTINUED | OUTPATIENT
Start: 2022-04-27 | End: 2022-04-27

## 2022-04-27 RX ORDER — OXYCODONE HCL 10 MG/1
10 TABLET, FILM COATED, EXTENDED RELEASE ORAL EVERY 8 HOURS SCHEDULED
Status: DISCONTINUED | OUTPATIENT
Start: 2022-04-27 | End: 2022-04-29 | Stop reason: HOSPADM

## 2022-04-27 RX ADMIN — SENNOSIDES 8.6 MG: 8.6 TABLET, FILM COATED ORAL at 21:03

## 2022-04-27 RX ADMIN — SODIUM CHLORIDE: 0.9 INJECTION, SOLUTION INTRAVENOUS at 08:43

## 2022-04-27 RX ADMIN — HYDROMORPHONE HYDROCHLORIDE: 10 INJECTION, SOLUTION INTRAMUSCULAR; INTRAVENOUS; SUBCUTANEOUS at 19:38

## 2022-04-27 RX ADMIN — MORPHINE SULFATE 4 MG: 4 INJECTION INTRAVENOUS at 17:57

## 2022-04-27 RX ADMIN — Medication 25 MCG: at 12:58

## 2022-04-27 RX ADMIN — HYDROMORPHONE HYDROCHLORIDE 0.5 MG: 1 INJECTION, SOLUTION INTRAMUSCULAR; INTRAVENOUS; SUBCUTANEOUS at 14:32

## 2022-04-27 RX ADMIN — SUGAMMADEX 146 MG: 100 INJECTION, SOLUTION INTRAVENOUS at 12:27

## 2022-04-27 RX ADMIN — HYDROMORPHONE HYDROCHLORIDE 0.5 MG: 1 INJECTION, SOLUTION INTRAMUSCULAR; INTRAVENOUS; SUBCUTANEOUS at 12:20

## 2022-04-27 RX ADMIN — DEXTROSE, SODIUM CHLORIDE, SODIUM LACTATE, POTASSIUM CHLORIDE, AND CALCIUM CHLORIDE 125 ML/HR: 5; .6; .31; .03; .02 INJECTION, SOLUTION INTRAVENOUS at 22:09

## 2022-04-27 RX ADMIN — DOCUSATE SODIUM 100 MG: 100 CAPSULE, LIQUID FILLED ORAL at 17:56

## 2022-04-27 RX ADMIN — SODIUM CHLORIDE FOR IRRIGATION 3000 ML: 0.9 SOLUTION IRRIGATION at 13:33

## 2022-04-27 RX ADMIN — ACETAMINOPHEN 650 MG: 325 TABLET ORAL at 17:56

## 2022-04-27 RX ADMIN — FENTANYL CITRATE 50 MCG: 50 INJECTION INTRAMUSCULAR; INTRAVENOUS at 08:31

## 2022-04-27 RX ADMIN — MIDAZOLAM 2 MG: 1 INJECTION INTRAMUSCULAR; INTRAVENOUS at 08:23

## 2022-04-27 RX ADMIN — ONDANSETRON 4 MG: 2 INJECTION INTRAMUSCULAR; INTRAVENOUS at 11:41

## 2022-04-27 RX ADMIN — ONDANSETRON 4 MG: 2 INJECTION INTRAMUSCULAR; INTRAVENOUS at 14:23

## 2022-04-27 RX ADMIN — BACITRACIN ZINC, NEOMYCIN, POLYMYXIN B 1 SMALL APPLICATION: 400; 3.5; 5 OINTMENT TOPICAL at 17:58

## 2022-04-27 RX ADMIN — PHENYLEPHRINE HYDROCHLORIDE 20 MCG/MIN: 10 INJECTION INTRAVENOUS at 09:10

## 2022-04-27 RX ADMIN — ROCURONIUM BROMIDE 10 MG: 50 INJECTION INTRAVENOUS at 10:30

## 2022-04-27 RX ADMIN — Medication 20 MG: at 09:31

## 2022-04-27 RX ADMIN — OXYCODONE HYDROCHLORIDE 10 MG: 10 TABLET, FILM COATED, EXTENDED RELEASE ORAL at 21:03

## 2022-04-27 RX ADMIN — ROCURONIUM BROMIDE 20 MG: 50 INJECTION INTRAVENOUS at 09:21

## 2022-04-27 RX ADMIN — Medication 25 MCG: at 13:31

## 2022-04-27 RX ADMIN — DEXTROSE, SODIUM CHLORIDE, SODIUM LACTATE, POTASSIUM CHLORIDE, AND CALCIUM CHLORIDE 125 ML/HR: 5; .6; .31; .03; .02 INJECTION, SOLUTION INTRAVENOUS at 14:18

## 2022-04-27 RX ADMIN — LIDOCAINE HYDROCHLORIDE 50 MG: 10 INJECTION, SOLUTION EPIDURAL; INFILTRATION; INTRACAUDAL; PERINEURAL at 08:31

## 2022-04-27 RX ADMIN — FENTANYL CITRATE 50 MCG: 50 INJECTION INTRAMUSCULAR; INTRAVENOUS at 09:31

## 2022-04-27 RX ADMIN — ROCURONIUM BROMIDE 50 MG: 50 INJECTION INTRAVENOUS at 08:40

## 2022-04-27 RX ADMIN — SODIUM CHLORIDE, SODIUM LACTATE, POTASSIUM CHLORIDE, AND CALCIUM CHLORIDE: .6; .31; .03; .02 INJECTION, SOLUTION INTRAVENOUS at 11:44

## 2022-04-27 RX ADMIN — Medication 25 MCG: at 13:41

## 2022-04-27 RX ADMIN — ONDANSETRON 4 MG: 2 INJECTION INTRAMUSCULAR; INTRAVENOUS at 19:31

## 2022-04-27 RX ADMIN — EPHEDRINE SULFATE 10 MG: 50 INJECTION INTRAVENOUS at 09:10

## 2022-04-27 RX ADMIN — OXYCODONE HYDROCHLORIDE 10 MG: 10 TABLET, FILM COATED, EXTENDED RELEASE ORAL at 16:33

## 2022-04-27 RX ADMIN — HYDROMORPHONE HYDROCHLORIDE 0.5 MG: 1 INJECTION, SOLUTION INTRAMUSCULAR; INTRAVENOUS; SUBCUTANEOUS at 14:19

## 2022-04-27 RX ADMIN — Medication 10 MG: at 09:20

## 2022-04-27 RX ADMIN — Medication 100 MG: at 08:37

## 2022-04-27 RX ADMIN — CEFAZOLIN SODIUM 1000 MG: 1 SOLUTION INTRAVENOUS at 08:45

## 2022-04-27 RX ADMIN — SODIUM CHLORIDE FOR IRRIGATION 3000 ML: 0.9 SOLUTION IRRIGATION at 14:39

## 2022-04-27 RX ADMIN — SODIUM CHLORIDE, SODIUM LACTATE, POTASSIUM CHLORIDE, AND CALCIUM CHLORIDE 125 ML/HR: .6; .31; .03; .02 INJECTION, SOLUTION INTRAVENOUS at 07:25

## 2022-04-27 RX ADMIN — SODIUM CHLORIDE, SODIUM LACTATE, POTASSIUM CHLORIDE, AND CALCIUM CHLORIDE: .6; .31; .03; .02 INJECTION, SOLUTION INTRAVENOUS at 08:25

## 2022-04-27 RX ADMIN — ROCURONIUM BROMIDE 20 MG: 50 INJECTION INTRAVENOUS at 10:02

## 2022-04-27 RX ADMIN — PROPOFOL 200 MG: 10 INJECTION, EMULSION INTRAVENOUS at 08:37

## 2022-04-27 RX ADMIN — ENOXAPARIN SODIUM 40 MG: 40 INJECTION SUBCUTANEOUS at 18:02

## 2022-04-27 RX ADMIN — ATROPA BELLADONNA AND OPIUM 1 SUPPOSITORY: 16.2; 3 SUPPOSITORY RECTAL at 13:50

## 2022-04-27 RX ADMIN — Medication 25 MCG: at 13:14

## 2022-04-27 NOTE — ANESTHESIA POSTPROCEDURE EVALUATION
Post-Op Assessment Note    CV Status:  Stable    Pain management: adequate     Mental Status:  Alert and awake   Hydration Status:  Euvolemic   PONV Controlled:  Controlled   Airway Patency:  Patent      Post Op Vitals Reviewed: Yes            No complications documented      BP   14/62   Temp (P) 97 9 °F (36 6 °C) (04/27/22 1246)    Pulse  76   Resp   16   SpO2   98

## 2022-04-27 NOTE — INTERVAL H&P NOTE
H&P reviewed  After examining the patient I find no changes in the patients condition since the H&P had been written  Vitals:    04/27/22 0622   BP: 122/63   Pulse: 69   Resp: 16   Temp: 97 7 °F (36 5 °C)   SpO2: 97%     I had a discussion again today with Dr Terri Alcala regarding his significant lower urinary tract symptoms  His prostate measures 152 g  This is too large as we discussed for transurethral resection of the prostate  The patient had recently been diagnosed with stage IV gastric cancer  He has been receiving chemotherapy  His chemotherapy was placed on hold by his oncologist and he has not had a dose for more than 2 weeks  White blood cell count 4000  Hematocrit 34  On examination he is in no acute distress  He has no respiratory distress  Cardiac is regular  Abdomen is soft nontender nondistended  Laparoscopic cholecystectomy and umbilical hernia scars are noted  Feeding gastrostomy tube in the left upper quadrant is noted  Skin is warm  Extremities without edema  Neurologic is grossly intact and nonfocal     Impression:  Significant lower urinary tract symptoms secondary to BPH, stage IV gastric cancer    Plan:  The patient again is quite adamant about proceeding with his elective robot assisted laparoscopic simple prostatectomy secondary to his significant BPH and very bothersome lower urinary tract symptoms  He wants to proceed with surgery from a quality of life standpoint  Risk and benefits of robot assisted laparoscopic simple prostatectomy were discussed and reviewed  Specifically, we discussed the risk of bleeding, infection, sepsis, conversion to an open procedure  Informed consent obtained

## 2022-04-27 NOTE — OP NOTE
OPERATIVE REPORT  PATIENT NAME: Danielito Watkins    :  1950  MRN: [id number]  Pt Location: [facility] OR ROOM 14    SURGERY DATE: 2022    Surgeon(s) and Role:     * Babita Michele MD - Primary     * Kati Barahona PA-C - Assisting     * Liliane Adorno MD - Assisting     * Aliza Beebe Christianojuju Moon, [address]    The physician assistants were necessary assistance as there were no qualified resident assistants available for this case  They provided assistance with suctioning, retraction, and passage of instruments/sutures  Dr Blade Bryant was a necessary 1st assistant attending urologist due to the challenging nature of the case and anatomy  Preop Diagnosis:  Benign prostatic hyperplasia with urinary retention [N40 1, R33 8]    Post-Op Diagnosis Codes: * Benign prostatic hyperplasia with urinary retention [N40 1, R33 8]    Procedure(s) (LRB):  PROSTATECTOMY SIMPLE LAPAROSCOPIC  W ROBOTICS, BLADDER NECK RECONSTRUCTION (N/A)   Robotic lysis of adhesions    Specimen(s):  ID Type Source Tests Collected by Time Destination   1 : prostate adenoma Tissue Prostate TISSUE EXAM Babita Michele MD 2022 1036        Estimated Blood Loss:   300 mL    Drains:  Closed/Suction Drain Left LLQ Bulb (Active)   Number of days: 0       Gastrostomy/Enterostomy Percutaneous endoscopic gastrostomy (PEG) (Active)   Number of days: 55       Continuous Bladder Irrigation Three-way (Active)   Number of days: 0       [REMOVED] Urethral Catheter Double-lumen; Latex 18 Fr  (Removed)   Number of days: 0       Anesthesia Type:   General    Operative Indications:  Benign prostatic hyperplasia with urinary retention [N40 1, R33 8]      Operative Findings:  Large intravesical prostate    Standard robot assisted laparoscopic suprapubic prostatectomy performed    Complications:   None    Procedure and Technique:  Christianne Polk is a 31-year-old male with intractable lower urinary tract symptoms who recently was in urinary retention  His prostate size is greater than 150 mg  Unfortunately he was recently diagnosed with stage IV gastric cancer  I offered the patient conservative management with medication and possibly even TURP although I feel that the gland size is too large for a proper TURP  For quality of wife the patient has requested a robot assisted laparoscopic simple prostatectomy  Risk and benefits of the procedure discussed reviewed informed consent obtained  Patient brought to the operating room on April 27, 2022  After smooth induction of general endotracheal anesthesia, patient was placed in a low lithotomy position  Pressure points were padded  Benign boots were applied  The arms were tucked at the sides and secured  The patient was secured to the bed with towels and tape  His entire abdomen and genitalia were then prepped and draped in sterile fashion  Intravenous antibiotics were administered  A time-out was performed all members of the operative team confirming the patient's identity procedure to be performed  Wilder catheter was inserted at the start of the case  An 8 mm left mid abdominal transverse incision was then made approximately 4 fingerbreadths from the umbilicus  Towel clips were placed and a Veress needle was inserted  I chose to insert the Veress needle through the left mid abdomen as opposed to the supra umbilical region secondary to prior umbilical hernia and a palpable aorta in this region  All ports were anesthetized with 0 5% plain Marcaine  The abdominal cavity was entered safely  A pneumoperitoneum was created  An 8 mm robotic port was then placed  A camera was inserted and the patient was placed in the steep Trendelenburg  No significant adhesions around the umbilicus were appreciated  An 8 mm supraumbilical robotic port was placed  An additional 8 mm working robotic port was placed 4 fingerbreadths lateral to the umbilicus on the right side    A 5 mm assistant port was placed in the right mid abdomen and a 12 mm assistant port was placed in the right lateral abdomen  An additional 8 mm working port was placed in the left lateral abdomen  A total of 6 laparoscopic ports were placed  The left colon was mobilized from the white line of Toldt  He shows were taken down in the region of the prior left inguinal hernia where preperitoneal mesh was visualized  The 4th arm was utilized to retract the rectum  Fenestrated bipolar and hot scissors were utilized for instrumentation as the bladder was distended and ultimately opened in the midline  I then placed retraction sutures on either side of the bladder with a Aranza Bottoms needle that was passed through the anterior abdominal wall and secured with a hemostat on the outside  This provided adequate bladder exposure  A large intravesical prostate was appreciated  Wilder catheter balloon was deflated and slightly retracted into the urethra  Five [place] feeding tubes were placed into either ureteral orifice which was clearly visualized  Weck clips were placed on the ends of the feeding tubes  I then utilized hot scissors to score the bladder mucosa on the posterior aspect just above the trigone and ureteral orifices in a semi lunar fashion around the inferior aspect of the prostate  I continued this dissection along the mucosa to the 3 and 9:00 a m  Positions and then began to dissect posteriorly finding the subcapsular plane and preserving the adenoma in place  I then dissected the adenomatous tissue in a near circumferential fashion ultimately opening the bladder mucosa from the 9 o'clock position laterally to the 3 o'clock position laterally opening the anterior bladder mucosa  Continued careful dissection was performed in a curvilinear fashion to the level of the urethra  Ultimately the urethra was identified proximal to the level of the sphincter complex in the subcapsular fashion    The large adenomatous prostate specimen was then removed  It was placed into an Endo-Catch bag and moved into the abdomen  Hemostasis was obtained with a combination of cautery and fenestrated bipolar cautery  Next performed a mucosal advancement reapproximating the urethral mucosa to the bladder mucosa  This anastomosis was started posteriorly with V lock sutures x2  Sutures were 1st placed on the bladder neck side and secured and then the anastomosis continued in an outside in fashion on the urethra  Once the majority of the anastomosis was completed on either side an additional anastomotic V lock suture was placed at the 12 o'clock position through the bladder mucosa, subcapsular tissue, and through the urethral mucosa  The anastomosis was then circumferentially completed  To of the V lock sutures were secured to 1 another and the 3rd was simply cut after over-sewing itself  A [address] [place] [address] Wilder catheter was then inserted into the bladder  Both ureteral orifices were still visualized and patent with clear efflux of urine  10 cc were placed into the balloon  The bladder mucosa was closed with a running V lock suture  Single Weck clip was placed at the most superior aspect of the closure taking care to ensure that the Weck clip was external to the bladder mucosa  Second layer closure with Stratafix was then performed from the inferior aspect of the bladder to the superior aspect again securing the suture in place with a single Weck clip which was placed outside of the bladder  The bladder was then distended with over 200 cc of fluid and there was no evidence of extravasation  Continuous bladder irrigation was gently initiated  The robot was undocked  The Endo-Catch bag suture was brought out through the supraumbilical midline port  A TIESHA drain was placed deep into the pelvis and brought out through the left lateral robotic port site      The midline incision was then opened just enough to remove the specimen within the [facility] bag  The TIESHA drain was secured with a drain stitch  The fascia in the midline was reapproximated with running PDS suture  The fascia was anesthetized with 0 5% plain Marcaine  The 12 mm assistant port fascia was closed with a 0 Vicryl on a UR6 needle  All skin incisions were closed with 4-0 Monocryl and Histoacryl after irrigation  The Wilder catheter is placed on gentle traction and CBI was running clear upon conclusion of the case  Overall patient tolerated the procedure well number no complications  The patient was extubated in the operating room transferred the PACU in stable condition at the conclusion the case        Patient Disposition:  PACU stable      SIGNATURE: Carolina Castillo MD  DATE: April 27, 2022  TIME: 12:28 PM

## 2022-04-27 NOTE — CONSULTS
Consultation - Geriatrics   Jesús Bishop 70 y o  male MRN: [id number]  Unit/Bed#: [facility] 318-01 Encounter: [id number]      Assessment/Plan  Frailty  Clinical Frail Scale: 5- Mildly Frail  More evident slowing, needs help high order IADLs (transport, bills, medications)  60 pounds weight loss in 2 months  Continue PEG tube with Jevity supplementation  Encourage oob, mobility  Follows with palliative care as outpatient  PT/OT    Depression  Continue support of friend and family  remeron was discontinued secondary to increased sonmelence    Ambulatory dysfunction  At risk for falls secondary to age, hx of fall, gait instability, polypharmacy, visual impairment  Fall precautions  Maintain yearly eye appointment  PT/OT  Rehab post hospitalization    Acute pain due to trauma  Pain management per palliative care     Delirium precautions  Baseline: alert and oriented x 3  Provide frequent redirection, reorientation, distraction techniques  Avoid deliriogenic medications such as tramadol, benzodiazepines, anticholinergics,  Benadryl  Treat pain, See geriatric pain protocol  Monitor for constipation and urinary retention  Encourage early and frequent moblization, OOB  Encourage Hydration/ Nutrition  Implement sleep hygiene, limit night time interuptions, group activities    Insomnia  First line is behavioral therapy  Avoid sedative hypnotics such as benzodiazepines and benadryl  Encourage staying awake during the day  Encourage daytime activity, morning exercise  Decrease or eliminate day time naps  Avoid caffeine especially during late afternoon and evening hours  Establish a night time routine  Recommend melatonin 3 mg po QHS/ consider gabapentin     Cognitive screening  Denies issue with memory  TSH 7 497 on 2/28/22, (dose increased to 125 mcg po daily)  Recommend check Vitamin B12  Keep physically, mentally and socially active    BPH with urinary retention  S/p robatic assisted simple lap prostatectomy with bladder neck reconstrucion, lysis of adhesions    Advanced Care Planning  Full code  Palliative on consult    Home Medication Review  [facility]  Bisacodyl 5 mg po daily prn  Finasteride 5 mg po daily  Irbesartan 300 mg po daily  Levocetirizine 5 mg po daily prn itching  Levothyroxine 125 mcg po daily  Lidocaine-prilocaine cream topically to PAC site  Ondansetron ODT 8 mg po Q 8 prn  Pantoprazole 40 mg po BID  Rosuvastatin 20 mg po daily  Tamsulosin 0 4 mg po BID  Banophen 25 mg po Q 6 prn itching  Oxycodone 10-20 mg po Q 4 prn moderate to severe pain  Oxycontin ER 10 mg po TID      patient is not taking:   Lactulose 10 gm TID- hold for loose stools  Mirtazapine 30 mg po QHS  Marinol 5 mg po BID    History of Present Illness   Physician Requesting Consult: Soraya Swift MD  Reason for Consult / Principal Problem: delirium precautions  Hx and PE limited by: NA  HPI: Jaylen Gonzalez is a 70y o  year old male who presents with significant BPH, 152 g prostate and urinary retention,  bothersome lower urinary tract symptoms, nocturia, daytime urgency and frequency  He wants to proceed with surgery for quality of life  He has recently been diagnosed with stage IV gastric cancer  He was receiving chemotherapy, now on hold for 2 weeks prior to surgery  Pt follows with Palliative care as outapatient  Geriatric is consulted from surgical optimization for delirium precautions  He has arthritis, HTN, hyperlipidemia, GERD, PEG tube     Prior to arrival he lives at home  He does not drive  He needs assistance with IADLs  He has lost more than 60 pounds in 20 months  He has PEG tube  With Jevity, 1-2 cans /day  He wears glasses  Upon exam pt is lying in bed  He is alert and oriented x 3  He is complaining of feeling cold, shivering  He denies confusion post anesthesia        Inpatient consult to Gerontology  Consult performed by: JADEN Aguirre  Consult ordered by: JADEN Leyva          Review of Systems Constitutional: Positive for unexpected weight change  HENT: Negative for hearing loss  Eyes: Negative for visual disturbance  Respiratory: Negative for cough  Cardiovascular: Negative for chest pain  Gastrointestinal: Negative for constipation  Genitourinary: Wilder in place   Musculoskeletal: Negative for gait problem  Neurological: Positive for weakness  Psychiatric/Behavioral: Negative for confusion  Historical Information   Past Medical History:   Diagnosis Date    Arthritis     shoulders    Cancer ([facility] [address]     gastric mass    COVID 01/2021 jan 2021- no hospitalization    Disease of thyroid gland     had radioactive iodine in past    GERD (gastroesophageal reflux disease)     History of GI diverticular bleed     Hyperlipidemia     Hypertension     on no meds at present    Kidney stone     Port-A-Cath in place     S/P percutaneous endoscopic gastrostomy (PEG) tube placement ([facility] [address]     Tinnitus     Wears glasses      Past Surgical History:   Procedure Laterality Date    ADENOIDECTOMY      CHOLECYSTECTOMY LAPAROSCOPIC N/A 1/24/2022    Procedure: CHOLECYSTECTOMY LAPAROSCOPIC W/ INTRAOP CHOLANGIOGRAM;  Surgeon: Whitley Lawrence DO;  [facility];  Service: General    COLONOSCOPY N/A 7/16/2016    Procedure: COLONOSCOPY;  Surgeon: Ayaz Ahmadi MD;  [facility];  Service:     FL CHOLANGIOGRAM TRANSHEPATIC  1/24/2022    FL GUIDED CENTRAL VENOUS ACCESS DEVICE INSERTION  2/11/2022    HERNIA REPAIR      PROSTATE BIOPSY      TONSILLECTOMY      TUNNELED VENOUS PORT PLACEMENT N/A 2/11/2022    Procedure: INSERTION VENOUS PORT (PORT-A-CATH);   Surgeon: David Saul MD;  [facility];  Service: Surgical Oncology    UVULECTOMY       Social History   Social History     Substance and Sexual Activity   Alcohol Use Yes    Comment: social     Social History     Substance and Sexual Activity   Drug Use Never     Social History     Tobacco Use   Smoking Status Never Smoker   Smokeless Tobacco Never Used         Family History: No family history on file      Meds/Allergies   Current meds:   Current Facility-Administered Medications   Medication Dose Route Frequency    acetaminophen (TYLENOL) tablet 650 mg  650 mg Oral Q6H [facility]    belladonna-opium (B&O SUPPOSITORY) 16 2-30 mg suppository 1 suppository  1 suppository Rectal Q6H PRN    dextrose 5 % in lactated Ringer's infusion  125 mL/hr Intravenous Continuous    diphenhydrAMINE (BENADRYL) tablet 25 mg  25 mg Oral Q6H PRN    docusate sodium (COLACE) capsule 100 mg  100 mg Oral BID    dronabinol (MARINOL) capsule 5 mg  5 mg Oral BID AC    enoxaparin (LOVENOX) subcutaneous injection 40 mg  40 mg Subcutaneous Daily    lactated ringers bolus 1,000 mL  1,000 mL Intravenous Once PRN    And    lactated ringers bolus 1,000 mL  1,000 mL Intravenous Once PRN    lactated ringers infusion  125 mL/hr Intravenous Continuous    [START ON 4/28/2022] levothyroxine tablet 125 mcg  125 mcg Oral QAM    morphine (PF) 4 mg/mL injection 4 mg  4 mg Intravenous Q2H PRN    neomycin-bacitracin-polymyxin b (NEOSPORIN) ointment 1 small application  1 small application Topical BID    ondansetron (ZOFRAN) injection 4 mg  4 mg Intravenous Q6H PRN    ondansetron (ZOFRAN-ODT) dispersible tablet 8 mg  8 mg Oral Q8H PRN    oxyCODONE (OxyCONTIN) 12 hr tablet 10 mg  10 mg Oral Q8H [facility]    oxyCODONE (ROXICODONE) immediate release tablet 10 mg  10 mg Oral Q4H PRN    oxyCODONE (ROXICODONE) IR tablet 5 mg  5 mg Oral Q4H PRN    [START ON 4/28/2022] pantoprazole (PROTONIX) EC tablet 40 mg  40 mg Oral Early Morning    prochlorperazine (COMPAZINE) tablet 10 mg  10 mg Oral Q6H PRN    saliva substitute (MOUTH KOTE) mucosal solution 5 spray  5 spray Mouth/Throat 4x Daily PRN    sodium chloride 0 9 % bolus 1,000 mL  1,000 mL Intravenous Once PRN    And    sodium chloride 0 9 % bolus 1,000 mL  1,000 mL Intravenous Once PRN    sodium chloride 0 9 % infusion  125 mL/hr Intravenous Continuous    sodium chloride 0 9 % irrigation solution 3,000 mL  3,000 mL Irrigation Continuous        No Known Allergies    Objective   Vitals: Blood pressure 136/66, pulse 84, temperature 97 8 °F (36 6 °C), resp  rate 18, height 5' 7" (1 702 m), weight 73 kg (161 lb), SpO2 98 %  ,Body mass index is 25 22 kg/m²  Physical Exam  Vitals and nursing note reviewed  HENT:      Head: Normocephalic  Nose: No congestion  Mouth/Throat:      Mouth: Mucous membranes are moist    Eyes:      General:         Right eye: No discharge  Left eye: No discharge  Cardiovascular:      Rate and Rhythm: Normal rate and regular rhythm  Pulses: Normal pulses  Heart sounds: Normal heart sounds  Pulmonary:      Effort: Pulmonary effort is normal       Breath sounds: Normal breath sounds  Abdominal:      General: Bowel sounds are normal       Palpations: Abdomen is soft  Musculoskeletal:         General: Normal range of motion  Cervical back: Normal range of motion  Skin:     General: Skin is warm  Neurological:      Mental Status: He is alert and oriented to person, place, and time  Mental status is at baseline  Psychiatric:         Mood and Affect: Mood normal          Lab Results:   Results from last 7 days   Lab Units 04/27/22  1345   HEMOGLOBIN g/dL 11 0*   HEMATOCRIT % 34 4*        Results from last 7 days   Lab Units 04/27/22  1345   POTASSIUM mmol/L 3 5   CHLORIDE mmol/L 108   CO2 mmol/L 24   BUN mg/dL 9   CREATININE mg/dL 0 83   CALCIUM mg/dL 8 6       Imaging Studies: I have personally reviewed pertinent reports  EKG, Pathology, and Other Studies: I have personally reviewed pertinent reports      VTE Prophylaxis: Sequential compression device (Venodyne)     Code Status: Level 1 - Full Code

## 2022-04-27 NOTE — CONSULTS
Consultation - Palliative and Supportive Care   Fozia Sanchez 70 y o  male [id number]    Patient Active Problem List   Diagnosis    Hypertension    Hypothyroid    Depression    BPH (benign prostatic hyperplasia)    COVID-19    Inflammatory arthritis    Rectus diastasis    Acute calculous cholecystitis    Gastric mass    Gastric adenocarcinoma ([facility] [address] [facility]    Encounter for central line care    FTT (failure to thrive) in adult    Stomach cancer ([facility] [address] [facility]    Cancer related pain    Moderate protein-calorie malnutrition ([facility] [address] [facility]    Palliative care patient    Benign prostatic hyperplasia with urinary frequency     Active issues specifically addressed today include:   Stage IV gastric cancer  POD 0, prostatectomy   Acute postoperative pain  Cancer related pain, opioid dependence uncomplicated  Palliative care patient    Plan:  1  Symptom management -    - Continue home Oxycontin 10 mg TID   - For next 24 hours, will have a very low dose dPCA with basal dosing only with plan for rapid transition to oral regimen in next 24 hours  - will leave nursing bolus of IV morphine 4 mg q2H PRN for now   - d/c Marinol- no longer taking   -bowel regimen with senna and miralax to prevent OIC    2  Goals - Full Cares, no limits  -    3  Psychosocial support-Supportive listening provided     Code Status:  Full - Level 1   Decisional apparatus:  Patient is competent on my exam today  If competence is lost, patient's substitute decision maker would default to spouse by [place]  Advance Directive / Living Will / POLST:  No     I have reviewed the patient's controlled substance dispensing history in the Prescription Drug Monitoring Program in compliance with the [facility] regulations before prescribing any controlled substances  We appreciate the invitation to be involved in this patient's care  We will continue to follow    Please do not hesitate to reach our on call provider through our clinic answering service at  should you have acute symptom control concerns  Katy Mendez DO  Palliative and Supportive Care  Clinic/Answering Service: [phone]  You can find me on [employer]! IDENTIFICATION:  Inpatient consult to Palliative Care  Consult performed by: Katy Mendez DO  Consult ordered by: Nishi Au MD        Physician Requesting Consult: Gloria Ramos MD  Reason for Consult / Principal Problem:  Symptom management  Hx and PE limited by: still somnolent from procedure    HISTORY OF PRESENT ILLNESS:       Radha Alvarenga is a 70 y o  male who presents with for elective prostatectomy  Patient is known to palliative care as he follows for symptom management related to stage IV gastric cancer  Given quality of life concerns decision made to move forward with prostatectomy given his very significant BPH symptoms  Patient's pain is currently controlled with OxyContin 10 mg t i d  And oxycodone p r n  [employer] Patient seen out of PACU  He is somnolent but arousable and very appropriate  He is worried that his Oxycontin is not ordered for his baseline cancer pain  He notes pain in the groin and we discussed using PCA with bolus doing only to get him through the acute post-operative time and he would like to proceed with this plan  ROS   Limited, as still coming out of anesthesia but able to note pain in the groin       Past Medical History:   Diagnosis Date    Arthritis     shoulders    Cancer ([address] )     gastric mass    COVID 01/2021 jan 2021- no hospitalization    Disease of thyroid gland     had radioactive iodine in past    GERD (gastroesophageal reflux disease)     History of GI diverticular bleed     Hyperlipidemia     Hypertension     on no meds at present    Kidney stone     Port-A-Cath in place     S/P percutaneous endoscopic gastrostomy (PEG) tube placement ([address]     Tinnitus     Wears glasses      Past Surgical History:   Procedure Laterality Date    ADENOIDECTOMY      CHOLECYSTECTOMY LAPAROSCOPIC N/A 1/24/2022    Procedure: CHOLECYSTECTOMY LAPAROSCOPIC W/ INTRAOP CHOLANGIOGRAM;  Surgeon: Whitley Lawrence DO;  [facility];  Service: General    COLONOSCOPY N/A 7/16/2016    Procedure: COLONOSCOPY;  Surgeon: Ayaz Ahmadi MD;  [facility];  Service:     FL CHOLANGIOGRAM TRANSHEPATIC  1/24/2022    FL GUIDED CENTRAL VENOUS ACCESS DEVICE INSERTION  2/11/2022    HERNIA REPAIR      PROSTATE BIOPSY      TONSILLECTOMY      TUNNELED VENOUS PORT PLACEMENT N/A 2/11/2022    Procedure: INSERTION VENOUS PORT (PORT-A-CATH); Surgeon: David Saul MD;  [facility];  Service: Surgical Oncology    UVULECTOMY       Social History     Socioeconomic History    Marital status: /Civil Union     Spouse name: Not on file    Number of children: Not on file    Years of education: Not on file    Highest education level: Not on file   Occupational History    Not on file   Tobacco Use    Smoking status: Never Smoker    Smokeless tobacco: Never Used   Vaping Use    Vaping Use: Never used   Substance and Sexual Activity    Alcohol use: Yes     Comment: social    Drug use: Never    Sexual activity: Not on file   Other Topics Concern    Not on file   Social History Narrative    Not on file     Social Determinants of Health     Financial Resource Strain: Not on file   Food Insecurity: No Food Insecurity    Worried About Running Out of Food in the Last Year: Never true    Amrita of Food in the Last Year: Never true   Transportation Needs: No Transportation Needs    Lack of Transportation (Medical): No    Lack of Transportation (Non-Medical):  No   Physical Activity: Not on file   Stress: Not on file   Social Connections: Not on file   Intimate Partner Violence: Not on file   Housing Stability: Unknown    Unable to Pay for Housing in the Last Year: No    Number of Places Lived in the Last Year: Not on file    Unstable Housing in the Last Year: No     No family history on file  MEDICATIONS / ALLERGIES:    all current active meds have been reviewed and current meds:   Current Facility-Administered Medications   Medication Dose Route Frequency    lactated ringers infusion  125 mL/hr Intravenous Continuous    sodium chloride 0 9 % infusion  125 mL/hr Intravenous Continuous     Facility-Administered Medications Ordered in Other Encounters   Medication Dose Route Frequency    ePHEDrine injection   Intravenous PRN    fentanyl citrate (PF) 100 MCG/2ML   Intravenous PRN    Ketamine HCl   Intravenous PRN    lactated ringers infusion   Intravenous Continuous PRN    lidocaine (PF) (XYLOCAINE-MPF) 1 % injection   Intravenous PRN    midazolam (VERSED) injection   Intravenous PRN    phenylephrine (DAIN-SYNEPHRINE) 50 mg (STANDARD CONCENTRATION) in sodium chloride 0 9% 250 mL   Intravenous Continuous PRN    propofol (DIPRIVAN) 200 MG/20ML bolus injection   Intravenous PRN    ROCuronium (ZEMURON) injection   Intravenous PRN    sodium chloride 0 9 % infusion   Intravenous Continuous PRN    Succinylcholine Chloride 100 mg/5 mL syringe   Intravenous PRN       No Known Allergies    OBJECTIVE:    Physical Exam  Physical Exam  Vitals and nursing note reviewed  Constitutional:       General: He is not in acute distress  Appearance: He is ill-appearing  HENT:      Head: Normocephalic and atraumatic  Right Ear: External ear normal       Left Ear: External ear normal       Nose: Nose normal    Eyes:      General: No scleral icterus  Right eye: No discharge  Left eye: No discharge  Cardiovascular:      Rate and Rhythm: Normal rate and regular rhythm  Pulmonary:      Effort: Pulmonary effort is normal  No respiratory distress  Breath sounds: Normal breath sounds  Abdominal:      General: Bowel sounds are normal  There is no distension  Palpations: Abdomen is soft  Tenderness: There is abdominal tenderness  Skin:     General: Skin is warm and dry  Coloration: Skin is pale  Neurological:      Mental Status: He is alert and oriented to person, place, and time  Lab Results: I have personally reviewed pertinent labs  , CBC: No results found for: WBC, HGB, HCT, MCV, PLT, ADJUSTEDWBC, MCH, MCHC, RDW, MPV, NRBC, CMP: No results found for: SODIUM, K, CL, CO2, ANIONGAP, BUN, CREATININE, GLUCOSE, CALCIUM, AST, ALT, ALKPHOS, PROT, BILITOT, EGFR, PT/PTT:No results found for: PT, PTT  Imaging Studies:  Reviewed  EKG, Pathology, and Other Studies:  Reviewed    Counseling / Coordination of Care    Total floor / unit time spent today 55+ minutes  Greater than 50% of total time was spent with the patient and / or family counseling and / or coordination of care  A description of the counseling / coordination of care: chart review, medication review with changes, supportive listening, discussion with consults  Portions of this document may have been created using dictation software and as such some "sound alike" terms may have been generated by the system  Do not hesitate to contact me with any questions or clarifications

## 2022-04-28 LAB
ABO GROUP BLD BPU: NORMAL
ABO GROUP BLD BPU: NORMAL
ANION GAP SERPL CALCULATED.3IONS-SCNC: 6 MMOL/L (ref 4–13)
BPU ID: NORMAL
BPU ID: NORMAL
BUN SERPL-MCNC: 11 MG/DL (ref 5–25)
CALCIUM SERPL-MCNC: 8.3 MG/DL (ref 8.3–10.1)
CHLORIDE SERPL-SCNC: 109 MMOL/L (ref 100–108)
CO2 SERPL-SCNC: 27 MMOL/L (ref 21–32)
CREAT SERPL-MCNC: 0.95 MG/DL (ref 0.6–1.3)
CROSSMATCH: NORMAL
CROSSMATCH: NORMAL
ERYTHROCYTE [DISTWIDTH] IN BLOOD BY AUTOMATED COUNT: 14.6 % (ref 11.6–15.1)
GFR SERPL CREATININE-BSD FRML MDRD: 80 ML/MIN/1.73SQ M
GLUCOSE SERPL-MCNC: 129 MG/DL (ref 65–140)
HCT VFR BLD AUTO: 31 % (ref 36.5–49.3)
HGB BLD-MCNC: 9.8 G/DL (ref 12–17)
MCH RBC QN AUTO: 29.5 PG (ref 26.8–34.3)
MCHC RBC AUTO-ENTMCNC: 31.6 G/DL (ref 31.4–37.4)
MCV RBC AUTO: 93 FL (ref 82–98)
PLATELET # BLD AUTO: 219 THOUSANDS/UL (ref 149–390)
PMV BLD AUTO: 9.5 FL (ref 8.9–12.7)
POTASSIUM SERPL-SCNC: 3.3 MMOL/L (ref 3.5–5.3)
RBC # BLD AUTO: 3.32 MILLION/UL (ref 3.88–5.62)
SODIUM SERPL-SCNC: 142 MMOL/L (ref 136–145)
UNIT DISPENSE STATUS: NORMAL
UNIT DISPENSE STATUS: NORMAL
UNIT PRODUCT CODE: NORMAL
UNIT PRODUCT CODE: NORMAL
UNIT PRODUCT VOLUME: 350 ML
UNIT PRODUCT VOLUME: 350 ML
UNIT RH: NORMAL
UNIT RH: NORMAL
WBC # BLD AUTO: 6.82 THOUSAND/UL (ref 4.31–10.16)

## 2022-04-28 PROCEDURE — 85027 COMPLETE CBC AUTOMATED: CPT | Performed by: UROLOGY

## 2022-04-28 PROCEDURE — 80048 BASIC METABOLIC PNL TOTAL CA: CPT | Performed by: UROLOGY

## 2022-04-28 PROCEDURE — 99024 POSTOP FOLLOW-UP VISIT: CPT | Performed by: UROLOGY

## 2022-04-28 RX ORDER — SODIUM CHLORIDE 9 MG/ML
100 INJECTION, SOLUTION INTRAVENOUS CONTINUOUS
Status: DISCONTINUED | OUTPATIENT
Start: 2022-04-28 | End: 2022-04-29

## 2022-04-28 RX ORDER — OXYCODONE HCL 5 MG/5 ML
20 SOLUTION, ORAL ORAL EVERY 4 HOURS PRN
Status: DISCONTINUED | OUTPATIENT
Start: 2022-04-28 | End: 2022-04-29 | Stop reason: HOSPADM

## 2022-04-28 RX ORDER — POTASSIUM CHLORIDE 20 MEQ/1
40 TABLET, EXTENDED RELEASE ORAL ONCE
Status: COMPLETED | OUTPATIENT
Start: 2022-04-28 | End: 2022-04-28

## 2022-04-28 RX ORDER — OXYCODONE HCL 5 MG/5 ML
10 SOLUTION, ORAL ORAL EVERY 4 HOURS PRN
Status: DISCONTINUED | OUTPATIENT
Start: 2022-04-28 | End: 2022-04-29 | Stop reason: HOSPADM

## 2022-04-28 RX ADMIN — BACITRACIN ZINC, NEOMYCIN, POLYMYXIN B 1 SMALL APPLICATION: 400; 3.5; 5 OINTMENT TOPICAL at 17:34

## 2022-04-28 RX ADMIN — DIPHENHYDRAMINE HCL 25 MG: 25 TABLET ORAL at 03:20

## 2022-04-28 RX ADMIN — SODIUM CHLORIDE 100 ML/HR: 0.9 INJECTION, SOLUTION INTRAVENOUS at 09:50

## 2022-04-28 RX ADMIN — ONDANSETRON 4 MG: 2 INJECTION INTRAMUSCULAR; INTRAVENOUS at 12:14

## 2022-04-28 RX ADMIN — ACETAMINOPHEN 650 MG: 325 TABLET ORAL at 23:56

## 2022-04-28 RX ADMIN — ACETAMINOPHEN 650 MG: 325 TABLET ORAL at 17:34

## 2022-04-28 RX ADMIN — DOCUSATE SODIUM 100 MG: 100 CAPSULE, LIQUID FILLED ORAL at 09:03

## 2022-04-28 RX ADMIN — ACETAMINOPHEN 650 MG: 325 TABLET ORAL at 00:03

## 2022-04-28 RX ADMIN — OXYCODONE HYDROCHLORIDE 20 MG: 5 SOLUTION ORAL at 15:49

## 2022-04-28 RX ADMIN — POTASSIUM CHLORIDE 40 MEQ: 20 TABLET, EXTENDED RELEASE ORAL at 09:05

## 2022-04-28 RX ADMIN — OXYCODONE HYDROCHLORIDE 20 MG: 5 SOLUTION ORAL at 23:56

## 2022-04-28 RX ADMIN — DOCUSATE SODIUM 100 MG: 100 CAPSULE, LIQUID FILLED ORAL at 17:34

## 2022-04-28 RX ADMIN — ACETAMINOPHEN 650 MG: 325 TABLET ORAL at 12:13

## 2022-04-28 RX ADMIN — PANTOPRAZOLE SODIUM 40 MG: 40 TABLET, DELAYED RELEASE ORAL at 15:49

## 2022-04-28 RX ADMIN — DEXTROSE, SODIUM CHLORIDE, SODIUM LACTATE, POTASSIUM CHLORIDE, AND CALCIUM CHLORIDE 125 ML/HR: 5; .6; .31; .03; .02 INJECTION, SOLUTION INTRAVENOUS at 05:24

## 2022-04-28 RX ADMIN — DIPHENHYDRAMINE HCL 25 MG: 25 TABLET ORAL at 09:51

## 2022-04-28 RX ADMIN — ENOXAPARIN SODIUM 40 MG: 40 INJECTION SUBCUTANEOUS at 09:03

## 2022-04-28 RX ADMIN — OXYCODONE HYDROCHLORIDE 20 MG: 5 SOLUTION ORAL at 19:55

## 2022-04-28 RX ADMIN — PANTOPRAZOLE SODIUM 40 MG: 40 TABLET, DELAYED RELEASE ORAL at 06:04

## 2022-04-28 RX ADMIN — OXYCODONE HYDROCHLORIDE 10 MG: 10 TABLET, FILM COATED, EXTENDED RELEASE ORAL at 05:24

## 2022-04-28 RX ADMIN — ACETAMINOPHEN 650 MG: 325 TABLET ORAL at 05:24

## 2022-04-28 RX ADMIN — OXYCODONE HYDROCHLORIDE 10 MG: 10 TABLET, FILM COATED, EXTENDED RELEASE ORAL at 22:28

## 2022-04-28 RX ADMIN — SENNOSIDES 8.6 MG: 8.6 TABLET, FILM COATED ORAL at 22:28

## 2022-04-28 RX ADMIN — OXYCODONE HYDROCHLORIDE 10 MG: 10 TABLET, FILM COATED, EXTENDED RELEASE ORAL at 13:30

## 2022-04-28 RX ADMIN — LEVOTHYROXINE SODIUM 125 MCG: 125 TABLET ORAL at 05:24

## 2022-04-28 RX ADMIN — BACITRACIN ZINC, NEOMYCIN, POLYMYXIN B 1 SMALL APPLICATION: 400; 3.5; 5 OINTMENT TOPICAL at 09:39

## 2022-04-28 RX ADMIN — DIPHENHYDRAMINE HCL 25 MG: 25 TABLET ORAL at 19:52

## 2022-04-28 NOTE — PLAN OF CARE
Problem: PAIN - ADULT  Goal: Verbalizes/displays adequate comfort level or baseline comfort level  Description: Interventions:  - Encourage patient to monitor pain and request assistance  - Assess pain using appropriate pain scale  - Administer analgesics based on type and severity of pain and evaluate response  - Implement non-pharmacological measures as appropriate and evaluate response  - Consider cultural and social influences on pain and pain management  - Notify physician/advanced practitioner if interventions unsuccessful or patient reports new pain  Outcome: Progressing     Problem: INFECTION - ADULT  Goal: Absence or prevention of progression during hospitalization  Description: INTERVENTIONS:  - Assess and monitor for signs and symptoms of infection  - Monitor lab/diagnostic results  - Monitor all insertion sites, i e  indwelling lines, tubes, and drains  - Monitor endotracheal if appropriate and nasal secretions for changes in amount and color  - [place] appropriate cooling/warming therapies per order  - Administer medications as ordered  - Instruct and encourage patient and family to use good hand hygiene technique  - Identify and instruct in appropriate isolation precautions for identified infection/condition  Outcome: Progressing  Goal: Absence of fever/infection during neutropenic period  Description: INTERVENTIONS:  - Monitor WBC    Outcome: Progressing     Problem: SAFETY ADULT  Goal: Patient will remain free of falls  Description: INTERVENTIONS:  - Educate patient/family on patient safety including physical limitations  - Instruct patient to call for assistance with activity   - Consult OT/PT to assist with strengthening/mobility   - Keep Call bell within reach  - Keep bed low and locked with side rails adjusted as appropriate  - Keep care items and personal belongings within reach  - Initiate and maintain comfort rounds  - Make Fall Risk Sign visible to staff  - Offer Toileting every  Hours, in advance of need  - Initiate/Maintain alarm  - Obtain necessary fall risk management equipment:   - Apply yellow socks and bracelet for high fall risk patients  - Consider moving patient to room near nurses station  Outcome: Progressing  Goal: Maintain or return to baseline ADL function  Description: INTERVENTIONS:  -  Assess patient's ability to carry out ADLs; assess patient's baseline for ADL function and identify physical deficits which impact ability to perform ADLs (bathing, care of mouth/teeth, toileting, grooming, dressing, etc )  - Assess/evaluate cause of self-care deficits   - Assess range of motion  - Assess patient's mobility; develop plan if impaired  - Assess patient's need for assistive devices and provide as appropriate  - Encourage maximum independence but intervene and supervise when necessary  - Involve family in performance of ADLs  - Assess for home care needs following discharge   - Consider OT consult to assist with ADL evaluation and planning for discharge  - Provide patient education as appropriate  Outcome: Progressing  Goal: Maintains/Returns to pre admission functional level  Description: INTERVENTIONS:  - Perform BMAT or MOVE assessment daily    - Set and communicate daily mobility goal to care team and patient/family/caregiver  - Collaborate with rehabilitation services on mobility goals if consulted  - Perform Range of Motion times a day  - Reposition patient every  hours    - Dangle patient  times a day  - Stand patient  times a day  - Ambulate patient  times a day  - Out of bed to chair times a day   - Out of bed for meals  times a day  - Out of bed for toileting  - Record patient progress and toleration of activity level   Outcome: Progressing     Problem: DISCHARGE PLANNING  Goal: Discharge to home or other facility with appropriate resources  Description: INTERVENTIONS:  - Identify barriers to discharge w/patient and caregiver  - Arrange for needed discharge resources and transportation as appropriate  - Identify discharge learning needs (meds, wound care, etc )  - Arrange for interpretive services to assist at discharge as needed  - Refer to Case Management Department for coordinating discharge planning if the patient needs post-hospital services based on physician/advanced practitioner order or complex needs related to functional status, cognitive ability, or social support system  Outcome: Progressing     Problem: Knowledge Deficit  Goal: Patient/family/caregiver demonstrates understanding of disease process, treatment plan, medications, and discharge instructions  Description: Complete learning assessment and assess knowledge base    Interventions:  - Provide teaching at level of understanding  - Provide teaching via preferred learning methods  Outcome: Progressing     Problem: Potential for Falls  Goal: Patient will remain free of falls  Description: INTERVENTIONS:  - Educate patient/family on patient safety including physical limitations  - Instruct patient to call for assistance with activity   - Consult OT/PT to assist with strengthening/mobility   - Keep Call bell within reach  - Keep bed low and locked with side rails adjusted as appropriate  - Keep care items and personal belongings within reach  - Initiate and maintain comfort rounds  - Make Fall Risk Sign visible to staff  - Offer Toileting every  Hours, in advance of need  - Initiate/Maintain alarm  - Obtain necessary fall risk management equipment:   - Apply yellow socks and bracelet for high fall risk patients  - Consider moving patient to room near nurses station  Outcome: Progressing     Problem: MOBILITY - ADULT  Goal: Maintain or return to baseline ADL function  Description: INTERVENTIONS:  -  Assess patient's ability to carry out ADLs; assess patient's baseline for ADL function and identify physical deficits which impact ability to perform ADLs (bathing, care of mouth/teeth, toileting, grooming, dressing, etc )  - Assess/evaluate cause of self-care deficits   - Assess range of motion  - Assess patient's mobility; develop plan if impaired  - Assess patient's need for assistive devices and provide as appropriate  - Encourage maximum independence but intervene and supervise when necessary  - Involve family in performance of ADLs  - Assess for home care needs following discharge   - Consider OT consult to assist with ADL evaluation and planning for discharge  - Provide patient education as appropriate  Outcome: Progressing  Goal: Maintains/Returns to pre admission functional level  Description: INTERVENTIONS:  - Perform BMAT or MOVE assessment daily    - Set and communicate daily mobility goal to care team and patient/family/caregiver  - Collaborate with rehabilitation services on mobility goals if consulted  - Perform Range of Motion  times a day  - Reposition patient every  hours    - Dangle patient times a day  - Stand patient  times a day  - Ambulate patient  times a day  - Out of bed to chair times a day   - Out of bed for meals  times a day  - Out of bed for toileting  - Record patient progress and toleration of activity level   Outcome: Progressing

## 2022-04-28 NOTE — PROGRESS NOTES
Postoperative day 1  Status post robot assisted laparoscopic simple prostatectomy for  160 g prostate      No issues reported overnight  Pain control better with PCA    /65 (BP Location: Left arm)   Pulse 63   Temp 98 3 °F (36 8 °C) (Oral)   Resp 16   Ht 5' 7" (1 702 m)   Wt 73 kg (161 lb)   SpO2 95%   BMI 25 22 kg/m²     TIESHA drain 20 cc    On examination he is in no acute distress  His abdomen is soft  Incisions clean dry and intact  The abdomen is appropriately tender  TIESHA drain in left lower quadrant with minimal output  Wilder catheter with light CBI draining light punch urine    Hematocrit 31  White blood cell count 6  Creatinine 0 9    Impression:  BPH, status post robot assisted laparoscopic simple prostatectomy, history of stage IV gastric cancer    Plan:  Out of bed and ambulate  Regular diet  Wean CBI to off then clamp  Maintain Wilder catheter with catheter plug for discharge home  Wean PCA  OxyContin 10 mg b i d  And oxycodone 5-10 mg Q 4-6 hours p r n   Breakthrough (home pain regimen)  DVT prophylaxis  Discharge planning later today with Wilder catheter to gravity  Wilder catheter for 14 days postop

## 2022-04-28 NOTE — CASE MANAGEMENT
Case Management Assessment & Discharge Planning Note    Patient name Fozia Sanchez  Location [facility] 884-00 MRN [id number]  : 1950 Date 2022       Current Admission Date: 2022  Current Admission Diagnosis:Benign prostatic hyperplasia with urinary frequency   Patient Active Problem List    Diagnosis Date Noted    Benign prostatic hyperplasia with urinary frequency 2022    Palliative care patient 2022    Moderate protein-calorie malnutrition ([facility] [address] ) 2022    FTT (failure to thrive) in adult 2022    Stomach cancer ([facility] [address] [facility] ) 2022    Cancer related pain 2022    Encounter for central line care 2022    Gastric adenocarcinoma ([facility] [address] [facility] ) 2022    Gastric mass 2022    Acute calculous cholecystitis 2022    Rectus diastasis 2021    Inflammatory arthritis 2021    COVID-19 2021    Hypertension 07/15/2016    Hypothyroid 07/15/2016    Depression 07/15/2016    BPH (benign prostatic hyperplasia) 07/15/2016      LOS (days): 1  Geometric Mean LOS (GMLOS) (days):   Days to GMLOS:     OBJECTIVE:    Risk of Unplanned Readmission Score: 21     Current admission status: Inpatient       Preferred Pharmacy:   [facility]  [address] [place] [address]  Phone: [phone] Fax: 301 [address] [place] [address]  Phone: [phone] Fax: [phone]    Primary Care Provider: Issac Krishna DO    Primary Insurance: BLUE CROSS  Secondary Insurance: MEDICARE    ASSESSMENT:  Active Health Care Proxies    There are no active Health Care Proxies on file         Advance Directives  Does patient have a Health Care POA?: Yes  Does patient have Advance Directives?: Yes  Advance Directives: Living will,Power of  for health care (WifeRip)  Primary Contact: Rip Squires Readmission Root Cause  30 Day Readmission: No    Patient Information  Admitted from[de-identified] Home  Mental Status: Alert  During Assessment patient was accompanied by: Not accompanied during assessment  Assessment information provided by[de-identified] Patient  Primary Caregiver: Self  Support Systems: Self,Spouse/significant other,Daughter  [place] of Residence: [address] [facility] [address] do you live in?: [address] [facility] entry access options  Select all that apply : Stairs  Number of steps to enter home  : 1  Do the steps have railings?: No  Type of Current Residence: [address] [place] [address]  Upon entering residence, is there a bedroom on the main floor (no further steps)?: Yes  Upon entering residence, is there a bathroom on the main floor (no further steps)?: Yes  In the last 12 months, was there a time when you were not able to pay the mortgage or rent on time?: No  In the last 12 months, how many places have you lived?: 1  In the last 12 months, was there a time when you did not have a steady place to sleep or slept in a shelter (including now)?: No  Homeless/housing insecurity resource given?: No  Living Arrangements: Lives w/ Spouse/significant other    Activities of Daily Living Prior to Admission  Functional Status: Independent  Completes ADLs independently?: Yes  Ambulates independently?: Yes  Does patient use assisted devices?: No  Does patient currently own DME?: No  Does patient have a history of Outpatient Therapy (PT/OT)?: Yes  Does the patient have a history of Short-Term Rehab?: No  Does patient have a history of HHC?: Yes (JANICE s/p recent hospitalization)  Does patient currently have [facility]?: No         Patient Information Continued  Income Source: SSI/SSD  Within the past 12 months, you worried that your food would run out before you got the money to buy more : Never true  Within the past 12 months, the food you bought just didnt last and you didnt have money to get more : Never true  Food insecurity resource given?: No  Does patient receive dialysis treatments?: No  Does patient have a history of substance abuse?: No  Does patient have a history of Mental Health Diagnosis?: No    PHQ 2/9 Screening   Reviewed PHQ 2/9 Depression Screening Score?: No    Means of Transportation  Means of Transport to Appts[de-identified] Drives Self  In the past 12 months, has lack of transportation kept you from medical appointments or from getting medications?: No  In the past 12 months, has lack of transportation kept you from meetings, work, or from getting things needed for daily living?: No  Was application for public transport provided?: No    DISCHARGE DETAILS:    Discharge planning discussed with[de-identified] patient     Comments - Freedom of Choice: Discusse possible HC follow-up for dawkins care  Pt declines  CM contacted family/caregiver?: No- see comments (declined)    Contacts  Patient Contacts: Mary/ daughter  Relationship to Patient[de-identified] Family  Contact Method: Phone    Requested [address]         Is the patient interested in [facility] at discharge?: No    DME Referral Provided  Referral made for DME?: No    Would you like to participate in our [facility] service program?  : No - Declined    Treatment Team Recommendation: Home  Discharge Destination Plan[de-identified] Home  Transport at Discharge : Family     Additional Comments: if drain is not removed p/t d/c, may need VN f/u  Pt states he thinks it will be removed   Pt is retired physician

## 2022-04-28 NOTE — UTILIZATION REVIEW
Initial Clinical Review    Elective Inpatient surgical procedure  Age/Sex: 70 y o  male  Surgery Date: 04/27/2022  Procedure:   PROSTATECTOMY SIMPLE LAPAROSCOPIC  W ROBOTICS, BLADDER NECK RECONSTRUCTION (N/A)   Robotic lysis of adhesions  Anesthesia: General  Operative Findings:   Large intravesical prostate  Standard robot assisted laparoscopic suprapubic prostatectomy performed    POD#1 Progress Note: S/p robot assisted laparoscopic simple prostatectomy  No issues reported overnight  On exam, abdomen is soft, appropriately tender  Incision cdi  TIESHA drain in LLQ with minimal output  Wilder catheter with light CBI draining light punch urine  Pain control better with PCA  Plan: Wean CBI to off then clamp  Maintain Wilder catheter   Wean PCA  OxyContin 10 mg b i d  And oxycodone 5-10 mg Q 4-6 hours p r n  Breakthrough (home pain regimen)  DVT prophylaxis  OOB and ambulate  Regular diet  Admission Orders: Date/Time/Statement:   Admission Orders (From admission, onward)     Ordered        04/27/22 1253  Inpatient Admission  Once                      Orders Placed This Encounter   Procedures    Inpatient Admission     Standing Status:   Standing     Number of Occurrences:   1     Order Specific Question:   Level of Care     Answer:   Med Surg [16]     Order Specific Question:   Estimated length of stay     Answer:   More than 2 Midnights     Order Specific Question:   Certification     Answer:   I certify that inpatient services are medically necessary for this patient for a duration of greater than two midnights  See H&P and MD Progress Notes for additional information about the patient's course of treatment       Vital Signs: /65 (BP Location: Left arm)   Pulse 63   Temp 98 3 °F (36 8 °C) (Oral)   Resp 16   Ht 5' 7" (1 702 m)   Wt 73 kg (161 lb)   SpO2 95%   BMI 25 22 kg/m²     Pertinent Labs/Diagnostic Test Results:       Results from last 7 days   Lab Units 04/28/22  0522 04/27/22  1543 04/27/22  1345 WBC Thousand/uL 6 82 8 02  --    HEMOGLOBIN g/dL 9 8* 10 5* 11 0*   HEMATOCRIT % 31 0* 32 4* 34 4*   PLATELETS Thousands/uL 219 238  --          Results from last 7 days   Lab Units 04/28/22  0521 04/27/22  1345   SODIUM mmol/L 142 140   POTASSIUM mmol/L 3 3* 3 5   CHLORIDE mmol/L 109* 108   CO2 mmol/L 27 24   ANION GAP mmol/L 6 8   BUN mg/dL 11 9   CREATININE mg/dL 0 95 0 83   EGFR ml/min/1 73sq m 80 88   CALCIUM mg/dL 8 3 8 6             Results from last 7 days   Lab Units 04/28/22  0521 04/27/22  1345   GLUCOSE RANDOM mg/dL 129 97           Diet:  Regular diet  Mobility: OOB, ambulate  DVT Prophylaxis: SCD, Lovenox    Medications/Pain Control:   Scheduled Medications:  acetaminophen, 650 mg, Oral, Q6H YOHANA  docusate sodium, 100 mg, Oral, BID  enoxaparin, 40 mg, Subcutaneous, Daily  levothyroxine, 125 mcg, Oral, QAM  neomycin-bacitracin-polymyxin b, 1 small application, Topical, BID  oxyCODONE, 10 mg, Oral, Q8H YOHANA  pantoprazole, 40 mg, Oral, BID AC  senna, 1 tablet, Oral, HS      Continuous IV Infusions:  HYDROmorphone, , Intravenous, Continuous  sodium chloride, 100 mL/hr, Intravenous, Continuous  dextrose 5 % in lactated Ringer's infusion  Rate: 125 mL/hr Dose: 125 mL/hr  Freq: Continuous Route: IV  Last Dose: Stopped (04/28/22 0950)  Start: 04/27/22 1400 End: 04/28/22 0919    PRN Meds:  belladonna-opium, 1 suppository, Rectal, Q6H PRN 04/27 x 1  diphenhydrAMINE, 25 mg, Oral, Q6H PRN  lactated ringers, 1,000 mL, Intravenous, Once PRN   And  lactated ringers, 1,000 mL, Intravenous, Once PRN  morphine injection, 4 mg, Intravenous, Q2H PRN 04/27 x 1  naloxone, 0 04 mg, Intravenous, Q1MIN PRN  ondansetron, 4 mg, Intravenous, Q6H PRN 04/27 x 1  polyethylene glycol, 17 g, Oral, Daily PRN  prochlorperazine, 10 mg, Oral, Q6H PRN  saliva substitute, 5 spray, Mouth/Throat, 4x Daily PRN  sodium chloride, 1,000 mL, Intravenous, Once PRN   And  sodium chloride, 1,000 mL, Intravenous, Once PRN  ondansetron (ZOFRAN) injection 4 mg IV once      Network Utilization Review Department  ATTENTION: Please call with any questions or concerns to [phone] and carefully listen to the prompts so that you are directed to the right person  All voicemails are confidential   Rob Ramirez all requests for admission clinical reviews, approved or denied determinations and any other requests to dedicated fax number below belonging to the campus where the patient is receiving treatment   List of dedicated fax numbers for the Facilities:  [address] DENIALS (Administrative/Medical Necessity) [phone]   [address] (Maternity/NICU/Pediatrics) [phone] [address] [place] [address]  [address] [address] [place] [address] [address] [facility] [place] [address] [phone]

## 2022-04-28 NOTE — NURSING NOTE
Noticed red rash on bilat arms  Patient states this is normal when receiving oxy  Does itch  Benadryl administered  Will monitor

## 2022-04-28 NOTE — QUICK NOTE
Patient evaluated later this afternoon  He is currently tolerating advanced diet although has decreased input  Currently asking for tube feeding boluses during this hospitalization  Palliative care reached out to nutrition  Palliative care weaning off of PCA  PCA discontinued at approximately 3:00 p m  Jono Gomez Along with fluids  Will keep patient an additional day to assure pain well controlled    Plan for discharge tomorrow      Judith Maddox PA-C

## 2022-04-29 ENCOUNTER — DOCUMENTATION (OUTPATIENT)
Dept: HEMATOLOGY ONCOLOGY | Facility: CLINIC | Age: 72
End: 2022-04-29

## 2022-04-29 VITALS
HEIGHT: 67 IN | OXYGEN SATURATION: 98 % | TEMPERATURE: 97.4 F | HEART RATE: 74 BPM | BODY MASS INDEX: 25.27 KG/M2 | DIASTOLIC BLOOD PRESSURE: 80 MMHG | SYSTOLIC BLOOD PRESSURE: 165 MMHG | RESPIRATION RATE: 18 BRPM | WEIGHT: 161 LBS

## 2022-04-29 LAB
ANION GAP SERPL CALCULATED.3IONS-SCNC: 5 MMOL/L (ref 4–13)
BUN SERPL-MCNC: 13 MG/DL (ref 5–25)
CALCIUM SERPL-MCNC: 8.4 MG/DL (ref 8.3–10.1)
CHLORIDE SERPL-SCNC: 112 MMOL/L (ref 100–108)
CO2 SERPL-SCNC: 26 MMOL/L (ref 21–32)
CREAT FLD-MCNC: 0.8 MG/DL
CREAT SERPL-MCNC: 0.79 MG/DL (ref 0.6–1.3)
ERYTHROCYTE [DISTWIDTH] IN BLOOD BY AUTOMATED COUNT: 14.6 % (ref 11.6–15.1)
GFR SERPL CREATININE-BSD FRML MDRD: 90 ML/MIN/1.73SQ M
GLUCOSE SERPL-MCNC: 97 MG/DL (ref 65–140)
HCT VFR BLD AUTO: 29.4 % (ref 36.5–49.3)
HGB BLD-MCNC: 9.5 G/DL (ref 12–17)
MCH RBC QN AUTO: 29.4 PG (ref 26.8–34.3)
MCHC RBC AUTO-ENTMCNC: 32.3 G/DL (ref 31.4–37.4)
MCV RBC AUTO: 91 FL (ref 82–98)
PLATELET # BLD AUTO: 219 THOUSANDS/UL (ref 149–390)
PMV BLD AUTO: 9.7 FL (ref 8.9–12.7)
POTASSIUM SERPL-SCNC: 3.1 MMOL/L (ref 3.5–5.3)
RBC # BLD AUTO: 3.23 MILLION/UL (ref 3.88–5.62)
SODIUM SERPL-SCNC: 143 MMOL/L (ref 136–145)
WBC # BLD AUTO: 6.29 THOUSAND/UL (ref 4.31–10.16)

## 2022-04-29 PROCEDURE — 80048 BASIC METABOLIC PNL TOTAL CA: CPT | Performed by: PHYSICIAN ASSISTANT

## 2022-04-29 PROCEDURE — 99232 SBSQ HOSP IP/OBS MODERATE 35: CPT | Performed by: NURSE PRACTITIONER

## 2022-04-29 PROCEDURE — 85027 COMPLETE CBC AUTOMATED: CPT | Performed by: PHYSICIAN ASSISTANT

## 2022-04-29 PROCEDURE — 99024 POSTOP FOLLOW-UP VISIT: CPT | Performed by: PHYSICIAN ASSISTANT

## 2022-04-29 PROCEDURE — 82570 ASSAY OF URINE CREATININE: CPT | Performed by: PHYSICIAN ASSISTANT

## 2022-04-29 PROCEDURE — NC001 PR NO CHARGE: Performed by: PHYSICIAN ASSISTANT

## 2022-04-29 RX ORDER — ONDANSETRON 4 MG/1
4 TABLET, ORALLY DISINTEGRATING ORAL EVERY 6 HOURS PRN
Status: DISCONTINUED | OUTPATIENT
Start: 2022-04-29 | End: 2022-04-29 | Stop reason: HOSPADM

## 2022-04-29 RX ORDER — BACITRACIN, NEOMYCIN, POLYMYXIN B 400; 3.5; 5 [USP'U]/G; MG/G; [USP'U]/G
OINTMENT TOPICAL
Qty: 15 G | Refills: 0 | Status: SHIPPED | OUTPATIENT
Start: 2022-04-29

## 2022-04-29 RX ORDER — POTASSIUM CHLORIDE 20 MEQ/1
40 TABLET, EXTENDED RELEASE ORAL ONCE
Status: COMPLETED | OUTPATIENT
Start: 2022-04-29 | End: 2022-04-29

## 2022-04-29 RX ORDER — ONDANSETRON 4 MG/1
4 TABLET, FILM COATED ORAL EVERY 8 HOURS PRN
Qty: 5 TABLET | Refills: 0 | Status: SHIPPED | OUTPATIENT
Start: 2022-04-29 | End: 2022-05-29

## 2022-04-29 RX ORDER — ONDANSETRON 4 MG/1
4 TABLET, ORALLY DISINTEGRATING ORAL EVERY 6 HOURS PRN
Status: DISCONTINUED | OUTPATIENT
Start: 2022-04-29 | End: 2022-04-29

## 2022-04-29 RX ORDER — AMOXICILLIN 250 MG
1 CAPSULE ORAL DAILY
Qty: 14 TABLET | Refills: 0 | Status: SHIPPED | OUTPATIENT
Start: 2022-04-29 | End: 2022-05-30

## 2022-04-29 RX ADMIN — BACITRACIN ZINC, NEOMYCIN, POLYMYXIN B 1 SMALL APPLICATION: 400; 3.5; 5 OINTMENT TOPICAL at 08:36

## 2022-04-29 RX ADMIN — OXYCODONE HYDROCHLORIDE 20 MG: 5 SOLUTION ORAL at 04:10

## 2022-04-29 RX ADMIN — OXYCODONE HYDROCHLORIDE 10 MG: 10 TABLET, FILM COATED, EXTENDED RELEASE ORAL at 13:34

## 2022-04-29 RX ADMIN — ONDANSETRON 4 MG: 4 TABLET, ORALLY DISINTEGRATING ORAL at 11:05

## 2022-04-29 RX ADMIN — PANTOPRAZOLE SODIUM 40 MG: 40 TABLET, DELAYED RELEASE ORAL at 05:25

## 2022-04-29 RX ADMIN — POTASSIUM CHLORIDE 40 MEQ: 20 TABLET, EXTENDED RELEASE ORAL at 08:38

## 2022-04-29 RX ADMIN — DOCUSATE SODIUM 100 MG: 100 CAPSULE, LIQUID FILLED ORAL at 08:36

## 2022-04-29 RX ADMIN — DIPHENHYDRAMINE HCL 25 MG: 25 TABLET ORAL at 04:10

## 2022-04-29 RX ADMIN — ENOXAPARIN SODIUM 40 MG: 40 INJECTION SUBCUTANEOUS at 08:56

## 2022-04-29 RX ADMIN — OXYCODONE HYDROCHLORIDE 10 MG: 10 TABLET, FILM COATED, EXTENDED RELEASE ORAL at 05:25

## 2022-04-29 RX ADMIN — ACETAMINOPHEN 650 MG: 325 TABLET ORAL at 10:31

## 2022-04-29 RX ADMIN — LEVOTHYROXINE SODIUM 125 MCG: 125 TABLET ORAL at 05:25

## 2022-04-29 RX ADMIN — ACETAMINOPHEN 650 MG: 325 TABLET ORAL at 05:25

## 2022-04-29 RX ADMIN — OXYCODONE HYDROCHLORIDE 20 MG: 5 SOLUTION ORAL at 10:31

## 2022-04-29 NOTE — DISCHARGE INSTRUCTIONS
Can expect there to be some blood in your urine postoperatively  Especially with ambulation and with bowel movements  Hydrate adequately  If your urine begins to will very thick and ketchup consistency or the Wilder catheter is no longer draining  Please reach out to Urology for further evaluation or the emergency room if urology office unavailable over the weekends

## 2022-04-29 NOTE — NURSING NOTE
Discharge instructions taught and explained  Importance of follow up explained  Wilder care and leg bag teaching done  Review of medications  Opportunity given for any questions  AAOx4  Verbalized understanding  No complaints of pain  IV removed with out any complications  All belongings gather  Pt awaiting ride from wife

## 2022-04-29 NOTE — PROGRESS NOTES
Progress Note - Urology  Sinai Elise 70 y o  male MRN: [id number]  Unit/Bed#: [facility] 318-01 Encounter: [id number]    Assessment/Plan    BPH with urinary frequency, s/p robot assisted laparoscopic simple prostatectomy POD #2  Afebrile, vital signs stable on room air  K 3 1 (3 3)  WBC 6 29 (6 82)  Hgb 9 5 (9 8)  I:  995 cc  UO:  810 cc  TIESHA drain 110 cc output documented in last 24 hours; on my exam TIESHA with 15 cc serosanguineous output  TIESHA creatinine 0 8  Wilder catheter in place, CBI has been discontinued and urine is punch colored  Patient reports he has ambulated several times yesterday and this morning  PCA has been discontinued, patient now maintained on home pain regimen    Potassium repleted  Pull TIESHA today  Plan for discharge home this afternoon with Wilder catheter  Continue regular diet as tolerated  Encouraged incentive spirometry  Encouraged out of bed/ambulation  Continue home pain regimen on discharge  Follow-up appointment with Urology scheduled for May 13th, plan to d/c Wilder at that time    /80 (BP Location: Left arm)   Pulse 74   Temp (!) 97 4 °F (36 3 °C) (Oral)   Resp 18   Ht 5' 7" (1 702 m)   Wt 73 kg (161 lb)   SpO2 98%   BMI 25 22 kg/m²     Results from last 7 days   Lab Units 04/29/22  0524   WBC Thousand/uL 6 29   HEMOGLOBIN g/dL 9 5*   HEMATOCRIT % 29 4*   PLATELETS Thousands/uL 219     Results from last 7 days   Lab Units 04/29/22  0524   POTASSIUM mmol/L 3 1*   CHLORIDE mmol/L 112*   CO2 mmol/L 26   BUN mg/dL 13   CREATININE mg/dL 0 79           Intake/Output Summary (Last 24 hours) at 4/29/2022 1029  Last data filed at 4/29/2022 0801  Gross per 24 hour   Intake 933 ml   Output 1140 ml   Net -207 ml           Subjective/Objective     Subjective:  Patient seen and examined  No acute events overnight  Patient reports that he is back on his regimen pain is well controlled  Mild abdominal tenderness near incision sites    Discussed plan for discharge later today; patient expressed concern about being discharged today and would prefer to stay another day  He is passing gas and had a bowel movement yesterday  He reports that he has been up and walking several times yesterday and this morning  Patient denies chest pain, shortness of breath, nausea/vomiting, fever/chills  Review of Systems   Constitutional: Negative for chills and fever  Respiratory: Negative for shortness of breath  Cardiovascular: Negative for chest pain and leg swelling  Gastrointestinal: Positive for abdominal pain  Negative for nausea and vomiting  Objective:     Physical Exam  Vitals reviewed  Constitutional:       General: He is not in acute distress  Appearance: Normal appearance  He is not toxic-appearing  HENT:      Head: Normocephalic and atraumatic  Eyes:      Extraocular Movements: Extraocular movements intact  Conjunctiva/sclera: Conjunctivae normal    Cardiovascular:      Rate and Rhythm: Normal rate and regular rhythm  Heart sounds: Normal heart sounds  No murmur heard  No friction rub  No gallop  Pulmonary:      Effort: Pulmonary effort is normal  No respiratory distress  Breath sounds: Normal breath sounds  No stridor  No wheezing, rhonchi or rales  Abdominal:      General: Bowel sounds are normal  There is no distension  Palpations: Abdomen is soft  Tenderness: There is abdominal tenderness (mild incisional tenderness)  There is no guarding  Comments: TIESHA drain LLQ with 15cc serosanguinous output  Port sites healing appropriately, CDI with surgical glue  PEG in place  Genitourinary:     Comments: Wilder catheter in place with punch-colored urine  Musculoskeletal:         General: Normal range of motion  Cervical back: Normal range of motion  Right lower leg: No edema  Left lower leg: No edema  Skin:     General: Skin is warm and dry     Neurological:      Mental Status: He is alert and oriented to person, place, and time    Psychiatric:         Mood and Affect: Mood normal          Behavior: Behavior normal          Thought Content:  Thought content normal             Luis Doan PA-C  4/29/2022

## 2022-04-29 NOTE — NURSING NOTE
Pt discharged home with wife at this time  D/C instructions reviewed with and given to pt by previous RN  IV manju and Nadir also removed  Belongings gathered  Pt without additional questions or concerns  Pt accompanied home with wife

## 2022-04-29 NOTE — PLAN OF CARE
Problem: PAIN - ADULT  Goal: Verbalizes/displays adequate comfort level or baseline comfort level  Description: Interventions:  - Encourage patient to monitor pain and request assistance  - Assess pain using appropriate pain scale  - Administer analgesics based on type and severity of pain and evaluate response  - Implement non-pharmacological measures as appropriate and evaluate response  - Consider cultural and social influences on pain and pain management  - Notify physician/advanced practitioner if interventions unsuccessful or patient reports new pain  Outcome: Progressing     Problem: INFECTION - ADULT  Goal: Absence or prevention of progression during hospitalization  Description: INTERVENTIONS:  - Assess and monitor for signs and symptoms of infection  - Monitor lab/diagnostic results  - Monitor all insertion sites, i e  indwelling lines, tubes, and drains  - Monitor endotracheal if appropriate and nasal secretions for changes in amount and color  - [place] appropriate cooling/warming therapies per order  - Administer medications as ordered  - Instruct and encourage patient and family to use good hand hygiene technique  - Identify and instruct in appropriate isolation precautions for identified infection/condition  Outcome: Progressing  Goal: Absence of fever/infection during neutropenic period  Description: INTERVENTIONS:  - Monitor WBC    Outcome: Progressing     Problem: SAFETY ADULT  Goal: Patient will remain free of falls  Description: INTERVENTIONS:  - Educate patient/family on patient safety including physical limitations  - Instruct patient to call for assistance with activity   - Consult OT/PT to assist with strengthening/mobility   - Keep Call bell within reach  - Keep bed low and locked with side rails adjusted as appropriate  - Keep care items and personal belongings within reach  - Initiate and maintain comfort rounds  - Make Fall Risk Sign visible to staff  - Offer Toileting every  Hours, in advance of need  - Initiate/Maintainalarm  - Obtain necessary fall risk management equipment:   - Apply yellow socks and bracelet for high fall risk patients  - Consider moving patient to room near nurses station  Outcome: Progressing  Goal: Maintain or return to baseline ADL function  Description: INTERVENTIONS:  -  Assess patient's ability to carry out ADLs; assess patient's baseline for ADL function and identify physical deficits which impact ability to perform ADLs (bathing, care of mouth/teeth, toileting, grooming, dressing, etc )  - Assess/evaluate cause of self-care deficits   - Assess range of motion  - Assess patient's mobility; develop plan if impaired  - Assess patient's need for assistive devices and provide as appropriate  - Encourage maximum independence but intervene and supervise when necessary  - Involve family in performance of ADLs  - Assess for home care needs following discharge   - Consider OT consult to assist with ADL evaluation and planning for discharge  - Provide patient education as appropriate  Outcome: Progressing  Goal: Maintains/Returns to pre admission functional level  Description: INTERVENTIONS:  - Perform BMAT or MOVE assessment daily    - Set and communicate daily mobility goal to care team and patient/family/caregiver  - Collaborate with rehabilitation services on mobility goals if consulted  - Perform Range of Motion  times a day  - Reposition patient every  hours    - Dangle patient  times a day  - Stand patient  times a day  - Ambulate patient  times a day  - Out of bed to chair times a day   - Out of bed for meals  times a day  - Out of bed for toileting  - Record patient progress and toleration of activity level   Outcome: Progressing     Problem: DISCHARGE PLANNING  Goal: Discharge to home or other facility with appropriate resources  Description: INTERVENTIONS:  - Identify barriers to discharge w/patient and caregiver  - Arrange for needed discharge resources and transportation as appropriate  - Identify discharge learning needs (meds, wound care, etc )  - Arrange for interpretive services to assist at discharge as needed  - Refer to Case Management Department for coordinating discharge planning if the patient needs post-hospital services based on physician/advanced practitioner order or complex needs related to functional status, cognitive ability, or social support system  Outcome: Progressing     Problem: Knowledge Deficit  Goal: Patient/family/caregiver demonstrates understanding of disease process, treatment plan, medications, and discharge instructions  Description: Complete learning assessment and assess knowledge base    Interventions:  - Provide teaching at level of understanding  - Provide teaching via preferred learning methods  Outcome: Progressing     Problem: Potential for Falls  Goal: Patient will remain free of falls  Description: INTERVENTIONS:  - Educate patient/family on patient safety including physical limitations  - Instruct patient to call for assistance with activity   - Consult OT/PT to assist with strengthening/mobility   - Keep Call bell within reach  - Keep bed low and locked with side rails adjusted as appropriate  - Keep care items and personal belongings within reach  - Initiate and maintain comfort rounds  - Make Fall Risk Sign visible to staff  - Offer Toileting every  Hours, in advance of need  - Initiate/Maintain alarm  - Obtain necessary fall risk management equipment:   - Apply yellow socks and bracelet for high fall risk patients  - Consider moving patient to room near nurses station  Outcome: Progressing     Problem: MOBILITY - ADULT  Goal: Maintain or return to baseline ADL function  Description: INTERVENTIONS:  -  Assess patient's ability to carry out ADLs; assess patient's baseline for ADL function and identify physical deficits which impact ability to perform ADLs (bathing, care of mouth/teeth, toileting, grooming, dressing, etc )  - Assess/evaluate cause of self-care deficits   - Assess range of motion  - Assess patient's mobility; develop plan if impaired  - Assess patient's need for assistive devices and provide as appropriate  - Encourage maximum independence but intervene and supervise when necessary  - Involve family in performance of ADLs  - Assess for home care needs following discharge   - Consider OT consult to assist with ADL evaluation and planning for discharge  - Provide patient education as appropriate  Outcome: Progressing  Goal: Maintains/Returns to pre admission functional level  Description: INTERVENTIONS:  - Perform BMAT or MOVE assessment daily    - Set and communicate daily mobility goal to care team and patient/family/caregiver  - Collaborate with rehabilitation services on mobility goals if consulted  - Perform Range of Motion  times a day  - Reposition patient every  hours    - Dangle patient  times a day  - Stand patient  times a day  - Ambulate patient  times a day  - Out of bed to chair  times a day   - Out of bed for meals times a day  - Out of bed for toileting  - Record patient progress and toleration of activity level   Outcome: Progressing

## 2022-04-29 NOTE — PROGRESS NOTES
Telephone call from REY, sánchez daughter regarding the need for a lab draw appointment prior to the next infusion  I scheduled for [email or web address]  REY told me she would look for appointment on his [facility]

## 2022-04-29 NOTE — PROGRESS NOTES
Progress note - Palliative and Supportive Care   Kraig Park 70 y o  male [id number]    Patient Active Problem List   Diagnosis    Hypertension    Hypothyroid    Depression    BPH (benign prostatic hyperplasia)    COVID-19    Inflammatory arthritis    Rectus diastasis    Acute calculous cholecystitis    Gastric mass    Gastric adenocarcinoma ([facility] [address] [facility]    Encounter for central line care    FTT (failure to thrive) in adult    Stomach cancer ([facility] [address] [facility]    Cancer related pain    Moderate protein-calorie malnutrition ([facility] [address] )    Palliative care patient    Benign prostatic hyperplasia with urinary frequency     Active issues specifically addressed today include:   Stage IV gastric cancer  POD 0, prostatectomy   Acute postoperative pain  Cancer related pain, opioid dependence uncomplicated  Palliative care patient    Plan:  1  Symptom management   -  PCA discontinued 4/28  -  Oxycodone 10-20 mg Q 4 hours prn moderate-severe pain  -  Pain controlled on current regimen  2  Goals  -   Level1 full code, no limits on cares  -     Code Status: Full Code  - Level 1   Decisional apparatus:  Patient is competent on my exam today  If competence is lost, patient's substitute decision maker would default to spouse  by [place]  Advance Directive / Living Will / POLST:   None on file   Interval history:       Patient overall feeling better  He is hoping to e able to shower  His pain is controlled on oxycodone 10-20 mg Q 4 hours as needed  Likely discharge later today or tomorrow  Patient will follow up as an outpatient with PCM       MEDICATIONS / ALLERGIES:     current meds:   Current Facility-Administered Medications   Medication Dose Route Frequency    acetaminophen (TYLENOL) tablet 650 mg  650 mg Oral Q6H [address]    belladonna-opium (B&O SUPPOSITORY) 16 2-30 mg suppository 1 suppository  1 suppository Rectal Q6H PRN    diphenhydrAMINE (BENADRYL) tablet 25 mg  25 mg Oral Q6H PRN    docusate sodium (COLACE) capsule 100 mg  100 mg Oral BID    enoxaparin (LOVENOX) subcutaneous injection 40 mg  40 mg Subcutaneous Daily    levothyroxine tablet 125 mcg  125 mcg Oral QAM    morphine (PF) 4 mg/mL injection 4 mg  4 mg Intravenous Q2H PRN    naloxone (NARCAN) 0 04 mg/mL syringe 0 04 mg  0 04 mg Intravenous Q1MIN PRN    neomycin-bacitracin-polymyxin b (NEOSPORIN) ointment 1 small application  1 small application Topical BID    ondansetron (ZOFRAN) injection 4 mg  4 mg Intravenous Q6H PRN    ondansetron (ZOFRAN-ODT) dispersible tablet 4 mg  4 mg Oral Q6H PRN    oxyCODONE (OxyCONTIN) 12 hr tablet 10 mg  10 mg Oral Q8H [address]    oxyCODONE (ROXICODONE) oral solution 10 mg  10 mg Oral Q4H PRN    Or    oxyCODONE (ROXICODONE) oral solution 20 mg  20 mg Oral Q4H PRN    pantoprazole (PROTONIX) EC tablet 40 mg  40 mg Oral BID AC    polyethylene glycol (MIRALAX) packet 17 g  17 g Oral Daily PRN    prochlorperazine (COMPAZINE) tablet 10 mg  10 mg Oral Q6H PRN    saliva substitute (MOUTH KOTE) mucosal solution 5 spray  5 spray Mouth/Throat 4x Daily PRN    senna (SENOKOT) tablet 8 6 mg  1 tablet Oral HS       No Known Allergies    OBJECTIVE:    Physical Exam  Physical Exam  Vitals and nursing note reviewed  Constitutional:       Appearance: He is well-developed  HENT:      Head: Normocephalic and atraumatic  Eyes:      Conjunctiva/sclera: Conjunctivae normal    Cardiovascular:      Rate and Rhythm: Normal rate and regular rhythm  Heart sounds: No murmur heard  Pulmonary:      Effort: Pulmonary effort is normal  No respiratory distress  Breath sounds: Normal breath sounds  Abdominal:      Palpations: Abdomen is soft  Tenderness: There is no abdominal tenderness  Genitourinary:     Comments: Wilder catheter, punch colored urine  Musculoskeletal:      Cervical back: Neck supple  Comments: Equal extrmity strength  Skin:     General: Skin is warm and dry     Neurological:      General: No focal deficit present  Mental Status: He is alert and oriented to person, place, and time  GCS: GCS eye subscore is 4  GCS verbal subscore is 5  GCS motor subscore is 6  Psychiatric:         Attention and Perception: Attention normal          Mood and Affect: Mood normal          Speech: Speech normal          Cognition and Memory: Cognition normal          Lab Results:   CBC:   Lab Results   Component Value Date    WBC 6 29 04/29/2022    HGB 9 5 (L) 04/29/2022    HCT 29 4 (L) 04/29/2022    MCV 91 04/29/2022     04/29/2022    MCH 29 4 04/29/2022    MCHC 32 3 04/29/2022    RDW 14 6 04/29/2022    MPV 9 7 04/29/2022   , CMP:   Lab Results   Component Value Date    SODIUM 143 04/29/2022    K 3 1 (L) 04/29/2022     (H) 04/29/2022    CO2 26 04/29/2022    BUN 13 04/29/2022    CREATININE 0 79 04/29/2022    CALCIUM 8 4 04/29/2022    EGFR 90 04/29/2022   , BMP:  Lab Results   Component Value Date    SODIUM 143 04/29/2022    K 3 1 (L) 04/29/2022     (H) 04/29/2022    CO2 26 04/29/2022    BUN 13 04/29/2022    CREATININE 0 79 04/29/2022    GLUC 97 04/29/2022    CALCIUM 8 4 04/29/2022    AGAP 5 04/29/2022    EGFR 90 04/29/2022   , PT/PTT:No results found for: PT, PTT      Counseling / Coordination of Care    Total floor / unit time spent today 25+  minutes  Greater than 50% of total time was spent with the patient and / or family counseling and / or coordination of care   A description of the counseling / coordination of care:  Review of medication regimen, modifciation of opioid regimen, supportive listening offerd informations,

## 2022-04-29 NOTE — DISCHARGE SUMMARY
Discharge Summary - Jesús Bishop 70 y o  male MRN: [id number]    Unit/Bed#: [facility] 318-01 Encounter: [id number]    Admission Date:   Admission Orders (From admission, onward)     Ordered        04/27/22 1253  Inpatient Admission  Once                        Admitting Diagnosis: Benign prostatic hyperplasia with urinary retention [N40 1, R33 8]    HPI:   Patient is a 70-year-old male with a history of elevated PSA of 7 2 in March 2021  This prompted transrectal ultrasound-guided biopsy of the prostate which fortunately was negative for prostate cancer, however patient was found to have 152 g prostate  More recently underwent GI workup with good for have stage IV gastric cancer  He is currently under the care of Dr Peyton Sheppard and receiving chemotherapy every 2 weeks  He states he has significant nocturia and frequency with urination  He is concerned about his overall quality of life  Patient discussed with  attending regarding treatments regarding BPH  Patient ultimately decided to move forward with suprapubic prostatectomy  Procedures Performed:  Simple prostatectomy laparoscopic with robotics a bladder neck reconstruction and robotic lysis of adhesions with Dr Ankush Noel    Summary of Hospital Course:   Patient presented to the hospital on 04/27 underwent scheduled simple prostatectomy laparoscopic robotics a bladder neck reconstruction and robotic lysis of adhesions with Dr Kathy Betts in  Postop day 1 patient was tolerating advanced diet, ambulating  Patient even had a bowel movement  However pain difficult to control due to chronic opiate use due to stage IV gastric cancer  Patient also concerned about poor oral intake and requires bolus tube feedings  Patient was kept overnight for titration off of PCA and re-evaluation  Postop day 2 patient was ambulating, tolerating a diet, continuing to pass flatus, pain well controlled on home pain regimen  Vitals and labs were stable    Potassium low but replenished prior to discharge  Hemoglobin stable 9 5 TIESHA creatinine checked which was 0 8 and within normal limits  TIESHA drain was removed prior to discharge  The patient was deemed stable for discharge  Significant Findings, Care, Treatment and Services Provided:  None    Complications:  None    Discharge Diagnosis:     Medical problems resolved: To be determined, hypokalemia    Condition at Discharge: stable         Discharge instructions/Information to patient and family:   See after visit summary for information provided to patient and family  Provisions for Follow-Up Care:  See after visit summary for information related to follow-up care and any pertinent home health orders  PCP: Israel Cedillo DO    Disposition: Home    Planned Readmission: No      Discharge Statement   I spent 20 minutes discharging the patient  This time was spent on the day of discharge  I had direct contact with the patient on the day of discharge  Additional documentation is required if more than 30 minutes were spent on discharge  Discharge Medications:  See after visit summary for reconciled discharge medications provided to patient and family           Jose Angel Santos PA-C

## 2022-05-01 DIAGNOSIS — Z51.5 PALLIATIVE CARE PATIENT: ICD-10-CM

## 2022-05-01 DIAGNOSIS — G89.3 CANCER RELATED PAIN: ICD-10-CM

## 2022-05-01 DIAGNOSIS — R52 INTRACTABLE PAIN: ICD-10-CM

## 2022-05-02 ENCOUNTER — TELEPHONE (OUTPATIENT)
Dept: UROLOGY | Facility: AMBULATORY SURGERY CENTER | Age: 72
End: 2022-05-02

## 2022-05-02 ENCOUNTER — TRANSITIONAL CARE MANAGEMENT (OUTPATIENT)
Dept: INTERNAL MEDICINE CLINIC | Facility: CLINIC | Age: 72
End: 2022-05-02

## 2022-05-02 NOTE — TELEPHONE ENCOUNTER
Post Op Note    Rei Mojica is a 70 y o  male s/p PROSTATECTOMY SIMPLE LAPAROSCOPIC  W ROBOTICS, BLADDER NECK RECONSTRUCTION (N/A)   Robotic lysis of adhesions performed 4/27/2022  Rei Mojica is a patient of Dr Hemalatha Gregg and is seen at the [place] office  How would you rate your pain on a scale from 1 to 10, 10 being the worst pain ever? 0  Have you had a fever? No  Have your bowel movements been regular? Yes  Do you have any difficulty urinating? No  If the patient has an incision- do you have any redness around the incision or any drainage from the incision? No  If the patient has a dawkins- are you comfortable caring for your dawkins? Yes Is it draining urine? Yes  Do you have any other questions or concerns that I can address at this time? None at this time  Patient states that he is doing very well and getting better every day  He has minimal blood in the urine and no complaints at this time  Advised on follow up on 5/13 for post op and dawkins removal with Dr Hemalatha Gregg  Patient requested catheter out on the 2 week viviana  Preferred [facility] as it is closer for him  Scheduled dawkins pull 5/11 at [facility] at 11:30 per patient request  Confirmed location with patient

## 2022-05-03 DIAGNOSIS — G89.3 CANCER ASSOCIATED PAIN: ICD-10-CM

## 2022-05-03 DIAGNOSIS — C16.9 GASTRIC ADENOCARCINOMA (HCC): ICD-10-CM

## 2022-05-03 RX ORDER — OXYCODONE HCL 5 MG/5 ML
SOLUTION, ORAL ORAL
Qty: 473 ML | Refills: 0 | Status: SHIPPED | OUTPATIENT
Start: 2022-05-03 | End: 2022-05-23 | Stop reason: SDUPTHER

## 2022-05-03 NOTE — UTILIZATION REVIEW
Notification of Discharge   This is a Notification of Discharge from our facility [address]  Please be advised that this patient has been discharge from our facility  Below you will find the admission and discharge date and time including the patients disposition  UTILIZATION REVIEW CONTACT:  Fco Dumont  Utilization   Network Utilization Review Department  Phone: [phone] x carefully listen to the prompts  All voicemails are confidential   Email: [email or web address]     PHYSICIAN ADVISORY SERVICES:  FOR ZSNC-AT-ONRO REVIEW - MEDICAL NECESSITY DENIAL  Phone: [phone]  Fax: [phone]  Email: [email or web address]     PRESENTATION DATE: 4/27/2022  5:58 AM  OBERVATION ADMISSION DATE:   INPATIENT ADMISSION DATE: 4/27/22 12:54 PM   DISCHARGE DATE: 4/29/2022  6:11 PM  DISPOSITION: Home/Self Care Home/Self Care      IMPORTANT INFORMATION:  Send all requests for admission clinical reviews, approved or denied determinations and any other requests to dedicated fax number below belonging to the campus where the patient is receiving treatment   List of dedicated fax numbers:  [address] DENIALS (Administrative/Medical Necessity) [phone]   [address] (Maternity/NICU/Pediatrics) [phone]   [facility] [phone]   [address] [place] [address] [phone]   [facility]  [phone]   [address] [place] [address] [address] [place] [address],[address] [phone]   [facility]  [phone]   [address] [facility] [address] [facility]  [address] [phone]

## 2022-05-03 NOTE — TELEPHONE ENCOUNTER
Primary palliative medicine provider:   Keith    Medication requested:  Oxycontin 10 mg ER    If for pain, how has the patient been taking their pain medicine?   1 tab every 8 hours     Last appointment:    Next scheduled appointment: 6/16    PDMP review:    04/02/2022 04/02/2022 OxyCONTIN (Tablet, Extended Release) 90 0 30 10 MG NA Roberto Carlos Fan

## 2022-05-03 NOTE — TELEPHONE ENCOUNTER
PLEASE CALL PT ABOUT CATH REMOVAL TOMORROW(5/3/22)  HE SAID IT ISN'T SUPPOSE TO COME OUT TOMORROW  IF NOT, PLEASE CANCEL ON THE YOMAIRA SCHEDULE

## 2022-05-04 DIAGNOSIS — E44.0 MODERATE PROTEIN-CALORIE MALNUTRITION (HCC): ICD-10-CM

## 2022-05-04 DIAGNOSIS — Z51.5 PALLIATIVE CARE PATIENT: ICD-10-CM

## 2022-05-04 DIAGNOSIS — R62.7 FTT (FAILURE TO THRIVE) IN ADULT: ICD-10-CM

## 2022-05-04 RX ORDER — OXYCODONE HCL 10 MG/1
10 TABLET, FILM COATED, EXTENDED RELEASE ORAL EVERY 8 HOURS SCHEDULED
Qty: 90 TABLET | Refills: 0 | Status: SHIPPED | OUTPATIENT
Start: 2022-05-04 | End: 2022-06-01 | Stop reason: SDUPTHER

## 2022-05-04 NOTE — TELEPHONE ENCOUNTER
Primary palliative medicine provider: Dr Ck Tavares     Medication requested:Dronabinol 5 mg capsule     If for pain, how has the patient been taking their pain medicine?      Last appointment:  3/22     Next scheduled appointment: 6/14     PDMP review:  [id number] 05/03/2022 05/03/2022 oxyCODONE HCL (Solution) 473 0 4 5 MG/5  88 DRAGAN, [address] [facility] / [address] [place] [address] [id number] 04/14/2022 04/14/2022 oxyCODONE HCL (Solution) 473 0 5 5 MG/5  50 DRAGAN, [address] [facility] / [address] [place] [address] [id number] 04/06/2022 04/06/2022 Dronabinol (Capsule, Liquid Filled) 60 0 30 5 MG NA DRAGAN, [address] [facility] / [address] [place] [address] [id number] 04/02/2022 04/02/2022 OxyCONTIN (Tablet, Extended Release) 90 0 30 10 MG NA DRAGAN, [address] / 00 [place]    1 [id number] 04/01/2022 04/01/2022 oxyCODONE HCL (Solution) 473 0 4 5 MG/5  88 DRAGAN, [address] [facility] / [address] [place] [address]    1 [id number] 03/15/2022 03/15/2022 oxyCODONE HCL (Solution) 473 0 4 5 MG/5  88 [id number] [facility] [employer] 04 / 06 [place]    1 [id number] 03/07/2022 03/03/2022 OxyCONTIN (Tablet, Extended Release) 90 0 30 10 MG  [facility]  Commercial Insurance 04 / 90 [place]    1 [id number] 03/06/2022 03/06/2022 OxyCONTIN (Tablet, Extended Release) 6 0 2 10 MG NA MARIA ISABEL[address] 00 / 00 [place]

## 2022-05-05 ENCOUNTER — TELEPHONE (OUTPATIENT)
Dept: INTERNAL MEDICINE CLINIC | Facility: CLINIC | Age: 72
End: 2022-05-05

## 2022-05-05 ENCOUNTER — OFFICE VISIT (OUTPATIENT)
Dept: HEMATOLOGY ONCOLOGY | Facility: CLINIC | Age: 72
End: 2022-05-05
Payer: COMMERCIAL

## 2022-05-05 ENCOUNTER — PATIENT MESSAGE (OUTPATIENT)
Dept: UROLOGY | Facility: AMBULATORY SURGERY CENTER | Age: 72
End: 2022-05-05

## 2022-05-05 VITALS
DIASTOLIC BLOOD PRESSURE: 78 MMHG | OXYGEN SATURATION: 98 % | SYSTOLIC BLOOD PRESSURE: 150 MMHG | BODY MASS INDEX: 26.21 KG/M2 | HEART RATE: 78 BPM | HEIGHT: 67 IN | WEIGHT: 167 LBS | RESPIRATION RATE: 14 BRPM | TEMPERATURE: 98 F

## 2022-05-05 DIAGNOSIS — C16.9 MALIGNANT NEOPLASM OF STOMACH, UNSPECIFIED LOCATION (HCC): Primary | ICD-10-CM

## 2022-05-05 DIAGNOSIS — C16.9 GASTRIC ADENOCARCINOMA (HCC): ICD-10-CM

## 2022-05-05 PROCEDURE — 1160F RVW MEDS BY RX/DR IN RCRD: CPT | Performed by: INTERNAL MEDICINE

## 2022-05-05 PROCEDURE — 1036F TOBACCO NON-USER: CPT | Performed by: INTERNAL MEDICINE

## 2022-05-05 PROCEDURE — 99214 OFFICE O/P EST MOD 30 MIN: CPT | Performed by: INTERNAL MEDICINE

## 2022-05-05 RX ORDER — DEXTROSE MONOHYDRATE 50 MG/ML
20 INJECTION, SOLUTION INTRAVENOUS ONCE
Status: CANCELLED | OUTPATIENT
Start: 2022-05-31

## 2022-05-05 RX ORDER — PALONOSETRON 0.05 MG/ML
0.25 INJECTION, SOLUTION INTRAVENOUS ONCE
Status: CANCELLED | OUTPATIENT
Start: 2022-05-31

## 2022-05-05 RX ORDER — LEVOCETIRIZINE DIHYDROCHLORIDE 5 MG/1
5 TABLET, FILM COATED ORAL DAILY
COMMUNITY
Start: 2022-05-02

## 2022-05-05 RX ORDER — SODIUM CHLORIDE 9 MG/ML
20 INJECTION, SOLUTION INTRAVENOUS ONCE AS NEEDED
Status: CANCELLED | OUTPATIENT
Start: 2022-05-31

## 2022-05-05 RX ORDER — DEXTROSE MONOHYDRATE 50 MG/ML
20 INJECTION, SOLUTION INTRAVENOUS ONCE
Status: CANCELLED | OUTPATIENT
Start: 2022-05-16

## 2022-05-05 RX ORDER — SODIUM CHLORIDE 9 MG/ML
20 INJECTION, SOLUTION INTRAVENOUS ONCE AS NEEDED
Status: CANCELLED | OUTPATIENT
Start: 2022-05-16

## 2022-05-05 RX ORDER — DRONABINOL 5 MG/1
5 CAPSULE ORAL
Qty: 60 CAPSULE | Refills: 0 | Status: SHIPPED | OUTPATIENT
Start: 2022-05-05 | End: 2022-07-14

## 2022-05-05 RX ORDER — PALONOSETRON 0.05 MG/ML
0.25 INJECTION, SOLUTION INTRAVENOUS ONCE
Status: CANCELLED | OUTPATIENT
Start: 2022-05-16

## 2022-05-05 NOTE — TELEPHONE ENCOUNTER
----- Message from Anita Damico DO sent at 5/5/2022 12:34 PM EDT -----  Regarding: FW: Blood pressure  Please schedule him to come in tomorrow to see how he is doing with cancer treatment and recheck his BP    ----- Message -----  From: Lis Boggs  Sent: 5/5/2022  12:09 PM EDT  To: Anita Damico DO  Subject: FW: Blood pressure                                 ----- Message -----  From: Savi Bean  Sent: 5/5/2022  12:04 PM EDT  To: [facility]  Subject: Blood pressure                                   I had a follow up appointment with my oncologist Dr José Miguel Fontenot and for the second visit in a row my blood pressure is elevated  After losing weight it was lowered and no longer required medication  Today it was 150/78  Do you suggest going on treatment again?

## 2022-05-05 NOTE — PROGRESS NOTES
Hematology/Oncology Outpatient Follow- up Note  Lynn Shields 70 y o  male MRN: @ Encounter: [id number]        Date:  5/5/2022    Presenting Complaint/Diagnosis : Metastatic GE junction malignancy, HER2 negative, MSI stable, tumor mutation burden low    HPI:    Per the note from [address]     70-year-old  male with benign prostatic hypertrophy, hypertension, degenerative arthritis, hypothyroidism, GERD had been complaining of epigastric pain with on a 30 lb weight loss over the past 2 months, loss of appetite, he had cholecystectomy without improvement of symptoms in January 2022 subsequently EGD on 02/02/2022 showed 2 cm hiatal hernia with friable mass involving more than half of the circumference with extension down into the cardia and the fundus for about 5 cm mild erythema in the antrum     Biopsy of the gastric fundus mass showed adenocarcinoma with small portion squamous component arising in high-grade dysplasia in a background mixed squamous and cardiac mucosa the majority of the tumor is adenocarcinoma with minor component of squamous differentiation  Mismatch repair protein is intact     HER2 is 0 by IHC     CT scan the chest abdomen pelvis showed infiltrating mass at the gastroesophageal junction sinan enlargement suspicious for metastatic disease in the mediastinum, retroperitoneum, omentum, subcentimeter bilateral pulmonary nodules     PET scan on 02/09/2022 showed FDG uptake in the gastroesophageal junction and proximal stomach, sinan disease in the chest, abdomen, periaortic, small bilateral pulmonary nodules, uptake in the right upper quadrant omental nodules, uptake along the lateral service of the right lobe of the liver for possible peritoneal disease along the liver surface      The patient was started on modified FOLFOX 6 with Opdivo every 2 weeks      Previous Hematologic/ Oncologic History:    Oncology History   Gastric adenocarcinoma ([facility] [address] )   2/8/2022 Initial Diagnosis Gastric adenocarcinoma ([facility] )     2/21/2022 -  Chemotherapy    palonosetron (ALOXI), 0 25 mg, Intravenous, Once, 3 of 10 cycles  Administration: 0 25 mg (3/21/2022), 0 25 mg (4/4/2022)  fosaprepitant (EMEND) IVPB, 150 mg, Intravenous, Once, 3 of 10 cycles  Administration: 150 mg (3/21/2022), 150 mg (4/4/2022)  nivolumab (OPDIVO) IVPB, 240 mg (100 % of original dose 240 mg), Intravenous, Once, 5 of 12 cycles  Dose modification: 240 mg (original dose 240 mg, Cycle 1)  Administration: 240 mg (2/21/2022), 240 mg (3/7/2022), 240 mg (3/21/2022), 240 mg (4/4/2022)  oxaliplatin (ELOXATIN) chemo infusion, 85 mg/m2 = 164 9 mg, Intravenous, Once, 5 of 12 cycles  Administration: 164 9 mg (2/21/2022), 164 9 mg (3/7/2022), 164 9 mg (3/21/2022), 164 9 mg (4/4/2022)  fluorouracil (ADRUCIL) ambulatory infusion Soln, 1,200 mg/m2/day = 4,655 mg, Intravenous, Over 46 hours, 5 of 12 cycles         Current Hematologic/ Oncologic Treatment:      Modified FOLFOX 6 with Opdivo  The patient has had 4 cycles so far  Cycle 5  Is on the 16th of May  Interval History:      The patient returns for follow-up visit  He had his prostatectomy done on the 2nd of May  He is restarting chemotherapy on the 16th of May  He did have a nice response based on his symptoms where his swallowing had improved  The plan is to give him 3-4 more cycles and then repeat imaging  In terms of symptoms he states he is recovering from his prostatectomy  Denies any nausea vomiting currently  Occasionally does have some nausea  He states Zofran helps with that  Denies any constipation or diarrhea  Does have some epigastric discomfort returning so we will get him started on chemotherapy as soon as safe i e  the 16th of May  The rest of his 14 point review of systems today was negative  Test Results:    Imaging: No results found      Labs:   Lab Results   Component Value Date    WBC 6 29 04/29/2022    HGB 9 5 (L) 04/29/2022    HCT 29 4 (L) 04/29/2022 MCV 91 04/29/2022     04/29/2022     Lab Results   Component Value Date    K 3 1 (L) 04/29/2022     (H) 04/29/2022    CO2 26 04/29/2022    BUN 13 04/29/2022    CREATININE 0 79 04/29/2022    GLUF 97 02/18/2022    CALCIUM 8 4 04/29/2022    CORRECTEDCA 9 4 04/14/2022    AST 21 04/14/2022    ALT 30 04/14/2022    ALKPHOS 157 (H) 04/14/2022    EGFR 90 04/29/2022         Lab Results   Component Value Date    PSA 7 2 (H) 03/08/2021       Lab Results   Component Value Date    IRON 28 (L) 03/03/2022    TIBC 254 03/03/2022    FERRITIN 429 (H) 03/03/2022     ROS: As stated in the history of present illness otherwise his 14 point review of systems today was negative        Active Problems:   Patient Active Problem List   Diagnosis    Hypertension    Hypothyroid    Depression    BPH (benign prostatic hyperplasia)    COVID-19    Inflammatory arthritis    Rectus diastasis    Acute calculous cholecystitis    Gastric mass    Gastric adenocarcinoma ([address] )    Encounter for central line care    FTT (failure to thrive) in adult    Stomach cancer ([address] )    Cancer related pain    Moderate protein-calorie malnutrition ([address] )    Palliative care patient    Benign prostatic hyperplasia with urinary frequency       Past Medical History:   Past Medical History:   Diagnosis Date    Arthritis     shoulders    Cancer ([address] )     gastric mass    COVID 01/2021 jan 2021- no hospitalization    Disease of thyroid gland     had radioactive iodine in past    GERD (gastroesophageal reflux disease)     History of GI diverticular bleed     Hyperlipidemia     Hypertension     on no meds at present    Kidney stone     Port-A-Cath in place     S/P percutaneous endoscopic gastrostomy (PEG) tube placement ([address] )     Tinnitus     Wears glasses        Surgical History:   Past Surgical History:   Procedure Laterality Date    ADENOIDECTOMY      CHOLECYSTECTOMY LAPAROSCOPIC N/A 1/24/2022    Procedure: CHOLECYSTECTOMY LAPAROSCOPIC W/ INTRAOP CHOLANGIOGRAM;  Surgeon: Sesar Oswald DO;  [facility];  Service: General    COLONOSCOPY N/A 7/16/2016    Procedure: COLONOSCOPY;  Surgeon: Alyce Chavez MD;  [facility];  Service:     FL CHOLANGIOGRAM TRANSHEPATIC  1/24/2022    FL GUIDED CENTRAL VENOUS ACCESS DEVICE INSERTION  2/11/2022    HERNIA REPAIR      HI LAP,PROSTATECTOMY,RADICAL,W/NERVE SPARE,INCL ROBOTIC N/A 4/27/2022    Procedure: PROSTATECTOMY SIMPLE LAPAROSCOPIC  W ROBOTICS, BLADDER NECK RECONSTRUCTION;  Surgeon: Abhinav Celaya MD;  [facility];  Service: Urology    PROSTATE BIOPSY      TONSILLECTOMY      TUNNELED VENOUS PORT PLACEMENT N/A 2/11/2022    Procedure: INSERTION VENOUS PORT (PORT-A-CATH); Surgeon: Roel Martínez MD;  [facility];  Service: Surgical Oncology    UVULECTOMY         Family History:  No family history on file  Cancer-related family history is not on file  Social History:   Social History     Socioeconomic History    Marital status: /Civil Union     Spouse name: Not on file    Number of children: Not on file    Years of education: Not on file    Highest education level: Not on file   Occupational History    Not on file   Tobacco Use    Smoking status: Never Smoker    Smokeless tobacco: Never Used   Vaping Use    Vaping Use: Never used   Substance and Sexual Activity    Alcohol use: Yes     Comment: social    Drug use: Never    Sexual activity: Not on file   Other Topics Concern    Not on file   Social History Narrative    Not on file     Social Determinants of Health     Financial Resource Strain: Not on file   Food Insecurity: No Food Insecurity    Worried About Running Out of Food in the Last Year: Never true    Amrita of Food in the Last Year: Never true   Transportation Needs: No Transportation Needs    Lack of Transportation (Medical): No    Lack of Transportation (Non-Medical):  No   Physical Activity: Not on file Stress: Not on file   Social Connections: Not on file   Intimate Partner Violence: Not on file   Housing Stability: [address] Unable to Pay for Housing in the Last Year: No    Number of Places Lived in the Last Year: 1    Unstable Housing in the Last Year: No       Current Medications:   Current Outpatient Medications   Medication Sig Dispense Refill    bisacodyl (DULCOLAX) 5 mg EC tablet Take 1 tablet (5 mg total) by mouth daily as needed for constipation Related to pain medicines 30 tablet 0    diphenhydrAMINE (BENADRYL) 25 mg tablet Take 1 tablet (25 mg total) by mouth every 6 (six) hours as needed for itching 90 tablet 1    dronabinol (MARINOL) 5 MG capsule Take 1 capsule (5 mg total) by mouth 2 (two) times a day before meals 60 capsule 0    levocetirizine (XYZAL) 5 MG tablet Take 5 mg by mouth in the morning      levothyroxine 125 mcg tablet Take 125 mcg by mouth every morning        neomycin-bacitracin-polymyxin b (NEOSPORIN) ointment Apply to the tip of the penis twice daily as needed for catheter irritation  15 g 0    ondansetron (Zofran ODT) 4 mg disintegrating tablet Take 2 tablets (8 mg total) by mouth every 8 (eight) hours as needed for nausea or vomiting for up to 90 doses 90 tablet 3    oxyCODONE (OxyCONTIN) 10 mg 12 hr tablet Take 1 tablet (10 mg total) by mouth every 8 (eight) hours Max Daily Amount: 30 mg 90 tablet 0    oxyCODONE (ROXICODONE) 5 mg/5 mL solution Take 10-20mg PO Q4H PRN Moderate-severe pain 473 mL 0    pantoprazole (PROTONIX) 40 mg tablet Take 1 tablet (40 mg total) by mouth 2 (two) times a day Before breakfast and before bed to prevent acid accumulation   60 tablet 0    saliva substitute (MOUTH KOTE) Apply 5 sprays to the mouth or throat 4 (four) times a day as needed (dry mouth) 59 mL 0    senna-docusate sodium (SENOKOT S) 8 6-50 mg per tablet Take 1 tablet by mouth daily for 14 days 14 tablet 0    ondansetron (ZOFRAN) 4 mg tablet Take 1 tablet (4 mg total) by mouth every 8 (eight) hours as needed for nausea or vomiting (Patient not taking: Reported on 5/5/2022 ) 5 tablet 0     No current facility-administered medications for this visit  Allergies: No Known Allergies    Physical Exam:    Body surface area is 1 87 meters squared  Wt Readings from Last 3 Encounters:   05/05/22 75 8 kg (167 lb)   04/27/22 73 kg (161 lb)   04/12/22 74 4 kg (164 lb)        Temp Readings from Last 3 Encounters:   05/05/22 98 °F (36 7 °C) (Temporal)   04/29/22 (!) 97 4 °F (36 3 °C) (Oral)   04/04/22 98 4 °F (36 9 °C) (Temporal)        BP Readings from Last 3 Encounters:   05/05/22 150/78   04/29/22 165/80   04/12/22 130/68         Pulse Readings from Last 3 Encounters:   05/05/22 78   04/29/22 74   04/12/22 80       Physical Exam     Constitutional   General appearance: No acute distress, well appearing and well nourished  Eyes   Conjunctiva and lids: No swelling, erythema or discharge  Pupils and irises: Equal, round and reactive to light  Ears, Nose, Mouth, and Throat   External inspection of ears and nose: Normal     Nasal mucosa, septum, and turbinates: Normal without edema or erythema  Oropharynx: Normal with no erythema, edema, exudate or lesions  Pulmonary   Respiratory effort: No increased work of breathing or signs of respiratory distress  Auscultation of lungs: Clear to auscultation  Cardiovascular   Palpation of heart: Normal PMI, no thrills  Auscultation of heart: Normal rate and rhythm, normal S1 and S2, without murmurs  Examination of extremities for edema and/or varicosities: Normal     Carotid pulses: Normal     Abdomen   Abdomen: Non-tender, no masses  Liver and spleen: No hepatomegaly or splenomegaly  Lymphatic   Palpation of lymph nodes in neck: No lymphadenopathy  Musculoskeletal   Gait and station: Normal     Digits and nails: Normal without clubbing or cyanosis      Inspection/palpation of joints, bones, and muscles: Normal  Skin   Skin and subcutaneous tissue: Normal without rashes or lesions  Neurologic   Cranial nerves: Cranial nerves 2-12 intact  Sensation: No sensory loss  Psychiatric   Orientation to person, place, and time: Normal     Mood and affect: Normal         Assessment / Plan: 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 benign prostatic hypertrophy, hypertension, hypothyroidism and GERD was diagnosed with adenocarcinoma of the GE junction with extension into the fundus of the stomach when he presented with weight loss and epigastric pain  Biopsy confirmed adenocarcinoma which was HER2 negative  MMR was intact  Tumor mutation burden is low  MSI was stable  No other actionable mutations were detected  Fiona Sherman testing did show he may be eligible for TAPUR clinical trial which may be open with [facility] which looks at mutations and treatment with FDA approved drugs for those mutations outside of standard of care  He did have CDK 6 amplification and EGFR amplification for which they may have clinical trial if needed  For now he is on modified FOLFOX 6 with Opdivo every 2 weeks which I think is a very reasonable regimen based on his disease  The plan is to do 6-8 cycles and then repeat imaging  He has had 4 cycles so far  He will restart treatment in the next 2 weeks  This is after his surgery  I will see him back in 4-6 weeks  We will plan on 8 cycles of chemotherapy and then reimage  Until then if he has any questions he will call our office  Goals and Barriers:  Current Goal:  Prolong Survival from gastroesophageal cancer  Barriers: None  Patient's Capacity to Self Care:  Patient able to self care  Portions of the record may have been created with voice recognition software  Occasional wrong word or "sound a like" substitutions may have occurred due to the inherent limitations of voice recognition software    Read the chart carefully and recognize, using context, where substitutions have occurred

## 2022-05-05 NOTE — TELEPHONE ENCOUNTER
Thank you, irbesartan 150mg has been sent into his pharmacy to help his BP and we will recheck it on Tuesday

## 2022-05-05 NOTE — TELEPHONE ENCOUNTER
CALLED PT, GAVE MESSAGE, AND SCHEDULED APPT    HE'S NOT COMING IN UNTIL NEXT Tuesday AS HE DID NOT WANT TO COME IN TOMORROW     JUST AN FYI

## 2022-05-08 DIAGNOSIS — Z51.5 PALLIATIVE CARE PATIENT: ICD-10-CM

## 2022-05-08 DIAGNOSIS — G89.3 CANCER-RELATED PAIN: ICD-10-CM

## 2022-05-08 DIAGNOSIS — C16.9 GASTRIC ADENOCARCINOMA (HCC): ICD-10-CM

## 2022-05-08 DIAGNOSIS — R10.13 EPIGASTRIC PAIN: ICD-10-CM

## 2022-05-09 NOTE — TELEPHONE ENCOUNTER
Primary palliative medicine provider:   Keith    Medication requested:  Pantoprazole     If for pain, how has the patient been taking their pain medicine?      Last appointment:    Next scheduled appointment: 6/14    PDMP review: PHAM

## 2022-05-10 RX ORDER — PANTOPRAZOLE SODIUM 40 MG/1
40 TABLET, DELAYED RELEASE ORAL 2 TIMES DAILY
Qty: 60 TABLET | Refills: 0 | Status: SHIPPED | OUTPATIENT
Start: 2022-05-10 | End: 2022-06-08 | Stop reason: SDUPTHER

## 2022-05-11 ENCOUNTER — APPOINTMENT (OUTPATIENT)
Dept: LAB | Facility: AMBULARY SURGERY CENTER | Age: 72
End: 2022-05-11
Attending: UROLOGY
Payer: COMMERCIAL

## 2022-05-11 ENCOUNTER — PROCEDURE VISIT (OUTPATIENT)
Dept: UROLOGY | Facility: CLINIC | Age: 72
End: 2022-05-11

## 2022-05-11 VITALS
HEART RATE: 78 BPM | DIASTOLIC BLOOD PRESSURE: 80 MMHG | WEIGHT: 159 LBS | BODY MASS INDEX: 24.96 KG/M2 | HEIGHT: 67 IN | SYSTOLIC BLOOD PRESSURE: 140 MMHG

## 2022-05-11 DIAGNOSIS — R35.0 BENIGN PROSTATIC HYPERPLASIA WITH URINARY FREQUENCY: Primary | ICD-10-CM

## 2022-05-11 DIAGNOSIS — R35.0 BENIGN PROSTATIC HYPERPLASIA WITH URINARY FREQUENCY: ICD-10-CM

## 2022-05-11 DIAGNOSIS — N40.1 BENIGN PROSTATIC HYPERPLASIA WITH URINARY FREQUENCY: ICD-10-CM

## 2022-05-11 DIAGNOSIS — N40.1 BENIGN PROSTATIC HYPERPLASIA WITH URINARY FREQUENCY: Primary | ICD-10-CM

## 2022-05-11 PROCEDURE — 87086 URINE CULTURE/COLONY COUNT: CPT

## 2022-05-11 PROCEDURE — 87186 SC STD MICRODIL/AGAR DIL: CPT

## 2022-05-11 PROCEDURE — 87077 CULTURE AEROBIC IDENTIFY: CPT

## 2022-05-11 PROCEDURE — 99024 POSTOP FOLLOW-UP VISIT: CPT

## 2022-05-11 NOTE — TELEPHONE ENCOUNTER
Primary palliative medicine provider:   Keith    Medication requested:  bisacodyl (DULCOLAX)       If for pain, how has the patient been taking their pain medicine?      Last appointment: 3/22/22    Next scheduled appointment: 6/14/22    PDMP review:  jarad

## 2022-05-11 NOTE — PROGRESS NOTES
5/11/2022    yLnn Onofre is a 70 y o  male  [id number]    Diagnosis:  Chief Complaint     Post-op          Patient presents for Wilder catheter removal s/p prostatectomy on 4/27/22 with Dr Betsey Toro:  As scheduled    Procedure: Wilder catheter was removed after deflation of an intact balloon  Patient tolerated the procedure well  He insisted on having urine culture done  He is not having symptoms, he states "it is necessary because I had a catheter in for 14 day"  Urine culture order provided to patient  He was advised if he is having difficulties urinating or unable to urinate, he will need to call the office  He verbalized understanding      Vitals:    05/11/22 1252   BP: 140/80   Pulse: 78   Weight: 72 1 kg (159 lb)   Height: 5' 7" (1 702 m)         Teresita Hodge, RN,BSN

## 2022-05-12 ENCOUNTER — HOSPITAL ENCOUNTER (OUTPATIENT)
Dept: INFUSION CENTER | Facility: CLINIC | Age: 72
Discharge: HOME | End: 2022-05-12
Payer: COMMERCIAL

## 2022-05-12 DIAGNOSIS — C16.9 GASTRIC ADENOCARCINOMA (HCC): Primary | ICD-10-CM

## 2022-05-12 DIAGNOSIS — Z45.2 ENCOUNTER FOR CENTRAL LINE CARE: ICD-10-CM

## 2022-05-12 LAB
ALBUMIN SERPL BCP-MCNC: 3.4 G/DL (ref 3.5–5)
ALP SERPL-CCNC: 126 U/L (ref 34–104)
ALT SERPL W P-5'-P-CCNC: 11 U/L (ref 7–52)
ANION GAP SERPL CALCULATED.3IONS-SCNC: 5 MMOL/L (ref 4–13)
AST SERPL W P-5'-P-CCNC: 19 U/L (ref 13–39)
BASOPHILS # BLD AUTO: 0.02 THOUSANDS/ΜL (ref 0–0.1)
BASOPHILS NFR BLD AUTO: 0 % (ref 0–1)
BILIRUB SERPL-MCNC: 0.63 MG/DL (ref 0.2–1)
BUN SERPL-MCNC: 15 MG/DL (ref 5–25)
CALCIUM ALBUM COR SERPL-MCNC: 9.3 MG/DL (ref 8.3–10.1)
CALCIUM SERPL-MCNC: 8.8 MG/DL (ref 8.4–10.2)
CHLORIDE SERPL-SCNC: 103 MMOL/L (ref 96–108)
CO2 SERPL-SCNC: 30 MMOL/L (ref 21–32)
CREAT SERPL-MCNC: 0.76 MG/DL (ref 0.6–1.3)
EOSINOPHIL # BLD AUTO: 0.44 THOUSAND/ΜL (ref 0–0.61)
EOSINOPHIL NFR BLD AUTO: 7 % (ref 0–6)
ERYTHROCYTE [DISTWIDTH] IN BLOOD BY AUTOMATED COUNT: 14.6 % (ref 11.6–15.1)
GFR SERPL CREATININE-BSD FRML MDRD: 91 ML/MIN/1.73SQ M
GLUCOSE SERPL-MCNC: 113 MG/DL (ref 65–140)
HCT VFR BLD AUTO: 34.8 % (ref 36.5–49.3)
HGB BLD-MCNC: 11.1 G/DL (ref 12–17)
IMM GRANULOCYTES # BLD AUTO: 0.02 THOUSAND/UL (ref 0–0.2)
IMM GRANULOCYTES NFR BLD AUTO: 0 % (ref 0–2)
LYMPHOCYTES # BLD AUTO: 0.95 THOUSANDS/ΜL (ref 0.6–4.47)
LYMPHOCYTES NFR BLD AUTO: 15 % (ref 14–44)
MCH RBC QN AUTO: 29.6 PG (ref 26.8–34.3)
MCHC RBC AUTO-ENTMCNC: 31.9 G/DL (ref 31.4–37.4)
MCV RBC AUTO: 93 FL (ref 82–98)
MONOCYTES # BLD AUTO: 0.8 THOUSAND/ΜL (ref 0.17–1.22)
MONOCYTES NFR BLD AUTO: 13 % (ref 4–12)
NEUTROPHILS # BLD AUTO: 3.93 THOUSANDS/ΜL (ref 1.85–7.62)
NEUTS SEG NFR BLD AUTO: 65 % (ref 43–75)
NRBC BLD AUTO-RTO: 0 /100 WBCS
PLATELET # BLD AUTO: 313 THOUSANDS/UL (ref 149–390)
PMV BLD AUTO: 9.6 FL (ref 8.9–12.7)
POTASSIUM SERPL-SCNC: 3.9 MMOL/L (ref 3.5–5.3)
PROT SERPL-MCNC: 6.1 G/DL (ref 6.4–8.4)
RBC # BLD AUTO: 3.75 MILLION/UL (ref 3.88–5.62)
SODIUM SERPL-SCNC: 138 MMOL/L (ref 135–147)
WBC # BLD AUTO: 6.16 THOUSAND/UL (ref 4.31–10.16)

## 2022-05-12 PROCEDURE — 85025 COMPLETE CBC W/AUTO DIFF WBC: CPT | Performed by: INTERNAL MEDICINE

## 2022-05-12 PROCEDURE — 80053 COMPREHEN METABOLIC PANEL: CPT | Performed by: INTERNAL MEDICINE

## 2022-05-13 ENCOUNTER — TELEPHONE (OUTPATIENT)
Dept: UROLOGY | Facility: AMBULATORY SURGERY CENTER | Age: 72
End: 2022-05-13

## 2022-05-13 ENCOUNTER — OFFICE VISIT (OUTPATIENT)
Dept: UROLOGY | Facility: AMBULATORY SURGERY CENTER | Age: 72
End: 2022-05-13

## 2022-05-13 ENCOUNTER — TELEPHONE (OUTPATIENT)
Dept: HEMATOLOGY ONCOLOGY | Facility: HOSPITAL | Age: 72
End: 2022-05-13

## 2022-05-13 ENCOUNTER — APPOINTMENT (OUTPATIENT)
Dept: LAB | Age: 72
End: 2022-05-13
Payer: COMMERCIAL

## 2022-05-13 VITALS
SYSTOLIC BLOOD PRESSURE: 92 MMHG | DIASTOLIC BLOOD PRESSURE: 58 MMHG | BODY MASS INDEX: 24.01 KG/M2 | HEIGHT: 67 IN | HEART RATE: 69 BPM | WEIGHT: 153 LBS

## 2022-05-13 DIAGNOSIS — R35.0 BENIGN PROSTATIC HYPERPLASIA WITH URINARY FREQUENCY: Primary | ICD-10-CM

## 2022-05-13 DIAGNOSIS — N40.1 BENIGN PROSTATIC HYPERPLASIA WITH URINARY FREQUENCY: Primary | ICD-10-CM

## 2022-05-13 DIAGNOSIS — C16.9 GASTRIC ADENOCARCINOMA (HCC): ICD-10-CM

## 2022-05-13 DIAGNOSIS — C16.9 GASTRIC ADENOCARCINOMA (HCC): Primary | ICD-10-CM

## 2022-05-13 DIAGNOSIS — N30.00 ACUTE CYSTITIS WITHOUT HEMATURIA: Primary | ICD-10-CM

## 2022-05-13 DIAGNOSIS — R97.20 ELEVATED PSA: ICD-10-CM

## 2022-05-13 LAB
ALBUMIN SERPL BCP-MCNC: 3.2 G/DL (ref 3.5–5)
ALP SERPL-CCNC: 151 U/L (ref 46–116)
ALT SERPL W P-5'-P-CCNC: 22 U/L (ref 12–78)
ANION GAP SERPL CALCULATED.3IONS-SCNC: 6 MMOL/L (ref 4–13)
AST SERPL W P-5'-P-CCNC: 24 U/L (ref 5–45)
BACTERIA UR CULT: ABNORMAL
BASOPHILS # BLD AUTO: 0.03 THOUSANDS/ΜL (ref 0–0.1)
BASOPHILS NFR BLD AUTO: 1 % (ref 0–1)
BILIRUB SERPL-MCNC: 0.66 MG/DL (ref 0.2–1)
BUN SERPL-MCNC: 18 MG/DL (ref 5–25)
CALCIUM ALBUM COR SERPL-MCNC: 9.6 MG/DL (ref 8.3–10.1)
CALCIUM SERPL-MCNC: 9 MG/DL (ref 8.3–10.1)
CHLORIDE SERPL-SCNC: 104 MMOL/L (ref 100–108)
CO2 SERPL-SCNC: 28 MMOL/L (ref 21–32)
CREAT SERPL-MCNC: 0.91 MG/DL (ref 0.6–1.3)
EOSINOPHIL # BLD AUTO: 0.39 THOUSAND/ΜL (ref 0–0.61)
EOSINOPHIL NFR BLD AUTO: 6 % (ref 0–6)
ERYTHROCYTE [DISTWIDTH] IN BLOOD BY AUTOMATED COUNT: 14.7 % (ref 11.6–15.1)
GFR SERPL CREATININE-BSD FRML MDRD: 84 ML/MIN/1.73SQ M
GLUCOSE SERPL-MCNC: 128 MG/DL (ref 65–140)
HCT VFR BLD AUTO: 36.2 % (ref 36.5–49.3)
HGB BLD-MCNC: 11.5 G/DL (ref 12–17)
IMM GRANULOCYTES # BLD AUTO: 0.02 THOUSAND/UL (ref 0–0.2)
IMM GRANULOCYTES NFR BLD AUTO: 0 % (ref 0–2)
LYMPHOCYTES # BLD AUTO: 1.27 THOUSANDS/ΜL (ref 0.6–4.47)
LYMPHOCYTES NFR BLD AUTO: 20 % (ref 14–44)
MCH RBC QN AUTO: 29.5 PG (ref 26.8–34.3)
MCHC RBC AUTO-ENTMCNC: 31.8 G/DL (ref 31.4–37.4)
MCV RBC AUTO: 93 FL (ref 82–98)
MONOCYTES # BLD AUTO: 0.66 THOUSAND/ΜL (ref 0.17–1.22)
MONOCYTES NFR BLD AUTO: 10 % (ref 4–12)
NEUTROPHILS # BLD AUTO: 4.02 THOUSANDS/ΜL (ref 1.85–7.62)
NEUTS SEG NFR BLD AUTO: 63 % (ref 43–75)
NRBC BLD AUTO-RTO: 0 /100 WBCS
PLATELET # BLD AUTO: 362 THOUSANDS/UL (ref 149–390)
PMV BLD AUTO: 10 FL (ref 8.9–12.7)
POTASSIUM SERPL-SCNC: 4.5 MMOL/L (ref 3.5–5.3)
PROT SERPL-MCNC: 6.9 G/DL (ref 6.4–8.2)
PSA SERPL-MCNC: 0.2 NG/ML (ref 0–4)
RBC # BLD AUTO: 3.9 MILLION/UL (ref 3.88–5.62)
SODIUM SERPL-SCNC: 138 MMOL/L (ref 136–145)
TSH SERPL DL<=0.05 MIU/L-ACNC: 2.98 UIU/ML (ref 0.45–4.5)
WBC # BLD AUTO: 6.39 THOUSAND/UL (ref 4.31–10.16)

## 2022-05-13 PROCEDURE — 85025 COMPLETE CBC W/AUTO DIFF WBC: CPT

## 2022-05-13 PROCEDURE — 99024 POSTOP FOLLOW-UP VISIT: CPT | Performed by: UROLOGY

## 2022-05-13 PROCEDURE — 3008F BODY MASS INDEX DOCD: CPT | Performed by: INTERNAL MEDICINE

## 2022-05-13 PROCEDURE — 36415 COLL VENOUS BLD VENIPUNCTURE: CPT

## 2022-05-13 PROCEDURE — 84153 ASSAY OF PSA TOTAL: CPT

## 2022-05-13 PROCEDURE — 80053 COMPREHEN METABOLIC PANEL: CPT

## 2022-05-13 PROCEDURE — 84443 ASSAY THYROID STIM HORMONE: CPT

## 2022-05-13 RX ORDER — CEPHALEXIN 500 MG/1
500 CAPSULE ORAL 3 TIMES DAILY
Qty: 15 CAPSULE | Refills: 0 | Status: SHIPPED | OUTPATIENT
Start: 2022-05-13 | End: 2022-05-18

## 2022-05-13 NOTE — TELEPHONE ENCOUNTER
Called and spoke to pt notifying him to get a TSH drawn for Monday treatment related to [address]  TSH added in treatment plan as recurring order  Pt verbalized good understanding

## 2022-05-13 NOTE — PROGRESS NOTES
Enid Jaramillo is a 57-year-old male who unfortunately has stage IV gastric cancer  He also had marked BPH with difficulty voiding  He underwent a robot assisted laparoscopic simple prostatectomy just over 2 weeks ago  His catheter was removed by the nursing staff approximately 2 days ago  Pathology reveals a 103 g benign prostate  He states that he is voiding well with a strong urinary stream and is quite pleased with the results of surgery  On examination his incisions are healing well this time  Impression:  BPH, status post robot assisted laparoscopic simple prostatectomy    Plan: We reviewed pathology which reveals 103 g of benign tissue  Follow-up in 6 months with postvoid residual assessment and uroflow evaluation recommended

## 2022-05-13 NOTE — TELEPHONE ENCOUNTER
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

## 2022-05-13 NOTE — TELEPHONE ENCOUNTER
----- Message from Uche Montiel RN sent at 5/12/2022  3:20 PM EDT -----  Sorry, he is now being followed by Dr Quynh Polo  I'll forward to [facility]  ----- Message -----  From: Avery Whatley, Pharmacist  Sent: 5/12/2022   3:15 PM EDT  To: Aan Zhang RN, Uche Montiel RN    Hey! I noticed Fidencio Robles hasn't had TSH checked in a while and he is on opdivo  Please advise for treatment on Monday  He did already have his labs drawn for treatment but just wanted to check  Thanks!

## 2022-05-14 ENCOUNTER — NURSE TRIAGE (OUTPATIENT)
Dept: OTHER | Facility: OTHER | Age: 72
End: 2022-05-14

## 2022-05-14 NOTE — TELEPHONE ENCOUNTER
Reason for Disposition   [1] POSITIVE urine test AND [2] antibiotic treatment in past month for urine infection    Answer Assessment - Initial Assessment Questions  1  TEST: "Was it a urinalysis or a urine culture for UTI?"      Urine culture    2  FEVER: "Do you have a fever?" If Yes, ask: "What is your temperature, how was it measured, and when did it start?"      Denies    3  FLANK PAIN: "Do you have any pain in your side?"     No    Protocols used: URINALYSIS RESULTS FOLLOW-UP CALL-[facility]    Patient's urine culture shows positive for klebsiella pneumoniae  Taking Kelfex  Would like to know if he should continue taking medication based on results  Advised patient that he should continue taking medication and medication will treat infection based on culture  Pt verbalized understanding

## 2022-05-14 NOTE — TELEPHONE ENCOUNTER
Regarding: urinary infection after cath removal   ----- Message from Edward Hightower sent at 5/14/2022  9:44 AM EDT -----  " I had a catheter removed and now my urine culture came back and says I have infection  "

## 2022-05-16 ENCOUNTER — HOSPITAL ENCOUNTER (OUTPATIENT)
Dept: INFUSION CENTER | Facility: CLINIC | Age: 72
Discharge: HOME | End: 2022-05-16
Payer: COMMERCIAL

## 2022-05-16 ENCOUNTER — PATIENT OUTREACH (OUTPATIENT)
Dept: CASE MANAGEMENT | Facility: HOSPITAL | Age: 72
End: 2022-05-16

## 2022-05-16 ENCOUNTER — TELEPHONE (OUTPATIENT)
Dept: GASTROENTEROLOGY | Facility: CLINIC | Age: 72
End: 2022-05-16

## 2022-05-16 VITALS
WEIGHT: 156 LBS | BODY MASS INDEX: 24.48 KG/M2 | HEIGHT: 67 IN | DIASTOLIC BLOOD PRESSURE: 70 MMHG | SYSTOLIC BLOOD PRESSURE: 118 MMHG | TEMPERATURE: 97.6 F | HEART RATE: 80 BPM | OXYGEN SATURATION: 99 % | RESPIRATION RATE: 18 BRPM

## 2022-05-16 DIAGNOSIS — C16.9 GASTRIC ADENOCARCINOMA (HCC): Primary | ICD-10-CM

## 2022-05-16 PROCEDURE — 96415 CHEMO IV INFUSION ADDL HR: CPT

## 2022-05-16 PROCEDURE — 96367 TX/PROPH/DG ADDL SEQ IV INF: CPT

## 2022-05-16 PROCEDURE — G0498 CHEMO EXTEND IV INFUS W/PUMP: HCPCS

## 2022-05-16 PROCEDURE — 96417 CHEMO IV INFUS EACH ADDL SEQ: CPT

## 2022-05-16 PROCEDURE — 96413 CHEMO IV INFUSION 1 HR: CPT

## 2022-05-16 PROCEDURE — 96375 TX/PRO/DX INJ NEW DRUG ADDON: CPT

## 2022-05-16 RX ORDER — SODIUM CHLORIDE 9 MG/ML
20 INJECTION, SOLUTION INTRAVENOUS ONCE AS NEEDED
Status: DISCONTINUED | OUTPATIENT
Start: 2022-05-16 | End: 2022-05-19 | Stop reason: HOSPADM

## 2022-05-16 RX ORDER — PALONOSETRON 0.05 MG/ML
0.25 INJECTION, SOLUTION INTRAVENOUS ONCE
Status: COMPLETED | OUTPATIENT
Start: 2022-05-16 | End: 2022-05-16

## 2022-05-16 RX ORDER — DEXTROSE MONOHYDRATE 50 MG/ML
20 INJECTION, SOLUTION INTRAVENOUS ONCE
Status: COMPLETED | OUTPATIENT
Start: 2022-05-16 | End: 2022-05-16

## 2022-05-16 RX ADMIN — DEXAMETHASONE SODIUM PHOSPHATE 10 MG: 10 INJECTION, SOLUTION INTRAMUSCULAR; INTRAVENOUS at 08:38

## 2022-05-16 RX ADMIN — OXALIPLATIN 158.1 MG: 5 INJECTION, SOLUTION INTRAVENOUS at 10:51

## 2022-05-16 RX ADMIN — SODIUM CHLORIDE 20 ML/HR: 9 INJECTION, SOLUTION INTRAVENOUS at 08:38

## 2022-05-16 RX ADMIN — PALONOSETRON 0.25 MG: 0.05 INJECTION, SOLUTION INTRAVENOUS at 09:03

## 2022-05-16 RX ADMIN — SODIUM CHLORIDE 240 MG: 9 INJECTION, SOLUTION INTRAVENOUS at 09:51

## 2022-05-16 RX ADMIN — FOSAPREPITANT 150 MG: 150 INJECTION, POWDER, LYOPHILIZED, FOR SOLUTION INTRAVENOUS at 09:04

## 2022-05-16 RX ADMIN — DEXTROSE 20 ML/HR: 5 SOLUTION INTRAVENOUS at 10:45

## 2022-05-16 NOTE — PLAN OF CARE
Problem: Potential for Falls  Goal: Patient will remain free of falls  Description: INTERVENTIONS:  - Educate patient/family on patient safety including physical limitations  - Instruct patient to call for assistance with activity   - Consult OT/PT to assist with strengthening/mobility   - Keep Call bell within reach  - Keep bed low and locked with side rails adjusted as appropriate  - Keep care items and personal belongings within reach  - Initiate and maintain comfort rounds  - Make Fall Risk Sign visible to staff  -- Apply yellow socks and bracelet for high fall risk patients  - Consider moving patient to room near nurses station  5/16/2022 0958 by Shelbi Dunne RN  Outcome: Sarah Fear  5/16/2022 0822 by Shelbi Dunne RN  Outcome: Progressing     Problem: Knowledge Deficit  Goal: Patient/family/caregiver demonstrates understanding of disease process, treatment plan, medications, and discharge instructions  Description: Complete learning assessment and assess knowledge base    Interventions:  - Provide teaching at level of understanding  - Provide teaching via preferred learning methods  5/16/2022 0958 by Shelbi Dunne RN  Outcome: Progressing  5/16/2022 0822 by Shelbi Dunne RN  Outcome: Progressing

## 2022-05-16 NOTE — PATIENT INSTRUCTIONS
May 2022      Christofer Monday Tuesday Wednesday Thursday Friday Saturday   1     2     3     4     5    FOLLOW UP PG  10:05 AM   (20 min )   Trena Palacio MD   [facility] [place] 6     7                8     9     10     11    PROCEDURE LONG PG  12:45 PM   (30 min )   [address] [facility] [place]    LAB WALK IN   1:30 PM   (5 min )   AN SPECIALTY PAVILION LAB CHAIR 1   [facility] 12    INF BLOOD SPECIMENS-CENTRAL  11:00 AM   (60 min )   AN INF QUICK CHAIR 01   [facility] [address] 13    OFFICE VISIT LONG PG   3:30 PM   (30 min )   Devan Hurtado MD   [facility] [place]    LAB WALK IN   5:35 PM   (5 min )   BE SLN CHAIR 1   St  [facility] - [address] 14                15     16    INF ONCOLOGY TX-TREATMENT PLAN   8:00 AM   (290 min )   AN INF CHAIR [facility]    TRANSITIONAL CARE [facility] PG   1:15 PM   (30 min )   Berta Shields DO   [facility] 17     18    INF ONCOLOGY TX-TREATMENT PLAN  12:00 PM   (30 min )   AN INF QUICK CHAIR 01   [address] [place] [address] 19     20     21        Cycle 5, Day 1        22     23     24     25     26    INF BLOOD SPECIMENS-CENTRAL  11:30 AM   (60 min )   AN INF QUICK CHAIR   [address] [place] [address] Street 27     28                29     30     31    INF ONCOLOGY TX-TREATMENT PLAN   8:00 AM   (290 min )   AN INF CHAIR 2   [address] [place] [address]                         Cycle 6, Day 1              Treatment Details         5/16/2022 - Cycle 5, Day 1      Chemotherapy: NIVOLUMAB IVPB, ONCBCN PROVIDER COMMUNICATION5, OXALIPLATIN INFUSION      Take-Home Chemo: ONCBCN PROVIDER COMMUNICATION8, FLUOROURACIL AMBULATORY INFUSION    5/31/2022 - Cycle 6, Day 1      Chemotherapy: NIVOLUMAB IVPB, [facility] PROVIDER COMMUNICATION5, OXALIPLATIN INFUSION      Take-Home ChemoThuy Escalera PROVIDER Ahmad Garcia AMBULATORY INFUSION        June 2022 Sunday Monday Tuesday Wednesday Thursday Friday Saturday                  1     2    INF ONCOLOGY TX-TREATMENT PLAN  12:00 PM   (30 min )   AN INF QUICK CHAIR [address] 3     4                5     6     7    FOLLOW UP PG  10:45 AM   (20 min )   MD Janene Roman [facility] [place] 8     9    INF BLOOD SPECIMENS-CENTRAL  10:00 AM   (60 min )   AN INF QUICK CHAIR 01   [address] 10     11                12  Happy Birthday!     13    INF ONCOLOGY TX-TREATMENT PLAN   8:00 AM   (290 min )   AN INF CHAIR 2   [address] [place] [address] 14    HOSP FOLLOW UP PG  12:45 PM   (40 min )   Nikkie Becker DO   [facility] 90 15    INF ONCOLOGY TX-TREATMENT PLAN  12:00 PM   (30 min )   AN INF QUICK CHAIR 01   [address] [place] [address] 16     17     18        Cycle 7, Day 1        19     20     21     22     23    INF BLOOD SPECIMENS-CENTRAL  11:30 AM   (60 min )   AN INF QUICK CHAIR   St  [address] [place] [address] 24     25                26     27    INF ONCOLOGY TX-TREATMENT PLAN   8:30 AM   (290 min )   AN INF CHAIR [address] [place] [address] 28     29    INF ONCOLOGY TX-TREATMENT PLAN  12:00 PM   (30 min )   AN INF QUICK CHAIR [address] [place] [address] 30                  Cycle 8, Day 1               Treatment Details         6/13/2022 - Cycle 7, Day 1      Chemotherapy: NIVOLUMAB IVPB, ONCBCN PROVIDER COMMUNICATION5, OXALIPLATIN INFUSION      Take-Home Chemo: ONCBCN PROVIDER COMMUNICATION8, FLUOROURACIL AMBULATORY INFUSION    6/27/2022 - Cycle 8, Day 1      Chemotherapy: NIVOLUMAB IVPB, ONCBCN PROVIDER COMMUNICATION5, OXALIPLATIN INFUSION Take-Home ChemoLottie Cone PROVIDER Rob Will AMBULATORY INFUSION              May 2022      Christofer Monday Tuesday Wednesday Thursday Friday Saturday   1     2     3     4     5    FOLLOW UP PG  10:05 AM   (20 min )   MD Carol Bhakta [facility] [place] 6     7                8     9     10     11    PROCEDURE LONG PG  12:45 PM   (30 min )   [address] [facility] [place]    LAB WALK IN   1:30 PM   (5 min )   AN SPECIALTY PAVILION LAB CHAIR 1   [facility] Specialty Pavilion 12    INF BLOOD SPECIMENS-CENTRAL  11:00 AM   (60 min )   AN INF QUICK CHAIR 01   [facility] [address] 13    OFFICE VISIT LONG PG   3:30 PM   (30 min )   Varsha Lopes MD   [facility] [place]    LAB WALK IN   5:35 PM   (5 min )   BE SLN CHAIR 1   [facility] Services - [address] 14                15     16    INF ONCOLOGY TX-TREATMENT PLAN   8:00 AM   (290 min )   AN INF CHAIR [facility]    TRANSITIONAL CARE [facility] PG   1:15 PM   (30 min )   Lisa Mejias DO   [facility] 17     18    INF ONCOLOGY TX-TREATMENT PLAN  12:00 PM   (30 min )   AN INF QUICK CHAIR 01   [address] [place] [address] 19     20     21        Cycle 5, Day 1        22     23     24     25     26    INF BLOOD SPECIMENS-CENTRAL  11:30 AM   (60 min )   AN INF [address] 27     28                29     30     31    INF ONCOLOGY TX-TREATMENT PLAN   8:00 AM   (290 min )   AN INF CHAIR 2   [address] [place] [address]                         Cycle 6, Day 1              Treatment Details         5/16/2022 - Cycle 5, Day 1      Chemotherapy: NIVOLUMAB IVPB, ONCBCN PROVIDER COMMUNICATION5, OXALIPLATIN INFUSION      Take-Home Chemo: [address] FLUOROURACIL AMBULATORY INFUSION    5/31/2022 - Cycle 6, Day 1      Chemotherapy: NIVOLUMAB IVPB, ONCBCN PROVIDER COMMUNICATION5, OXALIPLATIN INFUSION      Take-Home Chemo: ONCBCN PROVIDER Ana Brink AMBULATORY INFUSION        June 2022 Sunday Monday Tuesday Wednesday Thursday Friday Saturday                  1     2    INF ONCOLOGY TX-TREATMENT PLAN  12:00 PM   (30 min )   AN INF QUICK CHAIR [address] 3     4                5     6     7    FOLLOW UP PG  10:45 AM   (20 min )   [facility] [place] 8     9    INF BLOOD SPECIMENS-CENTRAL  10:00 AM   (60 min )   AN INF QUICK CHAIR 01   [facility] 302 [facility] 10     11                12  Happy Birthday!     13    INF ONCOLOGY TX-TREATMENT PLAN   8:00 AM   (290 min )   AN INF CHAIR 2   [address] [place] [address] Street 14    HOSP FOLLOW UP PG  12:45 PM   (40 min )   [facility] 90 15    INF ONCOLOGY TX-TREATMENT PLAN  12:00 PM   (30 min )   AN INF QUICK CHAIR 01   [address] [place] [address] Street 16     17     18        Cycle 7, Day 1        19     20     21     22     23    INF BLOOD SPECIMENS-CENTRAL  11:30 AM   (60 min )   AN INF QUICK CHAIR   St  [address] [place], [place] [address] Street 24     25                26     27    INF ONCOLOGY TX-TREATMENT PLAN   8:30 AM   (290 min )   AN INF CHAIR [address] 28     29    INF ONCOLOGY TX-TREATMENT PLAN  12:00 PM   (30 min )   AN INF QUICK CHAIR [address] 30                  Cycle 8, Day 1               Treatment Details         6/13/2022 - Cycle 7, Day 1      Chemotherapy: NIVOLUMAB IVPB, ONCBCN PROVIDER COMMUNICATION5, OXALIPLATIN INFUSION      Take-Home Chemo: ONCBCN PROVIDER COMMUNICATION8, FLUOROURACIL AMBULATORY INFUSION    6/27/2022 - Cycle 8, Day 1      Chemotherapy: NIVOLUMAB IVPB, ONCBCN PROVIDER COMMUNICATION5, OXALIPLATIN INFUSION      Take-Home Chemo: ONCBCN PROVIDER COMMUNICATION8, FLUOROURACIL AMBULATORY INFUSION

## 2022-05-16 NOTE — TELEPHONE ENCOUNTER
Patients GI provider:  Dr Suzanne Montague    Number to return call: ([phone], daughter Tete Elmore)    Reason for call: Daughter calling to schedule an EGD so her father can have his PEG tube removed while asleep  (See Dr Suzanne Montague message to patient  Thank you!      Scheduled procedure/appointment date if applicable: Apt/procedure

## 2022-05-16 NOTE — PROGRESS NOTES
Patient tolerated treatment today without incident and was discharged post, no c/o offered  LCW port dressing reinforced, all clamps to elastomeric pump open at discharge, 5ml/hr,  connection secured with gauze and tape  Good blood return noted prior to pump connect  Patient aware to RTO 5/18 at 1100 for disconnect  He did view educational video regarding this pump and was offered printed education as well  AVS given at discharge  Patient without c/o

## 2022-05-16 NOTE — PROGRESS NOTES
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

## 2022-05-18 ENCOUNTER — HOSPITAL ENCOUNTER (OUTPATIENT)
Dept: INFUSION CENTER | Facility: CLINIC | Age: 72
Discharge: HOME | End: 2022-05-18

## 2022-05-18 VITALS
RESPIRATION RATE: 18 BRPM | DIASTOLIC BLOOD PRESSURE: 63 MMHG | TEMPERATURE: 97.7 F | SYSTOLIC BLOOD PRESSURE: 100 MMHG | HEART RATE: 68 BPM

## 2022-05-18 DIAGNOSIS — C16.9 GASTRIC ADENOCARCINOMA (HCC): Primary | ICD-10-CM

## 2022-05-18 NOTE — PROGRESS NOTES
Patient presents today for pump disconnect  Patient offers no complaints  VSS  Elastmetric pump appears deflated, ordered 46 hours completed  Port with excellent blood return noted prior to flushing and de-accessing as per protocol  Next appointment confirmed  AVS offered and declined

## 2022-05-19 ENCOUNTER — PREP FOR PROCEDURE (OUTPATIENT)
Dept: GASTROENTEROLOGY | Facility: CLINIC | Age: 72
End: 2022-05-19

## 2022-05-19 DIAGNOSIS — C16.9 MALIGNANT NEOPLASM OF STOMACH, UNSPECIFIED LOCATION (HCC): Primary | ICD-10-CM

## 2022-05-19 NOTE — TELEPHONE ENCOUNTER
Pt's daughter called again very upset that no one has reached out  She stated he has stage 4 cancer and needs to have this scheduled ASAP  [phone] is her number

## 2022-05-19 NOTE — TELEPHONE ENCOUNTER
Kristi Plants & Pat ok'd to schedule at 7:30am      Scheduled date of EGD(as of today): 5/27/2022  Physician performing EGD: Dr Jewell  Location of EGD: [facility] [place], operated by [facility]  Instructions reviewed with patient by: Margret/darryl  Clearances: N/A

## 2022-05-23 DIAGNOSIS — G89.3 CANCER ASSOCIATED PAIN: ICD-10-CM

## 2022-05-23 DIAGNOSIS — C16.9 GASTRIC ADENOCARCINOMA (HCC): ICD-10-CM

## 2022-05-23 RX ORDER — OXYCODONE HCL 5 MG/5 ML
SOLUTION, ORAL ORAL
Qty: 473 ML | Refills: 0 | Status: SHIPPED | OUTPATIENT
Start: 2022-05-23

## 2022-05-24 ENCOUNTER — TELEPHONE (OUTPATIENT)
Dept: PALLIATIVE MEDICINE | Facility: CLINIC | Age: 72
End: 2022-05-24

## 2022-05-24 DIAGNOSIS — C16.9 GASTRIC ADENOCARCINOMA (HCC): Primary | ICD-10-CM

## 2022-05-25 ENCOUNTER — TELEPHONE (OUTPATIENT)
Dept: HEMATOLOGY ONCOLOGY | Facility: CLINIC | Age: 72
End: 2022-05-25

## 2022-05-25 DIAGNOSIS — L29.9 PRURITUS: Primary | ICD-10-CM

## 2022-05-25 RX ORDER — METHYLPREDNISOLONE 4 MG/1
TABLET ORAL
Qty: 1 EACH | Refills: 0 | Status: SHIPPED | OUTPATIENT
Start: 2022-05-25 | End: 2022-05-30

## 2022-05-25 NOTE — TELEPHONE ENCOUNTER
My dad is experiencing extreme itching  I am assuming it is a chemo side effect? Is that common and how can he treat it? He is going insane over it  He has scratched so much its becoming irritated  This  is all over his body  He has used xyzal and Benadryl as well as many different topical creams  Thank you in advance       Donovan daughter Kailyn Villatoro     May 25, 2022    Noy Melara  to Me  Jen Barber RN        7:41 AM  Note  Please review, thank you  Jen Barber RN  to Ryder Prasad MD    BT   8:00 AM  Can we maybe try corticosteroids to help manage the rash if this may be related to his opdivo? So see if that may help thanks       Ryder Prasad MD  to Me      8:13 AM  We can prescribe a Medrol Dosepak   Hold the [address] [place] [address] Road for the next 2 cycles   Continue the chemotherapy  Called and notified patient of the above plan  He verbalized understanding and was agreeable  He stated he had the rash with last cycle but it went away when we deferred his treatment  Now that he had treatment again the rash is back  Pt denied any other symptoms  He will have updated blood work tomorrow

## 2022-05-25 NOTE — TELEPHONE ENCOUNTER
Title: Opdivo    Date patient scheduled: 5/31 + 6/13    Original medication ordered: Opdivo    New Medication ordered: John Damico will be held for cycle #6 and #7 due to rash  Please see other documentation form Dr José Miguel Fontenot  Office to route to [facility]    Infusion tech to receive message, confirm scheduled treatment duration matches ordered treatment duration or adjust accordingly, and re-link appointment request orders  Infusion tech to notify pharmacy

## 2022-05-26 ENCOUNTER — TELEPHONE (OUTPATIENT)
Dept: HEMATOLOGY ONCOLOGY | Facility: CLINIC | Age: 72
End: 2022-05-26

## 2022-05-26 ENCOUNTER — HOSPITAL ENCOUNTER (OUTPATIENT)
Dept: INFUSION CENTER | Facility: CLINIC | Age: 72
Discharge: HOME | End: 2022-05-26
Payer: COMMERCIAL

## 2022-05-26 DIAGNOSIS — C16.9 MALIGNANT NEOPLASM OF STOMACH, UNSPECIFIED LOCATION (HCC): ICD-10-CM

## 2022-05-26 DIAGNOSIS — C16.9 GASTRIC ADENOCARCINOMA (HCC): Primary | ICD-10-CM

## 2022-05-26 DIAGNOSIS — C16.9 GASTRIC ADENOCARCINOMA (HCC): ICD-10-CM

## 2022-05-26 DIAGNOSIS — R79.89 ELEVATED SERUM CREATININE: ICD-10-CM

## 2022-05-26 LAB
ALBUMIN SERPL BCP-MCNC: 3.6 G/DL (ref 3.5–5)
ALP SERPL-CCNC: 132 U/L (ref 34–104)
ALT SERPL W P-5'-P-CCNC: 12 U/L (ref 7–52)
ANION GAP SERPL CALCULATED.3IONS-SCNC: 11 MMOL/L (ref 4–13)
AST SERPL W P-5'-P-CCNC: 15 U/L (ref 13–39)
BASOPHILS # BLD AUTO: 0.02 THOUSANDS/ΜL (ref 0–0.1)
BASOPHILS NFR BLD AUTO: 0 % (ref 0–1)
BILIRUB SERPL-MCNC: 0.57 MG/DL (ref 0.2–1)
BUN SERPL-MCNC: 55 MG/DL (ref 5–25)
CALCIUM SERPL-MCNC: 8.6 MG/DL (ref 8.4–10.2)
CHLORIDE SERPL-SCNC: 97 MMOL/L (ref 96–108)
CO2 SERPL-SCNC: 25 MMOL/L (ref 21–32)
CREAT SERPL-MCNC: 2.67 MG/DL (ref 0.6–1.3)
EOSINOPHIL # BLD AUTO: 0.09 THOUSAND/ΜL (ref 0–0.61)
EOSINOPHIL NFR BLD AUTO: 2 % (ref 0–6)
ERYTHROCYTE [DISTWIDTH] IN BLOOD BY AUTOMATED COUNT: 14.6 % (ref 11.6–15.1)
GFR SERPL CREATININE-BSD FRML MDRD: 22 ML/MIN/1.73SQ M
GLUCOSE SERPL-MCNC: 121 MG/DL (ref 65–140)
HCT VFR BLD AUTO: 34.8 % (ref 36.5–49.3)
HGB BLD-MCNC: 11.4 G/DL (ref 12–17)
IMM GRANULOCYTES # BLD AUTO: 0.03 THOUSAND/UL (ref 0–0.2)
IMM GRANULOCYTES NFR BLD AUTO: 1 % (ref 0–2)
LYMPHOCYTES # BLD AUTO: 0.92 THOUSANDS/ΜL (ref 0.6–4.47)
LYMPHOCYTES NFR BLD AUTO: 15 % (ref 14–44)
MCH RBC QN AUTO: 29.4 PG (ref 26.8–34.3)
MCHC RBC AUTO-ENTMCNC: 32.8 G/DL (ref 31.4–37.4)
MCV RBC AUTO: 90 FL (ref 82–98)
MONOCYTES # BLD AUTO: 0.68 THOUSAND/ΜL (ref 0.17–1.22)
MONOCYTES NFR BLD AUTO: 11 % (ref 4–12)
NEUTROPHILS # BLD AUTO: 4.36 THOUSANDS/ΜL (ref 1.85–7.62)
NEUTS SEG NFR BLD AUTO: 71 % (ref 43–75)
NRBC BLD AUTO-RTO: 0 /100 WBCS
PLATELET # BLD AUTO: 321 THOUSANDS/UL (ref 149–390)
PMV BLD AUTO: 9.6 FL (ref 8.9–12.7)
POTASSIUM SERPL-SCNC: 4.5 MMOL/L (ref 3.5–5.3)
PROT SERPL-MCNC: 6.7 G/DL (ref 6.4–8.4)
RBC # BLD AUTO: 3.88 MILLION/UL (ref 3.88–5.62)
SODIUM SERPL-SCNC: 133 MMOL/L (ref 135–147)
TSH SERPL DL<=0.05 MIU/L-ACNC: 4.42 UIU/ML (ref 0.45–4.5)
WBC # BLD AUTO: 6.1 THOUSAND/UL (ref 4.31–10.16)

## 2022-05-26 PROCEDURE — 84443 ASSAY THYROID STIM HORMONE: CPT | Performed by: INTERNAL MEDICINE

## 2022-05-26 PROCEDURE — 85025 COMPLETE CBC W/AUTO DIFF WBC: CPT | Performed by: INTERNAL MEDICINE

## 2022-05-26 PROCEDURE — 80053 COMPREHEN METABOLIC PANEL: CPT | Performed by: INTERNAL MEDICINE

## 2022-05-26 NOTE — PROGRESS NOTES
Patient to infusion for pre treatment labs  He developed a generalized rash after last treatment  He states it was very itchy  Dr Peyton Sheppard is aware and pt states he prescribed medication which has helped  They are holding [id number] [facility] for next treatment  Port accessed, labs drawn without issue    Patient aware next appointment Tuesday 8am   He declined AVS

## 2022-05-26 NOTE — TELEPHONE ENCOUNTER
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

## 2022-05-26 NOTE — TELEPHONE ENCOUNTER
Call placed to the patient and reviewed the attached information from Dr Melva Sever  He was agreeable the the ultrasound and repeat blood work next week  Pt will increase his water intake  He stated otherwise he is feeling good and his rash is improving  Stated someone from scheduling will call him with apt of ultrasound  He was instructed to call with any further questions or concerns  He was appreciative of my call  Paty Anton, can you please schedule this and notify the patient

## 2022-05-29 ENCOUNTER — APPOINTMENT (EMERGENCY)
Dept: RADIOLOGY | Facility: HOSPITAL | Age: 72
DRG: 683 | End: 2022-05-29
Payer: COMMERCIAL

## 2022-05-29 ENCOUNTER — APPOINTMENT (EMERGENCY)
Dept: CT IMAGING | Facility: HOSPITAL | Age: 72
DRG: 683 | End: 2022-05-29
Payer: COMMERCIAL

## 2022-05-29 ENCOUNTER — HOSPITAL ENCOUNTER (INPATIENT)
Facility: HOSPITAL | Age: 72
LOS: 2 days | Discharge: HOME | DRG: 683 | End: 2022-06-01
Attending: EMERGENCY MEDICINE | Admitting: INTERNAL MEDICINE
Payer: COMMERCIAL

## 2022-05-29 DIAGNOSIS — A41.9 SEVERE SEPSIS (HCC): ICD-10-CM

## 2022-05-29 DIAGNOSIS — R65.20 SEVERE SEPSIS (HCC): ICD-10-CM

## 2022-05-29 DIAGNOSIS — N17.9 AKI (ACUTE KIDNEY INJURY) (HCC): ICD-10-CM

## 2022-05-29 DIAGNOSIS — E86.0 DEHYDRATION: ICD-10-CM

## 2022-05-29 DIAGNOSIS — N39.0 COMPLICATED UTI (URINARY TRACT INFECTION): ICD-10-CM

## 2022-05-29 DIAGNOSIS — N39.0 ACUTE UTI: Primary | ICD-10-CM

## 2022-05-29 LAB
ALBUMIN SERPL BCP-MCNC: 3.8 G/DL (ref 3.5–5)
ALP SERPL-CCNC: 130 U/L (ref 34–104)
ALT SERPL W P-5'-P-CCNC: 10 U/L (ref 7–52)
ANION GAP SERPL CALCULATED.3IONS-SCNC: 11 MMOL/L (ref 4–13)
AST SERPL W P-5'-P-CCNC: 15 U/L (ref 13–39)
BASOPHILS # BLD AUTO: 0.03 THOUSANDS/ΜL (ref 0–0.1)
BASOPHILS NFR BLD AUTO: 0 % (ref 0–1)
BILIRUB SERPL-MCNC: 0.75 MG/DL (ref 0.2–1)
BUN SERPL-MCNC: 42 MG/DL (ref 5–25)
CALCIUM SERPL-MCNC: 8.8 MG/DL (ref 8.4–10.2)
CARDIAC TROPONIN I PNL SERPL HS: 5 NG/L
CHLORIDE SERPL-SCNC: 100 MMOL/L (ref 96–108)
CO2 SERPL-SCNC: 25 MMOL/L (ref 21–32)
CREAT SERPL-MCNC: 1.98 MG/DL (ref 0.6–1.3)
EOSINOPHIL # BLD AUTO: 0.97 THOUSAND/ΜL (ref 0–0.61)
EOSINOPHIL NFR BLD AUTO: 9 % (ref 0–6)
ERYTHROCYTE [DISTWIDTH] IN BLOOD BY AUTOMATED COUNT: 14.6 % (ref 11.6–15.1)
GFR SERPL CREATININE-BSD FRML MDRD: 33 ML/MIN/1.73SQ M
GLUCOSE SERPL-MCNC: 104 MG/DL (ref 65–140)
HCT VFR BLD AUTO: 37.5 % (ref 36.5–49.3)
HGB BLD-MCNC: 11.9 G/DL (ref 12–17)
IMM GRANULOCYTES # BLD AUTO: 0.05 THOUSAND/UL (ref 0–0.2)
IMM GRANULOCYTES NFR BLD AUTO: 0 % (ref 0–2)
LACTATE SERPL-SCNC: 1.6 MMOL/L (ref 0.5–2)
LIPASE SERPL-CCNC: 20 U/L (ref 11–82)
LYMPHOCYTES # BLD AUTO: 0.93 THOUSANDS/ΜL (ref 0.6–4.47)
LYMPHOCYTES NFR BLD AUTO: 8 % (ref 14–44)
MAGNESIUM SERPL-MCNC: 1.7 MG/DL (ref 1.9–2.7)
MCH RBC QN AUTO: 28.5 PG (ref 26.8–34.3)
MCHC RBC AUTO-ENTMCNC: 31.7 G/DL (ref 31.4–37.4)
MCV RBC AUTO: 90 FL (ref 82–98)
MONOCYTES # BLD AUTO: 0.9 THOUSAND/ΜL (ref 0.17–1.22)
MONOCYTES NFR BLD AUTO: 8 % (ref 4–12)
NEUTROPHILS # BLD AUTO: 8.25 THOUSANDS/ΜL (ref 1.85–7.62)
NEUTS SEG NFR BLD AUTO: 75 % (ref 43–75)
NRBC BLD AUTO-RTO: 0 /100 WBCS
PLATELET # BLD AUTO: 317 THOUSANDS/UL (ref 149–390)
PMV BLD AUTO: 9.1 FL (ref 8.9–12.7)
POTASSIUM SERPL-SCNC: 3.9 MMOL/L (ref 3.5–5.3)
PROT SERPL-MCNC: 6.9 G/DL (ref 6.4–8.4)
RBC # BLD AUTO: 4.18 MILLION/UL (ref 3.88–5.62)
SODIUM SERPL-SCNC: 136 MMOL/L (ref 135–147)
WBC # BLD AUTO: 11.13 THOUSAND/UL (ref 4.31–10.16)

## 2022-05-29 PROCEDURE — 96361 HYDRATE IV INFUSION ADD-ON: CPT

## 2022-05-29 PROCEDURE — 84145 PROCALCITONIN (PCT): CPT | Performed by: FAMILY MEDICINE

## 2022-05-29 PROCEDURE — 83605 ASSAY OF LACTIC ACID: CPT | Performed by: EMERGENCY MEDICINE

## 2022-05-29 PROCEDURE — 71045 X-RAY EXAM CHEST 1 VIEW: CPT

## 2022-05-29 PROCEDURE — 96375 TX/PRO/DX INJ NEW DRUG ADDON: CPT

## 2022-05-29 PROCEDURE — 93005 ELECTROCARDIOGRAM TRACING: CPT

## 2022-05-29 PROCEDURE — 81001 URINALYSIS AUTO W/SCOPE: CPT | Performed by: EMERGENCY MEDICINE

## 2022-05-29 PROCEDURE — 36415 COLL VENOUS BLD VENIPUNCTURE: CPT | Performed by: EMERGENCY MEDICINE

## 2022-05-29 PROCEDURE — 87077 CULTURE AEROBIC IDENTIFY: CPT | Performed by: EMERGENCY MEDICINE

## 2022-05-29 PROCEDURE — 0241U HB NFCT DS VIR RESP RNA 4 TRGT: CPT | Performed by: EMERGENCY MEDICINE

## 2022-05-29 PROCEDURE — 87186 SC STD MICRODIL/AGAR DIL: CPT | Performed by: EMERGENCY MEDICINE

## 2022-05-29 PROCEDURE — 84484 ASSAY OF TROPONIN QUANT: CPT | Performed by: EMERGENCY MEDICINE

## 2022-05-29 PROCEDURE — 87086 URINE CULTURE/COLONY COUNT: CPT | Performed by: EMERGENCY MEDICINE

## 2022-05-29 PROCEDURE — 85025 COMPLETE CBC W/AUTO DIFF WBC: CPT | Performed by: EMERGENCY MEDICINE

## 2022-05-29 PROCEDURE — 83735 ASSAY OF MAGNESIUM: CPT | Performed by: EMERGENCY MEDICINE

## 2022-05-29 PROCEDURE — 83690 ASSAY OF LIPASE: CPT | Performed by: EMERGENCY MEDICINE

## 2022-05-29 PROCEDURE — 74176 CT ABD & PELVIS W/O CONTRAST: CPT

## 2022-05-29 PROCEDURE — 87040 BLOOD CULTURE FOR BACTERIA: CPT | Performed by: EMERGENCY MEDICINE

## 2022-05-29 PROCEDURE — 80053 COMPREHEN METABOLIC PANEL: CPT | Performed by: EMERGENCY MEDICINE

## 2022-05-29 PROCEDURE — 99291 CRITICAL CARE FIRST HOUR: CPT | Performed by: EMERGENCY MEDICINE

## 2022-05-29 PROCEDURE — 99285 EMERGENCY DEPT VISIT HI MDM: CPT

## 2022-05-29 RX ORDER — ONDANSETRON 2 MG/ML
4 INJECTION INTRAMUSCULAR; INTRAVENOUS ONCE
Status: COMPLETED | OUTPATIENT
Start: 2022-05-29 | End: 2022-05-29

## 2022-05-29 RX ORDER — ACETAMINOPHEN 325 MG/1
975 TABLET ORAL ONCE
Status: COMPLETED | OUTPATIENT
Start: 2022-05-29 | End: 2022-05-29

## 2022-05-29 RX ADMIN — SODIUM CHLORIDE 1000 ML: 0.9 INJECTION, SOLUTION INTRAVENOUS at 23:24

## 2022-05-29 RX ADMIN — ONDANSETRON 4 MG: 2 INJECTION INTRAMUSCULAR; INTRAVENOUS at 23:27

## 2022-05-29 RX ADMIN — ACETAMINOPHEN 975 MG: 325 TABLET ORAL at 23:41

## 2022-05-30 PROBLEM — R21 RASH: Status: ACTIVE | Noted: 2022-05-30

## 2022-05-30 PROBLEM — E83.42 HYPOMAGNESEMIA: Status: ACTIVE | Noted: 2022-05-30

## 2022-05-30 PROBLEM — N17.9 SEPSIS WITH ACUTE RENAL FAILURE (HCC): Status: ACTIVE | Noted: 2022-05-30

## 2022-05-30 PROBLEM — R65.20 SEPSIS WITH ACUTE RENAL FAILURE (HCC): Status: ACTIVE | Noted: 2022-05-30

## 2022-05-30 PROBLEM — A41.9 SEPSIS WITH ACUTE RENAL FAILURE (HCC): Status: ACTIVE | Noted: 2022-05-30

## 2022-05-30 PROBLEM — N39.0 COMPLICATED UTI (URINARY TRACT INFECTION): Status: ACTIVE | Noted: 2022-05-30

## 2022-05-30 LAB
ALBUMIN SERPL BCP-MCNC: 3 G/DL (ref 3.5–5)
ALP SERPL-CCNC: 102 U/L (ref 34–104)
ALT SERPL W P-5'-P-CCNC: 9 U/L (ref 7–52)
ANION GAP SERPL CALCULATED.3IONS-SCNC: 7 MMOL/L (ref 4–13)
AST SERPL W P-5'-P-CCNC: 13 U/L (ref 13–39)
ATRIAL RATE: 69 BPM
BACTERIA UR QL AUTO: ABNORMAL /HPF
BASOPHILS # BLD AUTO: 0.03 THOUSANDS/ΜL (ref 0–0.1)
BASOPHILS NFR BLD AUTO: 0 % (ref 0–1)
BILIRUB SERPL-MCNC: 0.6 MG/DL (ref 0.2–1)
BILIRUB UR QL STRIP: NEGATIVE
BUN SERPL-MCNC: 39 MG/DL (ref 5–25)
CALCIUM ALBUM COR SERPL-MCNC: 8.6 MG/DL (ref 8.3–10.1)
CALCIUM SERPL-MCNC: 7.8 MG/DL (ref 8.4–10.2)
CHLORIDE SERPL-SCNC: 105 MMOL/L (ref 96–108)
CLARITY UR: ABNORMAL
CO2 SERPL-SCNC: 24 MMOL/L (ref 21–32)
COLOR UR: ABNORMAL
CREAT SERPL-MCNC: 1.83 MG/DL (ref 0.6–1.3)
EOSINOPHIL # BLD AUTO: 0.55 THOUSAND/ΜL (ref 0–0.61)
EOSINOPHIL NFR BLD AUTO: 7 % (ref 0–6)
ERYTHROCYTE [DISTWIDTH] IN BLOOD BY AUTOMATED COUNT: 14.6 % (ref 11.6–15.1)
FLUAV RNA RESP QL NAA+PROBE: NEGATIVE
FLUBV RNA RESP QL NAA+PROBE: NEGATIVE
GFR SERPL CREATININE-BSD FRML MDRD: 36 ML/MIN/1.73SQ M
GLUCOSE SERPL-MCNC: 103 MG/DL (ref 65–140)
GLUCOSE UR STRIP-MCNC: NEGATIVE MG/DL
HCT VFR BLD AUTO: 29.6 % (ref 36.5–49.3)
HGB BLD-MCNC: 9.7 G/DL (ref 12–17)
HGB UR QL STRIP.AUTO: ABNORMAL
IMM GRANULOCYTES # BLD AUTO: 0.04 THOUSAND/UL (ref 0–0.2)
IMM GRANULOCYTES NFR BLD AUTO: 1 % (ref 0–2)
KETONES UR STRIP-MCNC: NEGATIVE MG/DL
LEUKOCYTE ESTERASE UR QL STRIP: ABNORMAL
LYMPHOCYTES # BLD AUTO: 0.88 THOUSANDS/ΜL (ref 0.6–4.47)
LYMPHOCYTES NFR BLD AUTO: 10 % (ref 14–44)
MCH RBC QN AUTO: 29.3 PG (ref 26.8–34.3)
MCHC RBC AUTO-ENTMCNC: 32.8 G/DL (ref 31.4–37.4)
MCV RBC AUTO: 89 FL (ref 82–98)
MONOCYTES # BLD AUTO: 0.77 THOUSAND/ΜL (ref 0.17–1.22)
MONOCYTES NFR BLD AUTO: 9 % (ref 4–12)
NEUTROPHILS # BLD AUTO: 6.21 THOUSANDS/ΜL (ref 1.85–7.62)
NEUTS SEG NFR BLD AUTO: 73 % (ref 43–75)
NITRITE UR QL STRIP: NEGATIVE
NON-SQ EPI CELLS URNS QL MICRO: ABNORMAL /HPF
NRBC BLD AUTO-RTO: 0 /100 WBCS
P AXIS: 84 DEGREES
PH UR STRIP.AUTO: 7 [PH]
PLATELET # BLD AUTO: 238 THOUSANDS/UL (ref 149–390)
PMV BLD AUTO: 8.9 FL (ref 8.9–12.7)
POTASSIUM SERPL-SCNC: 3.7 MMOL/L (ref 3.5–5.3)
PR INTERVAL: 160 MS
PROCALCITONIN SERPL-MCNC: 0.1 NG/ML
PROT SERPL-MCNC: 5.5 G/DL (ref 6.4–8.4)
PROT UR STRIP-MCNC: ABNORMAL MG/DL
QRS AXIS: 44 DEGREES
QRSD INTERVAL: 78 MS
QT INTERVAL: 410 MS
QTC INTERVAL: 440 MS
RBC # BLD AUTO: 3.31 MILLION/UL (ref 3.88–5.62)
RBC #/AREA URNS AUTO: ABNORMAL /HPF
RSV RNA RESP QL NAA+PROBE: NEGATIVE
SARS-COV-2 RNA RESP QL NAA+PROBE: NEGATIVE
SODIUM SERPL-SCNC: 136 MMOL/L (ref 135–147)
SP GR UR STRIP.AUTO: 1.01 (ref 1–1.03)
T WAVE AXIS: 66 DEGREES
UROBILINOGEN UR QL STRIP.AUTO: 0.2 E.U./DL
VENTRICULAR RATE: 69 BPM
WBC # BLD AUTO: 8.48 THOUSAND/UL (ref 4.31–10.16)
WBC #/AREA URNS AUTO: ABNORMAL /HPF
WBC CLUMPS # UR AUTO: ABNORMAL /UL

## 2022-05-30 PROCEDURE — 96361 HYDRATE IV INFUSION ADD-ON: CPT

## 2022-05-30 PROCEDURE — 93010 ELECTROCARDIOGRAM REPORT: CPT | Performed by: INTERNAL MEDICINE

## 2022-05-30 PROCEDURE — 80053 COMPREHEN METABOLIC PANEL: CPT | Performed by: FAMILY MEDICINE

## 2022-05-30 PROCEDURE — 96365 THER/PROPH/DIAG IV INF INIT: CPT

## 2022-05-30 PROCEDURE — 99223 1ST HOSP IP/OBS HIGH 75: CPT | Performed by: INTERNAL MEDICINE

## 2022-05-30 PROCEDURE — 85025 COMPLETE CBC W/AUTO DIFF WBC: CPT | Performed by: FAMILY MEDICINE

## 2022-05-30 RX ORDER — OXYCODONE HCL 5 MG/5 ML
10 SOLUTION, ORAL ORAL EVERY 6 HOURS PRN
Status: DISCONTINUED | OUTPATIENT
Start: 2022-05-30 | End: 2022-06-01 | Stop reason: HOSPADM

## 2022-05-30 RX ORDER — LEVOTHYROXINE SODIUM 0.12 MG/1
125 TABLET ORAL
Status: DISCONTINUED | OUTPATIENT
Start: 2022-05-30 | End: 2022-06-01 | Stop reason: HOSPADM

## 2022-05-30 RX ORDER — ACETAMINOPHEN 325 MG/1
650 TABLET ORAL EVERY 6 HOURS PRN
Status: DISCONTINUED | OUTPATIENT
Start: 2022-05-30 | End: 2022-06-01 | Stop reason: HOSPADM

## 2022-05-30 RX ORDER — PANTOPRAZOLE SODIUM 40 MG/1
40 TABLET, DELAYED RELEASE ORAL 2 TIMES DAILY
Status: DISCONTINUED | OUTPATIENT
Start: 2022-05-30 | End: 2022-06-01 | Stop reason: HOSPADM

## 2022-05-30 RX ORDER — METHYLPREDNISOLONE 4 MG/1
4 TABLET ORAL ONCE
Status: COMPLETED | OUTPATIENT
Start: 2022-05-31 | End: 2022-05-31

## 2022-05-30 RX ORDER — OXYCODONE HCL 10 MG/1
10 TABLET, FILM COATED, EXTENDED RELEASE ORAL EVERY 8 HOURS SCHEDULED
Status: DISCONTINUED | OUTPATIENT
Start: 2022-05-30 | End: 2022-06-01 | Stop reason: HOSPADM

## 2022-05-30 RX ORDER — MAGNESIUM SULFATE HEPTAHYDRATE 40 MG/ML
2 INJECTION, SOLUTION INTRAVENOUS ONCE
Status: COMPLETED | OUTPATIENT
Start: 2022-05-30 | End: 2022-05-30

## 2022-05-30 RX ORDER — DRONABINOL 2.5 MG/1
5 CAPSULE ORAL
Status: DISCONTINUED | OUTPATIENT
Start: 2022-05-30 | End: 2022-06-01 | Stop reason: HOSPADM

## 2022-05-30 RX ORDER — METHYLPREDNISOLONE 4 MG/1
8 TABLET ORAL ONCE
Status: COMPLETED | OUTPATIENT
Start: 2022-05-30 | End: 2022-05-30

## 2022-05-30 RX ORDER — HEPARIN SODIUM 5000 [USP'U]/ML
5000 INJECTION, SOLUTION INTRAVENOUS; SUBCUTANEOUS EVERY 8 HOURS SCHEDULED
Status: DISCONTINUED | OUTPATIENT
Start: 2022-05-30 | End: 2022-06-01 | Stop reason: HOSPADM

## 2022-05-30 RX ORDER — ONDANSETRON 2 MG/ML
4 INJECTION INTRAMUSCULAR; INTRAVENOUS EVERY 6 HOURS PRN
Status: DISCONTINUED | OUTPATIENT
Start: 2022-05-30 | End: 2022-05-31

## 2022-05-30 RX ORDER — LORATADINE 10 MG/1
10 TABLET ORAL DAILY
Status: DISCONTINUED | OUTPATIENT
Start: 2022-05-30 | End: 2022-06-01 | Stop reason: HOSPADM

## 2022-05-30 RX ORDER — SODIUM CHLORIDE 9 MG/ML
125 INJECTION, SOLUTION INTRAVENOUS CONTINUOUS
Status: DISCONTINUED | OUTPATIENT
Start: 2022-05-30 | End: 2022-05-31

## 2022-05-30 RX ADMIN — CEFTRIAXONE SODIUM 1000 MG: 10 INJECTION, POWDER, FOR SOLUTION INTRAVENOUS at 10:08

## 2022-05-30 RX ADMIN — DRONABINOL 5 MG: 2.5 CAPSULE ORAL at 08:45

## 2022-05-30 RX ADMIN — CEFEPIME HYDROCHLORIDE 1000 MG: 1 INJECTION, POWDER, FOR SOLUTION INTRAMUSCULAR; INTRAVENOUS at 00:30

## 2022-05-30 RX ADMIN — OXYCODONE HYDROCHLORIDE 10 MG: 10 TABLET, FILM COATED, EXTENDED RELEASE ORAL at 05:01

## 2022-05-30 RX ADMIN — OXYCODONE HYDROCHLORIDE 10 MG: 10 TABLET, FILM COATED, EXTENDED RELEASE ORAL at 21:02

## 2022-05-30 RX ADMIN — OXYCODONE HYDROCHLORIDE 10 MG: 5 SOLUTION ORAL at 16:00

## 2022-05-30 RX ADMIN — LORATADINE 10 MG: 10 TABLET ORAL at 08:39

## 2022-05-30 RX ADMIN — HEPARIN SODIUM 5000 UNITS: 5000 INJECTION INTRAVENOUS; SUBCUTANEOUS at 05:01

## 2022-05-30 RX ADMIN — BISACODYL 5 MG: 5 TABLET, COATED ORAL at 08:45

## 2022-05-30 RX ADMIN — MAGNESIUM SULFATE HEPTAHYDRATE 2 G: 40 INJECTION, SOLUTION INTRAVENOUS at 03:00

## 2022-05-30 RX ADMIN — LEVOTHYROXINE SODIUM 125 MCG: 125 TABLET ORAL at 05:01

## 2022-05-30 RX ADMIN — METHYLPREDNISOLONE 8 MG: 4 TABLET ORAL at 08:39

## 2022-05-30 RX ADMIN — PANTOPRAZOLE SODIUM 40 MG: 40 TABLET, DELAYED RELEASE ORAL at 03:08

## 2022-05-30 RX ADMIN — ONDANSETRON 4 MG: 2 INJECTION INTRAMUSCULAR; INTRAVENOUS at 16:48

## 2022-05-30 RX ADMIN — SODIUM CHLORIDE 125 ML/HR: 0.9 INJECTION, SOLUTION INTRAVENOUS at 12:51

## 2022-05-30 RX ADMIN — SODIUM CHLORIDE 125 ML/HR: 0.9 INJECTION, SOLUTION INTRAVENOUS at 04:31

## 2022-05-30 RX ADMIN — DRONABINOL 5 MG: 2.5 CAPSULE ORAL at 16:01

## 2022-05-30 RX ADMIN — HEPARIN SODIUM 5000 UNITS: 5000 INJECTION INTRAVENOUS; SUBCUTANEOUS at 14:31

## 2022-05-30 RX ADMIN — SODIUM CHLORIDE 1000 ML: 0.9 INJECTION, SOLUTION INTRAVENOUS at 00:38

## 2022-05-30 RX ADMIN — OXYCODONE HYDROCHLORIDE 10 MG: 10 TABLET, FILM COATED, EXTENDED RELEASE ORAL at 14:31

## 2022-05-30 RX ADMIN — OXYCODONE HYDROCHLORIDE 10 MG: 5 SOLUTION ORAL at 22:44

## 2022-05-30 NOTE — H&P
[facility]  H&P- Joi Schuster 1950, 70 y o  male MRN: [id number]  Unit/Bed#: S -38 Encounter: [id number]  Primary Care Provider: Sylvie Fulton DO   Date and time admitted to hospital: 5/29/2022 10:47 PM    * Acute, Complicated UTI with Acute Kidney Injury   Assessment & Plan  POA: patient reporting 4 days increased urine frequency, dysuria, and 1 day of fever/chills, nausea/vomiting, weakness, fatigue, dizziness, suprapubic pain  Tmax at home 101F    ED vitals: T99 7, HR 83, RR 18, /65, 97% on RA    ED workup:  WBC 11 13  UA with large leukocytes, trace protein, moderate blood  Urine microscopy with innumerable WBC and WBC clumps  Cr 1 98 (baseline Cr 0 9, Cr on 5/27 2 67)  Mag 1 7  EKG rate 69, NSR  Lactate, lipase, troponin, covid negative  CT:  Bilateral nephrolithiasis, no hydronephrosis  Distended bladder, mild circumferential bladder wall thickening noted  Urine culture 5/11/22 grew Klebsiella  S/p prostatectomy April 2022  Has taken keflex at home for Klebsiella UTI, last taken day prior to admission    Received IV Cefepime, 2L NS, tylenol, zofran in ED    Plan:  IV Abx: Ceftriaxone 1g q24h  Tylenol prn for fevers  zofran prn for nausea/vomiting  IVF NS @ 125cc/hr  Check procalcitonin  monitor fever curve  AM CBC, CMP  F/u urine and blood cultures    Rash  Assessment & Plan  POA: erythematous, scattered, macular rash present on bilateral UE  Patient reports this has been present within the past week, and is a suspected side effect to Opdivo immunotherapy  Was having intense itching of this rash, which has been improving with Medrol dose pack prescribed 5/26  Patient had two days remaining on this and would like to continue this therapy as the rash is still present  He was also taking 5mg Xyzal daily  Will give 8mg oral methyprednisolone today and 4mg tomorrow to complete this outpatient treatment     Xyzal not on formulary, substitute with 10mg claritin daily  Hypomagnesemia  Assessment & Plan  Recent Labs     05/29/22  2310   MG 1 7*     Replete with IV mag 2g  Monitor with AM labs, replete prn    Cancer related pain  Assessment & Plan  Follows with Dr Shae Fam outpatient Palliative care  Pain regimen at home: Oxycodone 10mg TID, Oxy oral solution 10-20mg q6h prn  Resume home regimen, continue to monitor pain and adjust pain regimen if needed  Resume home dulcolax daily    Gastric adenocarcinoma [facility])  Assessment & Plan  Follows with Dr Majorie Goltz outpatient  Receiving chemo/immunotherapy, last infusion 5/16, next scheduled infusion 6/1  Resume home Protonix, dronabinol, Zofran   Follow up outpatient with oncology    Hypothyroid  Assessment & Plan  Home levothyroxine    VTE Prophylaxis: Heparin  / sequential compression device   Code Status: Level 1  POLST: POLST form is not discussed and not completed at this time  Anticipated Length of Stay:  Patient will be admitted on an Inpatient basis with an anticipated length of stay of  > 2 midnights  Justification for Hospital Stay: Acute complicated UTI requiring IV abx    Chief Complaint:   Fever, urinary symptoms    History of Present Illness:    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, with PMH of HTN, Hypothyroidism, BPH with recurrent UTIs s/p prostatectomy, gastric adenocarcinoma who presents with 4 days of increased urine frequency and dysuria with 1 day of fevers, chills, fatigue, nausea, vomiting, weakness, dizziness  He has history of frequent urinary tract infections and urinary retention secondary to BPH, recently treated with prostatectomy 1 month ago  He had a recent urinary tract infection 5/11 with culture growing Klebsiella for which he was taking Keflex  He last took Keflex yesterday  He also reports some pain today, suprapubic  He feels dehydrated and dry  He had 1 episode of vomiting today  He reports overall decreased appetite as well    He is currently undergoing chemotherapy and immunotherapy for gastric adenocarcinoma, last cycle 5/16  He reports a rash, itching, bilateral upper extremities started about 5 days ago and is a presumed reaction to his immunotherapy treatment  He has been taking a Medrol Dosepak for this as well as Xyzal   Outpatient blood work 3 days ago revealed a creatinine elevated to 2 67 with baseline 0 9  Fever at home today 101F  In the ED patient's VSS, WBC 11 13, creatinine 1 98, UA with innumerable WBC  Received IV cefepime and 2L NS  CT:  Bilateral nephrolithiasis, no hydronephrosis  Distended bladder, mild circumferential bladder wall thickening noted  Review of Systems:    Review of Systems   Constitutional: Positive for appetite change (decreased), chills, fatigue and fever  Eyes: Negative for visual disturbance  Respiratory: Negative for cough and shortness of breath  Cardiovascular: Negative for chest pain and leg swelling  Gastrointestinal: Positive for abdominal pain (suprapubic), constipation, nausea and vomiting  Negative for diarrhea  Genitourinary: Positive for dysuria and frequency  Negative for flank pain  Skin: Positive for rash  Allergic/Immunologic: Positive for immunocompromised state  Neurological: Positive for dizziness and weakness  Negative for syncope and headaches  Psychiatric/Behavioral: Negative for confusion  The patient is not nervous/anxious          Past Medical and Surgical History:     Past Medical History:   Diagnosis Date    Arthritis     shoulders    Cancer ([facility] [address] )     gastric mass    COVID 01/2021 jan 2021- no hospitalization    Disease of thyroid gland     had radioactive iodine in past    GERD (gastroesophageal reflux disease)     History of GI diverticular bleed     Hyperlipidemia     Hypertension     on no meds at present    Kidney stone     Port-A-Cath in place     S/P percutaneous endoscopic gastrostomy (PEG) tube placement (HCC)     Tinnitus     Wears glasses Past Surgical History:   Procedure Laterality Date    ADENOIDECTOMY      CHOLECYSTECTOMY LAPAROSCOPIC N/A 1/24/2022    Procedure: CHOLECYSTECTOMY LAPAROSCOPIC W/ INTRAOP CHOLANGIOGRAM;  Surgeon: Joyce Winter DO;  [facility];  Service: General    COLONOSCOPY N/A 7/16/2016    Procedure: COLONOSCOPY;  Surgeon: North Byrnes MD;  [facility];  Service:     FL CHOLANGIOGRAM TRANSHEPATIC  1/24/2022    FL GUIDED CENTRAL VENOUS ACCESS DEVICE INSERTION  2/11/2022    HERNIA REPAIR      [place] LAP,PROSTATECTOMY,RADICAL,W/NERVE SPARE,INCL ROBOTIC N/A 4/27/2022    Procedure: PROSTATECTOMY SIMPLE LAPAROSCOPIC  W ROBOTICS, BLADDER NECK RECONSTRUCTION;  Surgeon: Pita Espinoza MD;  [facility];  Service: Urology    PROSTATE BIOPSY      TONSILLECTOMY      TUNNELED VENOUS PORT PLACEMENT N/A 2/11/2022    Procedure: INSERTION VENOUS PORT (PORT-A-CATH); Surgeon: Elizabeth Collado MD;  [facility];  Service: Surgical Oncology    UVULECTOMY         Meds/Allergies:    Prior to Admission medications    Medication Sig Start Date End Date Taking? Authorizing Provider   bisacodyl (DULCOLAX) 5 mg EC tablet Take 1 tablet (5 mg total) by mouth as needed in the morning for constipation  Related to pain medicines   5/11/22  Yes Nakita Parker DO   dronabinol (MARINOL) 5 MG capsule Take 1 capsule (5 mg total) by mouth 2 (two) times a day before meals 5/5/22  Yes Kecia Cota PA-C   fluorouracil 4,370 mg in CADD/Elastomeric Infusion Device Infuse 4,370 mg (1,200 mg/m2/day x 1 82 m2) into a venous catheter over 46 hours for 2 days  Do not start before May 31, 2022  5/31/22 6/2/22 Yes Rhona Powell MD   levocetirizine (XYZAL) 5 MG tablet Take 5 mg by mouth in the morning 5/2/22  Yes Historical Provider, MD   levothyroxine 125 mcg tablet Take 125 mcg by mouth every morning     Yes Historical Provider, MD   oxyCODONE (OxyCONTIN) 10 mg 12 hr tablet Take 1 tablet (10 mg total) by mouth every 8 (eight) hours Max Daily Amount: 30 mg 5/4/22  Yes Nakita Parker DO   oxyCODONE (ROXICODONE) 5 mg/5 mL solution Take 10-20mg PO Q4H PRN Moderate-severe pain 5/23/22  Yes Nakita Parker DO   pantoprazole (PROTONIX) 40 mg tablet Take 1 tablet (40 mg total) by mouth 2 (two) times a day Before breakfast and before bed to prevent acid accumulation  5/10/22  Yes Nelson Parker DO   neomycin-bacitracin-polymyxin b (NEOSPORIN) ointment Apply to the tip of the penis twice daily as needed for catheter irritation  4/29/22   Moses Morrissey PA-C   ondansetron (Zofran ODT) 4 mg disintegrating tablet Take 2 tablets (8 mg total) by mouth every 8 (eight) hours as needed for nausea or vomiting for up to 90 doses 4/1/22   Nakita Parker DO   saliva substitute (MOUTH KOTE) Apply 5 sprays to the mouth or throat 4 (four) times a day as needed (dry mouth) 3/6/22   Kevon Abreu MD   irbesartan (AVAPRO) 150 mg tablet Take 1 tablet (150 mg total) by mouth daily at bedtime 5/5/22 5/30/22  Bea Lee DO   methylPREDNISolone 4 MG tablet therapy pack Use as directed on package 5/25/22 5/30/22  Liza Reddy MD   senna-docusate sodium (SENOKOT S) 8 6-50 mg per tablet Take 1 tablet by mouth daily for 14 days 4/29/22 5/30/22  Moses Morrissey PA-C     I have reviewed home medications with patient personally  Allergies: No Known Allergies    Social History:     Marital Status: /Civil Union   Occupation: Physician  Patient Pre-hospital Living Situation: home with wife  Patient Pre-hospital Level of Mobility: full  Patient Pre-hospital Diet Restrictions: none  Substance Use History:   Social History     Substance and Sexual Activity   Alcohol Use Yes    Comment: social     Social History     Tobacco Use   Smoking Status Never Smoker   Smokeless Tobacco Never Used     Social History     Substance and Sexual Activity   Drug Use Never       Family History:    History reviewed   No pertinent family history  Physical Exam:     Vitals:   Blood Pressure: 118/63 (05/30/22 0236)  Pulse: 60 (05/30/22 0236)  Temperature: 97 8 °F (36 6 °C) (05/30/22 0236)  Temp Source: Oral (05/29/22 2245)  Respirations: 18 (05/30/22 0236)  Height: 5' 7" (170 2 cm) (05/30/22 0000)  Weight - Scale: 67 1 kg (148 lb) (05/29/22 2245)  SpO2: 96 % (05/30/22 0236)    Physical Exam  Vitals reviewed  Constitutional:       General: He is not in acute distress  Appearance: He is normal weight  He is not ill-appearing  Comments: Patient lying in bed, appears fatigued, is cooperative and communicative  In no acute distress  HENT:      Head: Normocephalic and atraumatic  Nose: Nose normal       Mouth/Throat:      Mouth: Mucous membranes are dry  Eyes:      Extraocular Movements: Extraocular movements intact  Conjunctiva/sclera: Conjunctivae normal    Cardiovascular:      Rate and Rhythm: Normal rate and regular rhythm  Pulses: Normal pulses  Heart sounds: Normal heart sounds  Pulmonary:      Effort: Pulmonary effort is normal       Breath sounds: Normal breath sounds  Abdominal:      General: Abdomen is flat  Bowel sounds are normal  There is no distension  Palpations: Abdomen is soft  Tenderness: There is no abdominal tenderness  There is no guarding or rebound  Musculoskeletal:      Right lower leg: No edema  Left lower leg: No edema  Skin:     General: Skin is warm  Findings: Rash (erythematous, scattered, macular lesions bilateral UE) present  Neurological:      General: No focal deficit present  Mental Status: He is alert and oriented to person, place, and time  Psychiatric:         Mood and Affect: Mood normal          Behavior: Behavior normal          Additional Data:     Lab Results: I have personally reviewed pertinent reports        Results from last 7 days   Lab Units 05/29/22  2310   WBC Thousand/uL 11 13*   HEMOGLOBIN g/dL 11 9*   HEMATOCRIT % 37 5 PLATELETS Thousands/uL 317   NEUTROS PCT % 75   LYMPHS PCT % 8*   MONOS PCT % 8   EOS PCT % 9*     Results from last 7 days   Lab Units 05/29/22  2310   POTASSIUM mmol/L 3 9   CHLORIDE mmol/L 100   CO2 mmol/L 25   BUN mg/dL 42*   CREATININE mg/dL 1 98*   CALCIUM mg/dL 8 8   ALK PHOS U/L 130*   ALT U/L 10   AST U/L 15           Imaging: I have personally reviewed pertinent reports  CT renal stone study abdomen pelvis without contrast    Result Date: 5/30/2022  Narrative: CT ABDOMEN AND PELVIS WITHOUT IV CONTRAST - LOW DOSE RENAL STONE INDICATION:   fever, dysuria, vomiting, gastric cancer  COMPARISON:  CT abdomen pelvis dated 3/10/2022 TECHNIQUE:  Low radiation dose thin section CT examination of the abdomen and pelvis was performed without intravenous or oral contrast according to a protocol specifically designed to evaluate for urinary tract calculus  Axial, sagittal, and coronal 2D  reformatted images were created from the source data and submitted for interpretation  Evaluation for pathology in the abdomen and pelvis that is unrelated to urinary tract calculi is limited  Radiation dose length product (DLP) for this visit:  333 mGy-cm   This examination, like all CT scans performed in the [facility], was performed utilizing techniques to minimize radiation dose exposure, including the use of iterative reconstruction and automated exposure control  URINARY TRACT FINDINGS: RIGHT KIDNEY AND URETER:  Multiple subcentimeter nonobstructing stones in the right kidney  Right kidney otherwise appears grossly unremarkable; no hydronephrosis  LEFT KIDNEY AND URETER:  Multiple subcentimeter nonobstructing stones in the left kidney  Left kidney otherwise appears grossly unremarkable; no hydronephrosis  URINARY BLADDER:  Partially distended  Mild circumferential bladder wall thickening noted, probably exaggerated by underdistention    Superimposed cystitis is considered in the appropriate clinical setting  ADDITIONAL FINDINGS: LOWER CHEST:  Mild atelectasis/scarring in the bilateral lower lung fields  Visualized lungs otherwise appear grossly clear  SOLID VISCERA:  Pancreatic atrophy, otherwise limited low radiation dose noncontrast CT evaluation demonstrates no clinically significant abnormality of the imaged portions of the liver, spleen, pancreas, or adrenal glands  GALLBLADDER/BILIARY TREE:  Gallbladder is surgically absent  No biliary dilitation  STOMACH AND BOWEL:  Scattered colonic diverticulosis  No discrete evidence of acute diverticulitis  No evidence of bowel obstruction  Percutaneous gastrostomy tube in place in the gastric body  APPENDIX:  No findings to suggest appendicitis  ABDOMINOPELVIC CAVITY:  No ascites or pneumoperitoneum  Previously seen retroperitoneal adenopathy has essentially intervally resolved  REPRODUCTIVE ORGANS:  Status post prostatectomy ABDOMINAL WALL/INGUINAL REGIONS:  Body wall edema  Midline incisional scarring  OSSEOUS STRUCTURES: Generalized osteopenia  Multilevel degenerative changes of the spine  Anterolisthesis of approximately 5 mm of L4 on L5, probably related to facet joint arthropathy at this level  Spinal alignment otherwise grossly obtained  No acute fracture or destructive osseous lesion  Impression: Bilateral nephrolithiasis, no hydronephrosis  Status post prostatectomy  Partially distended bladder  Mild circumferential bladder wall thickening noted, probably exaggerated by underdistention  Superimposed cystitis is considered in the appropriate clinical setting  Correlation with the patient's symptoms, laboratory values, and urinalysis recommended  Previously seen retroperitoneal adenopathy has essentially intervally resolved, possibly related to posttreatment effect  No evidence of bowel obstruction  Pancreatic atrophy, scattered colonic diverticulosis, no evidence of acute diverticulitis, percutaneous gastrostomy tube in place    No evidence of bowel obstruction  Body wall edema, midline incisional scarring, and other findings as above  Workstation performed: [id number]       EKG, Pathology, and Other Studies Reviewed on Admission:   · EKG: rate 69, NSR    Epic / Care Everywhere Records Reviewed: Yes     ** Please Note: This note has been constructed using a voice recognition system   **

## 2022-05-30 NOTE — ASSESSMENT & PLAN NOTE
Recent Labs     05/29/22  2310   MG 1 7*     Replete with IV mag 2g  Monitor with AM labs, replete prn

## 2022-05-30 NOTE — ASSESSMENT & PLAN NOTE
Follows with Dr Billy Jimenes outpatient Palliative care  Pain regimen at home: Oxycodone 10mg TID, Oxy oral solution 10-20mg q6h prn  Resume home regimen, continue to monitor pain and adjust pain regimen if needed  Resume home dulcolax daily

## 2022-05-30 NOTE — ASSESSMENT & PLAN NOTE
POA: patient reporting 4 days increased urine frequency, dysuria, and 1 day of fever/chills, nausea/vomiting, weakness, fatigue, dizziness, suprapubic pain  Tmax at home 101F    ED vitals: T99 7, HR 83, RR 18, /65, 97% on RA    ED workup:  WBC 11 13  UA with large leukocytes, trace protein, moderate blood  Urine microscopy with innumerable WBC and WBC clumps  Cr 1 98 (baseline Cr 0 9, Cr on 5/27 2 67)  Mag 1 7  EKG rate 69, NSR  Lactate, lipase, troponin, covid negative  CT:  Bilateral nephrolithiasis, no hydronephrosis  Distended bladder, mild circumferential bladder wall thickening noted       Urine culture 5/11/22 grew Klebsiella  S/p prostatectomy April 2022  Has taken keflex at home for Klebsiella UTI, last taken day prior to admission    Received IV Cefepime, 2L NS, tylenol, zofran in ED    Plan:  IV Abx: Ceftriaxone 1g q24h  Tylenol prn for fevers  zofran prn for nausea/vomiting  IVF NS @ 125cc/hr  Check procalcitonin  monitor fever curve  AM CBC, CMP  F/u urine and blood cultures

## 2022-05-30 NOTE — ASSESSMENT & PLAN NOTE
POA: erythematous, scattered, macular rash present on bilateral UE  Patient reports this has been present within the past week, and is a suspected side effect to Opdivo immunotherapy  Was having intense itching of this rash, which has been improving with Medrol dose pack prescribed 5/26  Patient had two days remaining on this and would like to continue this therapy as the rash is still present  He was also taking 5mg Xyzal daily  Will give 8mg oral methyprednisolone today and 4mg tomorrow to complete this outpatient treatment  Xyzal not on formulary, substitute with 10mg claritin daily

## 2022-05-30 NOTE — ASSESSMENT & PLAN NOTE
Follows with Dr Gabriel Gonsalez outpatient  Receiving chemo/immunotherapy, last infusion 5/16, next scheduled infusion 6/1  Resume home Protonix, dronabinol, Zofran   Follow up outpatient with oncology

## 2022-05-30 NOTE — ED PROVIDER NOTES
History  Chief Complaint   Patient presents with    Fever - 9 weeks to 74 years     Pt reports fevers at home with vomiting this morning, decreased appetite  Reports recent UTIs since prostatectomy     71 yo M coming in for evaluation of fevers up to 101F at home, with associated dysuria, frequency, vomiting  Hx of stage 4 gastric cancer, and recently had a prostatectomy for BPH and urinary retention  He states he's had recurrent UTI's as well recently, currently taking another round of Cephalexin without much relief  History provided by:  Patient   used: No    Fever - 9 weeks to 74 years  Associated symptoms: dysuria, nausea and vomiting        Prior to Admission Medications   Prescriptions Last Dose Informant Patient Reported? Taking?   bisacodyl (DULCOLAX) 5 mg EC tablet More than a month at Unknown time  No No   Sig: Take 1 tablet (5 mg total) by mouth as needed in the morning for constipation  Related to pain medicines  dronabinol (MARINOL) 5 MG capsule  Self No Yes   Sig: Take 1 capsule (5 mg total) by mouth 2 (two) times a day before meals   fluorouracil 4,370 mg in CADD/Elastomeric Infusion Device   No Yes   Sig: Infuse 4,370 mg (1,200 mg/m2/day x 1 82 m2) into a venous catheter over 46 hours for 2 days  Do not start before May 31, 2022  levocetirizine (XYZAL) 5 MG tablet  Self Yes Yes   Sig: Take 5 mg by mouth in the morning   levothyroxine 125 mcg tablet  Self Yes Yes   Sig: Take 125 mcg by mouth every morning     neomycin-bacitracin-polymyxin b (NEOSPORIN) ointment  Self No Yes   Sig: Apply to the tip of the penis twice daily as needed for catheter irritation     ondansetron (Zofran ODT) 4 mg disintegrating tablet  Self No Yes   Sig: Take 2 tablets (8 mg total) by mouth every 8 (eight) hours as needed for nausea or vomiting for up to 90 doses   oxyCODONE (OxyCONTIN) 10 mg 12 hr tablet  Self No Yes   Sig: Take 1 tablet (10 mg total) by mouth every 8 (eight) hours Max Daily Amount: 30 mg   oxyCODONE (ROXICODONE) 5 mg/5 mL solution   No Yes   Sig: Take 10-20mg PO Q4H PRN Moderate-severe pain   pantoprazole (PROTONIX) 40 mg tablet   No Yes   Sig: Take 1 tablet (40 mg total) by mouth 2 (two) times a day Before breakfast and before bed to prevent acid accumulation  saliva substitute (MOUTH KOTE)  Self No Yes   Sig: Apply 5 sprays to the mouth or throat 4 (four) times a day as needed (dry mouth)      Facility-Administered Medications: None       Past Medical History:   Diagnosis Date    Arthritis     shoulders    Cancer ([facility] [address] )     gastric mass    COVID 01/2021 jan 2021- no hospitalization    Disease of thyroid gland     had radioactive iodine in past    GERD (gastroesophageal reflux disease)     History of GI diverticular bleed     Hyperlipidemia     Hypertension     on no meds at present    Kidney stone     Port-A-Cath in place     S[facility]     Tinnitus     Wears glasses        Past Surgical History:   Procedure Laterality Date    ADENOIDECTOMY      CHOLECYSTECTOMY LAPAROSCOPIC N/A 1/24/2022    Procedure: CHOLECYSTECTOMY LAPAROSCOPIC W/ INTRAOP CHOLANGIOGRAM;  Surgeon: Missy Armando DO;  [facility];  Service: General    COLONOSCOPY N/A 7/16/2016    Procedure: COLONOSCOPY;  Surgeon: Taylor Mckeon MD;  [facility];  Service:     FL CHOLANGIOGRAM TRANSHEPATIC  1/24/2022    FL GUIDED CENTRAL VENOUS ACCESS DEVICE INSERTION  2/11/2022    HERNIA REPAIR      LA LAP,PROSTATECTOMY,RADICAL,W/NERVE SPARE,INCL ROBOTIC N/A 4/27/2022    Procedure: PROSTATECTOMY SIMPLE LAPAROSCOPIC  W ROBOTICS, BLADDER NECK RECONSTRUCTION;  Surgeon: Eloy Leal MD;  [facility];  Service: Urology    PROSTATE BIOPSY      TONSILLECTOMY      TUNNELED VENOUS PORT PLACEMENT N/A 2/11/2022    Procedure: INSERTION VENOUS PORT (PORT-A-CATH);   Surgeon: Nerissa Heredia MD;  [facility];  Service: Surgical Oncology    UVULECTOMY         History reviewed  No pertinent family history  I have reviewed and agree with the history as documented  E-Cigarette/Vaping    E-Cigarette Use Never User      E-Cigarette/Vaping Substances     Social History     Tobacco Use    Smoking status: Never Smoker    Smokeless tobacco: Never Used   Vaping Use    Vaping Use: Never used   Substance Use Topics    Alcohol use: Yes     Comment: social    Drug use: Never       Review of Systems   Constitutional: Positive for fatigue and fever  Gastrointestinal: Positive for nausea and vomiting  Genitourinary: Positive for dysuria, frequency and urgency  All other systems reviewed and are negative  Physical Exam  Physical Exam  Vitals and nursing note reviewed  Constitutional:       General: He is not in acute distress  Appearance: Normal appearance  He is normal weight  HENT:      Head: Normocephalic and atraumatic  Right Ear: External ear normal       Left Ear: External ear normal       Nose: Nose normal  No congestion or rhinorrhea  Mouth/Throat:      Mouth: Mucous membranes are moist       Pharynx: Oropharynx is clear  Eyes:      General: No scleral icterus  Right eye: No discharge  Left eye: No discharge  Conjunctiva/sclera: Conjunctivae normal    Cardiovascular:      Rate and Rhythm: Normal rate and regular rhythm  Pulses: Normal pulses  Heart sounds: Normal heart sounds  Pulmonary:      Effort: Pulmonary effort is normal  No respiratory distress  Breath sounds: Normal breath sounds  Abdominal:      General: Abdomen is flat  There is no distension  Palpations: Abdomen is soft  Tenderness: There is no abdominal tenderness  Comments: PEG tube in place in LUQ   Musculoskeletal:         General: No swelling  Normal range of motion  Cervical back: Normal range of motion and neck supple  Skin:     General: Skin is warm and dry  Capillary Refill: Capillary refill takes less than 2 seconds  Neurological:      General: No focal deficit present  Mental Status: He is alert and oriented to person, place, and time  Mental status is at baseline  Psychiatric:         Mood and Affect: Mood normal          Behavior: Behavior normal          Vital Signs  ED Triage Vitals   Temperature Pulse Respirations Blood Pressure SpO2   05/29/22 2245 05/29/22 2245 05/29/22 2245 05/29/22 2245 05/29/22 2245   99 7 °F (37 6 °C) 83 18 119/65 97 %      Temp Source Heart Rate Source Patient Position - Orthostatic VS BP Location FiO2 (%)   05/29/22 2245 05/30/22 0000 05/30/22 0000 05/30/22 0000 --   Oral Monitor Sitting Right arm       Pain Score       05/29/22 2245       2           Vitals:    05/29/22 2245 05/30/22 0000 05/30/22 0030 05/30/22 0100   BP: 119/65 124/60 120/66 122/70   Pulse: 83 76 60 58   Patient Position - Orthostatic VS:  Sitting Sitting Lying         Visual Acuity      ED Medications  Medications   sodium chloride 0 9 % bolus 1,000 mL (0 mL Intravenous Stopped 5/30/22 0038)   acetaminophen (TYLENOL) tablet 975 mg (975 mg Oral Given 5/29/22 2341)   ondansetron (ZOFRAN) injection 4 mg (4 mg Intravenous Given 5/29/22 2327)   sodium chloride 0 9 % bolus 1,000 mL (0 mL Intravenous Stopped 5/30/22 0130)   cefepime (MAXIPIME) 1,000 mg in dextrose 5 % 50 mL IVPB (0 mg Intravenous Stopped 5/30/22 0100)       Diagnostic Studies  Results Reviewed     Procedure Component Value Units Date/Time    Urine Microscopic [[id number]]  (Abnormal) Collected: 05/29/22 2318    Lab Status: Final result Specimen: Urine, Clean Catch Updated: 05/30/22 0100     RBC, UA 2-4 /hpf      WBC, UA Innumerable /hpf      Epithelial Cells None Seen /hpf      Bacteria, UA Occasional /hpf      WBC Clumps Moderate    Urine culture [[id number]] Collected: 05/29/22 2318    Lab Status:  In process Specimen: Urine, Clean Catch Updated: 05/30/22 0100    COVID/FLU/RSV - 2 hour TAT [[id number]]  (Normal) Collected: 05/29/22 4700    Lab Status: Final result Specimen: Nares from Nose Updated: 05/30/22 0057     SARS-CoV-2 Negative     INFLUENZA A PCR Negative     INFLUENZA B PCR Negative     RSV PCR Negative    Narrative:      FOR PEDIATRIC PATIENTS - copy/paste COVID Guidelines URL to browser: [email or web address]  [email or web address]    SARS-CoV-2 assay is a Nucleic Acid Amplification assay intended for the  qualitative detection of nucleic acid from SARS-CoV-2 in nasopharyngeal  swabs  Results are for the presumptive identification of SARS-CoV-2 RNA  Positive results are indicative of infection with SARS-CoV-2, the virus  causing COVID-19, but do not rule out bacterial infection or co-infection  with other viruses  Laboratories within the [place] and its  territories are required to report all positive results to the appropriate  public health authorities  Negative results do not preclude SARS-CoV-2  infection and should not be used as the sole basis for treatment or other  patient management decisions  Negative results must be combined with  clinical observations, patient history, and epidemiological information  This test has not been FDA cleared or approved  This test has been authorized by FDA under an Emergency Use Authorization  (EUA)  This test is only authorized for the duration of time the  declaration that circumstances exist justifying the authorization of the  emergency use of an in vitro diagnostic tests for detection of SARS-CoV-2  virus and/or diagnosis of COVID-19 infection under section 564(b)(1) of  the Act, 21 U  S C  733RCX-7(O)(9), unless the authorization is terminated  or revoked sooner  The test has been validated but independent review by FDA  and CLIA is pending  Test performed using [employer] Gene[employer]pert: This RT-PCR assay targets N2,  a region unique to SARS-CoV-2   A conserved region in the E-gene was chosen  for pan-Sarbecovirus detection which includes SARS-CoV-2  UA w Reflex to Microscopic w Reflex to Culture [[id number]]  (Abnormal) Collected: 05/29/22 2318    Lab Status: Final result Specimen: Urine, Clean Catch Updated: 05/30/22 0026     Color, UA Light Yellow     Clarity, UA Cloudy     Specific [place], UA 1 010     pH, UA 7 0     Leukocytes, UA Large     Nitrite, UA Negative     Protein, UA Trace mg/dl      Glucose, UA Negative mg/dl      Ketones, UA Negative mg/dl      Urobilinogen, UA 0 2 E U /dl      Bilirubin, UA Negative     Blood, UA Moderate    HS Troponin 0hr (reflex protocol) [[id number]]  (Normal) Collected: 05/29/22 2310    Lab Status: Final result Specimen: Blood from Arm, Right Updated: 05/29/22 2355     hs TnI 0hr 5 ng/L     Lactic acid [[id number]]  (Normal) Collected: 05/29/22 2310    Lab Status: Final result Specimen: Blood from Arm, Right Updated: 05/29/22 2350     LACTIC ACID 1 6 mmol/L     Narrative:      Result may be elevated if tourniquet was used during collection      Comprehensive metabolic panel [[id number]]  (Abnormal) Collected: 05/29/22 2310    Lab Status: Final result Specimen: Blood from Arm, Right Updated: 05/29/22 2350     Sodium 136 mmol/L      Potassium 3 9 mmol/L      Chloride 100 mmol/L      CO2 25 mmol/L      ANION GAP 11 mmol/L      BUN 42 mg/dL      Creatinine 1 98 mg/dL      Glucose 104 mg/dL      Calcium 8 8 mg/dL      AST 15 U/L      ALT 10 U/L      Alkaline Phosphatase 130 U/L      Total Protein 6 9 g/dL      Albumin 3 8 g/dL      Total Bilirubin 0 75 mg/dL      eGFR 33 ml/min/1 73sq m     Narrative:      [place] guidelines for Chronic Kidney Disease (CKD):     Stage 1 with normal or high GFR (GFR > 90 mL/min/1 73 square meters)    Stage 2 Mild CKD (GFR = 60-89 mL/min/1 73 square meters)    Stage 3A Moderate CKD (GFR = 45-59 mL/min/1 73 square meters)    Stage 3B Moderate CKD (GFR = 30-44 mL/min/1 73 square meters)    Stage 4 Severe CKD (GFR = 15-29 mL/min/1 73 square meters)    Stage 5 End Stage CKD (GFR <15 mL/min/1 73 square meters)  Note: GFR calculation is accurate only with a steady state creatinine    Lipase [[id number]]  (Normal) Collected: 05/29/22 2310    Lab Status: Final result Specimen: Blood from Arm, Right Updated: 05/29/22 2350     Lipase 20 u/L     Magnesium [[id number]]  (Abnormal) Collected: 05/29/22 2310    Lab Status: Final result Specimen: Blood from Arm, Right Updated: 05/29/22 2350     Magnesium 1 7 mg/dL     CBC and differential [[id number]]  (Abnormal) Collected: 05/29/22 2310    Lab Status: Final result Specimen: Blood from Arm, Right Updated: 05/29/22 2333     WBC 11 13 Thousand/uL      RBC 4 18 Million/uL      Hemoglobin 11 9 g/dL      Hematocrit 37 5 %      MCV 90 fL      MCH 28 5 pg      MCHC 31 7 g/dL      RDW 14 6 %      MPV 9 1 fL      Platelets 125 Thousands/uL      nRBC 0 /100 WBCs      Neutrophils Relative 75 %      Immat GRANS % 0 %      Lymphocytes Relative 8 %      Monocytes Relative 8 %      Eosinophils Relative 9 %      Basophils Relative 0 %      Neutrophils Absolute 8 25 Thousands/µL      Immature Grans Absolute 0 05 Thousand/uL      Lymphocytes Absolute 0 93 Thousands/µL      Monocytes Absolute 0 90 Thousand/µL      Eosinophils Absolute 0 97 Thousand/µL      Basophils Absolute 0 03 Thousands/µL     Blood culture #2 [[id number]] Collected: 05/29/22 2310    Lab Status: In process Specimen: Blood from Hand, Right Updated: 05/29/22 2322    Blood culture #1 [[id number]] Collected: 05/29/22 2310    Lab Status: In process Specimen: Blood from Arm, Left Updated: 05/29/22 2322                 CT renal stone study abdomen pelvis without contrast   Final Result by Georgina Gordillo DO (05/30 0128)      Bilateral nephrolithiasis, no hydronephrosis  Status post prostatectomy  Partially distended bladder  Mild circumferential bladder wall thickening noted, probably exaggerated by underdistention    Superimposed cystitis is considered in the appropriate clinical setting  Correlation with the    patient's symptoms, laboratory values, and urinalysis recommended  Previously seen retroperitoneal adenopathy has essentially intervally resolved, possibly related to posttreatment effect  No evidence of bowel obstruction  Pancreatic atrophy, scattered colonic diverticulosis, no evidence of acute diverticulitis, percutaneous gastrostomy tube in place  No evidence of bowel obstruction  Body wall edema, midline incisional scarring, and other findings as above              Workstation performed: [id number]         XR chest 1 view portable    (Results Pending)              Procedures  ECG 12 Lead Documentation Only    Date/Time: 5/29/2022 11:30 PM  Performed by: Kalli Mccauley DO  Authorized by: Kalli Mccauley DO     Indications / Diagnosis:  Weakness vomiting fever  ECG reviewed by me, the ED Provider: yes    Patient location:  ED  Previous ECG:     Previous ECG:  Compared to current    Similarity:  No change    Comparison to cardiac monitor: Yes    Interpretation:     Interpretation: normal    Rate:     ECG rate:  69    ECG rate assessment: normal    Rhythm:     Rhythm: sinus rhythm    Ectopy:     Ectopy: none    QRS:     QRS axis:  Normal    QRS intervals:  Normal  Conduction:     Conduction: normal    ST segments:     ST segments:  Normal  T waves:     T waves: normal    CriticalCare Time  Performed by: Kalli Mccauley DO  Authorized by: Kalli Mccauley DO     Critical care provider statement:     Critical care time (minutes):  35    Critical care time was exclusive of:  Separately billable procedures and treating other patients and teaching time    Critical care was necessary to treat or prevent imminent or life-threatening deterioration of the following conditions:  Renal failure, dehydration and sepsis    Critical care was time spent personally by me on the following activities:  Obtaining history from patient or surrogate, development of treatment plan with patient or surrogate, discussions with primary provider, evaluation of patient's response to treatment, examination of patient, review of old charts, re-evaluation of patient's condition, ordering and review of radiographic studies, ordering and review of laboratory studies and ordering and performing treatments and interventions             ED Course  ED Course as of 05/30/22 0147   Kaycee May 29, 2022   2323 This patient was evaluated during a time of global shortage of iodinated contrast media  Based on guidance from the [employer] of Radiology, best practices, and local institutional approaches an alternative path for evaluating and managing the patient may have been employed in order to provide optimal care during this shortage  The current situation has been discussed with the patient  Mon May 30, 2022   0022 BUN(!): 42   0022 Creatinine(!): 1 98   0022 WBC(!): 11 13   0022 LACTIC ACID: 1 6  Workup revealing evidence of severe sepsis, with normal lactic acid but acute kidney injury  Creatinine 3 days ago was 2 67, today it is 1 98, baseline has normal creatinine  Will admit, IV abx (cefepime) ordered, IV fluids  Appears dehydrated, high BUN:Creat ratio  0127 D/w Jyoti Dewitt and [facility] resident, accept to service under Dr Andree Garber  SBIRT 22yo+    Flowsheet Row Most Recent Value   SBIRT (25 yo +)    In order to provide better care to our patients, we are screening all of our patients for alcohol and drug use  Would it be okay to ask you these screening questions?  Unable to answer at this time Filed at: 05/29/2022 2251                    MDM  Number of Diagnoses or Management Options  Acute UTI: new and requires workup  DEBBIE (acute kidney injury) [facility]): new and requires workup  Dehydration: new and requires workup  Severe sepsis [facility]): new and requires workup     Amount and/or Complexity of Data Reviewed  Clinical lab tests: ordered and reviewed  Tests in the radiology section of CPT®: ordered and reviewed  Decide to obtain previous medical records or to obtain history from someone other than the patient: yes  Discuss the patient with other providers: yes    Risk of Complications, Morbidity, and/or Mortality  Presenting problems: moderate  Diagnostic procedures: moderate  Management options: moderate    Patient Progress  Patient progress: stable      Disposition  Final diagnoses:   Acute UTI   DEBBIE (acute kidney injury) ([facility] [address] )   Dehydration   Severe sepsis ([facility] [address] )     Time reflects when diagnosis was documented in both MDM as applicable and the Disposition within this note     Time User Action Codes Description Comment    5/30/2022  1:29 AM Karen Lade Add [N39 0] Acute UTI     5/30/2022  1:29 AM DichJamison newamna Minder Add [N17 9] DEBBIE (acute kidney injury) ([facility] [address] )     5/30/2022  1:29 AM Karen Lade Add [E86 0] Dehydration     5/30/2022  1:29 AM Dichter, Velinda Minder Add [A41 9,  R65 20] Severe sepsis [facility])       ED Disposition     ED Disposition   Admit    Condition   Stable    Date/Time   Mon May 30, 2022  1:29 AM    Comment   Case was discussed with Mary Valentin and the patient's admission status was agreed to be Admission Status: inpatient status to the service of Dr Maricel Matthew   Follow-up Information    None         Patient's Medications   Discharge Prescriptions    No medications on file       No discharge procedures on file      PDMP Review       Value Time User    PDMP Reviewed  Yes 5/5/2022  7:13 AM Kofi Cormier PA-C          ED Provider  Electronically Signed by           Micheal Landin DO  05/30/22 0153

## 2022-05-31 ENCOUNTER — APPOINTMENT (INPATIENT)
Dept: ULTRASOUND IMAGING | Facility: HOSPITAL | Age: 72
DRG: 683 | End: 2022-05-31
Payer: COMMERCIAL

## 2022-05-31 ENCOUNTER — PATIENT OUTREACH (OUTPATIENT)
Dept: CASE MANAGEMENT | Facility: HOSPITAL | Age: 72
End: 2022-05-31

## 2022-05-31 ENCOUNTER — HOSPITAL ENCOUNTER (OUTPATIENT)
Dept: INFUSION CENTER | Facility: CLINIC | Age: 72
Discharge: HOME | End: 2022-05-31

## 2022-05-31 PROBLEM — K59.00 CONSTIPATION: Status: ACTIVE | Noted: 2022-05-31

## 2022-05-31 LAB
ANION GAP SERPL CALCULATED.3IONS-SCNC: 7 MMOL/L (ref 4–13)
BASOPHILS # BLD AUTO: 0.02 THOUSANDS/ΜL (ref 0–0.1)
BASOPHILS NFR BLD AUTO: 0 % (ref 0–1)
BUN SERPL-MCNC: 35 MG/DL (ref 5–25)
CALCIUM SERPL-MCNC: 8 MG/DL (ref 8.4–10.2)
CHLORIDE SERPL-SCNC: 110 MMOL/L (ref 96–108)
CO2 SERPL-SCNC: 22 MMOL/L (ref 21–32)
CREAT SERPL-MCNC: 1.81 MG/DL (ref 0.6–1.3)
EOSINOPHIL # BLD AUTO: 0.89 THOUSAND/ΜL (ref 0–0.61)
EOSINOPHIL NFR BLD AUTO: 11 % (ref 0–6)
ERYTHROCYTE [DISTWIDTH] IN BLOOD BY AUTOMATED COUNT: 14.7 % (ref 11.6–15.1)
GFR SERPL CREATININE-BSD FRML MDRD: 36 ML/MIN/1.73SQ M
GLUCOSE SERPL-MCNC: 83 MG/DL (ref 65–140)
HCT VFR BLD AUTO: 31.6 % (ref 36.5–49.3)
HGB BLD-MCNC: 9.9 G/DL (ref 12–17)
IMM GRANULOCYTES # BLD AUTO: 0.04 THOUSAND/UL (ref 0–0.2)
IMM GRANULOCYTES NFR BLD AUTO: 1 % (ref 0–2)
LYMPHOCYTES # BLD AUTO: 1.19 THOUSANDS/ΜL (ref 0.6–4.47)
LYMPHOCYTES NFR BLD AUTO: 15 % (ref 14–44)
MAGNESIUM SERPL-MCNC: 2 MG/DL (ref 1.9–2.7)
MCH RBC QN AUTO: 28.7 PG (ref 26.8–34.3)
MCHC RBC AUTO-ENTMCNC: 31.3 G/DL (ref 31.4–37.4)
MCV RBC AUTO: 92 FL (ref 82–98)
MONOCYTES # BLD AUTO: 0.82 THOUSAND/ΜL (ref 0.17–1.22)
MONOCYTES NFR BLD AUTO: 10 % (ref 4–12)
NEUTROPHILS # BLD AUTO: 4.92 THOUSANDS/ΜL (ref 1.85–7.62)
NEUTS SEG NFR BLD AUTO: 63 % (ref 43–75)
NRBC BLD AUTO-RTO: 0 /100 WBCS
PLATELET # BLD AUTO: 264 THOUSANDS/UL (ref 149–390)
PMV BLD AUTO: 9 FL (ref 8.9–12.7)
POTASSIUM SERPL-SCNC: 4.1 MMOL/L (ref 3.5–5.3)
RBC # BLD AUTO: 3.45 MILLION/UL (ref 3.88–5.62)
SODIUM SERPL-SCNC: 139 MMOL/L (ref 135–147)
WBC # BLD AUTO: 7.88 THOUSAND/UL (ref 4.31–10.16)

## 2022-05-31 PROCEDURE — 85025 COMPLETE CBC W/AUTO DIFF WBC: CPT

## 2022-05-31 PROCEDURE — 80048 BASIC METABOLIC PNL TOTAL CA: CPT

## 2022-05-31 PROCEDURE — 99233 SBSQ HOSP IP/OBS HIGH 50: CPT | Performed by: INTERNAL MEDICINE

## 2022-05-31 PROCEDURE — 76770 US EXAM ABDO BACK WALL COMP: CPT

## 2022-05-31 PROCEDURE — 83735 ASSAY OF MAGNESIUM: CPT

## 2022-05-31 RX ORDER — AMOXICILLIN 250 MG
1 CAPSULE ORAL 2 TIMES DAILY
Status: DISCONTINUED | OUTPATIENT
Start: 2022-05-31 | End: 2022-06-01 | Stop reason: HOSPADM

## 2022-05-31 RX ORDER — ONDANSETRON 4 MG/1
4 TABLET, ORALLY DISINTEGRATING ORAL EVERY 6 HOURS PRN
Status: DISCONTINUED | OUTPATIENT
Start: 2022-05-31 | End: 2022-06-01 | Stop reason: HOSPADM

## 2022-05-31 RX ADMIN — LEVOTHYROXINE SODIUM 125 MCG: 125 TABLET ORAL at 06:35

## 2022-05-31 RX ADMIN — HEPARIN SODIUM 5000 UNITS: 5000 INJECTION INTRAVENOUS; SUBCUTANEOUS at 06:31

## 2022-05-31 RX ADMIN — SODIUM CHLORIDE 125 ML/HR: 0.9 INJECTION, SOLUTION INTRAVENOUS at 05:00

## 2022-05-31 RX ADMIN — PANTOPRAZOLE SODIUM 40 MG: 40 TABLET, DELAYED RELEASE ORAL at 21:37

## 2022-05-31 RX ADMIN — OXYCODONE HYDROCHLORIDE 10 MG: 10 TABLET, FILM COATED, EXTENDED RELEASE ORAL at 06:35

## 2022-05-31 RX ADMIN — OXYCODONE HYDROCHLORIDE 10 MG: 5 SOLUTION ORAL at 22:40

## 2022-05-31 RX ADMIN — LORATADINE 10 MG: 10 TABLET ORAL at 09:16

## 2022-05-31 RX ADMIN — OXYCODONE HYDROCHLORIDE 10 MG: 10 TABLET, FILM COATED, EXTENDED RELEASE ORAL at 13:01

## 2022-05-31 RX ADMIN — HEPARIN SODIUM 5000 UNITS: 5000 INJECTION INTRAVENOUS; SUBCUTANEOUS at 21:37

## 2022-05-31 RX ADMIN — DRONABINOL 5 MG: 2.5 CAPSULE ORAL at 16:50

## 2022-05-31 RX ADMIN — PANTOPRAZOLE SODIUM 40 MG: 40 TABLET, DELAYED RELEASE ORAL at 09:16

## 2022-05-31 RX ADMIN — HEPARIN SODIUM 5000 UNITS: 5000 INJECTION INTRAVENOUS; SUBCUTANEOUS at 13:01

## 2022-05-31 RX ADMIN — DRONABINOL 5 MG: 2.5 CAPSULE ORAL at 06:35

## 2022-05-31 RX ADMIN — CEFTRIAXONE SODIUM 1000 MG: 10 INJECTION, POWDER, FOR SOLUTION INTRAVENOUS at 09:15

## 2022-05-31 RX ADMIN — SENNOSIDES AND DOCUSATE SODIUM 1 TABLET: 50; 8.6 TABLET ORAL at 12:03

## 2022-05-31 RX ADMIN — SENNOSIDES AND DOCUSATE SODIUM 1 TABLET: 50; 8.6 TABLET ORAL at 17:39

## 2022-05-31 RX ADMIN — METHYLPREDNISOLONE 4 MG: 4 TABLET ORAL at 09:16

## 2022-05-31 RX ADMIN — BISACODYL 5 MG: 5 TABLET, COATED ORAL at 09:16

## 2022-05-31 RX ADMIN — OXYCODONE HYDROCHLORIDE 10 MG: 10 TABLET, FILM COATED, EXTENDED RELEASE ORAL at 21:37

## 2022-05-31 RX ADMIN — ONDANSETRON 4 MG: 2 INJECTION INTRAMUSCULAR; INTRAVENOUS at 06:35

## 2022-05-31 NOTE — UTILIZATION REVIEW
Initial Clinical Review    Admission: Date/Time/Statement:   Admission Orders (From admission, onward)     Ordered        05/30/22 0131  Inpatient Admission  Once                      Orders Placed This Encounter   Procedures    Inpatient Admission     Standing Status:   Standing     Number of Occurrences:   1     Order Specific Question:   Level of Care     Answer:   Med Surg [16]     Order Specific Question:   Estimated length of stay     Answer:   More than 2 Midnights     Order Specific Question:   Certification     Answer:   I certify that inpatient services are medically necessary for this patient for a duration of greater than two midnights  See H&P and MD Progress Notes for additional information about the patient's course of treatment  ED Arrival Information     Expected   -    Arrival   5/29/2022 22:34    Acuity   Urgent            Means of arrival   Wheelchair    Escorted by   Family Member    Service   Hospitalist    Admission type   Urgent            Arrival complaint   dehydrated/uti/septic/vomit           Chief Complaint   Patient presents with    Fever - 9 weeks to 74 years     Pt reports fevers at home with vomiting this morning, decreased appetite  Reports recent UTIs since prostatectomy       Initial Presentation: 70 y o  male with hx HTN,  BPH s/p prostatectomy, gastric adenocarcinoma undergoing chemotherapy and immunotherapy with next due 6/1/22 presents to ED from home with symptomatic UTI  Completed Keflex for Klebsiella E UTI yeterday  Reports 4 days of increased urine frequency and dysuria with 1 day of fevers 101 F , chills, fatigue, nausea, vomiting, weakness, dizziness  Reports suprapubic pain   Reports a rash, itching, bilateral upper extremities started about 5 days ago and is a presumed reaction to his immunotherapy treatment    He has been taking a Medrol Dosepak for this as well as Xyzal  On exam, appears fatigued, has erythematous, scattered, macular lesions bilateral UE, temp 99  7 F  PEG tube in place   Labs-WBC 11 13, creatinine 1 98 from baseline 0 8-0 9 ,Mag 1 7 UA with innumerable WBC  CT:  Bilateral nephrolithiasis, no hydronephrosis  Distended bladder, mild circumferential bladder wall thickening noted  Pt given IV abx, IVF, Tylenol, IV antiemetic in ED  Pt admitted as Inpatient with acute complicated UTI with DEBBIE  Plan - IV Cefepime, prn Tylenol, prn antiemetic, IVF, check procal, monitor fever curve, CBC, CMP in am  F/u urine and blood cultures , Methylprednisolone to complete dose agustina for rash  Pain control  Date:5/31   Day 2:   Blood cultures NGTD  Urine cultures growing <10,000 klebsiella  Nsg reports increased urinary frequency   Pt continues IV Ceftriaxone   Creat remains elevated 1 81 today with IVF which was d/c'ed then today   Ordered renal US and PVR  Monitor fever curve and WBC's   WBC wnl today , pt afebrile   No Bm x 3 days in setting of decreased appetite, started senekot BID, encourage po intake, protein supplements   Medrol completed today  Pt reports nausea with meals, Ordered Zofran ODT to take pre meals   CBC, BMP tomorrow          ED Triage Vitals   Temperature Pulse Respirations Blood Pressure SpO2   05/29/22 2245 05/29/22 2245 05/29/22 2245 05/29/22 2245 05/29/22 2245   99 7 °F (37 6 °C) 83 18 119/65 97 %      Temp Source Heart Rate Source Patient Position - Orthostatic VS BP Location FiO2 (%)   05/29/22 2245 05/30/22 0000 05/30/22 0000 05/30/22 0000 --   Oral Monitor Sitting Right arm       Pain Score       05/29/22 2245       2          Wt Readings from Last 1 Encounters:   05/29/22 67 1 kg (148 lb)     Additional Vital Signs:   Date/Time Temp Pulse Resp BP MAP (mmHg) SpO2   05/31/22 07:11:13 -- 55 16 127/58 81 95 %   05/30/22 22:37:56 98 1 °F (36 7 °C) 62 18 130/65 87 94 %   05/30/22 15:36:51 97 9 °F (36 6 °C) 58 19 115/54 74 95 %   05/30/22 0930 -- -- -- -- -- --   05/30/22 07:53:59 98 1 °F (36 7 °C) 64 -- 141/73 96 98 %   05/30/22 02:36:20 97 8 °F (36 6 °C) 60 18 118/63 81 96 %   05/30/22 0100 -- 58 18 122/70 -- 94 %   05/30/22 0030 -- 60 18 120/66 -- 96 %   05/30/22 0000 -- 76 18 124/60 86 96 %       Pertinent Labs/Diagnostic Test Results:   US kidney and bladder   Final Result by Ghislaine Miranda DO (05/31 1518)   1  No hydronephrosis  2   Redemonstration of nonobstructing bilateral renal calculi as seen on the recent CT  3   Mild thickening of the urinary bladder wall  This may be due in part to incomplete distention, chronic urinary retention, or cystitis  Correlation with urinalysis is recommended  Workstation performed: [id number]         XR chest 1 view portable   Final Result by Artemio Medina MD (05/30 1115)      No acute cardiopulmonary disease  Workstation performed: [id number]         CT renal stone study abdomen pelvis without contrast   Final Result by Ryley Alexandre DO (05/30 0128)      Bilateral nephrolithiasis, no hydronephrosis  Status post prostatectomy  Partially distended bladder  Mild circumferential bladder wall thickening noted, probably exaggerated by underdistention  Superimposed cystitis is considered in the appropriate clinical setting  Correlation with the    patient's symptoms, laboratory values, and urinalysis recommended  Previously seen retroperitoneal adenopathy has essentially intervally resolved, possibly related to posttreatment effect  No evidence of bowel obstruction  Pancreatic atrophy, scattered colonic diverticulosis, no evidence of acute diverticulitis, percutaneous gastrostomy tube in place  No evidence of bowel obstruction  Body wall edema, midline incisional scarring, and other findings as above              Workstation performed: [id number]         5/229 ECG-  Normal sinus rhythm with premature ventricular or aberrantly conducted complexes    Results from last 7 days   Lab Units 05/29/22  2320   SARS-COV-2  Negative     Results from last 7 days   Lab Units 05/31/22  0455 05/30/22  0457 05/29/22  2310 05/26/22  1141   WBC Thousand/uL 7 88 8 48 11 13* 6 10   HEMOGLOBIN g/dL 9 9* 9 7* 11 9* 11 4*   HEMATOCRIT % 31 6* 29 6* 37 5 34 8*   PLATELETS Thousands/uL 264 238 317 321   NEUTROS ABS Thousands/µL 4 92 6 21 8 25* 4 36         Results from last 7 days   Lab Units 05/31/22 0455 05/30/22 0457 05/29/22 2310 05/26/22  1141   SODIUM mmol/L 139 136 136 133*   POTASSIUM mmol/L 4 1 3 7 3 9 4 5   CHLORIDE mmol/L 110* 105 100 97   CO2 mmol/L 22 24 25 25   ANION GAP mmol/L 7 7 11 11   BUN mg/dL 35* 39* 42* 55*   CREATININE mg/dL 1 81* 1 83* 1 98* 2 67*   EGFR ml/min/1 73sq m 36 36 33 22   CALCIUM mg/dL 8 0* 7 8* 8 8 8 6   MAGNESIUM mg/dL  --   --  1 7*  --      Results from last 7 days   Lab Units 05/30/22 0457 05/29/22 2310 05/26/22  1141   AST U/L 13 15 15   ALT U/L 9 10 12   ALK PHOS U/L 102 130* 132*   TOTAL PROTEIN g/dL 5 5* 6 9 6 7   ALBUMIN g/dL 3 0* 3 8 3 6   TOTAL BILIRUBIN mg/dL 0 60 0 75 0 57         Results from last 7 days   Lab Units 05/31/22 0455 05/30/22 0457 05/29/22 2310 05/26/22  1141   GLUCOSE RANDOM mg/dL 83 103 104 121               Results from last 7 days   Lab Units 05/29/22  2310   HS TNI 0HR ng/L 5             Results from last 7 days   Lab Units 05/26/22  1141   TSH 3RD GENERATON uIU/mL 4 424     Results from last 7 days   Lab Units 05/29/22  2310   PROCALCITONIN ng/ml 0 10     Results from last 7 days   Lab Units 05/29/22  2310   LACTIC ACID mmol/L 1 6                         Results from last 7 days   Lab Units 05/29/22  2310   LIPASE u/L 20                 Results from last 7 days   Lab Units 05/29/22  2318   CLARITY UA  Cloudy   COLOR UA  Light Yellow   SPEC GRAV UA  1 010   PH UA  7 0   GLUCOSE UA mg/dl Negative   KETONES UA mg/dl Negative   BLOOD UA  Moderate*   PROTEIN UA mg/dl Trace*   NITRITE UA  Negative   BILIRUBIN UA  Negative   UROBILINOGEN UA E U /dl 0 2   LEUKOCYTES UA  Large*   WBC UA /hpf Innumerable* RBC UA /hpf 2-4   BACTERIA UA /hpf Occasional   EPITHELIAL CELLS WET PREP /hpf None Seen     Results from last 7 days   Lab Units 05/29/22 2320   INFLUENZA A PCR  Negative   INFLUENZA B PCR  Negative   RSV PCR  Negative           Results from last 7 days   Lab Units 05/29/22 2318 05/29/22 2310   BLOOD CULTURE   --  No Growth at 24 hrs  No Growth at 24 hrs     URINE CULTURE  <10,000 cfu/ml Klebsiella pneumoniae*  --                ED Treatment:   Medication Administration from 05/29/2022 2234 to 05/30/2022 0154       Date/Time Order Dose Route Action     05/29/2022 2324 sodium chloride 0 9 % bolus 1,000 mL 1,000 mL Intravenous New Bag     05/29/2022 2341 acetaminophen (TYLENOL) tablet 975 mg 975 mg Oral Given     05/29/2022 2327 ondansetron (ZOFRAN) injection 4 mg 4 mg Intravenous Given     05/30/2022 0038 sodium chloride 0 9 % bolus 1,000 mL 1,000 mL Intravenous New Bag     05/30/2022 0030 cefepime (MAXIPIME) 1,000 mg in dextrose 5 % 50 mL IVPB 1,000 mg Intravenous New Bag        Past Medical History:   Diagnosis Date    Arthritis     shoulders    Cancer ([place] [address] )     gastric mass    COVID 01/2021 jan 2021- no hospitalization    Disease of thyroid gland     had radioactive iodine in past    GERD (gastroesophageal reflux disease)     History of GI diverticular bleed     Hyperlipidemia     Hypertension     on no meds at present    Kidney stone     Port-A-Cath in place     S/P percutaneous endoscopic gastrostomy (PEG) tube placement ([place] [address] )     Tinnitus     Wears glasses      Present on Admission:   Cancer related pain   Gastric adenocarcinoma ([place] [address] )   Hypothyroid      Admitting Diagnosis: Dehydration [E86 0]  Fever [R50 9]  Acute UTI [N39 0]  DEBBIE (acute kidney injury) ([place] [address] ) [N17 9]  Severe sepsis ([place] [address] ) [A41 9, R65 20]  Age/Sex: 70 y o  male  Admission Orders:  Scheduled Medications:  cefTRIAXone, 1,000 mg, Intravenous, Q24H  dronabinol, 5 mg, Oral, BID AC  heparin (porcine), 5,000 Units, Subcutaneous, Q8H [facility]  levothyroxine, 125 mcg, Oral, Early Morning  loratadine, 10 mg, Oral, Daily  oxyCODONE, 10 mg, Oral, Q8H YOHANA  pantoprazole, 40 mg, Oral, BID  senna-docusate sodium, 1 tablet, Oral, BID    methylprednisolone (MEDROL) tablet 8 mg  Dose: 8 mg  Freq: Once Route: PO  Start: 05/30/22 0900 End: 05/30/22 0839  magnesium sulfate 2 g/50 mL IVPB (premix) 2 g  Dose: 2 g  Freq: Once Route: IV  Last Dose: 2 g (05/30/22 0300)  Start: 05/30/22 0245 End: 05/30/22 0500  methylprednisolone (MEDROL) tablet 4 mg  Dose: 4 mg  Freq: Once Route: PO  Start: 05/31/22 0900 End: 05/31/22 0916  bisacodyl (DULCOLAX) EC tablet 5 mg  Dose: 5 mg  Freq: Daily Route: PO  Indications of Use: CONSTIPATION  Start: 05/30/22 0900 End: 05/31/22 1144  senna-docusate sodium (SENOKOT S) 8 6-50 mg per tablet 1 tablet  Dose: 1 tablet  Freq: 2 times daily Route: PO  Start: 05/31/22 1145    Continuous IV Infusions:    sodium chloride 0 9 % infusion  Rate: 125 mL/hr Dose: 125 mL/hr  Freq: Continuous Route: IV  Last Dose: 125 mL/hr (05/31/22 0500)  Start: 05/30/22 0245 End: 05/31/22 1144  PRN Meds:  acetaminophen, 650 mg, Oral, Q6H PRN  ondansetron, 4 mg, Intravenous, Q6H PRN x1 5/30, x1 5/31 End: 05/31/22 1141  oxyCODONE, 10 mg, Oral, Q6H PRN x2 5/30  ondansetron (ZOFRAN-ODT) dispersible tablet 4 mg  Dose: 4 mg  Freq: Every 6 hours PRN Route: PO  PRN Reasons: nausea,vomiting  Start: 05/31/22 1141    SCD  OOB as jt    Network Utilization Review Department  ATTENTION: Please call with any questions or concerns to [phone] and carefully listen to the prompts so that you are directed to the right person  All voicemails are confidential   Ivelisse Aveyr all requests for admission clinical reviews, approved or denied determinations and any other requests to dedicated fax number below belonging to the campus where the patient is receiving treatment   List of dedicated fax numbers for the Facilities:  FACILITY NAME UR FAX NUMBER   ADMISSION DENIALS (Administrative/Medical Necessity) [phone]   [address] (Maternity/NICU/Pediatrics) [address] [facility],[address] [facility] [address] [facility]  [phone]   Lilia Lew [address] [place] [address] [id number] [place] [address] [address]rios [address] [facility] [address] [facility] [phone]

## 2022-05-31 NOTE — ASSESSMENT & PLAN NOTE
POA: patient reporting 4 days increased urine frequency, dysuria, and 1 day of fever/chills, nausea/vomiting, weakness, fatigue, dizziness, suprapubic pain  Tmax at home 101F    ED vitals: T99 7, HR 83, RR 18, /65, 97% on RA  Recent Labs     05/30/22  0457 05/31/22  0455 06/01/22  0632   CREATININE 1 83* 1 81* 1 69*   EGFR 36 36 39     Estimated Creatinine Clearance: 37 5 mL/min (A) (by C-G formula based on SCr of 1 69 mg/dL (H))  In the ED found: WBC 11 13  Cr 1 98 (baseline Cr 0 9, Cr on 5/27 2 67) UA +  CT:  Bilateral nephrolithiasis, no hydronephrosis  Distended bladder, mild circumferential bladder wall thickening noted       Urine culture 5/11/22 grew Klebsiella,S/p prostatectomy April 2022  Has taken keflex at home for Klebsiella UTI, last taken day prior to admission    Received IV Cefepime, 2L NS, tylenol, zofran in ED  Blood culture NGTD    Plan:  IV Abx: Ceftriaxone 1g q24h  Tylenol prn for fevers  zofran prn for nausea/vomiting  Urine cultures growing <10,000 kelbsiella  Renal ultrasound unremarkable  Cr downtrending  BMP in 1 week  Follow up with PCP, urology, heme onc  Take Cefuroxime BID for four days to complete total of 7 days of antibiotics for UTI

## 2022-05-31 NOTE — ASSESSMENT & PLAN NOTE
POA: patient reporting 4 days increased urine frequency, dysuria, and 1 day of fever/chills, nausea/vomiting, weakness, fatigue, dizziness, suprapubic pain  Tmax at home 101F    ED vitals: T99 7, HR 83, RR 18, /65, 97% on RA    Results from last 7 days   Lab Units 05/29/22  2318   LEUKOCYTES UA  Large*   NITRITE UA  Negative   GLUCOSE UA mg/dl Negative   KETONES UA mg/dl Negative   BLOOD UA  Moderate*   WBC UA /hpf Innumerable*   RBC UA /hpf 2-4   BACTERIA UA /hpf Occasional     Recent Labs     05/29/22  2310 05/30/22  0457 05/31/22  0455   CREATININE 1 98* 1 83* 1 81*   EGFR 33 36 36     Estimated Creatinine Clearance: 35 mL/min (A) (by C-G formula based on SCr of 1 81 mg/dL (H))  ED workup:  WBC 11 13  UA, microscopy as above  Cr 1 98 (baseline Cr 0 9, Cr on 5/27 2 67)  Mag 1 7  Procal 0 10  EKG rate 69, NSR  Lactate, lipase, troponin, covid negative  CT:  Bilateral nephrolithiasis, no hydronephrosis  Distended bladder, mild circumferential bladder wall thickening noted       Urine culture 5/11/22 grew Klebsiella  S/p prostatectomy April 2022  Has taken keflex at home for Klebsiella UTI, last taken day prior to admission    Received IV Cefepime, 2L NS, tylenol, zofran in ED  Blood culture NGTD    Plan:  IV Abx: Ceftriaxone 1g q24h  Tylenol prn for fevers  zofran prn for nausea/vomiting  Urine cultures growing <10,000 kelbsiella  Given lack of improvement of creatinine, will order renal ultrasound and post void residual of bladder to look for possible retention  Continue to monitor fever curve, CBC  Will discontinue fluids for now, monitor Cr in AM

## 2022-05-31 NOTE — ASSESSMENT & PLAN NOTE
POA: erythematous, scattered, macular rash present on bilateral UE  Patient reports this has been present within the past week, and is a suspected side effect to Opdivo immunotherapy  Was having intense itching of this rash, which has been improving with Medrol dose pack prescribed 5/26  Patient had two days remaining on this and would like to continue this therapy as the rash is still present  He was also taking 5mg Xyzal daily  Completed medrol on 5/31  Xyzal not on formulary, substitute with 10mg claritin daily

## 2022-05-31 NOTE — ASSESSMENT & PLAN NOTE
Patient reports no BM for about 3 days, setting of decreased PO intake  Senna-docusate BID  Monitor for BM  Encourage Po intake

## 2022-05-31 NOTE — ASSESSMENT & PLAN NOTE
Follows with Dr Brian Landin outpatient  Receiving chemo/immunotherapy, last infusion 5/16, next scheduled infusion 6/1  Resume home Protonix, dronabinol, Zofran   Follow up outpatient with oncology

## 2022-05-31 NOTE — PROGRESS NOTES
[facility]  Progress Note - Nicole Michel 1950, 70 y o  male MRN: [id number]  Unit/Bed#: S -99 Encounter: [id number]  Primary Care Provider: Ed Gabriel DO   Date and time admitted to hospital: 5/29/2022 10:47 PM    * Acute, Complicated UTI with Acute Kidney Injury   Assessment & Plan  POA: patient reporting 4 days increased urine frequency, dysuria, and 1 day of fever/chills, nausea/vomiting, weakness, fatigue, dizziness, suprapubic pain  Tmax at home 101F    ED vitals: T99 7, HR 83, RR 18, /65, 97% on RA    Results from last 7 days   Lab Units 05/29/22  2318   LEUKOCYTES UA  Large*   NITRITE UA  Negative   GLUCOSE UA mg/dl Negative   KETONES UA mg/dl Negative   BLOOD UA  Moderate*   WBC UA /hpf Innumerable*   RBC UA /hpf 2-4   BACTERIA UA /hpf Occasional     Recent Labs     05/29/22  2310 05/30/22  0457 05/31/22  0455   CREATININE 1 98* 1 83* 1 81*   EGFR 33 36 36     Estimated Creatinine Clearance: 35 mL/min (A) (by C-G formula based on SCr of 1 81 mg/dL (H))  ED workup:  WBC 11 13  UA, microscopy as above  Cr 1 98 (baseline Cr 0 9, Cr on 5/27 2 67)  Mag 1 7  Procal 0 10  EKG rate 69, NSR  Lactate, lipase, troponin, covid negative  CT:  Bilateral nephrolithiasis, no hydronephrosis  Distended bladder, mild circumferential bladder wall thickening noted       Urine culture 5/11/22 grew Klebsiella  S/p prostatectomy April 2022  Has taken keflex at home for Klebsiella UTI, last taken day prior to admission    Received IV Cefepime, 2L NS, tylenol, zofran in ED  Blood culture NGTD    Plan:  IV Abx: Ceftriaxone 1g q24h  Tylenol prn for fevers  zofran prn for nausea/vomiting  Urine cultures growing <10,000 kelbsiella  Given lack of improvement of creatinine, will order renal ultrasound and post void residual of bladder to look for possible retention  Continue to monitor fever curve, CBC      Constipation  Assessment & Plan  Patient reports no BM for about 3 days, setting of decreased PO intake  Senna-docusate BID  Monitor for BM  Encourage Po intake    Rash  Assessment & Plan  POA: erythematous, scattered, macular rash present on bilateral UE  Patient reports this has been present within the past week, and is a suspected side effect to Opdivo immunotherapy  Was having intense itching of this rash, which has been improving with Medrol dose pack prescribed 5/26  Patient had two days remaining on this and would like to continue this therapy as the rash is still present  He was also taking 5mg Xyzal daily  Completed medrol on 5/31  Xyzal not on formulary, substitute with 10mg claritin daily  Hypomagnesemia  Assessment & Plan  Recent Labs     05/29/22  2310   MG 1 7*     Replete with IV mag 2g  Added magnesium to daily labs this AM, pending  Replete as needed to >2    Cancer related pain  Assessment & Plan  Follows with Dr Marisol Leal outpatient Palliative care  Pain regimen at home: Oxycodone 10mg TID, Oxy oral solution 10-20mg q6h prn  Resume home regimen, continue to monitor pain and adjust pain regimen if needed  Resume home dulcolax daily    Gastric adenocarcinoma [facility])  Assessment & Plan  Follows with Dr Karl Peña outpatient  Receiving chemo/immunotherapy, last infusion 5/16, next scheduled infusion 6/1  Resume home Protonix, dronabinol, Zofran   Follow up outpatient with oncology    Hypothyroid  Assessment & Plan  Home levothyroxine        VTE Pharmacologic Prophylaxis: VTE Score: 8 High Risk (Score >/= 5) - Pharmacological DVT Prophylaxis Ordered: heparin  Sequential Compression Devices Ordered  Patient Centered Rounds: I performed bedside rounds with nursing staff today  Discussions with Specialists or Other Care Team Provider: case management    Education and Discussions with Family / Patient: Patient declined call to        Current Length of Stay: 1 day(s)  Current Patient Status: Inpatient   Discharge Plan: Anticipate discharge in 24-48 hrs to home     Code Status: Level 1 - Full Code    Subjective:   Patient reporting feeling subjectively well today, seen ambulating in the hallway  Denies dysuria, still endorsing decreased PO intake and appetite which has been an ongoing problem, as well as nausea with meals  Will offer ODT zofran for patient to take before meals  Patient denies chest pain, abdominal pain, fevers, chills, emesis  Reports last BM three days ago  Will obtain post void residual today, renal ultrasound  Objective:     Vitals:   Temp (24hrs), Av °F (36 7 °C), Min:97 9 °F (36 6 °C), Max:98 1 °F (36 7 °C)    Temp:  [97 9 °F (36 6 °C)-98 1 °F (36 7 °C)] 98 1 °F (36 7 °C)  HR:  [55-62] 55  Resp:  [16-19] 16  BP: (115-130)/(54-65) 127/58  SpO2:  [94 %-95 %] 95 %  Body mass index is 23 18 kg/m²  Input and Output Summary (last 24 hours):   No intake or output data in the 24 hours ending 22 9718    Physical Exam:   Physical Exam  Vitals and nursing note reviewed  Constitutional:       General: He is not in acute distress  Appearance: Normal appearance  He is not toxic-appearing  HENT:      Head: Normocephalic and atraumatic  Eyes:      Extraocular Movements: Extraocular movements intact  Cardiovascular:      Rate and Rhythm: Normal rate and regular rhythm  Heart sounds: Normal heart sounds  No murmur heard  Pulmonary:      Effort: Pulmonary effort is normal  No respiratory distress  Breath sounds: Normal breath sounds  No wheezing  Abdominal:      General: There is no distension  Palpations: Abdomen is soft  Tenderness: There is no abdominal tenderness  Musculoskeletal:      Right lower leg: No edema  Left lower leg: No edema  Skin:     General: Skin is warm and dry  Neurological:      General: No focal deficit present  Mental Status: He is alert     Psychiatric:         Mood and Affect: Mood normal           Additional Data:     Labs:  Results from last 7 days   Lab Units 05/31/22  0455   WBC Thousand/uL 7 88   HEMOGLOBIN g/dL 9 9*   HEMATOCRIT % 31 6*   PLATELETS Thousands/uL 264   NEUTROS PCT % 63   LYMPHS PCT % 15   MONOS PCT % 10   EOS PCT % 11*     Results from last 7 days   Lab Units 05/31/22  0455 05/30/22  0457   SODIUM mmol/L 139 136   POTASSIUM mmol/L 4 1 3 7   CHLORIDE mmol/L 110* 105   CO2 mmol/L 22 24   BUN mg/dL 35* 39*   CREATININE mg/dL 1 81* 1 83*   ANION GAP mmol/L 7 7   CALCIUM mg/dL 8 0* 7 8*   ALBUMIN g/dL  --  3 0*   TOTAL BILIRUBIN mg/dL  --  0 60   ALK PHOS U/L  --  102   ALT U/L  --  9   AST U/L  --  13   GLUCOSE RANDOM mg/dL 83 103                 Results from last 7 days   Lab Units 05/29/22  2310   LACTIC ACID mmol/L 1 6   PROCALCITONIN ng/ml 0 10       Lines/Drains:  Invasive Devices  Report    Central Venous Catheter Line  Duration           Port A Cath 02/11/22 Left Subclavian 109 days          Peripheral Intravenous Line  Duration           Peripheral IV 05/29/22 Left Antecubital 1 day          Drain  Duration           Gastrostomy/Enterostomy Percutaneous endoscopic gastrostomy (PEG) 89 days    Closed/Suction Drain Left LLQ Bulb 34 days                Central Line:  Goal for removal: N/A - Chronic PICC             Imaging: no new imaging    Recent Cultures (last 7 days):   Results from last 7 days   Lab Units 05/29/22  2318 05/29/22  2310   BLOOD CULTURE   --  No Growth at 24 hrs  No Growth at 24 hrs     URINE CULTURE  <10,000 cfu/ml Klebsiella pneumoniae*  --        Last 24 Hours Medication List:   Current Facility-Administered Medications   Medication Dose Route Frequency Provider Last Rate    acetaminophen  650 mg Oral Q6H PRN Bharati Parnell, DO      cefTRIAXone  1,000 mg Intravenous Q24H Bharati Parnell, DO 1,000 mg (05/31/22 0915)    dronabinol  5 mg Oral BID AC Bharati Parnell,       heparin (porcine)  5,000 Units Subcutaneous [facility] Bharati Parnell, DO      levothyroxine  125 mcg Oral Early Morning Bharati Parnell DO      loratadine  10 mg Oral Daily Mara Diallo,       ondansetron  4 mg Oral Q6H PRN John Perez MD      oxyCODONE  10 mg Oral [facility] [place]      oxyCODONE  10 mg Oral Q6H PRN Mara Diallo DO      pantoprazole  40 mg Oral BID Mara Diallo DO      senna-docusate sodium  1 tablet Oral BID Stacy Hidalgo MD          Today, Patient Was Seen By: Stacy Hidalgo MD    **Please Note: This note may have been constructed using a voice recognition system  **

## 2022-05-31 NOTE — ASSESSMENT & PLAN NOTE
Recent Labs     05/29/22  2310   MG 1 7*     Replete with IV mag 2g  Added magnesium to daily labs this AM, pending  Replete as needed to >2

## 2022-05-31 NOTE — DISCHARGE INSTR - AVS FIRST PAGE
Dear Jim Spangler,     It was our pleasure to care for you here at [facility]  It is our hope that we were always able to exceed the expected standards for your care during your stay  You were hospitalized due to urinary tract infection and acute kidney injury  You were cared for on the fourth floor by Chely Cho MD under the service of Toña Yousif MD with the [employer] [facility] who covers for your primary care physician (PCP), Anson Nguyễn DO, while you were hospitalized  If you have any questions or concerns related to this hospitalization, you may contact us at [phone]  For follow up as well as any medication refills, we recommend that you follow up with your primary care physician  A registered nurse will reach out to you by phone within a few days after your discharge to answer any additional questions that you may have after going home  However, at this time we provide for you here, the most important instructions / recommendations at discharge:     Notable Medication Adjustments -   Ceftin 500mg every 12 hours for four days starting tomorrow 6/2/22 for your UTI, to complete a total of 7 days of antibiotics  You completed your medrol pack for your rash while hospitalized, please dispose of remaining medication you may have at home  Testing Required after Discharge -   Please obtain BMP in 1 week to monitor your kidney function  Important follow up information -   Please follow up with your primary care physician within 1 week of discharge  Please follow up with your oncologist and palliative care team as scheduled  Please follow up with your urologist within 1 week  Other Instructions -   Please monitor for fever at home, and if your symptoms worsen call your physician or seek medical attention    Please review this entire after visit summary as additional general instructions including medication list, appointments, activity, diet, any pertinent wound care, and other additional recommendations from your care team that may be provided for you        Sincerely,     Mary Kate Craft MD

## 2022-05-31 NOTE — DISCHARGE SUMMARY
[facility]  Discharge- Lynn Onofre 1950, 70 y o  male MRN: [id number]  Unit/Bed#: S -01 Encounter: [id number]  Primary Care Provider: Cj Davis DO   Date and time admitted to hospital: 5/29/2022 10:47 PM    * Acute, Complicated UTI with Acute Kidney Injury   Assessment & Plan  POA: patient reporting 4 days increased urine frequency, dysuria, and 1 day of fever/chills, nausea/vomiting, weakness, fatigue, dizziness, suprapubic pain  Tmax at home 101F    ED vitals: T99 7, HR 83, RR 18, /65, 97% on RA  Recent Labs     05/30/22  0457 05/31/22  0455 06/01/22  0632   CREATININE 1 83* 1 81* 1 69*   EGFR 36 36 39     Estimated Creatinine Clearance: 37 5 mL/min (A) (by C-G formula based on SCr of 1 69 mg/dL (H))  In the ED found: WBC 11 13  Cr 1 98 (baseline Cr 0 9, Cr on 5/27 2 67) UA +  CT:  Bilateral nephrolithiasis, no hydronephrosis  Distended bladder, mild circumferential bladder wall thickening noted       Urine culture 5/11/22 grew Klebsiella,S/p prostatectomy April 2022  Has taken keflex at home for Klebsiella UTI, last taken day prior to admission    Received IV Cefepime, 2L NS, tylenol, zofran in ED  Blood culture NGTD    Plan:  IV Abx: Ceftriaxone 1g q24h  Tylenol prn for fevers  zofran prn for nausea/vomiting  Urine cultures growing <10,000 kelbsiella  Renal ultrasound unremarkable  Cr downtrending  BMP in 1 week  Follow up with PCP, urology, heme onc  Take Cefuroxime BID for four days to complete total of 7 days of antibiotics for UTI    Constipation  Assessment & Plan  Patient reports no BM for about 3 days, setting of decreased PO intake, is passing gas  Senna BID while hospitalized  Can resume home bowel regimen on discharge or use OTC stool softeners/laxatives as needed    Rash  Assessment & Plan  POA: erythematous, scattered, macular rash present on bilateral UE  Patient reports this has been present within the past week, and is a suspected side effect to Opdivo immunotherapy  Was having intense itching of this rash, which has been improving with Medrol dose pack prescribed 5/26  Patient had two days remaining on this and would like to continue this therapy as the rash is still present  He was also taking 5mg Xyzal daily       Completed medrol on 5/31  Xyzal not on formulary, substitute with 10mg claritin daily while hospitalized, resume home meds on dc    Cancer related pain  Assessment & Plan  Follows with Dr Lizabeth Tirado outpatient Palliative care  Pain regimen at home: Oxycodone 10mg TID, Oxy oral solution 10-20mg q6h prn  Resume home regimen, continue to monitor pain and adjust pain regimen if needed  Resume home dulcolax daily    Gastric adenocarcinoma [facility])  Assessment & Plan  Follows with Dr Rafa Espinoza outpatient  Receiving chemo/immunotherapy, last infusion 5/16, next scheduled infusion 6/1  Resume home Protonix, dronabinol, Zofran   Follow up outpatient with oncology, palliative care    Hypothyroid  Assessment & Plan  Home levothyroxine    Hypomagnesemia-resolved as of 6/1/2022  Assessment & Plan  Recent Labs     05/29/22  2310 05/31/22  0455   MG 1 7* 2 0     Replete with IV mag 2g, resolved      Medical Problems             Resolved Problems  Date Reviewed: 5/31/2022          Resolved    Hypomagnesemia 6/1/2022     Resolved by  Radha Burkett MD              Discharging Resident: Radha Burkett MD  Discharging Attending: Kristopher Martinez MD  PCP: Jami Laura DO  Admission Date:   Admission Orders (From admission, onward)     Ordered        05/30/22 0131  Inpatient Admission  Once                      Discharge Date: 06/01/22    Consultations During Hospital Stay:  · None    Procedures Performed:   · CT renal stone study  · Chest XR  · Ultrasound kidney bladders    Significant Findings / Test Results:   · See hospital course for additional significant findings    Incidental Findings:   · None    Test Results Pending at Discharge (will require follow up): · None     Outpatient Tests Requested:  · BMP in 1 week    Complications:  None    Reason for Admission: Urinary tract infection with DEBBIE    Hospital Course:   Barry Drake is a 70 y o  male patient with PMH of HTN, hypothyroidism, BPH s/p prostatectomy, gastric adenocarcinoma under going chemotherapy, last on 5/16 who originally presented to the hospital on 5/29/2022 due to urinary frequency, dysuria, and one day of fever (tmax 101), chills, fatigue, nausea, vomiting, dizziness, and weakness  Patient recently had prostatectomy 1 month ago and had dawkins catheter for 2 weeks  On 5/11 was found to have UTI growing klebsiella, was prescribed keflex which was completed the day prior to admission  He reported decreased PO intake lately, and has been feeling dehydrated as such  Patient also reported rash due to chemotherapy for which he has been taking medrol pack, was then completed on 5/31 while hospitalized  He had also completed outpatient bloodwork on 5/26 showing Cr of 2 67 (baseline is 0 9)  In the ED he was found to have WBC of 11 13, creatinine of 1 98, UA showing innumerable WBC  CT abdomen and pelvis showing nephrolithiasis without obstruction, as well as mild bladder wall thickening and partial bladder distension  Given IV cefepime and 2L NS, which was changed to IV rocephin every 24 hours  Urine culture showed <10,000 Klebsiella  Cr downtrended to 1 81 from 1 98 on admission, however appeared to plateau, raising concern for other possible secondary causes of DEBBIE  Renal ultrasound showed no hydronephrosis or hydroureter, a normally distended bladder, and bladder wall thickening  Post void bladder scan showed 0mL of fluid, so he was likely not retaining urine  Creatinine then downtrended to 1 69 on day of discharge after discontinuation of fluid the day prior   He reported feeling back to baseline and was determined to be safe for discharge with plans to follow up with PCP, heme-onc, and urology, and to complete 4 remaining day of 7 day course of antibiotics with cefuroxime  He should obtain a BMP in one week to monitor creatinine  Please see above list of diagnoses and related plan for additional information  Condition at Discharge: fair    Discharge Day Visit / Exam:   Subjective:  Patient found seated comfortably in bed this morning in no acute distress  Denies dysuria, abdominal pain, nausea, vomiting, diarrhea  Reports no bowel movement but passing gas adequately  Reports feeling well and ready for discharge  Vitals: Blood Pressure: 140/72 (06/01/22 0718)  Pulse: 59 (05/31/22 2216)  Temperature: 98 °F (36 7 °C) (06/01/22 0718)  Temp Source: Oral (05/30/22 2237)  Respirations: 18 (06/01/22 0718)  Height: 5' 7" (170 2 cm) (05/30/22 0000)  Weight - Scale: 67 1 kg (148 lb) (05/29/22 2245)  SpO2: 96 % (05/31/22 2216)  Exam:   Physical Exam  Vitals and nursing note reviewed  Constitutional:       General: He is not in acute distress  Appearance: Normal appearance  He is not toxic-appearing  HENT:      Head: Normocephalic and atraumatic  Eyes:      Extraocular Movements: Extraocular movements intact  Cardiovascular:      Rate and Rhythm: Normal rate and regular rhythm  Heart sounds: Normal heart sounds  No murmur heard  Pulmonary:      Effort: Pulmonary effort is normal  No respiratory distress  Breath sounds: Normal breath sounds  No wheezing  Abdominal:      General: Bowel sounds are normal  There is no distension  Palpations: Abdomen is soft  Tenderness: There is no abdominal tenderness  Musculoskeletal:      Right lower leg: No edema  Left lower leg: No edema  Skin:     General: Skin is warm and dry  Neurological:      General: No focal deficit present  Mental Status: He is alert  Mental status is at baseline  Psychiatric:         Behavior: Behavior normal         Discussion with Family: Patient declined call to   Discharge instructions/Information to patient and family:   See after visit summary for information provided to patient and family  Provisions for Follow-Up Care:  See after visit summary for information related to follow-up care and any pertinent home health orders  Disposition:   Home    Planned Readmission: None    Discharge Medications:  See after visit summary for reconciled discharge medications provided to patient and/or family        **Please Note: This note may have been constructed using a voice recognition system**

## 2022-05-31 NOTE — ASSESSMENT & PLAN NOTE
Follows with Dr Keith Monroe outpatient Palliative care  Pain regimen at home: Oxycodone 10mg TID, Oxy oral solution 10-20mg q6h prn  Resume home regimen, continue to monitor pain and adjust pain regimen if needed  Resume home dulcolax daily

## 2022-06-01 ENCOUNTER — APPOINTMENT (OUTPATIENT)
Dept: RADIOLOGY | Age: 72
DRG: 683 | End: 2022-06-01
Payer: COMMERCIAL

## 2022-06-01 VITALS
WEIGHT: 148 LBS | RESPIRATION RATE: 18 BRPM | BODY MASS INDEX: 23.23 KG/M2 | OXYGEN SATURATION: 96 % | TEMPERATURE: 98 F | SYSTOLIC BLOOD PRESSURE: 140 MMHG | HEART RATE: 59 BPM | DIASTOLIC BLOOD PRESSURE: 72 MMHG | HEIGHT: 67 IN

## 2022-06-01 DIAGNOSIS — Z51.5 PALLIATIVE CARE PATIENT: ICD-10-CM

## 2022-06-01 DIAGNOSIS — R52 INTRACTABLE PAIN: ICD-10-CM

## 2022-06-01 DIAGNOSIS — G89.3 CANCER RELATED PAIN: ICD-10-CM

## 2022-06-01 PROBLEM — E83.42 HYPOMAGNESEMIA: Status: RESOLVED | Noted: 2022-05-30 | Resolved: 2022-06-01

## 2022-06-01 LAB
ALBUMIN SERPL BCP-MCNC: 3 G/DL (ref 3.5–5)
ALP SERPL-CCNC: 93 U/L (ref 34–104)
ALT SERPL W P-5'-P-CCNC: 9 U/L (ref 7–52)
ANION GAP SERPL CALCULATED.3IONS-SCNC: 7 MMOL/L (ref 4–13)
AST SERPL W P-5'-P-CCNC: 12 U/L (ref 13–39)
BACTERIA UR CULT: ABNORMAL
BACTERIA UR CULT: ABNORMAL
BASOPHILS # BLD AUTO: 0.02 THOUSANDS/ΜL (ref 0–0.1)
BASOPHILS NFR BLD AUTO: 0 % (ref 0–1)
BILIRUB SERPL-MCNC: 0.4 MG/DL (ref 0.2–1)
BUN SERPL-MCNC: 29 MG/DL (ref 5–25)
CALCIUM ALBUM COR SERPL-MCNC: 9.2 MG/DL (ref 8.3–10.1)
CALCIUM SERPL-MCNC: 8.4 MG/DL (ref 8.4–10.2)
CHLORIDE SERPL-SCNC: 110 MMOL/L (ref 96–108)
CO2 SERPL-SCNC: 24 MMOL/L (ref 21–32)
CREAT SERPL-MCNC: 1.69 MG/DL (ref 0.6–1.3)
EOSINOPHIL # BLD AUTO: 0.66 THOUSAND/ΜL (ref 0–0.61)
EOSINOPHIL NFR BLD AUTO: 11 % (ref 0–6)
ERYTHROCYTE [DISTWIDTH] IN BLOOD BY AUTOMATED COUNT: 14.7 % (ref 11.6–15.1)
GFR SERPL CREATININE-BSD FRML MDRD: 39 ML/MIN/1.73SQ M
GLUCOSE SERPL-MCNC: 81 MG/DL (ref 65–140)
HCT VFR BLD AUTO: 30.7 % (ref 36.5–49.3)
HGB BLD-MCNC: 9.8 G/DL (ref 12–17)
IMM GRANULOCYTES # BLD AUTO: 0.02 THOUSAND/UL (ref 0–0.2)
IMM GRANULOCYTES NFR BLD AUTO: 0 % (ref 0–2)
LYMPHOCYTES # BLD AUTO: 1.28 THOUSANDS/ΜL (ref 0.6–4.47)
LYMPHOCYTES NFR BLD AUTO: 20 % (ref 14–44)
MCH RBC QN AUTO: 29 PG (ref 26.8–34.3)
MCHC RBC AUTO-ENTMCNC: 31.9 G/DL (ref 31.4–37.4)
MCV RBC AUTO: 91 FL (ref 82–98)
MONOCYTES # BLD AUTO: 0.7 THOUSAND/ΜL (ref 0.17–1.22)
MONOCYTES NFR BLD AUTO: 11 % (ref 4–12)
NEUTROPHILS # BLD AUTO: 3.6 THOUSANDS/ΜL (ref 1.85–7.62)
NEUTS SEG NFR BLD AUTO: 58 % (ref 43–75)
NRBC BLD AUTO-RTO: 0 /100 WBCS
PLATELET # BLD AUTO: 285 THOUSANDS/UL (ref 149–390)
PMV BLD AUTO: 8.8 FL (ref 8.9–12.7)
POTASSIUM SERPL-SCNC: 3.7 MMOL/L (ref 3.5–5.3)
PROT SERPL-MCNC: 5.5 G/DL (ref 6.4–8.4)
RBC # BLD AUTO: 3.38 MILLION/UL (ref 3.88–5.62)
SODIUM SERPL-SCNC: 141 MMOL/L (ref 135–147)
WBC # BLD AUTO: 6.28 THOUSAND/UL (ref 4.31–10.16)

## 2022-06-01 PROCEDURE — 80053 COMPREHEN METABOLIC PANEL: CPT

## 2022-06-01 PROCEDURE — 85025 COMPLETE CBC W/AUTO DIFF WBC: CPT

## 2022-06-01 PROCEDURE — 99239 HOSP IP/OBS DSCHRG MGMT >30: CPT | Performed by: INTERNAL MEDICINE

## 2022-06-01 RX ORDER — CEFUROXIME AXETIL 500 MG/1
500 TABLET ORAL EVERY 12 HOURS SCHEDULED
Qty: 8 TABLET | Refills: 0 | Status: SHIPPED | OUTPATIENT
Start: 2022-06-02 | End: 2022-06-06

## 2022-06-01 RX ORDER — CEFUROXIME AXETIL 250 MG/1
500 TABLET ORAL EVERY 12 HOURS SCHEDULED
Status: DISCONTINUED | OUTPATIENT
Start: 2022-06-02 | End: 2022-06-01 | Stop reason: HOSPADM

## 2022-06-01 RX ADMIN — OXYCODONE HYDROCHLORIDE 10 MG: 10 TABLET, FILM COATED, EXTENDED RELEASE ORAL at 06:25

## 2022-06-01 RX ADMIN — LEVOTHYROXINE SODIUM 125 MCG: 125 TABLET ORAL at 06:25

## 2022-06-01 RX ADMIN — LORATADINE 10 MG: 10 TABLET ORAL at 08:09

## 2022-06-01 RX ADMIN — PANTOPRAZOLE SODIUM 40 MG: 40 TABLET, DELAYED RELEASE ORAL at 08:09

## 2022-06-01 RX ADMIN — DRONABINOL 5 MG: 2.5 CAPSULE ORAL at 06:25

## 2022-06-01 RX ADMIN — HEPARIN SODIUM 5000 UNITS: 5000 INJECTION INTRAVENOUS; SUBCUTANEOUS at 06:24

## 2022-06-01 RX ADMIN — ONDANSETRON 4 MG: 4 TABLET, ORALLY DISINTEGRATING ORAL at 08:33

## 2022-06-01 RX ADMIN — CEFTRIAXONE SODIUM 1000 MG: 10 INJECTION, POWDER, FOR SOLUTION INTRAVENOUS at 08:33

## 2022-06-01 RX ADMIN — SENNOSIDES AND DOCUSATE SODIUM 1 TABLET: 50; 8.6 TABLET ORAL at 08:09

## 2022-06-01 NOTE — ASSESSMENT & PLAN NOTE
POA: erythematous, scattered, macular rash present on bilateral UE  Patient reports this has been present within the past week, and is a suspected side effect to Opdivo immunotherapy  Was having intense itching of this rash, which has been improving with Medrol dose pack prescribed 5/26  Patient had two days remaining on this and would like to continue this therapy as the rash is still present  He was also taking 5mg Xyzal daily       Completed medrol on 5/31  Xyzal not on formulary, substitute with 10mg claritin daily while hospitalized, resume home meds on dc

## 2022-06-01 NOTE — ASSESSMENT & PLAN NOTE
Patient reports no BM for about 3 days, setting of decreased PO intake, is passing gas  Senna BID while hospitalized  Can resume home bowel regimen on discharge or use OTC stool softeners/laxatives as needed

## 2022-06-01 NOTE — PLAN OF CARE
Problem: PAIN - ADULT  Goal: Verbalizes/displays adequate comfort level or baseline comfort level  Description: Interventions:  - Encourage patient to monitor pain and request assistance  - Assess pain using appropriate pain scale  - Administer analgesics based on type and severity of pain and evaluate response  - Implement non-pharmacological measures as appropriate and evaluate response  - Consider cultural and social influences on pain and pain management  - Notify physician/advanced practitioner if interventions unsuccessful or patient reports new pain  Outcome: Progressing     Problem: INFECTION - ADULT  Goal: Absence or prevention of progression during hospitalization  Description: INTERVENTIONS:  - Assess and monitor for signs and symptoms of infection  - Monitor lab/diagnostic results  - Monitor all insertion sites, i e  indwelling lines, tubes, and drains  - Monitor endotracheal if appropriate and nasal secretions for changes in amount and color  - [place] appropriate cooling/warming therapies per order  - Administer medications as ordered  - Instruct and encourage patient and family to use good hand hygiene technique  - Identify and instruct in appropriate isolation precautions for identified infection/condition  Outcome: Progressing  Goal: Absence of fever/infection during neutropenic period  Description: INTERVENTIONS:  - Monitor WBC    Outcome: Progressing     Problem: SAFETY ADULT  Goal: Patient will remain free of falls  Description: INTERVENTIONS:  - Educate patient/family on patient safety including physical limitations  - Instruct patient to call for assistance with activity   - Consult OT/PT to assist with strengthening/mobility   - Keep Call bell within reach  - Keep bed low and locked with side rails adjusted as appropriate  - Keep care items and personal belongings within reach  - Initiate and maintain comfort rounds  - Make Fall Risk Sign visible to staff  - Offer Toileting every  Hours, in advance of need  - Initiate/Maintain alarm  - Obtain necessary fall risk management equipment:   - Apply yellow socks and bracelet for high fall risk patients  - Consider moving patient to room near nurses station  Outcome: Progressing  Goal: Maintain or return to baseline ADL function  Description: INTERVENTIONS:  -  Assess patient's ability to carry out ADLs; assess patient's baseline for ADL function and identify physical deficits which impact ability to perform ADLs (bathing, care of mouth/teeth, toileting, grooming, dressing, etc )  - Assess/evaluate cause of self-care deficits   - Assess range of motion  - Assess patient's mobility; develop plan if impaired  - Assess patient's need for assistive devices and provide as appropriate  - Encourage maximum independence but intervene and supervise when necessary  - Involve family in performance of ADLs  - Assess for home care needs following discharge   - Consider OT consult to assist with ADL evaluation and planning for discharge  - Provide patient education as appropriate  Outcome: Progressing  Goal: Maintains/Returns to pre admission functional level  Description: INTERVENTIONS:  - Perform BMAT or MOVE assessment daily    - Set and communicate daily mobility goal to care team and patient/family/caregiver  - Collaborate with rehabilitation services on mobility goals if consulted  - Perform Range of Motion  times a day  - Reposition patient every  hours    - Dangle patient  times a day  - Stand patient  times a day  - Ambulate patient  times a day  - Out of bed to chair  times a day   - Out of bed for meals times a day  - Out of bed for toileting  - Record patient progress and toleration of activity level   Outcome: Progressing     Problem: DISCHARGE PLANNING  Goal: Discharge to home or other facility with appropriate resources  Description: INTERVENTIONS:  - Identify barriers to discharge w/patient and caregiver  - Arrange for needed discharge resources and transportation as appropriate  - Identify discharge learning needs (meds, wound care, etc )  - Arrange for interpretive services to assist at discharge as needed  - Refer to Case Management Department for coordinating discharge planning if the patient needs post-hospital services based on physician/advanced practitioner order or complex needs related to functional status, cognitive ability, or social support system  Outcome: Progressing     Problem: Knowledge Deficit  Goal: Patient/family/caregiver demonstrates understanding of disease process, treatment plan, medications, and discharge instructions  Description: Complete learning assessment and assess knowledge base    Interventions:  - Provide teaching at level of understanding  - Provide teaching via preferred learning methods  Outcome: Progressing

## 2022-06-01 NOTE — ASSESSMENT & PLAN NOTE
Follows with Dr Marisol Leal outpatient Palliative care  Pain regimen at home: Oxycodone 10mg TID, Oxy oral solution 10-20mg q6h prn  Resume home regimen, continue to monitor pain and adjust pain regimen if needed  Resume home dulcolax daily Marisela Jackson(Resident)

## 2022-06-02 ENCOUNTER — TRANSITIONAL CARE MANAGEMENT (OUTPATIENT)
Dept: INTERNAL MEDICINE CLINIC | Facility: CLINIC | Age: 72
End: 2022-06-02

## 2022-06-02 RX ORDER — OXYCODONE HCL 10 MG/1
10 TABLET, FILM COATED, EXTENDED RELEASE ORAL EVERY 8 HOURS SCHEDULED
Qty: 90 TABLET | Refills: 0 | Status: SHIPPED | OUTPATIENT
Start: 2022-06-02 | End: 2022-07-20 | Stop reason: SDUPTHER

## 2022-06-02 NOTE — TELEPHONE ENCOUNTER
Primary palliative medicine provider:  Dr Dwayne Yoon     Medication requested: Oxycodone (Oxycontin) 10 mg 12 hr tablet    If for pain, how has the patient been taking their pain medicine?      Last appointment: 03/15/2022    Next scheduled appointment: 06/14/2022    PDMP review:    [id number] 05/04/2022 05/04/2022 OxyCONTIN (Tablet, Extended Release) 90 0 30 10 MG NA DRAGAN, [address] [facility] / [place] [address]       [id number] 04/02/2022 04/02/2022 OxyCONTIN (Tablet, Extended Release) 90 0 30 10 MG NA DRAGAN, [facility] [address]       [id number] 03/07/2022 03/03/2022 OxyCONTIN (Tablet, Extended Release) 90 0 30 10 MG  Lukas Miguel  [employer] 00 / 00 [place]

## 2022-06-04 LAB
BACTERIA BLD CULT: NORMAL
BACTERIA BLD CULT: NORMAL

## 2022-06-07 ENCOUNTER — TELEPHONE (OUTPATIENT)
Dept: HEMATOLOGY ONCOLOGY | Facility: CLINIC | Age: 72
End: 2022-06-07

## 2022-06-07 DIAGNOSIS — C16.9 GASTRIC ADENOCARCINOMA (HCC): Primary | ICD-10-CM

## 2022-06-07 RX ORDER — PALONOSETRON 0.05 MG/ML
0.25 INJECTION, SOLUTION INTRAVENOUS ONCE
Status: CANCELLED | OUTPATIENT
Start: 2022-06-13

## 2022-06-07 RX ORDER — DEXTROSE MONOHYDRATE 50 MG/ML
20 INJECTION, SOLUTION INTRAVENOUS ONCE
Status: CANCELLED | OUTPATIENT
Start: 2022-06-13

## 2022-06-07 RX ORDER — SODIUM CHLORIDE 9 MG/ML
20 INJECTION, SOLUTION INTRAVENOUS ONCE AS NEEDED
Status: CANCELLED | OUTPATIENT
Start: 2022-06-13

## 2022-06-07 NOTE — TELEPHONE ENCOUNTER
I left a VM for the patient to let him know that blood work has now been ordered and he is to get it before his next appointment  I let him know that someone would be reaching out soon to reschedule him

## 2022-06-07 NOTE — TELEPHONE ENCOUNTER
Spoke with patient about cancelling his appointment today with Dr Leila Joiner due to Dr Leila Joiner being out sick  I told him someone will call him back to reschedule

## 2022-06-07 NOTE — TELEPHONE ENCOUNTER
06/07/22     Spoke with pt's daughter Zachary Jenkins and r/s him to see Dr Susanna Bernal on 6/9 since Dr Susanna Bernal is sick out today  Pt is happy with the date and time

## 2022-06-08 DIAGNOSIS — R10.13 EPIGASTRIC PAIN: ICD-10-CM

## 2022-06-08 DIAGNOSIS — C16.0 MALIGNANT NEOPLASM OF CARDIA OF STOMACH (HCC): ICD-10-CM

## 2022-06-08 DIAGNOSIS — C16.9 GASTRIC ADENOCARCINOMA (HCC): ICD-10-CM

## 2022-06-09 ENCOUNTER — HOSPITAL ENCOUNTER (OUTPATIENT)
Dept: INFUSION CENTER | Facility: CLINIC | Age: 72
Discharge: HOME | End: 2022-06-09
Payer: COMMERCIAL

## 2022-06-09 ENCOUNTER — APPOINTMENT (OUTPATIENT)
Dept: LAB | Facility: CLINIC | Age: 72
End: 2022-06-09
Payer: COMMERCIAL

## 2022-06-09 ENCOUNTER — OFFICE VISIT (OUTPATIENT)
Dept: HEMATOLOGY ONCOLOGY | Facility: CLINIC | Age: 72
End: 2022-06-09
Payer: COMMERCIAL

## 2022-06-09 VITALS
SYSTOLIC BLOOD PRESSURE: 100 MMHG | HEIGHT: 67 IN | BODY MASS INDEX: 22.6 KG/M2 | TEMPERATURE: 97.6 F | DIASTOLIC BLOOD PRESSURE: 62 MMHG | WEIGHT: 144 LBS | RESPIRATION RATE: 14 BRPM | OXYGEN SATURATION: 98 % | HEART RATE: 74 BPM

## 2022-06-09 DIAGNOSIS — C16.9 GASTRIC ADENOCARCINOMA (HCC): Primary | ICD-10-CM

## 2022-06-09 DIAGNOSIS — N39.0 URINARY TRACT INFECTION WITHOUT HEMATURIA, SITE UNSPECIFIED: ICD-10-CM

## 2022-06-09 DIAGNOSIS — R11.0 CHEMOTHERAPY-INDUCED NAUSEA: ICD-10-CM

## 2022-06-09 DIAGNOSIS — E86.0 DEHYDRATION: Primary | ICD-10-CM

## 2022-06-09 DIAGNOSIS — T45.1X5A CHEMOTHERAPY-INDUCED NAUSEA: ICD-10-CM

## 2022-06-09 DIAGNOSIS — Z45.2 ENCOUNTER FOR CENTRAL LINE CARE: ICD-10-CM

## 2022-06-09 DIAGNOSIS — E87.6 HYPOKALEMIA: Primary | ICD-10-CM

## 2022-06-09 DIAGNOSIS — A41.9 SEPSIS, DUE TO UNSPECIFIED ORGANISM, UNSPECIFIED WHETHER ACUTE ORGAN DYSFUNCTION PRESENT (HCC): ICD-10-CM

## 2022-06-09 LAB
ALBUMIN SERPL BCP-MCNC: 3.6 G/DL (ref 3.5–5)
ALP SERPL-CCNC: 139 U/L (ref 34–104)
ALT SERPL W P-5'-P-CCNC: 11 U/L (ref 7–52)
ANION GAP SERPL CALCULATED.3IONS-SCNC: 9 MMOL/L (ref 4–13)
AST SERPL W P-5'-P-CCNC: 16 U/L (ref 13–39)
BACTERIA UR QL AUTO: ABNORMAL /HPF
BASOPHILS # BLD AUTO: 0.06 THOUSANDS/ΜL (ref 0–0.1)
BASOPHILS NFR BLD AUTO: 1 % (ref 0–1)
BILIRUB SERPL-MCNC: 0.58 MG/DL (ref 0.2–1)
BILIRUB UR QL STRIP: NEGATIVE
BUN SERPL-MCNC: 33 MG/DL (ref 5–25)
CALCIUM SERPL-MCNC: 8.9 MG/DL (ref 8.4–10.2)
CHLORIDE SERPL-SCNC: 101 MMOL/L (ref 96–108)
CLARITY UR: ABNORMAL
CO2 SERPL-SCNC: 27 MMOL/L (ref 21–32)
COLOR UR: ABNORMAL
CREAT SERPL-MCNC: 1.77 MG/DL (ref 0.6–1.3)
EOSINOPHIL # BLD AUTO: 0.3 THOUSAND/ΜL (ref 0–0.61)
EOSINOPHIL NFR BLD AUTO: 5 % (ref 0–6)
ERYTHROCYTE [DISTWIDTH] IN BLOOD BY AUTOMATED COUNT: 14.9 % (ref 11.6–15.1)
GFR SERPL CREATININE-BSD FRML MDRD: 37 ML/MIN/1.73SQ M
GLUCOSE SERPL-MCNC: 135 MG/DL (ref 65–140)
GLUCOSE UR STRIP-MCNC: NEGATIVE MG/DL
HCT VFR BLD AUTO: 33.6 % (ref 36.5–49.3)
HGB BLD-MCNC: 11 G/DL (ref 12–17)
HGB UR QL STRIP.AUTO: ABNORMAL
IMM GRANULOCYTES # BLD AUTO: 0.02 THOUSAND/UL (ref 0–0.2)
IMM GRANULOCYTES NFR BLD AUTO: 0 % (ref 0–2)
KETONES UR STRIP-MCNC: NEGATIVE MG/DL
LEUKOCYTE ESTERASE UR QL STRIP: ABNORMAL
LYMPHOCYTES # BLD AUTO: 1.03 THOUSANDS/ΜL (ref 0.6–4.47)
LYMPHOCYTES NFR BLD AUTO: 16 % (ref 14–44)
MCH RBC QN AUTO: 28.9 PG (ref 26.8–34.3)
MCHC RBC AUTO-ENTMCNC: 32.7 G/DL (ref 31.4–37.4)
MCV RBC AUTO: 88 FL (ref 82–98)
MONOCYTES # BLD AUTO: 0.77 THOUSAND/ΜL (ref 0.17–1.22)
MONOCYTES NFR BLD AUTO: 12 % (ref 4–12)
NEUTROPHILS # BLD AUTO: 4.32 THOUSANDS/ΜL (ref 1.85–7.62)
NEUTS SEG NFR BLD AUTO: 66 % (ref 43–75)
NITRITE UR QL STRIP: NEGATIVE
NON-SQ EPI CELLS URNS QL MICRO: ABNORMAL /HPF
NRBC BLD AUTO-RTO: 0 /100 WBCS
OTHER STN SPEC: ABNORMAL
PH UR STRIP.AUTO: 6.5 [PH]
PLATELET # BLD AUTO: 287 THOUSANDS/UL (ref 149–390)
PMV BLD AUTO: 9.2 FL (ref 8.9–12.7)
POTASSIUM SERPL-SCNC: 3.1 MMOL/L (ref 3.5–5.3)
PROT SERPL-MCNC: 6.9 G/DL (ref 6.4–8.4)
PROT UR STRIP-MCNC: ABNORMAL MG/DL
RBC # BLD AUTO: 3.81 MILLION/UL (ref 3.88–5.62)
RBC #/AREA URNS AUTO: ABNORMAL /HPF
SODIUM SERPL-SCNC: 137 MMOL/L (ref 135–147)
SP GR UR STRIP.AUTO: 1.01 (ref 1–1.03)
TSH SERPL DL<=0.05 MIU/L-ACNC: 1.38 UIU/ML (ref 0.45–4.5)
UROBILINOGEN UR QL STRIP.AUTO: 0.2 E.U./DL
WBC # BLD AUTO: 6.5 THOUSAND/UL (ref 4.31–10.16)
WBC #/AREA URNS AUTO: ABNORMAL /HPF

## 2022-06-09 PROCEDURE — 84443 ASSAY THYROID STIM HORMONE: CPT | Performed by: INTERNAL MEDICINE

## 2022-06-09 PROCEDURE — 85025 COMPLETE CBC W/AUTO DIFF WBC: CPT | Performed by: INTERNAL MEDICINE

## 2022-06-09 PROCEDURE — 87086 URINE CULTURE/COLONY COUNT: CPT | Performed by: INTERNAL MEDICINE

## 2022-06-09 PROCEDURE — 81001 URINALYSIS AUTO W/SCOPE: CPT | Performed by: INTERNAL MEDICINE

## 2022-06-09 PROCEDURE — 87040 BLOOD CULTURE FOR BACTERIA: CPT | Performed by: INTERNAL MEDICINE

## 2022-06-09 PROCEDURE — 99214 OFFICE O/P EST MOD 30 MIN: CPT | Performed by: INTERNAL MEDICINE

## 2022-06-09 PROCEDURE — 80053 COMPREHEN METABOLIC PANEL: CPT | Performed by: INTERNAL MEDICINE

## 2022-06-09 PROCEDURE — 36415 COLL VENOUS BLD VENIPUNCTURE: CPT | Performed by: INTERNAL MEDICINE

## 2022-06-09 RX ORDER — PANTOPRAZOLE SODIUM 40 MG/1
40 TABLET, DELAYED RELEASE ORAL 2 TIMES DAILY
Qty: 60 TABLET | Refills: 0 | Status: SHIPPED | OUTPATIENT
Start: 2022-06-09 | End: 2022-07-14 | Stop reason: SDUPTHER

## 2022-06-09 RX ORDER — POTASSIUM CHLORIDE 750 MG/1
10 CAPSULE, EXTENDED RELEASE ORAL DAILY
Qty: 5 CAPSULE | Refills: 0 | Status: SHIPPED | OUTPATIENT
Start: 2022-06-09 | End: 2022-06-14

## 2022-06-09 RX ORDER — POTASSIUM CHLORIDE 20 MEQ/1
20 TABLET, EXTENDED RELEASE ORAL ONCE
Status: CANCELLED
Start: 2022-06-10 | End: 2022-06-10

## 2022-06-09 RX ORDER — PROCHLORPERAZINE MALEATE 5 MG/1
5 TABLET ORAL EVERY 6 HOURS PRN
Qty: 30 TABLET | Refills: 2 | Status: SHIPPED | OUTPATIENT
Start: 2022-06-09 | End: 2022-06-24 | Stop reason: SDUPTHER

## 2022-06-09 NOTE — PROGRESS NOTES
Tolerated procedure without incident: No adverse reactions noted: Verified follow up appt with patient: AVS offered and declined

## 2022-06-09 NOTE — PROGRESS NOTES
Patient to [facility] for Lab Testing / Port Maintenance: Offers no complaints at present time: Left PAC accessed without difficulty: Good blood return noted: Labs drawn per MD order

## 2022-06-09 NOTE — UTILIZATION REVIEW
Please note, this clinical info was already sent to you on 6/1/22        Inpatient Admission Authorization Request   NOTIFICATION OF INPATIENT ADMISSION/INPATIENT AUTHORIZATION REQUEST   SERVICING FACILITY:   [address] [place] [address]  Tax ID: [id number]  NPI: [id number]  Place of Service: Inpatient [address]  Place of Service Code: 24     ATTENDING PROVIDER:  Attending Name and NPI#: Elvira Campos, [address] [[id number]]  Address: [address]  Phone: [phone]     UTILIZATION REVIEW CONTACT:  Juan Luis Alvarado Utilization   Network Utilization Review Department  Phone: [phone]  Fax: [phone]  Email: [email or web address]     PHYSICIAN ADVISORY SERVICES:  FOR FOGD-IW-YUOC REVIEW - MEDICAL NECESSITY DENIAL  Phone: [phone]  Fax: [phone]  Email: [email or web address]     TYPE OF REQUEST:  Inpatient Status     ADMISSION INFORMATION:  ADMISSION DATE/TIME: 5/30/22  1:31 AM  PATIENT DIAGNOSIS CODE/DESCRIPTION:  Dehydration [E86 0]  Fever [R50 9]  Acute UTI [N39 0]  DEBBIE (acute kidney injury) ([facility] ) [N17 9]  Severe sepsis ([facility] ) [A41 9, R65 20]  DISCHARGE DATE/TIME: 6/1/2022  1:14 PM   IMPORTANT INFORMATION:  Please contact the Juan Luis Alvarado directly with any questions or concerns regarding this request  Department voicemails are confidential     Send requests for admission clinical reviews, concurrent reviews, approvals, and administrative denials due to lack of clinical to fax [phone]             Initial Clinical Review    Admission: Date/Time/Statement:   Admission Orders (From admission, onward)     Ordered        05/30/22 0131  Inpatient Admission  Once                      Orders Placed This Encounter   Procedures    Inpatient Admission     Standing Status:   Standing     Number of Occurrences:   1     Order Specific Question:   Level of Care     Answer:   Med Surg [16]     Order Specific Question:   Estimated length of stay     Answer:   More than 2 Midnights     Order Specific Question:   Certification     Answer:   I certify that inpatient services are medically necessary for this patient for a duration of greater than two midnights  See H&P and MD Progress Notes for additional information about the patient's course of treatment  ED Arrival Information     Expected   -    Arrival   5/29/2022 22:34    Acuity   Urgent            Means of arrival   Wheelchair    Escorted by   Family Member    Service   Hospitalist    Admission type   Urgent            Arrival complaint   dehydrated/uti/septic/vomit           Chief Complaint   Patient presents with    Fever - 9 weeks to 74 years     Pt reports fevers at home with vomiting this morning, decreased appetite  Reports recent UTIs since prostatectomy       Initial Presentation: 70 y o  male with hx HTN,  BPH s/p prostatectomy, gastric adenocarcinoma undergoing chemotherapy and immunotherapy with next due 6/1/22 presents to ED from home with symptomatic UTI  Completed Keflex for Klebsiella E UTI yeterday  Reports 4 days of increased urine frequency and dysuria with 1 day of fevers 101 F , chills, fatigue, nausea, vomiting, weakness, dizziness  Reports suprapubic pain   Reports a rash, itching, bilateral upper extremities started about 5 days ago and is a presumed reaction to his immunotherapy treatment  He has been taking a Medrol Dosepak for this as well as Xyzal  On exam, appears fatigued, has erythematous, scattered, macular lesions bilateral UE, temp 99 7 F  PEG tube in place   Labs-WBC 11 13, creatinine 1 98 from baseline 0 8-0 9 ,Mag 1 7 UA with innumerable WBC  CT:  Bilateral nephrolithiasis, no hydronephrosis  Distended bladder, mild circumferential bladder wall thickening noted  Pt given IV abx, IVF, Tylenol, IV antiemetic in ED  Pt admitted as Inpatient with acute complicated UTI with DEBBIE   Plan - IV Cefepime, prn Tylenol, prn antiemetic, IVF, check procal, monitor fever curve, CBC, CMP in am  F/u urine and blood cultures , Methylprednisolone to complete dose agustina for rash  Pain control  Date:5/31   Day 2:   Blood cultures NGTD  Urine cultures growing <10,000 klebsiella  Nsg reports increased urinary frequency   Pt continues IV Ceftriaxone   Creat remains elevated 1 81 today with IVF which was d/c'ed then today   Ordered renal US and PVR  Monitor fever curve and WBC's   WBC wnl today , pt afebrile   No Bm x 3 days in setting of decreased appetite, started senekot BID, encourage po intake, protein supplements   Medrol completed today  Pt reports nausea with meals, Ordered Zofran ODT to take pre meals   CBC, BMP tomorrow   ED Triage Vitals   Temperature Pulse Respirations Blood Pressure SpO2   05/29/22 2245 05/29/22 2245 05/29/22 2245 05/29/22 2245 05/29/22 2245   99 7 °F (37 6 °C) 83 18 119/65 97 %      Temp Source Heart Rate Source Patient Position - Orthostatic VS BP Location FiO2 (%)   05/29/22 2245 05/30/22 0000 05/30/22 0000 05/30/22 0000 --   Oral Monitor Sitting Right arm       Pain Score       05/29/22 2245       2          Wt Readings from Last 1 Encounters:   05/29/22 67 1 kg (148 lb)     Additional Vital Signs:   Date/Time Temp Pulse Resp BP MAP (mmHg) SpO2   05/31/22 07:11:13 -- 55 16 127/58 81 95 %   05/30/22 22:37:56 98 1 °F (36 7 °C) 62 18 130/65 87 94 %   05/30/22 15:36:51 97 9 °F (36 6 °C) 58 19 115/54 74 95 %   05/30/22 0930 -- -- -- -- -- --   05/30/22 07:53:59 98 1 °F (36 7 °C) 64 -- 141/73 96 98 %   05/30/22 02:36:20 97 8 °F (36 6 °C) 60 18 118/63 81 96 %   05/30/22 0100 -- 58 18 122/70 -- 94 %   05/30/22 0030 -- 60 18 120/66 -- 96 %   05/30/22 0000 -- 76 18 124/60 86 96 %       Pertinent Labs/Diagnostic Test Results:   US kidney and bladder   Final Result by Germania Golden DO (05/31 1354)   1  No hydronephrosis  2   Redemonstration of nonobstructing bilateral renal calculi as seen on the recent CT  3   Mild thickening of the urinary bladder wall  This may be due in part to incomplete distention, chronic urinary retention, or cystitis  Correlation with urinalysis is recommended  Workstation performed: [id number]         XR chest 1 view portable   Final Result by Ashlie Gilman MD (05/30 1115)      No acute cardiopulmonary disease  Workstation performed: [id number]         CT renal stone study abdomen pelvis without contrast   Final Result by Adam Sheffield DO (05/30 0128)      Bilateral nephrolithiasis, no hydronephrosis  Status post prostatectomy  Partially distended bladder  Mild circumferential bladder wall thickening noted, probably exaggerated by underdistention  Superimposed cystitis is considered in the appropriate clinical setting  Correlation with the    patient's symptoms, laboratory values, and urinalysis recommended  Previously seen retroperitoneal adenopathy has essentially intervally resolved, possibly related to posttreatment effect  No evidence of bowel obstruction  Pancreatic atrophy, scattered colonic diverticulosis, no evidence of acute diverticulitis, percutaneous gastrostomy tube in place  No evidence of bowel obstruction  Body wall edema, midline incisional scarring, and other findings as above              Workstation performed: [id number]         5/229 ECG-  Normal sinus rhythm with premature ventricular or aberrantly conducted complexes                                                                                                                                  ED Treatment:   Medication Administration from 05/29/2022 2234 to 05/30/2022 0154       Date/Time Order Dose Route Action     05/29/2022 2324 sodium chloride 0 9 % bolus 1,000 mL 1,000 mL Intravenous New Bag     05/29/2022 2341 acetaminophen (TYLENOL) tablet 975 mg 975 mg Oral Given     05/29/2022 2327 ondansetron (ZOFRAN) injection 4 mg 4 mg Intravenous Given     05/30/2022 0038 sodium chloride 0 9 % bolus 1,000 mL 1,000 mL Intravenous New Bag     05/30/2022 0030 cefepime (MAXIPIME) 1,000 mg in dextrose 5 % 50 mL IVPB 1,000 mg Intravenous New Bag        Past Medical History:   Diagnosis Date    Arthritis     shoulders    Cancer ([facility] )     gastric mass    COVID 01/2021 jan 2021- no hospitalization    Disease of thyroid gland     had radioactive iodine in past    GERD (gastroesophageal reflux disease)     History of GI diverticular bleed     Hyperlipidemia     Hypertension     on no meds at present    Kidney stone     Port-A-Cath in place     S/P percutaneous endoscopic gastrostomy (PEG) tube placement ([place] [address] )     Tinnitus     Wears glasses      Present on Admission:   Cancer related pain   Gastric adenocarcinoma ([place] [address] )   Hypothyroid      Admitting Diagnosis: Dehydration [E86 0]  Fever [R50 9]  Acute UTI [N39 0]  DEBBIE (acute kidney injury) ([place] [address] ) [N17 9]  Severe sepsis ([place] [address] ) [A41 9, R65 20]  Age/Sex: 70 y o  male  Admission Orders:  Scheduled Medications:  No current facility-administered medications for this encounter  methylprednisolone (MEDROL) tablet 8 mg  Dose: 8 mg  Freq: Once Route: PO  Start: 05/30/22 0900 End: 05/30/22 0839  magnesium sulfate 2 g/50 mL IVPB (premix) 2 g  Dose: 2 g  Freq: Once Route: IV  Last Dose: 2 g (05/30/22 0300)  Start: 05/30/22 0245 End: 05/30/22 0500  methylprednisolone (MEDROL) tablet 4 mg  Dose: 4 mg  Freq: Once Route: PO  Start: 05/31/22 0900 End: 05/31/22 0916  bisacodyl (DULCOLAX) EC tablet 5 mg  Dose: 5 mg  Freq: Daily Route: PO  Indications of Use: CONSTIPATION  Start: 05/30/22 0900 End: 05/31/22 1144  senna-docusate sodium (SENOKOT S) 8 6-50 mg per tablet 1 tablet  Dose: 1 tablet  Freq: 2 times daily Route: PO  Start: 05/31/22 1145    Continuous IV Infusions:  No current facility-administered medications for this encounter     sodium chloride 0 9 % infusion  Rate: 125 mL/hr Dose: 125 mL/hr  Freq: Continuous Route: IV  Last Dose: 125 mL/hr (05/31/22 0500)  Start: 05/30/22 0245 End: 05/31/22 1144  PRN Meds:  acetaminophen, 650 mg, Oral, Q6H PRN  ondansetron, 4 mg, Intravenous, Q6H PRN x1 5/30, x1 5/31 End: 05/31/22 1141  oxyCODONE, 10 mg, Oral, Q6H PRN x2 5/30  ondansetron (ZOFRAN-ODT) dispersible tablet 4 mg  Dose: 4 mg  Freq: Every 6 hours PRN Route: PO  PRN Reasons: nausea,vomiting  Start: 05/31/22 1141    SCD  OOB as jt    Network Utilization Review Department  ATTENTION: Please call with any questions or concerns to [phone] and carefully listen to the prompts so that you are directed to the right person  All voicemails are confidential   Jolynn Nava all requests for admission clinical reviews, approved or denied determinations and any other requests to dedicated fax number below belonging to the campus where the patient is receiving treatment   List of dedicated fax numbers for the Facilities:  [address] DENIALS (Administrative/Medical Necessity) [phone]   [address] (Maternity/NICU/Pediatrics) [phone]   [address]  [address] [place] [id number] [place] [address] [address] [facility] [facility] [phone]

## 2022-06-09 NOTE — PROGRESS NOTES
Hematology/Oncology Outpatient Follow- up Note  Danielito Watkins 70 y o  male MRN: @ Encounter: [id number]        Date:  6/9/2022    Presenting Complaint/Diagnosis : Metastatic GE junction malignancy, HER2 negative, MSI stable, tumor mutation burden low    HPI:    Per the note from Dr Madden     66-year-old  male with benign prostatic hypertrophy, hypertension, degenerative arthritis, hypothyroidism, GERD had been complaining of epigastric pain with on a 30 lb weight loss over the past 2 months, loss of appetite, he had cholecystectomy without improvement of symptoms in January 2022 subsequently EGD on 02/02/2022 showed 2 cm hiatal hernia with friable mass involving more than half of the circumference with extension down into the cardia and the fundus for about 5 cm mild erythema in the antrum     Biopsy of the gastric fundus mass showed adenocarcinoma with small portion squamous component arising in high-grade dysplasia in a background mixed squamous and cardiac mucosa the majority of the tumor is adenocarcinoma with minor component of squamous differentiation  Mismatch repair protein is intact     HER2 is 0 by IHC     CT scan the chest abdomen pelvis showed infiltrating mass at the gastroesophageal junction sinan enlargement suspicious for metastatic disease in the mediastinum, retroperitoneum, omentum, subcentimeter bilateral pulmonary nodules     PET scan on 02/09/2022 showed FDG uptake in the gastroesophageal junction and proximal stomach, sinan disease in the chest, abdomen, periaortic, small bilateral pulmonary nodules, uptake in the right upper quadrant omental nodules, uptake along the lateral service of the right lobe of the liver for possible peritoneal disease along the liver surface      The patient was started on modified FOLFOX 6 with Opdivo every 2 weeks      Previous Hematologic/ Oncologic History:    Oncology History   Gastric adenocarcinoma ([facility] [address] )   2/8/2022 Initial Diagnosis Gastric adenocarcinoma ([facility] [address] )     2/21/2022 -  Chemotherapy    palonosetron (ALOXI), 0 25 mg, Intravenous, Once, 4 of 10 cycles  Administration: 0 25 mg (3/21/2022), 0 25 mg (4/4/2022), 0 25 mg (5/16/2022)  fosaprepitant (EMEND) IVPB, 150 mg, Intravenous, Once, 4 of 10 cycles  Administration: 150 mg (3/21/2022), 150 mg (4/4/2022), 150 mg (5/16/2022)  nivolumab (OPDIVO) IVPB, 240 mg (100 % of original dose 240 mg), Intravenous, Once, 5 of 10 cycles  Dose modification: 240 mg (original dose 240 mg, Cycle 1)  Administration: 240 mg (2/21/2022), 240 mg (3/7/2022), 240 mg (3/21/2022), 240 mg (4/4/2022), 240 mg (5/16/2022)  oxaliplatin (ELOXATIN) chemo infusion, 85 mg/m2 = 164 9 mg, Intravenous, Once, 6 of 12 cycles  Dose modification: 65 mg/m2 (original dose 85 mg/m2, Cycle 6, Reason: Other (Must fill in a comment), Comment: Elevated creatinine)  Administration: 164 9 mg (2/21/2022), 164 9 mg (3/7/2022), 164 9 mg (3/21/2022), 164 9 mg (4/4/2022), 158 1 mg (5/16/2022)  fluorouracil (ADRUCIL) ambulatory infusion Soln, 1,200 mg/m2/day = 4,655 mg, Intravenous, Over 46 hours, 6 of 12 cycles         Current Hematologic/ Oncologic Treatment:      Four cycles of modified FOLFOX 6 with Opdivo  Brendolyn Medal was dropped for cycle 5  Secondary to a skin rash  He also had sepsis at the same time so it could have been related  Brendolyn Medal is now being added back on  Interval History:      Patient returns for follow-up visit  He is in the hospital for sepsis and a UTI  Creatinine had gone up significantly at that time and is now coming down  He has recovered  White count is normal   He denies any fevers  He feels very weak today  Is also dehydrated  His potassium is also low  We will put in for and L of normal saline to be given every Friday  I will prescribe  5 days of potassium at 10 mEq daily  He will get his chemotherapy next week as scheduled  Brendolyn Medal will be added back on    Apart from fatigue he states his appetite has suffered  He has lost some weight  He did have a CT scan with stone protocol while in the hospital which showed the retroperitoneal adenopathy had resolved so it does appear like he is having a response to treatment  The rest of his 14 point review of systems today was negative  Test Results:    Imaging: XR chest 1 view portable    Result Date: 5/30/2022  Narrative: CHEST INDICATION:   fever weakness vomiting  COMPARISON:  Chest radiograph from 2/11/2022, renal stone CT from 5/29/2022, chest CT from 2/4/2022  EXAM PERFORMED/VIEWS:  XR CHEST PORTABLE FINDINGS:  Left port in mid SVC  Percutaneous gastrostomy tube  Cardiomediastinal silhouette appears unremarkable  No acute disease  Bilateral lung metastases on CT are not visible  No effusion or pneumothorax  Bilateral skin folds  Old left posterior rib fracture  Moderate bilateral glenohumeral degenerative disease  Cholecystectomy  Impression: No acute cardiopulmonary disease  Workstation performed: [id number]     CT renal stone study abdomen pelvis without contrast    Result Date: 5/30/2022  Narrative: CT ABDOMEN AND PELVIS WITHOUT IV CONTRAST - LOW DOSE RENAL STONE INDICATION:   fever, dysuria, vomiting, gastric cancer  COMPARISON:  CT abdomen pelvis dated 3/10/2022 TECHNIQUE:  Low radiation dose thin section CT examination of the abdomen and pelvis was performed without intravenous or oral contrast according to a protocol specifically designed to evaluate for urinary tract calculus  Axial, sagittal, and coronal 2D  reformatted images were created from the source data and submitted for interpretation  Evaluation for pathology in the abdomen and pelvis that is unrelated to urinary tract calculi is limited  Radiation dose length product (DLP) for this visit:  333 mGy-cm     This examination, like all CT scans performed in the [facility], was performed utilizing techniques to minimize radiation dose exposure, including the use of iterative reconstruction and automated exposure control  URINARY TRACT FINDINGS: RIGHT KIDNEY AND URETER:  Multiple subcentimeter nonobstructing stones in the right kidney  Right kidney otherwise appears grossly unremarkable; no hydronephrosis  LEFT KIDNEY AND URETER:  Multiple subcentimeter nonobstructing stones in the left kidney  Left kidney otherwise appears grossly unremarkable; no hydronephrosis  URINARY BLADDER:  Partially distended  Mild circumferential bladder wall thickening noted, probably exaggerated by underdistention  Superimposed cystitis is considered in the appropriate clinical setting  ADDITIONAL FINDINGS: LOWER CHEST:  Mild atelectasis/scarring in the bilateral lower lung fields  Visualized lungs otherwise appear grossly clear  SOLID VISCERA:  Pancreatic atrophy, otherwise limited low radiation dose noncontrast CT evaluation demonstrates no clinically significant abnormality of the imaged portions of the liver, spleen, pancreas, or adrenal glands  GALLBLADDER/BILIARY TREE:  Gallbladder is surgically absent  No biliary dilitation  STOMACH AND BOWEL:  Scattered colonic diverticulosis  No discrete evidence of acute diverticulitis  No evidence of bowel obstruction  Percutaneous gastrostomy tube in place in the gastric body  APPENDIX:  No findings to suggest appendicitis  ABDOMINOPELVIC CAVITY:  No ascites or pneumoperitoneum  Previously seen retroperitoneal adenopathy has essentially intervally resolved  REPRODUCTIVE ORGANS:  Status post prostatectomy ABDOMINAL WALL/INGUINAL REGIONS:  Body wall edema  Midline incisional scarring  OSSEOUS STRUCTURES: Generalized osteopenia  Multilevel degenerative changes of the spine  Anterolisthesis of approximately 5 mm of L4 on L5, probably related to facet joint arthropathy at this level  Spinal alignment otherwise grossly obtained  No acute fracture or destructive osseous lesion  Impression: Bilateral nephrolithiasis, no hydronephrosis   Status post prostatectomy  Partially distended bladder  Mild circumferential bladder wall thickening noted, probably exaggerated by underdistention  Superimposed cystitis is considered in the appropriate clinical setting  Correlation with the patient's symptoms, laboratory values, and urinalysis recommended  Previously seen retroperitoneal adenopathy has essentially intervally resolved, possibly related to posttreatment effect  No evidence of bowel obstruction  Pancreatic atrophy, scattered colonic diverticulosis, no evidence of acute diverticulitis, percutaneous gastrostomy tube in place  No evidence of bowel obstruction  Body wall edema, midline incisional scarring, and other findings as above  Workstation performed: [id number]     US kidney and bladder    Result Date: 5/31/2022  Narrative: RENAL ULTRASOUND INDICATION:   hima  COMPARISON: CT abdomen pelvis 5/29/2022 TECHNIQUE:   Ultrasound of the retroperitoneum was performed with a curvilinear transducer utilizing volumetric sweeps and still imaging techniques  FINDINGS: KIDNEYS: Symmetric and normal size  Right kidney:  12 5 x 7 4 x 7 3 cm  Volume 351 4 mL Left kidney:  11 8 x 6 7 x 4 9 cm  Volume 204 5 mL Right kidney Normal echogenicity and contour  Cyst within the lower pole measures 1 5 cm with a thin internal septation  No hydronephrosis  Multiple hyperechoic foci are seen with posterior shadowing and twinkle artifact  The largest measures 7 mm within the midpole  No perinephric fluid collections  Left kidney Normal echogenicity and contour  No mass is identified  No hydronephrosis  Hyperechoic focus within the midpole measures 6 mm with posterior shadowing  No perinephric fluid collections  URETERS: Nonvisualized  BLADDER: Normally distended  Mild bladder wall thickening is suggested  Bilateral ureteral jets detected  Impression: 1  No hydronephrosis  2   Redemonstration of nonobstructing bilateral renal calculi as seen on the recent CT   3   Mild thickening of the urinary bladder wall  This may be due in part to incomplete distention, chronic urinary retention, or cystitis  Correlation with urinalysis is recommended  Workstation performed: [id number]       Labs:   Lab Results   Component Value Date    WBC 6 50 06/09/2022    HGB 11 0 (L) 06/09/2022    HCT 33 6 (L) 06/09/2022    MCV 88 06/09/2022     06/09/2022     Lab Results   Component Value Date    K 3 1 (L) 06/09/2022     06/09/2022    CO2 27 06/09/2022    BUN 33 (H) 06/09/2022    CREATININE 1 77 (H) 06/09/2022    GLUF 97 02/18/2022    CALCIUM 8 9 06/09/2022    CORRECTEDCA 9 2 06/01/2022    AST 16 06/09/2022    ALT 11 06/09/2022    ALKPHOS 139 (H) 06/09/2022    EGFR 37 06/09/2022       Lab Results   Component Value Date    PSA 0 2 05/13/2022    PSA 7 2 (H) 03/08/2021       Lab Results   Component Value Date    IRON 28 (L) 03/03/2022    TIBC 254 03/03/2022    FERRITIN 429 (H) 03/03/2022       ROS: As stated in the history of present illness otherwise his 14 point review of systems today was negative        Active Problems:   Patient Active Problem List   Diagnosis    Hypertension    Hypothyroid    Depression    BPH (benign prostatic hyperplasia)    COVID-19    Inflammatory arthritis    Rectus diastasis    Acute calculous cholecystitis    Gastric mass    [facility]    Encounter for central line care    FTT (failure to thrive) in adult    Stomach cancer [facility] [address] [facility]    Cancer related pain    Moderate protein-calorie malnutrition ([facility] [address] )    Palliative care patient    Benign prostatic hyperplasia with urinary frequency    Acute, Complicated UTI with Acute Kidney Injury     Rash    Constipation       Past Medical History:   Past Medical History:   Diagnosis Date    Arthritis     shoulders    Cancer ([facility] [address] )     gastric mass    COVID 01/2021 jan 2021- no hospitalization    Disease of thyroid gland     had radioactive iodine in past    GERD (gastroesophageal reflux disease)     History of GI diverticular bleed     Hyperlipidemia     Hypertension     on no meds at present    Kidney stone     Port-A-Cath in place     S/P percutaneous endoscopic gastrostomy (PEG) tube placement ([facility] [address] )     Tinnitus     Wears glasses        Surgical History:   Past Surgical History:   Procedure Laterality Date    ADENOIDECTOMY      CHOLECYSTECTOMY LAPAROSCOPIC N/A 1/24/2022    Procedure: CHOLECYSTECTOMY LAPAROSCOPIC W/ INTRAOP CHOLANGIOGRAM;  Surgeon: Lori Mcknight DO;  [facility];  Service: General    COLONOSCOPY N/A 7/16/2016    Procedure: COLONOSCOPY;  Surgeon: Antonio Castaneda MD;  [facility];  Service:     FL CHOLANGIOGRAM TRANSHEPATIC  1/24/2022    FL GUIDED CENTRAL VENOUS ACCESS DEVICE INSERTION  2/11/2022    HERNIA REPAIR      W[place] LAP,PROSTATECTOMY,RADICAL,W/NERVE SPARE,INCL ROBOTIC N/A 4/27/2022    Procedure: PROSTATECTOMY SIMPLE LAPAROSCOPIC  W ROBOTICS, BLADDER NECK RECONSTRUCTION;  Surgeon: Johnanna Goodpasture, MD;  [facility];  Service: Urology    PROSTATE BIOPSY      TONSILLECTOMY      TUNNELED VENOUS PORT PLACEMENT N/A 2/11/2022    Procedure: INSERTION VENOUS PORT (PORT-A-CATH); Surgeon: Chery Rosa MD;  [facility];  Service: Surgical Oncology    UVULECTOMY         Family History:  No family history on file        Social History:   Social History     Socioeconomic History    Marital status: /Civil Union     Spouse name: Not on file    Number of children: Not on file    Years of education: Not on file    Highest education level: Not on file   Occupational History    Not on file   Tobacco Use    Smoking status: Never Smoker    Smokeless tobacco: Never Used   Vaping Use    Vaping Use: Never used   Substance and Sexual Activity    Alcohol use: Yes     Comment: social    Drug use: Never    Sexual activity: Not on file   Other Topics Concern    Not on file   Social History Narrative    Not on file Social Determinants of Health     Financial Resource Strain: Not on file   Food Insecurity: No Food Insecurity    Worried About Running Out of Food in the Last Year: Never true    Amrita of Food in the Last Year: Never true   Transportation Needs: No Transportation Needs    Lack of Transportation (Medical): No    Lack of Transportation (Non-Medical): No   Physical Activity: Not on file   Stress: Not on file   Social Connections: Not on file   Intimate Partner Violence: Not on file   Housing Stability: Low Risk     Unable to Pay for Housing in the Last Year: No    Number of Places Lived in the Last Year: 1    Unstable Housing in the Last Year: No       Current Medications:   Current Outpatient Medications   Medication Sig Dispense Refill    bisacodyl (DULCOLAX) 5 mg EC tablet Take 1 tablet (5 mg total) by mouth as needed in the morning for constipation  Related to pain medicines  30 tablet 0    dronabinol (MARINOL) 5 MG capsule Take 1 capsule (5 mg total) by mouth 2 (two) times a day before meals 60 capsule 0    [START ON 6/13/2022] fluorouracil 4,370 mg in CADD/Elastomeric Infusion Device Infuse 4,370 mg (1,200 mg/m2/day x 1 82 m2) into a venous catheter over 46 hours for 2 days  Do not start before June 13, 2022  1 each 0    levocetirizine (XYZAL) 5 MG tablet Take 5 mg by mouth in the morning      levothyroxine 125 mcg tablet Take 125 mcg by mouth every morning        neomycin-bacitracin-polymyxin b (NEOSPORIN) ointment Apply to the tip of the penis twice daily as needed for catheter irritation   15 g 0    ondansetron (Zofran ODT) 4 mg disintegrating tablet Take 2 tablets (8 mg total) by mouth every 8 (eight) hours as needed for nausea or vomiting for up to 90 doses 90 tablet 3    oxyCODONE (OxyCONTIN) 10 mg 12 hr tablet Take 1 tablet (10 mg total) by mouth every 8 (eight) hours Max Daily Amount: 30 mg 90 tablet 0    oxyCODONE (ROXICODONE) 5 mg/5 mL solution Take 10-20mg PO Q4H PRN Moderate-severe pain 473 mL 0    pantoprazole (PROTONIX) 40 mg tablet Take 1 tablet (40 mg total) by mouth 2 (two) times a day Before breakfast and before bed to prevent acid accumulation  60 tablet 0    saliva substitute (MOUTH KOTE) Apply 5 sprays to the mouth or throat 4 (four) times a day as needed (dry mouth) 59 mL 0     No current facility-administered medications for this visit  Allergies: No Known Allergies    Physical Exam:    Body surface area is 1 76 meters squared  Wt Readings from Last 3 Encounters:   06/09/22 65 3 kg (144 lb)   05/29/22 67 1 kg (148 lb)   05/16/22 70 8 kg (156 lb)        Temp Readings from Last 3 Encounters:   06/09/22 97 6 °F (36 4 °C) (Temporal)   06/01/22 98 °F (36 7 °C)   05/18/22 97 7 °F (36 5 °C) (Temporal)        BP Readings from Last 3 Encounters:   06/09/22 100/62   06/01/22 140/72   05/18/22 100/63         Pulse Readings from Last 3 Encounters:   06/09/22 74   05/31/22 59   05/18/22 68        Physical Exam     Constitutional   General appearance: No acute distress,  Feels weak and looks tired  Eyes   Conjunctiva and lids: No swelling, erythema or discharge  Pupils and irises: Equal, round and reactive to light  Ears, Nose, Mouth, and Throat   External inspection of ears and nose: Normal     Nasal mucosa, septum, and turbinates: Normal without edema or erythema  Oropharynx: Normal with no erythema, edema, exudate or lesions  Pulmonary   Respiratory effort: No increased work of breathing or signs of respiratory distress  Auscultation of lungs: Clear to auscultation  Cardiovascular   Palpation of heart: Normal PMI, no thrills  Auscultation of heart: Normal rate and rhythm, normal S1 and S2, without murmurs  Examination of extremities for edema and/or varicosities: Normal     Carotid pulses: Normal     Abdomen   Abdomen: Non-tender, no masses  Liver and spleen: No hepatomegaly or splenomegaly      Lymphatic   Palpation of lymph nodes in neck: No lymphadenopathy  Musculoskeletal   Gait and station: Normal     Digits and nails: Normal without clubbing or cyanosis  Inspection/palpation of joints, bones, and muscles: Normal     Skin   Skin and subcutaneous tissue: Normal without rashes or lesions  Neurologic   Cranial nerves: Cranial nerves 2-12 intact  Sensation: No sensory loss  Psychiatric   Orientation to person, place, and time: Normal     Mood and affect: Normal         Assessment / Plan: 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 benign prostatic hypertrophy, hypertension, hypothyroidism and GERD was diagnosed with adenocarcinoma of the GE junction with extension into the fundus of the stomach when he presented with weight loss and epigastric pain   Biopsy confirmed adenocarcinoma which was HER2 negative   MMR was intact   Tumor mutation burden is low   MSI was stable   No other actionable mutations were detected   Gaurdant testing did show he may be eligible for TAPUR clinical trial which may be open with [facility] which looks at mutations and treatment with FDA approved drugs for those mutations outside of standard of care  [facility] did have CDK 6 amplification and EGFR amplification for which they may have clinical trial if needed         The patient is currently on modified FOLFOX 6 with Opdivo  Opdivo was held for 1 cycle  It will now be restarted  The patient seems to have recovered from his sepsis although feels weak  We will give him a L of normal saline tomorrow  I will replace his potassium with 10 mEq daily  The plan is to do 6-8 cycles and then repeat imaging  Imaging he had while in the hospital does show improvement  I will order blood culture and urinalysis  Goals and Barriers:  Current Goal:  Prolong Survival from   Metastatic gastric cancer  Barriers: None  Patient's Capacity to Self Care:  Patient  able to self care      Portions of the record may have been created with voice recognition software  Occasional wrong word or "sound a like" substitutions may have occurred due to the inherent limitations of voice recognition software  Read the chart carefully and recognize, using context, where substitutions have occurred

## 2022-06-10 ENCOUNTER — HOSPITAL ENCOUNTER (OUTPATIENT)
Dept: INFUSION CENTER | Facility: HOSPITAL | Age: 72
Discharge: HOME | End: 2022-06-10
Attending: INTERNAL MEDICINE
Payer: COMMERCIAL

## 2022-06-10 ENCOUNTER — TELEPHONE (OUTPATIENT)
Dept: HEMATOLOGY ONCOLOGY | Facility: CLINIC | Age: 72
End: 2022-06-10

## 2022-06-10 ENCOUNTER — TELEPHONE (OUTPATIENT)
Dept: HEMATOLOGY ONCOLOGY | Facility: HOSPITAL | Age: 72
End: 2022-06-10

## 2022-06-10 DIAGNOSIS — N39.0 URINARY TRACT INFECTION WITHOUT HEMATURIA, SITE UNSPECIFIED: ICD-10-CM

## 2022-06-10 DIAGNOSIS — E86.0 DEHYDRATION: Primary | ICD-10-CM

## 2022-06-10 DIAGNOSIS — A41.9 SEPSIS, DUE TO UNSPECIFIED ORGANISM, UNSPECIFIED WHETHER ACUTE ORGAN DYSFUNCTION PRESENT (HCC): Primary | ICD-10-CM

## 2022-06-10 DIAGNOSIS — E86.0 DEHYDRATION: ICD-10-CM

## 2022-06-10 PROCEDURE — 96360 HYDRATION IV INFUSION INIT: CPT

## 2022-06-10 PROCEDURE — 96361 HYDRATE IV INFUSION ADD-ON: CPT

## 2022-06-10 RX ORDER — SULFAMETHOXAZOLE AND TRIMETHOPRIM 800; 160 MG/1; MG/1
1 TABLET ORAL EVERY 12 HOURS SCHEDULED
Qty: 14 TABLET | Refills: 0 | Status: SHIPPED | OUTPATIENT
Start: 2022-06-10 | End: 2022-06-17

## 2022-06-10 RX ORDER — POTASSIUM CHLORIDE 20 MEQ/1
20 TABLET, EXTENDED RELEASE ORAL ONCE
Status: CANCELLED
Start: 2022-06-17 | End: 2022-06-17

## 2022-06-10 RX ORDER — POTASSIUM CHLORIDE 20 MEQ/1
20 TABLET, EXTENDED RELEASE ORAL ONCE
Status: COMPLETED | OUTPATIENT
Start: 2022-06-10 | End: 2022-06-10

## 2022-06-10 RX ADMIN — POTASSIUM CHLORIDE 20 MEQ: 1500 TABLET, EXTENDED RELEASE ORAL at 10:47

## 2022-06-10 NOTE — TELEPHONE ENCOUNTER
Relayed information to pt that we are awaiting urine culture results and to proceed to ER with any fever  Will check back on Monday  Most ;ikely UA contaminated  Once culture grows will consider order PO antibiotics  Pt daughter Kailynparis Villatoro verbalized good understanding of this  Dr Sean Butcher wanted  To start pt on BACTRIM DS for 7 day course as a precaution  Ordered and pt daughter made aware

## 2022-06-10 NOTE — TELEPHONE ENCOUNTER
CALL TRANSFER   Reason for patient call? patient's daughter Brendan Route  is returning a phone call from [facility]   Patient's primary physician? Dr Majorie Goltz   RN call was transferred to and time it was transferred? Vladimir @ 920am   Informed patient that the message will be forwarded to the team and someone will get back to them as soon as possible    Did you relay this information to the patient?  yes

## 2022-06-10 NOTE — TELEPHONE ENCOUNTER
Please see documented telephone encounter regarding this   Was able to call back pt daughter and speak to her thank you

## 2022-06-10 NOTE — RESULT ENCOUNTER NOTE
The patient is on chemotherapy  Nitrate is negative but was negative when he was admitted with urosepsis  At this point we will based on his clinical status and performance status prescribe Bactrim DS b I d  for 7 days

## 2022-06-10 NOTE — TELEPHONE ENCOUNTER
----- Message from Yuli Shaw MD sent at 6/10/2022  9:31 AM EDT -----  Regarding: FW: Infection     The urinalysis showed negative nitrite  This could be a contaminated specimen secondary to collection  I am waiting for the culture  If the culture grows bacteria proving UTI I will put the patient on oral antibiotic  We will have the answer by Monday  If he spikes a fever he should go back to the emergency room  Blood cultures are also pending  I do not wish to start him on an antibiotic without proof of a UTI  Just bacteria and white cells in the urine are not proof of I UTI  It may be contamination   ----- Message -----  From: Elaina Briscoe RN  Sent: 6/10/2022   9:06 AM EDT  To: Yuli Shaw MD  Subject: FW: Infection                                    Pt had UA performed yesterday showing large leukocytes, increased WBC and mod bacteria  Daughter concerned and requesting PO antibiotics to treat UTI to prevent sepsis  Pt has apt an infusion today wondering if we could order something there  Advised IV abt given inpatient not infusion center  Could sent rx PO abt to treat to pt local pharmacy? Please let me know how to proceed, I will notify pt daughter Margarita Dunne thanks  ----- Message -----  From: Clark Jones  Sent: 6/10/2022   8:56 AM EDT  To: Elaina Briscoe RN  Subject: Infection                                        Please review, thanks     ----- Message -----  From: Chantal Villanueva  Sent: 6/10/2022   8:34 AM EDT  To: Hematology Oncology [facility] Clinical  Subject: Infection                                        As far as I know, Dr Gabriel Gonsalez ordered the tests

## 2022-06-11 LAB — BACTERIA UR CULT: NORMAL

## 2022-06-13 ENCOUNTER — HOSPITAL ENCOUNTER (OUTPATIENT)
Dept: INFUSION CENTER | Facility: CLINIC | Age: 72
Discharge: HOME | End: 2022-06-13
Payer: COMMERCIAL

## 2022-06-13 VITALS
WEIGHT: 143 LBS | SYSTOLIC BLOOD PRESSURE: 148 MMHG | OXYGEN SATURATION: 98 % | TEMPERATURE: 97.4 F | HEIGHT: 67 IN | RESPIRATION RATE: 16 BRPM | HEART RATE: 64 BPM | DIASTOLIC BLOOD PRESSURE: 84 MMHG | BODY MASS INDEX: 22.44 KG/M2

## 2022-06-13 DIAGNOSIS — C16.9 GASTRIC ADENOCARCINOMA (HCC): Primary | ICD-10-CM

## 2022-06-13 PROCEDURE — G0498 CHEMO EXTEND IV INFUS W/PUMP: HCPCS

## 2022-06-13 PROCEDURE — 96413 CHEMO IV INFUSION 1 HR: CPT

## 2022-06-13 PROCEDURE — 96367 TX/PROPH/DG ADDL SEQ IV INF: CPT

## 2022-06-13 PROCEDURE — 96375 TX/PRO/DX INJ NEW DRUG ADDON: CPT

## 2022-06-13 PROCEDURE — 96417 CHEMO IV INFUS EACH ADDL SEQ: CPT

## 2022-06-13 PROCEDURE — 96415 CHEMO IV INFUSION ADDL HR: CPT

## 2022-06-13 RX ORDER — SODIUM CHLORIDE 9 MG/ML
20 INJECTION, SOLUTION INTRAVENOUS ONCE AS NEEDED
Status: DISCONTINUED | OUTPATIENT
Start: 2022-06-13 | End: 2022-06-16 | Stop reason: HOSPADM

## 2022-06-13 RX ORDER — PALONOSETRON 0.05 MG/ML
0.25 INJECTION, SOLUTION INTRAVENOUS ONCE
Status: COMPLETED | OUTPATIENT
Start: 2022-06-13 | End: 2022-06-13

## 2022-06-13 RX ORDER — DEXTROSE MONOHYDRATE 50 MG/ML
20 INJECTION, SOLUTION INTRAVENOUS ONCE
Status: COMPLETED | OUTPATIENT
Start: 2022-06-13 | End: 2022-06-13

## 2022-06-13 RX ADMIN — SODIUM CHLORIDE 20 ML/HR: 9 INJECTION, SOLUTION INTRAVENOUS at 08:40

## 2022-06-13 RX ADMIN — PALONOSETRON HYDROCHLORIDE 0.25 MG: 0.25 INJECTION, SOLUTION INTRAVENOUS at 08:47

## 2022-06-13 RX ADMIN — DEXAMETHASONE SODIUM PHOSPHATE 10 MG: 10 INJECTION, SOLUTION INTRAMUSCULAR; INTRAVENOUS at 08:50

## 2022-06-13 RX ADMIN — FOSAPREPITANT 150 MG: 150 INJECTION, POWDER, LYOPHILIZED, FOR SOLUTION INTRAVENOUS at 09:11

## 2022-06-13 RX ADMIN — OXALIPLATIN 118.3 MG: 5 INJECTION, SOLUTION INTRAVENOUS at 10:53

## 2022-06-13 RX ADMIN — SODIUM CHLORIDE 240 MG: 9 INJECTION, SOLUTION INTRAVENOUS at 10:01

## 2022-06-13 RX ADMIN — DEXTROSE 20 ML/HR: 5 SOLUTION INTRAVENOUS at 10:40

## 2022-06-13 NOTE — PROGRESS NOTES
Patient here for chemo  He complains of nausea and takes compazine which he states helps  He denies any vomiting or diarrhea  Labs reviewed and meet parameters  k is low at 3 1 from labs drawn on 6/9/22  Per Dr Gordo Anton note on 6/9/22 he is aware and ordered patient po potassium which patient states he is taking  Patient also checked for UTI  Patient denies any UTI symptoms at this time  Dr Karl Peña also aware  Patient was started on bactrum but per patient the office told him to stop taking it because the urine culture was negative/no growth

## 2022-06-14 LAB — BACTERIA BLD CULT: NORMAL

## 2022-06-14 RX ORDER — ONDANSETRON 4 MG/1
8 TABLET, ORALLY DISINTEGRATING ORAL EVERY 8 HOURS PRN
Qty: 90 TABLET | Refills: 0 | Status: SHIPPED | OUTPATIENT
Start: 2022-06-14 | End: 2022-06-23 | Stop reason: SDUPTHER

## 2022-06-15 ENCOUNTER — HOSPITAL ENCOUNTER (OUTPATIENT)
Dept: INFUSION CENTER | Facility: CLINIC | Age: 72
Discharge: HOME | End: 2022-06-15

## 2022-06-15 ENCOUNTER — TELEPHONE (OUTPATIENT)
Dept: HEMATOLOGY ONCOLOGY | Facility: CLINIC | Age: 72
End: 2022-06-15

## 2022-06-15 DIAGNOSIS — C16.9 GASTRIC ADENOCARCINOMA (HCC): Primary | ICD-10-CM

## 2022-06-15 NOTE — PROGRESS NOTES
Patient here for elastomeric d/c  He offers no complaints  He states he feels well and no nausea  elastomeric appears deflated and 46 hours is complete  elastomeric d/c per protocol   Next appointment confirmed and avs given

## 2022-06-16 DIAGNOSIS — C16.9 GASTRIC ADENOCARCINOMA (HCC): ICD-10-CM

## 2022-06-16 DIAGNOSIS — G89.3 CANCER-RELATED PAIN: ICD-10-CM

## 2022-06-16 DIAGNOSIS — Z51.5 PALLIATIVE CARE PATIENT: ICD-10-CM

## 2022-06-17 ENCOUNTER — HOSPITAL ENCOUNTER (OUTPATIENT)
Dept: INFUSION CENTER | Facility: HOSPITAL | Age: 72
Discharge: HOME | End: 2022-06-17
Attending: INTERNAL MEDICINE
Payer: COMMERCIAL

## 2022-06-17 ENCOUNTER — PATIENT OUTREACH (OUTPATIENT)
Dept: CASE MANAGEMENT | Facility: HOSPITAL | Age: 72
End: 2022-06-17

## 2022-06-17 VITALS
DIASTOLIC BLOOD PRESSURE: 67 MMHG | SYSTOLIC BLOOD PRESSURE: 131 MMHG | TEMPERATURE: 97.6 F | HEART RATE: 65 BPM | OXYGEN SATURATION: 98 % | RESPIRATION RATE: 18 BRPM

## 2022-06-17 DIAGNOSIS — E86.0 DEHYDRATION: Primary | ICD-10-CM

## 2022-06-17 PROCEDURE — 96360 HYDRATION IV INFUSION INIT: CPT

## 2022-06-17 PROCEDURE — 96361 HYDRATE IV INFUSION ADD-ON: CPT

## 2022-06-17 RX ADMIN — SODIUM CHLORIDE 1000 ML: 0.9 INJECTION, SOLUTION INTRAVENOUS at 10:13

## 2022-06-20 DIAGNOSIS — C16.9 GASTRIC ADENOCARCINOMA (HCC): Primary | ICD-10-CM

## 2022-06-20 RX ORDER — PALONOSETRON 0.05 MG/ML
0.25 INJECTION, SOLUTION INTRAVENOUS ONCE
Status: CANCELLED | OUTPATIENT
Start: 2022-06-27

## 2022-06-20 RX ORDER — SODIUM CHLORIDE 9 MG/ML
20 INJECTION, SOLUTION INTRAVENOUS ONCE AS NEEDED
Status: CANCELLED | OUTPATIENT
Start: 2022-06-27

## 2022-06-20 RX ORDER — DEXTROSE MONOHYDRATE 50 MG/ML
20 INJECTION, SOLUTION INTRAVENOUS ONCE
Status: CANCELLED | OUTPATIENT
Start: 2022-06-27

## 2022-06-23 ENCOUNTER — HOSPITAL ENCOUNTER (OUTPATIENT)
Dept: INFUSION CENTER | Facility: CLINIC | Age: 72
Discharge: HOME | End: 2022-06-23
Payer: COMMERCIAL

## 2022-06-23 VITALS
SYSTOLIC BLOOD PRESSURE: 123 MMHG | OXYGEN SATURATION: 98 % | TEMPERATURE: 97.3 F | RESPIRATION RATE: 18 BRPM | DIASTOLIC BLOOD PRESSURE: 81 MMHG | HEART RATE: 78 BPM

## 2022-06-23 DIAGNOSIS — E03.9 HYPOTHYROIDISM, UNSPECIFIED TYPE: Primary | ICD-10-CM

## 2022-06-23 DIAGNOSIS — E87.6 HYPOPOTASSEMIA: Primary | ICD-10-CM

## 2022-06-23 DIAGNOSIS — C16.9 GASTRIC ADENOCARCINOMA (HCC): ICD-10-CM

## 2022-06-23 DIAGNOSIS — E86.0 DEHYDRATION: Primary | ICD-10-CM

## 2022-06-23 DIAGNOSIS — C16.0 MALIGNANT NEOPLASM OF CARDIA OF STOMACH (HCC): ICD-10-CM

## 2022-06-23 DIAGNOSIS — E87.6 HYPOPOTASSEMIA: ICD-10-CM

## 2022-06-23 LAB
ALBUMIN SERPL BCP-MCNC: 3.7 G/DL (ref 3.5–5)
ALP SERPL-CCNC: 136 U/L (ref 34–104)
ALT SERPL W P-5'-P-CCNC: 18 U/L (ref 7–52)
ANION GAP SERPL CALCULATED.3IONS-SCNC: 12 MMOL/L (ref 4–13)
AST SERPL W P-5'-P-CCNC: 21 U/L (ref 13–39)
BASOPHILS # BLD AUTO: 0.03 THOUSANDS/ΜL (ref 0–0.1)
BASOPHILS NFR BLD AUTO: 1 % (ref 0–1)
BILIRUB SERPL-MCNC: 0.66 MG/DL (ref 0.2–1)
BUN SERPL-MCNC: 30 MG/DL (ref 5–25)
CALCIUM SERPL-MCNC: 9.2 MG/DL (ref 8.4–10.2)
CHLORIDE SERPL-SCNC: 100 MMOL/L (ref 96–108)
CO2 SERPL-SCNC: 22 MMOL/L (ref 21–32)
CREAT SERPL-MCNC: 1.7 MG/DL (ref 0.6–1.3)
EOSINOPHIL # BLD AUTO: 0.18 THOUSAND/ΜL (ref 0–0.61)
EOSINOPHIL NFR BLD AUTO: 4 % (ref 0–6)
ERYTHROCYTE [DISTWIDTH] IN BLOOD BY AUTOMATED COUNT: 14.9 % (ref 11.6–15.1)
GFR SERPL CREATININE-BSD FRML MDRD: 39 ML/MIN/1.73SQ M
GLUCOSE SERPL-MCNC: 96 MG/DL (ref 65–140)
HCT VFR BLD AUTO: 30.1 % (ref 36.5–49.3)
HGB BLD-MCNC: 10.2 G/DL (ref 12–17)
IMM GRANULOCYTES # BLD AUTO: 0.03 THOUSAND/UL (ref 0–0.2)
IMM GRANULOCYTES NFR BLD AUTO: 1 % (ref 0–2)
LYMPHOCYTES # BLD AUTO: 1.14 THOUSANDS/ΜL (ref 0.6–4.47)
LYMPHOCYTES NFR BLD AUTO: 24 % (ref 14–44)
MCH RBC QN AUTO: 28.3 PG (ref 26.8–34.3)
MCHC RBC AUTO-ENTMCNC: 33.9 G/DL (ref 31.4–37.4)
MCV RBC AUTO: 84 FL (ref 82–98)
MONOCYTES # BLD AUTO: 0.71 THOUSAND/ΜL (ref 0.17–1.22)
MONOCYTES NFR BLD AUTO: 15 % (ref 4–12)
NEUTROPHILS # BLD AUTO: 2.63 THOUSANDS/ΜL (ref 1.85–7.62)
NEUTS SEG NFR BLD AUTO: 55 % (ref 43–75)
NRBC BLD AUTO-RTO: 0 /100 WBCS
PLATELET # BLD AUTO: 282 THOUSANDS/UL (ref 149–390)
PMV BLD AUTO: 9.1 FL (ref 8.9–12.7)
POTASSIUM SERPL-SCNC: 2.6 MMOL/L (ref 3.5–5.3)
PROT SERPL-MCNC: 6.6 G/DL (ref 6.4–8.4)
RBC # BLD AUTO: 3.6 MILLION/UL (ref 3.88–5.62)
SODIUM SERPL-SCNC: 134 MMOL/L (ref 135–147)
T4 FREE SERPL-MCNC: 1.21 NG/DL (ref 0.76–1.46)
TSH SERPL DL<=0.05 MIU/L-ACNC: 7.22 UIU/ML (ref 0.45–4.5)
WBC # BLD AUTO: 4.72 THOUSAND/UL (ref 4.31–10.16)

## 2022-06-23 PROCEDURE — 96365 THER/PROPH/DIAG IV INF INIT: CPT

## 2022-06-23 PROCEDURE — 96361 HYDRATE IV INFUSION ADD-ON: CPT

## 2022-06-23 PROCEDURE — 84443 ASSAY THYROID STIM HORMONE: CPT | Performed by: INTERNAL MEDICINE

## 2022-06-23 PROCEDURE — 84439 ASSAY OF FREE THYROXINE: CPT | Performed by: INTERNAL MEDICINE

## 2022-06-23 PROCEDURE — 96366 THER/PROPH/DIAG IV INF ADDON: CPT

## 2022-06-23 PROCEDURE — 85025 COMPLETE CBC W/AUTO DIFF WBC: CPT | Performed by: INTERNAL MEDICINE

## 2022-06-23 PROCEDURE — 80053 COMPREHEN METABOLIC PANEL: CPT | Performed by: INTERNAL MEDICINE

## 2022-06-23 RX ORDER — POTASSIUM CHLORIDE 14.9 MG/ML
20 INJECTION INTRAVENOUS ONCE
Status: CANCELLED
Start: 2022-06-24 | End: 2022-06-24

## 2022-06-23 RX ORDER — POTASSIUM CHLORIDE 14.9 MG/ML
20 INJECTION INTRAVENOUS ONCE
Status: CANCELLED | OUTPATIENT
Start: 2022-06-24

## 2022-06-23 RX ORDER — POTASSIUM CHLORIDE 14.9 MG/ML
20 INJECTION INTRAVENOUS ONCE
Status: CANCELLED
Start: 2022-06-23 | End: 2022-06-23

## 2022-06-23 RX ORDER — POTASSIUM CHLORIDE 14.9 MG/ML
20 INJECTION INTRAVENOUS ONCE
Status: CANCELLED
Start: 2022-06-28 | End: 2022-06-28

## 2022-06-23 RX ORDER — POTASSIUM CHLORIDE 14.9 MG/ML
20 INJECTION INTRAVENOUS ONCE
Status: COMPLETED | OUTPATIENT
Start: 2022-06-23 | End: 2022-06-23

## 2022-06-23 RX ADMIN — SODIUM CHLORIDE 1000 ML: 0.9 INJECTION, SOLUTION INTRAVENOUS at 13:20

## 2022-06-23 RX ADMIN — POTASSIUM CHLORIDE 20 MEQ: 14.9 INJECTION, SOLUTION INTRAVENOUS at 14:55

## 2022-06-23 NOTE — TELEPHONE ENCOUNTER
Infusion RN will inform patient that a potassium script has been sent to his pharmacy and to follow up with PCP regarding potassium and thyroid studies

## 2022-06-23 NOTE — PROGRESS NOTES
Patient tolerated IV hydration and 20 meq Potassium  Infusion without issue  Patient opted to keep port accessed as he will return tomorrow for another 20 meq of potassium  Chlorhexidine dressing applied    Appointment time reviewed with patient, he declined AVS

## 2022-06-24 ENCOUNTER — TELEPHONE (OUTPATIENT)
Dept: HEMATOLOGY ONCOLOGY | Facility: CLINIC | Age: 72
End: 2022-06-24

## 2022-06-24 ENCOUNTER — TELEPHONE (OUTPATIENT)
Dept: HEMATOLOGY ONCOLOGY | Facility: MEDICAL CENTER | Age: 72
End: 2022-06-24

## 2022-06-24 ENCOUNTER — HOSPITAL ENCOUNTER (OUTPATIENT)
Dept: INFUSION CENTER | Facility: CLINIC | Age: 72
Discharge: HOME | End: 2022-06-24
Payer: COMMERCIAL

## 2022-06-24 VITALS
SYSTOLIC BLOOD PRESSURE: 129 MMHG | TEMPERATURE: 97.7 F | OXYGEN SATURATION: 98 % | DIASTOLIC BLOOD PRESSURE: 75 MMHG | RESPIRATION RATE: 18 BRPM | HEART RATE: 65 BPM

## 2022-06-24 DIAGNOSIS — T45.1X5A CHEMOTHERAPY-INDUCED NAUSEA: ICD-10-CM

## 2022-06-24 DIAGNOSIS — E87.6 HYPOPOTASSEMIA: Primary | ICD-10-CM

## 2022-06-24 DIAGNOSIS — R11.0 CHEMOTHERAPY-INDUCED NAUSEA: ICD-10-CM

## 2022-06-24 PROCEDURE — 96365 THER/PROPH/DIAG IV INF INIT: CPT

## 2022-06-24 PROCEDURE — 96366 THER/PROPH/DIAG IV INF ADDON: CPT

## 2022-06-24 RX ORDER — POTASSIUM CHLORIDE 14.9 MG/ML
20 INJECTION INTRAVENOUS ONCE
Status: CANCELLED | OUTPATIENT
Start: 2022-06-24

## 2022-06-24 RX ORDER — PROCHLORPERAZINE MALEATE 5 MG/1
5 TABLET ORAL EVERY 6 HOURS PRN
Qty: 30 TABLET | Refills: 2 | Status: SHIPPED | OUTPATIENT
Start: 2022-06-24

## 2022-06-24 RX ORDER — ONDANSETRON 4 MG/1
8 TABLET, ORALLY DISINTEGRATING ORAL EVERY 8 HOURS PRN
Qty: 90 TABLET | Refills: 0 | Status: SHIPPED | OUTPATIENT
Start: 2022-06-24

## 2022-06-24 RX ORDER — POTASSIUM CHLORIDE 14.9 MG/ML
20 INJECTION INTRAVENOUS ONCE
Status: COMPLETED | OUTPATIENT
Start: 2022-06-24 | End: 2022-06-24

## 2022-06-24 RX ORDER — POTASSIUM CHLORIDE 750 MG/1
10 TABLET, EXTENDED RELEASE ORAL 2 TIMES DAILY
Qty: 30 TABLET | Refills: 0 | Status: SHIPPED | OUTPATIENT
Start: 2022-06-24 | End: 2022-07-18 | Stop reason: SDUPTHER

## 2022-06-24 RX ADMIN — POTASSIUM CHLORIDE 20 MEQ: 200 INJECTION, SOLUTION INTRAVENOUS at 07:42

## 2022-06-24 NOTE — PROGRESS NOTES
Patient arrives to infusion center for 20 meq potassium replacement today  Patient reports feels improvement since 1st bag potasium yesterday  VSS, L PAC remained accessed from yesterday, brisk blood return noted, port flushed easily without resistance  Potassium infusing as per orders  Patient aware plan to draw STAT CMP Monday prior to treatment  Patient aware that potassium was called into pharmacy - infusion RN to contact pharmacy to ensure script ready  Patient resting on recliner chair, call bell within reach  Spoke with [facility] - no KCL script called in since 6/9  Potassium scipt noted in "pending medications " Spoke with Jose Alfredo Hogan via [employer] - will call potassium script in to [facility] pharmacy for patient  Patient aware  Patient aware per Zuri Morris RN = start potassium script tomorrow 10 meq daily  Infusion RN confirmed with [facility] that script was definitely called in

## 2022-06-24 NOTE — PROGRESS NOTES
Patient tolerated treatment without incident  Port flush, good blood return noted, saline locked and de accessed  Pt aware to start oral potassium tomorrow  Copy of AVS provided

## 2022-06-24 NOTE — TELEPHONE ENCOUNTER
CALL RETURN FORM   Reason for patient call? Patient's daughter Brendan Aquino is calling because she states they are having scheduling conflict with the appt on  6/27 and Is requesting to talk with Santana Torrez   Patient's primary oncologist? Dr Majorie Goltz   Name of person the patient was calling for? Vladimir   Any additional information to add, if applicable? Best call back number is [phone]   Informed patient that the message will be forwarded to the team and someone will get back to them as soon as possible    Did you relay this information to the patient?  yes

## 2022-06-24 NOTE — TELEPHONE ENCOUNTER
Returned call to pt daughter  Advised potassium rx was sent to pt [facility] pharmacy from Dr Bartolome Hanna and to take that  She called originally due to appt conflict has ENT appt at 1pm on Monday same day as chemo treatment  Was able to get apt pushed back for treatment

## 2022-06-24 NOTE — PROGRESS NOTES
Received call from lab patient had critical potassium of 2 6  Spoke to [employer] in med onc office  Patient to receive 20 meq potassium today with IV hydration and 20 meq potassium tomorrow  10 meq potassium pills to be ordered to patients pharmacy to start Saturday 6/25  Made patient aware of plan he is agreeable  Patient also to follow up with PCP regarding potassium and thyroid studies, he is aware to do this  Patient also aware of repeat CMP to be drawn on Monday prior to chemo treatment and we will need to wait for results before starting treatment  Appointments adjusted for patient and AVS provided

## 2022-06-27 ENCOUNTER — TELEPHONE (OUTPATIENT)
Dept: HEMATOLOGY ONCOLOGY | Facility: HOSPITAL | Age: 72
End: 2022-06-27

## 2022-06-27 ENCOUNTER — HOSPITAL ENCOUNTER (OUTPATIENT)
Dept: INFUSION CENTER | Facility: CLINIC | Age: 72
Discharge: HOME | End: 2022-06-27
Payer: COMMERCIAL

## 2022-06-27 ENCOUNTER — TELEPHONE (OUTPATIENT)
Dept: NEPHROLOGY | Facility: CLINIC | Age: 72
End: 2022-06-27

## 2022-06-27 VITALS
RESPIRATION RATE: 18 BRPM | HEIGHT: 67 IN | HEART RATE: 59 BPM | OXYGEN SATURATION: 99 % | DIASTOLIC BLOOD PRESSURE: 66 MMHG | SYSTOLIC BLOOD PRESSURE: 110 MMHG | TEMPERATURE: 96.8 F | WEIGHT: 141 LBS | BODY MASS INDEX: 22.13 KG/M2

## 2022-06-27 DIAGNOSIS — C16.9 GASTRIC ADENOCARCINOMA (HCC): Primary | ICD-10-CM

## 2022-06-27 DIAGNOSIS — E87.6 HYPOKALEMIA: ICD-10-CM

## 2022-06-27 DIAGNOSIS — E03.9 HYPOTHYROIDISM, UNSPECIFIED TYPE: ICD-10-CM

## 2022-06-27 DIAGNOSIS — E87.6 HYPOKALEMIA: Primary | ICD-10-CM

## 2022-06-27 LAB
ALBUMIN SERPL BCP-MCNC: 3.5 G/DL (ref 3.5–5)
ALP SERPL-CCNC: 120 U/L (ref 34–104)
ALT SERPL W P-5'-P-CCNC: 13 U/L (ref 7–52)
ANION GAP SERPL CALCULATED.3IONS-SCNC: 9 MMOL/L (ref 4–13)
AST SERPL W P-5'-P-CCNC: 17 U/L (ref 13–39)
BILIRUB SERPL-MCNC: 0.54 MG/DL (ref 0.2–1)
BUN SERPL-MCNC: 24 MG/DL (ref 5–25)
CALCIUM SERPL-MCNC: 8.6 MG/DL (ref 8.4–10.2)
CHLORIDE SERPL-SCNC: 106 MMOL/L (ref 96–108)
CO2 SERPL-SCNC: 22 MMOL/L (ref 21–32)
CREAT SERPL-MCNC: 1.52 MG/DL (ref 0.6–1.3)
GFR SERPL CREATININE-BSD FRML MDRD: 45 ML/MIN/1.73SQ M
GLUCOSE SERPL-MCNC: 157 MG/DL (ref 65–140)
POTASSIUM SERPL-SCNC: 2.6 MMOL/L (ref 3.5–5.3)
PROT SERPL-MCNC: 6.1 G/DL (ref 6.4–8.4)
SODIUM SERPL-SCNC: 137 MMOL/L (ref 135–147)

## 2022-06-27 PROCEDURE — 84100 ASSAY OF PHOSPHORUS: CPT

## 2022-06-27 PROCEDURE — 96413 CHEMO IV INFUSION 1 HR: CPT

## 2022-06-27 PROCEDURE — 83735 ASSAY OF MAGNESIUM: CPT

## 2022-06-27 PROCEDURE — 96417 CHEMO IV INFUS EACH ADDL SEQ: CPT

## 2022-06-27 PROCEDURE — G0498 CHEMO EXTEND IV INFUS W/PUMP: HCPCS

## 2022-06-27 PROCEDURE — 96367 TX/PROPH/DG ADDL SEQ IV INF: CPT

## 2022-06-27 PROCEDURE — 96415 CHEMO IV INFUSION ADDL HR: CPT

## 2022-06-27 PROCEDURE — 96375 TX/PRO/DX INJ NEW DRUG ADDON: CPT

## 2022-06-27 PROCEDURE — 80053 COMPREHEN METABOLIC PANEL: CPT | Performed by: INTERNAL MEDICINE

## 2022-06-27 RX ORDER — DEXTROSE MONOHYDRATE 50 MG/ML
20 INJECTION, SOLUTION INTRAVENOUS ONCE
Status: COMPLETED | OUTPATIENT
Start: 2022-06-27 | End: 2022-06-27

## 2022-06-27 RX ORDER — SODIUM CHLORIDE 9 MG/ML
20 INJECTION, SOLUTION INTRAVENOUS ONCE AS NEEDED
Status: DISCONTINUED | OUTPATIENT
Start: 2022-06-27 | End: 2022-06-30 | Stop reason: HOSPADM

## 2022-06-27 RX ORDER — POTASSIUM CHLORIDE 20 MEQ/1
40 TABLET, EXTENDED RELEASE ORAL ONCE
Status: CANCELLED
Start: 2022-06-27 | End: 2022-06-27

## 2022-06-27 RX ORDER — POTASSIUM CHLORIDE 20 MEQ/1
40 TABLET, EXTENDED RELEASE ORAL ONCE
Status: COMPLETED | OUTPATIENT
Start: 2022-06-27 | End: 2022-06-27

## 2022-06-27 RX ORDER — PALONOSETRON 0.05 MG/ML
0.25 INJECTION, SOLUTION INTRAVENOUS ONCE
Status: COMPLETED | OUTPATIENT
Start: 2022-06-27 | End: 2022-06-27

## 2022-06-27 RX ADMIN — DEXTROSE 20 ML/HR: 5 SOLUTION INTRAVENOUS at 11:10

## 2022-06-27 RX ADMIN — SODIUM CHLORIDE 240 MG: 9 INJECTION, SOLUTION INTRAVENOUS at 10:25

## 2022-06-27 RX ADMIN — FOSAPREPITANT 150 MG: 150 INJECTION, POWDER, LYOPHILIZED, FOR SOLUTION INTRAVENOUS at 09:48

## 2022-06-27 RX ADMIN — POTASSIUM CHLORIDE 40 MEQ: 1500 TABLET, EXTENDED RELEASE ORAL at 09:48

## 2022-06-27 RX ADMIN — OXALIPLATIN 113.75 MG: 5 INJECTION, SOLUTION INTRAVENOUS at 11:12

## 2022-06-27 RX ADMIN — SODIUM CHLORIDE 20 ML/HR: 0.9 INJECTION, SOLUTION INTRAVENOUS at 08:00

## 2022-06-27 RX ADMIN — DEXAMETHASONE SODIUM PHOSPHATE 10 MG: 10 INJECTION, SOLUTION INTRAMUSCULAR; INTRAVENOUS at 09:25

## 2022-06-27 RX ADMIN — PALONOSETRON HYDROCHLORIDE 0.25 MG: 0.25 INJECTION INTRAVENOUS at 09:34

## 2022-06-27 NOTE — PROGRESS NOTES
Pt here for mFolfox  Pt needs repeat CMP drawn prior to beginning treatment; CMP drawn with [facility] stable  Callbell within reach

## 2022-06-27 NOTE — PROGRESS NOTES
Called  And left a detailed vm for pt to cb with any further questions   Advised to increase PO K and have cmp repeated by Thursday per Dr Gabriel Gonsalez

## 2022-06-27 NOTE — TELEPHONE ENCOUNTER
New Patient Intake Form   Patient Details   Lynn Onofre     1950     [id number]     Appointment Information   Who is calling to schedule? If not patient, what is callers name? Physician Office    Referring Provider  Dr Jose Mcnamara   Referring Provider Number [phone]   Reason for Appt (Diagnosis) hypokalemia   Is patient aware of why they are being referred? yes   Does Patient have labs done at [facility]? If not, where do they go? yes    Has patient had labs / urine work done? List date of most recent lab / urine work yes    Has patient had a BMP & CBC done in the past 2 years? If so, list the date yes    Has patient been hospitalized recently? If yes, list name and location of hospital they were in Yes - [address] [place] [address]   Has patient been seen by a Nephrologist before? If yes, list name, location and phone number  no   Has patient been see by another Specialty before (ex  Neurology, urology, cardiology)? If yes, please list name, and specialty  hematology, urology, gastroenterology   Has the patient had imaging done? If so, list the most recent date and type of imaging yes - CT renal, Renal US    Does the patient has a stone analysis report if history of kidney stones? no   Appointment Details   Is there a referral on file?  yes    Appointment Date  09/06/2022   Location [place]    Miscellaneous

## 2022-06-27 NOTE — PROGRESS NOTES
Potassium still 2 6 on recheck  Per Dr Rafa Espinoza, patient to receive 40 meq po  Treatment plan updated   Cosign sent to Dr Rafa Espinoza

## 2022-06-27 NOTE — PROGRESS NOTES
Pt's K resulted at 2 6, which is the same as it was on last draw  Pt affirms taking K at home daily  Pt denies vomiting, diarrhea  Pt has no complaints outside of constipation, fatigue and weakness  Spoke to [employer] in Dr Tom Barragan office  Per Ti Haas, pt is okay for treatment today  Pt ordered an additional 40 meq K while here and will get CMP checked on Wednesday with his Elastomeric Pump d/c  Per Blanca, dose of home K will not be changed  Pt agreeable

## 2022-06-27 NOTE — PROGRESS NOTES
Pt tolerated treatment well  Elastomeric pump connected after two RN check  Pt declined AVS  Pt aware to return Wednesday at 1130 for disconnect and repeat CMP

## 2022-06-27 NOTE — TELEPHONE ENCOUNTER
Called patient and spoke to patient's daughter Jojo Mckee confirmed the Nephrology appt date time and location and I let Jojo Mckee know patient will be on a cancellation list   Jojo Mckee confirmed

## 2022-06-28 ENCOUNTER — TELEPHONE (OUTPATIENT)
Dept: NEPHROLOGY | Facility: CLINIC | Age: 72
End: 2022-06-28

## 2022-06-28 ENCOUNTER — OFFICE VISIT (OUTPATIENT)
Dept: ENDOCRINOLOGY | Facility: CLINIC | Age: 72
End: 2022-06-28
Payer: COMMERCIAL

## 2022-06-28 VITALS
SYSTOLIC BLOOD PRESSURE: 110 MMHG | HEIGHT: 67 IN | BODY MASS INDEX: 22.73 KG/M2 | DIASTOLIC BLOOD PRESSURE: 60 MMHG | HEART RATE: 68 BPM | WEIGHT: 144.8 LBS

## 2022-06-28 DIAGNOSIS — E03.9 HYPOTHYROIDISM, UNSPECIFIED TYPE: ICD-10-CM

## 2022-06-28 DIAGNOSIS — E87.6 HYPOKALEMIA: ICD-10-CM

## 2022-06-28 DIAGNOSIS — I10 PRIMARY HYPERTENSION: Primary | ICD-10-CM

## 2022-06-28 DIAGNOSIS — R64 CACHEXIA (HCC): ICD-10-CM

## 2022-06-28 DIAGNOSIS — E89.0 POSTABLATIVE HYPOTHYROIDISM: Primary | ICD-10-CM

## 2022-06-28 LAB
MAGNESIUM SERPL-MCNC: 1.8 MG/DL (ref 1.9–2.7)
PHOSPHATE SERPL-MCNC: 3.2 MG/DL (ref 2.3–4.1)

## 2022-06-28 PROCEDURE — 1036F TOBACCO NON-USER: CPT | Performed by: INTERNAL MEDICINE

## 2022-06-28 PROCEDURE — 3008F BODY MASS INDEX DOCD: CPT | Performed by: INTERNAL MEDICINE

## 2022-06-28 PROCEDURE — 99244 OFF/OP CNSLTJ NEW/EST MOD 40: CPT | Performed by: INTERNAL MEDICINE

## 2022-06-28 PROCEDURE — 1160F RVW MEDS BY RX/DR IN RCRD: CPT | Performed by: INTERNAL MEDICINE

## 2022-06-28 NOTE — TELEPHONE ENCOUNTER
----- Message from David Gay MD sent at 6/28/2022 11:55 AM EDT -----  Piero mitchell,  On this pt, prior to Dr Rachana Macias seeing him, can you please order the following tests:  # Urinalysis  # Urine eosinophils  # BMP  # Magnesium  # Urine for: Creatinine/ Potassium/ Magnesium and sodium    Please order today to be done tomorrow   Thanks

## 2022-06-28 NOTE — PROGRESS NOTES
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, omentum, pulmonary and retroperitoneum  He is currently on chemotherapy administered by Dr Madeleine Carpenter  Other active medical issues are severe hypokalemia, UTI and renal impairment  Recent thyroid functions showed elevated TSH of 7  2uIU/mL with a fT4 1 2ng/dL  CBC and albumin are OK  Thyroid Hx: Hyperthyroidism was diagnosed in 1988, initially treated with oral agents followed by XIONG ablation  He has been on replacement since  Dose stable for decades now    Family Hx of thyroid: no    Patient Active Problem List   Diagnosis    Hypertension    Hypothyroid    Depression    BPH (benign prostatic hyperplasia)    COVID-19    Inflammatory arthritis    Rectus diastasis    Acute calculous cholecystitis    Gastric mass    Gastric adenocarcinoma ([facility] [address]    Encounter for central line care    FTT (failure to thrive) in adult    Stomach cancer ([facility] [address] [facility]    Cancer related pain    Moderate protein-calorie malnutrition ([facility] [address]    Palliative care patient    Benign prostatic hyperplasia with urinary frequency    Acute, Complicated UTI with Acute Kidney Injury     Rash    Constipation    Dehydration    Hypopotassemia     Past Medical History:   Diagnosis Date    Arthritis     shoulders    Cancer ([facility] [address] [facility]     gastric mass    COVID 01/2021 jan 2021- no hospitalization    Disease of thyroid gland     had radioactive iodine in past    GERD (gastroesophageal reflux disease)     History of GI diverticular bleed     Hyperlipidemia     Hypertension     on no meds at present    Kidney stone     Port-A-Cath in place     S/P percutaneous endoscopic gastrostomy (PEG) tube placement ([facility] [address] [facility]     Tinnitus     Wears glasses       Past Surgical History:   Procedure Laterality Date    ADENOIDECTOMY      CHOLECYSTECTOMY LAPAROSCOPIC N/A 1/24/2022    Procedure: CHOLECYSTECTOMY LAPAROSCOPIC W/ INTRAOP CHOLANGIOGRAM;  Surgeon: Dennise Laureano DO;  [facility];  Service: General    COLONOSCOPY N/A 7/16/2016    Procedure: COLONOSCOPY;  Surgeon: Inocencio Valdez MD;  [facility];  Service:     FL CHOLANGIOGRAM TRANSHEPATIC  1/24/2022    FL GUIDED CENTRAL VENOUS ACCESS DEVICE INSERTION  2/11/2022    HERNIA REPAIR      NC LAP,PROSTATECTOMY,RADICAL,W/NERVE SPARE,INCL ROBOTIC N/A 4/27/2022    Procedure: PROSTATECTOMY SIMPLE LAPAROSCOPIC  W ROBOTICS, BLADDER NECK RECONSTRUCTION;  Surgeon: Reta Velasquez MD;  [facility];  Service: Urology    PROSTATE BIOPSY      TONSILLECTOMY      TUNNELED VENOUS PORT PLACEMENT N/A 2/11/2022    Procedure: INSERTION VENOUS PORT (PORT-A-CATH); Surgeon: Yenny Otero MD;  [facility];  Service: Surgical Oncology    UVULECTOMY        History reviewed  No pertinent family history  Social History     Tobacco Use    Smoking status: Never Smoker    Smokeless tobacco: Never Used   Substance Use Topics    Alcohol use: Yes     Comment: social     No Known Allergies      Review of Systems   Constitutional: Positive for activity change, appetite change and fatigue  HENT: Negative  Eyes: Negative  Respiratory: Negative  Cardiovascular: Negative for chest pain  Gastrointestinal: Negative  Endocrine: Negative  Genitourinary: Negative  Musculoskeletal: Negative  Skin: Negative  Allergic/Immunologic: Negative  Neurological: Negative  Hematological: Negative  Psychiatric/Behavioral: Negative  All other systems reviewed and are negative          Current Outpatient Medications:     bisacodyl (DULCOLAX) 5 mg EC tablet, Take 1 tablet (5 mg total) by mouth daily as needed for constipation Related to pain medicines, Disp: 30 tablet, Rfl: 0    dronabinol (MARINOL) 5 MG capsule, Take 1 capsule (5 mg total) by mouth 2 (two) times a day before meals, Disp: 60 capsule, Rfl: 0    fluorouracil 4,200 mg in CADD/Elastomeric Infusion Device, Infuse 4,200 mg (1,200 mg/m2/day x 1 75 m2) into a venous catheter over 46 hours for 2 days  Do not start before June 27, 2022 , Disp: 1 each, Rfl: 0    levocetirizine (XYZAL) 5 MG tablet, Take 5 mg by mouth in the morning, Disp: , Rfl:     levothyroxine 125 mcg tablet, Take 125 mcg by mouth every morning  , Disp: , Rfl:     neomycin-bacitracin-polymyxin b (NEOSPORIN) ointment, Apply to the tip of the penis twice daily as needed for catheter irritation  , Disp: 15 g, Rfl: 0    ondansetron (Zofran ODT) 4 mg disintegrating tablet, Take 2 tablets (8 mg total) by mouth every 8 (eight) hours as needed for nausea or vomiting for up to 90 doses, Disp: 90 tablet, Rfl: 0    oxyCODONE (OxyCONTIN) 10 mg 12 hr tablet, Take 1 tablet (10 mg total) by mouth every 8 (eight) hours Max Daily Amount: 30 mg, Disp: 90 tablet, Rfl: 0    oxyCODONE (ROXICODONE) 5 mg/5 mL solution, Take 10-20mg PO Q4H PRN Moderate-severe pain, Disp: 473 mL, Rfl: 0    pantoprazole (PROTONIX) 40 mg tablet, Take 1 tablet (40 mg total) by mouth 2 (two) times a day Before breakfast and before bed to prevent acid accumulation  , Disp: 60 tablet, Rfl: 0    potassium chloride (K-DUR,KLOR-CON) 10 mEq tablet, Take 1 tablet (10 mEq total) by mouth 2 (two) times a day, Disp: 30 tablet, Rfl: 0    prochlorperazine (COMPAZINE) 5 mg tablet, Take 1 tablet (5 mg total) by mouth every 6 (six) hours as needed for nausea or vomiting, Disp: 30 tablet, Rfl: 2    saliva substitute (MOUTH KOTE), Apply 5 sprays to the mouth or throat 4 (four) times a day as needed (dry mouth), Disp: 59 mL, Rfl: 0    potassium chloride (MICRO-K) 10 MEQ CR capsule, Take 1 capsule (10 mEq total) by mouth in the morning for 5 days (Patient not taking: Reported on 6/28/2022), Disp: 5 capsule, Rfl: 0  No current facility-administered medications for this visit  Facility-Administered Medications Ordered in Other Visits:     sodium chloride 0 9 % infusion, 20 mL/hr, Intravenous, Once PRN, Kellie Hernandez MD, Stopped at 06/27/22 1109    Physical Exam:  Body mass index is 22 68 kg/m²  /60   Pulse 68   Ht 5' 7" (1 702 m)   Wt 65 7 kg (144 lb 12 8 oz)   BMI 22 68 kg/m²        Physical Exam  Constitutional:       Appearance: He is well-developed  HENT:      Head: Normocephalic  Eyes:      Pupils: Pupils are equal, round, and reactive to light  Neck:      Thyroid: No thyromegaly  Comments: No palpable thyroid  Cardiovascular:      Rate and Rhythm: Normal rate  Heart sounds: Normal heart sounds  Pulmonary:      Effort: Pulmonary effort is normal       Breath sounds: Normal breath sounds  Abdominal:      General: Bowel sounds are normal       Palpations: Abdomen is soft  Musculoskeletal:         General: No deformity  Cervical back: Normal range of motion  Skin:     General: Skin is dry  Capillary Refill: Capillary refill takes less than 2 seconds  Coloration: Skin is not pale  Findings: No rash  Neurological:      Mental Status: He is alert and oriented to person, place, and time           Labs:     Lab Results   Component Value Date    IAW1KYJIVSGJ 7 224 (H) 06/23/2022       Lab Results   Component Value Date    CREATININE 1 52 (H) 06/27/2022    CREATININE 1 70 (H) 06/23/2022    CREATININE 1 77 (H) 06/09/2022    BUN 24 06/27/2022    K 2 6 (LL) 06/27/2022     06/27/2022    CO2 22 06/27/2022     eGFR   Date Value Ref Range Status   06/27/2022 45 ml/min/1 73sq m Final       Lab Results   Component Value Date    ALT 13 06/27/2022    AST 17 06/27/2022    ALKPHOS 120 (H) 06/27/2022       Lab Results   Component Value Date    CHOLESTEROL 148 08/11/2021    CHOLESTEROL 255 (H) 03/08/2021     Lab Results   Component Value Date    HDL 52 08/11/2021    HDL 45 03/08/2021     Lab Results   Component Value Date    TRIG 121 08/11/2021    TRIG 130 03/08/2021     Lab Results   Component Value Date    Emma Higgins 08/11/2021         Impression:  1  Postablative hypothyroidism    2  Hypothyroidism, unspecified type    3  Hypokalemia    4  Cachexia ([address] )         Plan:    Diagnoses and all orders for this visit:    Postablative hypothyroidism  He seems overall clinically euthyroid  Biochemically, fT4 was 1 2ng/dL and TSH was 7  2uIU/mL  Today we discussed normal thyroid physiology and likely pathophysiology associated with cancer and chemotherapy  He has lost 70 lbs and I expect dose to reduce but he may also be absorbing less  Typically, we also expect TSH to be low with a non thyroid illness associated with increased rT3 production  For now, I recommended to monitor fT4 and total T3 levels with occasional TSH levels to monitor overall thyroid function and titrate therapy  Follow up in 6 months  -     Ambulatory Referral to Endocrinology  -     T4, free; Future  -     T3; Future  -     TSH, 3rd generation; Future  -     Thyroglobulin; Future    Hypokalemia  He does not report vomiting/diarrhea  He does drink water >2lit/day and has associated polyuria  No palpitations or arythmias  R/o pseudohypokalemia  Will check magnesium and phosphorus from yesterday's sample  From endocrine standpoint, he doesn't have hypertension associated with hypokalemia  No steroid use  No obvious acidosis/alkalosis  Note overtreatment with levothyroxine may contribute to hypokalemia but that's not the case here  Will have urine studies via nephrology soon  -     Magnesium; Future  -     Phosphorus; Future    Cachexia  From cancer and chemo  Lost 70 lbs  Encouraged regimented food intake including supplements  I have spent 65 minutes with patient today in which greater than 50% of this time was spent in counseling/coordination of care  Daughter updated at visit  All questioned fully answered   He will call me if any problems arise     Educated/ Counseled patient on diagnostic test results, prognosis, risk vs benefit of treatment options, importance of treatment compliance, healthy life and lifestyle choices        [address]

## 2022-06-29 ENCOUNTER — HOSPITAL ENCOUNTER (OUTPATIENT)
Dept: INFUSION CENTER | Facility: CLINIC | Age: 72
Discharge: HOME | End: 2022-06-29
Payer: COMMERCIAL

## 2022-06-29 DIAGNOSIS — I10 PRIMARY HYPERTENSION: ICD-10-CM

## 2022-06-29 DIAGNOSIS — I10 PRIMARY HYPERTENSION: Primary | ICD-10-CM

## 2022-06-29 DIAGNOSIS — E86.0 DEHYDRATION: ICD-10-CM

## 2022-06-29 DIAGNOSIS — E87.6 HYPOKALEMIA: ICD-10-CM

## 2022-06-29 DIAGNOSIS — C16.9 GASTRIC ADENOCARCINOMA (HCC): Primary | ICD-10-CM

## 2022-06-29 DIAGNOSIS — E87.6 HYPOKALEMIA: Primary | ICD-10-CM

## 2022-06-29 LAB
ANION GAP SERPL CALCULATED.3IONS-SCNC: 7 MMOL/L (ref 4–13)
BUN SERPL-MCNC: 35 MG/DL (ref 5–25)
CALCIUM SERPL-MCNC: 8.5 MG/DL (ref 8.4–10.2)
CHLORIDE SERPL-SCNC: 108 MMOL/L (ref 96–108)
CO2 SERPL-SCNC: 21 MMOL/L (ref 21–32)
CREAT SERPL-MCNC: 1.61 MG/DL (ref 0.6–1.3)
GFR SERPL CREATININE-BSD FRML MDRD: 42 ML/MIN/1.73SQ M
GLUCOSE SERPL-MCNC: 114 MG/DL (ref 65–140)
MAGNESIUM SERPL-MCNC: 1.8 MG/DL (ref 1.9–2.7)
POTASSIUM SERPL-SCNC: 3.3 MMOL/L (ref 3.5–5.3)
SODIUM SERPL-SCNC: 136 MMOL/L (ref 135–147)

## 2022-06-29 PROCEDURE — 80048 BASIC METABOLIC PNL TOTAL CA: CPT | Performed by: INTERNAL MEDICINE

## 2022-06-29 PROCEDURE — 83735 ASSAY OF MAGNESIUM: CPT | Performed by: INTERNAL MEDICINE

## 2022-06-29 NOTE — TELEPHONE ENCOUNTER
----- Message from Vu Correia MD sent at 6/29/2022  3:16 PM EDT -----  Please have the pt start slo mag 1 po twice daily and have him increase his potassium by 20 mEq daily for 5 days    Repeat BMP in one week with results to Dr Francisco Stack

## 2022-06-29 NOTE — TELEPHONE ENCOUNTER
I received a tiger text from Rocio Nolan advising to remind patient of getting labs done today while receiving his infusion  I called the patient but no answer so I left a detailed message for him to complete them today while at the infusion center  I spoke to Leilani Mason at the infusion center at [facility] and she put a note on his appointment desk as a reminder for the nurses to complete labs today

## 2022-06-29 NOTE — PLAN OF CARE
Problem: Potential for Falls  Goal: Patient will remain free of falls  Description: INTERVENTIONS:  - Educate patient/family on patient safety including physical limitations  - Instruct patient to call for assistance with activity   - Consult OT/PT to assist with strengthening/mobility   - Keep Call bell within reach  - Keep bed low and locked with side rails adjusted as appropriate  - Keep care items and personal belongings within reach  - Initiate and maintain comfort rounds  - Make Fall Risk Sign visible to staff  -- Apply yellow socks and bracelet for high fall risk patients  - Consider moving patient to room near nurses station  Outcome: Progressing     Problem: SAFETY ADULT  Goal: Patient will remain free of falls  Description: INTERVENTIONS:  - Educate patient/family on patient safety including physical limitations  - Instruct patient to call for assistance with activity   - Consult OT/PT to assist with strengthening/mobility   - Keep Call bell within reach  - Keep bed low and locked with side rails adjusted as appropriate  - Keep care items and personal belongings within reach  - Initiate and maintain comfort rounds  - Make Fall Risk Sign visible to staff  - Apply yellow socks and bracelet for high fall risk patients  - Consider moving patient to room near nurses station  Outcome: Progressing     Problem: Knowledge Deficit  Goal: Patient/family/caregiver demonstrates understanding of disease process, treatment plan, medications, and discharge instructions  Description: Complete learning assessment and assess knowledge base    Interventions:  - Provide teaching at level of understanding  - Provide teaching via preferred learning methods  Outcome: Progressing

## 2022-06-29 NOTE — TELEPHONE ENCOUNTER
Lm for the patient to begin slo mag 1 tab twice a day  - increase his potassium to 20 mEq daily for 5 days  and BMP to be done in 1 week

## 2022-06-29 NOTE — PROGRESS NOTES
Patient arrived for elastomeric pump d/c  Offers no complaints  Patient tolerated 46 hour infusion of 5FU without issues  Elastomeric pump d/c'd and blood work drawn ordered by Dr Maged Leroy  Port flushed per protocol  Urine studies also ordered by Dr Maged Leroy  Patient unable to provide specimen  Speci-cup given to patient  He is scheduled for hydration appointment on Friday and is aware to bring urine specimen with him that day   Patient verified appointment time on Friday and declined AVS

## 2022-06-30 RX ORDER — MAGNESIUM CHLORIDE 71.5 G/G
1 TABLET ORAL 2 TIMES DAILY
Qty: 60 TABLET | Refills: 5 | Status: SHIPPED | OUTPATIENT
Start: 2022-06-30

## 2022-07-01 ENCOUNTER — HOSPITAL ENCOUNTER (OUTPATIENT)
Dept: INFUSION CENTER | Facility: CLINIC | Age: 72
Discharge: HOME | End: 2022-07-01
Payer: COMMERCIAL

## 2022-07-01 VITALS
HEART RATE: 59 BPM | SYSTOLIC BLOOD PRESSURE: 116 MMHG | DIASTOLIC BLOOD PRESSURE: 72 MMHG | OXYGEN SATURATION: 99 % | RESPIRATION RATE: 18 BRPM | TEMPERATURE: 97.1 F

## 2022-07-01 DIAGNOSIS — E87.6 HYPOKALEMIA: ICD-10-CM

## 2022-07-01 DIAGNOSIS — E86.0 DEHYDRATION: Primary | ICD-10-CM

## 2022-07-01 PROCEDURE — 96360 HYDRATION IV INFUSION INIT: CPT

## 2022-07-01 PROCEDURE — 36415 COLL VENOUS BLD VENIPUNCTURE: CPT

## 2022-07-01 PROCEDURE — 96361 HYDRATE IV INFUSION ADD-ON: CPT

## 2022-07-01 RX ADMIN — SODIUM CHLORIDE 1000 ML: 0.9 INJECTION, SOLUTION INTRAVENOUS at 11:50

## 2022-07-01 NOTE — PATIENT INSTRUCTIONS
July 2022 Sunday Monday Tuesday Wednesday Thursday Friday Saturday 1    INF THERAPY PLAN  11:30 AM   (180 min )   AN INF CHAIR [place] 2                3     4     5    FOLLOW UP PG   2:05 PM   (20 min )   Steven Mercado MD   [facility] [place] 6     7    INF BLOOD SPECIMENS-CENTRAL   9:30 AM   (60 min )   AN INF QUICK [place] 8     9                10     11    INF ONCOLOGY TX-TREATMENT PLAN   8:30 AM   (290 min )   [facility] INF CHAIR [place] 12     13    INF ONCOLOGY TX-TREATMENT PLAN  12:00 PM   (30 min )   AN INF QUICK CHAIR [place] 14    HOSP FOLLOW UP PG  12:45 PM   (40 min )   DO Katharine Garg 15     16        Cycle 8, Day 1        17     18    CONSULT PG   9:45 AM   (60 min )   Kaushik Welch MD   [facility] [place] 19     20     21    INF BLOOD SPECIMENS-CENTRAL   9:30 AM   (60 min )   AN INF QUICK CHAIR 01   [address] 22     23                24     25    INF ONCOLOGY TX-TREATMENT PLAN   8:30 AM   (290 min )   AN INF CHAIR [place] 26     27    INF ONCOLOGY TX-TREATMENT PLAN  12:00 PM   (30 min )   AN INF QUICK CHAIR [place] 28     29     30        Cycle 9, Day 1        31                                                     Treatment Details         7/11/2022 - Cycle 8, Day 1      Chemotherapy: NIVOLUMAB IVPB, NIVOLUMAB IVPB, ONCBCN PROVIDER COMMUNICATION5, OXALIPLATIN INFUSION      Take-Home Chemo: ONCBCN PROVIDER COMMUNICATION8, FLUOROURACIL AMBULATORY INFUSION    7/25/2022 - Cycle 9, Day 1      Chemotherapy: NIVOLUMAB IVPB, NIVOLUMAB IVPB, ONCBCN PROVIDER COMMUNICATION5, OXALIPLATIN INFUSION Take-Home Chemo: ONCBCN PROVIDER COMMUNICATION8, FLUOROURACIL AMBULATORY INFUSION

## 2022-07-01 NOTE — PROGRESS NOTES
Patient tolerated hydration without incident and was discharged post, AVS given, next appt 7/7/22 for labs in prep for next treatment

## 2022-07-05 ENCOUNTER — OFFICE VISIT (OUTPATIENT)
Dept: HEMATOLOGY ONCOLOGY | Facility: CLINIC | Age: 72
End: 2022-07-05
Payer: COMMERCIAL

## 2022-07-05 VITALS
WEIGHT: 137 LBS | RESPIRATION RATE: 14 BRPM | BODY MASS INDEX: 21.5 KG/M2 | SYSTOLIC BLOOD PRESSURE: 110 MMHG | TEMPERATURE: 97.6 F | OXYGEN SATURATION: 98 % | DIASTOLIC BLOOD PRESSURE: 64 MMHG | HEIGHT: 67 IN | HEART RATE: 74 BPM

## 2022-07-05 DIAGNOSIS — C16.9 MALIGNANT NEOPLASM OF STOMACH, UNSPECIFIED LOCATION (HCC): Primary | ICD-10-CM

## 2022-07-05 DIAGNOSIS — C16.9 GASTRIC ADENOCARCINOMA (HCC): Primary | ICD-10-CM

## 2022-07-05 DIAGNOSIS — C16.9 GASTRIC ADENOCARCINOMA (HCC): ICD-10-CM

## 2022-07-05 PROCEDURE — 99214 OFFICE O/P EST MOD 30 MIN: CPT | Performed by: INTERNAL MEDICINE

## 2022-07-05 RX ORDER — PALONOSETRON 0.05 MG/ML
0.25 INJECTION, SOLUTION INTRAVENOUS ONCE
Status: CANCELLED | OUTPATIENT
Start: 2022-07-25

## 2022-07-05 RX ORDER — DEXTROSE MONOHYDRATE 50 MG/ML
20 INJECTION, SOLUTION INTRAVENOUS ONCE
Status: CANCELLED | OUTPATIENT
Start: 2022-07-25

## 2022-07-05 RX ORDER — SODIUM CHLORIDE 9 MG/ML
20 INJECTION, SOLUTION INTRAVENOUS ONCE AS NEEDED
Status: CANCELLED | OUTPATIENT
Start: 2022-07-25

## 2022-07-05 RX ORDER — PALONOSETRON 0.05 MG/ML
0.25 INJECTION, SOLUTION INTRAVENOUS ONCE
Status: CANCELLED | OUTPATIENT
Start: 2022-07-11

## 2022-07-05 RX ORDER — SODIUM CHLORIDE 9 MG/ML
20 INJECTION, SOLUTION INTRAVENOUS ONCE AS NEEDED
Status: CANCELLED | OUTPATIENT
Start: 2022-07-11

## 2022-07-05 RX ORDER — DEXTROSE MONOHYDRATE 50 MG/ML
20 INJECTION, SOLUTION INTRAVENOUS ONCE
Status: CANCELLED | OUTPATIENT
Start: 2022-07-11

## 2022-07-05 NOTE — PROGRESS NOTES
Hematology/Oncology Outpatient Follow- up Note  Fidencio Skinner 67 y o  male MRN: @ Encounter: [id number]        Date:  7/5/2022    Presenting Complaint/Diagnosis :  Metastatic GE junction malignancy, HER2 negative, MSI stable, tumor mutation burden low    HPI:    77-year-old  male with benign prostatic hypertrophy, hypertension, degenerative arthritis, hypothyroidism, GERD had been complaining of epigastric pain with on a 30 lb weight loss over the past 2 months, loss of appetite, he had cholecystectomy without improvement of symptoms in January 2022 subsequently EGD on 02/02/2022 showed 2 cm hiatal hernia with friable mass involving more than half of the circumference with extension down into the cardia and the fundus for about 5 cm mild erythema in the antrum     Biopsy of the gastric fundus mass showed adenocarcinoma with small portion squamous component arising in high-grade dysplasia in a background mixed squamous and cardiac mucosa the majority of the tumor is adenocarcinoma with minor component of squamous differentiation  Mismatch repair protein is intact     HER2 is 0 by IHC     CT scan the chest abdomen pelvis showed infiltrating mass at the gastroesophageal junction sinan enlargement suspicious for metastatic disease in the mediastinum, retroperitoneum, omentum, subcentimeter bilateral pulmonary nodules     PET scan on 02/09/2022 showed FDG uptake in the gastroesophageal junction and proximal stomach, sinan disease in the chest, abdomen, periaortic, small bilateral pulmonary nodules, uptake in the right upper quadrant omental nodules, uptake along the lateral service of the right lobe of the liver for possible peritoneal disease along the liver surface      The patient was started on modified FOLFOX 6 with Opdivo every 2 weeks    Previous Hematologic/ Oncologic History:    Oncology History   Gastric adenocarcinoma ([facility] [address] )   2/8/2022 Initial Diagnosis    Gastric adenocarcinoma ([address] ) 2/21/2022 -  Chemotherapy    palonosetron (ALOXI), 0 25 mg, Intravenous, Once, 5 of 15 cycles  Administration: 0 25 mg (3/21/2022), 0 25 mg (4/4/2022), 0 25 mg (5/16/2022), 0 25 mg (6/13/2022), 0 25 mg (6/27/2022)  fosaprepitant (EMEND) IVPB, 150 mg, Intravenous, Once, 5 of 15 cycles  Administration: 150 mg (3/21/2022), 150 mg (4/4/2022), 150 mg (5/16/2022), 150 mg (6/13/2022), 150 mg (6/27/2022)  nivolumab (OPDIVO) IVPB, 240 mg (100 % of original dose 240 mg), Intravenous, Once, 7 of 17 cycles  Dose modification: 240 mg (original dose 240 mg, Cycle 1)  Administration: 240 mg (2/21/2022), 240 mg (3/7/2022), 240 mg (3/21/2022), 240 mg (4/4/2022), 240 mg (5/16/2022), 240 mg (6/13/2022), 240 mg (6/27/2022)  oxaliplatin (ELOXATIN) chemo infusion, 85 mg/m2 = 164 9 mg, Intravenous, Once, 7 of 17 cycles  Dose modification: 65 mg/m2 (original dose 85 mg/m2, Cycle 6, Reason: Other (Must fill in a comment), Comment: Elevated creatinine)  Administration: 164 9 mg (2/21/2022), 164 9 mg (3/7/2022), 164 9 mg (3/21/2022), 164 9 mg (4/4/2022), 158 1 mg (5/16/2022), 118 3 mg (6/13/2022), 113 75 mg (6/27/2022)  fluorouracil (ADRUCIL) ambulatory infusion Soln, 1,200 mg/m2/day = 4,655 mg, Intravenous, Over 46 hours, 7 of 17 cycles         Current Hematologic/ Oncologic Treatment:    Modified FOLFOX 6 with Opdivo  Oxaliplatin is dose reduced  Cycle 8  Is on the 11th of July  Interval History:    Patient returns for follow-up visit  He states he is doing slightly better  The dose reduction the chemotherapy has helped his performance status  He still has not been able to gain weight although he states the tincture of marijuana that he uses does help with his appetite and mood  He will work harder to increase his calorie intake  Denies any nausea or vomiting  Does have some constipation  He is on medication for this  ECOG performance status is a 1  The rest of his 14 point review of systems today was negative    He is due for imaging and I will order this  Test Results:    Imaging: No results found  Labs:   Lab Results   Component Value Date    WBC 4 72 06/23/2022    HGB 10 2 (L) 06/23/2022    HCT 30 1 (L) 06/23/2022    MCV 84 06/23/2022     06/23/2022     Lab Results   Component Value Date    K 3 3 (L) 06/29/2022     06/29/2022    CO2 21 06/29/2022    BUN 35 (H) 06/29/2022    CREATININE 1 61 (H) 06/29/2022    GLUF 97 02/18/2022    CALCIUM 8 5 06/29/2022    CORRECTEDCA 9 2 06/01/2022    AST 17 06/27/2022    ALT 13 06/27/2022    ALKPHOS 120 (H) 06/27/2022    EGFR 42 06/29/2022       Lab Results   Component Value Date    PSA 0 2 05/13/2022    PSA 7 2 (H) 03/08/2021       Lab Results   Component Value Date    IRON 28 (L) 03/03/2022    TIBC 254 03/03/2022    FERRITIN 429 (H) 03/03/2022       ROS: As stated in the history of present illness otherwise his 14 point review of systems today was negative        Active Problems:   Patient Active Problem List   Diagnosis    Hypertension    Postablative hypothyroidism    Depression    BPH (benign prostatic hyperplasia)    COVID-19    Inflammatory arthritis    Rectus diastasis    Acute calculous cholecystitis    Gastric mass    [facility]    Encounter for central line care    FTT (failure to thrive) in adult    Stomach cancer [facility] )    Cancer related pain    Moderate protein-calorie malnutrition ([facility] )    Palliative care patient    Benign prostatic hyperplasia with urinary frequency    Acute, Complicated UTI with Acute Kidney Injury     Rash    Constipation    Dehydration    Hypokalemia       Past Medical History:   Past Medical History:   Diagnosis Date    Arthritis     shoulders    Cancer ([facility] [address] )     gastric mass    COVID 01/2021 jan 2021- no hospitalization    Disease of thyroid gland     had radioactive iodine in past    GERD (gastroesophageal reflux disease)     History of GI diverticular bleed     Hyperlipidemia     Hypertension     on no meds at present    Kidney stone     Port-A-Cath in place     S/P percutaneous endoscopic gastrostomy (PEG) tube placement (HCC)     Tinnitus     Wears glasses        Surgical History:   Past Surgical History:   Procedure Laterality Date    ADENOIDECTOMY      CHOLECYSTECTOMY LAPAROSCOPIC N/A 1/24/2022    Procedure: CHOLECYSTECTOMY LAPAROSCOPIC W/ INTRAOP CHOLANGIOGRAM;  Surgeon: Sabiha Guillermo DO;  [facility];  Service: General    COLONOSCOPY N/A 7/16/2016    Procedure: COLONOSCOPY;  Surgeon: Evette Izquierdo MD;  [facility];  Service:     FL CHOLANGIOGRAM TRANSHEPATIC  1/24/2022    FL GUIDED CENTRAL VENOUS ACCESS DEVICE INSERTION  2/11/2022    HERNIA REPAIR      MT LAP,PROSTATECTOMY,RADICAL,W/NERVE SPARE,INCL ROBOTIC N/A 4/27/2022    Procedure: PROSTATECTOMY SIMPLE LAPAROSCOPIC  W ROBOTICS, BLADDER NECK RECONSTRUCTION;  Surgeon: Darleen Duarte MD;  [facility];  Service: Urology    PROSTATE BIOPSY      TONSILLECTOMY      TUNNELED VENOUS PORT PLACEMENT N/A 2/11/2022    Procedure: INSERTION VENOUS PORT (PORT-A-CATH); Surgeon: Georgie Hashimoto, MD;  [facility];  Service: Surgical Oncology    UVULECTOMY         Family History:  No family history on file  Cancer-related family history is not on file      Social History:   Social History     Socioeconomic History    Marital status: /Civil Union     Spouse name: Not on file    Number of children: Not on file    Years of education: Not on file    Highest education level: Not on file   Occupational History    Not on file   Tobacco Use    Smoking status: Never Smoker    Smokeless tobacco: Never Used   Vaping Use    Vaping Use: Never used   Substance and Sexual Activity    Alcohol use: Yes     Comment: social    Drug use: Never    Sexual activity: Not on file   Other Topics Concern    Not on file   Social History Narrative    Not on file     Social Determinants of Health Financial Resource Strain: Not on file   Food Insecurity: No Food Insecurity    Worried About Running Out of Food in the Last Year: Never true    Amrita of Food in the Last Year: Never true   Transportation Needs: No Transportation Needs    Lack of Transportation (Medical): No    Lack of Transportation (Non-Medical): No   Physical Activity: Not on file   Stress: Not on file   Social Connections: Not on file   Intimate Partner Violence: Not on file   Housing Stability: Low Risk     Unable to Pay for Housing in the Last Year: No    Number of Places Lived in the Last Year: 1    Unstable Housing in the Last Year: No       Current Medications:   Current Outpatient Medications   Medication Sig Dispense Refill    bisacodyl (DULCOLAX) 5 mg EC tablet Take 1 tablet (5 mg total) by mouth daily as needed for constipation Related to pain medicines 30 tablet 0    dronabinol (MARINOL) 5 MG capsule Take 1 capsule (5 mg total) by mouth 2 (two) times a day before meals 60 capsule 0    [START ON 7/11/2022] fluorouracil 4,200 mg in CADD/Elastomeric Infusion Device Infuse 4,200 mg (1,200 mg/m2/day x 1 75 m2) into a venous catheter over 46 hours for 2 days  Do not start before July 11, 2022  1 each 0    levocetirizine (XYZAL) 5 MG tablet Take 5 mg by mouth in the morning      levothyroxine 125 mcg tablet Take 125 mcg by mouth every morning        magnesium chloride-calcium (Slow-Mag) 71 5-119 mg Take 1 tablet by mouth 2 (two) times a day 60 tablet 5    neomycin-bacitracin-polymyxin b (NEOSPORIN) ointment Apply to the tip of the penis twice daily as needed for catheter irritation   15 g 0    Nutritional Supplements (jevity 1 5 tam) LIQD Tube Feeding with Oral Diet Jevity 1 5 Bolus 240 ml q4h Flush 50 cc water with each bolus 1000 mL 3    ondansetron (Zofran ODT) 4 mg disintegrating tablet Take 2 tablets (8 mg total) by mouth every 8 (eight) hours as needed for nausea or vomiting for up to 90 doses 90 tablet 0    oxyCODONE (OxyCONTIN) 10 mg 12 hr tablet Take 1 tablet (10 mg total) by mouth every 8 (eight) hours Max Daily Amount: 30 mg 90 tablet 0    oxyCODONE (ROXICODONE) 5 mg/5 mL solution Take 10-20mg PO Q4H PRN Moderate-severe pain 473 mL 0    pantoprazole (PROTONIX) 40 mg tablet Take 1 tablet (40 mg total) by mouth 2 (two) times a day Before breakfast and before bed to prevent acid accumulation  60 tablet 0    potassium chloride (K-DUR,KLOR-CON) 10 mEq tablet Take 1 tablet (10 mEq total) by mouth 2 (two) times a day 30 tablet 0    prochlorperazine (COMPAZINE) 5 mg tablet Take 1 tablet (5 mg total) by mouth every 6 (six) hours as needed for nausea or vomiting 30 tablet 2    saliva substitute (MOUTH KOTE) Apply 5 sprays to the mouth or throat 4 (four) times a day as needed (dry mouth) 59 mL 0    potassium chloride (MICRO-K) 10 MEQ CR capsule Take 1 capsule (10 mEq total) by mouth in the morning for 5 days (Patient not taking: No sig reported) 5 capsule 0     No current facility-administered medications for this visit  Allergies: No Known Allergies    Physical Exam:    Body surface area is 1 72 meters squared  Wt Readings from Last 3 Encounters:   07/05/22 62 1 kg (137 lb)   06/28/22 65 7 kg (144 lb 12 8 oz)   06/27/22 64 kg (141 lb)        Temp Readings from Last 3 Encounters:   07/05/22 97 6 °F (36 4 °C) (Temporal)   07/01/22 (!) 97 1 °F (36 2 °C) (Temporal)   06/27/22 (!) 96 8 °F (36 °C) (Temporal)        BP Readings from Last 3 Encounters:   07/05/22 110/64   07/01/22 116/72   06/28/22 110/60         Pulse Readings from Last 3 Encounters:   07/05/22 74   07/01/22 59   06/28/22 68         Physical Exam     Constitutional   General appearance: No acute distress, mildly cachectic  Eyes   Conjunctiva and lids: No swelling, erythema or discharge  Pupils and irises: Equal, round and reactive to light      Ears, Nose, Mouth, and Throat   External inspection of ears and nose: Normal     Nasal mucosa, septum, and turbinates: Normal without edema or erythema  Oropharynx: Normal with no erythema, edema, exudate or lesions  Pulmonary   Respiratory effort: No increased work of breathing or signs of respiratory distress  Auscultation of lungs: Clear to auscultation  Cardiovascular   Palpation of heart: Normal PMI, no thrills  Auscultation of heart: Normal rate and rhythm, normal S1 and S2, without murmurs  Examination of extremities for edema and/or varicosities: Normal     Carotid pulses: Normal     Abdomen   Abdomen: Non-tender, no masses  Liver and spleen: No hepatomegaly or splenomegaly  Lymphatic   Palpation of lymph nodes in neck: No lymphadenopathy  Musculoskeletal   Gait and station: Normal     Digits and nails: Normal without clubbing or cyanosis  Inspection/palpation of joints, bones, and muscles: Normal     Skin   Skin and subcutaneous tissue: Normal without rashes or lesions  Neurologic   Cranial nerves: Cranial nerves 2-12 intact  Sensation: No sensory loss  Psychiatric   Orientation to person, place, and time: Normal     Mood and affect: Normal         Assessment / Plan: 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 benign prostatic hypertrophy, hypertension, hypothyroidism and GERD was diagnosed with adenocarcinoma of the GE junction with extension into the fundus of the stomach when he presented with weight loss and epigastric pain   Biopsy confirmed adenocarcinoma which was HER2 negative   MMR was intact   Tumor mutation burden is low    MSI was stable   No other actionable mutations were detected   [place] testing did show he may be eligible for TAPUR clinical trial which may be open with [facility] which looks at mutations and treatment with FDA approved drugs for those mutations outside of standard of care  Ronnie Verdugo did have CDK 6 amplification and EGFR amplification for which they may have clinical trial if needed      The patient is currently on modified FOLFOX 6 with Opdivo  Opdivo was held for 1 cycle  It was then restarted  Patient's CT with renal protocol done in the end of May had shown retroperitoneal adenopathy had resolved possibly related to post treatment effect  He is due for imaging on the rest of his body which I will arrange  Chemotherapy has been dose reduced  I will see him back in 4-6 weeks with results of imaging and he will stay on his current regimen until then  If his CT scan shows stability or response we will discontinue oxaliplatin at that time  Goals and Barriers:  Current Goal:  Prolong Survival from gastroesophageal cancer  Barriers: None  Patient's Capacity to Self Care:  Patient able to self care  Portions of the record may have been created with voice recognition software  Occasional wrong word or "sound a like" substitutions may have occurred due to the inherent limitations of voice recognition software  Read the chart carefully and recognize, using context, where substitutions have occurred

## 2022-07-06 DIAGNOSIS — C16.9 GASTRIC ADENOCARCINOMA (HCC): Primary | ICD-10-CM

## 2022-07-07 ENCOUNTER — HOSPITAL ENCOUNTER (OUTPATIENT)
Dept: INFUSION CENTER | Facility: CLINIC | Age: 72
End: 2022-07-07

## 2022-07-07 ENCOUNTER — HOSPITAL ENCOUNTER (OUTPATIENT)
Dept: INFUSION CENTER | Facility: CLINIC | Age: 72
Discharge: HOME | End: 2022-07-07
Payer: COMMERCIAL

## 2022-07-07 VITALS
TEMPERATURE: 97.4 F | DIASTOLIC BLOOD PRESSURE: 68 MMHG | RESPIRATION RATE: 18 BRPM | SYSTOLIC BLOOD PRESSURE: 120 MMHG | OXYGEN SATURATION: 97 % | HEART RATE: 65 BPM

## 2022-07-07 DIAGNOSIS — E86.0 DEHYDRATION: Primary | ICD-10-CM

## 2022-07-07 DIAGNOSIS — C16.9 GASTRIC ADENOCARCINOMA (HCC): ICD-10-CM

## 2022-07-07 DIAGNOSIS — E87.6 HYPOKALEMIA: ICD-10-CM

## 2022-07-07 LAB
ALBUMIN SERPL BCP-MCNC: 3.5 G/DL (ref 3.5–5)
ALP SERPL-CCNC: 108 U/L (ref 34–104)
ALT SERPL W P-5'-P-CCNC: 14 U/L (ref 7–52)
ANION GAP SERPL CALCULATED.3IONS-SCNC: 6 MMOL/L (ref 4–13)
AST SERPL W P-5'-P-CCNC: 18 U/L (ref 13–39)
BASOPHILS # BLD AUTO: 0.01 THOUSANDS/ΜL (ref 0–0.1)
BASOPHILS NFR BLD AUTO: 0 % (ref 0–1)
BILIRUB SERPL-MCNC: 0.4 MG/DL (ref 0.2–1)
BUN SERPL-MCNC: 29 MG/DL (ref 5–25)
CALCIUM SERPL-MCNC: 8.5 MG/DL (ref 8.4–10.2)
CHLORIDE SERPL-SCNC: 109 MMOL/L (ref 96–108)
CO2 SERPL-SCNC: 21 MMOL/L (ref 21–32)
CREAT SERPL-MCNC: 1.34 MG/DL (ref 0.6–1.3)
EOSINOPHIL # BLD AUTO: 0.1 THOUSAND/ΜL (ref 0–0.61)
EOSINOPHIL NFR BLD AUTO: 2 % (ref 0–6)
ERYTHROCYTE [DISTWIDTH] IN BLOOD BY AUTOMATED COUNT: 16.8 % (ref 11.6–15.1)
GFR SERPL CREATININE-BSD FRML MDRD: 52 ML/MIN/1.73SQ M
GLUCOSE SERPL-MCNC: 103 MG/DL (ref 65–140)
HCT VFR BLD AUTO: 27.4 % (ref 36.5–49.3)
HGB BLD-MCNC: 9.4 G/DL (ref 12–17)
IMM GRANULOCYTES # BLD AUTO: 0.02 THOUSAND/UL (ref 0–0.2)
IMM GRANULOCYTES NFR BLD AUTO: 0 % (ref 0–2)
LYMPHOCYTES # BLD AUTO: 1.02 THOUSANDS/ΜL (ref 0.6–4.47)
LYMPHOCYTES NFR BLD AUTO: 21 % (ref 14–44)
MCH RBC QN AUTO: 29.4 PG (ref 26.8–34.3)
MCHC RBC AUTO-ENTMCNC: 34.3 G/DL (ref 31.4–37.4)
MCV RBC AUTO: 86 FL (ref 82–98)
MONOCYTES # BLD AUTO: 0.77 THOUSAND/ΜL (ref 0.17–1.22)
MONOCYTES NFR BLD AUTO: 16 % (ref 4–12)
NEUTROPHILS # BLD AUTO: 2.89 THOUSANDS/ΜL (ref 1.85–7.62)
NEUTS SEG NFR BLD AUTO: 61 % (ref 43–75)
NRBC BLD AUTO-RTO: 0 /100 WBCS
PLATELET # BLD AUTO: 247 THOUSANDS/UL (ref 149–390)
PMV BLD AUTO: 8.9 FL (ref 8.9–12.7)
POTASSIUM SERPL-SCNC: 3.4 MMOL/L (ref 3.5–5.3)
PROT SERPL-MCNC: 6.1 G/DL (ref 6.4–8.4)
RBC # BLD AUTO: 3.2 MILLION/UL (ref 3.88–5.62)
SODIUM SERPL-SCNC: 136 MMOL/L (ref 135–147)
T4 FREE SERPL-MCNC: 0.72 NG/DL (ref 0.76–1.46)
TSH SERPL DL<=0.05 MIU/L-ACNC: 5.38 UIU/ML (ref 0.45–4.5)
WBC # BLD AUTO: 4.81 THOUSAND/UL (ref 4.31–10.16)

## 2022-07-07 PROCEDURE — 85025 COMPLETE CBC W/AUTO DIFF WBC: CPT | Performed by: INTERNAL MEDICINE

## 2022-07-07 PROCEDURE — 96360 HYDRATION IV INFUSION INIT: CPT

## 2022-07-07 PROCEDURE — 80053 COMPREHEN METABOLIC PANEL: CPT | Performed by: INTERNAL MEDICINE

## 2022-07-07 PROCEDURE — 84439 ASSAY OF FREE THYROXINE: CPT | Performed by: INTERNAL MEDICINE

## 2022-07-07 PROCEDURE — 96361 HYDRATE IV INFUSION ADD-ON: CPT

## 2022-07-07 PROCEDURE — 84443 ASSAY THYROID STIM HORMONE: CPT | Performed by: INTERNAL MEDICINE

## 2022-07-07 RX ADMIN — SODIUM CHLORIDE 1000 ML: 0.9 INJECTION, SOLUTION INTRAVENOUS at 13:52

## 2022-07-07 NOTE — PROGRESS NOTES
Patient is here for hydration and central labs   He states he is feeling well and offers no complaints

## 2022-07-07 NOTE — PROGRESS NOTES
Patient tolerated his hydration without any adverse reactions   Next appointment confirmed and avs given

## 2022-07-11 ENCOUNTER — TELEPHONE (OUTPATIENT)
Dept: GASTROENTEROLOGY | Facility: CLINIC | Age: 72
End: 2022-07-11

## 2022-07-11 ENCOUNTER — HOSPITAL ENCOUNTER (OUTPATIENT)
Dept: INFUSION CENTER | Facility: CLINIC | Age: 72
Discharge: HOME | End: 2022-07-11
Payer: COMMERCIAL

## 2022-07-11 ENCOUNTER — PATIENT OUTREACH (OUTPATIENT)
Dept: HEMATOLOGY ONCOLOGY | Facility: CLINIC | Age: 72
End: 2022-07-11

## 2022-07-11 VITALS
HEART RATE: 60 BPM | RESPIRATION RATE: 18 BRPM | SYSTOLIC BLOOD PRESSURE: 116 MMHG | HEIGHT: 67 IN | OXYGEN SATURATION: 98 % | BODY MASS INDEX: 21.74 KG/M2 | TEMPERATURE: 97.8 F | DIASTOLIC BLOOD PRESSURE: 62 MMHG | WEIGHT: 138.5 LBS

## 2022-07-11 DIAGNOSIS — C16.9 GASTRIC ADENOCARCINOMA (HCC): Primary | ICD-10-CM

## 2022-07-11 PROCEDURE — 96413 CHEMO IV INFUSION 1 HR: CPT

## 2022-07-11 PROCEDURE — 96417 CHEMO IV INFUS EACH ADDL SEQ: CPT

## 2022-07-11 PROCEDURE — 96367 TX/PROPH/DG ADDL SEQ IV INF: CPT

## 2022-07-11 PROCEDURE — 96375 TX/PRO/DX INJ NEW DRUG ADDON: CPT

## 2022-07-11 PROCEDURE — 96415 CHEMO IV INFUSION ADDL HR: CPT

## 2022-07-11 PROCEDURE — G0498 CHEMO EXTEND IV INFUS W/PUMP: HCPCS

## 2022-07-11 RX ORDER — SODIUM CHLORIDE 9 MG/ML
20 INJECTION, SOLUTION INTRAVENOUS ONCE AS NEEDED
Status: DISCONTINUED | OUTPATIENT
Start: 2022-07-11 | End: 2022-07-14 | Stop reason: HOSPADM

## 2022-07-11 RX ORDER — DEXTROSE MONOHYDRATE 50 MG/ML
20 INJECTION, SOLUTION INTRAVENOUS ONCE
Status: COMPLETED | OUTPATIENT
Start: 2022-07-11 | End: 2022-07-11

## 2022-07-11 RX ORDER — PALONOSETRON 0.05 MG/ML
0.25 INJECTION, SOLUTION INTRAVENOUS ONCE
Status: COMPLETED | OUTPATIENT
Start: 2022-07-11 | End: 2022-07-11

## 2022-07-11 RX ADMIN — FOSAPREPITANT 150 MG: 150 INJECTION, POWDER, LYOPHILIZED, FOR SOLUTION INTRAVENOUS at 09:07

## 2022-07-11 RX ADMIN — OXALIPLATIN 111.8 MG: 5 INJECTION, SOLUTION INTRAVENOUS at 10:46

## 2022-07-11 RX ADMIN — SODIUM CHLORIDE 20 ML/HR: 0.9 INJECTION, SOLUTION INTRAVENOUS at 08:45

## 2022-07-11 RX ADMIN — PALONOSETRON 0.25 MG: 0.05 INJECTION, SOLUTION INTRAVENOUS at 09:53

## 2022-07-11 RX ADMIN — DEXTROSE 20 ML/HR: 5 SOLUTION INTRAVENOUS at 10:42

## 2022-07-11 RX ADMIN — SODIUM CHLORIDE 240 MG: 9 INJECTION, SOLUTION INTRAVENOUS at 10:00

## 2022-07-11 RX ADMIN — DEXAMETHASONE SODIUM PHOSPHATE 10 MG: 10 INJECTION, SOLUTION INTRAMUSCULAR; INTRAVENOUS at 08:46

## 2022-07-11 NOTE — TELEPHONE ENCOUNTER
----- Message from Rebekah Garcia sent at 7/11/2022  1:14 PM EDT -----  Regarding: Maegan Amador script  Telephone call from Ryan Malia, patients daughter, requesting a new script be faxed to [phone]  Patient is low on supplies  Please call with any questions or concerns      Thank you  Angie Green

## 2022-07-11 NOTE — PATIENT INSTRUCTIONS
I spoke with Dr Osito Phipps regarding your thyroid studies (this is expected with Opdivo)  He would like you to increase your synthroid (levothyroxine) from 125 mcg daily to 150 mcg daily  He called a new prescription into the [facility] for you

## 2022-07-11 NOTE — PROGRESS NOTES
Patient completed treatment today without issue  Port redressed as left lower corner was not adhering nicely to patient skin  Port remains with brisk blood return, flushed well  CADD connected without incident  Double checked with MP RN, clamps open  Patient aware of disconnect time on Wednesday  Reinforced increased dose of Synthroid  Patient AAOx4 and ambulatory with cane upon DC with daughter

## 2022-07-11 NOTE — PROGRESS NOTES
Patient arrives to infusion center for D1 C8  Modified FOLFOX + Opdivo today  Patient reports some intermittent chronic right foot pain he states is his "metatarsalgia" and has been utilizing Voltaren gel with some relief  Labs reviewed from 7/7/22 - noted that creatinine and potassium have improved  Noted that TSH elevated at 5 379 and T4 decreased at 0 72  TEAMs message sent to [facility] in Dr Muriel Ang office - per Alexia Najera RN, defer to PCP  Spoke with Dr Jenny Howard, PCP = "Please have him increase levothyroxine to 150 mcg daily  New script sent into [facility] " Patient made aware  L PAC accessed without issue, brisk blood return noted, port flushed easily without resistance  CHG dressing applied dry and intact  Patient resting on recliner chair, call bell within reach

## 2022-07-11 NOTE — PROGRESS NOTES
Telephone call received from Shadi Mejia, patient's daughter, regarding the need for Jevity for his feeding tube  The tube was placed by GI so I will forward a message to them with the information  The order needs to be faxed to [phone]  Shadi Mejia thanked me for my assistance

## 2022-07-13 ENCOUNTER — HOSPITAL ENCOUNTER (OUTPATIENT)
Dept: INFUSION CENTER | Facility: CLINIC | Age: 72
Discharge: HOME | End: 2022-07-13
Payer: COMMERCIAL

## 2022-07-13 VITALS
DIASTOLIC BLOOD PRESSURE: 71 MMHG | RESPIRATION RATE: 16 BRPM | SYSTOLIC BLOOD PRESSURE: 107 MMHG | HEART RATE: 59 BPM | OXYGEN SATURATION: 98 % | TEMPERATURE: 97.4 F

## 2022-07-13 DIAGNOSIS — E86.0 DEHYDRATION: ICD-10-CM

## 2022-07-13 DIAGNOSIS — E87.6 HYPOKALEMIA: ICD-10-CM

## 2022-07-13 DIAGNOSIS — C16.9 GASTRIC ADENOCARCINOMA (HCC): Primary | ICD-10-CM

## 2022-07-13 DIAGNOSIS — E87.6 HYPOKALEMIA: Primary | ICD-10-CM

## 2022-07-13 PROCEDURE — 96360 HYDRATION IV INFUSION INIT: CPT

## 2022-07-13 PROCEDURE — 96361 HYDRATE IV INFUSION ADD-ON: CPT

## 2022-07-13 RX ADMIN — SODIUM CHLORIDE 1000 ML: 0.9 INJECTION, SOLUTION INTRAVENOUS at 13:00

## 2022-07-13 NOTE — TELEPHONE ENCOUNTER
I called daughter Nesha Ram, left  [phone] requesting callback re: supplies needed  Previous notes mentioned [employer] for [employer]  I am not sure what supplies are needed?

## 2022-07-13 NOTE — TELEPHONE ENCOUNTER
I spoke with Ryan Lawsonlyn Era needs a prescription sent to [facility] for his Jevity 1 5 (240 ml) 5 times a day Qty: 1 case with refills    60 ml syringes for his bolus tube feeds, try for QTY: 4 with refills    Phone# [phone] (this was the billing dept) I was then transferred to the patient , enteral nutrition dept  Fax# [phone]    I spoke with Erickson Kuhn at [facility] [phone] since there was a "break in service" over 60 days without ordering  Insurance requires clinical  notes and brand new order  She will fax the order form to my attention

## 2022-07-13 NOTE — PROGRESS NOTES
Patient to infusion for Elastomeric pump DC and hydration  He states he feels lightheaded and fatigued  Blood pressure and hgb stable  Elastomeric pump appears deflated and discontinued without issue  IV hydration in progress  Call bell within reach

## 2022-07-13 NOTE — TELEPHONE ENCOUNTER
Patients daughter would like a call back to discuss supplies that are needed for pt  [phone] is her call back- taran

## 2022-07-13 NOTE — PROGRESS NOTES
Patient tolerated hydration without incident  Port flushed, good blood return noted, de accessed  Copy of AVS provided

## 2022-07-14 ENCOUNTER — OFFICE VISIT (OUTPATIENT)
Dept: PALLIATIVE MEDICINE | Facility: CLINIC | Age: 72
End: 2022-07-14
Payer: COMMERCIAL

## 2022-07-14 VITALS
RESPIRATION RATE: 16 BRPM | OXYGEN SATURATION: 99 % | SYSTOLIC BLOOD PRESSURE: 98 MMHG | WEIGHT: 136.24 LBS | TEMPERATURE: 97.2 F | BODY MASS INDEX: 21.34 KG/M2 | HEART RATE: 63 BPM | DIASTOLIC BLOOD PRESSURE: 62 MMHG

## 2022-07-14 DIAGNOSIS — R52 INTRACTABLE PAIN: ICD-10-CM

## 2022-07-14 DIAGNOSIS — C16.9 GASTRIC ADENOCARCINOMA (HCC): Primary | ICD-10-CM

## 2022-07-14 DIAGNOSIS — F32.A DEPRESSION: ICD-10-CM

## 2022-07-14 DIAGNOSIS — R63.0 POOR APPETITE: ICD-10-CM

## 2022-07-14 DIAGNOSIS — B37.0 ORAL THRUSH: ICD-10-CM

## 2022-07-14 DIAGNOSIS — Z51.5 PALLIATIVE CARE PATIENT: ICD-10-CM

## 2022-07-14 DIAGNOSIS — R10.13 EPIGASTRIC PAIN: ICD-10-CM

## 2022-07-14 DIAGNOSIS — E44.0 MODERATE PROTEIN-CALORIE MALNUTRITION (HCC): ICD-10-CM

## 2022-07-14 DIAGNOSIS — G89.3 CANCER RELATED PAIN: ICD-10-CM

## 2022-07-14 DIAGNOSIS — E86.0 DEHYDRATION: ICD-10-CM

## 2022-07-14 PROCEDURE — 99215 OFFICE O/P EST HI 40 MIN: CPT | Performed by: INTERNAL MEDICINE

## 2022-07-14 RX ORDER — XYLITOL/YERBA SANTA
5 AEROSOL, SPRAY WITH PUMP (ML) MUCOUS MEMBRANE 4 TIMES DAILY PRN
Qty: 59 ML | Refills: 0 | Status: SHIPPED | OUTPATIENT
Start: 2022-07-14

## 2022-07-14 RX ORDER — OXYCODONE HCL 10 MG/1
10 TABLET, FILM COATED, EXTENDED RELEASE ORAL
Qty: 90 TABLET | Refills: 0 | Status: CANCELLED | OUTPATIENT
Start: 2022-07-14

## 2022-07-14 RX ORDER — OLANZAPINE 2.5 MG/1
2.5 TABLET ORAL
Qty: 30 TABLET | Refills: 0 | Status: SHIPPED | OUTPATIENT
Start: 2022-07-14 | End: 2022-09-22

## 2022-07-14 NOTE — PROGRESS NOTES
Palliative and Supportive Care   Sidra Jeong 67 y o  male [id number]    Assessment/Plan:  1  Gastric adenocarcinoma ([facility] [address] )    2  Depression    3  Cancer related pain    4  Palliative care patient    5  Moderate protein-calorie malnutrition (HCC)    6  Dehydration    7  Oral thrush    8  Poor appetite    9  Intractable pain      Requested Prescriptions     Pending Prescriptions Disp Refills    oxyCODONE (OxyCONTIN) 10 mg 12 hr tablet 90 tablet 0     Sig: Take 1 tablet (10 mg total) by mouth daily at bedtime Max Daily Amount: 10 mg     Signed Prescriptions Disp Refills    saliva substitute (MOUTH KOTE) 59 mL 0     Sig: Apply 5 sprays to the mouth or throat 4 (four) times a day as needed (dry mouth)    OLANZapine (ZyPREXA) 2 5 mg tablet 30 tablet 0     Sig: Take 1 tablet (2 5 mg total) by mouth daily at bedtime    nystatin (MYCOSTATIN) 500,000 units/5 mL suspension 200 mL 0     Sig: Apply 5 mL (500,000 Units total) to the mouth or throat 4 (four) times a day     Medications Discontinued During This Encounter   Medication Reason    dronabinol (MARINOL) 5 MG capsule     saliva substitute (MOUTH KOTE)        PLAN:  1  Symptom management-    -pain:  Patient has markedly reduced his opioid needs an currently only using OxyContin at bedtime, he feels that this manages his pain adequately    -poor appetite:  Patient does have evidence of thrush on examination and will provide script for nystatin  Discussed other pharmacologic aids and will start low-dose olanzapine 2 5 mg HS  -pruritus, possible opioid component:  Discussed appropriate skin care and moisturizer  Did review that possible use of both and H1 and H2 blocker could be helpful and recommended starting Pepcid or similar  2    Goals of care:  Evolving but beginning to trend towards more comfort care approach  Patient's goal at this point is to get to [place] to be with his family and talk about memories    Patient did ask questions about hospice care   He also asked about voluntarily stopping eating and drinking  Will plan to have further conversations after his return from his vacation  3    Psychosocial support:  Time spent providing supportive listening  All questions and concerns were addressed to their satisfaction    4  ACP:  See goals    5  Return to care: One-month    Representatives have queried the patient's controlled substance dispensing history in the Prescription Drug Monitoring Program in compliance with regulations before I have prescribed any controlled substances  The prescription history is consistent with prescribed therapy and our practice policies  No follow-ups on file  Subjective: In attendance at this visit: Rosendo Sears and his daughter  Chief Complaint  Follow up visit for:  symptom management, pain, neoplasm related, fatigue, assessment of goals of care, disease process education and discussion of prognosis, advance care planning  HPI     Rosendo Sears is a 67 y o  male with  palliative diagnosis of gastric adenocarcinoma follows with Dr Tristian Noel as an outpatient  Patient currently on combination modified FOLFOX 6 and Opdivo every 2 weeks  Patient follows with palliative for symptoms related to this  Most recently at the end of May patient was hospitalized for an elective prostatectomy after having severe BPH  Patient seen today in follow-up with his daughter  Patient tells me he is not doing well and then said he is getting close to being done with treatment  His main goal is to go to [place] to be with his family and spent time with them  This trip is planned for early August     Patient has reduced his opioid use as well using medication at bedtime  He has ongoing poor appetite with no desire to eat  He asks about voluntarily stopping eating and drinking, as he noted he actually felt better after several days without eating prior  Patient also has extreme pruritus with dry skin    Discussed different management techniques  Patient admits to feeling depressed but he notes that he has always been aware of his prognosis and that when his quality of life started to suffer that he knew it would be time to discuss end of life care  Will plan to do that at our follow-up visit  Oncology History   Gastric adenocarcinoma ([facility] [address] )   2/8/2022 Initial Diagnosis    Gastric adenocarcinoma ([facility] [address] )     2/21/2022 -  Chemotherapy    palonosetron (ALOXI), 0 25 mg, Intravenous, Once, 6 of 15 cycles  Administration: 0 25 mg (3/21/2022), 0 25 mg (4/4/2022), 0 25 mg (5/16/2022), 0 25 mg (6/13/2022), 0 25 mg (6/27/2022), 0 25 mg (7/11/2022)  fosaprepitant (EMEND) IVPB, 150 mg, Intravenous, Once, 6 of 15 cycles  Administration: 150 mg (3/21/2022), 150 mg (4/4/2022), 150 mg (5/16/2022), 150 mg (6/13/2022), 150 mg (6/27/2022), 150 mg (7/11/2022)  nivolumab (OPDIVO) IVPB, 240 mg (100 % of original dose 240 mg), Intravenous, Once, 8 of 17 cycles  Dose modification: 240 mg (original dose 240 mg, Cycle 1)  Administration: 240 mg (2/21/2022), 240 mg (3/7/2022), 240 mg (3/21/2022), 240 mg (4/4/2022), 240 mg (5/16/2022), 240 mg (7/11/2022), 240 mg (6/13/2022), 240 mg (6/27/2022)  oxaliplatin (ELOXATIN) chemo infusion, 85 mg/m2 = 164 9 mg, Intravenous, Once, 8 of 17 cycles  Dose modification: 65 mg/m2 (original dose 85 mg/m2, Cycle 6, Reason: Other (Must fill in a comment), Comment: Elevated creatinine)  Administration: 164 9 mg (2/21/2022), 164 9 mg (3/7/2022), 164 9 mg (3/21/2022), 164 9 mg (4/4/2022), 158 1 mg (5/16/2022), 118 3 mg (6/13/2022), 113 75 mg (6/27/2022), 111 8 mg (7/11/2022)  fluorouracil (ADRUCIL) ambulatory infusion Soln, 1,200 mg/m2/day = 4,655 mg, Intravenous, Over 46 hours, 8 of 17 cycles         The following portions of the medical history were reviewed: past medical history, problem list, medication list, and social history      Current Outpatient Medications:     bisacodyl (DULCOLAX) 5 mg EC tablet, Take 1 tablet (5 mg total) by mouth daily as needed for constipation Related to pain medicines, Disp: 30 tablet, Rfl: 0    levocetirizine (XYZAL) 5 MG tablet, Take 5 mg by mouth in the morning, Disp: , Rfl:     levothyroxine (Euthyrox) 150 mcg tablet, Take 1 tablet (150 mcg total) by mouth daily in the early morning, Disp: 90 tablet, Rfl: 3    magnesium chloride-calcium (Slow-Mag) 71 5-119 mg, Take 1 tablet by mouth 2 (two) times a day, Disp: 60 tablet, Rfl: 5    Nutritional Supplements (jevity 1 5 tam) LIQD, Tube Feeding with Oral Diet Jevity 1 5 Bolus 240 ml q4h Flush 50 cc water with each bolus, Disp: 1000 mL, Rfl: 3    nystatin (MYCOSTATIN) 500,000 units/5 mL suspension, Apply 5 mL (500,000 Units total) to the mouth or throat 4 (four) times a day, Disp: 200 mL, Rfl: 0    OLANZapine (ZyPREXA) 2 5 mg tablet, Take 1 tablet (2 5 mg total) by mouth daily at bedtime, Disp: 30 tablet, Rfl: 0    ondansetron (Zofran ODT) 4 mg disintegrating tablet, Take 2 tablets (8 mg total) by mouth every 8 (eight) hours as needed for nausea or vomiting for up to 90 doses, Disp: 90 tablet, Rfl: 0    oxyCODONE (OxyCONTIN) 10 mg 12 hr tablet, Take 1 tablet (10 mg total) by mouth every 8 (eight) hours Max Daily Amount: 30 mg, Disp: 90 tablet, Rfl: 0    pantoprazole (PROTONIX) 40 mg tablet, Take 1 tablet (40 mg total) by mouth 2 (two) times a day Before breakfast and before bed to prevent acid accumulation  , Disp: 60 tablet, Rfl: 0    potassium chloride (K-DUR,KLOR-CON) 10 mEq tablet, Take 1 tablet (10 mEq total) by mouth 2 (two) times a day, Disp: 30 tablet, Rfl: 0    prochlorperazine (COMPAZINE) 5 mg tablet, Take 1 tablet (5 mg total) by mouth every 6 (six) hours as needed for nausea or vomiting, Disp: 30 tablet, Rfl: 2    saliva substitute (MOUTH KOTE), Apply 5 sprays to the mouth or throat 4 (four) times a day as needed (dry mouth), Disp: 59 mL, Rfl: 0    neomycin-bacitracin-polymyxin b (NEOSPORIN) ointment, Apply to the tip of the penis twice daily as needed for catheter irritation  , Disp: 15 g, Rfl: 0    oxyCODONE (ROXICODONE) 5 mg/5 mL solution, Take 10-20mg PO Q4H PRN Moderate-severe pain (Patient not taking: Reported on 7/14/2022), Disp: 473 mL, Rfl: 0    potassium chloride (MICRO-K) 10 MEQ CR capsule, Take 1 capsule (10 mEq total) by mouth in the morning for 5 days (Patient not taking: No sig reported), Disp: 5 capsule, Rfl: 0  No current facility-administered medications for this visit  Review of Systems   Constitutional: Positive for activity change, appetite change, fatigue and unexpected weight change  Skin: Positive for pallor  Neurological: Positive for weakness  Psychiatric/Behavioral: The patient is nervous/anxious  All other systems reviewed and are negative  All other systems negative    Objective:  Vital Signs  BP 98/62 (BP Location: Left arm, Patient Position: Sitting, Cuff Size: Standard)   Pulse 63   Temp (!) 97 2 °F (36 2 °C) (Temporal)   Resp 16   Wt 61 8 kg (136 lb 3 9 oz)   SpO2 99%   BMI 21 34 kg/m²    Physical Exam    Constitutional: Appears chronically ill  In no acute physical or emotional distress  Head: Normocephalic and atraumatic  Eyes: EOM are normal  No ocular discharge  No scleral icterus  Neck: No visible adenopathy or masses  Mouth: oral thrush identified  Cardiovascular: Regular rate and rhythm, palpable distal pulses   Respiratory: Effort normal  No stridor  No respiratory distress  Gastrointestinal: No abdominal distension  Abdomen is soft  Musculoskeletal: No edema  Neurological: Alert, oriented and appropriately conversant  Skin: No diaphoresis, dry and warm to touch, no rashes seen on exposed areas of skin  Extremely dry  Psychiatric: Displays a normal mood and affect   Behavior, judgement and thought content appear normal      Portions of this document may have been created using dictation software and as such some "sound alike" terms may have been generated by the system  Do not hesitate to contact me with any questions or clarifications

## 2022-07-14 NOTE — TELEPHONE ENCOUNTER
I faxed [facility] enteral order forms and clinical to [phone]  I marked the request urgent as patient will be out of cans soon

## 2022-07-15 ENCOUNTER — TELEPHONE (OUTPATIENT)
Dept: NEPHROLOGY | Facility: CLINIC | Age: 72
End: 2022-07-15

## 2022-07-15 NOTE — TELEPHONE ENCOUNTER
Appointment Confirmation   Person confirmed appointment with  If not patient, name of the person Patient    Date and time of appointment 7/18    Patient acknowledged and will be at appointment? yes   Did you advise the patient that they will need a urine sample if they are a new patient?  yes   Did you advise the patient to bring their current medications for verification? (including any OTC) yes   Additional Information [facility]  [address] [facility] [address]  Phone: [phone]       October 3, 2019         Flor Seth  [facility] [facility] [address]            Dear Flor:    We are concerned about your health care.  We recently provided you with medication refills.  Many medications require routine follow-up with your doctor.    Your prescription(s) have been refilled for 30 days so you may have time for the above noted follow-up. Please call to schedule soon so we can assure you have an appointment before your next refills are needed.    Thank you,      KATE Arreguin / henry

## 2022-07-18 ENCOUNTER — PATIENT OUTREACH (OUTPATIENT)
Dept: CASE MANAGEMENT | Facility: HOSPITAL | Age: 72
End: 2022-07-18

## 2022-07-18 ENCOUNTER — TELEMEDICINE (OUTPATIENT)
Dept: NEPHROLOGY | Facility: CLINIC | Age: 72
End: 2022-07-18
Payer: COMMERCIAL

## 2022-07-18 ENCOUNTER — TELEPHONE (OUTPATIENT)
Dept: NEPHROLOGY | Facility: CLINIC | Age: 72
End: 2022-07-18

## 2022-07-18 VITALS
WEIGHT: 135 LBS | SYSTOLIC BLOOD PRESSURE: 110 MMHG | BODY MASS INDEX: 21.19 KG/M2 | HEART RATE: 70 BPM | HEIGHT: 67 IN | DIASTOLIC BLOOD PRESSURE: 70 MMHG

## 2022-07-18 DIAGNOSIS — E87.6 HYPOPOTASSEMIA: ICD-10-CM

## 2022-07-18 DIAGNOSIS — E87.6 HYPOKALEMIA: ICD-10-CM

## 2022-07-18 PROCEDURE — 1160F RVW MEDS BY RX/DR IN RCRD: CPT | Performed by: INTERNAL MEDICINE

## 2022-07-18 PROCEDURE — 99203 OFFICE O/P NEW LOW 30 MIN: CPT | Performed by: INTERNAL MEDICINE

## 2022-07-18 RX ORDER — PANTOPRAZOLE SODIUM 40 MG/1
40 TABLET, DELAYED RELEASE ORAL 2 TIMES DAILY
Qty: 60 TABLET | Refills: 0 | Status: SHIPPED | OUTPATIENT
Start: 2022-07-18

## 2022-07-18 RX ORDER — POTASSIUM CHLORIDE 750 MG/1
10 TABLET, EXTENDED RELEASE ORAL 2 TIMES DAILY
Qty: 30 TABLET | Refills: 0 | Status: SHIPPED | OUTPATIENT
Start: 2022-07-18 | End: 2022-07-26 | Stop reason: SDUPTHER

## 2022-07-18 NOTE — PROGRESS NOTES
Virtual Regular Visit    Verification of patient location:    Patient is located in the following state in which I hold an active license [place]      Assessment/Plan:    1  Hypokalemia    Patient was found have hypokalemia  Urine potassium was over 40 mEq per L so may have urinary potassium wasting although on that day the urine potassium was sent there was no serum potassium but had been low a couple days prior to that  Most recent potassium on 10 mEq of KCl daily is 3 4  Terms of the patient's causes of hypokalemia there is a few that come to mind  First states he is not really eating much so he could have low magnesium low potassium levels as result of this  Also he is receiving platinum-based chemotherapy which can cause urinary potassium and magnesium wasting  And on top of all this when I reviewed imaging of his adrenal glands and make comments that he had bilateral adrenal nodules  With respect to this last comment the issue is whether not these are functional nodules resulting in hyperaldosteronism  I was found that unlikely because his blood pressure is low  Will increase potassium to 10 mEq p o  B i d  Check plasma renin activity and serum aldosterone level  Repeat BMP, magnesium    Based upon these results will determine if patient should receive potassium-sparing diuretic or just supplement orally on the above changes  I will contact the patient with results    Problem List Items Addressed This Visit        Other    Hypokalemia    Relevant Orders    Renin Direct Assay    Aldosterone    Basic metabolic panel               Reason for visit is   Chief Complaint   Patient presents with    Consult    Virtual Regular Visit        Encounter provider Lopez Almonte MD    Provider located at [address] [place] [address] [address] [facility]  [phone]      Recent Visits  Date Type Provider Dept   07/15/22 Telephone Lopez Almonte MD [facility]   Showing recent visits within past 7 days and meeting all other requirements  Today's Visits  Date Type Provider Dept   07/18/22 Telephone Nithin Diaz MD [facility]   07/18/22 Telemedicine Nithin Diaz MD [facility] [address] [place] [address] today's visits and meeting all other requirements  Future Appointments  No visits were found meeting these conditions  Showing future appointments within next 150 days and meeting all other requirements       The patient was identified by name and date of birth  Rebecca Rosa was informed that this is a telemedicine visit and that the visit is being conducted through [address] [place] [address] Now and patient was informed that this is a secure, HIPAA-compliant platform  He agrees to proceed     My office door was closed  No one else was in the room  He acknowledged consent and understanding of privacy and security of the video platform  The patient has agreed to participate and understands they can discontinue the visit at any time  Patient is aware this is a billable service  Subjective  Rebecca Rosa is a 67 y o  male who is being seen for hypokalemia and this is his 1st visit   HPI     The patient is a 68-year-old male who was diagnosed with gastric cancer in February of this year  He has been receiving platinum-based chemotherapy  He states that his appetite is really not that good and for a month or so he has been hearing of a low potassium level  He is being seen for hypokalemia      Past Medical History:   Diagnosis Date    Arthritis     shoulders    Cancer ([address] )     gastric mass    COVID 01/2021 jan 2021- no hospitalization    Disease of thyroid gland     had radioactive iodine in past    GERD (gastroesophageal reflux disease)     History of GI diverticular bleed     Hyperlipidemia     Hypertension     on no meds at present    Kidney stone     Port-A-Cath in place     S/P percutaneous endoscopic gastrostomy (PEG) tube placement [facility])     Tinnitus     Wears glasses      Denied history of hypokalemia prior to a month ago  Past Surgical History:   Procedure Laterality Date    ADENOIDECTOMY      CHOLECYSTECTOMY LAPAROSCOPIC N/A 1/24/2022    Procedure: CHOLECYSTECTOMY LAPAROSCOPIC W/ INTRAOP CHOLANGIOGRAM;  Surgeon: Elisabeth Graves DO;  [facility];  Service: General    COLONOSCOPY N/A 7/16/2016    Procedure: COLONOSCOPY;  Surgeon: Reid Keyes MD;  [facility];  Service:     FL CHOLANGIOGRAM TRANSHEPATIC  1/24/2022    FL GUIDED CENTRAL VENOUS ACCESS DEVICE INSERTION  2/11/2022    HERNIA REPAIR      MI LAP,PROSTATECTOMY,RADICAL,W/NERVE SPARE,INCL ROBOTIC N/A 4/27/2022    Procedure: PROSTATECTOMY SIMPLE LAPAROSCOPIC  W ROBOTICS, BLADDER NECK RECONSTRUCTION;  Surgeon: Rachel Corrales MD;  [facility];  Service: Urology    PROSTATE BIOPSY      TONSILLECTOMY      TUNNELED VENOUS PORT PLACEMENT N/A 2/11/2022    Procedure: INSERTION VENOUS PORT (PORT-A-CATH); Surgeon: Arben Copeland MD;  [facility];  Service: Surgical Oncology    UVULECTOMY         Current Outpatient Medications   Medication Sig Dispense Refill    bisacodyl (DULCOLAX) 5 mg EC tablet Take 1 tablet (5 mg total) by mouth daily as needed for constipation Related to pain medicines 30 tablet 0    levocetirizine (XYZAL) 5 MG tablet Take 5 mg by mouth in the morning      levothyroxine (Euthyrox) 150 mcg tablet Take 1 tablet (150 mcg total) by mouth daily in the early morning 90 tablet 3    magnesium chloride-calcium (Slow-Mag) 71 5-119 mg Take 1 tablet by mouth 2 (two) times a day 60 tablet 5    neomycin-bacitracin-polymyxin b (NEOSPORIN) ointment Apply to the tip of the penis twice daily as needed for catheter irritation   15 g 0    Nutritional Supplements (jevity 1 5 tam) LIQD Tube Feeding with Oral Diet Jevity 1 5 Bolus 240 ml q4h Flush 50 cc water with each bolus 1000 mL 3    nystatin (MYCOSTATIN) 500,000 units/5 mL suspension Apply 5 mL (500,000 Units total) to the mouth or throat 4 (four) times a day 200 mL 0    OLANZapine (ZyPREXA) 2 5 mg tablet Take 1 tablet (2 5 mg total) by mouth daily at bedtime 30 tablet 0    ondansetron (Zofran ODT) 4 mg disintegrating tablet Take 2 tablets (8 mg total) by mouth every 8 (eight) hours as needed for nausea or vomiting for up to 90 doses 90 tablet 0    oxyCODONE (OxyCONTIN) 10 mg 12 hr tablet Take 1 tablet (10 mg total) by mouth every 8 (eight) hours Max Daily Amount: 30 mg 90 tablet 0    pantoprazole (PROTONIX) 40 mg tablet Take 1 tablet (40 mg total) by mouth 2 (two) times a day Before breakfast and before bed to prevent acid accumulation  60 tablet 0    potassium chloride (K-DUR,KLOR-CON) 10 mEq tablet Take 1 tablet (10 mEq total) by mouth 2 (two) times a day 30 tablet 0    saliva substitute (MOUTH KOTE) Apply 5 sprays to the mouth or throat 4 (four) times a day as needed (dry mouth) 59 mL 0    [START ON 7/25/2022] fluorouracil 4,130 mg in CADD/Elastomeric Infusion Device Infuse 4,130 mg (1,200 mg/m2/day x 1 72 m2) into a venous catheter over 46 hours for 2 days  Do not start before July 25, 2022  1 each 0    oxyCODONE (ROXICODONE) 5 mg/5 mL solution Take 10-20mg PO Q4H PRN Moderate-severe pain (Patient not taking: Reported on 7/14/2022) 473 mL 0    potassium chloride (MICRO-K) 10 MEQ CR capsule Take 1 capsule (10 mEq total) by mouth in the morning for 5 days (Patient not taking: No sig reported) 5 capsule 0    prochlorperazine (COMPAZINE) 5 mg tablet Take 1 tablet (5 mg total) by mouth every 6 (six) hours as needed for nausea or vomiting (Patient not taking: Reported on 7/18/2022) 30 tablet 2     No current facility-administered medications for this visit  No Known Allergies    Review of Systems   Constitutional: Positive for activity change, appetite change and fatigue  Negative for chills, diaphoresis and fever  HENT: Negative  Eyes: Negative  Respiratory: Negative for cough, chest tightness, shortness of breath and wheezing  Cardiovascular: Negative for chest pain, palpitations and leg swelling  Gastrointestinal: Positive for constipation  Negative for abdominal distention, abdominal pain, diarrhea, nausea and vomiting  Poor appetite especially after chemotherapy  Genitourinary: Negative for difficulty urinating, dysuria and flank pain  Urine frequency  Skin: Negative  Negative for rash  Neurological: Negative for dizziness, syncope, light-headedness and headaches  Psychiatric/Behavioral: Negative for agitation, behavioral problems, confusion and decreased concentration  Video Exam    Vitals:    07/18/22 0951   BP: 110/63   BP Location: Left arm   Patient Position: Sitting   Cuff Size: Standard   Weight: 61 2 kg (135 lb)   Height: 5' 7" (1 702 m)       Physical Exam     Exam was done through assistance with video  In general he was in no acute distress awake and alert oriented  HEENT sclerae anicteric oropharynx moist neck is supple lungs breathing comfortably no use of accessory muscles no wheezes appreciated  Cardiovascular no edema pulse reportedly regular  Neurological able to move all extremities alert oriented x3 no confusion  I spent 55 minutes directly with the patient during this visit    VIRTUAL VISIT [id number] [facility] Bety Moya verbally agrees to participate in [employer]  Pt is aware that [employer] could be limited without vital signs or the ability to perform a full hands-on physical exam  Juan Valero understands he or the provider may request at any time to terminate the video visit and request the patient to seek care or treatment in person

## 2022-07-18 NOTE — LETTER
July 18, 2022     Nemesio Beltran [address] [place] [address]  [address] [facility] 210  [address] [address]    Patient: Viv Ibarra   YOB: 1950   Date of Visit: 7/18/2022       Dear Dr Jewels Fernandez:    Thank you for referring Lalo Rice to me for evaluation  Below are my notes for this consultation  If you have questions, please do not hesitate to call me  I look forward to following your patient along with you  Sincerely,        Cheng Fraser MD        CC: No Recipients  Cheng Fraser MD  7/18/2022 10:46 AM  Sign when Signing Visit    Virtual Regular Visit    Verification of patient location:    Patient is located in the following state in which I hold an active license [place]      Assessment/Plan:    1  Hypokalemia    Patient was found have hypokalemia  Urine potassium was over 40 mEq per L so may have urinary potassium wasting although on that day the urine potassium was sent there was no serum potassium but had been low a couple days prior to that  Most recent potassium on 10 mEq of KCl daily is 3 4  Terms of the patient's causes of hypokalemia there is a few that come to mind  First states he is not really eating much so he could have low magnesium low potassium levels as result of this  Also he is receiving platinum-based chemotherapy which can cause urinary potassium and magnesium wasting  And on top of all this when I reviewed imaging of his adrenal glands and make comments that he had bilateral adrenal nodules  With respect to this last comment the issue is whether not these are functional nodules resulting in hyperaldosteronism  I was found that unlikely because his blood pressure is low  Will increase potassium to 10 mEq p o  B i d  Check plasma renin activity and serum aldosterone level  Repeat BMP, magnesium    Based upon these results will determine if patient should receive potassium-sparing diuretic or just supplement orally on the above changes      I will contact the patient with results  Problem List Items Addressed This Visit        Other    Hypokalemia    Relevant Orders    Renin Direct Assay    Aldosterone    Basic metabolic panel               Reason for visit is   Chief Complaint   Patient presents with    Consult    Virtual Regular Visit        Encounter provider Nani Martínez MD    Provider located at [address] [address] [facility]  [phone]      Recent Visits  Date Type Provider Dept   07/15/22 Telephone Nani Martínez MD [facility] [address] recent visits within past 7 days and meeting all other requirements  Today's Visits  Date Type Provider Dept   07/18/22 Telephone Nani Martínez MD [facility]   07/18/22 Telemedicine Nani Martínez MD [facility] [address] today's visits and meeting all other requirements  Future Appointments  No visits were found meeting these conditions  Showing future appointments within next 150 days and meeting all other requirements       The patient was identified by name and date of birth  Lexis Villatoro was informed that this is a telemedicine visit and that the visit is being conducted through [address] [place] [address] Now and patient was informed that this is a secure, HIPAA-compliant platform  He agrees to proceed     My office door was closed  No one else was in the room  He acknowledged consent and understanding of privacy and security of the video platform  The patient has agreed to participate and understands they can discontinue the visit at any time  Patient is aware this is a billable service  Subjective  Lexis Villatoro is a 67 y o  male who is being seen for hypokalemia and this is his 1st visit   HPI     The patient is a 68-year-old male who was diagnosed with gastric cancer in February of this year  He has been receiving platinum-based chemotherapy    He states that his appetite is really not that good and for a month or so he has been hearing of a low potassium level  He is being seen for hypokalemia  Past Medical History:   Diagnosis Date    Arthritis     shoulders    Cancer ([facility] [address] )     gastric mass    COVID 01/2021 jan 2021- no hospitalization    Disease of thyroid gland     had radioactive iodine in past    GERD (gastroesophageal reflux disease)     History of GI diverticular bleed     Hyperlipidemia     Hypertension     on no meds at present    Kidney stone     Port-A-Cath in place     S/P percutaneous endoscopic gastrostomy (PEG) tube placement ([facility] [address] )     Tinnitus     Wears glasses      Denied history of hypokalemia prior to a month ago  Past Surgical History:   Procedure Laterality Date    ADENOIDECTOMY      CHOLECYSTECTOMY LAPAROSCOPIC N/A 1/24/2022    Procedure: CHOLECYSTECTOMY LAPAROSCOPIC W/ INTRAOP CHOLANGIOGRAM;  Surgeon: Yanet Baez DO;  [facility];  Service: General    COLONOSCOPY N/A 7/16/2016    Procedure: COLONOSCOPY;  Surgeon: Rio Malagon MD;  [facility];  Service:     [place] CHOLANGIOGRAM TRANSHEPATIC  1/24/2022    FL GUIDED CENTRAL VENOUS ACCESS DEVICE INSERTION  2/11/2022    HERNIA REPAIR      [place] LAP,PROSTATECTOMY,RADICAL,W/NERVE SPARE,INCL ROBOTIC N/A 4/27/2022    Procedure: PROSTATECTOMY SIMPLE LAPAROSCOPIC  W ROBOTICS, BLADDER NECK RECONSTRUCTION;  Surgeon: Magali Marinelli MD;  [facility];  Service: Urology    PROSTATE BIOPSY      TONSILLECTOMY      TUNNELED VENOUS PORT PLACEMENT N/A 2/11/2022    Procedure: INSERTION VENOUS PORT (PORT-A-CATH);   Surgeon: Ismael Jackson MD;  [facility];  Service: Surgical Oncology    UVULECTOMY         Current Outpatient Medications   Medication Sig Dispense Refill    bisacodyl (DULCOLAX) 5 mg EC tablet Take 1 tablet (5 mg total) by mouth daily as needed for constipation Related to pain medicines 30 tablet 0    levocetirizine (XYZAL) 5 MG tablet Take 5 mg by mouth in the morning      levothyroxine (Euthyrox) 150 mcg tablet Take 1 tablet (150 mcg total) by mouth daily in the early morning 90 tablet 3    magnesium chloride-calcium (Slow-Mag) 71 5-119 mg Take 1 tablet by mouth 2 (two) times a day 60 tablet 5    neomycin-bacitracin-polymyxin b (NEOSPORIN) ointment Apply to the tip of the penis twice daily as needed for catheter irritation  15 g 0    Nutritional Supplements (jevity 1 5 tam) LIQD Tube Feeding with Oral Diet Jevity 1 5 Bolus 240 ml q4h Flush 50 cc water with each bolus 1000 mL 3    nystatin (MYCOSTATIN) 500,000 units/5 mL suspension Apply 5 mL (500,000 Units total) to the mouth or throat 4 (four) times a day 200 mL 0    OLANZapine (ZyPREXA) 2 5 mg tablet Take 1 tablet (2 5 mg total) by mouth daily at bedtime 30 tablet 0    ondansetron (Zofran ODT) 4 mg disintegrating tablet Take 2 tablets (8 mg total) by mouth every 8 (eight) hours as needed for nausea or vomiting for up to 90 doses 90 tablet 0    oxyCODONE (OxyCONTIN) 10 mg 12 hr tablet Take 1 tablet (10 mg total) by mouth every 8 (eight) hours Max Daily Amount: 30 mg 90 tablet 0    pantoprazole (PROTONIX) 40 mg tablet Take 1 tablet (40 mg total) by mouth 2 (two) times a day Before breakfast and before bed to prevent acid accumulation   60 tablet 0    potassium chloride (K-DUR,KLOR-CON) 10 mEq tablet Take 1 tablet (10 mEq total) by mouth 2 (two) times a day 30 tablet 0    saliva substitute (MOUTH KOTE) Apply 5 sprays to the mouth or throat 4 (four) times a day as needed (dry mouth) 59 mL 0    [START ON 7/25/2022] fluorouracil 4,130 mg in CADD/Elastomeric Infusion Device Infuse 4,130 mg (1,200 mg/m2/day x 1 72 m2) into a venous catheter over 46 hours for 2 days  Do not start before July 25, 2022  1 each 0    oxyCODONE (ROXICODONE) 5 mg/5 mL solution Take 10-20mg PO Q4H PRN Moderate-severe pain (Patient not taking: Reported on 7/14/2022) 473 mL 0    potassium chloride (MICRO-K) 10 MEQ CR capsule Take 1 capsule (10 mEq total) by mouth in the morning for 5 days (Patient not taking: No sig reported) 5 capsule 0    prochlorperazine (COMPAZINE) 5 mg tablet Take 1 tablet (5 mg total) by mouth every 6 (six) hours as needed for nausea or vomiting (Patient not taking: Reported on 7/18/2022) 30 tablet 2     No current facility-administered medications for this visit  No Known Allergies    Review of Systems   Constitutional: Positive for activity change, appetite change and fatigue  Negative for chills, diaphoresis and fever  HENT: Negative  Eyes: Negative  Respiratory: Negative for cough, chest tightness, shortness of breath and wheezing  Cardiovascular: Negative for chest pain, palpitations and leg swelling  Gastrointestinal: Positive for constipation  Negative for abdominal distention, abdominal pain, diarrhea, nausea and vomiting  Poor appetite especially after chemotherapy  Genitourinary: Negative for difficulty urinating, dysuria and flank pain  Urine frequency  Skin: Negative  Negative for rash  Neurological: Negative for dizziness, syncope, light-headedness and headaches  Psychiatric/Behavioral: Negative for agitation, behavioral problems, confusion and decreased concentration  Video Exam    Vitals:    07/18/22 0951   BP: 110/63   BP Location: Left arm   Patient Position: Sitting   Cuff Size: Standard   Weight: 61 2 kg (135 lb)   Height: 5' 7" (1 702 m)       Physical Exam     Exam was done through assistance with video  In general he was in no acute distress awake and alert oriented  HEENT sclerae anicteric oropharynx moist neck is supple lungs breathing comfortably no use of accessory muscles no wheezes appreciated  Cardiovascular no edema pulse reportedly regular  Neurological able to move all extremities alert oriented x3 no confusion    I spent 55 minutes directly with the patient during this visit    VIRTUAL VISIT [id number] [facility]  verbally agrees to participate in Virtual Care Services  Pt is aware that [employer] could be limited without vital signs or the ability to perform a full hands-on physical exam  Juan Fernando understands he or the provider may request at any time to terminate the video visit and request the patient to seek care or treatment in person

## 2022-07-18 NOTE — PROGRESS NOTES
MSW rescheduled to initiate initial assessment tomorrow  Patient has a scheduled appt today Per chart review

## 2022-07-18 NOTE — TELEPHONE ENCOUNTER
Patients daughter Yassine Gunter called [facility] to see if consult can be changed to a virtual due to patient not feeling good   Dr Bre Thompson gave the okay for it to be virtual  I called and switched it

## 2022-07-19 ENCOUNTER — PATIENT OUTREACH (OUTPATIENT)
Dept: CASE MANAGEMENT | Facility: HOSPITAL | Age: 72
End: 2022-07-19

## 2022-07-19 ENCOUNTER — TELEPHONE (OUTPATIENT)
Dept: HEMATOLOGY ONCOLOGY | Facility: CLINIC | Age: 72
End: 2022-07-19

## 2022-07-19 DIAGNOSIS — E86.0 DEHYDRATION: Primary | ICD-10-CM

## 2022-07-19 NOTE — TELEPHONE ENCOUNTER
Please cancel pt's treatment for 7/25/22, 7/27/22, 8/8/22 and 8/10/22  Please add additional hydration to be given on 7/25/22 and 8/1/22  Can pt's hydration on 8/5/22 be moved to 8/6/22? Thank you!

## 2022-07-19 NOTE — TELEPHONE ENCOUNTER
Spoke with Braydon Michael who is requesting patients next two treatments be canceled, as he is going to [place] and doesn't want to be tired and run down when he is there  She was wondering if patient can receive hydration two times a week for these two weeks  Called and spoke with patient who agreed to the above plan  Pt stated he does want his next two treatments to be canceled  He is asking for hydration on 7/25/2022, 7/29/2022, 8/1/2022 and 8/6/2022 instead of 8/5/2022  Pt asked for his daughter to be called with new apt's  Reviewed the above plan with Dr Lali Rowley who was agreeable to this plan  AN infusion, can you please cancel pt's treatment for 7/25/22, 7/27/22, 8/8/22 and 8/10/22  Please add additional hydration to be given on 7/25/22 and 8/1/22  Can pt's hydration on 8/5/22 be moved to 8/6/22  Please notify daughter of apt's

## 2022-07-19 NOTE — TELEPHONE ENCOUNTER
Treatment appts cancelled as requested, please have plan placed on hold or defer treatment dates  Additional hydration appts added  08/05 moved to 08/06

## 2022-07-19 NOTE — TELEPHONE ENCOUNTER
----- Message from Connie Marie sent at 7/19/2022  2:06 PM EDT -----  Regarding: FW: Compazine Refill    ----- Message -----  From: Anne Marie Alvarado  Sent: 7/19/2022   2:02 PM EDT  To: Hematology [facility]  Subject: Compazine Refill                                 Hi Vladimir! This is Edus daughter again  When you get a chance, can you give me a call? I think it would be easier than trying to explain my question in an email  Thank you!!      Gagan Yanes   [phone]

## 2022-07-20 DIAGNOSIS — G89.3 CANCER-RELATED PAIN: ICD-10-CM

## 2022-07-20 DIAGNOSIS — R52 INTRACTABLE PAIN: ICD-10-CM

## 2022-07-20 DIAGNOSIS — C16.9 GASTRIC ADENOCARCINOMA (HCC): ICD-10-CM

## 2022-07-20 DIAGNOSIS — Z51.5 PALLIATIVE CARE PATIENT: ICD-10-CM

## 2022-07-20 DIAGNOSIS — G89.3 CANCER RELATED PAIN: ICD-10-CM

## 2022-07-20 DIAGNOSIS — E87.6 HYPOKALEMIA: Primary | ICD-10-CM

## 2022-07-20 DIAGNOSIS — E86.0 DEHYDRATION: ICD-10-CM

## 2022-07-20 NOTE — TELEPHONE ENCOUNTER
Spoke with patient's daughter, Dimitrios Sigala, to go over updated infusion schedule  She has already viewed patient's schedule on MyChart and is aware and okay with all updates

## 2022-07-21 ENCOUNTER — PATIENT OUTREACH (OUTPATIENT)
Dept: CASE MANAGEMENT | Facility: HOSPITAL | Age: 72
End: 2022-07-21

## 2022-07-21 NOTE — TELEPHONE ENCOUNTER
Primary palliative medicine provider:   Keith    Medication requested: If for pain, how has the patient been taking their pain medicine?      Last appointment:    Next scheduled appointment:    PDMP review:  jarad

## 2022-07-21 NOTE — TELEPHONE ENCOUNTER
Primary palliative medicine provider:   Keith    Medication requested:  Oxycontin 10 mg       If for pain, how has the patient been taking their pain medicine?      Last appointment: 7/14    Next scheduled appointment: 08/23/22    PDMP review:    06/02/2022 06/02/2022 OxyCONTIN (Tablet, Extended Release) 90 0 30 10 MG  Lukas Miguel

## 2022-07-21 NOTE — PROGRESS NOTES
MSW received a call back from patient who states he will continue to come in for his chemo treatments however he would like to transition in the [facility] [address] when he is close to dying  Patient requested this MSW call his wife-Linnea to provide emotional support and completion of initial assessment  MSW provided emotional support services  MSW will follow with wife-Linnea

## 2022-07-22 ENCOUNTER — TELEPHONE (OUTPATIENT)
Dept: HEMATOLOGY ONCOLOGY | Facility: HOSPITAL | Age: 72
End: 2022-07-22

## 2022-07-22 ENCOUNTER — HOSPITAL ENCOUNTER (OUTPATIENT)
Dept: INFUSION CENTER | Facility: CLINIC | Age: 72
Discharge: HOME | End: 2022-07-22
Payer: COMMERCIAL

## 2022-07-22 VITALS
HEART RATE: 55 BPM | DIASTOLIC BLOOD PRESSURE: 74 MMHG | TEMPERATURE: 96.9 F | SYSTOLIC BLOOD PRESSURE: 111 MMHG | OXYGEN SATURATION: 97 % | RESPIRATION RATE: 18 BRPM

## 2022-07-22 DIAGNOSIS — C16.9 GASTRIC ADENOCARCINOMA (HCC): ICD-10-CM

## 2022-07-22 DIAGNOSIS — E86.0 DEHYDRATION: Primary | ICD-10-CM

## 2022-07-22 DIAGNOSIS — E87.6 HYPOKALEMIA: ICD-10-CM

## 2022-07-22 LAB
ALBUMIN SERPL BCP-MCNC: 3.5 G/DL (ref 3.5–5)
ALP SERPL-CCNC: 117 U/L (ref 34–104)
ALT SERPL W P-5'-P-CCNC: 15 U/L (ref 7–52)
ANION GAP SERPL CALCULATED.3IONS-SCNC: 7 MMOL/L (ref 4–13)
AST SERPL W P-5'-P-CCNC: 17 U/L (ref 13–39)
BASOPHILS # BLD AUTO: 0 THOUSANDS/ΜL (ref 0–0.1)
BASOPHILS NFR BLD AUTO: 0 % (ref 0–1)
BILIRUB SERPL-MCNC: 0.47 MG/DL (ref 0.2–1)
BUN SERPL-MCNC: 27 MG/DL (ref 5–25)
CALCIUM SERPL-MCNC: 8.6 MG/DL (ref 8.4–10.2)
CHLORIDE SERPL-SCNC: 113 MMOL/L (ref 96–108)
CO2 SERPL-SCNC: 18 MMOL/L (ref 21–32)
CREAT SERPL-MCNC: 1.14 MG/DL (ref 0.6–1.3)
EOSINOPHIL # BLD AUTO: 0.15 THOUSAND/ΜL (ref 0–0.61)
EOSINOPHIL NFR BLD AUTO: 3 % (ref 0–6)
ERYTHROCYTE [DISTWIDTH] IN BLOOD BY AUTOMATED COUNT: 18.9 % (ref 11.6–15.1)
GFR SERPL CREATININE-BSD FRML MDRD: 63 ML/MIN/1.73SQ M
GLUCOSE SERPL-MCNC: 112 MG/DL (ref 65–140)
HCT VFR BLD AUTO: 28.5 % (ref 36.5–49.3)
HGB BLD-MCNC: 9.5 G/DL (ref 12–17)
IMM GRANULOCYTES # BLD AUTO: 0.03 THOUSAND/UL (ref 0–0.2)
IMM GRANULOCYTES NFR BLD AUTO: 1 % (ref 0–2)
LYMPHOCYTES # BLD AUTO: 0.97 THOUSANDS/ΜL (ref 0.6–4.47)
LYMPHOCYTES NFR BLD AUTO: 18 % (ref 14–44)
MCH RBC QN AUTO: 29.2 PG (ref 26.8–34.3)
MCHC RBC AUTO-ENTMCNC: 33.3 G/DL (ref 31.4–37.4)
MCV RBC AUTO: 88 FL (ref 82–98)
MONOCYTES # BLD AUTO: 0.77 THOUSAND/ΜL (ref 0.17–1.22)
MONOCYTES NFR BLD AUTO: 14 % (ref 4–12)
NEUTROPHILS # BLD AUTO: 3.63 THOUSANDS/ΜL (ref 1.85–7.62)
NEUTS SEG NFR BLD AUTO: 64 % (ref 43–75)
NRBC BLD AUTO-RTO: 0 /100 WBCS
PLATELET # BLD AUTO: 244 THOUSANDS/UL (ref 149–390)
PMV BLD AUTO: 9.3 FL (ref 8.9–12.7)
POTASSIUM SERPL-SCNC: 2.9 MMOL/L (ref 3.5–5.3)
PROT SERPL-MCNC: 6.3 G/DL (ref 6.4–8.4)
RBC # BLD AUTO: 3.25 MILLION/UL (ref 3.88–5.62)
SODIUM SERPL-SCNC: 138 MMOL/L (ref 135–147)
TSH SERPL DL<=0.05 MIU/L-ACNC: 2.91 UIU/ML (ref 0.45–4.5)
WBC # BLD AUTO: 5.55 THOUSAND/UL (ref 4.31–10.16)

## 2022-07-22 PROCEDURE — 84443 ASSAY THYROID STIM HORMONE: CPT | Performed by: INTERNAL MEDICINE

## 2022-07-22 PROCEDURE — 96361 HYDRATE IV INFUSION ADD-ON: CPT

## 2022-07-22 PROCEDURE — 85025 COMPLETE CBC W/AUTO DIFF WBC: CPT | Performed by: INTERNAL MEDICINE

## 2022-07-22 PROCEDURE — 96360 HYDRATION IV INFUSION INIT: CPT

## 2022-07-22 PROCEDURE — 80053 COMPREHEN METABOLIC PANEL: CPT | Performed by: INTERNAL MEDICINE

## 2022-07-22 RX ORDER — OXYCODONE HCL 10 MG/1
10 TABLET, FILM COATED, EXTENDED RELEASE ORAL EVERY 8 HOURS SCHEDULED
Qty: 90 TABLET | Refills: 0 | Status: SHIPPED | OUTPATIENT
Start: 2022-07-22 | End: 2022-10-07 | Stop reason: SDUPTHER

## 2022-07-22 RX ADMIN — SODIUM CHLORIDE 1000 ML: 0.9 INJECTION, SOLUTION INTRAVENOUS at 14:06

## 2022-07-22 NOTE — TELEPHONE ENCOUNTER
Call placed to pt relaying information from Dr Lesley Tinajero advising pt to take 40 meq PO potassium  Advised to proceed to ER with any s/s of hypokalemia which I explained in detail  Advised to CB with any questions or concerns and provided teams number

## 2022-07-22 NOTE — PLAN OF CARE
Problem: Potential for Falls  Goal: Patient will remain free of falls  Description: INTERVENTIONS:  - Educate patient/family on patient safety including physical limitations  - Instruct patient to call for assistance with activity   - Consult OT/PT to assist with strengthening/mobility   - Keep Call bell within reach  - Keep bed low and locked with side rails adjusted as appropriate  - Keep care items and personal belongings within reach  - Initiate and maintain comfort rounds  - Make Fall   - Apply yellow socks and bracelet for high fall risk patients  - Consider moving patient to room near nurses station  Outcome: Progressing

## 2022-07-22 NOTE — PROGRESS NOTES
Patient tolerated hydration without incident  Port flushed and de-accessed as per protocol  Next appointment confirmed  AVS printed and given to patient

## 2022-07-22 NOTE — TELEPHONE ENCOUNTER
----- Message from [facility] Betzy HILLS MD sent at 7/22/2022  4:20 PM EDT -----    He should take potassium 40 mEq today p o   I had attached his nephrologist to my previous message so they can address this also but we should tell him to take 40 mg p o  Leighton Organ ----- Message -----  From: Kar Hummel, RN  Sent: 7/22/2022   4:03 PM EDT  To: [facility] MD JEANA, Kayla Yepez RN    Pt received his weekly 1LNS bolus today, he will also be receiving hydration twice a week for the next 2 weeks   Do you want to add any additional potassium?  ----- Message -----  From: [facility] Betzy HILLS MD  Sent: 7/22/2022   3:32 PM EDT  To: Lopez Almonte MD, Kar Hummel, RN, #    FYI potassium is still low   ----- Message -----  From: Lab, Background User  Sent: 7/22/2022   2:28 PM EDT  To: [facility] MD JEANA

## 2022-07-25 ENCOUNTER — TELEPHONE (OUTPATIENT)
Dept: NEPHROLOGY | Facility: CLINIC | Age: 72
End: 2022-07-25

## 2022-07-25 ENCOUNTER — PATIENT OUTREACH (OUTPATIENT)
Dept: CASE MANAGEMENT | Facility: HOSPITAL | Age: 72
End: 2022-07-25

## 2022-07-25 ENCOUNTER — TELEPHONE (OUTPATIENT)
Dept: HEMATOLOGY ONCOLOGY | Facility: CLINIC | Age: 72
End: 2022-07-25

## 2022-07-25 ENCOUNTER — HOSPITAL ENCOUNTER (OUTPATIENT)
Dept: INFUSION CENTER | Facility: CLINIC | Age: 72
Discharge: HOME | End: 2022-07-25
Payer: COMMERCIAL

## 2022-07-25 VITALS
BODY MASS INDEX: 21.03 KG/M2 | RESPIRATION RATE: 18 BRPM | SYSTOLIC BLOOD PRESSURE: 116 MMHG | WEIGHT: 134 LBS | HEIGHT: 67 IN | HEART RATE: 65 BPM | OXYGEN SATURATION: 99 % | DIASTOLIC BLOOD PRESSURE: 68 MMHG | TEMPERATURE: 97.7 F

## 2022-07-25 DIAGNOSIS — E86.0 DEHYDRATION: Primary | ICD-10-CM

## 2022-07-25 DIAGNOSIS — E87.6 HYPOKALEMIA: ICD-10-CM

## 2022-07-25 DIAGNOSIS — E87.6 HYPOKALEMIA: Primary | ICD-10-CM

## 2022-07-25 LAB — POTASSIUM SERPL-SCNC: 3 MMOL/L (ref 3.5–5.3)

## 2022-07-25 PROCEDURE — 96366 THER/PROPH/DIAG IV INF ADDON: CPT

## 2022-07-25 PROCEDURE — 96365 THER/PROPH/DIAG IV INF INIT: CPT

## 2022-07-25 PROCEDURE — 84132 ASSAY OF SERUM POTASSIUM: CPT | Performed by: INTERNAL MEDICINE

## 2022-07-25 RX ORDER — POTASSIUM CHLORIDE 14.9 MG/ML
20 INJECTION INTRAVENOUS ONCE
Status: CANCELLED
Start: 2022-07-25 | End: 2022-07-25

## 2022-07-25 RX ORDER — POTASSIUM CHLORIDE 14.9 MG/ML
20 INJECTION INTRAVENOUS ONCE
Status: CANCELLED
Start: 2022-08-01 | End: 2022-08-01

## 2022-07-25 RX ORDER — POTASSIUM CHLORIDE 14.9 MG/ML
20 INJECTION INTRAVENOUS ONCE
Status: COMPLETED | OUTPATIENT
Start: 2022-07-25 | End: 2022-07-25

## 2022-07-25 RX ADMIN — SODIUM CHLORIDE 1000 ML: 9 INJECTION, SOLUTION INTRAVENOUS at 09:56

## 2022-07-25 RX ADMIN — POTASSIUM CHLORIDE 20 MEQ: 14.9 INJECTION, SOLUTION INTRAVENOUS at 10:10

## 2022-07-25 NOTE — TELEPHONE ENCOUNTER
Potassium of 3 0 from today  Per Dr Annalise Eller patient is to receive an additional 20 mEq IV potassium today  Pt needs to call his nephrologist to notify them of updated values and possible changes to oral potassium at home  Pt should proceed to the ED with any worsening fatigue  Travis Kate RN notified to notify the patient  Pt's daughter was also notified of plan, she stated she will tam his nephrologist to discuss PO potassium management

## 2022-07-25 NOTE — TELEPHONE ENCOUNTER
----- Message from Colt Lieberman MD sent at 7/23/2022  8:02 AM EDT -----  Please call the lab and ask why they did not draw the renin, aldosterone that was ordered  I need those results  If they did not do it as ordered and as it appears then call pt let him know LAB did not do the tests I ordered and have him please go get them  Keep me posted  Thanks

## 2022-07-25 NOTE — PROGRESS NOTES
Patient tolerated hydration and potassium without incident   He is aware of his next appt, declined AVS

## 2022-07-25 NOTE — TELEPHONE ENCOUNTER
I spoke to Desiree from the [facility] and she apologized for the mix up she is unaware why the labs had not been drawn  - She will contact the patient for collection

## 2022-07-25 NOTE — PROGRESS NOTES
K 3 0, patient will receive 20 mEq today along with his fluid bolus  He will contact his nephrologist to adjust oral potassium dose  Bolus and potassium replacement in progress

## 2022-07-25 NOTE — TELEPHONE ENCOUNTER
CALL RETURN FORM   Reason for patient call? Patients daughter, Olivia Mora, calling to speak with Dr Annalise Eller regarding patients infusion appointment at 8:30am today  Patient's primary oncologist? Dr Annalise Eller   Name of person the patient was calling for? Dr Annalise Eller   Any additional information to add, if applicable? [phone]   Informed patient that the message will be forwarded to the team and someone will get back to them as soon as possible    Did you relay this information to the patient?  yes

## 2022-07-25 NOTE — PROGRESS NOTES
Patient is here for hydration  His potassium from 7/22 was 2 9  Patient reports significant muscle weakness/cramping  He is eating "as much as possible " Denies neuropathy or mucositis  Repeat potassium drawn and sent to lab stat, awaiting results before proceeding with fluid bolus in case they want to add potassium to it

## 2022-07-25 NOTE — TELEPHONE ENCOUNTER
Spoke with Daughter, who stated she pt is more fatigued and not eating much  She stated he isn't really getting out of bed  Dr Toribio Bryan would like repeat potassium today to reevaluate level  STAT order placed  Juwan Hook RN, notified to draw lab today

## 2022-07-27 ENCOUNTER — HOSPITAL ENCOUNTER (OUTPATIENT)
Dept: INFUSION CENTER | Facility: CLINIC | Age: 72
Discharge: HOME | End: 2022-07-27
Payer: COMMERCIAL

## 2022-07-27 DIAGNOSIS — E83.42 HYPOMAGNESEMIA: ICD-10-CM

## 2022-07-27 DIAGNOSIS — Z45.2 ENCOUNTER FOR CENTRAL LINE CARE: ICD-10-CM

## 2022-07-27 DIAGNOSIS — R30.0 DYSURIA: Primary | ICD-10-CM

## 2022-07-27 DIAGNOSIS — E87.6 HYPOKALEMIA: ICD-10-CM

## 2022-07-27 LAB
ANION GAP SERPL CALCULATED.3IONS-SCNC: 6 MMOL/L (ref 4–13)
BACTERIA UR QL AUTO: ABNORMAL /HPF
BILIRUB UR QL STRIP: NEGATIVE
BUN SERPL-MCNC: 20 MG/DL (ref 5–25)
CALCIUM SERPL-MCNC: 8.9 MG/DL (ref 8.4–10.2)
CHLORIDE SERPL-SCNC: 115 MMOL/L (ref 96–108)
CLARITY UR: CLEAR
CO2 SERPL-SCNC: 19 MMOL/L (ref 21–32)
COLOR UR: ABNORMAL
CREAT SERPL-MCNC: 0.97 MG/DL (ref 0.6–1.3)
GFR SERPL CREATININE-BSD FRML MDRD: 77 ML/MIN/1.73SQ M
GLUCOSE SERPL-MCNC: 114 MG/DL (ref 65–140)
GLUCOSE UR STRIP-MCNC: NEGATIVE MG/DL
HGB UR QL STRIP.AUTO: NEGATIVE
KETONES UR STRIP-MCNC: NEGATIVE MG/DL
LEUKOCYTE ESTERASE UR QL STRIP: NEGATIVE
MAGNESIUM SERPL-MCNC: 2.1 MG/DL (ref 1.9–2.7)
NITRITE UR QL STRIP: NEGATIVE
NON-SQ EPI CELLS URNS QL MICRO: ABNORMAL /HPF
PH UR STRIP.AUTO: 6.5 [PH]
POTASSIUM SERPL-SCNC: 3.4 MMOL/L (ref 3.5–5.3)
PROT UR STRIP-MCNC: ABNORMAL MG/DL
RBC #/AREA URNS AUTO: ABNORMAL /HPF
SODIUM SERPL-SCNC: 140 MMOL/L (ref 135–147)
SP GR UR STRIP.AUTO: 1.01 (ref 1–1.03)
UROBILINOGEN UR STRIP-ACNC: <2 MG/DL
WBC #/AREA URNS AUTO: ABNORMAL /HPF

## 2022-07-27 PROCEDURE — 83735 ASSAY OF MAGNESIUM: CPT

## 2022-07-27 PROCEDURE — 81001 URINALYSIS AUTO W/SCOPE: CPT | Performed by: INTERNAL MEDICINE

## 2022-07-27 PROCEDURE — 84244 ASSAY OF RENIN: CPT

## 2022-07-27 PROCEDURE — 82088 ASSAY OF ALDOSTERONE: CPT

## 2022-07-27 PROCEDURE — 80048 BASIC METABOLIC PNL TOTAL CA: CPT

## 2022-07-27 NOTE — PROGRESS NOTES
Patient here for lab work  Port flushed, blood return noted, labs drawn per order  Patient provided with AVS  Next appointment reviewed with patient

## 2022-07-29 ENCOUNTER — HOSPITAL ENCOUNTER (OUTPATIENT)
Dept: INFUSION CENTER | Facility: CLINIC | Age: 72
Discharge: HOME | End: 2022-07-29
Payer: COMMERCIAL

## 2022-07-29 VITALS
DIASTOLIC BLOOD PRESSURE: 63 MMHG | OXYGEN SATURATION: 99 % | RESPIRATION RATE: 18 BRPM | SYSTOLIC BLOOD PRESSURE: 114 MMHG | TEMPERATURE: 97.2 F | HEART RATE: 59 BPM

## 2022-07-29 DIAGNOSIS — E87.6 HYPOKALEMIA: ICD-10-CM

## 2022-07-29 DIAGNOSIS — E86.0 DEHYDRATION: Primary | ICD-10-CM

## 2022-07-29 PROCEDURE — 96360 HYDRATION IV INFUSION INIT: CPT

## 2022-07-29 PROCEDURE — 96361 HYDRATE IV INFUSION ADD-ON: CPT

## 2022-07-29 RX ADMIN — SODIUM CHLORIDE 1000 ML: 0.9 INJECTION, SOLUTION INTRAVENOUS at 11:59

## 2022-07-29 NOTE — PROGRESS NOTES
Patient to infusion for hydration  He offers no complaints  Port accessed, good blood return return noted  Call bell within reach

## 2022-08-01 ENCOUNTER — HOSPITAL ENCOUNTER (OUTPATIENT)
Dept: INFUSION CENTER | Facility: CLINIC | Age: 72
Discharge: HOME | End: 2022-08-01
Payer: COMMERCIAL

## 2022-08-01 DIAGNOSIS — R62.7 FTT (FAILURE TO THRIVE) IN ADULT: ICD-10-CM

## 2022-08-01 DIAGNOSIS — E86.0 DEHYDRATION: Primary | ICD-10-CM

## 2022-08-01 DIAGNOSIS — G89.3 CANCER-RELATED PAIN: Primary | ICD-10-CM

## 2022-08-01 DIAGNOSIS — C16.0 MALIGNANT NEOPLASM OF CARDIA OF STOMACH (HCC): ICD-10-CM

## 2022-08-01 DIAGNOSIS — Z51.5 PALLIATIVE CARE PATIENT: ICD-10-CM

## 2022-08-01 DIAGNOSIS — R64 MALIGNANT CACHEXIA (HCC): ICD-10-CM

## 2022-08-01 PROCEDURE — 96361 HYDRATE IV INFUSION ADD-ON: CPT

## 2022-08-01 PROCEDURE — 96360 HYDRATION IV INFUSION INIT: CPT

## 2022-08-01 RX ORDER — DRONABINOL 5 MG/1
5 CAPSULE ORAL
COMMUNITY
End: 2022-08-01 | Stop reason: SDUPTHER

## 2022-08-01 RX ORDER — DRONABINOL 5 MG/1
5 CAPSULE ORAL
Qty: 60 CAPSULE | Refills: 0 | Status: SHIPPED | OUTPATIENT
Start: 2022-08-01 | End: 2022-08-31 | Stop reason: SDUPTHER

## 2022-08-01 RX ADMIN — SODIUM CHLORIDE 1000 ML: 9 INJECTION, SOLUTION INTRAVENOUS at 13:42

## 2022-08-01 NOTE — TELEPHONE ENCOUNTER
Primary palliative medicine provider:     Medication requested: dronabinol (Marinol) 5 mg  capsule    If for pain, how has the patient been taking their pain medicine?      Last appointment: 07/14/2022    Next scheduled appointment: 08/23/2022    PDMP review:    [id number] 05/05/2022 05/05/2022 Miscellaneous (Non-liquid) 60 0 30 5 MG PHAM [place]  98  / 00 PA

## 2022-08-01 NOTE — PROGRESS NOTES
Patient here for hydration, tolerated without incident    He is aware of his next appt, declined AVS

## 2022-08-02 DIAGNOSIS — E87.6 HYPOKALEMIA: Primary | ICD-10-CM

## 2022-08-02 DIAGNOSIS — E86.0 DEHYDRATION: ICD-10-CM

## 2022-08-02 LAB — ALDOST SERPL-MCNC: 6 NG/DL (ref 0–30)

## 2022-08-03 ENCOUNTER — TELEPHONE (OUTPATIENT)
Dept: NEPHROLOGY | Facility: HOSPITAL | Age: 72
End: 2022-08-03

## 2022-08-03 DIAGNOSIS — N39.0 COMPLICATED UTI (URINARY TRACT INFECTION): ICD-10-CM

## 2022-08-03 DIAGNOSIS — E87.6 HYPOKALEMIA: ICD-10-CM

## 2022-08-03 DIAGNOSIS — E89.0 POSTABLATIVE HYPOTHYROIDISM: Primary | ICD-10-CM

## 2022-08-03 DIAGNOSIS — I10 PRIMARY HYPERTENSION: ICD-10-CM

## 2022-08-03 LAB — RENIN PLAS-CCNC: 0.78 NG/ML/HR (ref 0.17–5.38)

## 2022-08-03 NOTE — TELEPHONE ENCOUNTER
Called patient and informed him of the following information:    Patients other tests Dr Patti Ritter sent for the chemicals from the adrenal gland was not abnormal which is good  Patients last potassium was 3 2 when Dr Patti Ritter increased the potassium to 3 times a day  Please repeat this BMP next week on the same dose of potassium and will see if it has improved to normal  If it is not Dr Patti Ritter could add a medication to help reduce potassium losses in the urine  Patient verbally understood and had no further questions and stated he will go for the blood work sometime next week  The labs have been added to the patients chart

## 2022-08-03 NOTE — TELEPHONE ENCOUNTER
----- Message from Ashley Butt MD sent at 8/2/2022  4:07 PM EDT -----  Let patient know that the other tests I sent for the chemicals from the adrenal gland was not abnormal which is good  His last potassium was 3 2 when I increased his potassium to 3 times a day  Ask him to repeat this BMP next week on the same dose of potassium and will see if it has improved to normal   If it is not I could at a medication to help reduce potassium losses in the urine    Let me know I got the message and is okay with that   ----- Message -----  From: Lab, Background User  Sent: 7/27/2022  11:23 AM EDT  To: Ashley Butt MD

## 2022-08-04 ENCOUNTER — HOSPITAL ENCOUNTER (OUTPATIENT)
Dept: CT IMAGING | Facility: HOSPITAL | Age: 72
Discharge: HOME | End: 2022-08-04
Attending: INTERNAL MEDICINE
Payer: COMMERCIAL

## 2022-08-04 DIAGNOSIS — C16.9 MALIGNANT NEOPLASM OF STOMACH, UNSPECIFIED LOCATION (HCC): ICD-10-CM

## 2022-08-04 DIAGNOSIS — C16.9 GASTRIC ADENOCARCINOMA (HCC): ICD-10-CM

## 2022-08-04 PROCEDURE — 74177 CT ABD & PELVIS W/CONTRAST: CPT

## 2022-08-04 PROCEDURE — G1004 CDSM NDSC: HCPCS

## 2022-08-04 PROCEDURE — 71260 CT THORAX DX C+: CPT

## 2022-08-04 RX ADMIN — IOHEXOL 70 ML: 350 INJECTION, SOLUTION INTRAVENOUS at 12:17

## 2022-08-06 ENCOUNTER — HOSPITAL ENCOUNTER (OUTPATIENT)
Dept: INFUSION CENTER | Facility: CLINIC | Age: 72
Discharge: HOME | End: 2022-08-06
Payer: COMMERCIAL

## 2022-08-06 VITALS
OXYGEN SATURATION: 98 % | RESPIRATION RATE: 16 BRPM | TEMPERATURE: 97.6 F | DIASTOLIC BLOOD PRESSURE: 67 MMHG | HEART RATE: 58 BPM | SYSTOLIC BLOOD PRESSURE: 104 MMHG

## 2022-08-06 DIAGNOSIS — E86.0 DEHYDRATION: Primary | ICD-10-CM

## 2022-08-06 DIAGNOSIS — N39.0 COMPLICATED UTI (URINARY TRACT INFECTION): ICD-10-CM

## 2022-08-06 DIAGNOSIS — I10 PRIMARY HYPERTENSION: ICD-10-CM

## 2022-08-06 DIAGNOSIS — E87.6 HYPOKALEMIA: ICD-10-CM

## 2022-08-06 LAB
ANION GAP SERPL CALCULATED.3IONS-SCNC: 5 MMOL/L (ref 4–13)
BUN SERPL-MCNC: 25 MG/DL (ref 5–25)
CALCIUM SERPL-MCNC: 9.1 MG/DL (ref 8.4–10.2)
CHLORIDE SERPL-SCNC: 105 MMOL/L (ref 96–108)
CO2 SERPL-SCNC: 28 MMOL/L (ref 21–32)
CREAT SERPL-MCNC: 0.97 MG/DL (ref 0.6–1.3)
GFR SERPL CREATININE-BSD FRML MDRD: 77 ML/MIN/1.73SQ M
GLUCOSE SERPL-MCNC: 87 MG/DL (ref 65–140)
POTASSIUM SERPL-SCNC: 4.1 MMOL/L (ref 3.5–5.3)
SODIUM SERPL-SCNC: 138 MMOL/L (ref 135–147)

## 2022-08-06 PROCEDURE — 80048 BASIC METABOLIC PNL TOTAL CA: CPT | Performed by: INTERNAL MEDICINE

## 2022-08-06 PROCEDURE — 96361 HYDRATE IV INFUSION ADD-ON: CPT

## 2022-08-06 PROCEDURE — 96360 HYDRATION IV INFUSION INIT: CPT

## 2022-08-06 RX ADMIN — SODIUM CHLORIDE 1000 ML: 0.9 INJECTION, SOLUTION INTRAVENOUS at 12:05

## 2022-08-06 NOTE — PROGRESS NOTES
Patient here for hydration  Port flushed, blood return noted  Labs drawn per order, per patient he only requires labs ordered from Dr Lilli Mcdaniel today, will draw labs for Dr Chaka Dominguez appointment closer to appointment date  Patient resting with IVF infusing  No issues at this time  Will continue to monitor, call bell within reach

## 2022-08-08 ENCOUNTER — TELEPHONE (OUTPATIENT)
Dept: NEPHROLOGY | Facility: CLINIC | Age: 72
End: 2022-08-08

## 2022-08-08 NOTE — TELEPHONE ENCOUNTER
----- Message from Marce Pallas, MD sent at 8/6/2022 12:34 PM EDT -----  Let patient know potassium level was normal at 4 1 and his kidney function was normal as well   ----- Message -----  From: Lab, Background User  Sent: 8/6/2022  12:32 PM EDT  To: Marce Pallas, MD

## 2022-08-11 ENCOUNTER — TELEPHONE (OUTPATIENT)
Dept: NEPHROLOGY | Facility: CLINIC | Age: 72
End: 2022-08-11

## 2022-08-11 DIAGNOSIS — E87.6 HYPOPOTASSEMIA: ICD-10-CM

## 2022-08-11 DIAGNOSIS — I10 PRIMARY HYPERTENSION: Primary | ICD-10-CM

## 2022-08-11 DIAGNOSIS — E87.6 HYPOKALEMIA: ICD-10-CM

## 2022-08-11 NOTE — TELEPHONE ENCOUNTER
----- Message from Marian Pop MD sent at 8/11/2022  8:15 AM EDT -----  Leah Ritchie him repeat BMP in 2 weeks to see how potassium is

## 2022-08-13 ENCOUNTER — HOSPITAL ENCOUNTER (OUTPATIENT)
Dept: INFUSION CENTER | Facility: CLINIC | Age: 72
Discharge: HOME | End: 2022-08-13
Payer: COMMERCIAL

## 2022-08-13 VITALS
TEMPERATURE: 97 F | HEART RATE: 62 BPM | OXYGEN SATURATION: 98 % | DIASTOLIC BLOOD PRESSURE: 74 MMHG | SYSTOLIC BLOOD PRESSURE: 119 MMHG | RESPIRATION RATE: 16 BRPM

## 2022-08-13 DIAGNOSIS — E86.0 DEHYDRATION: Primary | ICD-10-CM

## 2022-08-13 DIAGNOSIS — E87.6 HYPOKALEMIA: ICD-10-CM

## 2022-08-13 PROCEDURE — 96360 HYDRATION IV INFUSION INIT: CPT

## 2022-08-13 PROCEDURE — 96361 HYDRATE IV INFUSION ADD-ON: CPT

## 2022-08-13 RX ADMIN — SODIUM CHLORIDE 1000 ML: 9 INJECTION, SOLUTION INTRAVENOUS at 12:05

## 2022-08-13 NOTE — PROGRESS NOTES
Patient here for hydration, says he is feeling well  Tolerated his infusion without incident  He is aware of his next appointment   Declined AVS

## 2022-08-13 NOTE — PROGRESS NOTES
Patient tolerated hydration without incident  Port with blood return, flushed well  Deaccessed by AL RN  Patient aware of next appts, declines AVS  Patient AAOx4 and ambulatory upon DC without gait disturbance noted

## 2022-08-15 ENCOUNTER — TELEPHONE (OUTPATIENT)
Dept: HEMATOLOGY ONCOLOGY | Facility: CLINIC | Age: 72
End: 2022-08-15

## 2022-08-15 ENCOUNTER — OFFICE VISIT (OUTPATIENT)
Dept: UROLOGY | Facility: AMBULATORY SURGERY CENTER | Age: 72
End: 2022-08-15
Payer: COMMERCIAL

## 2022-08-15 VITALS
DIASTOLIC BLOOD PRESSURE: 62 MMHG | HEART RATE: 73 BPM | OXYGEN SATURATION: 99 % | SYSTOLIC BLOOD PRESSURE: 92 MMHG | WEIGHT: 131 LBS | BODY MASS INDEX: 20.52 KG/M2

## 2022-08-15 DIAGNOSIS — N40.1 BENIGN PROSTATIC HYPERPLASIA WITH URINARY FREQUENCY: Primary | ICD-10-CM

## 2022-08-15 DIAGNOSIS — C16.9 GASTRIC ADENOCARCINOMA (HCC): Primary | ICD-10-CM

## 2022-08-15 DIAGNOSIS — R35.0 BENIGN PROSTATIC HYPERPLASIA WITH URINARY FREQUENCY: Primary | ICD-10-CM

## 2022-08-15 LAB — POST-VOID RESIDUAL VOLUME, ML POC: 0 ML

## 2022-08-15 PROCEDURE — 51798 US URINE CAPACITY MEASURE: CPT | Performed by: NURSE PRACTITIONER

## 2022-08-15 PROCEDURE — 99213 OFFICE O/P EST LOW 20 MIN: CPT | Performed by: NURSE PRACTITIONER

## 2022-08-15 RX ORDER — DEXTROSE MONOHYDRATE 50 MG/ML
20 INJECTION, SOLUTION INTRAVENOUS ONCE
Status: CANCELLED | OUTPATIENT
Start: 2022-08-22

## 2022-08-15 RX ORDER — SODIUM CHLORIDE 9 MG/ML
20 INJECTION, SOLUTION INTRAVENOUS ONCE AS NEEDED
Status: CANCELLED | OUTPATIENT
Start: 2022-08-22

## 2022-08-15 RX ORDER — PALONOSETRON 0.05 MG/ML
0.25 INJECTION, SOLUTION INTRAVENOUS ONCE
Status: CANCELLED | OUTPATIENT
Start: 2022-08-22

## 2022-08-15 NOTE — TELEPHONE ENCOUNTER
Medical Records Request Route to [facility]   Person calling in [place] from [facility]    Provider Dr Alyssa Sanchez   Fax Number / Omar Jimbo name (Attention:)   Maryjo    [phone]   Facility call back number [phone]   Patient call back number [phone]       Patient's most recent notes and scans

## 2022-08-15 NOTE — PROGRESS NOTES
8/15/2022    Assessment and Plan    67 y o  male managed by Dr Ariel Robles    1  Benign prostatic hyperplasia  · Status post robot assisted laparoscopic simple prostatectomy 04/27/2022  · Pathology-103 g of benign tissue  · Bladder scan PVR 0 ml  · Uroflow- unable to void in the office  · Will continue to monitor for worsening of all lower urinary tract symptoms  · Discussed symptoms for which patient should call the office he verbalized understanding  · Follow up in the office in 6 months with uroflow    2  Family history prostate cancer  · Brother    3  Stage IV gastric cancer    History of Present Illness  Damion Horton is a 67 y o  male here for follow up evaluation of  urinary symptoms secondary to benign prostatic hyperplasia  Patient is status post insertion of Wilder catheter 03/10/2022 status post evaluation emergency department for urinary retention  He has been managed with Wilder catheter insertion since that time and has been it  tolerating well      He is status post prostate biopsy 06/08/2021 with a noted prostate size of about 152 g  At his follow-up appointment it was recommended that he start finasteride to assist in reduction of prostate size and hopefully improve urinary symptoms  More recently, patient is status post robot assisted laparoscopic prostatectomy 04/27/2022 by Dr Ariel Robles for prostate size of 160 g  Patient reports continued episodes of nocturia upwards to 3-4 times at night  Reports drinking 60 oz of water daily and approximately half a cup of tea per day  He does not restrict fluids before bedtime as he reports always being thirsty  Review of Systems   Constitutional: Negative for chills and fever  Respiratory: Negative for cough and shortness of breath  Cardiovascular: Negative for chest pain  Gastrointestinal: Negative for abdominal distention, abdominal pain, blood in stool, nausea and vomiting     Genitourinary: Negative for difficulty urinating, dysuria, enuresis, flank pain, frequency, hematuria and urgency  Nocturia   Skin: Negative for rash  Past Medical History  Past Medical History:   Diagnosis Date    Arthritis     shoulders    Cancer ([facility] [address] )     gastric mass    COVID 01/2021 jan 2021- no hospitalization    Disease of thyroid gland     had radioactive iodine in past    GERD (gastroesophageal reflux disease)     History of GI diverticular bleed     Hyperlipidemia     Hypertension     on no meds at present    Kidney stone     Port-A-Cath in place     S/P percutaneous endoscopic gastrostomy (PEG) tube placement ([facility] [address] )     Tinnitus     Wears glasses        Past Social History  Past Surgical History:   Procedure Laterality Date    ADENOIDECTOMY      CHOLECYSTECTOMY LAPAROSCOPIC N/A 1/24/2022    Procedure: CHOLECYSTECTOMY LAPAROSCOPIC W/ INTRAOP CHOLANGIOGRAM;  Surgeon: Nadira Ramirez DO;  [facility];  Service: General    COLONOSCOPY N/A 7/16/2016    Procedure: COLONOSCOPY;  Surgeon: Peyton Carpio MD;  [facility];  Service:     FL CHOLANGIOGRAM TRANSHEPATIC  1/24/2022    FL GUIDED CENTRAL VENOUS ACCESS DEVICE INSERTION  2/11/2022    HERNIA REPAIR      MS LAP,PROSTATECTOMY,RADICAL,W/NERVE SPARE,INCL ROBOTIC N/A 4/27/2022    Procedure: PROSTATECTOMY SIMPLE LAPAROSCOPIC  W ROBOTICS, BLADDER NECK RECONSTRUCTION;  Surgeon: Toya Medeiros MD;  [facility];  Service: Urology    PROSTATE BIOPSY      TONSILLECTOMY      TUNNELED VENOUS PORT PLACEMENT N/A 2/11/2022    Procedure: INSERTION VENOUS PORT (PORT-A-CATH); Surgeon: Laurita Carcamo MD;  [facility];  Service: Surgical Oncology    UVULECTOMY       Social History     Tobacco Use   Smoking Status Never Smoker   Smokeless Tobacco Never Used       Past Family History  No family history on file      Past Social history  Social History     Socioeconomic History    Marital status: /Civil Union     Spouse name: Not on file    Number of children: Not on file    Years of education: Not on file    Highest education level: Not on file   Occupational History    Not on file   Tobacco Use    Smoking status: Never Smoker    Smokeless tobacco: Never Used   Vaping Use    Vaping Use: Never used   Substance and Sexual Activity    Alcohol use: Yes     Comment: social    Drug use: Never    Sexual activity: Not on file   Other Topics Concern    Not on file   Social History Narrative    Not on file     Social Determinants of Health     Financial Resource Strain: Not on file   Food Insecurity: No Food Insecurity    Worried About Running Out of Food in the Last Year: Never true    Amrita of Food in the Last Year: Never true   Transportation Needs: No Transportation Needs    Lack of Transportation (Medical): No    Lack of Transportation (Non-Medical):  No   Physical Activity: Not on file   Stress: Not on file   Social Connections: Not on file   Intimate Partner Violence: Not on file   Housing Stability: Low Risk     Unable to Pay for Housing in the Last Year: No    Number of Places Lived in the Last Year: 1    Unstable Housing in the Last Year: No       Current Medications  Current Outpatient Medications   Medication Sig Dispense Refill    bisacodyl (DULCOLAX) 5 mg EC tablet Take 1 tablet (5 mg total) by mouth daily as needed for constipation Related to pain medicines 30 tablet 0    dronabinol (MARINOL) 5 MG capsule Take 1 capsule (5 mg total) by mouth 2 (two) times a day before meals 60 capsule 0    [START ON 8/22/2022] fluorouracil 4,130 mg in CADD/Elastomeric Infusion Device Infuse 4,130 mg (1,200 mg/m2/day x 1 72 m2) into a catheter in a vein via infusion device over 46 hours for 2 days  Do not start before August 22, 2022  1 each 0    levocetirizine (XYZAL) 5 MG tablet Take 5 mg by mouth in the morning      levothyroxine (Euthyrox) 150 mcg tablet Take 1 tablet (150 mcg total) by mouth daily in the early morning 90 tablet 3    magnesium chloride-calcium (Slow-Mag) 71 5-119 mg Take 1 tablet by mouth 2 (two) times a day 60 tablet 5    neomycin-bacitracin-polymyxin b (NEOSPORIN) ointment Apply to the tip of the penis twice daily as needed for catheter irritation  15 g 0    Nutritional Supplements (jevity 1 5 tam) LIQD Tube Feeding with Oral Diet Jevity 1 5 Bolus 240 ml q4h Flush 50 cc water with each bolus 1000 mL 3    nystatin (MYCOSTATIN) 500,000 units/5 mL suspension Apply 5 mL (500,000 Units total) to the mouth or throat 4 (four) times a day 200 mL 0    OLANZapine (ZyPREXA) 2 5 mg tablet Take 1 tablet (2 5 mg total) by mouth daily at bedtime 30 tablet 0    ondansetron (Zofran ODT) 4 mg disintegrating tablet Take 2 tablets (8 mg total) by mouth every 8 (eight) hours as needed for nausea or vomiting for up to 90 doses 90 tablet 0    oxyCODONE (OxyCONTIN) 10 mg 12 hr tablet Take 1 tablet (10 mg total) by mouth every 8 (eight) hours Max Daily Amount: 30 mg 90 tablet 0    oxyCODONE (ROXICODONE) 5 mg/5 mL solution Take 10-20mg PO Q4H PRN Moderate-severe pain (Patient not taking: Reported on 7/14/2022) 473 mL 0    pantoprazole (PROTONIX) 40 mg tablet Take 1 tablet (40 mg total) by mouth 2 (two) times a day Before breakfast and before bed to prevent acid accumulation  60 tablet 0    potassium chloride (K-DUR,KLOR-CON) 10 mEq tablet Take 1 tablet (10 mEq total) by mouth 3 (three) times a day 90 tablet 5    potassium chloride (MICRO-K) 10 MEQ CR capsule Take 1 capsule (10 mEq total) by mouth in the morning for 5 days (Patient not taking: No sig reported) 5 capsule 0    prochlorperazine (COMPAZINE) 5 mg tablet Take 1 tablet (5 mg total) by mouth every 6 (six) hours as needed for nausea or vomiting (Patient not taking: Reported on 7/18/2022) 30 tablet 2    saliva substitute (MOUTH KOTE) Apply 5 sprays to the mouth or throat 4 (four) times a day as needed (dry mouth) 59 mL 0     No current facility-administered medications for this visit  Allergies  No Known Allergies      The following portions of the patient's history were reviewed and updated as appropriate: allergies, current medications, past medical history, past social history, past surgical history and problem list       Vitals  There were no vitals filed for this visit  Physical Exam  Physical Exam  Vitals reviewed  Constitutional:       General: He is not in acute distress  Appearance: Normal appearance  He is normal weight  HENT:      Head: Normocephalic  Pulmonary:      Effort: No respiratory distress  Breath sounds: Normal breath sounds  Skin:     General: Skin is warm and dry  Neurological:      General: No focal deficit present  Mental Status: He is alert and oriented to person, place, and time     Psychiatric:         Mood and Affect: Mood normal          Behavior: Behavior normal        Results  No results found for this or any previous visit (from the past 1 hour(s)) ]  Lab Results   Component Value Date    PSA 0 2 05/13/2022    PSA 7 2 (H) 03/08/2021     Lab Results   Component Value Date    CALCIUM 9 1 08/06/2022    K 4 1 08/06/2022    CO2 28 08/06/2022     08/06/2022    BUN 25 08/06/2022    CREATININE 0 97 08/06/2022     Lab Results   Component Value Date    WBC 5 55 07/22/2022    HGB 9 5 (L) 07/22/2022    HCT 28 5 (L) 07/22/2022    MCV 88 07/22/2022     07/22/2022     Orders  Orders Placed This Encounter   Procedures    POCT Measure PVR       JADEN Leon

## 2022-08-16 ENCOUNTER — OFFICE VISIT (OUTPATIENT)
Dept: HEMATOLOGY ONCOLOGY | Facility: CLINIC | Age: 72
End: 2022-08-16
Payer: COMMERCIAL

## 2022-08-16 ENCOUNTER — TELEPHONE (OUTPATIENT)
Dept: HEMATOLOGY ONCOLOGY | Facility: CLINIC | Age: 72
End: 2022-08-16

## 2022-08-16 VITALS
HEART RATE: 76 BPM | TEMPERATURE: 97.8 F | OXYGEN SATURATION: 97 % | RESPIRATION RATE: 14 BRPM | DIASTOLIC BLOOD PRESSURE: 72 MMHG | SYSTOLIC BLOOD PRESSURE: 120 MMHG | BODY MASS INDEX: 20.88 KG/M2 | HEIGHT: 67 IN | WEIGHT: 133 LBS

## 2022-08-16 DIAGNOSIS — G89.3 CANCER-RELATED PAIN: ICD-10-CM

## 2022-08-16 DIAGNOSIS — C16.9 GASTRIC ADENOCARCINOMA (HCC): ICD-10-CM

## 2022-08-16 DIAGNOSIS — Z51.5 PALLIATIVE CARE PATIENT: ICD-10-CM

## 2022-08-16 DIAGNOSIS — C16.9 MALIGNANT NEOPLASM OF STOMACH, UNSPECIFIED LOCATION (HCC): ICD-10-CM

## 2022-08-16 DIAGNOSIS — C16.9 GASTRIC ADENOCARCINOMA (HCC): Primary | ICD-10-CM

## 2022-08-16 PROCEDURE — 1160F RVW MEDS BY RX/DR IN RCRD: CPT | Performed by: INTERNAL MEDICINE

## 2022-08-16 PROCEDURE — 99214 OFFICE O/P EST MOD 30 MIN: CPT | Performed by: INTERNAL MEDICINE

## 2022-08-16 NOTE — TELEPHONE ENCOUNTER
Rodolfo Lopez checking status on request for patients notes    Patient has an appointment on 8/18/22 and would like to review notes    Fax [phone]

## 2022-08-16 NOTE — TELEPHONE ENCOUNTER
Chair time adjusted  Cycle 10 is scheduled 1 day late due to holiday  Please have dates changed and let us know so we can schedule more tx for pt      Thank you,    Alexandra Buerger

## 2022-08-16 NOTE — TELEPHONE ENCOUNTER
I will get date changed for cycle 10  Can we schedule the rest of his cycles on the dates that are there?

## 2022-08-16 NOTE — TELEPHONE ENCOUNTER
Change to patient's tx plan  Scheduled appts can be kept, may just need to adjust chair time  Also, please schedule out a few more treatments  Thank you!

## 2022-08-16 NOTE — PROGRESS NOTES
Hematology/Oncology Outpatient Follow- up Note  Alyssa Kenny 67 y o  male MRN: @ Encounter: [id number]        Date:  8/16/2022    Presenting Complaint/Diagnosis : Metastatic GE junction malignancy, HER2 negative, MSI stable, tumor mutation burden low       HPI:    42-year-old  male with benign prostatic hypertrophy, hypertension, degenerative arthritis, hypothyroidism, GERD had been complaining of epigastric pain with on a 30 lb weight loss over the past 2 months, loss of appetite, he had cholecystectomy without improvement of symptoms in January 2022 subsequently EGD on 02/02/2022 showed 2 cm hiatal hernia with friable mass involving more than half of the circumference with extension down into the cardia and the fundus for about 5 cm mild erythema in the antrum     Biopsy of the gastric fundus mass showed adenocarcinoma with small portion squamous component arising in high-grade dysplasia in a background mixed squamous and cardiac mucosa the majority of the tumor is adenocarcinoma with minor component of squamous differentiation  Mismatch repair protein is intact     HER2 is 0 by IHC     CT scan the chest abdomen pelvis showed infiltrating mass at the gastroesophageal junction sinan enlargement suspicious for metastatic disease in the mediastinum, retroperitoneum, omentum, subcentimeter bilateral pulmonary nodules     PET scan on 02/09/2022 showed FDG uptake in the gastroesophageal junction and proximal stomach, sinan disease in the chest, abdomen, periaortic, small bilateral pulmonary nodules, uptake in the right upper quadrant omental nodules, uptake along the lateral service of the right lobe of the liver for possible peritoneal disease along the liver surface      The patient was started on modified FOLFOX 6 with Opdivo every 2 weeks      Previous Hematologic/ Oncologic History:    Oncology History   Gastric adenocarcinoma ([facility] [address] )   2/8/2022 Initial Diagnosis    Gastric adenocarcinoma ([address] ) Medication:   Requested Prescriptions     Pending Prescriptions Disp Refills    HYDROcodone-acetaminophen (NORCO) 7.5-325 MG per tablet 120 tablet 0     Sig: Take 1 tablet by mouth every 6 hours as needed for Pain for up to 30 days. Last Filled:  03/03/2020 #120 0 rf     Patient Phone Number: [phone] (home)     Last appt: 2/27/2020   Next appt: Visit date not found    Last OARRS:   RX Monitoring 3/3/2020   Attestation -   Acute Pain Prescriptions -   Periodic Controlled Substance Monitoring No signs of potential drug abuse or diversion identified.    Chronic Pain > 80 MEDD - 2/21/2022 -  Chemotherapy    palonosetron (ALOXI), 0 25 mg, Intravenous, Once, 7 of 15 cycles  Administration: 0 25 mg (3/21/2022), 0 25 mg (4/4/2022), 0 25 mg (5/16/2022), 0 25 mg (6/13/2022), 0 25 mg (6/27/2022), 0 25 mg (7/11/2022)  fosaprepitant (EMEND) IVPB, 150 mg, Intravenous, Once, 7 of 15 cycles  Administration: 150 mg (3/21/2022), 150 mg (4/4/2022), 150 mg (5/16/2022), 150 mg (6/13/2022), 150 mg (6/27/2022), 150 mg (7/11/2022)  nivolumab (OPDIVO) IVPB, 240 mg (100 % of original dose 240 mg), Intravenous, Once, 9 of 17 cycles  Dose modification: 240 mg (original dose 240 mg, Cycle 1)  Administration: 240 mg (2/21/2022), 240 mg (3/7/2022), 240 mg (3/21/2022), 240 mg (4/4/2022), 240 mg (5/16/2022), 240 mg (7/11/2022), 240 mg (6/13/2022), 240 mg (6/27/2022)  oxaliplatin (ELOXATIN) chemo infusion, 85 mg/m2 = 164 9 mg, Intravenous, Once, 9 of 17 cycles  Dose modification: 65 mg/m2 (original dose 85 mg/m2, Cycle 6, Reason: Other (Must fill in a comment), Comment: Elevated creatinine)  Administration: 164 9 mg (2/21/2022), 164 9 mg (3/7/2022), 164 9 mg (3/21/2022), 164 9 mg (4/4/2022), 158 1 mg (5/16/2022), 118 3 mg (6/13/2022), 113 75 mg (6/27/2022), 111 8 mg (7/11/2022)  fluorouracil (ADRUCIL) ambulatory infusion Soln, 1,200 mg/m2/day = 4,655 mg, Intravenous, Over 46 hours, 9 of 17 cycles         Current Hematologic/ Oncologic Treatment:      Dose adjusted modified FOLFOX 6 with immunotherapy  Cycle 10 is on the 5th of September    Interval History:      Patient returns for follow-up visit  Most recent imaging shows improvement of metastatic pulmonary nodules in the interval with no enlarged adenopathy  At this point with his performance status I think it is reasonable to discontinue oxaliplatin since he has had 9 cycles so far  We will also dose reduce 5 fluorouracil to 2000 milligrams/meter subcutaneously in the pump  He will continue to follow with our colleagues in [address]    His appetite is still not very good  Does feel fatigued  Hopefully the dose reductions will help  He is fatigued  The rest of his 14 point review of systems today was negative  All in all he has had a very good response to treatment so I think dose reductions are reasonable  Test Results:    Imaging: CT chest abdomen pelvis w contrast    Result Date: 8/10/2022  Narrative: CT CHEST, ABDOMEN AND PELVIS WITH IV CONTRAST INDICATION:   C16 9: Malignant neoplasm of stomach, unspecified  COMPARISON:  CT chest abdomen pelvis 2/4/2022 and CT stone search 5/29/2022 TECHNIQUE: CT examination of the chest, abdomen and pelvis was performed  Axial, sagittal, and coronal 2D reformatted images were created from the source data and submitted for interpretation  Radiation dose length product (DLP) for this visit:  569 mGy-cm   This examination, like all CT scans performed in the [facility], was performed utilizing techniques to minimize radiation dose exposure, including the use of iterative reconstruction and automated exposure control  IV Contrast:  70 mL of iohexol (OMNIPAQUE) Enteric Contrast: Enteric contrast was not administered  FINDINGS: CHEST LUNGS:  Marked interval improvement of bilateral pulmonary nodules with resolution of most nodules and a couple residual nodules are stable or smaller  For example, a 5 mm nodule in the left lower lobe adjacent to the major fissure on image 4/43 is similar size  There is a 3 mm nodule in the left lower lobe on image 4/84 with some mild surrounding groundglass opacities  This nodule was previously 9 mm  PLEURA:  Unremarkable  HEART/GREAT VESSELS: Heart size normal   Coronary artery calcification  No pericardial effusion  No thoracic aortic aneurysm  MEDIASTINUM AND SHERWIN:  Unremarkable  CHEST WALL AND LOWER NECK:  Unremarkable  ABDOMEN LIVER/BILIARY TREE:  Unremarkable  GALLBLADDER:  No calcified gallstones  No pericholecystic inflammatory change  SPLEEN:  Unremarkable  PANCREAS:  Unremarkable  ADRENAL GLANDS:  Stable small adrenal nodules  KIDNEYS/URETERS:  Bilateral nonobstructing renal stones measuring 5 mm or less  No hydronephrosis  STOMACH AND BOWEL:  Percutaneous gastrostomy tube  APPENDIX:  No findings to suggest appendicitis  ABDOMINOPELVIC CAVITY:  No ascites  No pneumoperitoneum  No lymphadenopathy  VESSELS:  Atherosclerotic changes are present  No evidence of aneurysm  PELVIS REPRODUCTIVE ORGANS:  Status post prostatectomy  URINARY BLADDER:  Unremarkable  ABDOMINAL WALL/INGUINAL REGIONS:  Unremarkable  OSSEOUS STRUCTURES:  No acute fracture or destructive osseous lesion  Impression: 1  Improvement of metastatic pulmonary nodules in the interval  2   No enlarged adenopathy  Workstation performed: [id number]       Labs:   Lab Results   Component Value Date    WBC 5 55 07/22/2022    HGB 9 5 (L) 07/22/2022    HCT 28 5 (L) 07/22/2022    MCV 88 07/22/2022     07/22/2022     Lab Results   Component Value Date    K 4 1 08/06/2022     08/06/2022    CO2 28 08/06/2022    BUN 25 08/06/2022    CREATININE 0 97 08/06/2022    GLUF 97 02/18/2022    CALCIUM 9 1 08/06/2022    CORRECTEDCA 9 2 06/01/2022    AST 17 07/22/2022    ALT 15 07/22/2022    ALKPHOS 117 (H) 07/22/2022    EGFR 77 08/06/2022       Lab Results   Component Value Date    PSA 0 2 05/13/2022    PSA 7 2 (H) 03/08/2021       Lab Results   Component Value Date    IRON 28 (L) 03/03/2022    TIBC 254 03/03/2022    FERRITIN 429 (H) 03/03/2022       ROS: As stated in the history of present illness otherwise his 14 point review of systems today was negative        Active Problems:   Patient Active Problem List   Diagnosis    Hypertension    Postablative hypothyroidism    Depression    BPH (benign prostatic hyperplasia)    COVID-19    Inflammatory arthritis    Rectus diastasis    Acute calculous cholecystitis    Gastric mass    Gastric adenocarcinoma ([address] )    Encounter for central line care    FTT (failure to thrive) in adult    Stomach cancer ([facility] [address] )    Cancer related pain    Moderate protein-calorie malnutrition [facility] )    Palliative care patient    Benign prostatic hyperplasia with urinary frequency    Acute, Complicated UTI with Acute Kidney Injury     Rash    Constipation    Dehydration    Hypokalemia       Past Medical History:   Past Medical History:   Diagnosis Date    Arthritis     shoulders    Cancer ([facility] [address] )     gastric mass    COVID 01/2021 jan 2021- no hospitalization    Disease of thyroid gland     had radioactive iodine in past    GERD (gastroesophageal reflux disease)     History of GI diverticular bleed     Hyperlipidemia     Hypertension     on no meds at present    Kidney stone     Port-A-Cath in place     S/P percutaneous endoscopic gastrostomy (PEG) tube placement ([place] [id number] )     Tinnitus     Wears glasses        Surgical History:   Past Surgical History:   Procedure Laterality Date    ADENOIDECTOMY      CHOLECYSTECTOMY LAPAROSCOPIC N/A 1/24/2022    Procedure: CHOLECYSTECTOMY LAPAROSCOPIC W/ INTRAOP CHOLANGIOGRAM;  Surgeon: Jennyfer Edwards DO;  [facility];  Service: General    COLONOSCOPY N/A 7/16/2016    Procedure: COLONOSCOPY;  Surgeon: Viviana Regalado MD;  [facility];  Service:     FL CHOLANGIOGRAM TRANSHEPATIC  1/24/2022    FL GUIDED CENTRAL VENOUS ACCESS DEVICE INSERTION  2/11/2022    HERNIA REPAIR      AR LAP,PROSTATECTOMY,RADICAL,W/NERVE SPARE,INCL ROBOTIC N/A 4/27/2022    Procedure: PROSTATECTOMY SIMPLE LAPAROSCOPIC  W ROBOTICS, BLADDER NECK RECONSTRUCTION;  Surgeon: Leslie Magana MD;  [facility];  Service: Urology    PROSTATE BIOPSY      TONSILLECTOMY      TUNNELED VENOUS PORT PLACEMENT N/A 2/11/2022    Procedure: INSERTION VENOUS PORT (PORT-A-CATH); Surgeon: Jennifer Velasquez MD;  [facility];  Service: Surgical Oncology    UVULECTOMY         Family History:  No family history on file      Cancer-related family history is not on file  Social History:   Social History     Socioeconomic History    Marital status: /Civil Union     Spouse name: Not on file    Number of children: Not on file    Years of education: Not on file    Highest education level: Not on file   Occupational History    Not on file   Tobacco Use    Smoking status: Never Smoker    Smokeless tobacco: Never Used   Vaping Use    Vaping Use: Never used   Substance and Sexual Activity    Alcohol use: Yes     Comment: social    Drug use: Never    Sexual activity: Not on file   Other Topics Concern    Not on file   Social History Narrative    Not on file     Social Determinants of Health     Financial Resource Strain: Not on file   Food Insecurity: No Food Insecurity    Worried About Running Out of Food in the Last Year: Never true    Amrita of Food in the Last Year: Never true   Transportation Needs: No Transportation Needs    Lack of Transportation (Medical): No    Lack of Transportation (Non-Medical):  No   Physical Activity: Not on file   Stress: Not on file   Social Connections: Not on file   Intimate Partner Violence: Not on file   Housing Stability: Low Risk     Unable to Pay for Housing in the Last Year: No    Number of Places Lived in the Last Year: 1    Unstable Housing in the Last Year: No       Current Medications:   Current Outpatient Medications   Medication Sig Dispense Refill    bisacodyl (DULCOLAX) 5 mg EC tablet Take 1 tablet (5 mg total) by mouth daily as needed for constipation Related to pain medicines 30 tablet 0    dronabinol (MARINOL) 5 MG capsule Take 1 capsule (5 mg total) by mouth 2 (two) times a day before meals 60 capsule 0    [START ON 8/22/2022] fluorouracil 4,130 mg in CADD/Elastomeric Infusion Device Infuse 4,130 mg (1,200 mg/m2/day x 1 72 m2) into a catheter in a vein via infusion device over 46 hours for 2 days  Do not start before August 22, 2022  1 each 0    levocetirizine (XYZAL) 5 MG tablet Take 5 mg by mouth in the morning      levothyroxine (Euthyrox) 150 mcg tablet Take 1 tablet (150 mcg total) by mouth daily in the early morning 90 tablet 3    magnesium chloride-calcium (Slow-Mag) 71 5-119 mg Take 1 tablet by mouth 2 (two) times a day 60 tablet 5    neomycin-bacitracin-polymyxin b (NEOSPORIN) ointment Apply to the tip of the penis twice daily as needed for catheter irritation  15 g 0    Nutritional Supplements (jevity 1 5 tam) LIQD Tube Feeding with Oral Diet Jevity 1 5 Bolus 240 ml q4h Flush 50 cc water with each bolus 1000 mL 3    nystatin (MYCOSTATIN) 500,000 units/5 mL suspension Apply 5 mL (500,000 Units total) to the mouth or throat 4 (four) times a day 200 mL 0    OLANZapine (ZyPREXA) 2 5 mg tablet Take 1 tablet (2 5 mg total) by mouth daily at bedtime 30 tablet 0    ondansetron (Zofran ODT) 4 mg disintegrating tablet Take 2 tablets (8 mg total) by mouth every 8 (eight) hours as needed for nausea or vomiting for up to 90 doses 90 tablet 0    oxyCODONE (OxyCONTIN) 10 mg 12 hr tablet Take 1 tablet (10 mg total) by mouth every 8 (eight) hours Max Daily Amount: 30 mg 90 tablet 0    pantoprazole (PROTONIX) 40 mg tablet Take 1 tablet (40 mg total) by mouth 2 (two) times a day Before breakfast and before bed to prevent acid accumulation   60 tablet 0    potassium chloride (K-DUR,KLOR-CON) 10 mEq tablet Take 1 tablet (10 mEq total) by mouth 3 (three) times a day 90 tablet 5    prochlorperazine (COMPAZINE) 5 mg tablet Take 1 tablet (5 mg total) by mouth every 6 (six) hours as needed for nausea or vomiting 30 tablet 2    saliva substitute (MOUTH KOTE) Apply 5 sprays to the mouth or throat 4 (four) times a day as needed (dry mouth) 59 mL 0    oxyCODONE (ROXICODONE) 5 mg/5 mL solution Take 10-20mg PO Q4H PRN Moderate-severe pain (Patient not taking: No sig reported) 473 mL 0    potassium chloride (MICRO-K) 10 MEQ CR capsule Take 1 capsule (10 mEq total) by mouth in the morning for 5 days (Patient not taking: No sig reported) 5 capsule 0     No current facility-administered medications for this visit  Allergies: No Known Allergies    Physical Exam:    Body surface area is 1 7 meters squared  Wt Readings from Last 3 Encounters:   08/16/22 60 3 kg (133 lb)   08/15/22 59 4 kg (131 lb)   07/25/22 60 8 kg (134 lb)        Temp Readings from Last 3 Encounters:   08/16/22 97 8 °F (36 6 °C) (Temporal)   08/13/22 (!) 97 °F (36 1 °C) (Temporal)   08/06/22 97 6 °F (36 4 °C) (Temporal)        BP Readings from Last 3 Encounters:   08/16/22 120/72   08/15/22 92/62   08/13/22 119/74         Pulse Readings from Last 3 Encounters:   08/16/22 76   08/15/22 73   08/13/22 62        Physical Exam     Constitutional   General appearance: No acute distress, well appearing and well nourished  Eyes   Conjunctiva and lids: No swelling, erythema or discharge  Pupils and irises: Equal, round and reactive to light  Ears, Nose, Mouth, and Throat   External inspection of ears and nose: Normal     Nasal mucosa, septum, and turbinates: Normal without edema or erythema  Oropharynx: Normal with no erythema, edema, exudate or lesions  Pulmonary   Respiratory effort: No increased work of breathing or signs of respiratory distress  Auscultation of lungs: Clear to auscultation  Cardiovascular   Palpation of heart: Normal PMI, no thrills  Auscultation of heart: Normal rate and rhythm, normal S1 and S2, without murmurs  Examination of extremities for edema and/or varicosities: Normal     Carotid pulses: Normal     Abdomen   Abdomen: Non-tender, no masses  Liver and spleen: No hepatomegaly or splenomegaly  Lymphatic   Palpation of lymph nodes in neck: No lymphadenopathy  Musculoskeletal   Gait and station:   Walks with support secondary to fatigue  Digits and nails: Normal without clubbing or cyanosis      Inspection/palpation of joints, bones, and muscles: Normal     Skin   Skin and subcutaneous tissue: Normal without rashes or lesions  Neurologic   Cranial nerves: Cranial nerves 2-12 intact  Sensation: No sensory loss  Psychiatric   Orientation to person, place, and time: Normal     Mood and affect: Normal         Assessment / Plan: 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 benign prostatic hypertrophy, hypertension, hypothyroidism and GERD was diagnosed with adenocarcinoma of the GE junction with extension into the fundus of the stomach when he presented with weight loss and epigastric pain   Biopsy confirmed adenocarcinoma which was HER2 negative   MMR was intact   Tumor mutation burden is low   MSI was stable   No other actionable mutations were detected   [facility] testing did show he may be eligible for TAPUR clinical trial which may be open with [facility] which looks at mutations and treatment with FDA approved drugs for those mutations outside of standard of care  Nickolas Lunsford did have CDK 6 amplification and EGFR amplification for which they may have clinical trial if needed       The patient is currently on modified FOLFOX 6 with Opdivo  He has had a very nice response to treatment with resolution of the lymphadenopathy per the most recent imaging  At this point I think discontinuing oxaliplatin is reasonable and we can decrease the 5 fluorouracil dose to 2000 milligrams/meter2 over 46 hours from 2400  The patient and his daughter in agreement with this  He has been more fatigued so I think it is reasonable  I will see him back in 6 weeks  We will repeat imaging in 3-4 months  If he continues to have problems despite the dose reduction we will discontinue the 5 FU pump  Goals and Barriers:  Current Goal:  Prolong Survival from metastatic adenocarcinoma of the GE junction  Barriers: None  Patient's Capacity to Self Care:  Patient able to self care      Portions of the record may have been created with voice recognition software  Occasional wrong word or "sound a like" substitutions may have occurred due to the inherent limitations of voice recognition software  Read the chart carefully and recognize, using context, where substitutions have occurred

## 2022-08-17 ENCOUNTER — TELEPHONE (OUTPATIENT)
Dept: HEMATOLOGY ONCOLOGY | Facility: CLINIC | Age: 72
End: 2022-08-17

## 2022-08-17 NOTE — TELEPHONE ENCOUNTER
Renie Hammans from [facility], [facility] called regarding a missing "gardet report" from record request  Renie Hammans is requesting Davi Ling Report be faxed to [phone]  Renie Hammans can also be reached at [phone] for any questions

## 2022-08-17 NOTE — TELEPHONE ENCOUNTER
Spoke with patient to make him aware of his updated infusion schedule  He will view his schedule in more detail on his MyChart

## 2022-08-18 ENCOUNTER — HOSPITAL ENCOUNTER (OUTPATIENT)
Dept: INFUSION CENTER | Facility: CLINIC | Age: 72
Discharge: HOME | End: 2022-08-18
Payer: COMMERCIAL

## 2022-08-18 DIAGNOSIS — E87.6 HYPOKALEMIA: ICD-10-CM

## 2022-08-18 DIAGNOSIS — E86.0 DEHYDRATION: Primary | ICD-10-CM

## 2022-08-18 PROCEDURE — 96360 HYDRATION IV INFUSION INIT: CPT

## 2022-08-18 PROCEDURE — 96361 HYDRATE IV INFUSION ADD-ON: CPT

## 2022-08-18 RX ADMIN — SODIUM CHLORIDE 1000 ML: 0.9 INJECTION, SOLUTION INTRAVENOUS at 10:37

## 2022-08-22 ENCOUNTER — HOSPITAL ENCOUNTER (OUTPATIENT)
Dept: INFUSION CENTER | Facility: CLINIC | Age: 72
Discharge: HOME | End: 2022-08-22
Payer: COMMERCIAL

## 2022-08-22 ENCOUNTER — PATIENT MESSAGE (OUTPATIENT)
Dept: PALLIATIVE MEDICINE | Facility: CLINIC | Age: 72
End: 2022-08-22

## 2022-08-22 VITALS
WEIGHT: 131.5 LBS | RESPIRATION RATE: 18 BRPM | SYSTOLIC BLOOD PRESSURE: 111 MMHG | BODY MASS INDEX: 20.64 KG/M2 | OXYGEN SATURATION: 99 % | TEMPERATURE: 97.4 F | DIASTOLIC BLOOD PRESSURE: 74 MMHG | HEART RATE: 64 BPM | HEIGHT: 67 IN

## 2022-08-22 DIAGNOSIS — F32.A DEPRESSION: Primary | ICD-10-CM

## 2022-08-22 DIAGNOSIS — C16.9 GASTRIC ADENOCARCINOMA (HCC): Primary | ICD-10-CM

## 2022-08-22 LAB
ALBUMIN SERPL BCP-MCNC: 3.6 G/DL (ref 3.5–5)
ALP SERPL-CCNC: 118 U/L (ref 34–104)
ALT SERPL W P-5'-P-CCNC: 12 U/L (ref 7–52)
ANION GAP SERPL CALCULATED.3IONS-SCNC: 6 MMOL/L (ref 4–13)
AST SERPL W P-5'-P-CCNC: 17 U/L (ref 13–39)
BASOPHILS # BLD AUTO: 0.01 THOUSANDS/ΜL (ref 0–0.1)
BASOPHILS NFR BLD AUTO: 0 % (ref 0–1)
BILIRUB SERPL-MCNC: 0.65 MG/DL (ref 0.2–1)
BUN SERPL-MCNC: 22 MG/DL (ref 5–25)
CALCIUM SERPL-MCNC: 9.2 MG/DL (ref 8.4–10.2)
CHLORIDE SERPL-SCNC: 105 MMOL/L (ref 96–108)
CO2 SERPL-SCNC: 29 MMOL/L (ref 21–32)
CREAT SERPL-MCNC: 0.95 MG/DL (ref 0.6–1.3)
EOSINOPHIL # BLD AUTO: 0.22 THOUSAND/ΜL (ref 0–0.61)
EOSINOPHIL NFR BLD AUTO: 4 % (ref 0–6)
ERYTHROCYTE [DISTWIDTH] IN BLOOD BY AUTOMATED COUNT: 15.2 % (ref 11.6–15.1)
GFR SERPL CREATININE-BSD FRML MDRD: 79 ML/MIN/1.73SQ M
GLUCOSE SERPL-MCNC: 130 MG/DL (ref 65–140)
HCT VFR BLD AUTO: 31.5 % (ref 36.5–49.3)
HGB BLD-MCNC: 9.9 G/DL (ref 12–17)
IMM GRANULOCYTES # BLD AUTO: 0.03 THOUSAND/UL (ref 0–0.2)
IMM GRANULOCYTES NFR BLD AUTO: 1 % (ref 0–2)
LYMPHOCYTES # BLD AUTO: 1.3 THOUSANDS/ΜL (ref 0.6–4.47)
LYMPHOCYTES NFR BLD AUTO: 22 % (ref 14–44)
MCH RBC QN AUTO: 29.9 PG (ref 26.8–34.3)
MCHC RBC AUTO-ENTMCNC: 31.4 G/DL (ref 31.4–37.4)
MCV RBC AUTO: 95 FL (ref 82–98)
MONOCYTES # BLD AUTO: 0.46 THOUSAND/ΜL (ref 0.17–1.22)
MONOCYTES NFR BLD AUTO: 8 % (ref 4–12)
NEUTROPHILS # BLD AUTO: 3.89 THOUSANDS/ΜL (ref 1.85–7.62)
NEUTS SEG NFR BLD AUTO: 65 % (ref 43–75)
NRBC BLD AUTO-RTO: 0 /100 WBCS
PLATELET # BLD AUTO: 268 THOUSANDS/UL (ref 149–390)
PMV BLD AUTO: 9.5 FL (ref 8.9–12.7)
POTASSIUM SERPL-SCNC: 3.6 MMOL/L (ref 3.5–5.3)
PROT SERPL-MCNC: 6.6 G/DL (ref 6.4–8.4)
RBC # BLD AUTO: 3.31 MILLION/UL (ref 3.88–5.62)
SODIUM SERPL-SCNC: 140 MMOL/L (ref 135–147)
T4 FREE SERPL-MCNC: 1.44 NG/DL (ref 0.76–1.46)
TSH SERPL DL<=0.05 MIU/L-ACNC: 0.08 UIU/ML (ref 0.45–4.5)
WBC # BLD AUTO: 5.91 THOUSAND/UL (ref 4.31–10.16)

## 2022-08-22 PROCEDURE — 84439 ASSAY OF FREE THYROXINE: CPT | Performed by: INTERNAL MEDICINE

## 2022-08-22 PROCEDURE — 80053 COMPREHEN METABOLIC PANEL: CPT | Performed by: INTERNAL MEDICINE

## 2022-08-22 PROCEDURE — 96413 CHEMO IV INFUSION 1 HR: CPT

## 2022-08-22 PROCEDURE — 84443 ASSAY THYROID STIM HORMONE: CPT | Performed by: INTERNAL MEDICINE

## 2022-08-22 PROCEDURE — 96367 TX/PROPH/DG ADDL SEQ IV INF: CPT

## 2022-08-22 PROCEDURE — 85025 COMPLETE CBC W/AUTO DIFF WBC: CPT | Performed by: INTERNAL MEDICINE

## 2022-08-22 PROCEDURE — 96375 TX/PRO/DX INJ NEW DRUG ADDON: CPT

## 2022-08-22 RX ORDER — SODIUM CHLORIDE 9 MG/ML
20 INJECTION, SOLUTION INTRAVENOUS ONCE AS NEEDED
Status: DISCONTINUED | OUTPATIENT
Start: 2022-08-22 | End: 2022-08-25 | Stop reason: HOSPADM

## 2022-08-22 RX ORDER — PALONOSETRON 0.05 MG/ML
0.25 INJECTION, SOLUTION INTRAVENOUS ONCE
Status: COMPLETED | OUTPATIENT
Start: 2022-08-22 | End: 2022-08-22

## 2022-08-22 RX ORDER — DEXTROSE MONOHYDRATE 50 MG/ML
20 INJECTION, SOLUTION INTRAVENOUS ONCE
Status: DISCONTINUED | OUTPATIENT
Start: 2022-08-22 | End: 2022-08-25 | Stop reason: HOSPADM

## 2022-08-22 RX ADMIN — FOSAPREPITANT 150 MG: 150 INJECTION, POWDER, LYOPHILIZED, FOR SOLUTION INTRAVENOUS at 09:55

## 2022-08-22 RX ADMIN — DEXAMETHASONE SODIUM PHOSPHATE 10 MG: 10 INJECTION, SOLUTION INTRAMUSCULAR; INTRAVENOUS at 09:24

## 2022-08-22 RX ADMIN — SODIUM CHLORIDE 240 MG: 9 INJECTION, SOLUTION INTRAVENOUS at 10:41

## 2022-08-22 RX ADMIN — PALONOSETRON 0.25 MG: 0.05 INJECTION, SOLUTION INTRAVENOUS at 10:34

## 2022-08-22 RX ADMIN — SODIUM CHLORIDE 20 ML/HR: 0.9 INJECTION, SOLUTION INTRAVENOUS at 09:00

## 2022-08-22 NOTE — PROGRESS NOTES
Patient tolerated treatment today without issue  Elastomeric pump started, all connections secured and checked with 2nd RN  Pt aware to return Wed at 9:45 for disconnect

## 2022-08-22 NOTE — PROGRESS NOTES
Patient to infusion for Optivo and 5FU elastomeric device  He offers no complaints other than has stiffness in his back  Port accessed and Labs drawn without incident  Results are back and meet parameters for treatment today, except Thyroid labs have not resulted  Per Monserrat Sullivan RN OK to precede without thyroid lab results  VSS  Call bell within reach

## 2022-08-23 RX ORDER — ESCITALOPRAM OXALATE 5 MG/1
5 TABLET ORAL DAILY
Qty: 30 TABLET | Refills: 0 | Status: SHIPPED | OUTPATIENT
Start: 2022-08-23 | End: 2022-09-14 | Stop reason: SDUPTHER

## 2022-08-23 NOTE — TELEPHONE ENCOUNTER
From: Apolonia Hopkins  To: Madhavi Aguirre DO  Sent: 8/22/2022 8:58 PM EDT  Subject: Change of medication    Good evening  My dad had his scan and follow up with Dr May Mayorga and his second opinion with Dr Bety Nava from [facility]  Dr Bety Nava recommended he start Lexapro to aid with his depression as the scan results indicate his lack of appetite is not due to the cancer or chemo (last chemo treatment was July 11)  He stopped the Zyprexa as it was not helping him gain weight and he became extremely irritable on it  He is the kindest man so I knew this was the cause  Dr Bety Nava wants to keep all prescriptions prescribed by one physician  Would you be willing to prescribe Lexapro for my dad to try? Im not sure if you have access to Dr Mira Way notes  Please let me know  Thank you in advance       Valarie Martines   Leo Perkins daughter)

## 2022-08-24 ENCOUNTER — HOSPITAL ENCOUNTER (OUTPATIENT)
Dept: INFUSION CENTER | Facility: CLINIC | Age: 72
Discharge: HOME | End: 2022-08-24

## 2022-08-24 DIAGNOSIS — C16.9 GASTRIC ADENOCARCINOMA (HCC): Primary | ICD-10-CM

## 2022-08-24 DIAGNOSIS — E86.0 DEHYDRATION: ICD-10-CM

## 2022-08-24 DIAGNOSIS — E87.6 HYPOKALEMIA: Primary | ICD-10-CM

## 2022-08-24 NOTE — PROGRESS NOTES
Patient arrives to infusion center for elastomeric CADD D/C  Elastomeric CADD appears deflated, removed intact  Port remains with brisk blood return, flushed well without resistance, deaccessed  Bandaid in place  Patient tolerated well  AVS printed and provided  Patient ambulatory with cane upon DC

## 2022-08-26 ENCOUNTER — HOSPITAL ENCOUNTER (OUTPATIENT)
Dept: INFUSION CENTER | Facility: CLINIC | Age: 72
End: 2022-08-26
Payer: COMMERCIAL

## 2022-08-26 VITALS
RESPIRATION RATE: 16 BRPM | DIASTOLIC BLOOD PRESSURE: 71 MMHG | HEART RATE: 50 BPM | SYSTOLIC BLOOD PRESSURE: 114 MMHG | OXYGEN SATURATION: 97 % | TEMPERATURE: 97.9 F

## 2022-08-26 DIAGNOSIS — E87.6 HYPOKALEMIA: ICD-10-CM

## 2022-08-26 DIAGNOSIS — E87.6 HYPOPOTASSEMIA: ICD-10-CM

## 2022-08-26 DIAGNOSIS — E86.0 DEHYDRATION: Primary | ICD-10-CM

## 2022-08-26 DIAGNOSIS — I10 PRIMARY HYPERTENSION: ICD-10-CM

## 2022-08-26 LAB
ANION GAP SERPL CALCULATED.3IONS-SCNC: 5 MMOL/L (ref 4–13)
BUN SERPL-MCNC: 27 MG/DL (ref 5–25)
CALCIUM SERPL-MCNC: 8.6 MG/DL (ref 8.4–10.2)
CHLORIDE SERPL-SCNC: 103 MMOL/L (ref 96–108)
CO2 SERPL-SCNC: 31 MMOL/L (ref 21–32)
CREAT SERPL-MCNC: 0.82 MG/DL (ref 0.6–1.3)
GFR SERPL CREATININE-BSD FRML MDRD: 88 ML/MIN/1.73SQ M
GLUCOSE SERPL-MCNC: 98 MG/DL (ref 65–140)
POTASSIUM SERPL-SCNC: 3.6 MMOL/L (ref 3.5–5.3)
SODIUM SERPL-SCNC: 139 MMOL/L (ref 135–147)

## 2022-08-26 PROCEDURE — 96360 HYDRATION IV INFUSION INIT: CPT

## 2022-08-26 PROCEDURE — 80048 BASIC METABOLIC PNL TOTAL CA: CPT | Performed by: INTERNAL MEDICINE

## 2022-08-26 PROCEDURE — 96361 HYDRATE IV INFUSION ADD-ON: CPT

## 2022-08-26 RX ADMIN — SODIUM CHLORIDE 1000 ML: 0.9 INJECTION, SOLUTION INTRAVENOUS at 13:50

## 2022-08-26 NOTE — PROGRESS NOTES
Pt here for weekly hydration, offers no new complaints  BMP drawn via PAC  Hydration tolerated well  Aware of next hydration appointment  AVS provided

## 2022-08-29 ENCOUNTER — TELEPHONE (OUTPATIENT)
Dept: NEPHROLOGY | Facility: CLINIC | Age: 72
End: 2022-08-29

## 2022-08-29 RX ORDER — PALONOSETRON 0.05 MG/ML
0.25 INJECTION, SOLUTION INTRAVENOUS ONCE
Status: CANCELLED | OUTPATIENT
Start: 2022-09-06

## 2022-08-29 RX ORDER — DEXTROSE MONOHYDRATE 50 MG/ML
20 INJECTION, SOLUTION INTRAVENOUS ONCE
Status: CANCELLED | OUTPATIENT
Start: 2022-09-06

## 2022-08-29 RX ORDER — SODIUM CHLORIDE 9 MG/ML
20 INJECTION, SOLUTION INTRAVENOUS ONCE AS NEEDED
Status: CANCELLED | OUTPATIENT
Start: 2022-09-06

## 2022-08-29 NOTE — TELEPHONE ENCOUNTER
----- Message from Rosa Santos MD sent at 8/27/2022  7:24 AM EDT -----  Call and let him know potassium and kidney function all normal !  ----- Message -----  From: Lab, Background User  Sent: 8/26/2022   2:18 PM EDT  To: Rosa Santos MD

## 2022-08-30 DIAGNOSIS — C16.9 GASTRIC ADENOCARCINOMA (HCC): Primary | ICD-10-CM

## 2022-08-31 DIAGNOSIS — C16.0 MALIGNANT NEOPLASM OF CARDIA OF STOMACH (HCC): ICD-10-CM

## 2022-08-31 DIAGNOSIS — R64 MALIGNANT CACHEXIA (HCC): ICD-10-CM

## 2022-08-31 DIAGNOSIS — Z51.5 PALLIATIVE CARE PATIENT: ICD-10-CM

## 2022-08-31 RX ORDER — DRONABINOL 5 MG/1
5 CAPSULE ORAL
Qty: 60 CAPSULE | Refills: 0 | Status: SHIPPED | OUTPATIENT
Start: 2022-08-31 | End: 2022-09-13 | Stop reason: SDUPTHER

## 2022-08-31 NOTE — TELEPHONE ENCOUNTER
Primary palliative medicine provider:   Dr Eliseo Bills    Medication requested:  Dronabinol 5mg capsule    Pt has 3 days left of medication    Last appointment:  7/14/2022    Next scheduled appointment:  9/22/2022    [facility]    PDMP review:

## 2022-09-02 ENCOUNTER — HOSPITAL ENCOUNTER (OUTPATIENT)
Dept: INFUSION CENTER | Facility: CLINIC | Age: 72
End: 2022-09-02
Payer: COMMERCIAL

## 2022-09-02 VITALS
HEART RATE: 60 BPM | DIASTOLIC BLOOD PRESSURE: 72 MMHG | SYSTOLIC BLOOD PRESSURE: 117 MMHG | TEMPERATURE: 97.4 F | RESPIRATION RATE: 18 BRPM

## 2022-09-02 DIAGNOSIS — C16.9 GASTRIC ADENOCARCINOMA (HCC): ICD-10-CM

## 2022-09-02 DIAGNOSIS — E87.6 HYPOKALEMIA: ICD-10-CM

## 2022-09-02 DIAGNOSIS — E86.0 DEHYDRATION: Primary | ICD-10-CM

## 2022-09-02 LAB
ALBUMIN SERPL BCP-MCNC: 3.5 G/DL (ref 3.5–5)
ALP SERPL-CCNC: 102 U/L (ref 34–104)
ALT SERPL W P-5'-P-CCNC: 11 U/L (ref 7–52)
ANION GAP SERPL CALCULATED.3IONS-SCNC: 7 MMOL/L (ref 4–13)
AST SERPL W P-5'-P-CCNC: 12 U/L (ref 13–39)
BASOPHILS # BLD AUTO: 0.02 THOUSANDS/ΜL (ref 0–0.1)
BASOPHILS NFR BLD AUTO: 0 % (ref 0–1)
BILIRUB SERPL-MCNC: 0.56 MG/DL (ref 0.2–1)
BUN SERPL-MCNC: 19 MG/DL (ref 5–25)
CALCIUM SERPL-MCNC: 8.8 MG/DL (ref 8.4–10.2)
CHLORIDE SERPL-SCNC: 102 MMOL/L (ref 96–108)
CO2 SERPL-SCNC: 28 MMOL/L (ref 21–32)
CREAT SERPL-MCNC: 1.02 MG/DL (ref 0.6–1.3)
EOSINOPHIL # BLD AUTO: 0.21 THOUSAND/ΜL (ref 0–0.61)
EOSINOPHIL NFR BLD AUTO: 4 % (ref 0–6)
ERYTHROCYTE [DISTWIDTH] IN BLOOD BY AUTOMATED COUNT: 14.8 % (ref 11.6–15.1)
GFR SERPL CREATININE-BSD FRML MDRD: 73 ML/MIN/1.73SQ M
GLUCOSE SERPL-MCNC: 134 MG/DL (ref 65–140)
HCT VFR BLD AUTO: 29.7 % (ref 36.5–49.3)
HGB BLD-MCNC: 9.7 G/DL (ref 12–17)
IMM GRANULOCYTES # BLD AUTO: 0.02 THOUSAND/UL (ref 0–0.2)
IMM GRANULOCYTES NFR BLD AUTO: 0 % (ref 0–2)
LYMPHOCYTES # BLD AUTO: 0.7 THOUSANDS/ΜL (ref 0.6–4.47)
LYMPHOCYTES NFR BLD AUTO: 14 % (ref 14–44)
MCH RBC QN AUTO: 30.8 PG (ref 26.8–34.3)
MCHC RBC AUTO-ENTMCNC: 32.7 G/DL (ref 31.4–37.4)
MCV RBC AUTO: 94 FL (ref 82–98)
MONOCYTES # BLD AUTO: 0.54 THOUSAND/ΜL (ref 0.17–1.22)
MONOCYTES NFR BLD AUTO: 11 % (ref 4–12)
NEUTROPHILS # BLD AUTO: 3.67 THOUSANDS/ΜL (ref 1.85–7.62)
NEUTS SEG NFR BLD AUTO: 71 % (ref 43–75)
NRBC BLD AUTO-RTO: 0 /100 WBCS
PLATELET # BLD AUTO: 251 THOUSANDS/UL (ref 149–390)
PMV BLD AUTO: 9.1 FL (ref 8.9–12.7)
POTASSIUM SERPL-SCNC: 4.1 MMOL/L (ref 3.5–5.3)
PROT SERPL-MCNC: 5.9 G/DL (ref 6.4–8.4)
RBC # BLD AUTO: 3.15 MILLION/UL (ref 3.88–5.62)
SODIUM SERPL-SCNC: 137 MMOL/L (ref 135–147)
TSH SERPL DL<=0.05 MIU/L-ACNC: 1.34 UIU/ML (ref 0.45–4.5)
WBC # BLD AUTO: 5.16 THOUSAND/UL (ref 4.31–10.16)

## 2022-09-02 PROCEDURE — 80053 COMPREHEN METABOLIC PANEL: CPT | Performed by: INTERNAL MEDICINE

## 2022-09-02 PROCEDURE — 96361 HYDRATE IV INFUSION ADD-ON: CPT

## 2022-09-02 PROCEDURE — 85025 COMPLETE CBC W/AUTO DIFF WBC: CPT | Performed by: INTERNAL MEDICINE

## 2022-09-02 PROCEDURE — 96360 HYDRATION IV INFUSION INIT: CPT

## 2022-09-02 PROCEDURE — 84443 ASSAY THYROID STIM HORMONE: CPT | Performed by: INTERNAL MEDICINE

## 2022-09-02 RX ADMIN — SODIUM CHLORIDE 1000 ML: 0.9 INJECTION, SOLUTION INTRAVENOUS at 10:32

## 2022-09-02 NOTE — PROGRESS NOTES
Pt to clinic for labs from port and hydration, pt tolerated without complications, aware of next appointment, declined avs

## 2022-09-06 ENCOUNTER — HOSPITAL ENCOUNTER (OUTPATIENT)
Dept: INFUSION CENTER | Facility: CLINIC | Age: 72
Discharge: HOME | End: 2022-09-06
Payer: COMMERCIAL

## 2022-09-06 ENCOUNTER — PATIENT OUTREACH (OUTPATIENT)
Dept: CASE MANAGEMENT | Facility: HOSPITAL | Age: 72
End: 2022-09-06

## 2022-09-06 VITALS
BODY MASS INDEX: 19.78 KG/M2 | HEART RATE: 60 BPM | HEIGHT: 68 IN | SYSTOLIC BLOOD PRESSURE: 118 MMHG | RESPIRATION RATE: 18 BRPM | DIASTOLIC BLOOD PRESSURE: 77 MMHG | WEIGHT: 130.5 LBS | TEMPERATURE: 98.2 F | OXYGEN SATURATION: 97 %

## 2022-09-06 DIAGNOSIS — C16.9 GASTRIC ADENOCARCINOMA (HCC): Primary | ICD-10-CM

## 2022-09-06 PROCEDURE — G0498 CHEMO EXTEND IV INFUS W/PUMP: HCPCS

## 2022-09-06 PROCEDURE — 96375 TX/PRO/DX INJ NEW DRUG ADDON: CPT

## 2022-09-06 PROCEDURE — 96413 CHEMO IV INFUSION 1 HR: CPT

## 2022-09-06 PROCEDURE — 96367 TX/PROPH/DG ADDL SEQ IV INF: CPT

## 2022-09-06 RX ORDER — DEXTROSE MONOHYDRATE 50 MG/ML
20 INJECTION, SOLUTION INTRAVENOUS ONCE
Status: DISCONTINUED | OUTPATIENT
Start: 2022-09-06 | End: 2022-09-09 | Stop reason: HOSPADM

## 2022-09-06 RX ORDER — SODIUM CHLORIDE 9 MG/ML
20 INJECTION, SOLUTION INTRAVENOUS ONCE AS NEEDED
Status: DISCONTINUED | OUTPATIENT
Start: 2022-09-06 | End: 2022-09-09 | Stop reason: HOSPADM

## 2022-09-06 RX ORDER — PALONOSETRON 0.05 MG/ML
0.25 INJECTION, SOLUTION INTRAVENOUS ONCE
Status: COMPLETED | OUTPATIENT
Start: 2022-09-06 | End: 2022-09-06

## 2022-09-06 RX ADMIN — PALONOSETRON 0.25 MG: 0.05 INJECTION, SOLUTION INTRAVENOUS at 08:50

## 2022-09-06 RX ADMIN — DEXAMETHASONE SODIUM PHOSPHATE 10 MG: 10 INJECTION, SOLUTION INTRAMUSCULAR; INTRAVENOUS at 08:52

## 2022-09-06 RX ADMIN — SODIUM CHLORIDE 20 ML/HR: 0.9 INJECTION, SOLUTION INTRAVENOUS at 08:43

## 2022-09-06 RX ADMIN — FOSAPREPITANT 150 MG: 150 INJECTION, POWDER, LYOPHILIZED, FOR SOLUTION INTRAVENOUS at 09:17

## 2022-09-06 RX ADMIN — SODIUM CHLORIDE 240 MG: 9 INJECTION, SOLUTION INTRAVENOUS at 10:14

## 2022-09-06 NOTE — PROGRESS NOTES
Patient tolerated his chemo without any adverse reactions  Port had excellent blood return before, during and after chemo  elastomeric pump connected per protocol with double checked by 2 RN's  Confirmed clamps open  Next appointment confirmed and avs given
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
## 2022-09-06 NOTE — PLAN OF CARE
Problem: Potential for Falls  Goal: Patient will remain free of falls  Description: INTERVENTIONS:  - Educate patient/family on patient safety including physical limitations  - Instruct patient to call for assistance with activity   - Consult OT/PT to assist with strengthening/mobility   - Keep Call bell within reach  - Keep bed low and locked with side rails adjusted as appropriate  - Keep care items and personal belongings within reach  - Initiate and maintain comfort rounds  - Make Fall Risk Sign visible to staff  -   - Apply yellow socks and bracelet for high fall risk patients  - Consider moving patient to room near nurses station  Outcome: Progressing     Problem: Knowledge Deficit  Goal: Patient/family/caregiver demonstrates understanding of disease process, treatment plan, medications, and discharge instructions  Description: Complete learning assessment and assess knowledge base    Interventions:  - Provide teaching at level of understanding  - Provide teaching via preferred learning methods  Outcome: Progressing

## 2022-09-06 NOTE — PROGRESS NOTES
Tips to Control Acid Reflux    To control acid reflux, you ll need to make some basic diet and lifestyle changes. The simple steps outlined below may be all you ll need to ease discomfort.  Watch what you eat    Avoid fatty foods and spicy foods.    Eat fewer acidic foods, such as citrus and tomato-based foods. These can increase symptoms.    Limit drinking alcohol, caffeine, and fizzy beverages. All increase acid reflux.    Try limiting chocolate, peppermint, and spearmint. These can worsen acid reflux in some people.  Watch when you eat    Avoid lying down for 3 hours after eating.    Do not snack before going to bed.  Raise your head  Raising your head and upper body by 4 to 6 inches helps limit reflux when you re lying down. Put blocks under the head of your bed frame to raise it.  Other changes    Lose weight, if you need to    Don t exercise near bedtime    Avoid tight-fitting clothes    Limit aspirin and ibuprofen    Stop smoking   Date Last Reviewed: 7/1/2016 2000-2017 [employer]. [address] [place] [address], [place] [address]. All rights reserved. This information is not intended as a substitute for professional medical care. Always follow your healthcare professional's instructions.         Patient is here for chemo  He offers no complaints at this time   Labs reviewed and meet parameters

## 2022-09-08 ENCOUNTER — HOSPITAL ENCOUNTER (OUTPATIENT)
Dept: INFUSION CENTER | Facility: CLINIC | Age: 72
Discharge: HOME | End: 2022-09-08

## 2022-09-08 DIAGNOSIS — C16.9 GASTRIC ADENOCARCINOMA (HCC): Primary | ICD-10-CM

## 2022-09-08 NOTE — PROGRESS NOTES
Patient arrived for Elastomeric pump D/C  Patient offers no complaints  Tolerated 46 hour infusion of 5FU at home without issues  Pump appears completely deflated and 46 hours have elapsed  Port flushed per protocol  Patient states he feels well today and does not feel like he needs his hydration appointment scheduled for Saturday  Appointment canceled  Patient verified upcoming appoitment and new schedule provided

## 2022-09-12 RX ORDER — PALONOSETRON 0.05 MG/ML
0.25 INJECTION, SOLUTION INTRAVENOUS ONCE
Status: CANCELLED | OUTPATIENT
Start: 2022-09-19

## 2022-09-12 RX ORDER — DEXTROSE MONOHYDRATE 50 MG/ML
20 INJECTION, SOLUTION INTRAVENOUS ONCE
Status: CANCELLED | OUTPATIENT
Start: 2022-09-19

## 2022-09-12 RX ORDER — SODIUM CHLORIDE 9 MG/ML
20 INJECTION, SOLUTION INTRAVENOUS ONCE AS NEEDED
Status: CANCELLED | OUTPATIENT
Start: 2022-09-19

## 2022-09-13 DIAGNOSIS — C16.0 MALIGNANT NEOPLASM OF CARDIA OF STOMACH (HCC): ICD-10-CM

## 2022-09-13 DIAGNOSIS — R64 MALIGNANT CACHEXIA (HCC): ICD-10-CM

## 2022-09-13 DIAGNOSIS — Z51.5 PALLIATIVE CARE PATIENT: ICD-10-CM

## 2022-09-13 DIAGNOSIS — C16.9 GASTRIC ADENOCARCINOMA (HCC): Primary | ICD-10-CM

## 2022-09-13 RX ORDER — DRONABINOL 10 MG/1
10 CAPSULE ORAL
Qty: 60 CAPSULE | Refills: 0 | Status: SHIPPED | OUTPATIENT
Start: 2022-09-13 | End: 2022-09-22 | Stop reason: SDUPTHER

## 2022-09-14 DIAGNOSIS — Z51.5 PALLIATIVE CARE PATIENT: ICD-10-CM

## 2022-09-14 DIAGNOSIS — G89.3 CANCER-RELATED PAIN: ICD-10-CM

## 2022-09-14 DIAGNOSIS — E87.6 HYPOKALEMIA: Primary | ICD-10-CM

## 2022-09-14 DIAGNOSIS — T45.1X5A CHEMOTHERAPY-INDUCED NAUSEA: ICD-10-CM

## 2022-09-14 DIAGNOSIS — F32.A DEPRESSION: ICD-10-CM

## 2022-09-14 DIAGNOSIS — R11.0 CHEMOTHERAPY-INDUCED NAUSEA: ICD-10-CM

## 2022-09-14 DIAGNOSIS — C16.9 GASTRIC ADENOCARCINOMA (HCC): ICD-10-CM

## 2022-09-14 DIAGNOSIS — E86.0 DEHYDRATION: ICD-10-CM

## 2022-09-14 RX ORDER — PROCHLORPERAZINE MALEATE 5 MG/1
5 TABLET ORAL EVERY 6 HOURS PRN
Qty: 30 TABLET | Refills: 0 | Status: SHIPPED | OUTPATIENT
Start: 2022-09-14 | End: 2022-10-18 | Stop reason: SDUPTHER

## 2022-09-14 RX ORDER — ESCITALOPRAM OXALATE 5 MG/1
5 TABLET ORAL DAILY
Qty: 30 TABLET | Refills: 0 | Status: SHIPPED | OUTPATIENT
Start: 2022-09-14 | End: 2022-09-22 | Stop reason: SDUPTHER

## 2022-09-14 NOTE — TELEPHONE ENCOUNTER
Primary palliative medicine provider:   Keith    Medication requested: If for pain, how has the patient been taking their pain medicine?      Last appointment: 07/14/22    Next scheduled appointment: 09/22/22    PDMP review:jarad

## 2022-09-16 ENCOUNTER — HOSPITAL ENCOUNTER (OUTPATIENT)
Dept: INFUSION CENTER | Facility: CLINIC | Age: 72
End: 2022-09-16
Payer: COMMERCIAL

## 2022-09-16 VITALS
OXYGEN SATURATION: 98 % | SYSTOLIC BLOOD PRESSURE: 120 MMHG | TEMPERATURE: 98.9 F | RESPIRATION RATE: 16 BRPM | DIASTOLIC BLOOD PRESSURE: 76 MMHG | HEART RATE: 54 BPM

## 2022-09-16 DIAGNOSIS — E86.0 DEHYDRATION: Primary | ICD-10-CM

## 2022-09-16 DIAGNOSIS — E87.6 HYPOKALEMIA: ICD-10-CM

## 2022-09-16 DIAGNOSIS — C16.9 GASTRIC ADENOCARCINOMA (HCC): ICD-10-CM

## 2022-09-16 LAB
ALBUMIN SERPL BCP-MCNC: 3.2 G/DL (ref 3.5–5)
ALP SERPL-CCNC: 114 U/L (ref 34–104)
ALT SERPL W P-5'-P-CCNC: 11 U/L (ref 7–52)
ANION GAP SERPL CALCULATED.3IONS-SCNC: 5 MMOL/L (ref 4–13)
AST SERPL W P-5'-P-CCNC: 15 U/L (ref 13–39)
BASOPHILS # BLD AUTO: 0.02 THOUSANDS/ΜL (ref 0–0.1)
BASOPHILS NFR BLD AUTO: 0 % (ref 0–1)
BILIRUB SERPL-MCNC: 0.42 MG/DL (ref 0.2–1)
BUN SERPL-MCNC: 21 MG/DL (ref 5–25)
CALCIUM ALBUM COR SERPL-MCNC: 9 MG/DL (ref 8.3–10.1)
CALCIUM SERPL-MCNC: 8.4 MG/DL (ref 8.4–10.2)
CHLORIDE SERPL-SCNC: 98 MMOL/L (ref 96–108)
CO2 SERPL-SCNC: 30 MMOL/L (ref 21–32)
CREAT SERPL-MCNC: 0.91 MG/DL (ref 0.6–1.3)
EOSINOPHIL # BLD AUTO: 0.14 THOUSAND/ΜL (ref 0–0.61)
EOSINOPHIL NFR BLD AUTO: 2 % (ref 0–6)
ERYTHROCYTE [DISTWIDTH] IN BLOOD BY AUTOMATED COUNT: 14 % (ref 11.6–15.1)
GFR SERPL CREATININE-BSD FRML MDRD: 83 ML/MIN/1.73SQ M
GLUCOSE SERPL-MCNC: 111 MG/DL (ref 65–140)
HCT VFR BLD AUTO: 29 % (ref 36.5–49.3)
HGB BLD-MCNC: 9.6 G/DL (ref 12–17)
IMM GRANULOCYTES # BLD AUTO: 0.03 THOUSAND/UL (ref 0–0.2)
IMM GRANULOCYTES NFR BLD AUTO: 1 % (ref 0–2)
LYMPHOCYTES # BLD AUTO: 0.77 THOUSANDS/ΜL (ref 0.6–4.47)
LYMPHOCYTES NFR BLD AUTO: 13 % (ref 14–44)
MCH RBC QN AUTO: 31.3 PG (ref 26.8–34.3)
MCHC RBC AUTO-ENTMCNC: 33.1 G/DL (ref 31.4–37.4)
MCV RBC AUTO: 95 FL (ref 82–98)
MONOCYTES # BLD AUTO: 0.68 THOUSAND/ΜL (ref 0.17–1.22)
MONOCYTES NFR BLD AUTO: 12 % (ref 4–12)
NEUTROPHILS # BLD AUTO: 4.2 THOUSANDS/ΜL (ref 1.85–7.62)
NEUTS SEG NFR BLD AUTO: 72 % (ref 43–75)
NRBC BLD AUTO-RTO: 0 /100 WBCS
PLATELET # BLD AUTO: 256 THOUSANDS/UL (ref 149–390)
PMV BLD AUTO: 8.9 FL (ref 8.9–12.7)
POTASSIUM SERPL-SCNC: 4.2 MMOL/L (ref 3.5–5.3)
PROT SERPL-MCNC: 5.9 G/DL (ref 6.4–8.4)
RBC # BLD AUTO: 3.07 MILLION/UL (ref 3.88–5.62)
SODIUM SERPL-SCNC: 133 MMOL/L (ref 135–147)
TSH SERPL DL<=0.05 MIU/L-ACNC: 0.46 UIU/ML (ref 0.45–4.5)
WBC # BLD AUTO: 5.84 THOUSAND/UL (ref 4.31–10.16)

## 2022-09-16 PROCEDURE — 85025 COMPLETE CBC W/AUTO DIFF WBC: CPT | Performed by: INTERNAL MEDICINE

## 2022-09-16 PROCEDURE — 84443 ASSAY THYROID STIM HORMONE: CPT | Performed by: INTERNAL MEDICINE

## 2022-09-16 PROCEDURE — 80053 COMPREHEN METABOLIC PANEL: CPT | Performed by: INTERNAL MEDICINE

## 2022-09-16 PROCEDURE — 96361 HYDRATE IV INFUSION ADD-ON: CPT

## 2022-09-16 PROCEDURE — 96360 HYDRATION IV INFUSION INIT: CPT

## 2022-09-16 RX ADMIN — SODIUM CHLORIDE 1000 ML: 0.9 INJECTION, SOLUTION INTRAVENOUS at 13:52

## 2022-09-16 NOTE — PROGRESS NOTES
Patient to infusion for hydration and pre treatment labs  He offers no complaints  VSS  Port accessed and labs drawn per MD order  Call bell within reach

## 2022-09-19 ENCOUNTER — HOSPITAL ENCOUNTER (OUTPATIENT)
Dept: INFUSION CENTER | Facility: CLINIC | Age: 72
Discharge: HOME | End: 2022-09-19
Payer: COMMERCIAL

## 2022-09-19 VITALS
SYSTOLIC BLOOD PRESSURE: 121 MMHG | HEART RATE: 59 BPM | OXYGEN SATURATION: 98 % | HEIGHT: 68 IN | DIASTOLIC BLOOD PRESSURE: 74 MMHG | RESPIRATION RATE: 18 BRPM | BODY MASS INDEX: 19.7 KG/M2 | TEMPERATURE: 97.9 F | WEIGHT: 130 LBS

## 2022-09-19 DIAGNOSIS — C16.9 GASTRIC ADENOCARCINOMA (HCC): Primary | ICD-10-CM

## 2022-09-19 PROCEDURE — 96375 TX/PRO/DX INJ NEW DRUG ADDON: CPT

## 2022-09-19 PROCEDURE — 96413 CHEMO IV INFUSION 1 HR: CPT

## 2022-09-19 PROCEDURE — 96367 TX/PROPH/DG ADDL SEQ IV INF: CPT

## 2022-09-19 RX ORDER — SODIUM CHLORIDE 9 MG/ML
20 INJECTION, SOLUTION INTRAVENOUS ONCE AS NEEDED
Status: DISCONTINUED | OUTPATIENT
Start: 2022-09-19 | End: 2022-09-22 | Stop reason: HOSPADM

## 2022-09-19 RX ORDER — DEXTROSE MONOHYDRATE 50 MG/ML
20 INJECTION, SOLUTION INTRAVENOUS ONCE
Status: DISCONTINUED | OUTPATIENT
Start: 2022-09-19 | End: 2022-09-22 | Stop reason: HOSPADM

## 2022-09-19 RX ORDER — PALONOSETRON 0.05 MG/ML
0.25 INJECTION, SOLUTION INTRAVENOUS ONCE
Status: COMPLETED | OUTPATIENT
Start: 2022-09-19 | End: 2022-09-19

## 2022-09-19 RX ADMIN — DEXAMETHASONE SODIUM PHOSPHATE 10 MG: 10 INJECTION, SOLUTION INTRAMUSCULAR; INTRAVENOUS at 09:00

## 2022-09-19 RX ADMIN — PALONOSETRON 0.25 MG: 0.05 INJECTION, SOLUTION INTRAVENOUS at 09:13

## 2022-09-19 RX ADMIN — SODIUM CHLORIDE 240 MG: 9 INJECTION, SOLUTION INTRAVENOUS at 10:03

## 2022-09-19 RX ADMIN — FOSAPREPITANT 150 MG: 150 INJECTION, POWDER, LYOPHILIZED, FOR SOLUTION INTRAVENOUS at 09:22

## 2022-09-19 RX ADMIN — SODIUM CHLORIDE 20 ML/HR: 0.9 INJECTION, SOLUTION INTRAVENOUS at 08:59

## 2022-09-19 NOTE — PROGRESS NOTES
Patient here for modified FOLFOX, offers no complaints  Labs are in parameters for treatment  Port accessed without issue, premeds in progress

## 2022-09-19 NOTE — PROGRESS NOTES
Patient tolerated treatment without incident  Elastomeric pump connected, clamp open and pump running, verified with 2nd RN  Patient is aware of disconnect time   Declined AVS

## 2022-09-21 ENCOUNTER — HOSPITAL ENCOUNTER (OUTPATIENT)
Dept: INFUSION CENTER | Facility: CLINIC | Age: 72
Discharge: HOME | End: 2022-09-21

## 2022-09-21 DIAGNOSIS — C16.9 GASTRIC ADENOCARCINOMA (HCC): Primary | ICD-10-CM

## 2022-09-21 NOTE — PROGRESS NOTES
Pt tolerated treatment well  Pump appeared completely deflated after 46 hours, port flushed per protocol without any issue  Pt aware of next appointment  AVS provided  Kris Perry

## 2022-09-22 ENCOUNTER — OFFICE VISIT (OUTPATIENT)
Dept: PALLIATIVE MEDICINE | Facility: CLINIC | Age: 72
End: 2022-09-22
Payer: COMMERCIAL

## 2022-09-22 VITALS
BODY MASS INDEX: 19.92 KG/M2 | TEMPERATURE: 97 F | DIASTOLIC BLOOD PRESSURE: 59 MMHG | WEIGHT: 129.19 LBS | SYSTOLIC BLOOD PRESSURE: 101 MMHG | HEART RATE: 67 BPM | OXYGEN SATURATION: 97 %

## 2022-09-22 DIAGNOSIS — G89.3 CANCER RELATED PAIN: Primary | ICD-10-CM

## 2022-09-22 DIAGNOSIS — R62.7 FTT (FAILURE TO THRIVE) IN ADULT: ICD-10-CM

## 2022-09-22 DIAGNOSIS — R64 MALIGNANT CACHEXIA (HCC): ICD-10-CM

## 2022-09-22 DIAGNOSIS — C16.0 MALIGNANT NEOPLASM OF CARDIA OF STOMACH (HCC): ICD-10-CM

## 2022-09-22 DIAGNOSIS — Z51.5 PALLIATIVE CARE PATIENT: ICD-10-CM

## 2022-09-22 DIAGNOSIS — F32.A DEPRESSION: ICD-10-CM

## 2022-09-22 PROCEDURE — 1160F RVW MEDS BY RX/DR IN RCRD: CPT | Performed by: INTERNAL MEDICINE

## 2022-09-22 PROCEDURE — 99214 OFFICE O/P EST MOD 30 MIN: CPT | Performed by: INTERNAL MEDICINE

## 2022-09-22 RX ORDER — DRONABINOL 10 MG/1
10 CAPSULE ORAL
Qty: 60 CAPSULE | Refills: 0 | Status: SHIPPED | OUTPATIENT
Start: 2022-09-22

## 2022-09-22 RX ORDER — ESCITALOPRAM OXALATE 10 MG/1
10 TABLET ORAL DAILY
Qty: 90 TABLET | Refills: 0 | Status: SHIPPED | OUTPATIENT
Start: 2022-09-22

## 2022-09-22 NOTE — PROGRESS NOTES
Palliative and Supportive Care   Sanam Christina 67 y o  male [phone]    Assessment/Plan:  1  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[facility] [address] )    2  Malignant cachexia ([facility] [address] )    3  Palliative care patient    4  Depression      Requested Prescriptions     Signed Prescriptions Disp Refills    dronabinol (MARINOL) 10 MG capsule 60 capsule 0     Sig: Take 1 capsule (10 mg total) by mouth 2 (two) times a day before meals    escitalopram (LEXAPRO) 10 mg tablet 90 tablet 0     Sig: Take 1 tablet (10 mg total) by mouth daily     Medications Discontinued During This Encounter   Medication Reason    OLANZapine (ZyPREXA) 2 5 mg tablet     dronabinol (MARINOL) 10 MG capsule Reorder    escitalopram (LEXAPRO) 5 mg tablet Reorder    oxyCODONE (ROXICODONE) 5 mg/5 mL solution        PLAN:  1  Symptom management-               -pain:  Patient has markedly reduced his opioid needs an currently only using OxyContin at bedtime, he feels that this manages his pain adequately               -poor appetite:   weight loss has stabilized  Continue Marinol 10 mg b i d  Zyprexa stopped               -pruritus, possible opioid component:   improved with decrease of opioids   -nausea:  Managed with Zofran   -depression due to medical illness:  Increase Lexapro to 10 mg     2  Goals of care:   patient continues to tolerate chemotherapy treatments however his contemplative of hospice cares  He has considered a hunger strike for his end of life when he feels that his time is limited  He is still finding enjoying activities and went to [place] with his siblings, and will be going to his son's wedding in November      3    Psychosocial support:  Time spent providing supportive listening  All questions and concerns were addressed to their satisfaction     4     ACP:  See goals       Representatives have queried the patient's controlled substance dispensing history in the Prescription Drug Monitoring Program in compliance with regulations before I have prescribed any controlled substances  The prescription history is consistent with prescribed therapy and our practice policies  No follow-ups on file  Subjective: In attendance at this visit: Deedee Smith and his wife  Chief Complaint  Follow up visit for:  symptom management, pain, neoplasm related, depression or anxiety, nausea, fatigue, assessment of goals of care, disease process education and discussion of prognosis  HPI     Deedee Smith is a 67 y o  male with stage IV gastric adenocarcinoma  Patient follows with Dr Awilda Calvin and continues on chemotherapy treatments at this time  Patient had most recent chemo on Monday and he states he is more nauseous today but that is normal for him to feel most ill on day 4 post treatment  Currently his symptoms are well controlled  He is still having a depressed mood and discussed up titration of Lexapro which he agrees to  He still endorses good quality of life  He was able to go to [place] for a family trip  He is also getting ready for his son's wedding  He is fully understanding of his prognosis and that hospice cares available to him if or when he chooses to stop chemotherapy  He does endorse the idea of a hunger strike at the end of his life as he states he would not want to prolong his suffering      Oncology History   Gastric adenocarcinoma ([facility] [address] )   2/8/2022 Initial Diagnosis    Gastric adenocarcinoma ([facility] [address] )     2/21/2022 -  Chemotherapy    palonosetron (ALOXI), 0 25 mg, Intravenous, Once, 9 of 15 cycles  Administration: 0 25 mg (3/21/2022), 0 25 mg (4/4/2022), 0 25 mg (5/16/2022), 0 25 mg (6/13/2022), 0 25 mg (6/27/2022), 0 25 mg (9/6/2022), 0 25 mg (7/11/2022), 0 25 mg (8/22/2022), 0 25 mg (9/19/2022)  fosaprepitant (EMEND) IVPB, 150 mg, Intravenous, Once, 9 of 15 cycles  Administration: 150 mg (3/21/2022), 150 mg (4/4/2022), 150 mg (5/16/2022), 150 mg (6/13/2022), 150 mg (6/27/2022), 150 mg (9/6/2022), 150 mg (7/11/2022), 150 mg (8/22/2022), 150 mg (9/19/2022)  nivolumab (OPDIVO) IVPB, 240 mg (100 % of original dose 240 mg), Intravenous, Once, 11 of 17 cycles  Dose modification: 240 mg (original dose 240 mg, Cycle 1)  Administration: 240 mg (2/21/2022), 240 mg (3/7/2022), 240 mg (3/21/2022), 240 mg (4/4/2022), 240 mg (5/16/2022), 240 mg (9/6/2022), 240 mg (7/11/2022), 240 mg (8/22/2022), 240 mg (9/19/2022), 240 mg (6/13/2022), 240 mg (6/27/2022)  oxaliplatin (ELOXATIN) chemo infusion, 85 mg/m2 = 164 9 mg, Intravenous, Once, 9 of 9 cycles  Dose modification: 65 mg/m2 (original dose 85 mg/m2, Cycle 6, Reason: Other (Must fill in a comment), Comment: Elevated creatinine)  Administration: 164 9 mg (2/21/2022), 164 9 mg (3/7/2022), 164 9 mg (3/21/2022), 164 9 mg (4/4/2022), 158 1 mg (5/16/2022), 118 3 mg (6/13/2022), 113 75 mg (6/27/2022), 111 8 mg (7/11/2022)  fluorouracil (ADRUCIL) ambulatory infusion Soln, 1,200 mg/m2/day = 4,655 mg, Intravenous, Over 46 hours, 11 of 17 cycles  Dose modification: 1,000 mg/m2/day (original dose 1,200 mg/m2/day, Cycle 10, Reason: Dose modified as per discussion with consulting physician)     8/16/2022 -  Cancer Staged    Staging form: Stomach, AJCC 7th Edition  - Clinical: Stage IV (TX, N3b, M1) - Signed by Rachel Altman MD on 8/16/2022           The following portions of the medical history were reviewed: past medical history, problem list, medication list, and social history      Current Outpatient Medications:     bisacodyl (DULCOLAX) 5 mg EC tablet, Take 1 tablet (5 mg total) by mouth daily as needed for constipation Related to pain medicines, Disp: 30 tablet, Rfl: 0    dronabinol (MARINOL) 10 MG capsule, Take 1 capsule (10 mg total) by mouth 2 (two) times a day before meals, Disp: 60 capsule, Rfl: 0    escitalopram (LEXAPRO) 10 mg tablet, Take 1 tablet (10 mg total) by mouth daily, Disp: 90 tablet, Rfl: 0    levothyroxine (Euthyrox) 150 mcg tablet, Take 1 tablet (150 mcg total) by mouth daily in the early morning, Disp: 90 tablet, Rfl: 3    magnesium chloride-calcium (Slow-Mag) 71 5-119 mg, Take 1 tablet by mouth 2 (two) times a day, Disp: 60 tablet, Rfl: 5    ondansetron (Zofran ODT) 4 mg disintegrating tablet, Take 2 tablets (8 mg total) by mouth every 8 (eight) hours as needed for nausea or vomiting for up to 90 doses, Disp: 90 tablet, Rfl: 0    oxyCODONE (OxyCONTIN) 10 mg 12 hr tablet, Take 1 tablet (10 mg total) by mouth every 8 (eight) hours Max Daily Amount: 30 mg, Disp: 90 tablet, Rfl: 0    potassium chloride (K-DUR,KLOR-CON) 10 mEq tablet, Take 1 tablet (10 mEq total) by mouth 3 (three) times a day, Disp: 90 tablet, Rfl: 5    prochlorperazine (COMPAZINE) 5 mg tablet, Take 1 tablet (5 mg total) by mouth every 6 (six) hours as needed for nausea or vomiting, Disp: 30 tablet, Rfl: 0    levocetirizine (XYZAL) 5 MG tablet, Take 5 mg by mouth in the morning (Patient not taking: Reported on 9/22/2022), Disp: , Rfl:     neomycin-bacitracin-polymyxin b (NEOSPORIN) ointment, Apply to the tip of the penis twice daily as needed for catheter irritation  , Disp: 15 g, Rfl: 0    Nutritional Supplements (jevity 1 5 tam) LIQD, Tube Feeding with Oral Diet Jevity 1 5 Bolus 240 ml q4h Flush 50 cc water with each bolus, Disp: 1000 mL, Rfl: 3    nystatin (MYCOSTATIN) 500,000 units/5 mL suspension, Apply 5 mL (500,000 Units total) to the mouth or throat 4 (four) times a day, Disp: 200 mL, Rfl: 0    pantoprazole (PROTONIX) 40 mg tablet, Take 1 tablet (40 mg total) by mouth 2 (two) times a day Before breakfast and before bed to prevent acid accumulation  , Disp: 60 tablet, Rfl: 0    potassium chloride (MICRO-K) 10 MEQ CR capsule, Take 1 capsule (10 mEq total) by mouth in the morning for 5 days (Patient not taking: No sig reported), Disp: 5 capsule, Rfl: 0    saliva substitute (MOUTH KOTE), Apply 5 sprays to the mouth or throat 4 (four) times a day as needed (dry mouth), Disp: 59 mL, Rfl: 0  No current facility-administered medications for this visit  Review of Systems   Constitutional: Positive for activity change, appetite change and fatigue  Gastrointestinal: Positive for nausea  Skin: Positive for pallor  Neurological: Positive for weakness  All other systems reviewed and are negative  All other systems negative    Objective:  Vital Signs  /59 (BP Location: Left arm, Patient Position: Sitting, Cuff Size: Standard)   Pulse 67   Temp (!) 97 °F (36 1 °C) (Temporal)   Wt 58 6 kg (129 lb 3 oz)   SpO2 97%   BMI 19 92 kg/m²    Physical Exam    Constitutional: Appears chronically ill   In no acute physical or emotional distress  Head: Normocephalic and atraumatic  Eyes: EOM are normal  No ocular discharge  No scleral icterus  Neck: No visible adenopathy or masses  Cardiovascular: Regular rate and rhythm, palpable distal pulses   Respiratory: Effort normal  No stridor  No respiratory distress  Gastrointestinal: No abdominal distension  Abdomen is soft  Musculoskeletal: No edema  Neurological: Alert, oriented and appropriately conversant  Skin: No diaphoresis, dry and warm to touch, no rashes seen on exposed areas of skin  Psychiatric: Displays a normal mood and affect  Behavior, judgement and thought content appear normal      Portions of this document may have been created using dictation software and as such some "sound alike" terms may have been generated by the system  Do not hesitate to contact me with any questions or clarifications

## 2022-09-23 ENCOUNTER — HOSPITAL ENCOUNTER (OUTPATIENT)
Dept: INFUSION CENTER | Facility: CLINIC | Age: 72
End: 2022-09-23
Payer: COMMERCIAL

## 2022-09-23 VITALS
RESPIRATION RATE: 16 BRPM | SYSTOLIC BLOOD PRESSURE: 122 MMHG | OXYGEN SATURATION: 99 % | DIASTOLIC BLOOD PRESSURE: 69 MMHG | TEMPERATURE: 97.7 F | HEART RATE: 50 BPM

## 2022-09-23 DIAGNOSIS — E86.0 DEHYDRATION: Primary | ICD-10-CM

## 2022-09-23 DIAGNOSIS — E87.6 HYPOKALEMIA: ICD-10-CM

## 2022-09-23 PROCEDURE — 96361 HYDRATE IV INFUSION ADD-ON: CPT

## 2022-09-23 PROCEDURE — 96360 HYDRATION IV INFUSION INIT: CPT

## 2022-09-23 RX ADMIN — SODIUM CHLORIDE 1000 ML: 0.9 INJECTION, SOLUTION INTRAVENOUS at 11:12

## 2022-09-23 NOTE — PROGRESS NOTES
Pt here for IV hydration NSS over 2 hours  Pt stated he is "actually feeling better today"  Vitals stable  Pt comfortable in chair with call bell in place

## 2022-09-26 RX ORDER — SODIUM CHLORIDE 9 MG/ML
20 INJECTION, SOLUTION INTRAVENOUS ONCE AS NEEDED
Status: CANCELLED | OUTPATIENT
Start: 2022-10-03

## 2022-09-26 RX ORDER — DEXTROSE MONOHYDRATE 50 MG/ML
20 INJECTION, SOLUTION INTRAVENOUS ONCE
Status: CANCELLED | OUTPATIENT
Start: 2022-10-03

## 2022-09-26 RX ORDER — PALONOSETRON 0.05 MG/ML
0.25 INJECTION, SOLUTION INTRAVENOUS ONCE
Status: CANCELLED | OUTPATIENT
Start: 2022-10-03

## 2022-09-27 DIAGNOSIS — C16.9 GASTRIC ADENOCARCINOMA (HCC): Primary | ICD-10-CM

## 2022-09-30 ENCOUNTER — HOSPITAL ENCOUNTER (OUTPATIENT)
Dept: INFUSION CENTER | Facility: CLINIC | Age: 72
End: 2022-09-30
Payer: COMMERCIAL

## 2022-09-30 VITALS
SYSTOLIC BLOOD PRESSURE: 104 MMHG | OXYGEN SATURATION: 99 % | TEMPERATURE: 97.5 F | RESPIRATION RATE: 18 BRPM | HEART RATE: 54 BPM | DIASTOLIC BLOOD PRESSURE: 70 MMHG

## 2022-09-30 DIAGNOSIS — C16.9 GASTRIC ADENOCARCINOMA (HCC): ICD-10-CM

## 2022-09-30 DIAGNOSIS — E87.6 HYPOKALEMIA: ICD-10-CM

## 2022-09-30 DIAGNOSIS — E86.0 DEHYDRATION: Primary | ICD-10-CM

## 2022-09-30 LAB
ALBUMIN SERPL BCP-MCNC: 3.2 G/DL (ref 3.5–5)
ALP SERPL-CCNC: 108 U/L (ref 34–104)
ALT SERPL W P-5'-P-CCNC: 13 U/L (ref 7–52)
ANION GAP SERPL CALCULATED.3IONS-SCNC: 4 MMOL/L (ref 4–13)
AST SERPL W P-5'-P-CCNC: 15 U/L (ref 13–39)
BASOPHILS # BLD AUTO: 0.03 THOUSANDS/ΜL (ref 0–0.1)
BASOPHILS NFR BLD AUTO: 1 % (ref 0–1)
BILIRUB SERPL-MCNC: 0.37 MG/DL (ref 0.2–1)
BUN SERPL-MCNC: 17 MG/DL (ref 5–25)
CALCIUM ALBUM COR SERPL-MCNC: 9.1 MG/DL (ref 8.3–10.1)
CALCIUM SERPL-MCNC: 8.5 MG/DL (ref 8.4–10.2)
CHLORIDE SERPL-SCNC: 99 MMOL/L (ref 96–108)
CO2 SERPL-SCNC: 33 MMOL/L (ref 21–32)
CREAT SERPL-MCNC: 0.94 MG/DL (ref 0.6–1.3)
EOSINOPHIL # BLD AUTO: 0.11 THOUSAND/ΜL (ref 0–0.61)
EOSINOPHIL NFR BLD AUTO: 2 % (ref 0–6)
ERYTHROCYTE [DISTWIDTH] IN BLOOD BY AUTOMATED COUNT: 13.8 % (ref 11.6–15.1)
GFR SERPL CREATININE-BSD FRML MDRD: 80 ML/MIN/1.73SQ M
GLUCOSE SERPL-MCNC: 102 MG/DL (ref 65–140)
HCT VFR BLD AUTO: 29.9 % (ref 36.5–49.3)
HGB BLD-MCNC: 9.4 G/DL (ref 12–17)
IMM GRANULOCYTES # BLD AUTO: 0.05 THOUSAND/UL (ref 0–0.2)
IMM GRANULOCYTES NFR BLD AUTO: 1 % (ref 0–2)
LYMPHOCYTES # BLD AUTO: 0.92 THOUSANDS/ΜL (ref 0.6–4.47)
LYMPHOCYTES NFR BLD AUTO: 18 % (ref 14–44)
MCH RBC QN AUTO: 29.7 PG (ref 26.8–34.3)
MCHC RBC AUTO-ENTMCNC: 31.4 G/DL (ref 31.4–37.4)
MCV RBC AUTO: 95 FL (ref 82–98)
MONOCYTES # BLD AUTO: 0.65 THOUSAND/ΜL (ref 0.17–1.22)
MONOCYTES NFR BLD AUTO: 12 % (ref 4–12)
NEUTROPHILS # BLD AUTO: 3.51 THOUSANDS/ΜL (ref 1.85–7.62)
NEUTS SEG NFR BLD AUTO: 66 % (ref 43–75)
NRBC BLD AUTO-RTO: 0 /100 WBCS
PLATELET # BLD AUTO: 283 THOUSANDS/UL (ref 149–390)
PMV BLD AUTO: 8.7 FL (ref 8.9–12.7)
POTASSIUM SERPL-SCNC: 3.9 MMOL/L (ref 3.5–5.3)
PROT SERPL-MCNC: 5.8 G/DL (ref 6.4–8.4)
RBC # BLD AUTO: 3.16 MILLION/UL (ref 3.88–5.62)
SODIUM SERPL-SCNC: 136 MMOL/L (ref 135–147)
TSH SERPL DL<=0.05 MIU/L-ACNC: 1.45 UIU/ML (ref 0.45–4.5)
WBC # BLD AUTO: 5.27 THOUSAND/UL (ref 4.31–10.16)

## 2022-09-30 PROCEDURE — 96360 HYDRATION IV INFUSION INIT: CPT

## 2022-09-30 PROCEDURE — 80053 COMPREHEN METABOLIC PANEL: CPT | Performed by: INTERNAL MEDICINE

## 2022-09-30 PROCEDURE — 85025 COMPLETE CBC W/AUTO DIFF WBC: CPT | Performed by: INTERNAL MEDICINE

## 2022-09-30 PROCEDURE — 84443 ASSAY THYROID STIM HORMONE: CPT | Performed by: INTERNAL MEDICINE

## 2022-09-30 PROCEDURE — 96361 HYDRATE IV INFUSION ADD-ON: CPT

## 2022-09-30 RX ADMIN — SODIUM CHLORIDE 1000 ML: 0.9 INJECTION, SOLUTION INTRAVENOUS at 11:17

## 2022-09-30 NOTE — PROGRESS NOTES
Patient tolerated hydration today without issues  Patient aware of appointment time on Monday for next cycle of chemotherapy  AVS provided

## 2022-10-03 ENCOUNTER — HOSPITAL ENCOUNTER (OUTPATIENT)
Dept: INFUSION CENTER | Facility: CLINIC | Age: 72
Discharge: HOME | End: 2022-10-03
Payer: COMMERCIAL

## 2022-10-03 VITALS
OXYGEN SATURATION: 98 % | SYSTOLIC BLOOD PRESSURE: 145 MMHG | BODY MASS INDEX: 19.63 KG/M2 | DIASTOLIC BLOOD PRESSURE: 79 MMHG | WEIGHT: 129.5 LBS | TEMPERATURE: 96.8 F | HEART RATE: 54 BPM | RESPIRATION RATE: 18 BRPM | HEIGHT: 68 IN

## 2022-10-03 DIAGNOSIS — C16.9 GASTRIC ADENOCARCINOMA (HCC): Primary | ICD-10-CM

## 2022-10-03 PROCEDURE — 96367 TX/PROPH/DG ADDL SEQ IV INF: CPT

## 2022-10-03 PROCEDURE — 96375 TX/PRO/DX INJ NEW DRUG ADDON: CPT

## 2022-10-03 PROCEDURE — G0498 CHEMO EXTEND IV INFUS W/PUMP: HCPCS

## 2022-10-03 PROCEDURE — 96413 CHEMO IV INFUSION 1 HR: CPT

## 2022-10-03 RX ORDER — SODIUM CHLORIDE 9 MG/ML
20 INJECTION, SOLUTION INTRAVENOUS ONCE AS NEEDED
Status: DISCONTINUED | OUTPATIENT
Start: 2022-10-03 | End: 2022-10-06 | Stop reason: HOSPADM

## 2022-10-03 RX ORDER — DEXTROSE MONOHYDRATE 50 MG/ML
20 INJECTION, SOLUTION INTRAVENOUS ONCE
Status: DISCONTINUED | OUTPATIENT
Start: 2022-10-03 | End: 2022-10-06 | Stop reason: HOSPADM

## 2022-10-03 RX ORDER — PALONOSETRON 0.05 MG/ML
0.25 INJECTION, SOLUTION INTRAVENOUS ONCE
Status: COMPLETED | OUTPATIENT
Start: 2022-10-03 | End: 2022-10-03

## 2022-10-03 RX ADMIN — DEXAMETHASONE SODIUM PHOSPHATE 10 MG: 10 INJECTION, SOLUTION INTRAMUSCULAR; INTRAVENOUS at 12:26

## 2022-10-03 RX ADMIN — SODIUM CHLORIDE 240 MG: 9 INJECTION, SOLUTION INTRAVENOUS at 13:35

## 2022-10-03 RX ADMIN — SODIUM CHLORIDE 20 ML/HR: 0.9 INJECTION, SOLUTION INTRAVENOUS at 12:20

## 2022-10-03 RX ADMIN — PALONOSETRON HYDROCHLORIDE 0.25 MG: 0.25 INJECTION INTRAVENOUS at 12:26

## 2022-10-03 RX ADMIN — FOSAPREPITANT 150 MG: 150 INJECTION, POWDER, LYOPHILIZED, FOR SOLUTION INTRAVENOUS at 12:50

## 2022-10-03 NOTE — PROGRESS NOTES
Pt here for Opdivo/Cadd connect  Vitals stable; labs within parameters for treatment  Callbell within reach

## 2022-10-03 NOTE — PROGRESS NOTES
Pt tolerated treatment well  Elastomeric pump connected after two RN check  Pt declined AVS  Pt aware to return Wednesday at 1230 for disconnect

## 2022-10-05 ENCOUNTER — TELEPHONE (OUTPATIENT)
Dept: PALLIATIVE MEDICINE | Facility: CLINIC | Age: 72
End: 2022-10-05

## 2022-10-05 ENCOUNTER — HOSPITAL ENCOUNTER (OUTPATIENT)
Dept: INFUSION CENTER | Facility: CLINIC | Age: 72
Discharge: HOME | End: 2022-10-05

## 2022-10-05 DIAGNOSIS — C16.9 GASTRIC ADENOCARCINOMA (HCC): Primary | ICD-10-CM

## 2022-10-05 NOTE — TELEPHONE ENCOUNTER
Patient called requesting refill of Dronabinol  Patient states he has 3 days left of medication  Phone call to [employer] spoke to Dimitrios Sigala (pharmacist) she stated Dronabinol script dated 9/22/22 is on hold  The earliest this can be filled is 10/12/22  Pharmacist stated patient picked up Dronabinol script dated 9/13/22 on 9/13/22 for a month supply  Pharmacist stated they would need provider to over ride for early refill      Pharmacy- [facility]  [phone]    Next scheduled appointment 12/1/23

## 2022-10-05 NOTE — PROGRESS NOTES
Patient to infusion for Elastomeric pump disconnect  He offer no complaints  Pump appears deflated after 46 hours, port flushed per protocol, good blood return noted  Copy of AVS provided

## 2022-10-07 DIAGNOSIS — Z51.5 PALLIATIVE CARE PATIENT: ICD-10-CM

## 2022-10-07 DIAGNOSIS — G89.3 CANCER RELATED PAIN: ICD-10-CM

## 2022-10-07 DIAGNOSIS — R52 INTRACTABLE PAIN: ICD-10-CM

## 2022-10-07 RX ORDER — OXYCODONE HCL 10 MG/1
10 TABLET, FILM COATED, EXTENDED RELEASE ORAL EVERY 8 HOURS SCHEDULED
Qty: 90 TABLET | Refills: 0 | Status: SHIPPED | OUTPATIENT
Start: 2022-10-07

## 2022-10-07 NOTE — TELEPHONE ENCOUNTER
Primary palliative medicine provider:   Keith    Medication requested: If for pain, how has the patient been taking their pain medicine?  1 tablet every HS    Last appointment: 09/22    Next scheduled appointment: 12/01    PDMP review:    07/25/2022 07/22/2022 OxyCONTIN (Tablet, Extended Release) 90 0 30 10 MG NA DRAGAN, [address]

## 2022-10-10 DIAGNOSIS — C16.9 GASTRIC ADENOCARCINOMA (HCC): Primary | ICD-10-CM

## 2022-10-10 RX ORDER — DEXTROSE MONOHYDRATE 50 MG/ML
20 INJECTION, SOLUTION INTRAVENOUS ONCE
Status: CANCELLED | OUTPATIENT
Start: 2022-10-17

## 2022-10-10 RX ORDER — PALONOSETRON 0.05 MG/ML
0.25 INJECTION, SOLUTION INTRAVENOUS ONCE
Status: CANCELLED | OUTPATIENT
Start: 2022-10-17

## 2022-10-10 RX ORDER — SODIUM CHLORIDE 9 MG/ML
20 INJECTION, SOLUTION INTRAVENOUS ONCE AS NEEDED
Status: CANCELLED | OUTPATIENT
Start: 2022-10-17

## 2022-10-11 PROBLEM — N39.0 COMPLICATED UTI (URINARY TRACT INFECTION): Status: RESOLVED | Noted: 2022-05-30 | Resolved: 2022-10-11

## 2022-10-12 ENCOUNTER — OFFICE VISIT (OUTPATIENT)
Dept: HEMATOLOGY ONCOLOGY | Facility: CLINIC | Age: 72
End: 2022-10-12
Payer: COMMERCIAL

## 2022-10-12 ENCOUNTER — TELEPHONE (OUTPATIENT)
Dept: HEMATOLOGY ONCOLOGY | Facility: CLINIC | Age: 72
End: 2022-10-12

## 2022-10-12 VITALS
WEIGHT: 125 LBS | OXYGEN SATURATION: 98 % | DIASTOLIC BLOOD PRESSURE: 70 MMHG | TEMPERATURE: 98 F | BODY MASS INDEX: 18.94 KG/M2 | HEIGHT: 68 IN | SYSTOLIC BLOOD PRESSURE: 124 MMHG | HEART RATE: 74 BPM | RESPIRATION RATE: 14 BRPM

## 2022-10-12 DIAGNOSIS — C16.9 GASTRIC ADENOCARCINOMA (HCC): Primary | ICD-10-CM

## 2022-10-12 DIAGNOSIS — E87.6 HYPOKALEMIA: Primary | ICD-10-CM

## 2022-10-12 DIAGNOSIS — E86.0 DEHYDRATION: ICD-10-CM

## 2022-10-12 PROCEDURE — 99214 OFFICE O/P EST MOD 30 MIN: CPT | Performed by: INTERNAL MEDICINE

## 2022-10-12 NOTE — PROGRESS NOTES
Hematology/Oncology Outpatient Follow- up Note  Ilan Mreaz 67 y o  male MRN: @ Encounter: [id number]        Date:  10/12/2022    Presenting Complaint/Diagnosis :      Metastatic GE junction malignancy, HER2 negative, MSI stable, tumor mutation burden low    HPI:      20-year-old  male with benign prostatic hypertrophy, hypertension, degenerative arthritis, hypothyroidism, GERD had been complaining of epigastric pain with on a 30 lb weight loss over the past 2 months, loss of appetite, he had cholecystectomy without improvement of symptoms in January 2022 subsequently EGD on 02/02/2022 showed 2 cm hiatal hernia with friable mass involving more than half of the circumference with extension down into the cardia and the fundus for about 5 cm mild erythema in the antrum     Biopsy of the gastric fundus mass showed adenocarcinoma with small portion squamous component arising in high-grade dysplasia in a background mixed squamous and cardiac mucosa the majority of the tumor is adenocarcinoma with minor component of squamous differentiation  Mismatch repair protein is intact     HER2 is 0 by IHC     CT scan the chest abdomen pelvis showed infiltrating mass at the gastroesophageal junction sinan enlargement suspicious for metastatic disease in the mediastinum, retroperitoneum, omentum, subcentimeter bilateral pulmonary nodules     PET scan on 02/09/2022 showed FDG uptake in the gastroesophageal junction and proximal stomach, sinan disease in the chest, abdomen, periaortic, small bilateral pulmonary nodules, uptake in the right upper quadrant omental nodules, uptake along the lateral service of the right lobe of the liver for possible peritoneal disease along the liver surface      The patient was started on modified FOLFOX 6 with Opdivo every 2 weeks      Previous Hematologic/ Oncologic History:    Oncology History   Gastric adenocarcinoma ([facility] [address] )   2/8/2022 Initial Diagnosis    Gastric adenocarcinoma ([address] )     2/21/2022 -  Chemotherapy    palonosetron (ALOXI), 0 25 mg, Intravenous, Once, 10 of 15 cycles  Administration: 0 25 mg (3/21/2022), 0 25 mg (4/4/2022), 0 25 mg (5/16/2022), 0 25 mg (6/13/2022), 0 25 mg (6/27/2022), 0 25 mg (9/6/2022), 0 25 mg (7/11/2022), 0 25 mg (8/22/2022), 0 25 mg (9/19/2022), 0 25 mg (10/3/2022)  fosaprepitant (EMEND) IVPB, 150 mg, Intravenous, Once, 10 of 15 cycles  Administration: 150 mg (3/21/2022), 150 mg (4/4/2022), 150 mg (5/16/2022), 150 mg (6/13/2022), 150 mg (6/27/2022), 150 mg (9/6/2022), 150 mg (7/11/2022), 150 mg (8/22/2022), 150 mg (9/19/2022), 150 mg (10/3/2022)  nivolumab (OPDIVO) IVPB, 240 mg (100 % of original dose 240 mg), Intravenous, Once, 12 of 17 cycles  Dose modification: 240 mg (original dose 240 mg, Cycle 1)  Administration: 240 mg (2/21/2022), 240 mg (3/7/2022), 240 mg (3/21/2022), 240 mg (4/4/2022), 240 mg (5/16/2022), 240 mg (9/6/2022), 240 mg (7/11/2022), 240 mg (8/22/2022), 240 mg (9/19/2022), 240 mg (10/3/2022), 240 mg (6/13/2022), 240 mg (6/27/2022)  oxaliplatin (ELOXATIN) chemo infusion, 85 mg/m2 = 164 9 mg, Intravenous, Once, 9 of 9 cycles  Dose modification: 65 mg/m2 (original dose 85 mg/m2, Cycle 6, Reason: Other (Must fill in a comment), Comment: Elevated creatinine)  Administration: 164 9 mg (2/21/2022), 164 9 mg (3/7/2022), 164 9 mg (3/21/2022), 164 9 mg (4/4/2022), 158 1 mg (5/16/2022), 118 3 mg (6/13/2022), 113 75 mg (6/27/2022), 111 8 mg (7/11/2022)  fluorouracil (ADRUCIL) ambulatory infusion Soln, 1,200 mg/m2/day = 4,655 mg, Intravenous, Over 46 hours, 12 of 17 cycles  Dose modification: 1,000 mg/m2/day (original dose 1,200 mg/m2/day, Cycle 10, Reason: Dose modified as per discussion with consulting physician)     8/16/2022 -  Cancer Staged    Staging form: Stomach, AJCC 7th Edition  - Clinical: Stage IV (TX, N3b, M1) - Signed by Boris Hua MD on 8/16/2022           Current Hematologic/ Oncologic Treatment:      Dose adjusted modified FOLFOX 6 with immunotherapy  Basically this point the patient is just getting 5 fluorouracil with Opdivo every 2 weeks  Interval History:      Patient returns for follow-up visit  He still has fatigue  His abdominal pain has worsened slightly so he has increased his oxycodone  He is due for imaging and I will arrange this before his next visit in a month  If he has progression we will consider adding on an additional chemotherapy most probably irinotecan or switching to alternate treatment like a taxane with possibly Cyramza  The patient states he is otherwise mostly at baseline  Denies any nausea or vomiting  Has had constipation because of his increased pain medications and is adjusting his laxative dose  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  The rest of his 14 point review of systems today was negative  Cancer Staging:  Cancer Staging  Gastric adenocarcinoma [facility])  Staging form: Stomach, AJCC 7th Edition  - Clinical: Stage IV (TX, N3b, M1) - Signed by Bennie Zuluaga MD on 8/16/2022      Test Results:    Imaging: No results found  Labs:   Lab Results   Component Value Date    WBC 5 27 09/30/2022    HGB 9 4 (L) 09/30/2022    HCT 29 9 (L) 09/30/2022    MCV 95 09/30/2022     09/30/2022     Lab Results   Component Value Date    K 3 9 09/30/2022    CL 99 09/30/2022    CO2 33 (H) 09/30/2022    BUN 17 09/30/2022    CREATININE 0 94 09/30/2022    GLUF 97 02/18/2022    CALCIUM 8 5 09/30/2022    CORRECTEDCA 9 1 09/30/2022    AST 15 09/30/2022    ALT 13 09/30/2022    ALKPHOS 108 (H) 09/30/2022    EGFR 80 09/30/2022       Lab Results   Component Value Date    PSA 0 2 05/13/2022    PSA 7 2 (H) 03/08/2021       Lab Results   Component Value Date    IRON 28 (L) 03/03/2022    TIBC 254 03/03/2022    FERRITIN 429 (H) 03/03/2022       ROS: As stated in the history of present illness otherwise his 14 point review of systems today was negative        Active Problems:   Patient Active Problem List Diagnosis   • Hypertension   • Postablative hypothyroidism   • Depression   • BPH (benign prostatic hyperplasia)   • COVID-19   • Inflammatory arthritis   • Rectus diastasis   • Acute calculous cholecystitis   • Gastric mass   • Gastric adenocarcinoma (HCC)   • Encounter for central line care   • FTT (failure to thrive) in adult   • Stomach cancer ([facility] )   • Cancer related pain   • Moderate protein-calorie malnutrition ([place] [address] )   • Palliative care patient   • Benign prostatic hyperplasia with urinary frequency   • Rash   • Constipation   • Dehydration   • Hypokalemia       Past Medical History:   Past Medical History:   Diagnosis Date   • Arthritis     shoulders   • Cancer ([facility] )     gastric mass   • COVID 01/2021 jan 2021- no hospitalization   • Disease of thyroid gland     had radioactive iodine in past   • GERD (gastroesophageal reflux disease)    • History of GI diverticular bleed    • Hyperlipidemia    • Hypertension     on no meds at present   • Kidney stone    • Port-A-Cath in place    • S/P percutaneous endoscopic gastrostomy (PEG) tube placement (HCC)    • Tinnitus    • Wears glasses        Surgical History:   Past Surgical History:   Procedure Laterality Date   • ADENOIDECTOMY     • CHOLECYSTECTOMY LAPAROSCOPIC N/A 1/24/2022    Procedure: CHOLECYSTECTOMY LAPAROSCOPIC W/ INTRAOP CHOLANGIOGRAM;  Surgeon: Ida Mcclelland DO;  [facility];  Service: General   • COLONOSCOPY N/A 7/16/2016    Procedure: COLONOSCOPY;  Surgeon: Anshu Jeffrey MD;  [facility];  Service:    • FL CHOLANGIOGRAM TRANSHEPATIC  1/24/2022   • FL GUIDED CENTRAL VENOUS ACCESS DEVICE INSERTION  2/11/2022   • HERNIA REPAIR     • AK LAP,PROSTATECTOMY,RADICAL,W/NERVE SPARE,INCL ROBOTIC N/A 4/27/2022    Procedure: PROSTATECTOMY SIMPLE LAPAROSCOPIC  W ROBOTICS, BLADDER NECK RECONSTRUCTION;  Surgeon: Radha Masters MD;  Location: [facility];  Service: Urology   • PROSTATE BIOPSY     • TONSILLECTOMY     • TUNNELED VENOUS PORT PLACEMENT N/A 2/11/2022    Procedure: INSERTION VENOUS PORT (PORT-A-CATH); Surgeon: Jose Bautista MD;  Location: [facility];  Service: Surgical Oncology   • UVULECTOMY         Family History:  No family history on file  Cancer-related family history is not on file  Social History:   Social History     Socioeconomic History   • Marital status: /Civil Union     Spouse name: Not on file   • Number of children: Not on file   • Years of education: Not on file   • Highest education level: Not on file   Occupational History   • Not on file   Tobacco Use   • Smoking status: Never Smoker   • Smokeless tobacco: Never Used   Vaping Use   • Vaping Use: Never used   Substance and Sexual Activity   • Alcohol use: Yes     Comment: social   • Drug use: Never   • Sexual activity: Not on file   Other Topics Concern   • Not on file   Social History Narrative   • Not on file     Social Determinants of Health     Financial Resource Strain: Not on file   Food Insecurity: No Food Insecurity   • Worried About Running Out of Food in the Last Year: Never true   • Ran Out of Food in the Last Year: Never true   Transportation Needs: No Transportation Needs   • Lack of Transportation (Medical): No   • Lack of Transportation (Non-Medical):  No   Physical Activity: Not on file   Stress: Not on file   Social Connections: Not on file   Intimate Partner Violence: Not on file   Housing Stability: Low Risk    • Unable to Pay for Housing in the Last Year: No   • Number of Places Lived in the Last Year: 1   • Unstable Housing in the Last Year: No       Current Medications:   Current Outpatient Medications   Medication Sig Dispense Refill   • bisacodyl (DULCOLAX) 5 mg EC tablet Take 1 tablet (5 mg total) by mouth daily as needed for constipation Related to pain medicines 30 tablet 0   • dronabinol (MARINOL) 10 MG capsule Take 1 capsule (10 mg total) by mouth 2 (two) times a day before meals 60 capsule 0   • escitalopram (LEXAPRO) 10 mg tablet Take 1 tablet (10 mg total) by mouth daily 90 tablet 0   • [START ON 10/17/2022] fluorouracil 3,380 mg in CADD/Elastomeric Infusion Device Infuse 3,380 mg (1,000 mg/m2/day x 1 69 m2) into a catheter in a vein via infusion device over 46 hours for 2 days  Do not start before October 17, 2022  1 each 0   • levothyroxine (Euthyrox) 150 mcg tablet Take 1 tablet (150 mcg total) by mouth daily in the early morning 90 tablet 3   • magnesium chloride-calcium (Slow-Mag) 71 5-119 mg Take 1 tablet by mouth 2 (two) times a day 60 tablet 5   • neomycin-bacitracin-polymyxin b (NEOSPORIN) ointment Apply to the tip of the penis twice daily as needed for catheter irritation  15 g 0   • Nutritional Supplements (jevity 1 5 tam) LIQD Tube Feeding with Oral Diet Jevity 1 5 Bolus 240 ml q4h Flush 50 cc water with each bolus 1000 mL 3   • ondansetron (Zofran ODT) 4 mg disintegrating tablet Take 2 tablets (8 mg total) by mouth every 8 (eight) hours as needed for nausea or vomiting for up to 90 doses 90 tablet 0   • oxyCODONE (OxyCONTIN) 10 mg 12 hr tablet Take 1 tablet (10 mg total) by mouth every 8 (eight) hours Max Daily Amount: 30 mg (Patient taking differently: Take 10 mg by mouth every 12 (twelve) hours) 90 tablet 0   • pantoprazole (PROTONIX) 40 mg tablet Take 1 tablet (40 mg total) by mouth 2 (two) times a day Before breakfast and before bed to prevent acid accumulation   60 tablet 0   • potassium chloride (K-DUR,KLOR-CON) 10 mEq tablet Take 1 tablet (10 mEq total) by mouth 3 (three) times a day 90 tablet 5   • potassium chloride (MICRO-K) 10 MEQ CR capsule Take 1 capsule (10 mEq total) by mouth in the morning for 5 days 5 capsule 0   • prochlorperazine (COMPAZINE) 5 mg tablet Take 1 tablet (5 mg total) by mouth every 6 (six) hours as needed for nausea or vomiting 30 tablet 0   • saliva substitute (MOUTH KOTE) Apply 5 sprays to the mouth or throat 4 (four) times a day as needed (dry mouth) 59 mL 0   • levocetirizine (XYZAL) 5 MG tablet Take 5 mg by mouth in the morning (Patient not taking: No sig reported)     • nystatin (MYCOSTATIN) 500,000 units/5 mL suspension Apply 5 mL (500,000 Units total) to the mouth or throat 4 (four) times a day 200 mL 0     No current facility-administered medications for this visit  Allergies: No Known Allergies    Physical Exam:    Body surface area is 1 66 meters squared  Wt Readings from Last 3 Encounters:   10/12/22 56 7 kg (125 lb)   10/03/22 58 7 kg (129 lb 8 oz)   09/22/22 58 6 kg (129 lb 3 oz)        Temp Readings from Last 3 Encounters:   10/12/22 98 °F (36 7 °C) (Temporal)   10/03/22 (!) 96 8 °F (36 °C) (Temporal)   09/30/22 97 5 °F (36 4 °C) (Temporal)        BP Readings from Last 3 Encounters:   10/12/22 124/70   10/03/22 145/79   09/30/22 104/70         Pulse Readings from Last 3 Encounters:   10/12/22 74   10/03/22 (!) 54   09/30/22 (!) 54         Physical Exam     Constitutional   General appearance: No acute distress, well appearing and well nourished  Eyes   Conjunctiva and lids: No swelling, erythema or discharge  Pupils and irises: Equal, round and reactive to light  Ears, Nose, Mouth, and Throat   External inspection of ears and nose: Normal     Nasal mucosa, septum, and turbinates: Normal without edema or erythema  Oropharynx: Normal with no erythema, edema, exudate or lesions  Pulmonary   Respiratory effort: No increased work of breathing or signs of respiratory distress  Auscultation of lungs: Clear to auscultation  Cardiovascular   Palpation of heart: Normal PMI, no thrills  Auscultation of heart: Normal rate and rhythm, normal S1 and S2, without murmurs  Examination of extremities for edema and/or varicosities: Normal     Carotid pulses: Normal     Abdomen   Abdomen: Non-tender, no masses  Liver and spleen: No hepatomegaly or splenomegaly  Lymphatic   Palpation of lymph nodes in neck: No lymphadenopathy      Musculoskeletal   Gait and station: Normal     Digits and nails: Normal without clubbing or cyanosis  Inspection/palpation of joints, bones, and muscles: Normal     Skin   Skin and subcutaneous tissue: Normal without rashes or lesions  Neurologic   Cranial nerves: Cranial nerves 2-12 intact  Sensation: No sensory loss  Psychiatric   Orientation to person, place, and time: Normal     Mood and affect: Normal         Assessment / Plan: 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 benign prostatic hypertrophy, hypertension, hypothyroidism and GERD was diagnosed with adenocarcinoma of the GE junction with extension into the fundus of the stomach when he presented with weight loss and epigastric pain   Biopsy confirmed adenocarcinoma which was HER2 negative   MMR was intact   Tumor mutation burden is low   MSI was stable   No other actionable mutations were detected   [place] testing did show he may be eligible for TAPUR clinical trial which may be open with [facility] which looks at mutations and treatment with FDA approved drugs for those mutations outside of standard of care  Sherrie Mascorro did have CDK 6 amplification and EGFR amplification for which they may have clinical trial if needed       The patient is currently on 5 fluorouracil and Opdivo  Oxaliplatin has been discontinued as he had a good response  At this point he will stay on this  I will repeat his imaging before his next visit  If he has progression either irinotecan will be considered in addition to his current regimen or Taxol with Shantal Blancas or he could go for the clinical trial as stated above  I will see him back in a month  In the interim if he has any questions he will call our office  Goals and Barriers:  Current Goal:  Prolong Survival from GE junction adenocarcinoma  Barriers: None  Patient's Capacity to Self Care:  Patient  able to self care      Portions of the record may have been created with voice recognition software  Occasional wrong word or "sound a like" substitutions may have occurred due to the inherent limitations of voice recognition software  Read the chart carefully and recognize, using context, where substitutions have occurred

## 2022-10-13 NOTE — TELEPHONE ENCOUNTER
Spoke with patient  He is aware of infusion schedule update and appt for CT  Patient said that he will review all appts in his MyChart

## 2022-10-14 ENCOUNTER — HOSPITAL ENCOUNTER (OUTPATIENT)
Dept: INFUSION CENTER | Facility: CLINIC | Age: 72
End: 2022-10-14
Payer: COMMERCIAL

## 2022-10-14 VITALS
OXYGEN SATURATION: 98 % | HEART RATE: 57 BPM | RESPIRATION RATE: 16 BRPM | DIASTOLIC BLOOD PRESSURE: 79 MMHG | TEMPERATURE: 97.5 F | SYSTOLIC BLOOD PRESSURE: 126 MMHG

## 2022-10-14 DIAGNOSIS — E86.0 DEHYDRATION: Primary | ICD-10-CM

## 2022-10-14 DIAGNOSIS — C16.9 GASTRIC ADENOCARCINOMA (HCC): ICD-10-CM

## 2022-10-14 DIAGNOSIS — Z45.2 ENCOUNTER FOR CENTRAL LINE CARE: ICD-10-CM

## 2022-10-14 DIAGNOSIS — E87.6 HYPOKALEMIA: ICD-10-CM

## 2022-10-14 LAB
ALBUMIN SERPL BCP-MCNC: 3.5 G/DL (ref 3.5–5)
ALP SERPL-CCNC: 112 U/L (ref 34–104)
ALT SERPL W P-5'-P-CCNC: 11 U/L (ref 7–52)
ANION GAP SERPL CALCULATED.3IONS-SCNC: 5 MMOL/L (ref 4–13)
ANISOCYTOSIS BLD QL SMEAR: PRESENT
AST SERPL W P-5'-P-CCNC: 14 U/L (ref 13–39)
BASOPHILS # BLD MANUAL: 0.1 THOUSAND/UL (ref 0–0.1)
BASOPHILS NFR MAR MANUAL: 2 % (ref 0–1)
BILIRUB SERPL-MCNC: 0.41 MG/DL (ref 0.2–1)
BUN SERPL-MCNC: 18 MG/DL (ref 5–25)
CALCIUM SERPL-MCNC: 8.8 MG/DL (ref 8.4–10.2)
CHLORIDE SERPL-SCNC: 103 MMOL/L (ref 96–108)
CO2 SERPL-SCNC: 31 MMOL/L (ref 21–32)
CREAT SERPL-MCNC: 0.93 MG/DL (ref 0.6–1.3)
EOSINOPHIL # BLD MANUAL: 0.1 THOUSAND/UL (ref 0–0.4)
EOSINOPHIL NFR BLD MANUAL: 2 % (ref 0–6)
ERYTHROCYTE [DISTWIDTH] IN BLOOD BY AUTOMATED COUNT: 14.6 % (ref 11.6–15.1)
GFR SERPL CREATININE-BSD FRML MDRD: 81 ML/MIN/1.73SQ M
GLUCOSE SERPL-MCNC: 117 MG/DL (ref 65–140)
HCT VFR BLD AUTO: 32.6 % (ref 36.5–49.3)
HGB BLD-MCNC: 10.4 G/DL (ref 12–17)
LYMPHOCYTES # BLD AUTO: 0.93 THOUSAND/UL (ref 0.6–4.47)
LYMPHOCYTES # BLD AUTO: 19 % (ref 14–44)
MCH RBC QN AUTO: 29.7 PG (ref 26.8–34.3)
MCHC RBC AUTO-ENTMCNC: 31.9 G/DL (ref 31.4–37.4)
MCV RBC AUTO: 93 FL (ref 82–98)
MONOCYTES # BLD AUTO: 0.49 THOUSAND/UL (ref 0–1.22)
MONOCYTES NFR BLD: 10 % (ref 4–12)
NEUTROPHILS # BLD MANUAL: 3.26 THOUSAND/UL (ref 1.85–7.62)
NEUTS BAND NFR BLD MANUAL: 1 % (ref 0–8)
NEUTS SEG NFR BLD AUTO: 66 % (ref 43–75)
PLATELET # BLD AUTO: 267 THOUSANDS/UL (ref 149–390)
PLATELET BLD QL SMEAR: ADEQUATE
PMV BLD AUTO: 8.9 FL (ref 8.9–12.7)
POTASSIUM SERPL-SCNC: 4 MMOL/L (ref 3.5–5.3)
PROT SERPL-MCNC: 6.1 G/DL (ref 6.4–8.4)
RBC # BLD AUTO: 3.5 MILLION/UL (ref 3.88–5.62)
SODIUM SERPL-SCNC: 139 MMOL/L (ref 135–147)
TSH SERPL DL<=0.05 MIU/L-ACNC: 1.06 UIU/ML (ref 0.45–4.5)
WBC # BLD AUTO: 4.87 THOUSAND/UL (ref 4.31–10.16)

## 2022-10-14 PROCEDURE — 85007 BL SMEAR W/DIFF WBC COUNT: CPT | Performed by: INTERNAL MEDICINE

## 2022-10-14 PROCEDURE — 96360 HYDRATION IV INFUSION INIT: CPT

## 2022-10-14 PROCEDURE — 84443 ASSAY THYROID STIM HORMONE: CPT | Performed by: INTERNAL MEDICINE

## 2022-10-14 PROCEDURE — 80053 COMPREHEN METABOLIC PANEL: CPT | Performed by: INTERNAL MEDICINE

## 2022-10-14 PROCEDURE — 85027 COMPLETE CBC AUTOMATED: CPT | Performed by: INTERNAL MEDICINE

## 2022-10-14 PROCEDURE — 96361 HYDRATE IV INFUSION ADD-ON: CPT

## 2022-10-14 RX ADMIN — SODIUM CHLORIDE 1000 ML: 0.9 INJECTION, SOLUTION INTRAVENOUS at 13:04

## 2022-10-14 NOTE — PROGRESS NOTES
Labs within parameters for treatment on Monday  Port remains with brisk blood return s/p treatment, flushed well without resistance  Deaccessed, bandaid in place  AVS printed and provided  Patient aware of appt time Monday  Patient AAOx4 and ambulatory upon DC

## 2022-10-14 NOTE — PROGRESS NOTES
Patient arrives to infusion center for lab draw in prep for treatment Monday and hydration  Patient offers no acute complaints  VSS, L PAC accessed by TP RN, brisk blood return noted, labs collected and sent as per orders  Port flushed without resistance  Hydration in progress  Patient resting on recliner chair, call bell within reach

## 2022-10-17 ENCOUNTER — HOSPITAL ENCOUNTER (OUTPATIENT)
Dept: INFUSION CENTER | Facility: CLINIC | Age: 72
Discharge: HOME | End: 2022-10-17
Payer: COMMERCIAL

## 2022-10-17 ENCOUNTER — TELEPHONE (OUTPATIENT)
Dept: HEMATOLOGY ONCOLOGY | Facility: HOSPITAL | Age: 72
End: 2022-10-17

## 2022-10-17 ENCOUNTER — PATIENT OUTREACH (OUTPATIENT)
Dept: HEMATOLOGY ONCOLOGY | Facility: CLINIC | Age: 72
End: 2022-10-17

## 2022-10-17 VITALS
WEIGHT: 125.5 LBS | BODY MASS INDEX: 19.02 KG/M2 | HEART RATE: 63 BPM | SYSTOLIC BLOOD PRESSURE: 122 MMHG | OXYGEN SATURATION: 99 % | TEMPERATURE: 99 F | HEIGHT: 68 IN | DIASTOLIC BLOOD PRESSURE: 79 MMHG | RESPIRATION RATE: 18 BRPM

## 2022-10-17 DIAGNOSIS — C16.9 GASTRIC ADENOCARCINOMA (HCC): Primary | ICD-10-CM

## 2022-10-17 PROCEDURE — 96375 TX/PRO/DX INJ NEW DRUG ADDON: CPT

## 2022-10-17 PROCEDURE — 96367 TX/PROPH/DG ADDL SEQ IV INF: CPT

## 2022-10-17 PROCEDURE — G0498 CHEMO EXTEND IV INFUS W/PUMP: HCPCS

## 2022-10-17 PROCEDURE — 96413 CHEMO IV INFUSION 1 HR: CPT

## 2022-10-17 RX ORDER — SODIUM CHLORIDE 9 MG/ML
20 INJECTION, SOLUTION INTRAVENOUS ONCE AS NEEDED
Status: DISCONTINUED | OUTPATIENT
Start: 2022-10-17 | End: 2022-10-20 | Stop reason: HOSPADM

## 2022-10-17 RX ORDER — DEXTROSE MONOHYDRATE 50 MG/ML
20 INJECTION, SOLUTION INTRAVENOUS ONCE
Status: DISCONTINUED | OUTPATIENT
Start: 2022-10-17 | End: 2022-10-20 | Stop reason: HOSPADM

## 2022-10-17 RX ORDER — PALONOSETRON 0.05 MG/ML
0.25 INJECTION, SOLUTION INTRAVENOUS ONCE
Status: COMPLETED | OUTPATIENT
Start: 2022-10-17 | End: 2022-10-17

## 2022-10-17 RX ADMIN — DEXAMETHASONE SODIUM PHOSPHATE 10 MG: 10 INJECTION, SOLUTION INTRAMUSCULAR; INTRAVENOUS at 10:10

## 2022-10-17 RX ADMIN — PALONOSETRON HYDROCHLORIDE 0.25 MG: 0.25 INJECTION INTRAVENOUS at 10:36

## 2022-10-17 RX ADMIN — SODIUM CHLORIDE 240 MG: 9 INJECTION, SOLUTION INTRAVENOUS at 11:15

## 2022-10-17 RX ADMIN — SODIUM CHLORIDE 20 ML/HR: 0.9 INJECTION, SOLUTION INTRAVENOUS at 10:11

## 2022-10-17 RX ADMIN — FOSAPREPITANT 150 MG: 150 INJECTION, POWDER, LYOPHILIZED, FOR SOLUTION INTRAVENOUS at 10:36

## 2022-10-17 NOTE — TELEPHONE ENCOUNTER
Returned call of patient's daughter, Braydon Michael who called stating her dad is having increased pain  Ct was able to be moved up to 10/25 with a follow-up with Dr Lali Rowley of 10/27 to discuss results  Mary appreciative of call, told she would have to go to nearest [address] [place] [address] to  barium for Tuesday, stated understanding   No questions at this time

## 2022-10-17 NOTE — PROGRESS NOTES
Patient tolerated treatment without incident  Elastomeric pump connected, clamp open and pump running, verified with 2nd RN  Patient is aware of disconnect time   Has AVS

## 2022-10-17 NOTE — PROGRESS NOTES
Patient here for Opdivo and 5FU elastomeric pump, is feeling well  Labs reviewed and are within parameters for treatment  Port accessed without issue

## 2022-10-17 NOTE — PROGRESS NOTES
Telephone call received from Campbell Hallrobbin Hallman, patient's daughter, regarding patients increase in pain  She is concerned the tumor is increasing thereby increasing his pain especially because he is on a dose reduction  She would like the CT scan sooner rather than later    Thank you

## 2022-10-18 DIAGNOSIS — G89.3 CANCER-RELATED PAIN: ICD-10-CM

## 2022-10-18 DIAGNOSIS — Z51.5 PALLIATIVE CARE PATIENT: ICD-10-CM

## 2022-10-18 DIAGNOSIS — R11.0 CHEMOTHERAPY-INDUCED NAUSEA: ICD-10-CM

## 2022-10-18 DIAGNOSIS — T45.1X5A CHEMOTHERAPY-INDUCED NAUSEA: ICD-10-CM

## 2022-10-18 DIAGNOSIS — C16.9 GASTRIC ADENOCARCINOMA (HCC): ICD-10-CM

## 2022-10-18 RX ORDER — PROCHLORPERAZINE MALEATE 5 MG/1
5 TABLET ORAL EVERY 6 HOURS PRN
Qty: 30 TABLET | Refills: 0 | Status: SHIPPED | OUTPATIENT
Start: 2022-10-18 | End: 2022-10-27 | Stop reason: ALTCHOICE

## 2022-10-19 ENCOUNTER — HOSPITAL ENCOUNTER (OUTPATIENT)
Dept: INFUSION CENTER | Facility: CLINIC | Age: 72
Discharge: HOME | End: 2022-10-19

## 2022-10-19 DIAGNOSIS — C16.9 GASTRIC ADENOCARCINOMA (HCC): Primary | ICD-10-CM

## 2022-10-19 NOTE — PROGRESS NOTES
Pt to clinic for elastomeric pump removal  pump appeared empty after 46 hours   Pt tolerated without complications, pt aware of next appointment avs printed

## 2022-10-21 ENCOUNTER — HOSPITAL ENCOUNTER (OUTPATIENT)
Dept: INFUSION CENTER | Facility: CLINIC | Age: 72
End: 2022-10-21

## 2022-10-24 ENCOUNTER — PATIENT MESSAGE (OUTPATIENT)
Dept: PALLIATIVE MEDICINE | Facility: CLINIC | Age: 72
End: 2022-10-24

## 2022-10-24 ENCOUNTER — PATIENT OUTREACH (OUTPATIENT)
Dept: HEMATOLOGY ONCOLOGY | Facility: CLINIC | Age: 72
End: 2022-10-24

## 2022-10-24 DIAGNOSIS — C16.9 GASTRIC ADENOCARCINOMA (HCC): Primary | ICD-10-CM

## 2022-10-24 DIAGNOSIS — G89.3 CANCER-RELATED PAIN: Primary | ICD-10-CM

## 2022-10-24 DIAGNOSIS — C16.9 GASTRIC ADENOCARCINOMA (HCC): ICD-10-CM

## 2022-10-24 DIAGNOSIS — Z51.5 PALLIATIVE CARE PATIENT: ICD-10-CM

## 2022-10-24 RX ORDER — DEXTROSE MONOHYDRATE 50 MG/ML
20 INJECTION, SOLUTION INTRAVENOUS ONCE
Status: CANCELLED | OUTPATIENT
Start: 2022-10-31

## 2022-10-24 RX ORDER — PALONOSETRON 0.05 MG/ML
0.25 INJECTION, SOLUTION INTRAVENOUS ONCE
Status: CANCELLED | OUTPATIENT
Start: 2022-10-31

## 2022-10-24 RX ORDER — SODIUM CHLORIDE 9 MG/ML
20 INJECTION, SOLUTION INTRAVENOUS ONCE AS NEEDED
Status: CANCELLED | OUTPATIENT
Start: 2022-10-31

## 2022-10-24 NOTE — PROGRESS NOTES
Telephone call received from patient's daughter Jonatan Benson  Her Dad has not been eating and is losing a significant amount of weight  She asked questions about how the process of hospice gets started  Catarino March has a CT scan scheduled 10/25 and follow up with Dr Megha Hutton on 10/27  She also questioned home hospice vs inpatient  I told her once they saw Dr Megha Hutton, if there was progression, he could place a referral for hospice  A hospice liaison would schedule an inhome visit and discuss plan of care  Jonatan Benson thanked me for the information  She stated she would call back if her Dad got worse

## 2022-10-25 ENCOUNTER — HOSPITAL ENCOUNTER (OUTPATIENT)
Dept: CT IMAGING | Facility: HOSPITAL | Age: 72
Discharge: HOME | End: 2022-10-25
Attending: INTERNAL MEDICINE
Payer: COMMERCIAL

## 2022-10-25 ENCOUNTER — TELEPHONE (OUTPATIENT)
Dept: HEMATOLOGY ONCOLOGY | Facility: CLINIC | Age: 72
End: 2022-10-25

## 2022-10-25 ENCOUNTER — HOSPITAL ENCOUNTER (OUTPATIENT)
Dept: RADIOLOGY | Facility: MEDICAL CENTER | Age: 72
Discharge: HOME | End: 2022-10-25
Attending: INTERNAL MEDICINE
Payer: COMMERCIAL

## 2022-10-25 ENCOUNTER — APPOINTMENT (OUTPATIENT)
Dept: RADIOLOGY | Facility: MEDICAL CENTER | Age: 72
End: 2022-10-25
Payer: COMMERCIAL

## 2022-10-25 DIAGNOSIS — C16.9 MALIGNANT NEOPLASM OF STOMACH, UNSPECIFIED LOCATION (HCC): ICD-10-CM

## 2022-10-25 DIAGNOSIS — C16.9 GASTRIC ADENOCARCINOMA (HCC): ICD-10-CM

## 2022-10-25 DIAGNOSIS — C16.9 GASTRIC ADENOCARCINOMA (HCC): Primary | ICD-10-CM

## 2022-10-25 PROCEDURE — 74177 CT ABD & PELVIS W/CONTRAST: CPT

## 2022-10-25 PROCEDURE — 71260 CT THORAX DX C+: CPT

## 2022-10-25 PROCEDURE — G1004 CDSM NDSC: HCPCS

## 2022-10-25 RX ORDER — OXYCODONE HCL 5 MG/5 ML
5-10 SOLUTION, ORAL ORAL EVERY 4 HOURS PRN
Qty: 473 ML | Refills: 0 | Status: SHIPPED | OUTPATIENT
Start: 2022-10-25

## 2022-10-25 RX ADMIN — IOHEXOL 100 ML: 350 INJECTION, SOLUTION INTRAVENOUS at 12:51

## 2022-10-25 NOTE — PROGRESS NOTES
Received call from daughter, Jayson Smyth stating her parents just called her and that [facility] did not feel comfortable to scan her father without barium  Scheduling was called to schedule STAT CT  Patient and family wishing for alternate location

## 2022-10-25 NOTE — TELEPHONE ENCOUNTER
Medical Records Request Route to [facility]   Person calling in [place]   Provider Dr Merrill Memory   Fax Number / Ariella Landa name (Attention:)   [place] / [phone]   Facility call back number [phone]   Patient call back number      Dimas Gutierrez is calling in from [facility] [place] requesting patient's most recent scans and most recent MD notes to be faxed over to [phone]

## 2022-10-25 NOTE — TELEPHONE ENCOUNTER
Called daughter, Alexus Garcia, with new scan time and location, [facility] 330pm 10/25

## 2022-10-25 NOTE — TELEPHONE ENCOUNTER
Daughter, Ana Juarez, called office due to patients inability to swallow barium for CT scan  Dr Luna Ill ok with just IV  [facility] called multiple times with no answer, a voicemail was left  Patients wife with patient, called her from daughters cell phone as daughter came to office, with my callback number for staff to call  Confirmed with daughter that I will call and follow-up with her after speaking with CT staff  A TT was sent to [facility] my TEAMS number to speak about the above  I attempted to call [facility] number with no answer  Another attempt to call radiology was done, I was able to reach [address] [place] [address]  A verbal was given that Mr Smith Bull can go forth with CT scan without the barium  Ana Sebastianjose was called to be made aware contact was made

## 2022-10-26 ENCOUNTER — TELEPHONE (OUTPATIENT)
Dept: HEMATOLOGY ONCOLOGY | Facility: CLINIC | Age: 72
End: 2022-10-26

## 2022-10-26 DIAGNOSIS — E86.0 DEHYDRATION: ICD-10-CM

## 2022-10-26 DIAGNOSIS — E87.6 HYPOKALEMIA: Primary | ICD-10-CM

## 2022-10-26 NOTE — TELEPHONE ENCOUNTER
CALL RETURN FORM   Reason for patient call? Enmanuel Diaz from [facility] calling on behalf of Dr Emilia Gagnon to request call from Dr Luis Phillips    Patient's primary oncologist? Dr Luis Phillips   Name of person the patient was calling for? Dr Luis Phillips   Any additional information to add, if applicable? Dr Olivia Diaz would like to hear from Dr Luis Phillips to discuss patient's care  Please call Dr Olivia Diaz on his cell at [phone]   Informed patient that the message will be forwarded to the team and someone will get back to them as soon as possible    Did you relay this information to the patient?  Yes

## 2022-10-27 ENCOUNTER — TELEPHONE (OUTPATIENT)
Dept: HEMATOLOGY ONCOLOGY | Facility: CLINIC | Age: 72
End: 2022-10-27

## 2022-10-27 ENCOUNTER — OFFICE VISIT (OUTPATIENT)
Dept: HEMATOLOGY ONCOLOGY | Facility: CLINIC | Age: 72
End: 2022-10-27
Payer: COMMERCIAL

## 2022-10-27 VITALS
OXYGEN SATURATION: 98 % | WEIGHT: 122 LBS | BODY MASS INDEX: 18.49 KG/M2 | SYSTOLIC BLOOD PRESSURE: 110 MMHG | TEMPERATURE: 98 F | HEIGHT: 68 IN | RESPIRATION RATE: 14 BRPM | HEART RATE: 78 BPM | DIASTOLIC BLOOD PRESSURE: 72 MMHG

## 2022-10-27 DIAGNOSIS — R11.0 CHEMOTHERAPY-INDUCED NAUSEA: ICD-10-CM

## 2022-10-27 DIAGNOSIS — T45.1X5A CHEMOTHERAPY INDUCED NEUTROPENIA (HCC): Primary | ICD-10-CM

## 2022-10-27 DIAGNOSIS — C16.9 GASTRIC ADENOCARCINOMA (HCC): Primary | ICD-10-CM

## 2022-10-27 DIAGNOSIS — D70.1 CHEMOTHERAPY INDUCED NEUTROPENIA (HCC): Primary | ICD-10-CM

## 2022-10-27 DIAGNOSIS — C16.9 MALIGNANT NEOPLASM OF STOMACH, UNSPECIFIED LOCATION (HCC): ICD-10-CM

## 2022-10-27 DIAGNOSIS — T45.1X5A CHEMOTHERAPY-INDUCED NAUSEA: ICD-10-CM

## 2022-10-27 DIAGNOSIS — C16.9 GASTRIC ADENOCARCINOMA (HCC): ICD-10-CM

## 2022-10-27 PROCEDURE — 99214 OFFICE O/P EST MOD 30 MIN: CPT | Performed by: INTERNAL MEDICINE

## 2022-10-27 RX ORDER — OLANZAPINE 5 MG/1
5 TABLET ORAL
Qty: 90 TABLET | Refills: 1 | Status: SHIPPED | OUTPATIENT
Start: 2022-10-27

## 2022-10-27 RX ORDER — ONDANSETRON HYDROCHLORIDE 8 MG/1
8 TABLET, FILM COATED ORAL EVERY 6 HOURS
Qty: 80 TABLET | Refills: 1 | Status: SHIPPED | OUTPATIENT
Start: 2022-10-27

## 2022-10-27 NOTE — TELEPHONE ENCOUNTER
Patient is scheduled on 10/31 for treatment  If he wants to keep his scheduled dates please have the date changed and also appt requests are not signed   Please have them signed

## 2022-10-27 NOTE — TELEPHONE ENCOUNTER
Per Dr Viv Le treatment will be changing  Cathleen Shabazz will keep hi hydration appointments  He was scheduled out for his previous plan  He would like to be able to keep the  Monday appointment for the new plan  The orders are updated but the dates may have to be changed  He has a follow up with Tapan Jerez already scheduled    Patient was conceded about insurance approval for Monday appointment  Becca Quach

## 2022-10-27 NOTE — TELEPHONE ENCOUNTER
Please keep all hydration appts  Please cancel all existing tx appts and schedule new tx plan  Patient is hoping to be able to keep the same appts that he already has for his new tx  Thank you!

## 2022-10-27 NOTE — PROGRESS NOTES
Hematology/Oncology Outpatient Follow- up Note  George Kade 67 y o  male MRN: @ Encounter: [id number]        Date:  10/27/2022    Presenting Complaint/Diagnosis :  Metastatic GE junction malignancy, HER2 negative, MSI stable, tumor mutation burden low    HPI:    17-year-old  male with benign prostatic hypertrophy, hypertension, degenerative arthritis, hypothyroidism, GERD had been complaining of epigastric pain with on a 30 lb weight loss over the past 2 months, loss of appetite, he had cholecystectomy without improvement of symptoms in January 2022 subsequently EGD on 02/02/2022 showed 2 cm hiatal hernia with friable mass involving more than half of the circumference with extension down into the cardia and the fundus for about 5 cm mild erythema in the antrum     Biopsy of the gastric fundus mass showed adenocarcinoma with small portion squamous component arising in high-grade dysplasia in a background mixed squamous and cardiac mucosa the majority of the tumor is adenocarcinoma with minor component of squamous differentiation  Mismatch repair protein is intact     HER2 is 0 by IHC     CT scan the chest abdomen pelvis showed infiltrating mass at the gastroesophageal junction sinan enlargement suspicious for metastatic disease in the mediastinum, retroperitoneum, omentum, subcentimeter bilateral pulmonary nodules     PET scan on 02/09/2022 showed FDG uptake in the gastroesophageal junction and proximal stomach, sinan disease in the chest, abdomen, periaortic, small bilateral pulmonary nodules, uptake in the right upper quadrant omental nodules, uptake along the lateral service of the right lobe of the liver for possible peritoneal disease along the liver surface      The patient was started on modified FOLFOX 6 with Opdivo every 2 weeks      Previous Hematologic/ Oncologic History:    Oncology History   Gastric adenocarcinoma ([facility] [address] )   2/8/2022 Initial Diagnosis    Gastric adenocarcinoma ([address] ) 2/21/2022 -  Chemotherapy    palonosetron (ALOXI), 0 25 mg, Intravenous, Once, 11 of 19 cycles  Administration: 0 25 mg (3/21/2022), 0 25 mg (4/4/2022), 0 25 mg (5/16/2022), 0 25 mg (6/13/2022), 0 25 mg (6/27/2022), 0 25 mg (9/6/2022), 0 25 mg (7/11/2022), 0 25 mg (8/22/2022), 0 25 mg (9/19/2022), 0 25 mg (10/3/2022), 0 25 mg (10/17/2022)  fosaprepitant (EMEND) IVPB, 150 mg, Intravenous, Once, 11 of 19 cycles  Administration: 150 mg (3/21/2022), 150 mg (4/4/2022), 150 mg (5/16/2022), 150 mg (6/13/2022), 150 mg (6/27/2022), 150 mg (9/6/2022), 150 mg (7/11/2022), 150 mg (8/22/2022), 150 mg (9/19/2022), 150 mg (10/3/2022), 150 mg (10/17/2022)  nivolumab (OPDIVO) IVPB, 240 mg (100 % of original dose 240 mg), Intravenous, Once, 13 of 21 cycles  Dose modification: 240 mg (original dose 240 mg, Cycle 1)  Administration: 240 mg (2/21/2022), 240 mg (3/7/2022), 240 mg (3/21/2022), 240 mg (4/4/2022), 240 mg (5/16/2022), 240 mg (9/6/2022), 240 mg (7/11/2022), 240 mg (8/22/2022), 240 mg (9/19/2022), 240 mg (10/3/2022), 240 mg (6/13/2022), 240 mg (6/27/2022), 240 mg (10/17/2022)  oxaliplatin (ELOXATIN) chemo infusion, 85 mg/m2 = 164 9 mg, Intravenous, Once, 9 of 9 cycles  Dose modification: 65 mg/m2 (original dose 85 mg/m2, Cycle 6, Reason: Other (Must fill in a comment), Comment: Elevated creatinine)  Administration: 164 9 mg (2/21/2022), 164 9 mg (3/7/2022), 164 9 mg (3/21/2022), 164 9 mg (4/4/2022), 158 1 mg (5/16/2022), 118 3 mg (6/13/2022), 113 75 mg (6/27/2022), 111 8 mg (7/11/2022)  fluorouracil (ADRUCIL) ambulatory infusion Soln, 1,200 mg/m2/day = 4,655 mg, Intravenous, Over 46 hours, 13 of 21 cycles  Dose modification: 1,000 mg/m2/day (original dose 1,200 mg/m2/day, Cycle 10, Reason: Dose modified as per discussion with consulting physician)     8/16/2022 -  Cancer Staged    Staging form: Stomach, AJCC 7th Edition  - Clinical: Stage IV (TX, N3b, M1) - Signed by Federica Garcia MD on 8/16/2022           Current Hematologic/ Oncologic Treatment:      Dose adjusted modified FOLFOX 6 with immunotherapy  Basically this point the patient is just getting 5 fluorouracil with Opdivo every 2 weeks  Oxaliplatin was discontinued after 8 cycles with demonstrated good response  Interval History:    Patient returns for follow-up visit  He was having increasing pain and performance status was being affected so imaging was done early  Imaging reveals progression of disease in multiple areas including adrenals, mediastinum , lung nodules and possibly left periaortic nodes also  Based on this the recommendation would be to increase intensity of chemotherapy either by moving him to Taxol and Cyramza versus repeating his molecular testing and seeing if he has high EGFR expression and considering cetuximab per recommendations from [facility]  He does have an appointment to see them in 2 weeks and his physician at called stating if he does wish to pursue therapy they would recommend repeating his gaurdant testing  The patient states he has thought about this and does not wish to be at [facility] because of the drive and is fatigued  He would rather be treated locally  Today he is agreeable to considering chemotherapy  I recommended Taxol and Sharlie Sans and he agreed  He has been nauseous  I will substitute as Compazine for Zofran 8 mg every 6 hours  I will also add Zyprexa at night to help both with appetite nausea and feeling of well-being  He is fatigued  ECOG performance status has gone down and is now 1  Is cachectic  The rest of his 14 point review of systems today was negative        Cancer Staging:  Cancer Staging  Gastric adenocarcinoma [facility])  Staging form: Stomach, AJCC 7th Edition  - Clinical: Stage IV (TX, N3b, M1) - Signed by Amie Jaramillo MD on 8/16/2022      Test Results:    Imaging: CT chest abdomen pelvis w contrast    Result Date: 10/25/2022  Narrative: CT CHEST, ABDOMEN AND PELVIS WITH IV CONTRAST INDICATION:   C16 9: Malignant neoplasm of stomach, unspecified  COMPARISON:  August 4, 2022, February 4, 2022 TECHNIQUE: CT examination of the chest, abdomen and pelvis was performed  Axial, sagittal, and coronal 2D reformatted images were created from the source data and submitted for interpretation  Radiation dose length product (DLP) for this visit:  546 mGy-cm   This examination, like all CT scans performed in the [facility], was performed utilizing techniques to minimize radiation dose exposure, including the use of iterative reconstruction and automated exposure control  IV Contrast:  100 mL of iohexol (OMNIPAQUE) Enteric Contrast: Enteric contrast was administered  FINDINGS: CHEST LUNGS:  Several new pulmonary nodules are identified bilaterally consistent with new metastatic disease  These do not appear to be recurrence of nodule seen previously in February  Most of the nodules are 3 to 4 mm in size, the largest nodules are as follows; Right lower lobe image 62 measuring 7 x 6 mm Left lower lobe image 88 measuring  5 x 4 mm Left lower lobe image 94 measuring 10 x 6 mm Mild bibasilar reticular interstitial thickening in both posterior lower lobes which is nonspecific but may reflect a component of interstitial fluid, mildly increased compared to prior  There is no tracheal or endobronchial lesion  PLEURA:  Unremarkable  HEART/GREAT VESSELS: Heavy atherosclerotic coronary artery calcification is noted  Heart is otherwise unremarkable  No thoracic aortic aneurysm  MEDIASTINUM AND SHERWIN:  Enlarging mediastinal lymph node, precarinal image 2/25 measuring 10 mm  CHEST WALL AND LOWER NECK:  Unremarkable  ABDOMEN LIVER/BILIARY TREE:  There is prominence of common bile duct and central intrahepatic biliary tree most consistent with the sequela of prior cholecystectomy  Stable finding  dLiver is otherwise unremarkable  GALLBLADDER:  Gallbladder is surgically absent  SPLEEN:  Unremarkable  PANCREAS:  Unremarkable  ADRENAL GLANDS:  Bilateral ill-defined mixed attenuation nodule in the adrenal glands, appearing increased in size suggesting metastatic etiology  On the right measuring 1 8 x 1 5 cm in the body and 1 8 x 1 4 cm in the anterior limb  On the left in the  body measuring 1 4 x 1 3 cm  KIDNEYS/URETERS:  No hydronephrosis  Nonobstructing bilateral nephrolithiasis  STOMACH AND BOWEL:  Percutaneous gastrostomy tube noted  No measurable lesion or ulceration within the stomach  Small bowel and colon within normal limits  APPENDIX:  No findings to suggest appendicitis  ABDOMINOPELVIC CAVITY:  No ascites  No pneumoperitoneum  Subcentimeter but likely pathologic left para-aortic lymph nodes, less than seen in February but greater than in August  VESSELS:  Unremarkable for patient's age  PELVIS REPRODUCTIVE ORGANS:  Unremarkable for patient's age  URINARY BLADDER:  Unremarkable  ABDOMINAL WALL/INGUINAL REGIONS:  Unremarkable  OSSEOUS STRUCTURES:  No acute fracture or destructive osseous lesion  Severe degenerative changes seen in the right glenohumeral joint, moderate degenerative changes in the left glenohumeral joint  Degenerative disc disease in the lumbar spine  Grade 1 spondylolisthesis of L4 on L5  A stable finding  Impression: New bilateral pulmonary nodules can metastases  Enlarging mediastinal and carinal lymph node  Enlarging bilateral adrenal nodules suspicious for metastases  Borderline pathologic left para-aortic nodes likely also representing worsening metastatic disease  The study was marked in EPIC for significant notification     Findings also tiger texted to referring physician Dr Yasemin Todd performed: [id number]       Labs:   Lab Results   Component Value Date    WBC 4 87 10/14/2022    HGB 10 4 (L) 10/14/2022    HCT 32 6 (L) 10/14/2022    MCV 93 10/14/2022     10/14/2022     Lab Results   Component Value Date    K 4 0 10/14/2022    CL 103 10/14/2022    CO2 31 10/14/2022    BUN 18 10/14/2022    CREATININE 0 93 10/14/2022    GLUF 97 02/18/2022    CALCIUM 8 8 10/14/2022    CORRECTEDCA 9 1 09/30/2022    AST 14 10/14/2022    ALT 11 10/14/2022    ALKPHOS 112 (H) 10/14/2022    EGFR 81 10/14/2022       Lab Results   Component Value Date    PSA 0 2 05/13/2022    PSA 7 2 (H) 03/08/2021       Lab Results   Component Value Date    IRON 28 (L) 03/03/2022    TIBC 254 03/03/2022    FERRITIN 429 (H) 03/03/2022         ROS: As stated in the history of present illness otherwise his 14 point review of systems today was negative        Active Problems:   Patient Active Problem List   Diagnosis   • Hypertension   • Postablative hypothyroidism   • Depression   • BPH (benign prostatic hyperplasia)   • COVID-19   • Inflammatory arthritis   • Rectus diastasis   • Acute calculous cholecystitis   • Gastric mass   • Gastric adenocarcinoma (HCC)   • Encounter for central line care   • FTT (failure to thrive) in adult   • Stomach cancer ([facility] [address] )   • Cancer related pain   • Moderate protein-calorie malnutrition ([facility] [address] )   • Palliative care patient   • Benign prostatic hyperplasia with urinary frequency   • Rash   • Constipation   • Dehydration   • Hypokalemia       Past Medical History:   Past Medical History:   Diagnosis Date   • Arthritis     shoulders   • Cancer ([facility] [address] )     gastric mass   • COVID 01/2021 jan 2021- no hospitalization   • Disease of thyroid gland     had radioactive iodine in past   • GERD (gastroesophageal reflux disease)    • History of GI diverticular bleed    • Hyperlipidemia    • Hypertension     on no meds at present   • Kidney stone    • Port-A-Cath in place    • S/P percutaneous endoscopic gastrostomy (PEG) tube placement [facility])    • Tinnitus    • Wears glasses        Surgical History:   Past Surgical History:   Procedure Laterality Date   • ADENOIDECTOMY     • CHOLECYSTECTOMY LAPAROSCOPIC N/A 1/24/2022    Procedure: CHOLECYSTECTOMY LAPAROSCOPIC W/ INTRAOP CHOLANGIOGRAM;  Surgeon: Naman Rush DO;  [facility];  Service: General   • COLONOSCOPY N/A 7/16/2016    Procedure: COLONOSCOPY;  Surgeon: Amari Hernandez MD;  [facility];  Service:    • FL CHOLANGIOGRAM TRANSHEPATIC  1/24/2022   • FL GUIDED CENTRAL VENOUS ACCESS DEVICE INSERTION  2/11/2022   • HERNIA REPAIR     • NV LAP,PROSTATECTOMY,RADICAL,W/NERVE SPARE,INCL ROBOTIC N/A 4/27/2022    Procedure: PROSTATECTOMY SIMPLE LAPAROSCOPIC  W ROBOTICS, BLADDER NECK RECONSTRUCTION;  Surgeon: Mera Azevedo MD;  Location: [facility];  Service: Urology   • PROSTATE BIOPSY     • TONSILLECTOMY     • TUNNELED VENOUS PORT PLACEMENT N/A 2/11/2022    Procedure: INSERTION VENOUS PORT (PORT-A-CATH); Surgeon: Blossom Christina MD;  [facility]: [facility];  Service: Surgical Oncology   • UVULECTOMY         Family History:  No family history on file  Cancer-related family history is not on file  Social History:   Social History     Socioeconomic History   • Marital status: /Civil Union     Spouse name: Not on file   • Number of children: Not on file   • Years of education: Not on file   • Highest education level: Not on file   Occupational History   • Not on file   Tobacco Use   • Smoking status: Never Smoker   • Smokeless tobacco: Never Used   Vaping Use   • Vaping Use: Never used   Substance and Sexual Activity   • Alcohol use: Yes     Comment: social   • Drug use: Never   • Sexual activity: Not on file   Other Topics Concern   • Not on file   Social History Narrative   • Not on file     Social Determinants of Health     Financial Resource Strain: Not on file   Food Insecurity: No Food Insecurity   • Worried About Running Out of Food in the Last Year: Never true   • Ran Out of Food in the Last Year: Never true   Transportation Needs: No Transportation Needs   • Lack of Transportation (Medical): No   • Lack of Transportation (Non-Medical):  No   Physical Activity: Not on file   Stress: Not on file Social Connections: Not on file   Intimate Partner Violence: Not on file   Housing Stability: Low Risk    • Unable to Pay for Housing in the Last Year: No   • Number of Places Lived in the Last Year: 1   • Unstable Housing in the Last Year: No       Current Medications:   Current Outpatient Medications   Medication Sig Dispense Refill   • bisacodyl (DULCOLAX) 5 mg EC tablet Take 1 tablet (5 mg total) by mouth daily as needed for constipation Related to pain medicines 30 tablet 0   • dronabinol (MARINOL) 10 MG capsule Take 1 capsule (10 mg total) by mouth 2 (two) times a day before meals 60 capsule 0   • escitalopram (LEXAPRO) 10 mg tablet Take 1 tablet (10 mg total) by mouth daily 90 tablet 0   • [START ON 10/31/2022] fluorouracil 3,380 mg in CADD/Elastomeric Infusion Device Infuse 3,380 mg (1,000 mg/m2/day x 1 69 m2) into a catheter in a vein via infusion device over 46 hours for 2 days  Do not start before October 31, 2022  1 each 0   • levothyroxine (Euthyrox) 150 mcg tablet Take 1 tablet (150 mcg total) by mouth daily in the early morning 90 tablet 3   • magnesium chloride-calcium (Slow-Mag) 71 5-119 mg Take 1 tablet by mouth 2 (two) times a day 60 tablet 5   • neomycin-bacitracin-polymyxin b (NEOSPORIN) ointment Apply to the tip of the penis twice daily as needed for catheter irritation   15 g 0   • Nutritional Supplements (jevity 1 5 tam) LIQD Tube Feeding with Oral Diet Jevity 1 5 Bolus 240 ml q4h Flush 50 cc water with each bolus 1000 mL 3   • ondansetron (Zofran ODT) 4 mg disintegrating tablet Take 2 tablets (8 mg total) by mouth every 8 (eight) hours as needed for nausea or vomiting for up to 90 doses 90 tablet 0   • oxyCODONE (OxyCONTIN) 10 mg 12 hr tablet Take 1 tablet (10 mg total) by mouth every 8 (eight) hours Max Daily Amount: 30 mg (Patient taking differently: Take 10 mg by mouth every 12 (twelve) hours) 90 tablet 0   • oxyCODONE (ROXICODONE) 5 mg/5 mL solution Take 5-10 mL (5-10 mg total) by mouth every 4 (four) hours as needed for moderate pain or severe pain Max Daily Amount: 60 mg 473 mL 0   • pantoprazole (PROTONIX) 40 mg tablet Take 1 tablet (40 mg total) by mouth 2 (two) times a day Before breakfast and before bed to prevent acid accumulation  60 tablet 0   • potassium chloride (K-DUR,KLOR-CON) 10 mEq tablet Take 1 tablet (10 mEq total) by mouth 3 (three) times a day 90 tablet 5   • prochlorperazine (COMPAZINE) 5 mg tablet Take 1 tablet (5 mg total) by mouth every 6 (six) hours as needed for nausea or vomiting 30 tablet 0   • saliva substitute (MOUTH KOTE) Apply 5 sprays to the mouth or throat 4 (four) times a day as needed (dry mouth) 59 mL 0   • levocetirizine (XYZAL) 5 MG tablet Take 5 mg by mouth in the morning (Patient not taking: No sig reported)     • nystatin (MYCOSTATIN) 500,000 units/5 mL suspension Apply 5 mL (500,000 Units total) to the mouth or throat 4 (four) times a day 200 mL 0   • potassium chloride (MICRO-K) 10 MEQ CR capsule Take 1 capsule (10 mEq total) by mouth in the morning for 5 days (Patient not taking: Reported on 10/27/2022) 5 capsule 0     No current facility-administered medications for this visit  Allergies: No Known Allergies    Physical Exam:    Body surface area is 1 65 meters squared  Wt Readings from Last 3 Encounters:   10/27/22 55 3 kg (122 lb)   10/17/22 56 9 kg (125 lb 8 oz)   10/12/22 56 7 kg (125 lb)        Temp Readings from Last 3 Encounters:   10/27/22 98 °F (36 7 °C) (Temporal)   10/17/22 99 °F (37 2 °C) (Temporal)   10/14/22 97 5 °F (36 4 °C) (Temporal)        BP Readings from Last 3 Encounters:   10/27/22 110/72   10/17/22 122/79   10/14/22 126/79         Pulse Readings from Last 3 Encounters:   10/27/22 78   10/17/22 63   10/14/22 57         Physical Exam     Constitutional   General appearance:   Cachectic    Eyes   Conjunctiva and lids: No swelling, erythema or discharge  Pupils and irises: Equal, round and reactive to light      Ears, Nose, Mouth, and Throat   External inspection of ears and nose: Normal     Nasal mucosa, septum, and turbinates: Normal without edema or erythema  Oropharynx: Normal with no erythema, edema, exudate or lesions  Pulmonary   Respiratory effort: No increased work of breathing or signs of respiratory distress  Auscultation of lungs: Clear to auscultation  Cardiovascular   Palpation of heart: Normal PMI, no thrills  Auscultation of heart: Normal rate and rhythm, normal S1 and S2, without murmurs  Examination of extremities for edema and/or varicosities: Normal     Carotid pulses: Normal     Abdomen   Abdomen: Non-tender, no masses  Liver and spleen: No hepatomegaly or splenomegaly  Lymphatic   Palpation of lymph nodes in neck: No lymphadenopathy  Musculoskeletal   Gait and station: Normal     Digits and nails: Normal without clubbing or cyanosis  Inspection/palpation of joints, bones, and muscles: Normal         Assessment / Plan: 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 benign prostatic hypertrophy, hypertension, hypothyroidism and GERD was diagnosed with adenocarcinoma of the GE junction with extension into the fundus of the stomach when he presented with weight loss and epigastric pain   Biopsy confirmed adenocarcinoma which was HER2 negative   MMR was intact   Tumor mutation burden is low   MSI was stable   No other actionable mutations were detected   Gaurdant testing did show he may be eligible for TAPUR clinical trial which may be open with [facility] which looks at mutations and treatment with FDA approved drugs for those mutations outside of standard of care  [facility] did have CDK 6 amplification and EGFR amplification for which they may have clinical trial if needed      [facility] as I had spoken to his oncologist there yesterday who had recommended repeat guardant testing to see if he could go on clinical trial with an agent for high EGFR expression/amplification  The patient states currently he is not interested in driving there for consideration of treatment  He was however agreeable to Taxol and Cyramza as second-line treatment  I think this is a reasonable step  He obviously has progressed quite quickly on 5 fluorouracil Leucovorin along with immunotherapy  I went over the risks benefits and alternatives of Taxol and Cyramza  I explained possible side effects to include but not limited to low blood counts, risk of infection, neuropathy, allergic reaction, risk of blood clots, thrombosis, fistulas, poor wound healing and even death  The patient is agreeable to proceeding  We will set him up to start hopefully within a week  He will get blood work every week and get treatment every week  Depending on how he does medications may be adjusted  I have dose reduced his Taxol to 70 milligrams/meter2 based on his performance status  The patient and his daughter were both in agreement with proceeding  He will sign a consent form today  This also includes a consent form for possible blood products  I will see him back in a month  Until then if he has any questions he will call our office  Goals and Barriers:  Current Goal:  Prolong Survival from metastatic gastric cancer  Barriers: None  Patient's Capacity to Self Care:  Patient able to self care  Portions of the record may have been created with voice recognition software  Occasional wrong word or "sound a like" substitutions may have occurred due to the inherent limitations of voice recognition software  Read the chart carefully and recognize, using context, where substitutions have occurred

## 2022-10-28 ENCOUNTER — HOSPITAL ENCOUNTER (OUTPATIENT)
Dept: INFUSION CENTER | Facility: CLINIC | Age: 72
End: 2022-10-28
Payer: COMMERCIAL

## 2022-10-28 VITALS
SYSTOLIC BLOOD PRESSURE: 115 MMHG | HEART RATE: 64 BPM | DIASTOLIC BLOOD PRESSURE: 79 MMHG | RESPIRATION RATE: 18 BRPM | OXYGEN SATURATION: 99 % | TEMPERATURE: 98 F

## 2022-10-28 DIAGNOSIS — D70.1 CHEMOTHERAPY INDUCED NEUTROPENIA (HCC): ICD-10-CM

## 2022-10-28 DIAGNOSIS — C16.9 GASTRIC ADENOCARCINOMA (HCC): ICD-10-CM

## 2022-10-28 DIAGNOSIS — E86.0 DEHYDRATION: Primary | ICD-10-CM

## 2022-10-28 DIAGNOSIS — T45.1X5A CHEMOTHERAPY INDUCED NEUTROPENIA (HCC): ICD-10-CM

## 2022-10-28 DIAGNOSIS — C16.9 MALIGNANT NEOPLASM OF STOMACH, UNSPECIFIED LOCATION (HCC): ICD-10-CM

## 2022-10-28 DIAGNOSIS — E87.6 HYPOKALEMIA: ICD-10-CM

## 2022-10-28 DIAGNOSIS — Z45.2 ENCOUNTER FOR CENTRAL LINE CARE: ICD-10-CM

## 2022-10-28 LAB
ALBUMIN SERPL BCP-MCNC: 3.6 G/DL (ref 3.5–5)
ALP SERPL-CCNC: 109 U/L (ref 34–104)
ALT SERPL W P-5'-P-CCNC: 11 U/L (ref 7–52)
AMORPH URATE CRY URNS QL MICRO: ABNORMAL
ANION GAP SERPL CALCULATED.3IONS-SCNC: 5 MMOL/L (ref 4–13)
AST SERPL W P-5'-P-CCNC: 14 U/L (ref 13–39)
BACTERIA UR QL AUTO: ABNORMAL /HPF
BASOPHILS # BLD AUTO: 0.03 THOUSANDS/ÂΜL (ref 0–0.1)
BASOPHILS NFR BLD AUTO: 1 % (ref 0–1)
BILIRUB SERPL-MCNC: 0.52 MG/DL (ref 0.2–1)
BILIRUB UR QL STRIP: NEGATIVE
BUN SERPL-MCNC: 16 MG/DL (ref 5–25)
CALCIUM SERPL-MCNC: 8.9 MG/DL (ref 8.4–10.2)
CHLORIDE SERPL-SCNC: 104 MMOL/L (ref 96–108)
CLARITY UR: ABNORMAL
CO2 SERPL-SCNC: 30 MMOL/L (ref 21–32)
COLOR UR: YELLOW
CREAT SERPL-MCNC: 0.96 MG/DL (ref 0.6–1.3)
EOSINOPHIL # BLD AUTO: 0.1 THOUSAND/ÂΜL (ref 0–0.61)
EOSINOPHIL NFR BLD AUTO: 2 % (ref 0–6)
ERYTHROCYTE [DISTWIDTH] IN BLOOD BY AUTOMATED COUNT: 15.1 % (ref 11.6–15.1)
GFR SERPL CREATININE-BSD FRML MDRD: 78 ML/MIN/1.73SQ M
GLUCOSE SERPL-MCNC: 112 MG/DL (ref 65–140)
GLUCOSE UR STRIP-MCNC: NEGATIVE MG/DL
HCT VFR BLD AUTO: 30.9 % (ref 36.5–49.3)
HGB BLD-MCNC: 10 G/DL (ref 12–17)
HGB UR QL STRIP.AUTO: NEGATIVE
HYALINE CASTS #/AREA URNS LPF: ABNORMAL /LPF
IMM GRANULOCYTES # BLD AUTO: 0.01 THOUSAND/UL (ref 0–0.2)
IMM GRANULOCYTES NFR BLD AUTO: 0 % (ref 0–2)
KETONES UR STRIP-MCNC: NEGATIVE MG/DL
LEUKOCYTE ESTERASE UR QL STRIP: NEGATIVE
LYMPHOCYTES # BLD AUTO: 0.92 THOUSANDS/ÂΜL (ref 0.6–4.47)
LYMPHOCYTES NFR BLD AUTO: 19 % (ref 14–44)
MCH RBC QN AUTO: 30.1 PG (ref 26.8–34.3)
MCHC RBC AUTO-ENTMCNC: 32.4 G/DL (ref 31.4–37.4)
MCV RBC AUTO: 93 FL (ref 82–98)
MONOCYTES # BLD AUTO: 0.46 THOUSAND/ÂΜL (ref 0.17–1.22)
MONOCYTES NFR BLD AUTO: 10 % (ref 4–12)
MUCOUS THREADS UR QL AUTO: ABNORMAL
NEUTROPHILS # BLD AUTO: 3.26 THOUSANDS/ÂΜL (ref 1.85–7.62)
NEUTS SEG NFR BLD AUTO: 68 % (ref 43–75)
NITRITE UR QL STRIP: NEGATIVE
NON-SQ EPI CELLS URNS QL MICRO: ABNORMAL /HPF
NRBC BLD AUTO-RTO: 0 /100 WBCS
PH UR STRIP.AUTO: 7.5 [PH]
PLATELET # BLD AUTO: 268 THOUSANDS/UL (ref 149–390)
PMV BLD AUTO: 9.2 FL (ref 8.9–12.7)
POTASSIUM SERPL-SCNC: 4.1 MMOL/L (ref 3.5–5.3)
PROT SERPL-MCNC: 6 G/DL (ref 6.4–8.4)
PROT UR STRIP-MCNC: ABNORMAL MG/DL
RBC # BLD AUTO: 3.32 MILLION/UL (ref 3.88–5.62)
RBC #/AREA URNS AUTO: ABNORMAL /HPF
SODIUM SERPL-SCNC: 139 MMOL/L (ref 135–147)
SP GR UR STRIP.AUTO: 1.01 (ref 1–1.03)
UROBILINOGEN UR STRIP-ACNC: <2 MG/DL
WBC # BLD AUTO: 4.78 THOUSAND/UL (ref 4.31–10.16)
WBC #/AREA URNS AUTO: ABNORMAL /HPF

## 2022-10-28 PROCEDURE — 81001 URINALYSIS AUTO W/SCOPE: CPT | Performed by: INTERNAL MEDICINE

## 2022-10-28 PROCEDURE — 85025 COMPLETE CBC W/AUTO DIFF WBC: CPT | Performed by: INTERNAL MEDICINE

## 2022-10-28 PROCEDURE — 80053 COMPREHEN METABOLIC PANEL: CPT | Performed by: INTERNAL MEDICINE

## 2022-10-28 RX ORDER — SODIUM CHLORIDE 9 MG/ML
20 INJECTION, SOLUTION INTRAVENOUS ONCE
OUTPATIENT
Start: 2022-11-14

## 2022-10-28 RX ORDER — SODIUM CHLORIDE 9 MG/ML
20 INJECTION, SOLUTION INTRAVENOUS ONCE
Status: CANCELLED | OUTPATIENT
Start: 2022-10-31

## 2022-10-28 RX ORDER — SODIUM CHLORIDE 9 MG/ML
20 INJECTION, SOLUTION INTRAVENOUS ONCE
OUTPATIENT
Start: 2022-11-07

## 2022-10-28 RX ADMIN — SODIUM CHLORIDE 1000 ML: 0.9 INJECTION, SOLUTION INTRAVENOUS at 10:25

## 2022-10-28 NOTE — TELEPHONE ENCOUNTER
Patient scheduled through C4 D1, please note N1S3 on 12/27, C3D8 on 1/2/23, and C3 D15 on 1/10/23 related to holiday closures  C4 D1 back to Monday treatments  Please have date changed for C3

## 2022-10-28 NOTE — PROGRESS NOTES
Patient arrived for hydration  Offers no complaints  Blood work drawn with port access in preparation for new treatment on Monday  Patient  aware that he needs to provide a urine sample today also

## 2022-10-28 NOTE — TELEPHONE ENCOUNTER
Spoke with patient  He is aware of schedule update  He will get a print out of new schedule when he comes in this morning for hydration  Linnea Cid / Royce Alpers- please change dates for Cycle 3 ONLY to the following:    C3 D1-   12/27  C3 D8-   1/2/23  C3 D15- 1/10/23    Thank you!

## 2022-10-28 NOTE — PROGRESS NOTES
Patient tolerated hydration today without complications  Patient able to provide urine sample  Patient verified appointment on Monday for new cycle of treatment  AVS provided

## 2022-10-31 ENCOUNTER — HOSPITAL ENCOUNTER (OUTPATIENT)
Dept: INFUSION CENTER | Facility: CLINIC | Age: 72
Discharge: HOME | End: 2022-10-31

## 2022-10-31 VITALS
HEIGHT: 68 IN | TEMPERATURE: 97.2 F | RESPIRATION RATE: 18 BRPM | BODY MASS INDEX: 18.64 KG/M2 | WEIGHT: 123 LBS | OXYGEN SATURATION: 96 % | SYSTOLIC BLOOD PRESSURE: 102 MMHG | DIASTOLIC BLOOD PRESSURE: 60 MMHG | HEART RATE: 61 BPM

## 2022-10-31 DIAGNOSIS — C16.9 MALIGNANT NEOPLASM OF STOMACH, UNSPECIFIED LOCATION (HCC): ICD-10-CM

## 2022-10-31 DIAGNOSIS — T45.1X5A CHEMOTHERAPY INDUCED NEUTROPENIA (HCC): ICD-10-CM

## 2022-10-31 DIAGNOSIS — D70.1 CHEMOTHERAPY INDUCED NEUTROPENIA (HCC): ICD-10-CM

## 2022-10-31 DIAGNOSIS — C16.9 GASTRIC ADENOCARCINOMA (HCC): Primary | ICD-10-CM

## 2022-10-31 RX ORDER — SODIUM CHLORIDE 9 MG/ML
20 INJECTION, SOLUTION INTRAVENOUS ONCE
Status: COMPLETED | OUTPATIENT
Start: 2022-10-31 | End: 2022-10-31

## 2022-10-31 RX ADMIN — PACLITAXEL 115.8 MG: 6 INJECTION, SOLUTION, CONCENTRATE INTRAVENOUS at 13:33

## 2022-10-31 RX ADMIN — FAMOTIDINE 20 MG: 10 INJECTION INTRAVENOUS at 11:47

## 2022-10-31 RX ADMIN — SODIUM CHLORIDE 442 MG: 0.9 INJECTION, SOLUTION INTRAVENOUS at 12:28

## 2022-10-31 RX ADMIN — SODIUM CHLORIDE 20 ML/HR: 0.9 INJECTION, SOLUTION INTRAVENOUS at 11:00

## 2022-10-31 RX ADMIN — DEXAMETHASONE SODIUM PHOSPHATE: 10 INJECTION, SOLUTION INTRAMUSCULAR; INTRAVENOUS at 11:05

## 2022-10-31 RX ADMIN — DIPHENHYDRAMINE HYDROCHLORIDE 50 MG: 50 INJECTION, SOLUTION INTRAMUSCULAR; INTRAVENOUS at 11:26

## 2022-10-31 NOTE — PROGRESS NOTES
Pt tolerated treatment well  Pt wishes to change some of his appts so he will get a printout up front on his way out

## 2022-10-31 NOTE — PROGRESS NOTES
Pt here for Taxol/Cyramza  Vitals stable; labs within parameters for treatment  Callbell within reach

## 2022-11-01 DIAGNOSIS — E87.6 HYPOPOTASSEMIA: ICD-10-CM

## 2022-11-01 DIAGNOSIS — Z51.5 PALLIATIVE CARE PATIENT: ICD-10-CM

## 2022-11-01 DIAGNOSIS — C16.9 GASTRIC ADENOCARCINOMA (HCC): ICD-10-CM

## 2022-11-01 DIAGNOSIS — G89.3 CANCER-RELATED PAIN: ICD-10-CM

## 2022-11-02 RX ORDER — POTASSIUM CHLORIDE 750 MG/1
10 TABLET, EXTENDED RELEASE ORAL 3 TIMES DAILY
Qty: 270 TABLET | Refills: 3 | Status: SHIPPED | OUTPATIENT
Start: 2022-11-02

## 2022-11-03 NOTE — TELEPHONE ENCOUNTER
Primary palliative medicine provider:   Keith     Medication requested:  bisocodyl      If for pain, how has the patient been taking their pain medicine?      Last appointment: 09/22    Next scheduled appointment: 12/01    PDMP review: jarad    Pharmacy:

## 2022-11-04 ENCOUNTER — TELEPHONE (OUTPATIENT)
Dept: INFUSION CENTER | Facility: CLINIC | Age: 72
End: 2022-11-04

## 2022-11-04 NOTE — TELEPHONE ENCOUNTER
Pt called and stated that he is not dehydrated today and is not coming for his infusion because he does not need it  He confirmed his next appt for Monday

## 2022-11-07 ENCOUNTER — HOSPITAL ENCOUNTER (OUTPATIENT)
Dept: INFUSION CENTER | Facility: CLINIC | Age: 72
Discharge: HOME | End: 2022-11-07

## 2022-11-07 VITALS
WEIGHT: 118 LBS | OXYGEN SATURATION: 96 % | DIASTOLIC BLOOD PRESSURE: 74 MMHG | HEART RATE: 67 BPM | HEIGHT: 68 IN | RESPIRATION RATE: 18 BRPM | TEMPERATURE: 99 F | SYSTOLIC BLOOD PRESSURE: 139 MMHG | BODY MASS INDEX: 17.88 KG/M2

## 2022-11-07 DIAGNOSIS — C16.9 GASTRIC ADENOCARCINOMA (HCC): Primary | ICD-10-CM

## 2022-11-07 DIAGNOSIS — D70.1 CHEMOTHERAPY INDUCED NEUTROPENIA (HCC): ICD-10-CM

## 2022-11-07 DIAGNOSIS — C16.9 MALIGNANT NEOPLASM OF STOMACH, UNSPECIFIED LOCATION (HCC): ICD-10-CM

## 2022-11-07 DIAGNOSIS — T45.1X5A CHEMOTHERAPY INDUCED NEUTROPENIA (HCC): ICD-10-CM

## 2022-11-07 LAB
BASOPHILS # BLD AUTO: 0.02 THOUSANDS/ÂΜL (ref 0–0.1)
BASOPHILS NFR BLD AUTO: 0 % (ref 0–1)
EOSINOPHIL # BLD AUTO: 0.1 THOUSAND/ÂΜL (ref 0–0.61)
EOSINOPHIL NFR BLD AUTO: 2 % (ref 0–6)
ERYTHROCYTE [DISTWIDTH] IN BLOOD BY AUTOMATED COUNT: 15.6 % (ref 11.6–15.1)
HCT VFR BLD AUTO: 31.1 % (ref 36.5–49.3)
HGB BLD-MCNC: 10.3 G/DL (ref 12–17)
IMM GRANULOCYTES # BLD AUTO: 0.02 THOUSAND/UL (ref 0–0.2)
IMM GRANULOCYTES NFR BLD AUTO: 0 % (ref 0–2)
LYMPHOCYTES # BLD AUTO: 0.79 THOUSANDS/ÂΜL (ref 0.6–4.47)
LYMPHOCYTES NFR BLD AUTO: 14 % (ref 14–44)
MCH RBC QN AUTO: 30.9 PG (ref 26.8–34.3)
MCHC RBC AUTO-ENTMCNC: 33.1 G/DL (ref 31.4–37.4)
MCV RBC AUTO: 93 FL (ref 82–98)
MONOCYTES # BLD AUTO: 0.46 THOUSAND/ÂΜL (ref 0.17–1.22)
MONOCYTES NFR BLD AUTO: 8 % (ref 4–12)
NEUTROPHILS # BLD AUTO: 4.38 THOUSANDS/ÂΜL (ref 1.85–7.62)
NEUTS SEG NFR BLD AUTO: 76 % (ref 43–75)
NRBC BLD AUTO-RTO: 0 /100 WBCS
PLATELET # BLD AUTO: 235 THOUSANDS/UL (ref 149–390)
PMV BLD AUTO: 9.2 FL (ref 8.9–12.7)
RBC # BLD AUTO: 3.33 MILLION/UL (ref 3.88–5.62)
WBC # BLD AUTO: 5.77 THOUSAND/UL (ref 4.31–10.16)

## 2022-11-07 RX ORDER — SODIUM CHLORIDE 9 MG/ML
20 INJECTION, SOLUTION INTRAVENOUS ONCE
Status: COMPLETED | OUTPATIENT
Start: 2022-11-07 | End: 2022-11-07

## 2022-11-07 RX ADMIN — PACLITAXEL 115.8 MG: 6 INJECTION, SOLUTION INTRAVENOUS at 09:54

## 2022-11-07 RX ADMIN — FAMOTIDINE 20 MG: 10 INJECTION INTRAVENOUS at 09:12

## 2022-11-07 RX ADMIN — DIPHENHYDRAMINE HYDROCHLORIDE 50 MG: 50 INJECTION, SOLUTION INTRAMUSCULAR; INTRAVENOUS at 08:52

## 2022-11-07 RX ADMIN — DEXAMETHASONE SODIUM PHOSPHATE: 10 INJECTION, SOLUTION INTRAMUSCULAR; INTRAVENOUS at 08:30

## 2022-11-07 RX ADMIN — SODIUM CHLORIDE 20 ML/HR: 0.9 INJECTION, SOLUTION INTRAVENOUS at 08:30

## 2022-11-07 NOTE — PROGRESS NOTES
Patient tolerated treatment today without issue  Port remains with brisk blood return noted, flushed easily without resistance  Deaccessed, bandaid in place  Calendar printed for patient with D1 D8 D15 of each cycle written out, written what labs patient will need prior to each day of each cycle  Cyrmaza educational handout printed per patient request  AVS printed and provided to patient  Patient wife contacted by RN for  at patient request  Patient alert upon DC, ambulatory with walker

## 2022-11-07 NOTE — PROGRESS NOTES
Patient arrives to infusion center for D8 C1 Taxol  Patient reports he sustained a mechanical trip and fall on Saturday, LEFT sided head strike  Denies LOC, denies visual disturbance  Patient reports he did not seek medical attention  Abrasion and skin tear noted superior to LEFT eyebrow, no active drainage  Gauze and papertape noted as dressing  Patient reports he does not feel he needs ER eval, is AAOx4 at this time  TEAMs message to [employer] RN to make aware  PAC accessed by JOSE RN, brisk blood return noted, labs collected today for treatment - CBC within parameters for treatment today  Patient resting on recliner chair, call bell within reach

## 2022-11-10 ENCOUNTER — TELEPHONE (OUTPATIENT)
Dept: HEMATOLOGY ONCOLOGY | Facility: CLINIC | Age: 72
End: 2022-11-10

## 2022-11-10 NOTE — TELEPHONE ENCOUNTER
Medical Records Request Route to P[facility]   Person calling In [facility]   Provider Albert Jin   Fax Number / Yashira Buckner name (Attention:)   [phone] [facility] call back number [phone]   Patient call back number Not avail

## 2022-11-10 NOTE — TELEPHONE ENCOUNTER
11/10/22    I faxed over pt's old Guardian 360 report in Aug  Per note:  "We discussed his appointment at [facility] as I had spoken to his oncologist there yesterday who had recommended repeat guardant testing to see if he could go on clinical trial with an agent for high EGFR expression/amplification  The patient states currently he is not interested in driving there for consideration of treatment  He was however agreeable to Taxol and Cyramza as second-line treatment "    I called back but Santos Temple was not available  I think she was looking for the new molecular test which per note, pt was not interested  Their rep will relay above info to Santos Temple

## 2022-11-11 ENCOUNTER — HOSPITAL ENCOUNTER (OUTPATIENT)
Dept: INFUSION CENTER | Facility: CLINIC | Age: 72
End: 2022-11-11

## 2022-11-11 ENCOUNTER — TELEPHONE (OUTPATIENT)
Dept: HEMATOLOGY ONCOLOGY | Facility: CLINIC | Age: 72
End: 2022-11-11

## 2022-11-11 VITALS
OXYGEN SATURATION: 99 % | DIASTOLIC BLOOD PRESSURE: 82 MMHG | HEART RATE: 75 BPM | SYSTOLIC BLOOD PRESSURE: 149 MMHG | RESPIRATION RATE: 18 BRPM | TEMPERATURE: 97.7 F

## 2022-11-11 DIAGNOSIS — E86.0 DEHYDRATION: Primary | ICD-10-CM

## 2022-11-11 DIAGNOSIS — C16.9 MALIGNANT NEOPLASM OF STOMACH, UNSPECIFIED LOCATION (HCC): ICD-10-CM

## 2022-11-11 DIAGNOSIS — E87.6 HYPOKALEMIA: ICD-10-CM

## 2022-11-11 DIAGNOSIS — D70.1 CHEMOTHERAPY INDUCED NEUTROPENIA (HCC): ICD-10-CM

## 2022-11-11 DIAGNOSIS — T45.1X5A CHEMOTHERAPY INDUCED NEUTROPENIA (HCC): ICD-10-CM

## 2022-11-11 DIAGNOSIS — C16.9 GASTRIC ADENOCARCINOMA (HCC): ICD-10-CM

## 2022-11-11 LAB
ALBUMIN SERPL BCP-MCNC: 3.5 G/DL (ref 3.5–5)
ALP SERPL-CCNC: 111 U/L (ref 34–104)
ALT SERPL W P-5'-P-CCNC: 18 U/L (ref 7–52)
ANION GAP SERPL CALCULATED.3IONS-SCNC: 8 MMOL/L (ref 4–13)
AST SERPL W P-5'-P-CCNC: 24 U/L (ref 13–39)
BASOPHILS # BLD AUTO: 0.01 THOUSANDS/ÂΜL (ref 0–0.1)
BASOPHILS NFR BLD AUTO: 0 % (ref 0–1)
BILIRUB SERPL-MCNC: 0.88 MG/DL (ref 0.2–1)
BUN SERPL-MCNC: 15 MG/DL (ref 5–25)
CALCIUM SERPL-MCNC: 8.5 MG/DL (ref 8.4–10.2)
CHLORIDE SERPL-SCNC: 105 MMOL/L (ref 96–108)
CO2 SERPL-SCNC: 25 MMOL/L (ref 21–32)
CREAT SERPL-MCNC: 0.83 MG/DL (ref 0.6–1.3)
EOSINOPHIL # BLD AUTO: 0.12 THOUSAND/ÂΜL (ref 0–0.61)
EOSINOPHIL NFR BLD AUTO: 3 % (ref 0–6)
ERYTHROCYTE [DISTWIDTH] IN BLOOD BY AUTOMATED COUNT: 15.7 % (ref 11.6–15.1)
GFR SERPL CREATININE-BSD FRML MDRD: 87 ML/MIN/1.73SQ M
GLUCOSE SERPL-MCNC: 91 MG/DL (ref 65–140)
HCT VFR BLD AUTO: 33.1 % (ref 36.5–49.3)
HGB BLD-MCNC: 10.8 G/DL (ref 12–17)
IMM GRANULOCYTES # BLD AUTO: 0.02 THOUSAND/UL (ref 0–0.2)
IMM GRANULOCYTES NFR BLD AUTO: 1 % (ref 0–2)
LYMPHOCYTES # BLD AUTO: 0.63 THOUSANDS/ÂΜL (ref 0.6–4.47)
LYMPHOCYTES NFR BLD AUTO: 18 % (ref 14–44)
MCH RBC QN AUTO: 30.2 PG (ref 26.8–34.3)
MCHC RBC AUTO-ENTMCNC: 32.6 G/DL (ref 31.4–37.4)
MCV RBC AUTO: 93 FL (ref 82–98)
MONOCYTES # BLD AUTO: 0.09 THOUSAND/ÂΜL (ref 0.17–1.22)
MONOCYTES NFR BLD AUTO: 3 % (ref 4–12)
NEUTROPHILS # BLD AUTO: 2.65 THOUSANDS/ÂΜL (ref 1.85–7.62)
NEUTS SEG NFR BLD AUTO: 75 % (ref 43–75)
NRBC BLD AUTO-RTO: 0 /100 WBCS
PLATELET # BLD AUTO: 277 THOUSANDS/UL (ref 149–390)
PMV BLD AUTO: 9.3 FL (ref 8.9–12.7)
POTASSIUM SERPL-SCNC: 3.8 MMOL/L (ref 3.5–5.3)
PROT SERPL-MCNC: 6.1 G/DL (ref 6.4–8.4)
RBC # BLD AUTO: 3.58 MILLION/UL (ref 3.88–5.62)
SODIUM SERPL-SCNC: 138 MMOL/L (ref 135–147)
WBC # BLD AUTO: 3.52 THOUSAND/UL (ref 4.31–10.16)

## 2022-11-11 RX ADMIN — SODIUM CHLORIDE 1000 ML: 0.9 INJECTION, SOLUTION INTRAVENOUS at 09:50

## 2022-11-11 NOTE — TELEPHONE ENCOUNTER
Returned phone call of Dr Alvaro Webb re: guardant test result  Per Vinay Drummond MA note from yesterday pt was not interested in pursuing another test as he did not want to commute for treatment  Dr Alvaro Webb made aware of below contact yesterday  Thankful for call  Message below  Ana Corteslyla    11/10/22 10:39 AM  Note   11/10/22     I faxed over pt's old Guardian 360 report in Aug  Per note:  "We discussed his appointment at [facility] as I had spoken to his oncologist there yesterday who had recommended repeat guardant testing to see if he could go on clinical trial with an agent for high EGFR expression/amplification   The patient states currently he is not interested in driving there for consideration of treatment  Andres Recinos was however agreeable to Taxol and Cyramza as second-line treatment "     I called back but Lesly Duran was not available  I think she was looking for the new molecular test which per note, pt was not interested  Their rep will relay above info to Lesly Duran

## 2022-11-11 NOTE — TELEPHONE ENCOUNTER
CALL RETURN FORM   Reason for patient call? Joy Schwartz calling in on behalf of Dr Sri Santos  Requesting a call back to speak in regards to the mutual patient  Patient's primary oncologist? Dr Inna Blackwell    Name of person the patient was calling for? Alicia Almanzar    Any additional information to add, if applicable? Dr Sri Santos is requesting a direct call from Dr Inna Blackwell  [phone]   Informed patient that the message will be forwarded to the team and someone will get back to them as soon as possible    Did you relay this information to the patient?  Izabel

## 2022-11-11 NOTE — PROGRESS NOTES
Patient to [facility] for Lab Testing / Hydration: Offers no complaints at present time: Left PAC accessed without difficulty: Good blood return noted: Labs drawn per MD order

## 2022-11-14 ENCOUNTER — PATIENT OUTREACH (OUTPATIENT)
Dept: HEMATOLOGY ONCOLOGY | Facility: CLINIC | Age: 72
End: 2022-11-14

## 2022-11-14 ENCOUNTER — HOSPITAL ENCOUNTER (OUTPATIENT)
Dept: INFUSION CENTER | Facility: CLINIC | Age: 72
Discharge: HOME | End: 2022-11-14

## 2022-11-14 ENCOUNTER — TELEPHONE (OUTPATIENT)
Dept: HEMATOLOGY ONCOLOGY | Facility: CLINIC | Age: 72
End: 2022-11-14

## 2022-11-14 VITALS
HEART RATE: 78 BPM | RESPIRATION RATE: 18 BRPM | DIASTOLIC BLOOD PRESSURE: 87 MMHG | SYSTOLIC BLOOD PRESSURE: 127 MMHG | WEIGHT: 120.5 LBS | HEIGHT: 68 IN | TEMPERATURE: 97.5 F | BODY MASS INDEX: 18.26 KG/M2

## 2022-11-14 DIAGNOSIS — D70.1 CHEMOTHERAPY INDUCED NEUTROPENIA (HCC): ICD-10-CM

## 2022-11-14 DIAGNOSIS — C16.9 MALIGNANT NEOPLASM OF STOMACH, UNSPECIFIED LOCATION (HCC): ICD-10-CM

## 2022-11-14 DIAGNOSIS — C16.9 GASTRIC ADENOCARCINOMA (HCC): Primary | ICD-10-CM

## 2022-11-14 DIAGNOSIS — T45.1X5A CHEMOTHERAPY INDUCED NEUTROPENIA (HCC): ICD-10-CM

## 2022-11-14 RX ORDER — SODIUM CHLORIDE 9 MG/ML
20 INJECTION, SOLUTION INTRAVENOUS ONCE
Status: COMPLETED | OUTPATIENT
Start: 2022-11-14 | End: 2022-11-14

## 2022-11-14 RX ADMIN — FAMOTIDINE 20 MG: 10 INJECTION INTRAVENOUS at 13:31

## 2022-11-14 RX ADMIN — PEGFILGRASTIM 6 MG: KIT SUBCUTANEOUS at 16:09

## 2022-11-14 RX ADMIN — DIPHENHYDRAMINE HYDROCHLORIDE 50 MG: 50 INJECTION, SOLUTION INTRAMUSCULAR; INTRAVENOUS at 12:48

## 2022-11-14 RX ADMIN — SODIUM CHLORIDE 20 ML/HR: 0.9 INJECTION, SOLUTION INTRAVENOUS at 12:48

## 2022-11-14 RX ADMIN — SODIUM CHLORIDE 442 MG: 0.9 INJECTION, SOLUTION INTRAVENOUS at 13:57

## 2022-11-14 RX ADMIN — PACLITAXEL 115.8 MG: 6 INJECTION, SOLUTION, CONCENTRATE INTRAVENOUS at 15:07

## 2022-11-14 RX ADMIN — DEXAMETHASONE SODIUM PHOSPHATE: 10 INJECTION, SOLUTION INTRAMUSCULAR; INTRAVENOUS at 13:10

## 2022-11-14 NOTE — PROGRESS NOTES
Pt here for cyramza , taxol, and neulasta onpro  Labs within parameters to treat  Vitals checked and stable  Port accessed with positive blood return noted  Call bell within reach

## 2022-11-14 NOTE — PROGRESS NOTES
Telephone call received from [facility] patient's daughter  REY stated they received a phone call from [facility] regarding a new trial patient may be eligible for  He would need to d/c chemo for approximately 10 days  Graeme Mosquera is scheduled for treatment today 11/14  Teams phone number for Ben Valero was provided to [facility]

## 2022-11-14 NOTE — TELEPHONE ENCOUNTER
Voicemail received from patient's daughter Ryan Finley:    Grace Smith  My name is Leatha Patel  I'm calling on behalf of my father, Gillian Araiza  I have a question as someone well, Doctor Bhavna Carmichael, his other physician, oncology physician out at [facility], called him and has a trial for him to try, but he needs to get blood work and just not be on chemo for 10 days  So I just wanted to follow up and just make sure that was OK with you guys and if we can push the chemo back  But I just wanted to confirm that Doctor Binta Santo talked to Doctor Angelika Schuster and that was OK  If you give me a call back, [phone]  Thank you  Returned call to Ryan Finley, she does not have complete information regarding trial, and is waiting for a call back from [facility]  Patient will be keeping infusion appt as scheduled today

## 2022-11-14 NOTE — PROGRESS NOTES
Pt tolerated infusion without complications, neulasta on pro on left arm, pt aware of when medication will start and when he can remove   Pt aware of next appointment, avs printed

## 2022-11-18 ENCOUNTER — HOSPITAL ENCOUNTER (OUTPATIENT)
Dept: INFUSION CENTER | Facility: CLINIC | Age: 72
End: 2022-11-18

## 2022-11-18 VITALS
OXYGEN SATURATION: 99 % | TEMPERATURE: 97.5 F | DIASTOLIC BLOOD PRESSURE: 77 MMHG | RESPIRATION RATE: 17 BRPM | SYSTOLIC BLOOD PRESSURE: 129 MMHG | HEART RATE: 72 BPM

## 2022-11-18 DIAGNOSIS — E87.6 HYPOKALEMIA: ICD-10-CM

## 2022-11-18 DIAGNOSIS — E86.0 DEHYDRATION: Primary | ICD-10-CM

## 2022-11-18 RX ADMIN — SODIUM CHLORIDE 1000 ML: 0.9 INJECTION, SOLUTION INTRAVENOUS at 09:09

## 2022-11-19 DIAGNOSIS — Z51.5 PALLIATIVE CARE PATIENT: ICD-10-CM

## 2022-11-19 DIAGNOSIS — R52 INTRACTABLE PAIN: ICD-10-CM

## 2022-11-19 DIAGNOSIS — G89.3 CANCER RELATED PAIN: ICD-10-CM

## 2022-11-21 RX ORDER — OXYCODONE HCL 10 MG/1
10 TABLET, FILM COATED, EXTENDED RELEASE ORAL EVERY 8 HOURS SCHEDULED
Qty: 90 TABLET | Refills: 0 | Status: SHIPPED | OUTPATIENT
Start: 2022-11-21

## 2022-11-21 NOTE — TELEPHONE ENCOUNTER
Primary palliative medicine provider: Araceli Watkins    Medication requested: OXYCONTIN    If for pain, how has the patient been taking their pain medicine?      Last appointment: 9/22/22    Next scheduled appointment:12/1/22    PDMP review:  PATIENT ID PRESCRIPTION # FILLED WRITTEN DRUG LABEL QTY DAYS STRENGTH MME** PRESCRIBER PHARMACY PAYMENT REFILL #/AUTH STATE DETAIL   1 [id number] 10/25/2022 10/25/2022 oxyCODONE HCL (Solution) 473 0 8 5 MG/5  44 ORIANA ARCHIBALD [facility] ''  0 / 0 [place]    1 [id number] 10/10/2022 10/07/2022 OxyCONTIN (Tablet, Extended Release) 90 0 30 10 MG

## 2022-11-22 ENCOUNTER — PATIENT OUTREACH (OUTPATIENT)
Dept: HEMATOLOGY ONCOLOGY | Facility: CLINIC | Age: 72
End: 2022-11-22

## 2022-11-22 DIAGNOSIS — R19.7 DIARRHEA, UNSPECIFIED TYPE: Primary | ICD-10-CM

## 2022-11-22 RX ORDER — SODIUM CHLORIDE 9 MG/ML
20 INJECTION, SOLUTION INTRAVENOUS ONCE
OUTPATIENT
Start: 2022-12-05

## 2022-11-22 RX ORDER — DIPHENOXYLATE HCL/ATROPINE 2.5-.025/5
5 LIQUID (ML) ORAL 4 TIMES DAILY PRN
Qty: 60 ML | Refills: 0 | Status: SHIPPED | OUTPATIENT
Start: 2022-11-22

## 2022-11-22 RX ORDER — SODIUM CHLORIDE 9 MG/ML
20 INJECTION, SOLUTION INTRAVENOUS ONCE
Status: CANCELLED | OUTPATIENT
Start: 2022-11-28

## 2022-11-22 RX ORDER — SODIUM CHLORIDE 9 MG/ML
20 INJECTION, SOLUTION INTRAVENOUS ONCE
OUTPATIENT
Start: 2022-12-12

## 2022-11-22 NOTE — PROGRESS NOTES
Received call from [facility], patients daughter, hoping to discuss her fathers treatment  [facility] states he is not tolerating the chemo well, persistent diarrhea with incontinence, being extremely weak  She would like to discuss the oral tx that has been mentioned by the [facility] provider    Please call she would prefer to discuss with Dr Majorie Goltz

## 2022-11-23 NOTE — PROGRESS NOTES
Called daughter, advised that guardant 360 kit will be at  of [address] for pt to  on Friday before schedule lab work  Reviewed that Lomotil was sent to pharmacy yesterday for diarrhea  She will  for father  She will also assist in getting stool tested for c-diff as discussed with Dr Karl Peña

## 2022-11-25 ENCOUNTER — HOSPITAL ENCOUNTER (OUTPATIENT)
Dept: INFUSION CENTER | Facility: CLINIC | Age: 72
End: 2022-11-25

## 2022-11-25 VITALS
HEART RATE: 93 BPM | OXYGEN SATURATION: 96 % | DIASTOLIC BLOOD PRESSURE: 66 MMHG | TEMPERATURE: 98.7 F | SYSTOLIC BLOOD PRESSURE: 99 MMHG

## 2022-11-25 DIAGNOSIS — E86.0 DEHYDRATION: Primary | ICD-10-CM

## 2022-11-25 DIAGNOSIS — C16.9 MALIGNANT NEOPLASM OF STOMACH, UNSPECIFIED LOCATION (HCC): ICD-10-CM

## 2022-11-25 DIAGNOSIS — E87.6 HYPOKALEMIA: ICD-10-CM

## 2022-11-25 DIAGNOSIS — C16.9 GASTRIC ADENOCARCINOMA (HCC): ICD-10-CM

## 2022-11-25 DIAGNOSIS — T45.1X5A CHEMOTHERAPY INDUCED NEUTROPENIA (HCC): ICD-10-CM

## 2022-11-25 DIAGNOSIS — D70.1 CHEMOTHERAPY INDUCED NEUTROPENIA (HCC): ICD-10-CM

## 2022-11-25 LAB
ALBUMIN SERPL BCP-MCNC: 3.3 G/DL (ref 3.5–5)
ALP SERPL-CCNC: 161 U/L (ref 34–104)
ALT SERPL W P-5'-P-CCNC: 10 U/L (ref 7–52)
ANION GAP SERPL CALCULATED.3IONS-SCNC: 7 MMOL/L (ref 4–13)
AST SERPL W P-5'-P-CCNC: 18 U/L (ref 13–39)
BACTERIA UR QL AUTO: ABNORMAL /HPF
BASOPHILS # BLD MANUAL: 0.97 THOUSAND/UL (ref 0–0.1)
BASOPHILS NFR MAR MANUAL: 4 % (ref 0–1)
BILIRUB SERPL-MCNC: 0.36 MG/DL (ref 0.2–1)
BILIRUB UR QL STRIP: NEGATIVE
BUN SERPL-MCNC: 11 MG/DL (ref 5–25)
CALCIUM ALBUM COR SERPL-MCNC: 9.1 MG/DL (ref 8.3–10.1)
CALCIUM SERPL-MCNC: 8.5 MG/DL (ref 8.4–10.2)
CHLORIDE SERPL-SCNC: 110 MMOL/L (ref 96–108)
CLARITY UR: CLEAR
CO2 SERPL-SCNC: 24 MMOL/L (ref 21–32)
COLOR UR: ABNORMAL
CREAT SERPL-MCNC: 1.02 MG/DL (ref 0.6–1.3)
EOSINOPHIL # BLD MANUAL: 0.73 THOUSAND/UL (ref 0–0.4)
EOSINOPHIL NFR BLD MANUAL: 3 % (ref 0–6)
ERYTHROCYTE [DISTWIDTH] IN BLOOD BY AUTOMATED COUNT: 17.7 % (ref 11.6–15.1)
GFR SERPL CREATININE-BSD FRML MDRD: 73 ML/MIN/1.73SQ M
GLUCOSE SERPL-MCNC: 115 MG/DL (ref 65–140)
GLUCOSE UR STRIP-MCNC: NEGATIVE MG/DL
HCT VFR BLD AUTO: 31.2 % (ref 36.5–49.3)
HGB BLD-MCNC: 10.2 G/DL (ref 12–17)
HGB UR QL STRIP.AUTO: NEGATIVE
KETONES UR STRIP-MCNC: NEGATIVE MG/DL
LEUKOCYTE ESTERASE UR QL STRIP: NEGATIVE
LYMPHOCYTES # BLD AUTO: 1.22 THOUSAND/UL (ref 0.6–4.47)
LYMPHOCYTES # BLD AUTO: 5 % (ref 14–44)
MCH RBC QN AUTO: 30.3 PG (ref 26.8–34.3)
MCHC RBC AUTO-ENTMCNC: 32.7 G/DL (ref 31.4–37.4)
MCV RBC AUTO: 93 FL (ref 82–98)
MONOCYTES # BLD AUTO: 1.46 THOUSAND/UL (ref 0–1.22)
MONOCYTES NFR BLD: 6 % (ref 4–12)
MUCOUS THREADS UR QL AUTO: ABNORMAL
MYELOCYTES NFR BLD MANUAL: 1 % (ref 0–1)
NEUTROPHILS # BLD MANUAL: 19.46 THOUSAND/UL (ref 1.85–7.62)
NEUTS BAND NFR BLD MANUAL: 13 % (ref 0–8)
NEUTS SEG NFR BLD AUTO: 67 % (ref 43–75)
NITRITE UR QL STRIP: NEGATIVE
NON-SQ EPI CELLS URNS QL MICRO: ABNORMAL /HPF
NRBC BLD AUTO-RTO: 1 /100 WBC (ref 0–2)
PH UR STRIP.AUTO: 6.5 [PH]
PLATELET # BLD AUTO: 284 THOUSANDS/UL (ref 149–390)
PLATELET BLD QL SMEAR: ADEQUATE
PMV BLD AUTO: 9.6 FL (ref 8.9–12.7)
POTASSIUM SERPL-SCNC: 3.7 MMOL/L (ref 3.5–5.3)
PROMYELOCYTES NFR BLD MANUAL: 1 % (ref 0–0)
PROT SERPL-MCNC: 5.7 G/DL (ref 6.4–8.4)
PROT UR STRIP-MCNC: ABNORMAL MG/DL
RBC # BLD AUTO: 3.37 MILLION/UL (ref 3.88–5.62)
RBC #/AREA URNS AUTO: ABNORMAL /HPF
RBC MORPH BLD: NORMAL
SODIUM SERPL-SCNC: 141 MMOL/L (ref 135–147)
SP GR UR STRIP.AUTO: 1.01 (ref 1–1.03)
UROBILINOGEN UR STRIP-ACNC: <2 MG/DL
WBC # BLD AUTO: 24.32 THOUSAND/UL (ref 4.31–10.16)
WBC #/AREA URNS AUTO: ABNORMAL /HPF

## 2022-11-25 RX ADMIN — SODIUM CHLORIDE 1000 ML: 0.9 INJECTION, SOLUTION INTRAVENOUS at 11:00

## 2022-11-25 NOTE — PROGRESS NOTES
Patient presents today for hydration and lab draw  Patient reports some generalized weakness and fatigue  VSS  Port accessed without incident with excellent blood return noted  Labs drawn as per order

## 2022-11-28 ENCOUNTER — HOSPITAL ENCOUNTER (OUTPATIENT)
Dept: INFUSION CENTER | Facility: CLINIC | Age: 72
Discharge: HOME | End: 2022-11-28

## 2022-11-28 VITALS
BODY MASS INDEX: 17.73 KG/M2 | RESPIRATION RATE: 18 BRPM | WEIGHT: 117 LBS | HEART RATE: 77 BPM | HEIGHT: 68 IN | DIASTOLIC BLOOD PRESSURE: 79 MMHG | SYSTOLIC BLOOD PRESSURE: 112 MMHG | TEMPERATURE: 97.6 F

## 2022-11-28 DIAGNOSIS — C16.9 GASTRIC ADENOCARCINOMA (HCC): Primary | ICD-10-CM

## 2022-11-28 DIAGNOSIS — T45.1X5A CHEMOTHERAPY INDUCED NEUTROPENIA (HCC): ICD-10-CM

## 2022-11-28 DIAGNOSIS — C16.9 MALIGNANT NEOPLASM OF STOMACH, UNSPECIFIED LOCATION (HCC): ICD-10-CM

## 2022-11-28 DIAGNOSIS — D70.1 CHEMOTHERAPY INDUCED NEUTROPENIA (HCC): ICD-10-CM

## 2022-11-28 RX ORDER — SODIUM CHLORIDE 9 MG/ML
20 INJECTION, SOLUTION INTRAVENOUS ONCE
Status: COMPLETED | OUTPATIENT
Start: 2022-11-28 | End: 2022-11-28

## 2022-11-28 RX ADMIN — SODIUM CHLORIDE 20 ML/HR: 9 INJECTION, SOLUTION INTRAVENOUS at 11:10

## 2022-11-28 RX ADMIN — DIPHENHYDRAMINE HYDROCHLORIDE 50 MG: 50 INJECTION, SOLUTION INTRAMUSCULAR; INTRAVENOUS at 11:32

## 2022-11-28 RX ADMIN — PACLITAXEL 82.8 MG: 6 INJECTION, SOLUTION, CONCENTRATE INTRAVENOUS at 13:32

## 2022-11-28 RX ADMIN — FAMOTIDINE 20 MG: 10 INJECTION INTRAVENOUS at 11:54

## 2022-11-28 RX ADMIN — DEXAMETHASONE SODIUM PHOSPHATE: 10 INJECTION, SOLUTION INTRAMUSCULAR; INTRAVENOUS at 11:09

## 2022-11-28 RX ADMIN — SODIUM CHLORIDE 442 MG: 0.9 INJECTION, SOLUTION INTRAVENOUS at 12:29

## 2022-11-28 NOTE — PROGRESS NOTES
Patient tolerated treatment today without adverse reaction    Next appointment confirmed, AVS provided

## 2022-11-28 NOTE — PROGRESS NOTES
Patient to infusion for D1C2 Cyramza and Taxol  He offers no complaints  Labs reviewed from 11/25/22 and BP within parameters for treatment  Call bell within reach

## 2022-12-01 NOTE — PROGRESS NOTES
Hematology/Oncology Outpatient Follow- up Note  Geneshelton Poag 1950, [id number]  12/6/2022        Chief Complaint   Patient presents with   • Follow-up       HPI:  Simón Calloway is a 70-year-old gentleman with a history of benign prostatic hypertrophy, hypertension, degenerative arthritis, hypothyroidism, GERD had been complaining of epigastric pain with on a 30 lb weight loss over the past 2 months, loss of appetite, he had cholecystectomy without improvement of symptoms in January 2022 subsequently EGD on 02/02/2022 showed 2 cm hiatal hernia with friable mass involving more than half of the circumference with extension down into the cardia and the fundus for about 5 cm mild erythema in the antrum     Biopsy of the gastric fundus mass showed adenocarcinoma with small portion squamous component arising in high-grade dysplasia in a background mixed squamous and cardiac mucosa the majority of the tumor is adenocarcinoma with minor component of squamous differentiation  Mismatch repair protein is intact     HER2 is 0 by IHC     CT scan the chest abdomen pelvis showed infiltrating mass at the gastroesophageal junction sinan enlargement suspicious for metastatic disease in the mediastinum, retroperitoneum, omentum, subcentimeter bilateral pulmonary nodules     PET scan on 02/09/2022 showed FDG uptake in the gastroesophageal junction and proximal stomach, sinan disease in the chest, abdomen, periaortic, small bilateral pulmonary nodules, uptake in the right upper quadrant omental nodules, uptake along the lateral service of the right lobe of the liver for possible peritoneal disease along the liver surface      The patient was started on modified FOLFOX 6 with Opdivo every 2 weeks  He had evidence of disease progression on imaging completed on 10/25/2022    He was seen at [facility] for 2nd opinion and recommendation was made to repeat guardant testing to see if he could go on clinical trial with an agent for high EGFR expression/amplification  Patient declined this recommendation    He was started on Taxol and Cyramza as second-line treatment    Previous Hematologic/ Oncologic History:    Oncology History   Gastric adenocarcinoma ([facility] [address] )   2/8/2022 Initial Diagnosis    Gastric adenocarcinoma ([facility] [address] )     2/21/2022 - 10/19/2022 Chemotherapy    palonosetron (ALOXI), 0 25 mg, Intravenous, Once, 11 of 19 cycles  Administration: 0 25 mg (3/21/2022), 0 25 mg (4/4/2022), 0 25 mg (5/16/2022), 0 25 mg (6/13/2022), 0 25 mg (6/27/2022), 0 25 mg (9/6/2022), 0 25 mg (7/11/2022), 0 25 mg (8/22/2022), 0 25 mg (9/19/2022), 0 25 mg (10/3/2022), 0 25 mg (10/17/2022)  fosaprepitant (EMEND) IVPB, 150 mg, Intravenous, Once, 11 of 19 cycles  Administration: 150 mg (3/21/2022), 150 mg (4/4/2022), 150 mg (5/16/2022), 150 mg (6/13/2022), 150 mg (6/27/2022), 150 mg (9/6/2022), 150 mg (7/11/2022), 150 mg (8/22/2022), 150 mg (9/19/2022), 150 mg (10/3/2022), 150 mg (10/17/2022)  nivolumab (OPDIVO) IVPB, 240 mg (100 % of original dose 240 mg), Intravenous, Once, 13 of 21 cycles  Dose modification: 240 mg (original dose 240 mg, Cycle 1)  Administration: 240 mg (2/21/2022), 240 mg (3/7/2022), 240 mg (3/21/2022), 240 mg (4/4/2022), 240 mg (5/16/2022), 240 mg (9/6/2022), 240 mg (7/11/2022), 240 mg (8/22/2022), 240 mg (9/19/2022), 240 mg (10/3/2022), 240 mg (6/13/2022), 240 mg (6/27/2022), 240 mg (10/17/2022)  oxaliplatin (ELOXATIN) chemo infusion, 85 mg/m2 = 164 9 mg, Intravenous, Once, 9 of 9 cycles  Dose modification: 65 mg/m2 (original dose 85 mg/m2, Cycle 6, Reason: Other (Must fill in a comment), Comment: Elevated creatinine)  Administration: 164 9 mg (2/21/2022), 164 9 mg (3/7/2022), 164 9 mg (3/21/2022), 164 9 mg (4/4/2022), 158 1 mg (5/16/2022), 118 3 mg (6/13/2022), 113 75 mg (6/27/2022), 111 8 mg (7/11/2022)  fluorouracil (ADRUCIL) ambulatory infusion Soln, 1,200 mg/m2/day = 4,655 mg, Intravenous, Over 46 hours, 13 of 21 cycles  Dose modification: 1,000 mg/m2/day (original dose 1,200 mg/m2/day, Cycle 10, Reason: Dose modified as per discussion with consulting physician)     8/16/2022 -  Cancer Staged    Staging form: Stomach, AJCC 7th Edition  - Clinical: Stage IV (TX, N3b, M1) - Signed by Efrem Hassan MD on 8/16/2022       10/31/2022 -  Chemotherapy    pegfilgrastim (Blade Stubbs), 6 mg, Subcutaneous, Once, 2 of 6 cycles  Administration: 6 mg (11/14/2022)  ramucirumab (CYRAMZA) IVPB, 8 mg/kg = 442 mg, Intravenous, Once, 2 of 6 cycles  Administration: 442 mg (10/31/2022), 442 mg (11/14/2022), 442 mg (11/28/2022)  PACLItaxel (TAXOL) chemo IVPB, 70 mg/m2 = 115 8 mg (87 5 % of original dose 80 mg/m2), Intravenous, Once, 2 of 6 cycles  Dose modification: 70 mg/m2 (original dose 80 mg/m2, Cycle 1, Reason: Anticipated Tolerance), 50 mg/m2 (original dose 80 mg/m2, Cycle 2, Reason: Other (Must fill in a comment), Comment: Diarrhea)  Administration: 115 8 mg (10/31/2022), 115 8 mg (11/7/2022), 115 8 mg (11/14/2022), 82 8 mg (11/28/2022), 82 8 mg (12/5/2022)     Stomach cancer ([facility] [address] )   2/28/2022 Initial Diagnosis    Stomach cancer ([facility] [address] )     10/31/2022 -  Chemotherapy    pegfilgrastim (Blade Stubbs), 6 mg, Subcutaneous, Once, 2 of 6 cycles  Administration: 6 mg (11/14/2022)  ramucirumab (CYRAMZA) IVPB, 8 mg/kg = 442 mg, Intravenous, Once, 2 of 6 cycles  Administration: 442 mg (10/31/2022), 442 mg (11/14/2022), 442 mg (11/28/2022)  PACLItaxel (TAXOL) chemo IVPB, 70 mg/m2 = 115 8 mg (87 5 % of original dose 80 mg/m2), Intravenous, Once, 2 of 6 cycles  Dose modification: 70 mg/m2 (original dose 80 mg/m2, Cycle 1, Reason: Anticipated Tolerance), 50 mg/m2 (original dose 80 mg/m2, Cycle 2, Reason: Other (Must fill in a comment), Comment: Diarrhea)  Administration: 115 8 mg (10/31/2022), 115 8 mg (11/7/2022), 115 8 mg (11/14/2022), 82 8 mg (11/28/2022), 82 8 mg (12/5/2022)       Dose adjusted modified FOLFOX 6 with immunotherapy    Oxaliplatin was discontinued after 8 cycles  Current Hematologic/ Oncologic Treatment:    Dose reduced Taxol and Cyramza    ECO-3    Interval History:   The patient presents for routine follow up along with his daughter  He was last seen by Dr Karlos Johnson on 10/27/2022  He started cycle 2 of treatment on   His blood work has remained in acceptable treatment range  He received day 8 of cycle 2 yesterday    Today, he appears weak, frail, cachectic  There is evidence of significant muscle wasting  He has lost 4 pounds since last week and a total of 11 pounds since October  He has no appetite  Nausea is improved since Zyprexa was prescribed  He does use Marinol and medical marijuana to help with appetite stimulation  He is not using his feeding tube  Denies dysphagia  Not having any severe pain  He has been experiencing exertional dyspnea  Denies any chest pain or shortness of breath at rest   He reports his activity is quite decreased  He is spending greater than 50% of the day lying in bed or sitting in his chair watching TV  He continues to have diarrhea up to 3 times per day but reports this is improved  He is taking Imodium and reports this has been helpful  He has not required Lomotil    We had a annamaria conversation today regarding my concerns over his declining performance status, frailty, continued weight loss  I explained the goal of chemotherapy is to promote quality of life while prolonging life  I am concerned that continuing treatment maybe doing more harm than good  We did discuss hospice  He is not ready       Cancer Staging:   Cancer Staging   Gastric adenocarcinoma [facility])  Staging form: Stomach, AJCC 7th Edition  - Clinical: Stage IV (TX, N3b, M1) - Signed by Bar Villatoro MD on 2022      Molecular Testing:       Test Results:    Imaging: No results found      Labs:   Lab Results   Component Value Date    WBC 11 44 (H) 2022    HGB 10 6 (L) 2022    HCT 32 8 (L) 2022    MCV 96 12/04/2022     12/04/2022     Lab Results   Component Value Date    K 3 4 (L) 12/04/2022     (H) 12/04/2022    CO2 24 12/04/2022    BUN 15 12/04/2022    CREATININE 1 08 12/04/2022    GLUF 97 02/18/2022    CALCIUM 8 5 12/04/2022    CORRECTEDCA 9 3 12/04/2022    AST 18 12/04/2022    ALT 19 12/04/2022    ALKPHOS 124 (H) 12/04/2022    EGFR 68 12/04/2022           Review of Systems   Constitutional: Positive for activity change, appetite change, fatigue and unexpected weight change  Respiratory: Positive for shortness of breath  Gastrointestinal: Positive for diarrhea  Musculoskeletal: Positive for gait problem  Skin: Positive for pallor  Neurological: Positive for weakness           Active Problems:   Patient Active Problem List   Diagnosis   • Hypertension   • Postablative hypothyroidism   • Depression   • BPH (benign prostatic hyperplasia)   • COVID-19   • Inflammatory arthritis   • Rectus diastasis   • Acute calculous cholecystitis   • Gastric mass   • Gastric adenocarcinoma (HCC)   • Encounter for central line care   • FTT (failure to thrive) in adult   • Stomach cancer ([facility] [address] )   • Cancer related pain   • Moderate protein-calorie malnutrition ([facility] [address] )   • Palliative care patient   • Benign prostatic hyperplasia with urinary frequency   • Rash   • Constipation   • Dehydration   • Hypokalemia   • Chemotherapy induced neutropenia (HCC)       Past Medical History:   Past Medical History:   Diagnosis Date   • Arthritis     shoulders   • Cancer ([facility] [address] )     gastric mass   • COVID 01/2021 jan 2021- no hospitalization   • Disease of thyroid gland     had radioactive iodine in past   • GERD (gastroesophageal reflux disease)    • History of GI diverticular bleed    • Hyperlipidemia    • Hypertension     on no meds at present   • Kidney stone    • Port-A-Cath in place    • S/P percutaneous endoscopic gastrostomy (PEG) tube placement [facility])    • Tinnitus    • Wears glasses        Surgical History:   Past Surgical History:   Procedure Laterality Date   • ADENOIDECTOMY     • CHOLECYSTECTOMY LAPAROSCOPIC N/A 1/24/2022    Procedure: CHOLECYSTECTOMY LAPAROSCOPIC W/ INTRAOP CHOLANGIOGRAM;  Surgeon: Deonte Dunham DO;  [facility];  Service: General   • COLONOSCOPY N/A 7/16/2016    Procedure: COLONOSCOPY;  Surgeon: Melani Kohli MD;  [facility];  Service:    • FL CHOLANGIOGRAM TRANSHEPATIC  1/24/2022   • FL GUIDED CENTRAL VENOUS ACCESS DEVICE INSERTION  2/11/2022   • HERNIA REPAIR     • NM LAP,PROSTATECTOMY,RADICAL,W/NERVE SPARE,INCL ROBOTIC N/A 4/27/2022    Procedure: PROSTATECTOMY SIMPLE LAPAROSCOPIC  W ROBOTICS, BLADDER NECK RECONSTRUCTION;  Surgeon: Yvon Crespo MD;  Location: [facility];  Service: Urology   • PROSTATE BIOPSY     • TONSILLECTOMY     • TUNNELED VENOUS PORT PLACEMENT N/A 2/11/2022    Procedure: INSERTION VENOUS PORT (PORT-A-CATH); Surgeon: Chelle Sanchez MD;  Location: [facility];  Service: Surgical Oncology   • UVULECTOMY         Family History:  No family history on file  Cancer-related family history is not on file      Social History:   Social History     Socioeconomic History   • Marital status: /Civil Union     Spouse name: Not on file   • Number of children: Not on file   • Years of education: Not on file   • Highest education level: Not on file   Occupational History   • Not on file   Tobacco Use   • Smoking status: Never   • Smokeless tobacco: Never   Vaping Use   • Vaping Use: Never used   Substance and Sexual Activity   • Alcohol use: Yes     Comment: social   • Drug use: Never   • Sexual activity: Not on file   Other Topics Concern   • Not on file   Social History Narrative   • Not on file     Social Determinants of Health     Financial Resource Strain: Not on file   Food Insecurity: No Food Insecurity   • Worried About Running Out of Food in the Last Year: Never true   • Ran Out of Food in the Last Year: Never true   Transportation Needs: No Transportation Needs   • Lack of Transportation (Medical): No   • Lack of Transportation (Non-Medical): No   Physical Activity: Not on file   Stress: Not on file   Social Connections: Not on file   Intimate Partner Violence: Not on file   Housing Stability: Low Risk    • Unable to Pay for Housing in the Last Year: No   • Number of Places Lived in the Last Year: 1   • Unstable Housing in the Last Year: No       Current Medications:   Current Outpatient Medications   Medication Sig Dispense Refill   • bisacodyl (DULCOLAX) 5 mg EC tablet Take 1 tablet (5 mg total) by mouth daily as needed for constipation Related to pain medicines 30 tablet 0   • diphenoxylate-atropine (LOMOTIL) 2 5-0 025 mg/5 mL liquid Take 5 mL by mouth 4 (four) times a day as needed for diarrhea 60 mL 0   • dronabinol (MARINOL) 10 MG capsule Take 1 capsule (10 mg total) by mouth 2 (two) times a day before meals 60 capsule 0   • escitalopram (LEXAPRO) 10 mg tablet Take 1 tablet (10 mg total) by mouth daily 90 tablet 0   • levothyroxine (Euthyrox) 150 mcg tablet Take 1 tablet (150 mcg total) by mouth daily in the early morning 90 tablet 3   • magnesium chloride-calcium (Slow-Mag) 71 5-119 mg Take 1 tablet by mouth 2 (two) times a day 60 tablet 5   • neomycin-bacitracin-polymyxin b (NEOSPORIN) ointment Apply to the tip of the penis twice daily as needed for catheter irritation   15 g 0   • Nutritional Supplements (jevity 1 5 tam) LIQD Tube Feeding with Oral Diet Jevity 1 5 Bolus 240 ml q4h Flush 50 cc water with each bolus 1000 mL 3   • OLANZapine (ZyPREXA) 5 mg tablet Take 1 tablet (5 mg total) by mouth daily at bedtime 90 tablet 1   • ondansetron (Zofran ODT) 4 mg disintegrating tablet Take 2 tablets (8 mg total) by mouth every 8 (eight) hours as needed for nausea or vomiting for up to 90 doses 90 tablet 0   • ondansetron (ZOFRAN) 8 mg tablet Take 1 tablet (8 mg total) by mouth every 6 (six) hours 80 tablet 1   • oxyCODONE (OxyCONTIN) 10 mg 12 hr tablet Take 1 tablet (10 mg total) by mouth every 8 (eight) hours Max Daily Amount: 30 mg 90 tablet 0   • oxyCODONE (ROXICODONE) 5 mg/5 mL solution Take 5-10 mL (5-10 mg total) by mouth every 4 (four) hours as needed for moderate pain or severe pain Max Daily Amount: 60 mg 473 mL 0   • pantoprazole (PROTONIX) 40 mg tablet Take 1 tablet (40 mg total) by mouth 2 (two) times a day Before breakfast and before bed to prevent acid accumulation  60 tablet 0   • potassium chloride (K-DUR,KLOR-CON) 10 mEq tablet Take 1 tablet (10 mEq total) by mouth 3 (three) times a day 270 tablet 3   • saliva substitute (MOUTH KOTE) Apply 5 sprays to the mouth or throat 4 (four) times a day as needed (dry mouth) 59 mL 0   • levocetirizine (XYZAL) 5 MG tablet Take 5 mg by mouth in the morning (Patient not taking: Reported on 9/22/2022)     • nystatin (MYCOSTATIN) 500,000 units/5 mL suspension Apply 5 mL (500,000 Units total) to the mouth or throat 4 (four) times a day 200 mL 0   • potassium chloride (MICRO-K) 10 MEQ CR capsule Take 1 capsule (10 mEq total) by mouth in the morning for 5 days (Patient not taking: Reported on 10/27/2022) 5 capsule 0     No current facility-administered medications for this visit  Facility-Administered Medications Ordered in Other Visits   Medication Dose Route Frequency Provider Last Rate Last Admin   • alteplase (CATHFLO) injection 2 mg  2 mg Intracatheter Q2H PRN Taylor Boyce MD           Allergies: No Known Allergies    Physical Exam:  /88 (BP Location: Right arm, Patient Position: Sitting, Cuff Size: Adult)   Pulse 98   Temp (!) 95 7 °F (35 4 °C) (Temporal)   Resp 16   Ht 5' 7" (1 702 m)   Wt 51 7 kg (114 lb)   SpO2 98%   BMI 17 85 kg/m²   Body surface area is 1 59 meters squared      Wt Readings from Last 3 Encounters:   12/06/22 51 7 kg (114 lb)   12/05/22 51 3 kg (113 lb)   11/28/22 53 1 kg (117 lb)           Physical Exam  Constitutional:       General: He is not in acute distress  Appearance: He is ill-appearing  Comments: Appears frail, weak, cachetic  HENT:      Head: Normocephalic and atraumatic  Mouth/Throat:      Pharynx: No oropharyngeal exudate  Eyes:      General: No scleral icterus  Cardiovascular:      Rate and Rhythm: Normal rate and regular rhythm  Heart sounds: Normal heart sounds  Pulmonary:      Effort: Pulmonary effort is normal       Breath sounds: Normal breath sounds  Abdominal:      General: Abdomen is flat  Bowel sounds are normal       Palpations: Abdomen is soft  Tenderness: There is no abdominal tenderness  Musculoskeletal:      Cervical back: Normal range of motion  Right lower leg: No edema  Left lower leg: No edema  Comments: Bitemporal wasting  Significant muscle wasting  Neurological:      General: No focal deficit present  Mental Status: He is alert and oriented to person, place, and time  Psychiatric:         Mood and Affect: Mood normal          Behavior: Behavior normal          Assessment / Plan:    1  Gastric adenocarcinoma ([facility] [address] )    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 adenocarcinoma of the GE junction with extension into the fundus of the stomach when he presented with weight loss and epigastric pain   Biopsy confirmed adenocarcinoma which was HER2 negative   MMR was intact   Tumor mutation burden is low   MSI was stable   No other actionable mutations were detected   Gaurdant testing did show he may be eligible for TAPUR clinical trial which may be open with [facility] which looks at mutations and treatment with FDA approved drugs for those mutations outside of standard of care  He did have CDK 6 amplification and EGFR amplification for which they may have clinical trial if needed  He was started on systemic chemotherapy with modified FOLFOX 6 with Opdivo every 2 weeks   Unfortunately, he progressed rather quickly on this regimen  He was started on second line treatment with Taxol and Cyramza day 1,8,15 every 28 days on 10/31/22  His dose of Taxol has been dose reduced d/t side effects  He has been seen at [facility] who had recommended repeat guardant testing to see if he could go on clinical trial with an agent for high EGFR expression/amplification  He did recently repeat guardant testing, those results are still pending  Initially, patient was not interested in driving to [facility] for consideration of treatment  At this point, patient and his daughter don't feel he has the stamina to tolerate a long commute to consider clinical trial      He completed day 8 of cycle 2 yesterday  He remains interested in continuing on his current treatment plan  A annamaria discussion was held today regarding his overall performance status, goals of care  I did stress importance of getting adequate nutrition while continuing with chemotherapy  I strongly advised him to resume tube feeds to help support his overall nutrition since he expresses his desire to continue on treatment  Time was spent answering questions about hospice if he were to decide to stop treatment  He does follow with palliative care  As he wishes, he will continue on his current regimen  I will see him back in a few weeks prior to starting cycle 3 to reassess how he is doing and how his overall performance status is holding up  Patient and his daughter were in agreement with this plan of care  They are instructed to call at anytime with any additional questions or concerns         Goals and Barriers:  Current Goal:  Prolong Survival from metastatic gastric cancer  Barriers: None  Patient's Capacity to Self Care:  Patient  able to self care  Portions of the record may have been created with voice recognition software  Occasional wrong word or "sound a like" substitutions may have occurred due to the inherent limitations of voice recognition software    Read the chart carefully and recognize, using context, where substitutions have occurred

## 2022-12-02 ENCOUNTER — HOSPITAL ENCOUNTER (OUTPATIENT)
Dept: INFUSION CENTER | Facility: CLINIC | Age: 72
End: 2022-12-02

## 2022-12-02 ENCOUNTER — TELEPHONE (OUTPATIENT)
Dept: HEMATOLOGY ONCOLOGY | Facility: CLINIC | Age: 72
End: 2022-12-02

## 2022-12-02 ENCOUNTER — TELEPHONE (OUTPATIENT)
Dept: INFUSION CENTER | Facility: CLINIC | Age: 72
End: 2022-12-02

## 2022-12-02 NOTE — TELEPHONE ENCOUNTER
Fax sent to [facility] of patients face sheet  Unable to return their phone call as no number was provided

## 2022-12-02 NOTE — TELEPHONE ENCOUNTER
CALL RETURN FORM   Reason for patient call? Rickie Tirado from [facility] calling to request a face sheet be faxed including patients billing information    Patient's primary oncologist? Dr Nisa Poe    Name of person the patient was calling for? Dr Darek Ramirez Team     Any additional information to add, if applicable? Fax: [phone]    Requesting call when this is sent   Informed patient that the message will be forwarded to the team and someone will get back to them as soon as possible    Did you relay this information to the patient?  Yes

## 2022-12-02 NOTE — TELEPHONE ENCOUNTER
I spoke with jefferson to remind him to have his labs drawn prior to mondays tx  He stated he will go tomorrow

## 2022-12-04 ENCOUNTER — APPOINTMENT (OUTPATIENT)
Dept: LAB | Age: 72
End: 2022-12-04

## 2022-12-04 DIAGNOSIS — D70.1 CHEMOTHERAPY INDUCED NEUTROPENIA (HCC): ICD-10-CM

## 2022-12-04 DIAGNOSIS — T45.1X5A CHEMOTHERAPY INDUCED NEUTROPENIA (HCC): ICD-10-CM

## 2022-12-04 DIAGNOSIS — C16.9 GASTRIC ADENOCARCINOMA (HCC): ICD-10-CM

## 2022-12-04 DIAGNOSIS — C16.9 MALIGNANT NEOPLASM OF STOMACH, UNSPECIFIED LOCATION (HCC): ICD-10-CM

## 2022-12-04 LAB
ALBUMIN SERPL BCP-MCNC: 3 G/DL (ref 3.5–5)
ALP SERPL-CCNC: 124 U/L (ref 46–116)
ALT SERPL W P-5'-P-CCNC: 19 U/L (ref 12–78)
ANION GAP SERPL CALCULATED.3IONS-SCNC: 6 MMOL/L (ref 4–13)
AST SERPL W P-5'-P-CCNC: 18 U/L (ref 5–45)
BASOPHILS # BLD AUTO: 0.08 THOUSANDS/ÂΜL (ref 0–0.1)
BASOPHILS NFR BLD AUTO: 1 % (ref 0–1)
BILIRUB SERPL-MCNC: 0.52 MG/DL (ref 0.2–1)
BUN SERPL-MCNC: 15 MG/DL (ref 5–25)
CALCIUM ALBUM COR SERPL-MCNC: 9.3 MG/DL (ref 8.3–10.1)
CALCIUM SERPL-MCNC: 8.5 MG/DL (ref 8.3–10.1)
CHLORIDE SERPL-SCNC: 110 MMOL/L (ref 96–108)
CO2 SERPL-SCNC: 24 MMOL/L (ref 21–32)
CREAT SERPL-MCNC: 1.08 MG/DL (ref 0.6–1.3)
EOSINOPHIL # BLD AUTO: 0.08 THOUSAND/ÂΜL (ref 0–0.61)
EOSINOPHIL NFR BLD AUTO: 1 % (ref 0–6)
ERYTHROCYTE [DISTWIDTH] IN BLOOD BY AUTOMATED COUNT: 17.8 % (ref 11.6–15.1)
GFR SERPL CREATININE-BSD FRML MDRD: 68 ML/MIN/1.73SQ M
GLUCOSE SERPL-MCNC: 114 MG/DL (ref 65–140)
HCT VFR BLD AUTO: 32.8 % (ref 36.5–49.3)
HGB BLD-MCNC: 10.6 G/DL (ref 12–17)
IMM GRANULOCYTES # BLD AUTO: 0.12 THOUSAND/UL (ref 0–0.2)
IMM GRANULOCYTES NFR BLD AUTO: 1 % (ref 0–2)
LYMPHOCYTES # BLD AUTO: 2.31 THOUSANDS/ÂΜL (ref 0.6–4.47)
LYMPHOCYTES NFR BLD AUTO: 20 % (ref 14–44)
MCH RBC QN AUTO: 30.9 PG (ref 26.8–34.3)
MCHC RBC AUTO-ENTMCNC: 32.3 G/DL (ref 31.4–37.4)
MCV RBC AUTO: 96 FL (ref 82–98)
MONOCYTES # BLD AUTO: 0.69 THOUSAND/ÂΜL (ref 0.17–1.22)
MONOCYTES NFR BLD AUTO: 6 % (ref 4–12)
NEUTROPHILS # BLD AUTO: 8.16 THOUSANDS/ÂΜL (ref 1.85–7.62)
NEUTS SEG NFR BLD AUTO: 71 % (ref 43–75)
NRBC BLD AUTO-RTO: 0 /100 WBCS
PLATELET # BLD AUTO: 377 THOUSANDS/UL (ref 149–390)
PMV BLD AUTO: 10.6 FL (ref 8.9–12.7)
POTASSIUM SERPL-SCNC: 3.4 MMOL/L (ref 3.5–5.3)
PROT SERPL-MCNC: 5.7 G/DL (ref 6.4–8.4)
RBC # BLD AUTO: 3.43 MILLION/UL (ref 3.88–5.62)
SODIUM SERPL-SCNC: 140 MMOL/L (ref 135–147)
WBC # BLD AUTO: 11.44 THOUSAND/UL (ref 4.31–10.16)

## 2022-12-05 ENCOUNTER — HOSPITAL ENCOUNTER (OUTPATIENT)
Dept: INFUSION CENTER | Facility: CLINIC | Age: 72
Discharge: HOME | End: 2022-12-05

## 2022-12-05 VITALS
WEIGHT: 113 LBS | BODY MASS INDEX: 17.13 KG/M2 | HEART RATE: 72 BPM | RESPIRATION RATE: 18 BRPM | TEMPERATURE: 97.2 F | SYSTOLIC BLOOD PRESSURE: 116 MMHG | DIASTOLIC BLOOD PRESSURE: 70 MMHG | OXYGEN SATURATION: 97 % | HEIGHT: 68 IN

## 2022-12-05 DIAGNOSIS — T45.1X5A CHEMOTHERAPY INDUCED NEUTROPENIA (HCC): ICD-10-CM

## 2022-12-05 DIAGNOSIS — C16.9 GASTRIC ADENOCARCINOMA (HCC): Primary | ICD-10-CM

## 2022-12-05 DIAGNOSIS — C16.9 MALIGNANT NEOPLASM OF STOMACH, UNSPECIFIED LOCATION (HCC): ICD-10-CM

## 2022-12-05 DIAGNOSIS — D70.1 CHEMOTHERAPY INDUCED NEUTROPENIA (HCC): ICD-10-CM

## 2022-12-05 RX ORDER — SODIUM CHLORIDE 9 MG/ML
20 INJECTION, SOLUTION INTRAVENOUS ONCE
Status: COMPLETED | OUTPATIENT
Start: 2022-12-05 | End: 2022-12-05

## 2022-12-05 RX ADMIN — DIPHENHYDRAMINE HYDROCHLORIDE 50 MG: 50 INJECTION, SOLUTION INTRAMUSCULAR; INTRAVENOUS at 13:09

## 2022-12-05 RX ADMIN — SODIUM CHLORIDE 20 ML/HR: 0.9 INJECTION, SOLUTION INTRAVENOUS at 12:48

## 2022-12-05 RX ADMIN — DEXAMETHASONE SODIUM PHOSPHATE: 10 INJECTION, SOLUTION INTRAMUSCULAR; INTRAVENOUS at 12:46

## 2022-12-05 RX ADMIN — PACLITAXEL 82.8 MG: 6 INJECTION, SOLUTION, CONCENTRATE INTRAVENOUS at 14:04

## 2022-12-05 RX ADMIN — FAMOTIDINE 20 MG: 10 INJECTION INTRAVENOUS at 13:30

## 2022-12-05 NOTE — PROGRESS NOTES
Patient is here for Taxol, offers no complaints  He has lost 4 lbs since last visit, reports poor appetite  His labs are within parameters to treat  He has a follow up with his oncologist tomorrow  Port accessed without issue, patient resting comfortably in chair   Will ask patient and his wife if they want to speak with the dietician regarding weight loss and poor appetite and if so will place referral

## 2022-12-06 ENCOUNTER — OFFICE VISIT (OUTPATIENT)
Dept: HEMATOLOGY ONCOLOGY | Facility: CLINIC | Age: 72
End: 2022-12-06

## 2022-12-06 VITALS
WEIGHT: 114 LBS | OXYGEN SATURATION: 98 % | BODY MASS INDEX: 17.89 KG/M2 | SYSTOLIC BLOOD PRESSURE: 130 MMHG | HEART RATE: 98 BPM | RESPIRATION RATE: 16 BRPM | HEIGHT: 67 IN | TEMPERATURE: 95.7 F | DIASTOLIC BLOOD PRESSURE: 88 MMHG

## 2022-12-06 DIAGNOSIS — R64 MALIGNANT CACHEXIA (HCC): ICD-10-CM

## 2022-12-06 DIAGNOSIS — Z51.5 PALLIATIVE CARE PATIENT: ICD-10-CM

## 2022-12-06 DIAGNOSIS — E86.0 DEHYDRATION: ICD-10-CM

## 2022-12-06 DIAGNOSIS — C16.0 MALIGNANT NEOPLASM OF CARDIA OF STOMACH (HCC): ICD-10-CM

## 2022-12-06 DIAGNOSIS — C16.9 GASTRIC ADENOCARCINOMA (HCC): Primary | ICD-10-CM

## 2022-12-06 RX ORDER — DRONABINOL 10 MG/1
10 CAPSULE ORAL
Qty: 60 CAPSULE | Refills: 0 | Status: SHIPPED | OUTPATIENT
Start: 2022-12-06

## 2022-12-06 RX ORDER — XYLITOL/YERBA SANTA
5 AEROSOL, SPRAY WITH PUMP (ML) MUCOUS MEMBRANE 4 TIMES DAILY PRN
Qty: 59 ML | Refills: 0 | Status: SHIPPED | OUTPATIENT
Start: 2022-12-06

## 2022-12-07 DIAGNOSIS — E86.0 DEHYDRATION: ICD-10-CM

## 2022-12-07 DIAGNOSIS — E87.6 HYPOKALEMIA: Primary | ICD-10-CM

## 2022-12-09 ENCOUNTER — HOSPITAL ENCOUNTER (OUTPATIENT)
Dept: INFUSION CENTER | Facility: CLINIC | Age: 72
End: 2022-12-09

## 2022-12-09 ENCOUNTER — TELEPHONE (OUTPATIENT)
Dept: HEMATOLOGY ONCOLOGY | Facility: HOSPITAL | Age: 72
End: 2022-12-09

## 2022-12-09 ENCOUNTER — PATIENT OUTREACH (OUTPATIENT)
Dept: HEMATOLOGY ONCOLOGY | Facility: CLINIC | Age: 72
End: 2022-12-09

## 2022-12-09 VITALS
SYSTOLIC BLOOD PRESSURE: 113 MMHG | OXYGEN SATURATION: 96 % | RESPIRATION RATE: 18 BRPM | TEMPERATURE: 98.6 F | HEART RATE: 101 BPM | DIASTOLIC BLOOD PRESSURE: 78 MMHG

## 2022-12-09 DIAGNOSIS — E87.6 HYPOKALEMIA: Primary | ICD-10-CM

## 2022-12-09 DIAGNOSIS — C16.9 GASTRIC ADENOCARCINOMA (HCC): ICD-10-CM

## 2022-12-09 DIAGNOSIS — C16.9 GASTRIC ADENOCARCINOMA (HCC): Primary | ICD-10-CM

## 2022-12-09 DIAGNOSIS — E86.0 DEHYDRATION: ICD-10-CM

## 2022-12-09 DIAGNOSIS — E87.6 HYPOKALEMIA: ICD-10-CM

## 2022-12-09 DIAGNOSIS — E86.0 DEHYDRATION: Primary | ICD-10-CM

## 2022-12-09 LAB
ALBUMIN SERPL BCP-MCNC: 3.3 G/DL (ref 3.5–5)
ALP SERPL-CCNC: 98 U/L (ref 34–104)
ALT SERPL W P-5'-P-CCNC: 9 U/L (ref 7–52)
ANION GAP SERPL CALCULATED.3IONS-SCNC: 7 MMOL/L (ref 4–13)
AST SERPL W P-5'-P-CCNC: 15 U/L (ref 13–39)
BASOPHILS # BLD AUTO: 0.03 THOUSANDS/ÂΜL (ref 0–0.1)
BASOPHILS NFR BLD AUTO: 0 % (ref 0–1)
BILIRUB SERPL-MCNC: 0.78 MG/DL (ref 0.2–1)
BUN SERPL-MCNC: 15 MG/DL (ref 5–25)
CALCIUM ALBUM COR SERPL-MCNC: 9 MG/DL (ref 8.3–10.1)
CALCIUM SERPL-MCNC: 8.4 MG/DL (ref 8.4–10.2)
CHLORIDE SERPL-SCNC: 113 MMOL/L (ref 96–108)
CO2 SERPL-SCNC: 21 MMOL/L (ref 21–32)
CREAT SERPL-MCNC: 0.89 MG/DL (ref 0.6–1.3)
EOSINOPHIL # BLD AUTO: 0.08 THOUSAND/ÂΜL (ref 0–0.61)
EOSINOPHIL NFR BLD AUTO: 1 % (ref 0–6)
ERYTHROCYTE [DISTWIDTH] IN BLOOD BY AUTOMATED COUNT: 18.6 % (ref 11.6–15.1)
GFR SERPL CREATININE-BSD FRML MDRD: 85 ML/MIN/1.73SQ M
GLUCOSE SERPL-MCNC: 93 MG/DL (ref 65–140)
HCT VFR BLD AUTO: 30.8 % (ref 36.5–49.3)
HGB BLD-MCNC: 10.1 G/DL (ref 12–17)
IMM GRANULOCYTES # BLD AUTO: 0.06 THOUSAND/UL (ref 0–0.2)
IMM GRANULOCYTES NFR BLD AUTO: 1 % (ref 0–2)
LYMPHOCYTES # BLD AUTO: 1.21 THOUSANDS/ÂΜL (ref 0.6–4.47)
LYMPHOCYTES NFR BLD AUTO: 10 % (ref 14–44)
MCH RBC QN AUTO: 31.5 PG (ref 26.8–34.3)
MCHC RBC AUTO-ENTMCNC: 32.8 G/DL (ref 31.4–37.4)
MCV RBC AUTO: 96 FL (ref 82–98)
MONOCYTES # BLD AUTO: 0.4 THOUSAND/ÂΜL (ref 0.17–1.22)
MONOCYTES NFR BLD AUTO: 3 % (ref 4–12)
NEUTROPHILS # BLD AUTO: 10.02 THOUSANDS/ÂΜL (ref 1.85–7.62)
NEUTS SEG NFR BLD AUTO: 85 % (ref 43–75)
NRBC BLD AUTO-RTO: 0 /100 WBCS
PLATELET # BLD AUTO: 328 THOUSANDS/UL (ref 149–390)
PMV BLD AUTO: 10.2 FL (ref 8.9–12.7)
POTASSIUM SERPL-SCNC: 3.1 MMOL/L (ref 3.5–5.3)
PROT SERPL-MCNC: 5.7 G/DL (ref 6.4–8.4)
RBC # BLD AUTO: 3.21 MILLION/UL (ref 3.88–5.62)
SODIUM SERPL-SCNC: 141 MMOL/L (ref 135–147)
WBC # BLD AUTO: 11.8 THOUSAND/UL (ref 4.31–10.16)

## 2022-12-09 RX ORDER — POTASSIUM CHLORIDE 14.9 MG/ML
20 INJECTION INTRAVENOUS ONCE
Status: CANCELLED | OUTPATIENT
Start: 2022-12-10

## 2022-12-09 RX ADMIN — SODIUM CHLORIDE 1000 ML: 0.9 INJECTION, SOLUTION INTRAVENOUS at 11:51

## 2022-12-09 NOTE — PROGRESS NOTES
Daughter Octaviaradha Michael called to report Edu's potassium is low at 3 1  She indicates this is the level he begins to act oddly and hallucinate   Braydon Michael was hoping for an order for potassium infusion  Message sent to Dr Delfina Tirado team

## 2022-12-09 NOTE — PROGRESS NOTES
Patient tolerated infusion today without complications   Patient verified upcoming appointment for Monday and declined AVS

## 2022-12-09 NOTE — TELEPHONE ENCOUNTER
Returned call to patients daughter, she states patient is not acting right  Feels there is a change since he was in the office the other day  As per Dr Xuan Fagan potassium of 3 1 should not be causing confusion or mental status changes, and it may be best if patient is evaluated in ED  Ramsey Alberto does not think pt will go  Advised her to continue to monitor pt but if his symptoms worsen, she should bring him to ED  Pt is taking oral K+ at home three times a day as per Ramsey Alberto  He also received hydration today  Pt will go for 1L NSS tomorrow and 20meq K+ at [facility]  Daughter is aware of appt  She knows that if symptoms worsen over the weekend, pt will require ED eval   She was agreeable    Will f/u on Monday

## 2022-12-09 NOTE — TELEPHONE ENCOUNTER
----- Message from Angel Bro sent at 12/9/2022  2:20 PM EST -----  Patients daughter Valerio Mayorga called regarding patients potassium level being 3 1  Valerio Mayorga states this is the level he starts behaving oddly and hallucinating  She would like to have him get a potassium infusion    Thanks

## 2022-12-10 ENCOUNTER — HOSPITAL ENCOUNTER (OUTPATIENT)
Dept: INFUSION CENTER | Facility: HOSPITAL | Age: 72
Discharge: HOME | End: 2022-12-10
Attending: INTERNAL MEDICINE

## 2022-12-10 VITALS
RESPIRATION RATE: 18 BRPM | DIASTOLIC BLOOD PRESSURE: 72 MMHG | SYSTOLIC BLOOD PRESSURE: 116 MMHG | HEART RATE: 84 BPM | TEMPERATURE: 97.5 F

## 2022-12-10 DIAGNOSIS — E86.0 DEHYDRATION: ICD-10-CM

## 2022-12-10 DIAGNOSIS — E87.6 HYPOKALEMIA: Primary | ICD-10-CM

## 2022-12-10 RX ORDER — POTASSIUM CHLORIDE 14.9 MG/ML
20 INJECTION INTRAVENOUS ONCE
Status: CANCELLED | OUTPATIENT
Start: 2022-12-10

## 2022-12-10 RX ORDER — POTASSIUM CHLORIDE 14.9 MG/ML
20 INJECTION INTRAVENOUS ONCE
Status: COMPLETED | OUTPATIENT
Start: 2022-12-10 | End: 2022-12-10

## 2022-12-10 RX ADMIN — POTASSIUM CHLORIDE 20 MEQ: 14.9 INJECTION, SOLUTION INTRAVENOUS at 11:08

## 2022-12-10 RX ADMIN — SODIUM CHLORIDE 1000 ML: 0.9 INJECTION, SOLUTION INTRAVENOUS at 11:08

## 2022-12-12 ENCOUNTER — HOSPITAL ENCOUNTER (OUTPATIENT)
Dept: INFUSION CENTER | Facility: CLINIC | Age: 72
Discharge: HOME | End: 2022-12-12

## 2022-12-12 ENCOUNTER — TELEPHONE (OUTPATIENT)
Dept: HEMATOLOGY ONCOLOGY | Facility: CLINIC | Age: 72
End: 2022-12-12

## 2022-12-12 VITALS
RESPIRATION RATE: 16 BRPM | HEART RATE: 101 BPM | WEIGHT: 115.3 LBS | DIASTOLIC BLOOD PRESSURE: 86 MMHG | BODY MASS INDEX: 18.1 KG/M2 | SYSTOLIC BLOOD PRESSURE: 156 MMHG | TEMPERATURE: 98 F | HEIGHT: 67 IN

## 2022-12-12 DIAGNOSIS — C16.9 GASTRIC ADENOCARCINOMA (HCC): Primary | ICD-10-CM

## 2022-12-12 DIAGNOSIS — D70.1 CHEMOTHERAPY INDUCED NEUTROPENIA (HCC): ICD-10-CM

## 2022-12-12 DIAGNOSIS — C16.9 MALIGNANT NEOPLASM OF STOMACH, UNSPECIFIED LOCATION (HCC): ICD-10-CM

## 2022-12-12 DIAGNOSIS — T45.1X5A CHEMOTHERAPY INDUCED NEUTROPENIA (HCC): ICD-10-CM

## 2022-12-12 RX ORDER — SODIUM CHLORIDE 9 MG/ML
20 INJECTION, SOLUTION INTRAVENOUS ONCE
Status: COMPLETED | OUTPATIENT
Start: 2022-12-12 | End: 2022-12-12

## 2022-12-12 RX ADMIN — PACLITAXEL 82.8 MG: 6 INJECTION, SOLUTION, CONCENTRATE INTRAVENOUS at 14:43

## 2022-12-12 RX ADMIN — PEGFILGRASTIM 6 MG: KIT SUBCUTANEOUS at 15:45

## 2022-12-12 RX ADMIN — SODIUM CHLORIDE 442 MG: 0.9 INJECTION, SOLUTION INTRAVENOUS at 13:41

## 2022-12-12 RX ADMIN — SODIUM CHLORIDE 20 ML/HR: 9 INJECTION, SOLUTION INTRAVENOUS at 12:24

## 2022-12-12 RX ADMIN — FAMOTIDINE 20 MG: 10 INJECTION INTRAVENOUS at 13:05

## 2022-12-12 RX ADMIN — DEXAMETHASONE SODIUM PHOSPHATE: 10 INJECTION, SOLUTION INTRAMUSCULAR; INTRAVENOUS at 12:24

## 2022-12-12 RX ADMIN — DIPHENHYDRAMINE HYDROCHLORIDE 50 MG: 50 INJECTION, SOLUTION INTRAMUSCULAR; INTRAVENOUS at 12:45

## 2022-12-12 NOTE — TELEPHONE ENCOUNTER
CALL TRANSFER   Reason for patint call? Fabio Her calling to request the records from patients most recent visit with Dr Flip Valencia be sent again to [phone]  They report that the fax was not received  Patient is scheduled for appointment today  Patient's primary physician? Dr Flip Valencia   RN call was transferred to and time it was transferred? Jace Romero @ 3:44PM   Informed patient that the message will be forwarded to the team and someone will get back to them as soon as possible    Did you relay this information to the patient?  Yes

## 2022-12-12 NOTE — PROGRESS NOTES
Patient here for cyramza/taxol, he is feeling weak today but no other c/o at this time or changes to report  The patient is a 41y Male complaining of abdominal pain.

## 2022-12-12 NOTE — PATIENT INSTRUCTIONS
December 2022 Sunday Monday Tuesday Wednesday Thursday Friday Saturday                       1     2     3                4    LAB SUPPLY   11:25 AM   (5 min )   BE SLN CHAIR 1   [facility] - [place] [address]    INF ONCOLOGY TX-TREATMENT PLAN  12:30 PM   (210 min )   AN INF CHAIR J Carlos 6    FOLLOW UP PG   9:15 AM   (30 min )   Magdalena Craven, [address] [facility] [place] 7     8     9    INF HYDRATION/ANTIEMETICS 2 HR  11:30 AM   (180 min )   AN INF CHAIR Jones Jeffrey 15 [facility] 10    INF HYDRATION/ANTIEMETICS 1 HR  11:00 AM   (150 min )   BE INF CHAIR 2   [facility]    Cycle 2, Day 8        11     12    INF ONCOLOGY TX-TREATMENT PLAN  12:00 PM   (270 min )   AN INF CHAIR J Carlos 13     14     15     16    INF HYDRATION/ANTIEMETICS 2 HR  12:30 PM   (180 min )   AN INF CHAIR J Carlos 17        Cycle 2, Day 15  + Add'l treatments        18     19     20     21     22     23    INF HYDRATION/ANTIEMETICS 2 HR  10:30 AM   (180 min )   AN INF CHAIR J Carlos 24                25     26     27    INF ONCOLOGY TX-TREATMENT PLAN  11:30 AM   (270 min )   AN INF CHAIR J Carlos 28     29     30    INF HYDRATION/ANTIEMETICS 2 HR   9:00 AM   (180 min )   AN INF CHAIR J Carlos 31         Cycle 3, Day 1              Treatment Details         12/5/2022 - Cycle 2, Day 8      Chemotherapy: ONCBCN PROVIDER COMMUNICATION2, PACLITAXEL IVPB    12/12/2022 - Cycle 2, Day 15 + Additional treatments      Supportive Care: [facility], pegfilgrastim (Faye Arroyo), ONCBCN PROVIDER COMMUNICATION3      Chemotherapy: [facility], RAMUCIRUMAB IVPB, ONCBCN PROVIDER COMMUNICATION2, PACLITAXEL IVPB    12/27/2022 - Cycle 3, Day 1      Supportive Care: [facility] PROVIDER COMMUNICATION3      Chemotherapy: [facility] PROVIDER [id number], RAMUCIRUMAB IVPB, ONCBCN PROVIDER COMMUNICATION2, PACLITAXEL IVPB        January 2023 Sunday Monday Tuesday Wednesday Thursday Friday Saturday   1     2     3    INF ONCOLOGY TX-TREATMENT PLAN   8:30 AM   (210 min )   AN INF CHAIR [facility]    FOLLOW UP PG  12:15 PM   (30 min )   Tori Bradford, [address] [facility] [place] 4     5     6    INF HYDRATION/ANTIEMETICS 2 HR  10:30 AM   (180 min )   AN INF CHAIR [address] [facility] 7        Cycle 3, Day 8        8     9     10    INF ONCOLOGY TX-TREATMENT PLAN   8:30 AM   (270 min )   AN INF BED 1   St  [address] [place] [address] 11     12     13    INF HYDRATION/ANTIEMETICS 2 HR   9:00 AM   (180 min )   AN INF CHAIR [facility] 14         Cycle 3, Day 15       15     16     17     18    FOLLOW UP PG  10:45 AM   (20 min )   Angelica Alexander MD   [facility] [place] 19     20    INF HYDRATION/ANTIEMETICS 2 HR  12:00 PM   (180 min )   AN INF CHAIR [facility] 21                22     23     24    INF ONCOLOGY TX-TREATMENT PLAN   8:30 AM   (270 min )   AN INF CHAIR [facility] 25     26     27    INF HYDRATION/ANTIEMETICS 2 HR  10:30 AM   (180 min )   AN INF CHAIR [facility] 28         Cycle 4, Day 1       29     30     31    INF ONCOLOGY TX-TREATMENT PLAN   8:30 AM   (210 min )   AN INF CHAIR [facility]                         Cycle 4, Day 8              Treatment Details         1/2/2023 - Cycle 3, Day 8      Chemotherapy: [facility] PROVIDER COMMUNICATION2, PACLITAXEL IVPB    1/10/2023 - Cycle 3, Day 15      Supportive Care: [facility] PROVIDER COMMUNICATION7, pegfilgrastim (Anibal Jaimes), ONCBCN PROVIDER COMMUNICATION3      Chemotherapy: ON[facility] PROVIDER [id number], RAMUCIRUMAB IVPB, ONCBCN PROVIDER COMMUNICATION2, PACLITAXEL IVPB    1/24/2023 - Cycle 4, Day 1      Supportive Care: [facility] PROVIDER COMMUNICATION3      Chemotherapy: ON[facility] PROVIDER [id number], RAMUCIRUMAB IVPB, ONCBCN PROVIDER COMMUNICATION2, PACLITAXEL IVPB    1/31/2023 - Cycle 4, Day 8      Chemotherapy: ONCBCN PROVIDER COMMUNICATION2, PACLITAXEL IVPB

## 2022-12-12 NOTE — PROGRESS NOTES
Patient tolerated treatment today without incident and was discharged post, no c/o offered  He was feeling better, more energy from when he arrived today  Neulasta onpro placed patient DEANNE  as ordered for injection tomorrow at 1845  Patient familiar with injection and removal of onpro and that it should complete by 1930 tomorrow  He will RTO 12/16/22 for hydration appt as previously scheduled

## 2022-12-12 NOTE — TELEPHONE ENCOUNTER
Medical Records Request Route to [facility]   Person calling In [place], patient has appmt at [facility] 12/12   Provider Cely Bryant   Fax Number / Person's name (Attention:)   [phone] Dr Yesenia Chacon call back number [phone]   Patient call back number n/a

## 2022-12-13 ENCOUNTER — APPOINTMENT (EMERGENCY)
Dept: CT IMAGING | Facility: HOSPITAL | Age: 72
End: 2022-12-13

## 2022-12-13 ENCOUNTER — APPOINTMENT (EMERGENCY)
Dept: VASCULAR ULTRASOUND | Facility: HOSPITAL | Age: 72
End: 2022-12-13

## 2022-12-13 ENCOUNTER — TELEPHONE (OUTPATIENT)
Dept: HEMATOLOGY ONCOLOGY | Facility: CLINIC | Age: 72
End: 2022-12-13

## 2022-12-13 ENCOUNTER — HOSPITAL ENCOUNTER (EMERGENCY)
Facility: HOSPITAL | Age: 72
Discharge: HOME | End: 2022-12-13
Attending: EMERGENCY MEDICINE

## 2022-12-13 VITALS
OXYGEN SATURATION: 99 % | TEMPERATURE: 98.6 F | WEIGHT: 116.84 LBS | SYSTOLIC BLOOD PRESSURE: 140 MMHG | DIASTOLIC BLOOD PRESSURE: 63 MMHG | RESPIRATION RATE: 16 BRPM | BODY MASS INDEX: 18.3 KG/M2 | HEART RATE: 68 BPM

## 2022-12-13 DIAGNOSIS — R06.02 SHORTNESS OF BREATH: Primary | ICD-10-CM

## 2022-12-13 DIAGNOSIS — J69.0 ASPIRATION PNEUMONIA (HCC): Primary | ICD-10-CM

## 2022-12-13 DIAGNOSIS — E87.6 HYPOKALEMIA: Primary | ICD-10-CM

## 2022-12-13 DIAGNOSIS — R06.02 SHORTNESS OF BREATH: ICD-10-CM

## 2022-12-13 LAB
2HR DELTA HS TROPONIN: 1 NG/L
ALBUMIN SERPL BCP-MCNC: 2.9 G/DL (ref 3.5–5)
ALP SERPL-CCNC: 85 U/L (ref 34–104)
ALT SERPL W P-5'-P-CCNC: 8 U/L (ref 7–52)
ANION GAP SERPL CALCULATED.3IONS-SCNC: 7 MMOL/L (ref 4–13)
ANISOCYTOSIS BLD QL SMEAR: PRESENT
AST SERPL W P-5'-P-CCNC: 15 U/L (ref 13–39)
ATRIAL RATE: 72 BPM
BASOPHILS # BLD MANUAL: 0 THOUSAND/UL (ref 0–0.1)
BASOPHILS NFR MAR MANUAL: 0 % (ref 0–1)
BILIRUB SERPL-MCNC: 0.34 MG/DL (ref 0.2–1)
BUN SERPL-MCNC: 13 MG/DL (ref 5–25)
CALCIUM ALBUM COR SERPL-MCNC: 9 MG/DL (ref 8.3–10.1)
CALCIUM SERPL-MCNC: 8.1 MG/DL (ref 8.4–10.2)
CARDIAC TROPONIN I PNL SERPL HS: 7 NG/L
CARDIAC TROPONIN I PNL SERPL HS: 8 NG/L
CHLORIDE SERPL-SCNC: 113 MMOL/L (ref 96–108)
CO2 SERPL-SCNC: 18 MMOL/L (ref 21–32)
CREAT SERPL-MCNC: 0.81 MG/DL (ref 0.6–1.3)
EOSINOPHIL # BLD MANUAL: 0.13 THOUSAND/UL (ref 0–0.4)
EOSINOPHIL NFR BLD MANUAL: 1 % (ref 0–6)
ERYTHROCYTE [DISTWIDTH] IN BLOOD BY AUTOMATED COUNT: 18.9 % (ref 11.6–15.1)
GFR SERPL CREATININE-BSD FRML MDRD: 88 ML/MIN/1.73SQ M
GLUCOSE SERPL-MCNC: 81 MG/DL (ref 65–140)
HCT VFR BLD AUTO: 27.1 % (ref 36.5–49.3)
HGB BLD-MCNC: 8.8 G/DL (ref 12–17)
LYMPHOCYTES # BLD AUTO: 1.28 THOUSAND/UL (ref 0.6–4.47)
LYMPHOCYTES # BLD AUTO: 10 % (ref 14–44)
MAGNESIUM SERPL-MCNC: 1.7 MG/DL (ref 1.9–2.7)
MCH RBC QN AUTO: 30.9 PG (ref 26.8–34.3)
MCHC RBC AUTO-ENTMCNC: 32.5 G/DL (ref 31.4–37.4)
MCV RBC AUTO: 95 FL (ref 82–98)
MONOCYTES # BLD AUTO: 0.13 THOUSAND/UL (ref 0–1.22)
MONOCYTES NFR BLD: 1 % (ref 4–12)
NEUTROPHILS # BLD MANUAL: 11.28 THOUSAND/UL (ref 1.85–7.62)
NEUTS SEG NFR BLD AUTO: 88 % (ref 43–75)
P AXIS: 60 DEGREES
PHOSPHATE SERPL-MCNC: 2.8 MG/DL (ref 2.3–4.1)
PLATELET # BLD AUTO: 278 THOUSANDS/UL (ref 149–390)
PLATELET BLD QL SMEAR: ADEQUATE
PMV BLD AUTO: 10.1 FL (ref 8.9–12.7)
POTASSIUM SERPL-SCNC: 3.1 MMOL/L (ref 3.5–5.3)
PR INTERVAL: 124 MS
PROT SERPL-MCNC: 5.2 G/DL (ref 6.4–8.4)
QRS AXIS: 19 DEGREES
QRSD INTERVAL: 86 MS
QT INTERVAL: 414 MS
QTC INTERVAL: 453 MS
RBC # BLD AUTO: 2.85 MILLION/UL (ref 3.88–5.62)
RBC MORPH BLD: PRESENT
SODIUM SERPL-SCNC: 138 MMOL/L (ref 135–147)
T WAVE AXIS: 55 DEGREES
VENTRICULAR RATE: 72 BPM
WBC # BLD AUTO: 12.82 THOUSAND/UL (ref 4.31–10.16)

## 2022-12-13 RX ORDER — METRONIDAZOLE 500 MG/1
500 TABLET ORAL ONCE
Status: COMPLETED | OUTPATIENT
Start: 2022-12-13 | End: 2022-12-13

## 2022-12-13 RX ORDER — AMOXICILLIN AND CLAVULANATE POTASSIUM 875; 125 MG/1; MG/1
1 TABLET, FILM COATED ORAL EVERY 12 HOURS
Qty: 14 TABLET | Refills: 0 | Status: SHIPPED | OUTPATIENT
Start: 2022-12-13 | End: 2022-12-20

## 2022-12-13 RX ORDER — MAGNESIUM SULFATE HEPTAHYDRATE 40 MG/ML
2 INJECTION, SOLUTION INTRAVENOUS ONCE
Status: COMPLETED | OUTPATIENT
Start: 2022-12-13 | End: 2022-12-13

## 2022-12-13 RX ORDER — METRONIDAZOLE 500 MG/1
500 TABLET ORAL EVERY 8 HOURS SCHEDULED
Qty: 21 TABLET | Refills: 0 | Status: SHIPPED | OUTPATIENT
Start: 2022-12-13 | End: 2022-12-20

## 2022-12-13 RX ORDER — POTASSIUM CHLORIDE 20 MEQ/1
40 TABLET, EXTENDED RELEASE ORAL ONCE
Status: COMPLETED | OUTPATIENT
Start: 2022-12-13 | End: 2022-12-13

## 2022-12-13 RX ORDER — AMOXICILLIN AND CLAVULANATE POTASSIUM 875; 125 MG/1; MG/1
1 TABLET, FILM COATED ORAL ONCE
Status: COMPLETED | OUTPATIENT
Start: 2022-12-13 | End: 2022-12-13

## 2022-12-13 RX ADMIN — METRONIDAZOLE 500 MG: 500 TABLET ORAL at 17:34

## 2022-12-13 RX ADMIN — MAGNESIUM SULFATE HEPTAHYDRATE 2 G: 40 INJECTION, SOLUTION INTRAVENOUS at 15:22

## 2022-12-13 RX ADMIN — IOHEXOL 85 ML: 350 INJECTION, SOLUTION INTRAVENOUS at 14:29

## 2022-12-13 RX ADMIN — AMOXICILLIN AND CLAVULANATE POTASSIUM 1 TABLET: 875; 125 TABLET, FILM COATED ORAL at 17:34

## 2022-12-13 RX ADMIN — POTASSIUM CHLORIDE 40 MEQ: 1500 TABLET, EXTENDED RELEASE ORAL at 15:16

## 2022-12-13 NOTE — DISCHARGE INSTRUCTIONS
DISCHARGE INSTRUCTIONS:   Seek care immediately if:   You have chest pain  You are confused or cannot think clearly  You have more trouble breathing or your breathing seems faster than normal     Contact your healthcare provider if:   You have a fever  Your symptoms are not better after 2 or 3 days of treatment  You have questions or concerns about your condition or care

## 2022-12-13 NOTE — TELEPHONE ENCOUNTER
Received mychart message regarding SOB, LLE pain  Spoke to daughter, Eliana Gilman, who felt his SOB was worse as he could not walk to one place or other without feeling SOB  Also states Dad's potassium has been low and he seems to be off during those times  Was repleted in infusion over weekend but only small amount  Discussed options of OP workup versus ED visit due to schedule of taking Kim Bearded to possibly multiple locations and waiting for test results  Called wife, Anirudh Cleverly, to discuss  Feels SOB has been about same, however was complaining of LLE pain in his calf/leg area  Due to potential for driving to multiple locations, Anirudh Cleverly preferring not to drive on highways and fatigue/SOB of Kim Bearded - ADT order placed for [facility] ED for work-up  Relayed the above to daughter, Eliana Gilman states that Dad has misplaced his handicap sign  Will stop into [facility] today and drop paperwork off for new one  WIll relay message to team there today to expect her visit

## 2022-12-13 NOTE — ED PROVIDER NOTES
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, his lungs currently undergoing chemotherapy with frequent Neulasta infusions presenting to the emergency department for evaluation of shortness of breath  Patient states that over the past month he has had worsening shortness of breath with exertion  Patient states that this is most notable when he goes to walk distances as he can only walk approximately 10 feet  Patient was recently evaluated for an episode of hyperkalemia with a potassium value of 3 1  This was minimally addressed at his infusion center where a small amount of potassium was given back to him  Patient states that a few nights ago he states that he slept wrong on his leg  This is caused him some mild posterior gastrocnemius pain  Patient related this to his primary care physician who had concern for potential DVT given this patient's risk factors  He recommended that patient consider an outpatient evaluation versus an ED evaluation and, given his wife's reluctance to drive on the freeway and around to multiple locations the decision was made for patient to be evaluated in the emergency department  Patient denies any current pleuritic chest pain, fever, cough, chills, shortness of breath, chest pressure  He does state that his exertional dyspnea has gotten worse  He denies history of DVT or PE  Prior to Admission Medications   Prescriptions Last Dose Informant Patient Reported? Taking?    Nutritional Supplements (jevity 1 5 tam) LIQD   No No   Sig: Tube Feeding with Oral Diet Jevity 1 5 Bolus 240 ml q4h Flush 50 cc water with each bolus   OLANZapine (ZyPREXA) 5 mg tablet   No No   Sig: Take 1 tablet (5 mg total) by mouth daily at bedtime   bisacodyl (DULCOLAX) 5 mg EC tablet   No No   Sig: Take 1 tablet (5 mg total) by mouth daily as needed for constipation Related to pain medicines   diphenoxylate-atropine (LOMOTIL) 2 5-0 025 mg/5 mL liquid   No No   Sig: Take 5 mL by mouth 4 (four) times a day as needed for diarrhea   dronabinol (MARINOL) 10 MG capsule   No No   Sig: Take 1 capsule (10 mg total) by mouth 2 (two) times a day before meals   levocetirizine (XYZAL) 5 MG tablet   Yes No   Sig: Take 5 mg by mouth in the morning   Patient not taking: Reported on 9/22/2022   levothyroxine (Euthyrox) 150 mcg tablet   No No   Sig: Take 1 tablet (150 mcg total) by mouth daily in the early morning   magnesium chloride-calcium (Slow-Mag) 71 5-119 mg   No No   Sig: Take 1 tablet by mouth 2 (two) times a day   neomycin-bacitracin-polymyxin b (NEOSPORIN) ointment   No No   Sig: Apply to the tip of the penis twice daily as needed for catheter irritation  nystatin (MYCOSTATIN) 500,000 units/5 mL suspension   No No   Sig: Apply 5 mL (500,000 Units total) to the mouth or throat 4 (four) times a day   ondansetron (ZOFRAN) 8 mg tablet   No No   Sig: Take 1 tablet (8 mg total) by mouth every 6 (six) hours   ondansetron (Zofran ODT) 4 mg disintegrating tablet   No No   Sig: Take 2 tablets (8 mg total) by mouth every 8 (eight) hours as needed for nausea or vomiting for up to 90 doses   oxyCODONE (OxyCONTIN) 10 mg 12 hr tablet   No No   Sig: Take 1 tablet (10 mg total) by mouth every 8 (eight) hours Max Daily Amount: 30 mg   oxyCODONE (ROXICODONE) 5 mg/5 mL solution   No No   Sig: Take 5-10 mL (5-10 mg total) by mouth every 4 (four) hours as needed for moderate pain or severe pain Max Daily Amount: 60 mg   pantoprazole (PROTONIX) 40 mg tablet   No No   Sig: Take 1 tablet (40 mg total) by mouth 2 (two) times a day Before breakfast and before bed to prevent acid accumulation  potassium chloride (K-DUR,KLOR-CON) 10 mEq tablet   No No   Sig: Take 1 tablet (10 mEq total) by mouth 3 (three) times a day   potassium chloride (MICRO-K) 10 MEQ CR capsule   No No   Sig: Take 1 capsule (10 mEq total) by mouth in the morning for 5 days   Patient not taking: Reported on 10/27/2022   saliva substitute (MOUTH KOTE)   No No   Sig: Apply 5 sprays to the mouth or throat 4 (four) times a day as needed (dry mouth)      Facility-Administered Medications: None       Past Medical History:   Diagnosis Date   • Arthritis     shoulders   • Cancer ([facility] [address] )     gastric mass   • COVID 01/2021 jan 2021- no hospitalization   • Disease of thyroid gland     had radioactive iodine in past   • GERD (gastroesophageal reflux disease)    • History of GI diverticular bleed    • Hyperlipidemia    • Hypertension     on no meds at present   • Kidney stone    • Port-A-Cath in place    • S/[facility]    • Tinnitus    • Wears glasses        Past Surgical History:   Procedure Laterality Date   • ADENOIDECTOMY     • CHOLECYSTECTOMY LAPAROSCOPIC N/A 1/24/2022    Procedure: CHOLECYSTECTOMY LAPAROSCOPIC W/ INTRAOP CHOLANGIOGRAM;  Surgeon: Vera Yoon DO;  [facility];  Service: General   • COLONOSCOPY N/A 7/16/2016    Procedure: COLONOSCOPY;  Surgeon: Abbi Swan MD;  [facility];  Service:    • FL CHOLANGIOGRAM TRANSHEPATIC  1/24/2022   • FL GUIDED CENTRAL VENOUS ACCESS DEVICE INSERTION  2/11/2022   • HERNIA REPAIR     • CA LAP,PROSTATECTOMY,RADICAL,W/NERVE SPARE,INCL ROBOTIC N/A 4/27/2022    Procedure: PROSTATECTOMY SIMPLE LAPAROSCOPIC  W ROBOTICS, BLADDER NECK RECONSTRUCTION;  Surgeon: Karen Pope MD;  Location[facility];  Service: Urology   • PROSTATE BIOPSY     • TONSILLECTOMY     • TUNNELED VENOUS PORT PLACEMENT N/A 2/11/2022    Procedure: INSERTION VENOUS PORT (PORT-A-CATH);   Surgeon: Almas Hightower MD;  Location: [facility];  Service: Surgical Oncology   • UVULECTOMY         History reviewed  No pertinent family history  I have reviewed and agree with the history as documented  E-Cigarette/Vaping   • E-Cigarette Use Never User      E-Cigarette/Vaping Substances     Social History     Tobacco Use   • Smoking status: Never   • Smokeless tobacco: Never   Vaping Use   • Vaping Use: Never used   Substance Use Topics   • Alcohol use: Yes     Comment: social   • Drug use: Never        Review of Systems   Constitutional: Positive for fatigue  HENT: Negative  Eyes: Negative  Respiratory: Positive for shortness of breath  Cardiovascular: Negative  Gastrointestinal: Negative  Endocrine: Negative  Genitourinary: Negative  Musculoskeletal: Negative  Skin: Negative  Allergic/Immunologic: Negative  Neurological: Positive for weakness  Hematological: Negative  Psychiatric/Behavioral: Negative  All other systems reviewed and are negative  Physical Exam  ED Triage Vitals   Temperature Pulse Respirations Blood Pressure SpO2   12/13/22 1123 12/13/22 1121 12/13/22 1121 12/13/22 1121 12/13/22 1121   98 6 °F (37 °C) 95 20 (!) 174/87 97 %      Temp Source Heart Rate Source Patient Position - Orthostatic VS BP Location FiO2 (%)   12/13/22 1121 12/13/22 1121 12/13/22 1121 12/13/22 1121 --   Oral Monitor Sitting Right arm       Pain Score       12/13/22 1121       1             Orthostatic Vital Signs  Vitals:    12/13/22 1121 12/13/22 1352 12/13/22 1530   BP: (!) 174/87 129/69 140/63   Pulse: 95 71 68   Patient Position - Orthostatic VS: Sitting Lying Lying       Physical Exam  Vitals and nursing note reviewed  Constitutional:       General: He is not in acute distress  Appearance: Normal appearance  He is not ill-appearing, toxic-appearing or diaphoretic  Comments: Cachectic appearing male   HENT:      Head: Normocephalic and atraumatic  Eyes:      General: No scleral icterus  Right eye: No discharge  Left eye: No discharge  Extraocular Movements: Extraocular movements intact  Conjunctiva/sclera: Conjunctivae normal       Pupils: Pupils are equal, round, and reactive to light  Cardiovascular:      Rate and Rhythm: Normal rate  Pulses: Normal pulses  Heart sounds: Normal heart sounds  No murmur heard  No friction rub  No gallop  Pulmonary:      Effort: Pulmonary effort is normal  No respiratory distress  Breath sounds: Normal breath sounds  No stridor  No wheezing, rhonchi or rales  Abdominal:      General: Abdomen is flat  Bowel sounds are normal  There is no distension  Palpations: Abdomen is soft  Tenderness: There is no abdominal tenderness  There is no guarding or rebound  Musculoskeletal:         General: No swelling  Normal range of motion  Cervical back: Normal range of motion  No rigidity  Right lower leg: No edema  Left lower leg: No edema  Skin:     General: Skin is warm and dry  Capillary Refill: Capillary refill takes less than 2 seconds  Coloration: Skin is not jaundiced  Findings: No bruising or lesion  Neurological:      General: No focal deficit present  Mental Status: He is alert and oriented to person, place, and time  Mental status is at baseline  Psychiatric:         Mood and Affect: Mood normal          Behavior: Behavior normal          Thought Content:  Thought content normal          Judgment: Judgment normal          ED Medications  Medications   magnesium sulfate 2 g/50 mL IVPB (premix) 2 g (0 g Intravenous Stopped 12/13/22 1632)   potassium chloride (K-DUR,KLOR-CON) CR tablet 40 mEq (40 mEq Oral Given 12/13/22 1516)   iohexol (OMNIPAQUE) 350 MG/ML injection (SINGLE-DOSE) 85 mL (85 mL Intravenous Given 12/13/22 1429)   amoxicillin-clavulanate (AUGMENTIN) 875-125 mg per tablet 1 tablet (1 tablet Oral Given 12/13/22 1734)   metroNIDAZOLE (FLAGYL) tablet 500 mg (500 mg Oral Given 12/13/22 1734) Diagnostic Studies  Results Reviewed     Procedure Component Value Units Date/Time    HS Troponin I 2hr [[id number]]  (Normal) Collected: 12/13/22 1526    Lab Status: Final result Specimen: Blood from Line, Venous Updated: 12/13/22 1700     hs TnI 2hr 8 ng/L      Delta 2hr hsTnI 1 ng/L     CBC and differential [[id number]]  (Abnormal) Collected: 12/13/22 1224    Lab Status: Final result Specimen: Blood from Central Venous Line Updated: 12/13/22 1353     WBC 12 82 Thousand/uL      RBC 2 85 Million/uL      Hemoglobin 8 8 g/dL      Hematocrit 27 1 %      MCV 95 fL      MCH 30 9 pg      MCHC 32 5 g/dL      RDW 18 9 %      MPV 10 1 fL      Platelets 392 Thousands/uL     Narrative: This is an appended report  These results have been appended to a previously verified report      Manual Differential(PHLEBS Do Not Order) [[id number]]  (Abnormal) Collected: 12/13/22 1224    Lab Status: Final result Specimen: Blood from Central Venous Line Updated: 12/13/22 1353     Segmented % 88 %      Lymphocytes % 10 %      Monocytes % 1 %      Eosinophils, % 1 %      Basophils % 0 %      Absolute Neutrophils 11 28 Thousand/uL      Lymphocytes Absolute 1 28 Thousand/uL      Monocytes Absolute 0 13 Thousand/uL      Eosinophils Absolute 0 13 Thousand/uL      Basophils Absolute 0 00 Thousand/uL      Total Counted --     RBC Morphology Present     Anisocytosis Present     Platelet Estimate Adequate    HS Troponin 0hr (reflex protocol) [[id number]]  (Normal) Collected: 12/13/22 1224    Lab Status: Final result Specimen: Blood from Central Venous Line Updated: 12/13/22 1309     hs TnI 0hr 7 ng/L     Comprehensive metabolic panel [[id number]]  (Abnormal) Collected: 12/13/22 1224    Lab Status: Final result Specimen: Blood from Central Venous Line Updated: 12/13/22 1305     Sodium 138 mmol/L      Potassium 3 1 mmol/L      Chloride 113 mmol/L      CO2 18 mmol/L      ANION GAP 7 mmol/L      BUN 13 mg/dL      Creatinine 0 81 mg/dL      Glucose 81 mg/dL      Calcium 8 1 mg/dL      Corrected Calcium 9 0 mg/dL      AST 15 U/L      ALT 8 U/L      Alkaline Phosphatase 85 U/L      Total Protein 5 2 g/dL      Albumin 2 9 g/dL      Total Bilirubin 0 34 mg/dL      eGFR 88 ml/min/1 73sq m     Narrative:      [place] guidelines for Chronic Kidney Disease (CKD):   •  Stage 1 with normal or high GFR (GFR > 90 mL/min/1 73 square meters)  •  Stage 2 Mild CKD (GFR = 60-89 mL/min/1 73 square meters)  •  Stage 3A Moderate CKD (GFR = 45-59 mL/min/1 73 square meters)  •  Stage 3B Moderate CKD (GFR = 30-44 mL/min/1 73 square meters)  •  Stage 4 Severe CKD (GFR = 15-29 mL/min/1 73 square meters)  •  Stage 5 End Stage CKD (GFR <15 mL/min/1 73 square meters)  Note: GFR calculation is accurate only with a steady state creatinine    Magnesium [[id number]]  (Abnormal) Collected: 12/13/22 1224    Lab Status: Final result Specimen: Blood from Central Venous Line Updated: 12/13/22 1305     Magnesium 1 7 mg/dL     Phosphorus [[id number]]  (Normal) Collected: 12/13/22 1224    Lab Status: Final result Specimen: Blood from Central Venous Line Updated: 12/13/22 1305     Phosphorus 2 8 mg/dL                  CTA ED chest PE Study   Final Result by Eran Kim MD (12/13 1522)      No pulmonary embolism  Patchy airspace opacities in the right lower lobe mostly medially and posteriorly, new from October 2022, likely representing pneumonia  Consider aspiration pneumonia given the dependent location and the presence of fluid throughout the esophagus    (discussed below)  Interval decrease in size of some of the previously seen pulmonary nodules and resolution of others October 2022  Other subcentimeter nodules remain unchanged  Mild interval enlargement of a right hilar lymph node, now 1 5 cm in short axis, unclear whether reactive and/or metastatic  Mild interval decrease in size of the previously seen right lower paratracheal lymph node  Small left pleural effusion, new from prior  Fluid throughout the esophagus which may be related to gastroesophageal reflux or esophageal dysmotility  A small pill is seen in the mid esophagus  These findings may place patient at risk for aspiration  Additional findings as above  The study was marked in [facility] for immediate notification  Workstation performed: [id number]         VAS lower limb venous duplex study, unilateral/limited   Final Result by Loc Lubin MD (46/89 [id number])            Procedures  Procedures      ED Course  ED Course as of 12/14/22 1040   Tue Dec 13, 2022   1326 Magnesium(!)   1326 Potassium(!): 3 1   1326 Noted to be hypoMg, hypoK, will give IV Mg, PO K   1326 hs TnI 0hr: 7  Elevated trop, will draw 2h                                 Wells' Criteria for PE    Flowsheet Row Most Recent Value   Wells' Criteria for PE    Clinical signs and symptoms of DVT 0 Filed at: 12/13/2022 1230   PE is primary diagnosis or equally likely 3 Filed at: 12/13/2022 1230   HR >100 0 Filed at: 12/13/2022 1230   Immobilization at least 3 days or Surgery in the previous 4 weeks 0 Filed at: 12/13/2022 1230   Previous, objectively diagnosed PE or DVT 0 Filed at: 12/13/2022 1230   Hemoptysis 0 Filed at: 12/13/2022 1230   Malignancy with treatment within 6 months or palliative 1 Filed at: 12/13/2022 1230   Wells' Criteria Total 4 Filed at: 12/13/2022 1230            MDM  Number of Diagnoses or Management Options  Aspiration pneumonia [facility]): new and does not require workup  Diagnosis management comments: -Patient presenting to emergency department for evaluation  -Physical exam findings as noted above  -Initial troponin elevated, follow-up troponin delta 1  -Cross sectional imaging demonstrated no PE  There was concern for some mild aspiration pneumonia  I relayed this to the patient    He did not want to stay for IV antibiotics, remarking that outpatient p o  antibiotics will suffice   -Patient left lower extremity negative for DVT  -Patient received first dose here in emergency department  Will continue double anaerobe coverage as an outpatient  -Questions answered, return precautions given, follow-up for PCP given, discharge instructions given  Amount and/or Complexity of Data Reviewed  Clinical lab tests: ordered and reviewed  Tests in the radiology section of CPT®: ordered and reviewed  Tests in the medicine section of CPT®: ordered and reviewed  Decide to obtain previous medical records or to obtain history from someone other than the patient: yes  Review and summarize past medical records: yes  Discuss the patient with other providers: yes  Independent visualization of images, tracings, or specimens: yes        Disposition  Final diagnoses:   Aspiration pneumonia ([facility] [address] )     Time reflects when diagnosis was documented in both MDM as applicable and the Disposition within this note     Time User Action Codes Description Comment    12/13/2022  5:10 PM Eric Diaz Add [J69 [id number]] Aspiration pneumonia [facility])       ED Disposition     ED Disposition   Discharge    Condition   Stable    Date/Time   Tue Dec 13, 2022  5:11 PM    Comment   [address] discharge to home/self care  Follow-up Information     Follow up With Specialties Details Why [address], DO Internal Medicine In 2 days  [address] [place] [address]  [phone]            Discharge Medication List as of 12/13/2022  5:12 PM      START taking these medications    Details   amoxicillin-clavulanate (AUGMENTIN) 875-125 mg per tablet Take 1 tablet by mouth every 12 (twelve) hours for 7 days, Starting Tue 12/13/2022, Until Tue 12/20/2022, Normal      metroNIDAZOLE (FLAGYL) 500 mg tablet Take 1 tablet (500 mg total) by mouth every 8 (eight) hours for 7 days, Starting Tue 12/13/2022, Until Tue 12/20/2022, Normal         CONTINUE these medications which have NOT CHANGED Details   bisacodyl (DULCOLAX) 5 mg EC tablet Take 1 tablet (5 mg total) by mouth daily as needed for constipation Related to pain medicines, Starting Fri 11/4/2022, Normal      diphenoxylate-atropine (LOMOTIL) 2 5-0 025 mg/5 mL liquid Take 5 mL by mouth 4 (four) times a day as needed for diarrhea, Starting Tue 11/22/2022, Normal      dronabinol (MARINOL) 10 MG capsule Take 1 capsule (10 mg total) by mouth 2 (two) times a day before meals, Starting Tue 12/6/2022, Normal      levocetirizine (XYZAL) 5 MG tablet Take 5 mg by mouth in the morning, Starting Mon 5/2/2022, Historical Med      levothyroxine (Euthyrox) 150 mcg tablet Take 1 tablet (150 mcg total) by mouth daily in the early morning, Starting Mon 7/11/2022, Normal      magnesium chloride-calcium (Slow-Mag) 71 5-119 mg Take 1 tablet by mouth 2 (two) times a day, Starting Thu 6/30/2022, Normal      neomycin-bacitracin-polymyxin b (NEOSPORIN) ointment Apply to the tip of the penis twice daily as needed for catheter irritation  , Normal      Nutritional Supplements (jevity 1 5 tam) LIQD Tube Feeding with Oral Diet Jevity 1 5 Bolus 240 ml q4h Flush 50 cc water with each bolus, Normal      nystatin (MYCOSTATIN) 500,000 units/5 mL suspension Apply 5 mL (500,000 Units total) to the mouth or throat 4 (four) times a day, Starting Thu 7/14/2022, Normal      OLANZapine (ZyPREXA) 5 mg tablet Take 1 tablet (5 mg total) by mouth daily at bedtime, Starting Thu 10/27/2022, Normal      ondansetron (Zofran ODT) 4 mg disintegrating tablet Take 2 tablets (8 mg total) by mouth every 8 (eight) hours as needed for nausea or vomiting for up to 90 doses, Starting Fri 6/24/2022, Normal      ondansetron (ZOFRAN) 8 mg tablet Take 1 tablet (8 mg total) by mouth every 6 (six) hours, Starting Thu 10/27/2022, Normal      oxyCODONE (OxyCONTIN) 10 mg 12 hr tablet Take 1 tablet (10 mg total) by mouth every 8 (eight) hours Max Daily Amount: 30 mg, Starting Mon 11/21/2022, Normal      oxyCODONE (ROXICODONE) 5 mg/5 mL solution Take 5-10 mL (5-10 mg total) by mouth every 4 (four) hours as needed for moderate pain or severe pain Max Daily Amount: 60 mg, Starting Tue 10/25/2022, Normal      pantoprazole (PROTONIX) 40 mg tablet Take 1 tablet (40 mg total) by mouth 2 (two) times a day Before breakfast and before bed to prevent acid accumulation  , Starting Mon 7/18/2022, Normal      potassium chloride (K-DUR,KLOR-CON) 10 mEq tablet Take 1 tablet (10 mEq total) by mouth 3 (three) times a day, Starting Wed 11/2/2022, Normal      potassium chloride (MICRO-K) 10 MEQ CR capsule Take 1 capsule (10 mEq total) by mouth in the morning for 5 days, Starting Thu 6/9/2022, Until Wed 10/12/2022, Normal      saliva substitute (MOUTH KOTE) Apply 5 sprays to the mouth or throat 4 (four) times a day as needed (dry mouth), Starting Tue 12/6/2022, Normal           No discharge procedures on file  PDMP Review       Value Time User    PDMP Reviewed  Yes 12/6/2022 10:05 AM Marcelle Maurice           ED Provider  Attending physically available and evaluated Rosendo Sears I managed the patient along with the ED Attending      Electronically Signed by         Vashti Bernardo DO  12/14/22 1356

## 2022-12-14 NOTE — ED ATTENDING ATTESTATION
12/13/2022  IBenjamin MD, saw and evaluated the patient  I have discussed the patient with the resident/non-physician practitioner and agree with the resident's/non-physician practitioner's findings, Plan of Care, and MDM as documented in the resident's/non-physician practitioner's note, except where noted  All available labs and Radiology studies were reviewed  I was present for key portions of any procedure(s) performed by the resident/non-physician practitioner and I was immediately available to provide assistance  At this point I agree with the current assessment done in the Emergency Department  I have conducted an independent evaluation of this patient a history and physical is as follows:    49-year-old male presenting to emergency department with cough  Shortness of breath  Also has left lower extremity pain  Patient with active cancer  Chemotherapy  Told he is hypokalemic  Lungs are clear  Cardiac exam normal   No calf tenderness or swelling  Agree work-up    DVT study, PE study, cardiac evaluation          ED Course         Critical Care Time  Procedures

## 2022-12-16 ENCOUNTER — HOSPITAL ENCOUNTER (OUTPATIENT)
Dept: INFUSION CENTER | Facility: CLINIC | Age: 72
End: 2022-12-16

## 2022-12-16 VITALS
DIASTOLIC BLOOD PRESSURE: 76 MMHG | OXYGEN SATURATION: 98 % | HEART RATE: 72 BPM | RESPIRATION RATE: 22 BRPM | SYSTOLIC BLOOD PRESSURE: 117 MMHG

## 2022-12-16 DIAGNOSIS — E86.0 DEHYDRATION: Primary | ICD-10-CM

## 2022-12-16 DIAGNOSIS — R06.02 SHORTNESS OF BREATH: Primary | ICD-10-CM

## 2022-12-16 DIAGNOSIS — R06.02 SHORTNESS OF BREATH: ICD-10-CM

## 2022-12-16 DIAGNOSIS — E87.6 HYPOKALEMIA: ICD-10-CM

## 2022-12-16 LAB
ABO GROUP BLD: NORMAL
BLD GP AB SCN SERPL QL: NEGATIVE
ERYTHROCYTE [DISTWIDTH] IN BLOOD BY AUTOMATED COUNT: 19.7 % (ref 11.6–15.1)
HCT VFR BLD AUTO: 26.8 % (ref 36.5–49.3)
HGB BLD-MCNC: 8.9 G/DL (ref 12–17)
MCH RBC QN AUTO: 31.7 PG (ref 26.8–34.3)
MCHC RBC AUTO-ENTMCNC: 33.2 G/DL (ref 31.4–37.4)
MCV RBC AUTO: 95 FL (ref 82–98)
PLATELET # BLD AUTO: 276 THOUSANDS/UL (ref 149–390)
PMV BLD AUTO: 9.7 FL (ref 8.9–12.7)
RBC # BLD AUTO: 2.81 MILLION/UL (ref 3.88–5.62)
RH BLD: POSITIVE
SPECIMEN EXPIRATION DATE: NORMAL
WBC # BLD AUTO: 6.01 THOUSAND/UL (ref 4.31–10.16)

## 2022-12-16 RX ADMIN — SODIUM CHLORIDE 1000 ML: 0.9 INJECTION, SOLUTION INTRAVENOUS at 12:42

## 2022-12-16 NOTE — PROGRESS NOTES
Hemoglobin resulted at 8 9  Lung sounds clear and vitals signs stable  No further interventions required  Patient tolerated hydration without complications  Port flushed per protocol  Patient aware to present to ED if symptoms become worse  Patient also reminded that he was prescribed antibiotics for aspiration pneumonia  Scripts still at his pharmacy  Patient verbalized understanding   Verified upcoming appointment and declined AVS

## 2022-12-16 NOTE — PROGRESS NOTES
Patient arrived for hydration  Patient complaining of shortness of breath  States he cannot walk more than 10 steps without losing his breath  Patient was seen in ED on 12/13 for same complaint  Hemoglobin from that visit 8 8 which is decreased from his hemoglobin on 12/9 which was 10 1  PE study and troponins in ED negative  Above info relayed to Halina Mracum RN in Dr Paris Hogan office  Nickie Boogie spoke with Bryce LUNA  Plan to draw stat CBC to recheck hemoglobin

## 2022-12-19 ENCOUNTER — TELEPHONE (OUTPATIENT)
Dept: HEMATOLOGY ONCOLOGY | Facility: CLINIC | Age: 72
End: 2022-12-19

## 2022-12-19 RX ORDER — POTASSIUM CHLORIDE 14.9 MG/ML
20 INJECTION INTRAVENOUS ONCE
Status: CANCELLED
Start: 2022-12-23 | End: 2022-12-19

## 2022-12-19 NOTE — PROGRESS NOTES
Spoke with Emily Boss, Dr Gagan García daughter  Pt K level still on lower side  Was in ED last week with possible aspiration pna  Emily Boss stated he is still not feeling himself, often happens also when his K is low  Will place order for 20meq K for Friday at infusion center to reduce need for swallowing of K at home given size of pill and/or amount of liquid to reduce aspiration risk

## 2022-12-21 DIAGNOSIS — Z51.5 PALLIATIVE CARE PATIENT: ICD-10-CM

## 2022-12-21 DIAGNOSIS — R52 INTRACTABLE PAIN: ICD-10-CM

## 2022-12-21 DIAGNOSIS — G89.3 CANCER RELATED PAIN: ICD-10-CM

## 2022-12-21 RX ORDER — SODIUM CHLORIDE 9 MG/ML
20 INJECTION, SOLUTION INTRAVENOUS ONCE
Status: CANCELLED | OUTPATIENT
Start: 2023-01-02

## 2022-12-21 RX ORDER — SODIUM CHLORIDE 9 MG/ML
20 INJECTION, SOLUTION INTRAVENOUS ONCE
Status: CANCELLED | OUTPATIENT
Start: 2023-01-10

## 2022-12-21 RX ORDER — SODIUM CHLORIDE 9 MG/ML
20 INJECTION, SOLUTION INTRAVENOUS ONCE
Status: CANCELLED | OUTPATIENT
Start: 2022-12-27

## 2022-12-22 RX ORDER — OXYCODONE HCL 10 MG/1
10 TABLET, FILM COATED, EXTENDED RELEASE ORAL EVERY 8 HOURS SCHEDULED
Qty: 90 TABLET | Refills: 0 | Status: SHIPPED | OUTPATIENT
Start: 2022-12-22 | End: 2023-01-01

## 2022-12-23 ENCOUNTER — HOSPITAL ENCOUNTER (OUTPATIENT)
Dept: INFUSION CENTER | Facility: CLINIC | Age: 72
Discharge: HOME | End: 2022-12-23

## 2022-12-23 ENCOUNTER — TELEPHONE (OUTPATIENT)
Dept: INFUSION CENTER | Facility: CLINIC | Age: 72
End: 2022-12-23

## 2022-12-23 NOTE — TELEPHONE ENCOUNTER
I [facility] a [facility] from patients [phone] stating she would like to cancel patients appointment today due to the weather  [address] [place] [address] stated she would like patient to be rescheduled for tomorrow 12/24  Patient was rescheduled to 12/24 at [address] Lisa was in agreement with this  I received a 2nd call shortly after from taran stating that patients wife was not happy that appointment at been cancelled for today  [address] [place] [address] asking if patient can be rescheduled back to originally time  I told [address] [place] [address] unforutenly that the open time slot has been already filled today  [address] [place] [address] understood and stated patient will just wait till Next Hydration appointment  Arben Manuel know since patient was not coming in for Hydration and labs that patient will need to go to the outpatient lab before his next treatment appointment on Tuesday 12/27   [address] [place] [address] stated understanding

## 2022-12-27 ENCOUNTER — TELEPHONE (OUTPATIENT)
Dept: INFUSION CENTER | Facility: CLINIC | Age: 72
End: 2022-12-27

## 2022-12-27 ENCOUNTER — HOSPITAL ENCOUNTER (OUTPATIENT)
Dept: INFUSION CENTER | Facility: CLINIC | Age: 72
Discharge: HOME | End: 2022-12-27

## 2022-12-27 NOTE — TELEPHONE ENCOUNTER
Daughter called to cancel apt  On 12/27/22  Daughter informed me that William Rainey no longer wants to receive treatments and i looking into hospice  Daughter does not want to cancel all apt  As she wants to give him some time to think  No apt were rescheduled

## 2022-12-28 ENCOUNTER — TELEPHONE (OUTPATIENT)
Age: 72
End: 2022-12-28

## 2022-12-28 ENCOUNTER — HOSPICE ADMISSION (OUTPATIENT)
Dept: HOME HOSPICE | Facility: HOSPICE | Age: 72
End: 2022-12-28

## 2022-12-28 ENCOUNTER — HOME CARE VISIT (OUTPATIENT)
Dept: HOME HEALTH SERVICES | Facility: HOME HEALTHCARE | Age: 72
End: 2022-12-28

## 2022-12-28 DIAGNOSIS — Z51.5 END OF LIFE CARE: Primary | ICD-10-CM

## 2022-12-28 NOTE — PROGRESS NOTES
Returned phone call of Solomon staton Median  Hospice consult placed  Would like to keep appointment at this time with Mian Craig in January  Aware to call with any needs, concerns or questions  INfusion will be made aware of changes  Plan will be discontinued

## 2022-12-29 ENCOUNTER — HOME CARE VISIT (OUTPATIENT)
Dept: HOME HEALTH SERVICES | Facility: HOME HEALTHCARE | Age: 72
End: 2022-12-29

## 2022-12-29 ENCOUNTER — HOME CARE VISIT (OUTPATIENT)
Dept: HOME HOSPICE | Facility: HOSPICE | Age: 72
End: 2022-12-29

## 2022-12-29 VITALS
WEIGHT: 104 LBS | TEMPERATURE: 97.6 F | HEART RATE: 72 BPM | SYSTOLIC BLOOD PRESSURE: 102 MMHG | BODY MASS INDEX: 16.32 KG/M2 | RESPIRATION RATE: 20 BRPM | OXYGEN SATURATION: 97 % | DIASTOLIC BLOOD PRESSURE: 56 MMHG | HEIGHT: 67 IN

## 2022-12-30 ENCOUNTER — HOME CARE VISIT (OUTPATIENT)
Dept: HOME HEALTH SERVICES | Facility: HOME HEALTHCARE | Age: 72
End: 2022-12-30

## 2022-12-30 ENCOUNTER — HOSPITAL ENCOUNTER (OUTPATIENT)
Dept: INFUSION CENTER | Facility: CLINIC | Age: 72
Discharge: HOME | End: 2022-12-30

## 2022-12-31 ENCOUNTER — HOME CARE VISIT (OUTPATIENT)
Dept: HOME HOSPICE | Facility: HOSPICE | Age: 72
End: 2022-12-31

## 2023-01-01 ENCOUNTER — HOME CARE VISIT (OUTPATIENT)
Dept: HOME HEALTH SERVICES | Facility: HOME HEALTHCARE | Age: 73
End: 2023-01-01

## 2023-01-01 ENCOUNTER — TELEPHONE (OUTPATIENT)
Dept: FAMILY MEDICINE CLINIC | Facility: CLINIC | Age: 73
End: 2023-01-01

## 2023-01-01 DIAGNOSIS — E86.0 DEHYDRATION: ICD-10-CM

## 2023-01-01 DIAGNOSIS — C16.9 GASTRIC ADENOCARCINOMA (HCC): ICD-10-CM

## 2023-01-01 DIAGNOSIS — C16.0 MALIGNANT NEOPLASM OF CARDIA OF STOMACH (HCC): ICD-10-CM

## 2023-01-03 ENCOUNTER — HOME CARE VISIT (OUTPATIENT)
Dept: HOME HEALTH SERVICES | Facility: HOME HEALTHCARE | Age: 73
End: 2023-01-03

## 2023-01-03 RX ORDER — XYLITOL/YERBA SANTA
5 AEROSOL, SPRAY WITH PUMP (ML) MUCOUS MEMBRANE 4 TIMES DAILY PRN
Qty: 250 ML | Refills: 2 | Status: SHIPPED | OUTPATIENT
Start: 2023-01-03

## 2023-01-03 NOTE — TELEPHONE ENCOUNTER
Spoke with Greg Jiménez today- canceled pt's appt with Dr Cely Bryant on 1/18  Offering support and advising pt to call us back if any questions or any help we can provide

## 2023-01-05 ENCOUNTER — HOME CARE VISIT (OUTPATIENT)
Dept: HOME HEALTH SERVICES | Facility: HOME HEALTHCARE | Age: 73
End: 2023-01-05

## 2023-01-05 ENCOUNTER — HOME CARE VISIT (OUTPATIENT)
Dept: HOME HOSPICE | Facility: HOSPICE | Age: 73
End: 2023-01-05

## 2023-01-06 ENCOUNTER — HOME CARE VISIT (OUTPATIENT)
Dept: HOME HEALTH SERVICES | Facility: HOME HEALTHCARE | Age: 73
End: 2023-01-06

## 2023-01-06 ENCOUNTER — HOME CARE VISIT (OUTPATIENT)
Dept: HOME HOSPICE | Facility: HOSPICE | Age: 73
End: 2023-01-06

## 2023-01-13 ENCOUNTER — HOME CARE VISIT (OUTPATIENT)
Dept: HOME HOSPICE | Facility: HOSPICE | Age: 73
End: 2023-01-13

## 2023-01-19 ENCOUNTER — HOME CARE VISIT (OUTPATIENT)
Dept: HOME HOSPICE | Facility: HOSPICE | Age: 73
End: 2023-01-19

## 2023-01-19 NOTE — CASE COMMUNICATION
Alexsandra Kacy, Bereavement Final IDG 23 (1MR, 1LR) Due: 23  : 1950  SOC: 22  IPU SOC: 23  DOD: 23  Diagnosis: Gastric Adenocarcinoma  Primary: Wife - Lester Marcos was a 67year old man who came to the [place] [address] from [facility]  He was a physician  Wife of 27 years is Linnea  Shirleen Barthel has a daughter and son from her first marriage, but Mallory Acuna has been their father since a very young age  Shirleen Barthel an d a 13year old grandson visited  Mallory Acuna was a member of [employer] in [facility] miguel Yarbrough visited with her daughter Mo Whittbryan (13years old), annita Vázquez, and Luh Gentleman  Linnea voiced feeling the family had been able to prepare for a year for her 's death  Her siblings visited and were of support to her  Shirleen Barthel stated her spirits are holding up, but being bored and finding his decline hard to watch  It was  mentioned at [address] [place] [address] that Mary's 13year old daughter is having trouble at school and it was encouraged Mo Reny reach out to the school counselor  Sudarshan's son [facility] visited  Shirleen Barthel stated only she, Mo Reny and Roseanne Arteaga would benefit from bereavement support, but Roseanne Arteaga requested no bereavement follow-up  It was mentioned Mallory Acuna has 3 biological children, but she did not share any further information  TOD: Jerome Rivera were at be ide  Family was tearful, supportive of each other and did state they were grateful that he  peacefully  Erin Leo was also bedside, praying with the family  Call Assignments:   Jennyfer Andrade to reassess daughter Claribel Giraldo at   (Due: 23)  Erin Leo to reassess wife Mau Snyder at [facility]   (Due: 23)

## 2023-02-08 NOTE — PROGRESS NOTES
Patient declined referral to dietician  He tolerated his infusion without incident   He is aware of his next appt, has AVS  [employer] Pregnancy And Lactation Text: This medication is Pregnancy Category X and is absolutely contraindicated during pregnancy. It is unknown if it is excreted in breast milk.

## 2023-10-13 NOTE — SOCIAL WORK
H&P reviewed. After examining the patient I find no changes in the patients condition since the H&P had been written.     Vitals:    10/13/23 0917   BP: 121/70   Pulse: 87   Resp: 16   Temp: (!) 97.1 °F (36.2 °C)   SpO2: 98% Palliative LSW saw patient at the bedside today  LSW appreciates the opportunity to provide patient/family with inpatient emotional support and guidance while patient continues to receive medical attention from the medical team      Topics discussed: Met with pt and pt's dtr Mary at bedside  LSW introduced role of Ankur Saab LUCY to dtr  Pt discussed he is doing okay today--maybe "slightly less as good" than yesterday as he is currently NPO  Pt scheduled to get his PEG tube at 3:45 this afternoon  Pt discussed he was provided a thorough explanation of the procedure and feels all his questions were addressed  Pt continues to discuss that he plans to fight his CA from "all fronts" and understands the importance of utilizing all his resources/supports in order to do so (chemo, delroy, family/friends etc)  Pt reflected on his relationship with his wife and that she may benefit from emotional support from LSW--but is unsure if she would be receptive  LSW offered to call wife to introduce role and offer support to see if she would wish to talk  Pt/dtr agreeable with same  Pt expressed appreciation of continued support  LSW phoned pt's wife Hayley Osman ([phone]) to introduce herself and role of Sebastian Jessica Pina reflected that it has been a "shock" and things have been stressful since pt's diagnosis, but feels she is coping okay emotionally overall  She feels she has enough support around her, and her children live very close  Hayley Osman also has been spending time with their cat and dog which has helped her emotionally  Linnea recalled the time up until pt's diagnosis  Pt was diagnosed Feb 4th, but she feels it was at least 7 months prior to that that she felt something was wrong  Pt had a mouth implant placed that was not healing and also had side effects of vomiting which were concerning to her   She discussed recently pt was barely eating, and then was brought to the hospital     She is aware pt scheduled to have PEG placed this afternoon and plans on coming in to visit with pt prior to the surgery  She denies any further concerns at this time  LSW advised Linnea she left her card with contact information at bedside and encouraged her to call should she need any further support  Wife expressed appreciation of call  Areas that need follow-up: Emotional Support  Resources given: None  Others present: Pt's dtr Mary at bedside;  Phone conversation with pt's wife Krystal Mitchell will continue to follow as requested by the medical team, patient, or family

## 2024-04-15 NOTE — OP NOTE
PERATIVE REPORT  PATIENT NAME: Carlton Mckay    :  1950  MRN: [id number]  Pt Location: AN OR ROOM 03    SURGERY DATE: 2022    Surgeon(s) and Role:     * Harry Hess DO - Primary     * Naila Sánchez MD - Assisting    Preop Diagnosis:  Acute calculous cholecystitis [K80 00]    Post-Op Diagnosis Codes:     * Acute calculous cholecystitis [K80 00]    Procedure(s) (LRB):  CHOLECYSTECTOMY LAPAROSCOPIC W/ INTRAOP CHOLANGIOGRAM (N/A)    Specimen(s):  ID Type Source Tests Collected by Time Destination   1 :  Tissue Gallbladder TISSUE EXAM Harry Hess DO 2022 1816        Estimated Blood Loss:   Minimal    Drains:  * No LDAs found *    Anesthesia Type:   General/local    Operative Indications:  Acute calculous cholecystitis [K80 00]      Operative Findings:  Changes consistent with acute on chronic calculous cholecystitis  Cholangiogram within normal limits without any filling defects  Good flow of dye into the duodenum  Height 69 in weight 75 kg/166 lb  BMI 24    Complications:   None    Procedure and Technique:  Patient was brought the operative suite and identified by visualization, conversation, by armband  Sequential compression pumps were placed  Was given Ancef perioperatively  Once under anesthesia abdomen is then prepped and draped in a sterile fashion  Time-out was performed was assured that the prep was dry  Local was instilled at the supraumbilical fold  Small vertical skin incision was made the subcutaneous tissues were bluntly dissected with Kocher clamps down to the fascia fascia lifted up and divided the midline  I poked through the underlying peritoneum gaining access into the abdominal cavity  11 mm trocar was placed under direct visualization  CO2 was attached pneumoperitoneum 3 other 5 mm bladeless trocars were then placed in the right upper quadrant  Gallbladder was lifted cephalad he had a fair amount of omental adhesions to the undersurface of the gallbladder  These were taken down with a combination of blunt dissection and Harmonic scalpel  Lateral retraction was utilized at the neck of the gallbladder  Careful dissection was carried out to identify both cystic duct and cystic artery  Cystic artery was divided to Ligaclips  Clip was placed at the junction of the cystic duct and the gallbladder  Small cut was made and a cholangiogram was performed using a TAUT  Cholangiogram catheter  There was good flow of dye into the common duct distal hepatic radicles and duodenum  No filling defect was noted  Cholangiogram catheter was removed  Proximal cystic duct was triply clipped and divided  Gallbladder was then taken off the liver using variable speed Harmonic scalpel  There was excellent hemostasis  Gallbladder was placed into an Endo-Catch bag  Irrigation is carried out  Pneumoperitoneum was evacuated  Gallbladder then brought to the umbilical port site  Trocars removed  Fascia at the umbilical port site was closed using 0 Vicryl in a figure-eight fashion  Local was instilled  Four Monocryl was used to close skin incisions in a subcuticular fashion  Wounds were washed and dried  Sterile skin glue was applied  Was awake in the operating returned to the recovery area in stable condition having tolerated the procedure well     I was present for the entire procedure    Patient Disposition:  PACU       SIGNATURE: Patrick Dial DO  DATE: January 24, 2022  TIME: 6:52 PM on the discharge service for the patient. I have reviewed and made amendments to the documentation where necessary.

## 2024-11-15 NOTE — PROGRESS NOTES
Biopsychosocial and Barriers Assessment    Cancer Diagnosis: Gastric CA   Home/Cell Phone: [phone]   Emergency Contact: Antonella Biggs (Daughter) [phone]   Marital Status: Dayana Pollock  Interpretation concerns, speaks another language, preferred language: English  Cultural concerns: no  Ability to read or write: yes    Caregiver/Support: wifeMagdy  Children: Mary and Marko  Child/Elder care: no    Housing: One story   Home Setup: there is one step to enter from outside  Lives With: ayaz Yarbrough  Daily Living Activities: assistance from wife  Durable Medical Equipment: walker, and cane and wheelchair  Ambulation: assistance     [facility]  High co-pays with insurance: no  High co-pays with medication coverage: no  No medication coverage: no    Primary Care Provider: Saul Miller DO  Hx of Home Health Care: no   Hx of Short term rehab: no  Mental Health Hx: no  Substance Abuse Hx: no  Employment: retired   Status/Location: no   Ability to pay bills: yes  POA/LW/AD: nigel Mccormick is health representative  Transportation Plan/Concerns: ayaz Mccormick transpors      What do you know about your Cancer Diagnosis    What has your doctor told you about your cancer diagnosis: Stage 4 cancer  What has your doctor told you about your cancer treatment: chemo to treatment  Pain management    What specific concerns do you have about your diagnosis and treatment: no concerns    Have you been made aware of any hair loss associated with treatment: n/a    Additional Comments: At last contact with patient, pt requested this MSW phone his wife to initiate inial assessment and provide emotional support services  MSW phoned and spoke to Miriam who confirmed she is doing as best to be expected  Khai Mccormick states she stays physically fit   Khai Mccormick has concerns for patient and does not like to see him in pain  Khai Mccormick said she knows her  is terminal however do not know when he will transition  Kelley Blas also said she knows her  does not want to die at home instead at [facility] house  MSW provided emotional support  Kelley Blas is agreeable to MSW's follow up  MSW will follow up  Awake

## (undated) DEVICE — KERLIX BANDAGE ROLL: Brand: KERLIX

## (undated) DEVICE — ADHESIVE SKIN HIGH VISCOSITY EXOFIN 1ML

## (undated) DEVICE — TROCAR: Brand: KII FIOS FIRST ENTRY

## (undated) DEVICE — VIAL DECANTER

## (undated) DEVICE — KIT, BETHLEHEM ROBOTIC PROST: Brand: CARDINAL HEALTH

## (undated) DEVICE — PAD GROUNDING ADULT

## (undated) DEVICE — CHLORAPREP HI-LITE 26ML ORANGE

## (undated) DEVICE — CATH FOLEY 18FR 5ML 2 WAY SILICONE ELASTIMER

## (undated) DEVICE — Device: Brand: MEDEX

## (undated) DEVICE — SURGICEL 4 X 8

## (undated) DEVICE — DRAPE EQUIPMENT RF WAND

## (undated) DEVICE — SUT PDS II 0 CT-1 27 IN Z340H

## (undated) DEVICE — 3000CC GUARDIAN II: Brand: GUARDIAN

## (undated) DEVICE — SUT SILK 2-0 TIES 144 IN LA55G

## (undated) DEVICE — CANNULA SEAL

## (undated) DEVICE — LUBRICANT SURGILUBE TUBE 4 OZ  FLIP TOP

## (undated) DEVICE — ASTOUND STANDARD SURGICAL GOWN, XL: Brand: CONVERTORS

## (undated) DEVICE — ENDOPATH PNEUMONEEDLE INSUFFLATION NEEDLES WITH LUER LOCK CONNECTORS 120MM: Brand: ENDOPATH

## (undated) DEVICE — SOFT SILICONE HYDROCELLULAR SACRUM DRESSING WITH LOCK AWAY LAYER: Brand: ALLEVYN LIFE SACRUM (LARGE) PACK OF 10

## (undated) DEVICE — SUT PROLENE 2-0 SH 30 IN 8833H

## (undated) DEVICE — SUT ETHILON 3-0 FS-1 18 IN 663G

## (undated) DEVICE — COLUMN DRAPE

## (undated) DEVICE — PENCIL ELECTROSURG E-Z CLEAN -0035H

## (undated) DEVICE — GAUZE SPONGES,USP TYPE VII GAUZE, 12 PLY: Brand: CURITY

## (undated) DEVICE — TISSUE RETRIEVAL SYSTEM: Brand: INZII RETRIEVAL SYSTEM

## (undated) DEVICE — LARGE NEEDLE DRIVER: Brand: ENDOWRIST

## (undated) DEVICE — AIRSEAL TUBE SMOKE EVAC LUMENX3 FILTERED

## (undated) DEVICE — TUBING SUCTION 5MM X 12 FT

## (undated) DEVICE — GLOVE INDICATOR PI UNDERGLOVE SZ 8 BLUE

## (undated) DEVICE — LIGHT HANDLE COVER SLEEVE DISP BLUE STELLAR

## (undated) DEVICE — MEDI-VAC YANK SUCT HNDL W/TPRD BULBOUS TIP: Brand: CARDINAL HEALTH

## (undated) DEVICE — DRAPE C-ARM X-RAY

## (undated) DEVICE — INTENDED FOR TISSUE SEPARATION, AND OTHER PROCEDURES THAT REQUIRE A SHARP SURGICAL BLADE TO PUNCTURE OR CUT.: Brand: BARD-PARKER SAFETY BLADES SIZE 11, STERILE

## (undated) DEVICE — HEMOSTATIC MATRIX SURGIFLO 8ML W/THROMBIN

## (undated) DEVICE — UNDYED BRAIDED (POLYGLACTIN 910), SYNTHETIC ABSORBABLE SUTURE: Brand: COATED VICRYL

## (undated) DEVICE — JP PERF DRN SIL FLT 10MM FULL: Brand: CARDINAL HEALTH

## (undated) DEVICE — SUT SILK 2-0 18 IN A185H

## (undated) DEVICE — ANTIBACTERIAL UNDYED BRAIDED (POLYGLACTIN 910), SYNTHETIC ABSORBABLE SUTURE: Brand: COATED VICRYL

## (undated) DEVICE — LUBRICANT INST ELECTROLUBE ANTISTK WO PAD

## (undated) DEVICE — CATH FOLEY 24FR 30ML 3 WAY SILICONE ELASTIMER

## (undated) DEVICE — SUT MONOCRYL 4-0 PS-2 27 IN Y426H

## (undated) DEVICE — BAG DECANTER

## (undated) DEVICE — MONOPOLAR CURVED SCISSORS: Brand: ENDOWRIST

## (undated) DEVICE — GLOVE SRG BIOGEL ECLIPSE 7.5

## (undated) DEVICE — HARMONIC 1100 SHEARS, 36CM SHAFT LENGTH: Brand: HARMONIC

## (undated) DEVICE — CYSTO TUBING TUR Y IRRIGATION

## (undated) DEVICE — TAUT CATH INTRODUCER 4.5 FR

## (undated) DEVICE — ARM DRAPE

## (undated) DEVICE — INTENDED FOR TISSUE SEPARATION, AND OTHER PROCEDURES THAT REQUIRE A SHARP SURGICAL BLADE TO PUNCTURE OR CUT.: Brand: BARD-PARKER SAFETY BLADES SIZE 15, STERILE

## (undated) DEVICE — 3M™ TEGADERM™ TRANSPARENT FILM DRESSING FRAME STYLE, 1626W, 4 IN X 4-3/4 IN (10 CM X 12 CM), 50/CT 4CT/CASE: Brand: 3M™ TEGADERM™

## (undated) DEVICE — Device: Brand: CENTURION

## (undated) DEVICE — SYRINGE 10ML LL

## (undated) DEVICE — SUT VICRYL 0 UR-6 27 IN J603H

## (undated) DEVICE — SUT VLOC 90 3-0 CV-23 9 IN VLOCM0844

## (undated) DEVICE — SUT MONOCRYL PLUS 4-0 PS-2 18 IN MCP496G

## (undated) DEVICE — SUT VLOC 90 3-0 90 CV-23 L9 IN VLOCM1944

## (undated) DEVICE — VISUALIZATION SYSTEM: Brand: CLEARIFY

## (undated) DEVICE — GLOVE SRG BIOGEL ORTHOPEDIC 8

## (undated) DEVICE — TROCAR PORT ACCESS 12 X120MM W/BLDLS OPTICAL TIP AIRSEAL

## (undated) DEVICE — TIP COVER ACCESSORY

## (undated) DEVICE — TROCAR: Brand: KII® SLEEVE

## (undated) DEVICE — ROSEBUD DISSECTORS: Brand: DEROYAL

## (undated) DEVICE — STRL BETHLACCESS CATHETER PACK: Brand: CARDINAL HEALTH

## (undated) DEVICE — FENESTRATED BIPOLAR FORCEPS: Brand: ENDOWRIST

## (undated) DEVICE — SYRINGE 20ML LL

## (undated) DEVICE — JACKSON-PRATT 100CC BULB RESERVOIR: Brand: CARDINAL HEALTH

## (undated) DEVICE — GLOVE SRG BIOGEL ECLIPSE 8

## (undated) DEVICE — SYRINGE BULB 2 OZ

## (undated) DEVICE — SURGIFLO ENDOSCOPIC APPICATOR: Brand: ETHICON

## (undated) DEVICE — STOPCOCK 3-WAY

## (undated) DEVICE — SUT STRATAFIX SPIRAL 2-0 CT-1 30 CM SXPP1B410

## (undated) DEVICE — COTTON TIP APPLICTOR 2 PK

## (undated) DEVICE — URIMETER 2500ML

## (undated) DEVICE — PROGRASP FORCEPS: Brand: ENDOWRIST

## (undated) DEVICE — GAUZE SPONGES,16 PLY: Brand: CURITY

## (undated) DEVICE — LIGAMAX 5 MM ENDOSCOPIC MULTIPLE CLIP APPLIER: Brand: LIGAMAX

## (undated) DEVICE — ANTIBACTERIAL VIOLET BRAIDED (POLYGLACTIN 910), SYNTHETIC ABSORBABLE SUTURE: Brand: COATED VICRYL

## (undated) DEVICE — NEEDLE 22 G X 1 1/2 SAFETY

## (undated) DEVICE — HEM-O-LOK CLIP CARTRIDGE LARGE DA VINCI SI/XI

## (undated) DEVICE — PACK PBDS LAP CHOLE RF

## (undated) DEVICE — 3M™ STERI-STRIP™ REINFORCED ADHESIVE SKIN CLOSURES, R1547, 1/2 IN X 4 IN (12 MM X 100 MM), 6 STRIPS/ENVELOPE: Brand: 3M™ STERI-STRIP™